# Patient Record
Sex: FEMALE | Race: BLACK OR AFRICAN AMERICAN | NOT HISPANIC OR LATINO
[De-identification: names, ages, dates, MRNs, and addresses within clinical notes are randomized per-mention and may not be internally consistent; named-entity substitution may affect disease eponyms.]

---

## 2019-10-18 PROBLEM — Z00.00 ENCOUNTER FOR PREVENTIVE HEALTH EXAMINATION: Status: ACTIVE | Noted: 2019-10-18

## 2019-10-29 ENCOUNTER — APPOINTMENT (OUTPATIENT)
Dept: NEUROLOGY | Facility: CLINIC | Age: 29
End: 2019-10-29

## 2019-11-12 ENCOUNTER — APPOINTMENT (OUTPATIENT)
Dept: NEUROLOGY | Facility: CLINIC | Age: 29
End: 2019-11-12

## 2020-01-23 ENCOUNTER — TRANSCRIPTION ENCOUNTER (OUTPATIENT)
Age: 30
End: 2020-01-23

## 2020-03-15 ENCOUNTER — EMERGENCY (EMERGENCY)
Facility: HOSPITAL | Age: 30
LOS: 1 days | Discharge: HOME | End: 2020-03-15
Attending: EMERGENCY MEDICINE | Admitting: EMERGENCY MEDICINE
Payer: MEDICAID

## 2020-03-15 ENCOUNTER — TRANSCRIPTION ENCOUNTER (OUTPATIENT)
Age: 30
End: 2020-03-15

## 2020-03-15 VITALS
SYSTOLIC BLOOD PRESSURE: 108 MMHG | OXYGEN SATURATION: 100 % | DIASTOLIC BLOOD PRESSURE: 73 MMHG | HEART RATE: 77 BPM | TEMPERATURE: 98 F | RESPIRATION RATE: 18 BRPM

## 2020-03-15 VITALS
RESPIRATION RATE: 18 BRPM | TEMPERATURE: 98 F | HEART RATE: 80 BPM | DIASTOLIC BLOOD PRESSURE: 74 MMHG | OXYGEN SATURATION: 100 %

## 2020-03-15 LAB
ALBUMIN SERPL ELPH-MCNC: 3.8 G/DL — SIGNIFICANT CHANGE UP (ref 3.3–5)
ALP SERPL-CCNC: 58 U/L — SIGNIFICANT CHANGE UP (ref 40–120)
ALT FLD-CCNC: 17 U/L — SIGNIFICANT CHANGE UP (ref 4–33)
ANION GAP SERPL CALC-SCNC: 8 MMO/L — SIGNIFICANT CHANGE UP (ref 7–14)
APPEARANCE UR: CLEAR — SIGNIFICANT CHANGE UP
AST SERPL-CCNC: 29 U/L — SIGNIFICANT CHANGE UP (ref 4–32)
BILIRUB SERPL-MCNC: 0.3 MG/DL — SIGNIFICANT CHANGE UP (ref 0.2–1.2)
BILIRUB UR-MCNC: NEGATIVE — SIGNIFICANT CHANGE UP
BLOOD UR QL VISUAL: NEGATIVE — SIGNIFICANT CHANGE UP
BUN SERPL-MCNC: 9 MG/DL — SIGNIFICANT CHANGE UP (ref 7–23)
CALCIUM SERPL-MCNC: 9.5 MG/DL — SIGNIFICANT CHANGE UP (ref 8.4–10.5)
CHLORIDE SERPL-SCNC: 101 MMOL/L — SIGNIFICANT CHANGE UP (ref 98–107)
CO2 SERPL-SCNC: 22 MMOL/L — SIGNIFICANT CHANGE UP (ref 22–31)
COLOR SPEC: SIGNIFICANT CHANGE UP
CREAT SERPL-MCNC: 0.67 MG/DL — SIGNIFICANT CHANGE UP (ref 0.5–1.3)
GLUCOSE SERPL-MCNC: 82 MG/DL — SIGNIFICANT CHANGE UP (ref 70–99)
GLUCOSE UR-MCNC: NEGATIVE — SIGNIFICANT CHANGE UP
HCT VFR BLD CALC: 36 % — SIGNIFICANT CHANGE UP (ref 34.5–45)
HGB BLD-MCNC: 11.9 G/DL — SIGNIFICANT CHANGE UP (ref 11.5–15.5)
KETONES UR-MCNC: NEGATIVE — SIGNIFICANT CHANGE UP
LEUKOCYTE ESTERASE UR-ACNC: NEGATIVE — SIGNIFICANT CHANGE UP
LIDOCAIN IGE QN: 24 U/L — SIGNIFICANT CHANGE UP (ref 7–60)
MCHC RBC-ENTMCNC: 29.9 PG — SIGNIFICANT CHANGE UP (ref 27–34)
MCHC RBC-ENTMCNC: 33.1 % — SIGNIFICANT CHANGE UP (ref 32–36)
MCV RBC AUTO: 90.5 FL — SIGNIFICANT CHANGE UP (ref 80–100)
NITRITE UR-MCNC: NEGATIVE — SIGNIFICANT CHANGE UP
NRBC # FLD: 0 K/UL — SIGNIFICANT CHANGE UP (ref 0–0)
PH UR: 6 — SIGNIFICANT CHANGE UP (ref 5–8)
PLATELET # BLD AUTO: 264 K/UL — SIGNIFICANT CHANGE UP (ref 150–400)
PMV BLD: 10.5 FL — SIGNIFICANT CHANGE UP (ref 7–13)
POTASSIUM SERPL-MCNC: 5.5 MMOL/L — HIGH (ref 3.5–5.3)
POTASSIUM SERPL-SCNC: 5.5 MMOL/L — HIGH (ref 3.5–5.3)
PROT SERPL-MCNC: 7.2 G/DL — SIGNIFICANT CHANGE UP (ref 6–8.3)
PROT UR-MCNC: NEGATIVE — SIGNIFICANT CHANGE UP
RBC # BLD: 3.98 M/UL — SIGNIFICANT CHANGE UP (ref 3.8–5.2)
RBC # FLD: 13.7 % — SIGNIFICANT CHANGE UP (ref 10.3–14.5)
SODIUM SERPL-SCNC: 131 MMOL/L — LOW (ref 135–145)
SP GR SPEC: 1.01 — SIGNIFICANT CHANGE UP (ref 1–1.04)
UROBILINOGEN FLD QL: NORMAL — SIGNIFICANT CHANGE UP
WBC # BLD: 5.31 K/UL — SIGNIFICANT CHANGE UP (ref 3.8–10.5)
WBC # FLD AUTO: 5.31 K/UL — SIGNIFICANT CHANGE UP (ref 3.8–10.5)

## 2020-03-15 PROCEDURE — 74176 CT ABD & PELVIS W/O CONTRAST: CPT | Mod: 26

## 2020-03-15 PROCEDURE — 99283 EMERGENCY DEPT VISIT LOW MDM: CPT

## 2020-03-15 PROCEDURE — 76705 ECHO EXAM OF ABDOMEN: CPT | Mod: 26

## 2020-03-15 RX ORDER — SODIUM CHLORIDE 9 MG/ML
1000 INJECTION INTRAMUSCULAR; INTRAVENOUS; SUBCUTANEOUS ONCE
Refills: 0 | Status: COMPLETED | OUTPATIENT
Start: 2020-03-15 | End: 2020-03-15

## 2020-03-15 RX ORDER — KETOROLAC TROMETHAMINE 30 MG/ML
15 SYRINGE (ML) INJECTION ONCE
Refills: 0 | Status: DISCONTINUED | OUTPATIENT
Start: 2020-03-15 | End: 2020-03-15

## 2020-03-15 RX ADMIN — Medication 15 MILLIGRAM(S): at 15:46

## 2020-03-15 RX ADMIN — SODIUM CHLORIDE 1000 MILLILITER(S): 9 INJECTION INTRAMUSCULAR; INTRAVENOUS; SUBCUTANEOUS at 13:43

## 2020-03-15 NOTE — ED PROVIDER NOTE - CLINICAL SUMMARY MEDICAL DECISION MAKING FREE TEXT BOX
upper abd pain concern for pancreatitis vs gb diseas vs gastritis vs pyelo. pain not compatible with gu source. will check ua and u preg

## 2020-03-15 NOTE — ED ADULT NURSE NOTE - CHPI ED NUR SYMPTOMS NEG
no dysuria/no hematuria/no nausea/no vomiting/no fever/no diarrhea/no blood in stool/no burning urination/no chills

## 2020-03-15 NOTE — PROVIDER CONTACT NOTE (OTHER) - ASSESSMENT
SW contacted by Attending BETSY Patrick who states Pt is from a Group Home had an Aide with her who appears to have left. Pt has intellectual delay.  ROCIO met with Pt A&O x2 who was alone unable to provide contact information for Aide. SW unable to reach anyone with number on file, SW attempted online searches for agency [facility] contact information for weekday personnel.   ROCIO met with Pt again who was now accompanied by two staff from [facility] [phone] MsKory Ely and Ms. Landers who confirmed Pt’s address [address] [place] [address] and provided  Doris Georges # [phone]. ROCIO spoke with Manager who confirmed the staff, address and Pt to travel back by group home van driven by Ms. Graves.   Pt is cleared for D/C by Medicine.  Clinical provider is in agreement with Group Home Van transport to residence.  Huddle Form Completed.  No further SW intervention required at this time.

## 2020-03-15 NOTE — ED PROVIDER NOTE - OBJECTIVE STATEMENT
29 year old female with developmental delay presents with diffuse abd pain, mostly upper. no lower abd pain. denies n/v/d/ denies dysuria

## 2020-06-20 ENCOUNTER — EMERGENCY (EMERGENCY)
Facility: HOSPITAL | Age: 30
LOS: 1 days | Discharge: HOME | End: 2020-06-20
Attending: EMERGENCY MEDICINE | Admitting: EMERGENCY MEDICINE
Payer: MEDICAID

## 2020-06-20 VITALS
TEMPERATURE: 99 F | RESPIRATION RATE: 19 BRPM | SYSTOLIC BLOOD PRESSURE: 112 MMHG | HEART RATE: 81 BPM | DIASTOLIC BLOOD PRESSURE: 74 MMHG | OXYGEN SATURATION: 100 %

## 2020-06-20 VITALS
OXYGEN SATURATION: 100 % | DIASTOLIC BLOOD PRESSURE: 80 MMHG | TEMPERATURE: 98 F | HEART RATE: 90 BPM | RESPIRATION RATE: 18 BRPM | SYSTOLIC BLOOD PRESSURE: 129 MMHG

## 2020-06-20 LAB
ALBUMIN SERPL ELPH-MCNC: 4 G/DL — SIGNIFICANT CHANGE UP (ref 3.3–5)
ALP SERPL-CCNC: 59 U/L — SIGNIFICANT CHANGE UP (ref 40–120)
ALT FLD-CCNC: 12 U/L — SIGNIFICANT CHANGE UP (ref 4–33)
ANION GAP SERPL CALC-SCNC: 10 MMO/L — SIGNIFICANT CHANGE UP (ref 7–14)
APPEARANCE UR: CLEAR — SIGNIFICANT CHANGE UP
AST SERPL-CCNC: 15 U/L — SIGNIFICANT CHANGE UP (ref 4–32)
BASOPHILS # BLD AUTO: 0.01 K/UL — SIGNIFICANT CHANGE UP (ref 0–0.2)
BASOPHILS NFR BLD AUTO: 0.2 % — SIGNIFICANT CHANGE UP (ref 0–2)
BILIRUB SERPL-MCNC: < 0.2 MG/DL — LOW (ref 0.2–1.2)
BILIRUB UR-MCNC: NEGATIVE — SIGNIFICANT CHANGE UP
BLOOD UR QL VISUAL: NEGATIVE — SIGNIFICANT CHANGE UP
BUN SERPL-MCNC: 8 MG/DL — SIGNIFICANT CHANGE UP (ref 7–23)
CALCIUM SERPL-MCNC: 9.5 MG/DL — SIGNIFICANT CHANGE UP (ref 8.4–10.5)
CHLORIDE SERPL-SCNC: 103 MMOL/L — SIGNIFICANT CHANGE UP (ref 98–107)
CO2 SERPL-SCNC: 23 MMOL/L — SIGNIFICANT CHANGE UP (ref 22–31)
COLOR SPEC: SIGNIFICANT CHANGE UP
CREAT SERPL-MCNC: 0.69 MG/DL — SIGNIFICANT CHANGE UP (ref 0.5–1.3)
EOSINOPHIL # BLD AUTO: 0.04 K/UL — SIGNIFICANT CHANGE UP (ref 0–0.5)
EOSINOPHIL NFR BLD AUTO: 0.8 % — SIGNIFICANT CHANGE UP (ref 0–6)
GLUCOSE SERPL-MCNC: 95 MG/DL — SIGNIFICANT CHANGE UP (ref 70–99)
GLUCOSE UR-MCNC: NEGATIVE — SIGNIFICANT CHANGE UP
HCG SERPL-ACNC: < 5 MIU/ML — SIGNIFICANT CHANGE UP
HCT VFR BLD CALC: 34.7 % — SIGNIFICANT CHANGE UP (ref 34.5–45)
HGB BLD-MCNC: 11.5 G/DL — SIGNIFICANT CHANGE UP (ref 11.5–15.5)
IMM GRANULOCYTES NFR BLD AUTO: 0.2 % — SIGNIFICANT CHANGE UP (ref 0–1.5)
KETONES UR-MCNC: NEGATIVE — SIGNIFICANT CHANGE UP
LEUKOCYTE ESTERASE UR-ACNC: NEGATIVE — SIGNIFICANT CHANGE UP
LIDOCAIN IGE QN: 32.2 U/L — SIGNIFICANT CHANGE UP (ref 7–60)
LYMPHOCYTES # BLD AUTO: 1.58 K/UL — SIGNIFICANT CHANGE UP (ref 1–3.3)
LYMPHOCYTES # BLD AUTO: 33.4 % — SIGNIFICANT CHANGE UP (ref 13–44)
MAGNESIUM SERPL-MCNC: 1.7 MG/DL — SIGNIFICANT CHANGE UP (ref 1.6–2.6)
MCHC RBC-ENTMCNC: 29.6 PG — SIGNIFICANT CHANGE UP (ref 27–34)
MCHC RBC-ENTMCNC: 33.1 % — SIGNIFICANT CHANGE UP (ref 32–36)
MCV RBC AUTO: 89.2 FL — SIGNIFICANT CHANGE UP (ref 80–100)
MONOCYTES # BLD AUTO: 0.39 K/UL — SIGNIFICANT CHANGE UP (ref 0–0.9)
MONOCYTES NFR BLD AUTO: 8.2 % — SIGNIFICANT CHANGE UP (ref 2–14)
NEUTROPHILS # BLD AUTO: 2.7 K/UL — SIGNIFICANT CHANGE UP (ref 1.8–7.4)
NEUTROPHILS NFR BLD AUTO: 57.2 % — SIGNIFICANT CHANGE UP (ref 43–77)
NITRITE UR-MCNC: NEGATIVE — SIGNIFICANT CHANGE UP
NRBC # FLD: 0 K/UL — SIGNIFICANT CHANGE UP (ref 0–0)
PH UR: 7.5 — SIGNIFICANT CHANGE UP (ref 5–8)
PHOSPHATE SERPL-MCNC: 3.1 MG/DL — SIGNIFICANT CHANGE UP (ref 2.5–4.5)
PLATELET # BLD AUTO: 259 K/UL — SIGNIFICANT CHANGE UP (ref 150–400)
PMV BLD: 10.5 FL — SIGNIFICANT CHANGE UP (ref 7–13)
POTASSIUM SERPL-MCNC: 4.4 MMOL/L — SIGNIFICANT CHANGE UP (ref 3.5–5.3)
POTASSIUM SERPL-SCNC: 4.4 MMOL/L — SIGNIFICANT CHANGE UP (ref 3.5–5.3)
PROT SERPL-MCNC: 6.9 G/DL — SIGNIFICANT CHANGE UP (ref 6–8.3)
PROT UR-MCNC: NEGATIVE — SIGNIFICANT CHANGE UP
RBC # BLD: 3.89 M/UL — SIGNIFICANT CHANGE UP (ref 3.8–5.2)
RBC # FLD: 13.4 % — SIGNIFICANT CHANGE UP (ref 10.3–14.5)
SODIUM SERPL-SCNC: 136 MMOL/L — SIGNIFICANT CHANGE UP (ref 135–145)
SP GR SPEC: 1.01 — SIGNIFICANT CHANGE UP (ref 1–1.04)
UROBILINOGEN FLD QL: NORMAL — SIGNIFICANT CHANGE UP
WBC # BLD: 4.73 K/UL — SIGNIFICANT CHANGE UP (ref 3.8–10.5)
WBC # FLD AUTO: 4.73 K/UL — SIGNIFICANT CHANGE UP (ref 3.8–10.5)

## 2020-06-20 PROCEDURE — 99284 EMERGENCY DEPT VISIT MOD MDM: CPT

## 2020-06-20 RX ORDER — FAMOTIDINE 10 MG/ML
20 INJECTION INTRAVENOUS DAILY
Refills: 0 | Status: DISCONTINUED | OUTPATIENT
Start: 2020-06-20 | End: 2020-06-24

## 2020-06-20 RX ADMIN — FAMOTIDINE 104 MILLIGRAM(S): 10 INJECTION INTRAVENOUS at 18:25

## 2020-06-20 RX ADMIN — Medication 30 MILLILITER(S): at 18:24

## 2020-06-20 NOTE — ED ADULT TRIAGE NOTE - CHIEF COMPLAINT QUOTE
Pt arrives via EMS--pt autistic from a group home. Pt c/o abdominal stomach all over since yesterday. Denies any other symptoms. Denies N/V or diarrhea Pt arrives via EMS--pt autistic from a group home., with aide. Pt c/o abdominal /stomach pain all over since yesterday. Denies any other symptoms. Denies N/V or diarrhea

## 2020-06-20 NOTE — ED ADULT NURSE NOTE - NSIMPLEMENTINTERV_GEN_ALL_ED
Implemented All Universal Safety Interventions:  [place] to call system. Call bell, personal items and telephone within reach. Instruct patient to call for assistance. Room bathroom lighting operational. Non-slip footwear when patient is off stretcher. Physically safe environment: no spills, clutter or unnecessary equipment. Stretcher in lowest position, wheels locked, appropriate side rails in place.

## 2020-06-20 NOTE — ED PROVIDER NOTE - PATIENT PORTAL LINK FT
You can access the FollowMyHealth Patient Portal offered by [facility] by registering at the following website: [email or web address]. By joining [employer]’s FollowMyHealth portal, you will also be able to view your health information using other applications (apps) compatible with our system.

## 2020-06-20 NOTE — ED ADULT NURSE NOTE - CHIEF COMPLAINT QUOTE
Pt arrives via EMS--pt autistic from a group home., with aide. Pt c/o abdominal /stomach pain all over since yesterday. Denies any other symptoms. Denies N/V or diarrhea

## 2020-06-20 NOTE — ED ADULT NURSE NOTE - OBJECTIVE STATEMENT
Receive pt. in Intake room 3 alert and oriented x 3, presenting to the ER with complaints of abdominal pain. Pt. have a history of developmental delay, she is from a group home accompanied by staff. Pt. stated " I ate mac and cheese with chicken tenders today and my stomach is hurting now". Medicated as ordered, labs sent, will continue to monitor.

## 2020-06-20 NOTE — ED PROVIDER NOTE - NSFOLLOWUPINSTRUCTIONS_ED_ALL_ED_FT
Advance activity as tolerated.  Continue all previously prescribed medications as directed unless otherwise instructed.  Follow up with your primary care physician in 48-72 hours- bring copies of your results.  Return to the ER for worsening or persistent symptoms, and/or ANY NEW OR CONCERNING SYMPTOMS. If you have issues obtaining follow up, please call: [phone] to obtain a doctor or specialist who takes your insurance in your area.  You may call [phone] to make an appointment with the internal medicine clinic.

## 2020-06-20 NOTE — ED PROVIDER NOTE - CLINICAL SUMMARY MEDICAL DECISION MAKING FREE TEXT BOX
30 y/o F pmh  developmental delay c/o sharp abd pain all over shortly after having lunch consisting of mac n cheese and chicken tenders. Pt reports having this pain before and was told everything was normal. Did not take anything for the pain. Denies nausea, vomiting, diarrhea, fever, chills, cp, sob, dysuria.  abd soft/nt/nd  will give pepcid, maalox  -labs, ua
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
## 2020-06-20 NOTE — ED PROVIDER NOTE - OBJECTIVE STATEMENT
28 y/o F pmh  developmental delay c/o sharp abd pain all over shortly after having lunch consisting of mac n cheese and chicken tenders. Pt reports having this pain before and was told everything was normal. Did not take anything for the pain. Denies nausea, vomiting, diarrhea, fever, chills, cp, sob, dysuria.

## 2020-06-21 LAB
CULTURE RESULTS: SIGNIFICANT CHANGE UP
SPECIMEN SOURCE: SIGNIFICANT CHANGE UP

## 2020-07-19 ENCOUNTER — EMERGENCY (EMERGENCY)
Facility: HOSPITAL | Age: 30
LOS: 1 days | Discharge: HOME | End: 2020-07-19
Attending: EMERGENCY MEDICINE | Admitting: EMERGENCY MEDICINE
Payer: MEDICAID

## 2020-07-19 VITALS
HEART RATE: 111 BPM | TEMPERATURE: 98 F | SYSTOLIC BLOOD PRESSURE: 111 MMHG | DIASTOLIC BLOOD PRESSURE: 72 MMHG | RESPIRATION RATE: 16 BRPM | OXYGEN SATURATION: 100 %

## 2020-07-19 VITALS
DIASTOLIC BLOOD PRESSURE: 77 MMHG | OXYGEN SATURATION: 100 % | RESPIRATION RATE: 16 BRPM | SYSTOLIC BLOOD PRESSURE: 129 MMHG | TEMPERATURE: 98 F | HEART RATE: 76 BPM

## 2020-07-19 LAB
ALBUMIN SERPL ELPH-MCNC: 4 G/DL — SIGNIFICANT CHANGE UP (ref 3.3–5)
ALP SERPL-CCNC: 58 U/L — SIGNIFICANT CHANGE UP (ref 40–120)
ALT FLD-CCNC: 13 U/L — SIGNIFICANT CHANGE UP (ref 4–33)
ANION GAP SERPL CALC-SCNC: 11 MMO/L — SIGNIFICANT CHANGE UP (ref 7–14)
APPEARANCE UR: CLEAR — SIGNIFICANT CHANGE UP
AST SERPL-CCNC: 16 U/L — SIGNIFICANT CHANGE UP (ref 4–32)
BASOPHILS # BLD AUTO: 0.01 K/UL — SIGNIFICANT CHANGE UP (ref 0–0.2)
BASOPHILS NFR BLD AUTO: 0.2 % — SIGNIFICANT CHANGE UP (ref 0–2)
BILIRUB SERPL-MCNC: 0.2 MG/DL — SIGNIFICANT CHANGE UP (ref 0.2–1.2)
BILIRUB UR-MCNC: NEGATIVE — SIGNIFICANT CHANGE UP
BLOOD UR QL VISUAL: NEGATIVE — SIGNIFICANT CHANGE UP
BUN SERPL-MCNC: 8 MG/DL — SIGNIFICANT CHANGE UP (ref 7–23)
CALCIUM SERPL-MCNC: 9.4 MG/DL — SIGNIFICANT CHANGE UP (ref 8.4–10.5)
CHLORIDE SERPL-SCNC: 104 MMOL/L — SIGNIFICANT CHANGE UP (ref 98–107)
CO2 SERPL-SCNC: 22 MMOL/L — SIGNIFICANT CHANGE UP (ref 22–31)
COLOR SPEC: SIGNIFICANT CHANGE UP
CREAT SERPL-MCNC: 0.64 MG/DL — SIGNIFICANT CHANGE UP (ref 0.5–1.3)
EOSINOPHIL # BLD AUTO: 0.05 K/UL — SIGNIFICANT CHANGE UP (ref 0–0.5)
EOSINOPHIL NFR BLD AUTO: 1 % — SIGNIFICANT CHANGE UP (ref 0–6)
GLUCOSE SERPL-MCNC: 80 MG/DL — SIGNIFICANT CHANGE UP (ref 70–99)
GLUCOSE UR-MCNC: NEGATIVE — SIGNIFICANT CHANGE UP
HCT VFR BLD CALC: 34.5 % — SIGNIFICANT CHANGE UP (ref 34.5–45)
HGB BLD-MCNC: 11.6 G/DL — SIGNIFICANT CHANGE UP (ref 11.5–15.5)
IMM GRANULOCYTES NFR BLD AUTO: 0.2 % — SIGNIFICANT CHANGE UP (ref 0–1.5)
KETONES UR-MCNC: NEGATIVE — SIGNIFICANT CHANGE UP
LEUKOCYTE ESTERASE UR-ACNC: NEGATIVE — SIGNIFICANT CHANGE UP
LYMPHOCYTES # BLD AUTO: 1.76 K/UL — SIGNIFICANT CHANGE UP (ref 1–3.3)
LYMPHOCYTES # BLD AUTO: 35.8 % — SIGNIFICANT CHANGE UP (ref 13–44)
MCHC RBC-ENTMCNC: 30.5 PG — SIGNIFICANT CHANGE UP (ref 27–34)
MCHC RBC-ENTMCNC: 33.6 % — SIGNIFICANT CHANGE UP (ref 32–36)
MCV RBC AUTO: 90.8 FL — SIGNIFICANT CHANGE UP (ref 80–100)
MONOCYTES # BLD AUTO: 0.38 K/UL — SIGNIFICANT CHANGE UP (ref 0–0.9)
MONOCYTES NFR BLD AUTO: 7.7 % — SIGNIFICANT CHANGE UP (ref 2–14)
NEUTROPHILS # BLD AUTO: 2.7 K/UL — SIGNIFICANT CHANGE UP (ref 1.8–7.4)
NEUTROPHILS NFR BLD AUTO: 55.1 % — SIGNIFICANT CHANGE UP (ref 43–77)
NITRITE UR-MCNC: NEGATIVE — SIGNIFICANT CHANGE UP
NRBC # FLD: 0 K/UL — SIGNIFICANT CHANGE UP (ref 0–0)
PH UR: 6 — SIGNIFICANT CHANGE UP (ref 5–8)
PLATELET # BLD AUTO: 248 K/UL — SIGNIFICANT CHANGE UP (ref 150–400)
PMV BLD: 10.4 FL — SIGNIFICANT CHANGE UP (ref 7–13)
POTASSIUM SERPL-MCNC: 4 MMOL/L — SIGNIFICANT CHANGE UP (ref 3.5–5.3)
POTASSIUM SERPL-SCNC: 4 MMOL/L — SIGNIFICANT CHANGE UP (ref 3.5–5.3)
PROT SERPL-MCNC: 6.5 G/DL — SIGNIFICANT CHANGE UP (ref 6–8.3)
PROT UR-MCNC: NEGATIVE — SIGNIFICANT CHANGE UP
RBC # BLD: 3.8 M/UL — SIGNIFICANT CHANGE UP (ref 3.8–5.2)
RBC # FLD: 13.7 % — SIGNIFICANT CHANGE UP (ref 10.3–14.5)
SODIUM SERPL-SCNC: 137 MMOL/L — SIGNIFICANT CHANGE UP (ref 135–145)
SP GR SPEC: 1.01 — SIGNIFICANT CHANGE UP (ref 1–1.04)
TROPONIN T, HIGH SENSITIVITY: < 6 NG/L — SIGNIFICANT CHANGE UP (ref ?–14)
UROBILINOGEN FLD QL: NORMAL — SIGNIFICANT CHANGE UP
WBC # BLD: 4.91 K/UL — SIGNIFICANT CHANGE UP (ref 3.8–10.5)
WBC # FLD AUTO: 4.91 K/UL — SIGNIFICANT CHANGE UP (ref 3.8–10.5)

## 2020-07-19 PROCEDURE — 99284 EMERGENCY DEPT VISIT MOD MDM: CPT | Mod: 25

## 2020-07-19 PROCEDURE — 71046 X-RAY EXAM CHEST 2 VIEWS: CPT | Mod: 26

## 2020-07-19 PROCEDURE — 93010 ELECTROCARDIOGRAM REPORT: CPT

## 2020-07-19 RX ORDER — ACETAMINOPHEN 500 MG
650 TABLET ORAL ONCE
Refills: 0 | Status: COMPLETED | OUTPATIENT
Start: 2020-07-19 | End: 2020-07-19

## 2020-07-19 RX ADMIN — Medication 650 MILLIGRAM(S): at 20:01

## 2020-07-19 RX ADMIN — Medication 30 MILLILITER(S): at 19:31

## 2020-07-19 RX ADMIN — Medication 650 MILLIGRAM(S): at 19:31

## 2020-07-19 NOTE — ED PROVIDER NOTE - OBJECTIVE STATEMENT
29F presents from group home accompanied by aide. Patient states this morning had sharp non radiating chest pain that lasted about hour. She also had sharp abdominal pain that radiated to her right flank. Pain started after eating toast for breakfast. Denies constipation or diarrhea, no blood in stool. Endorses having a cough for the past week, no fever/chills. No n/v. No shortness of breath. No ightheadedness or passing out.     Medical history: constipation, schizoaffective, diabetes on metformin.   No surgical hx.   No allergies.   No new meds.

## 2020-07-19 NOTE — ED PROVIDER NOTE - PATIENT PORTAL LINK FT
You can access the FollowMyHealth Patient Portal offered by [facility] by registering at the following website: [email or web address]. By joining [employer]’s FollowMyHealth portal, you will also be able to view your health information using other applications (apps) compatible with our system.

## 2020-07-19 NOTE — ED ADULT NURSE NOTE - OBJECTIVE STATEMENT
Pt received to room 26 c/o midsternal CP and epigastric pain for the past few days. Denies N/V/D, fever/chills, SOB. Respirations even/unlabored. In NAD. NSR on cardiac monitor. Pt is from group home, accompanied by group staff member. Hx: schizophrenia. Will continue to monitor.

## 2020-07-19 NOTE — ED PROVIDER NOTE - ATTENDING CONTRIBUTION TO CARE
29F p/w chest pain and stomach pain x 1 day.  Upper middle chest pain x 1 day.  No fever, does endorse cough, no sputum.  no short of breath, no h/o asthma, no vomiting or diarrhea.  No fall or injury.  No other c/o.  Still having chest discomfort.  Pt reports year is 2016 but does know the season, the reason she's here, the name of her aide; aide requests psychiatry eval.  Pt is cooperative, denies SI/HI/AVH.  I suspect pt has more intellectual disability rather than confusion, aide advised we will check labs, and urine and CXR; if those are unremarkable would have pt f/u with PMD and psychiatry as oupt. Normal neuro exam here. 29F p/w chest pain and stomach pain x 1 day.  Upper middle chest pain x 1 day.  No fever, does endorse cough, no sputum.  no short of breath, no h/o asthma, no vomiting or diarrhea.  No fall or injury.  No other c/o.  Still having chest discomfort.  Pt reports year is 2016 but does know the season, the reason she's here, the name of her aide; aide requests psychiatry eval.  Pt is calm and cooperative, denies SI/HI/AVH.  I suspect pt has more intellectual disability rather than confusion, aide advised we will check labs, and urine and CXR; if those are unremarkable would have pt f/u with PMD and psychiatry as oupt. Normal neuro exam here.  VS:  unremarkable except tachycardia, improving    GEN - NAD;   well appearing;   A+O x3  (except year, sensorium clear, knows why she's here, where she is, name of aide, name of president)  HEAD - NC/AT     ENT - PEERL, EOMI, mucous membranes    moist , no discharge      NECK: Neck supple, non-tender without lymphadenopathy, no masses, no JVD  PULM - CTA b/l,  symmetric breath sounds  COR -  normal heart sounds    ABD - , ND, NT, soft,  BACK - no CVA tenderness, nontender spine     EXTREMS - no edema, no deformity, warm and well perfused    SKIN - no rash    or bruising      NEUROLOGIC - alert, face symmetric, speech fluent, sensation nl, motor no focal deficit.

## 2020-07-19 NOTE — ED PROVIDER NOTE - PROGRESS NOTE DETAILS
Patient pain free at this time with normal physical exam and stable vital signs. Caretaker states patient will follow up with primary care provider for recurring or worsening symptoms.

## 2020-07-19 NOTE — ED ADULT NURSE NOTE - CAS EDP DISCH TYPE
Spoke with patient via phone. Last INR 7/18/19 was 2.4. Dose maintained per protocol. Today's INR is 3.3 and is within goal range. Current warfarin dosing verified with patient. Patient was informed that their INR result is within therapeutic range and instructed to maintain current dose per protocol. Discussed dose and return date for next INR. Dr. Zina Andrade is in the office today supervising the treatment. Patient was instructed to contact the clinic with any unusual bleeding or bruising, any changes in medications, diet, health status, lifestyle, or any other changes, questions or concerns. Patient verbalized understanding of all discussed.
Group Home/[facility] group [facility]

## 2020-07-19 NOTE — ED PROVIDER NOTE - CLINICAL SUMMARY MEDICAL DECISION MAKING FREE TEXT BOX
Sue: 29F presents to ED after having brief episode of abdominal pain and chest pain. No concern for aortic dissection as VSS and PE normal. Also no risk factors (non smoker, no HTN, young age). Pulmonary embolism unlikely as well (no recent immobilization, no OCPs, no smoking, no family hx clots, no personal hx clots, no tachycardia or hypoxia or syncopal events). Boorheves, ACS, Pneumonia unlikely given HPI, but will check CXR, troponin, basic labs, EKG. AGE unlikely as no n/v/d or fever. Appendicitis and choleystits unlikely as patient is pain free. Will d/c with PCP follow up as patient is asymptomatic at this time.

## 2020-07-19 NOTE — ED PROVIDER NOTE - NSFOLLOWUPINSTRUCTIONS_ED_ALL_ED_FT
You were seen for abdominal pain and chest pain.     Seek immediate medical assistance for any new or worsening symptoms.     Follow up with your PCP for recurring symptoms.     Please take 600 mg of Ibuprofen (aka Motrin, Advil) and/or 650 mg Acetaminophen (aka Tylenol) every 6 hours, as needed, for mild-moderate pain.

## 2020-07-19 NOTE — ED ADULT TRIAGE NOTE - CHIEF COMPLAINT QUOTE
Pt from [facility] group home accompanied by aide w/ hx of schizophrenia, non positional midsternal chest pain radiating bilaterally since this am w/ associated anxiety Pt denies SOB dizziness

## 2020-07-19 NOTE — ED ADULT NURSE NOTE - CHIEF COMPLAINT QUOTE
Pt from [facility] group home accompanied by aide w/ hx of schizophrenia, non positional midsternal chest pain radiating bilaterally since this am w/ associated anxiety Pt denies SOB dizziness

## 2020-11-08 ENCOUNTER — EMERGENCY (EMERGENCY)
Facility: HOSPITAL | Age: 30
LOS: 1 days | Discharge: HOME | End: 2020-11-08
Attending: EMERGENCY MEDICINE | Admitting: EMERGENCY MEDICINE
Payer: MEDICAID

## 2020-11-08 VITALS
RESPIRATION RATE: 16 BRPM | SYSTOLIC BLOOD PRESSURE: 113 MMHG | TEMPERATURE: 97 F | DIASTOLIC BLOOD PRESSURE: 80 MMHG | HEART RATE: 94 BPM | OXYGEN SATURATION: 97 %

## 2020-11-08 PROCEDURE — 99283 EMERGENCY DEPT VISIT LOW MDM: CPT

## 2020-11-08 RX ORDER — FAMOTIDINE 10 MG/ML
20 INJECTION INTRAVENOUS ONCE
Refills: 0 | Status: COMPLETED | OUTPATIENT
Start: 2020-11-08 | End: 2020-11-08

## 2020-11-08 RX ADMIN — FAMOTIDINE 20 MILLIGRAM(S): 10 INJECTION INTRAVENOUS at 18:24

## 2020-11-08 RX ADMIN — Medication 30 MILLILITER(S): at 18:24

## 2020-11-08 NOTE — ED ADULT TRIAGE NOTE - CHIEF COMPLAINT QUOTE
Coming from group home for abd pain and diarrhea x 3 days. PMH DMII, developmental delays Coming from group home accompanied by staff for abd pain and diarrhea x 3 days. PMH DMII, developmental delays

## 2020-11-08 NOTE — ED PROVIDER NOTE - CLINICAL SUMMARY MEDICAL DECISION MAKING FREE TEXT BOX
28 y/o F with adnominal discomfort and loose stool presents to ED. The patient is well appearing. Likely gastritis vs PUD vs IBS. Will obtain uPreg and give GI cocktail. Will discharge with GI follow up as an outpatient. 28 y/o F with adnominal discomfort and loose stool presents to ED. The patient is well appearing. Likely gastritis vs PUD vs GERD vs IBS.  Will obtain uPreg and give GI cocktail. Will discharge with GI follow up as an outpatient.

## 2020-11-08 NOTE — ED PROVIDER NOTE - CARE PLAN
Principal Discharge DX:	Abdominal pain  Assessment and plan of treatment:	You were seen today for your abdominal pain.  It is likely due to gastritis, stomach inflammation.  Avoid salty or spicy foods, alcohol, and caffeine.  Be sure to follow up with your primary care physician in 2-3 days for re-evaluation.  Follow up with a GI specialist at the next available appointment. RETURN TO THE EMERGENCY DEPARTMENT IMMEDIATELY FOR SEVERE PAIN, IF YOU CANNOT EAT OR DRINK, DEVELOP CHEST PAIN, TROUBLE BREATHING, OR FOR ANY OTHER CONCERN.

## 2020-11-08 NOTE — ED ADULT NURSE NOTE - OBJECTIVE STATEMENT
pt received to intake 5, aox3.  pt c/o abd pain and diarrhea x a few days.  pt appears in NAD, tolerating PO well.  awaiting further orders

## 2020-11-08 NOTE — ED PROVIDER NOTE - PATIENT PORTAL LINK FT
You can access the FollowMyHealth Patient Portal offered by [facility] by registering at the following website: [email or web address]. By joining [employer]’s FollowMyHealth portal, you will also be able to view your health information using other applications (apps) compatible with our system.

## 2020-11-08 NOTE — ED PROVIDER NOTE - OBJECTIVE STATEMENT
30 y/o F presents to ED complaining of abdominal discomfort and loose stools. She came from a group home with an aide. She states that for the last couple of months she has had general abdominal discomfort and loose stools. Nothing has changed today. he states that he primarily has upper abdominal pain with intermittent radiation into the chest that feels like burning. 28 y/o F presents to ED complaining of abdominal discomfort and loose stools. She came from a group home with an aide. She states that for the last couple of months she has had general abdominal discomfort and loose stools. Nothing has changed today. She states that she primarily has upper abdominal pain with intermittent radiation into the chest that feels like burning.

## 2020-11-08 NOTE — ED ADULT NURSE NOTE - CHIEF COMPLAINT QUOTE
Coming from group home accompanied by staff for abd pain and diarrhea x 3 days. PMH DMII, developmental delays

## 2020-11-08 NOTE — ED PROVIDER NOTE - PLAN OF CARE
You were seen today for your abdominal pain.  It is likely due to gastritis, stomach inflammation.  Avoid salty or spicy foods, alcohol, and caffeine.  Be sure to follow up with your primary care physician in 2-3 days for re-evaluation.  Follow up with a GI specialist at the next available appointment. RETURN TO THE EMERGENCY DEPARTMENT IMMEDIATELY FOR SEVERE PAIN, IF YOU CANNOT EAT OR DRINK, DEVELOP CHEST PAIN, TROUBLE BREATHING, OR FOR ANY OTHER CONCERN.

## 2020-11-09 LAB — SARS-COV-2 RNA SPEC QL NAA+PROBE: SIGNIFICANT CHANGE UP

## 2020-11-18 ENCOUNTER — EMERGENCY (EMERGENCY)
Facility: HOSPITAL | Age: 30
LOS: 1 days | Discharge: HOME | End: 2020-11-18
Attending: EMERGENCY MEDICINE | Admitting: EMERGENCY MEDICINE
Payer: MEDICAID

## 2020-11-18 VITALS
DIASTOLIC BLOOD PRESSURE: 83 MMHG | RESPIRATION RATE: 15 BRPM | OXYGEN SATURATION: 100 % | SYSTOLIC BLOOD PRESSURE: 116 MMHG | TEMPERATURE: 97 F | HEART RATE: 85 BPM

## 2020-11-18 LAB
ALBUMIN SERPL ELPH-MCNC: 4.2 G/DL — SIGNIFICANT CHANGE UP (ref 3.3–5)
ALP SERPL-CCNC: 49 U/L — SIGNIFICANT CHANGE UP (ref 40–120)
ALT FLD-CCNC: 10 U/L — SIGNIFICANT CHANGE UP (ref 4–33)
AMPHET UR-MCNC: NEGATIVE — SIGNIFICANT CHANGE UP
ANION GAP SERPL CALC-SCNC: 11 MMO/L — SIGNIFICANT CHANGE UP (ref 7–14)
APAP SERPL-MCNC: < 15 UG/ML — LOW (ref 15–25)
APPEARANCE UR: CLEAR — SIGNIFICANT CHANGE UP
AST SERPL-CCNC: 10 U/L — SIGNIFICANT CHANGE UP (ref 4–32)
BARBITURATES UR SCN-MCNC: NEGATIVE — SIGNIFICANT CHANGE UP
BASOPHILS # BLD AUTO: 0.02 K/UL — SIGNIFICANT CHANGE UP (ref 0–0.2)
BASOPHILS NFR BLD AUTO: 0.4 % — SIGNIFICANT CHANGE UP (ref 0–2)
BENZODIAZ UR-MCNC: NEGATIVE — SIGNIFICANT CHANGE UP
BILIRUB SERPL-MCNC: 0.3 MG/DL — SIGNIFICANT CHANGE UP (ref 0.2–1.2)
BILIRUB UR-MCNC: NEGATIVE — SIGNIFICANT CHANGE UP
BLOOD UR QL VISUAL: NEGATIVE — SIGNIFICANT CHANGE UP
BUN SERPL-MCNC: 7 MG/DL — SIGNIFICANT CHANGE UP (ref 7–23)
CALCIUM SERPL-MCNC: 9.5 MG/DL — SIGNIFICANT CHANGE UP (ref 8.4–10.5)
CANNABINOIDS UR-MCNC: NEGATIVE — SIGNIFICANT CHANGE UP
CHLORIDE SERPL-SCNC: 103 MMOL/L — SIGNIFICANT CHANGE UP (ref 98–107)
CO2 SERPL-SCNC: 24 MMOL/L — SIGNIFICANT CHANGE UP (ref 22–31)
COCAINE METAB.OTHER UR-MCNC: NEGATIVE — SIGNIFICANT CHANGE UP
COLOR SPEC: YELLOW — SIGNIFICANT CHANGE UP
CREAT SERPL-MCNC: 0.69 MG/DL — SIGNIFICANT CHANGE UP (ref 0.5–1.3)
EOSINOPHIL # BLD AUTO: 0.01 K/UL — SIGNIFICANT CHANGE UP (ref 0–0.5)
EOSINOPHIL NFR BLD AUTO: 0.2 % — SIGNIFICANT CHANGE UP (ref 0–6)
EPI CELLS # UR: SIGNIFICANT CHANGE UP
ETHANOL BLD-MCNC: < 10 MG/DL — SIGNIFICANT CHANGE UP
GLUCOSE SERPL-MCNC: 85 MG/DL — SIGNIFICANT CHANGE UP (ref 70–99)
GLUCOSE UR-MCNC: NEGATIVE — SIGNIFICANT CHANGE UP
HCG SERPL-ACNC: < 5 MIU/ML — SIGNIFICANT CHANGE UP
HCT VFR BLD CALC: 36.4 % — SIGNIFICANT CHANGE UP (ref 34.5–45)
HGB BLD-MCNC: 12.2 G/DL — SIGNIFICANT CHANGE UP (ref 11.5–15.5)
IMM GRANULOCYTES NFR BLD AUTO: 0.2 % — SIGNIFICANT CHANGE UP (ref 0–1.5)
KETONES UR-MCNC: SIGNIFICANT CHANGE UP
LEUKOCYTE ESTERASE UR-ACNC: NEGATIVE — SIGNIFICANT CHANGE UP
LYMPHOCYTES # BLD AUTO: 1.42 K/UL — SIGNIFICANT CHANGE UP (ref 1–3.3)
LYMPHOCYTES # BLD AUTO: 31.6 % — SIGNIFICANT CHANGE UP (ref 13–44)
MCHC RBC-ENTMCNC: 30.4 PG — SIGNIFICANT CHANGE UP (ref 27–34)
MCHC RBC-ENTMCNC: 33.5 % — SIGNIFICANT CHANGE UP (ref 32–36)
MCV RBC AUTO: 90.8 FL — SIGNIFICANT CHANGE UP (ref 80–100)
METHADONE UR-MCNC: NEGATIVE — SIGNIFICANT CHANGE UP
MONOCYTES # BLD AUTO: 0.42 K/UL — SIGNIFICANT CHANGE UP (ref 0–0.9)
MONOCYTES NFR BLD AUTO: 9.4 % — SIGNIFICANT CHANGE UP (ref 2–14)
NEUTROPHILS # BLD AUTO: 2.61 K/UL — SIGNIFICANT CHANGE UP (ref 1.8–7.4)
NEUTROPHILS NFR BLD AUTO: 58.2 % — SIGNIFICANT CHANGE UP (ref 43–77)
NITRITE UR-MCNC: NEGATIVE — SIGNIFICANT CHANGE UP
NRBC # FLD: 0 K/UL — SIGNIFICANT CHANGE UP (ref 0–0)
OPIATES UR-MCNC: NEGATIVE — SIGNIFICANT CHANGE UP
OXYCODONE UR-MCNC: NEGATIVE — SIGNIFICANT CHANGE UP
PCP UR-MCNC: NEGATIVE — SIGNIFICANT CHANGE UP
PH UR: 6 — SIGNIFICANT CHANGE UP (ref 5–8)
PLATELET # BLD AUTO: 242 K/UL — SIGNIFICANT CHANGE UP (ref 150–400)
PMV BLD: 10.8 FL — SIGNIFICANT CHANGE UP (ref 7–13)
POTASSIUM SERPL-MCNC: 4.1 MMOL/L — SIGNIFICANT CHANGE UP (ref 3.5–5.3)
POTASSIUM SERPL-SCNC: 4.1 MMOL/L — SIGNIFICANT CHANGE UP (ref 3.5–5.3)
PROT SERPL-MCNC: 6.3 G/DL — SIGNIFICANT CHANGE UP (ref 6–8.3)
PROT UR-MCNC: 10 — SIGNIFICANT CHANGE UP
RBC # BLD: 4.01 M/UL — SIGNIFICANT CHANGE UP (ref 3.8–5.2)
RBC # FLD: 13.5 % — SIGNIFICANT CHANGE UP (ref 10.3–14.5)
RBC CASTS # UR COMP ASSIST: SIGNIFICANT CHANGE UP (ref 0–?)
SALICYLATES SERPL-MCNC: < 5 MG/DL — LOW (ref 15–30)
SODIUM SERPL-SCNC: 138 MMOL/L — SIGNIFICANT CHANGE UP (ref 135–145)
SP GR SPEC: 1.02 — SIGNIFICANT CHANGE UP (ref 1–1.04)
TSH SERPL-MCNC: 1.22 UIU/ML — SIGNIFICANT CHANGE UP (ref 0.27–4.2)
UROBILINOGEN FLD QL: NORMAL — SIGNIFICANT CHANGE UP
WBC # BLD: 4.49 K/UL — SIGNIFICANT CHANGE UP (ref 3.8–10.5)
WBC # FLD AUTO: 4.49 K/UL — SIGNIFICANT CHANGE UP (ref 3.8–10.5)
WBC UR QL: SIGNIFICANT CHANGE UP (ref 0–?)

## 2020-11-18 PROCEDURE — 90792 PSYCH DIAG EVAL W/MED SRVCS: CPT

## 2020-11-18 PROCEDURE — 93010 ELECTROCARDIOGRAM REPORT: CPT

## 2020-11-18 PROCEDURE — 99285 EMERGENCY DEPT VISIT HI MDM: CPT | Mod: 25

## 2020-11-18 RX ORDER — FAMOTIDINE 10 MG/ML
20 INJECTION INTRAVENOUS ONCE
Refills: 0 | Status: COMPLETED | OUTPATIENT
Start: 2020-11-18 | End: 2020-11-18

## 2020-11-18 RX ADMIN — FAMOTIDINE 20 MILLIGRAM(S): 10 INJECTION INTRAVENOUS at 19:38

## 2020-11-18 RX ADMIN — Medication 30 MILLILITER(S): at 19:38

## 2020-11-18 NOTE — ED BEHAVIORAL HEALTH ASSESSMENT NOTE - SAFETY PLAN DETAILS
patient will talk to staff member if she feels worse, if she is suicidal r getting agitated, she shows understanding. Also the staff recommended to send patient back to Er if she becomes agitated, aggressive or suicidal ,

## 2020-11-18 NOTE — ED BEHAVIORAL HEALTH ASSESSMENT NOTE - OTHER
staff peers Pt presents with multiple somatic complaints pt is in bed "upset" labile, anxious- bright AH ? Cognitive impairment MR

## 2020-11-18 NOTE — ED BEHAVIORAL HEALTH ASSESSMENT NOTE - REFERRAL / APPOINTMENT DETAILS
pt will continue with the psych appointment, recommended to increase the risperdal to 3 mg BID and start SSRI

## 2020-11-18 NOTE — ED BEHAVIORAL HEALTH ASSESSMENT NOTE - DESCRIPTION
cooperative,. no agitation, multiple somatic complaints   Vital Signs Last 24 Hrs  T(C): 36.1 (18 Nov 2020 18:25), Max: 36.1 (18 Nov 2020 18:25)  T(F): 97 (18 Nov 2020 18:25), Max: 97 (18 Nov 2020 18:25)  HR: 85 (18 Nov 2020 18:25) (85 - 85)  BP: 116/83 (18 Nov 2020 18:25) (116/83 - 116/83)  BP(mean): --  RR: 15 (18 Nov 2020 18:25) (15 - 15)  SpO2: 100% (18 Nov 2020 18:25) (100% - 100%) DM Constipation single, in group home; has one sister (as per the patient)

## 2020-11-18 NOTE — ED BEHAVIORAL HEALTH ASSESSMENT NOTE - SUICIDE PROTECTIVE FACTORS
Supportive social network of family or friends/Protestant beliefs/Fear of death or the actual act of killing self

## 2020-11-18 NOTE — ED PROVIDER NOTE - PROGRESS NOTE DETAILS
Sign out follow-up: Pt given discharge papers by resident Dr. Rogers. Social met with patient and a woman identifying herself as a "supervisor", who stated she had a van and would take the patient back to the group home. She walked out with the patient prior to removal of IV. Called [phone], spoke to "Leesa". She will call supervisor and call back. DERIAN. Sue - I did not place an IV in the patient during her stay nor did I see one in her arms during physical examination when I received her in sign out. There is no RN documentation of IV placement in the chart. I spoke to the [facility] home manager, who contacted Lyla Medina (the supervisor at bedside with patient) who states that no IV was ever placed in the patient, and the patients does not have an IV in at this time.

## 2020-11-18 NOTE — ED PROVIDER NOTE - PHYSICAL EXAMINATION
Physical Exam:  Gen: NAD, AOx3, non-toxic appearing, able to ambulate without assistance  Head: NCAT  HEENT: EOMI, PEERLA, normal conjunctiva, tongue midline, oral mucosa moist  Lung: CTAB, no respiratory distress, no wheezes/rhonchi/rales B/L, speaking in full sentences  CV: RRR, no murmurs, rubs or gallops, distal pulses 2+ b/l  Abd: soft, NT, ND, no guarding, no rigidity, no rebound tenderness, no CVA tenderness   Neuro: No focal sensory or motor deficits  Skin: Warm, well perfused, no rash, no leg swelling  Psych: abnormal affect, calm

## 2020-11-18 NOTE — ED ADULT NURSE NOTE - OBJECTIVE STATEMENT
pt received in rm 16 AAO x 3. pt with hx of schizoaffective disorder. pt from group home with staff at bedside, coming in for multiple complaints. pt reports seizures, midsternal chest pain, epigastric pain, sob. pt also states she is scared she is going to die from her seizures and is "planning her ". pt denies fevers, chills, cough, nausea, diarrhea, S/I, H/I, auditory and visual disturbances. respirations even and unlabored. pt appears in NAD at this time. labs drawn and sent. will continue to monitor.
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
## 2020-11-18 NOTE — ED ADULT NURSE REASSESSMENT NOTE - NS ED NURSE REASSESS COMMENT FT1
pt remains calm and cooperative in bed. group home staff at bedside. covid swab and antibody sent. pt remains in NAD. will continue to monitor.

## 2020-11-18 NOTE — ED BEHAVIORAL HEALTH ASSESSMENT NOTE - CURRENT MEDICATION
risperdal 2 mg BID; MVI; Bromocriptine 5 mg BID ; Ativan 1 mg BID; metformin 100 mg BID; cogentin 1 mg BID
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
## 2020-11-18 NOTE — ED ADULT NURSE NOTE - CHIEF COMPLAINT QUOTE
Pt brought in from [facility] presents to ED for psych eval. As per staff at pt's side pt has been having worsening delusions, psychosis, increased agitation, hallucinations, endorsing suicidal ideation,  and decreased PO intake x2wks. Staff also state she has been having urinary incontinence x4days. Pt states "I'm here for my chest pain when I yell and scream and also gas. I have had seizures and I'm planning a  for my seizures." As per staff pt was seen last week at [facility] but pt symptoms have been worsening. Pt denies SI and HI at this time. pmhx of mild intellectual disability, schizoaffective disorder

## 2020-11-18 NOTE — ED PROVIDER NOTE - PATIENT PORTAL LINK FT
You can access the FollowMyHealth Patient Portal offered by [facility] by registering at the following website: [email or web address]. By joining [email or web address]’s FollowMyHealth portal, you will also be able to view your health information using other applications (apps) compatible with our system.

## 2020-11-18 NOTE — ED PROVIDER NOTE - NSFOLLOWUPINSTRUCTIONS_ED_ALL_ED_FT
You were seen for change in behavior.     The medication Risperidone increased to 3mg two times per day. It was sent to your pharmacy.     Seek immediate medical assistance for any new or worsening symptoms such as fever, nausea, vomiting.     A copy of all lab work is given to you.

## 2020-11-18 NOTE — ED PROVIDER NOTE - OBJECTIVE STATEMENT
28yo female schizoaffective, DM on metformin p/w altered mental status. Per group home staff at bedside, for the past two weeks patient has had decreased appetite and 15 pound weight loss, agitation, passive SI, strong delusions of factitious boyfriend resulting in her breaking up with her actual boyfriend at the group home. Patient says she is sad because of her abdominal pain and says she has had intermittent chest pain x 2 days. Acknowledges visual hallucinatons. Denies current SI, HI, auditory hallucinations, N/V/D, dyspnea.

## 2020-11-18 NOTE — ED ADULT TRIAGE NOTE - CHIEF COMPLAINT QUOTE
Pt brought in from [facility] presents to ED for psych eval. As per staff at pt's side pt has been having worsening delusions, psychosis, hallucinations, endorsing suicidal ideation,  and decreased PO intake x2wks. Staff also state she has been having urinary incontinence x4days. Pt states "I'm here for my chest pain when I yell and scream and also gas. I have had seizures and I'm planning a  for my seizures." As per staff pt was seen last week at [facility] but pt symptoms have been worsening. Pt denies SI and HI at this time. pmhx of mild intellectual disability, schizoaffective disorder Pt brought in from [facility] presents to ED for psych eval. As per staff at pt's side pt has been having worsening delusions, psychosis, increased agitation, hallucinations, endorsing suicidal ideation,  and decreased PO intake x2wks. Staff also state she has been having urinary incontinence x4days. Pt states "I'm here for my chest pain when I yell and scream and also gas. I have had seizures and I'm planning a  for my seizures." As per staff pt was seen last week at [facility] but pt symptoms have been worsening. Pt denies SI and HI at this time. pmhx of mild intellectual disability, schizoaffective disorder
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
## 2020-11-18 NOTE — ED BEHAVIORAL HEALTH ASSESSMENT NOTE - OTHER PAST PSYCHIATRIC HISTORY (INCLUDE DETAILS REGARDING ONSET, COURSE OF ILLNESS, INPATIENT/OUTPATIENT TREATMENT)
no prior psych admissions No HX of SA, Under care of Dr Osman. Compliant with meds when "encouraged" patient was seen in [facility] a week ago, was d/milagros with no med adjustment

## 2020-11-18 NOTE — ED BEHAVIORAL HEALTH ASSESSMENT NOTE - RISK ASSESSMENT
delusions, psychosis, impulsivity, anxiety. MR   No insomnia, no agitation, compliant with meds, no S/H I/I/P; No HX of SA Low Acute Suicide Risk

## 2020-11-18 NOTE — ED PROVIDER NOTE - ATTENDING CONTRIBUTION TO CARE
agree with resident note    "28yo female schizoaffective, DM on metformin p/w altered mental status. Per group home staff at bedside, for the past two weeks patient has had decreased appetite and 15 pound weight loss, agitation, passive SI, strong delusions of factitious boyfriend resulting in her breaking up with her actual boyfriend at the group home. Patient says she is sad because of her abdominal pain and says she has had intermittent chest pain x 2 days. Acknowledges visual hallucinatons. Denies current SI, HI, auditory hallucinations, N/V/D, dyspnea."  Pt endorses multiple medical complaints such as seizures, abd pain, chest pain.  Per aide this has been a similar presentation in the past but real complaints are not taking meds, and not eating.  Pt has made up a fictitious boyfriend, has been seeing things that are not there    PE: in no distress; VSS; CTAB/L; s1 s2 no m/r/g abd soft/NT/ND ext: no edema psych: no HI/no SI; poor insight, +AH    Plan: UA/UCG, labs, EKG, psych consult and reassess

## 2020-11-18 NOTE — ED BEHAVIORAL HEALTH NOTE - BEHAVIORAL HEALTH NOTE
Writer contacted [facility] at [phone]. Group home address is [address] [place] [address], [place] [address]. Pt is accompanied at bedside by aide present during MD evaluation.     Writer spoke with MARTINA Landers. DSP at beginning of shift was told pt was having a seizure. Pt reported to have told staff of this as reason why pt was sent to ED. DSP reports that pt recently having many different behaviors and episodes. Pt reporting she is pregnant, getting , and “a lot of different stories”. DSP reports that she was told that this is not new behavior, however, first time specific DSP has seen such behavior from pt. Pt has lived at residence for over a year. Leesa then directed writer to ADRY Sutton at [phone]. RN provided the following information:     Pt said to have a mild intellectual disability and some mental health concerns. Pt recently acting up and talking about “weird stuff” for the past few weeks. Pt was seen in ED one day last week as well due to “acting funny and talking weird stuff”. Pt said to take “psych medications” with some similar past behaviors. Behaviors although worsening with hallucinations, talking about things that are happening that are untrue, and sporadically refusing medications. Pt requiring increased encouragement to take medication. Residence with 24/7 monitoring; no direct 1:1 to staff. No psychiatric admissions since living at [facility]. No medical concerns reported. Pt has hx of diabetes reported to be managed well at current. No known exposure to COVID. No travel outside of [place]. Pt has recent test said to be negative.     No significant changes to daily routine. No violence. No SI/HI intent or plan. Pt recently reporting of illnesses she does not have with increased somatic preoccupation. Staff confirms pt does not such illnesses for ex. sleep apnea. Pt will also say that someone told her she had heart problem also said to not be true. No acute safety concerns. Pt had recent appointment with psychiatrist at [facility] who was made aware of recent symptoms with no changes to medications. Writer encouraged outreach to provider again for outpatient follow up. RN reports that pt in past has reported that she is having seizures. RN unaware of incident leading to ED visit today.  Pt then sent for eval with no medical findings to support.     Writer then contacted back residence speaking with MARTINA Landers. Writer discussed d/c planning and transportation with DSP. DSP requested writer call back in 15 minutes as she needed to speak with . Writer contacted back. Group home to arrange  of staff member and pt. Writer informed aide at bedside who will contact residence to arrange. Writer contacted [facility] at [phone]. Group home address is [address] [place] [address], [place] [address]. Pt is accompanied at bedside by aide present during MD evaluation.     Writer spoke with MARTINA Landers. DSP at beginning of shift was told pt was having a seizure. Pt reported to have told staff of this as reason why pt was sent to ED. DSP reports that pt recently having many different behaviors and episodes. Pt reporting she is pregnant, getting , and “a lot of different stories”. DSP reports that she was told that this is not new behavior, however, first time specific DSP has seen such behavior from pt. Pt has lived at residence for over a year. Leesa then directed writer to ADRY Sutton at [phone]. RN provided the following information:     Pt said to have a mild intellectual disability and some mental health concerns. Pt recently acting up and talking about “weird stuff” for the past few weeks. Pt was seen in ED one day last week as well due to “acting funny and talking weird stuff”. Pt said to take “psych medications” with some similar past behaviors. Behaviors although worsening with hallucinations, talking about things that are happening that are untrue, and sporadically refusing medications. Pt requiring increased encouragement to take medication. Residence with 24/7 monitoring; no direct 1:1 to staff. No psychiatric admissions since living at [facility]. No medical concerns reported. Pt has hx of diabetes reported to be managed well at current. No known exposure to COVID. No travel outside of [place]. Pt has recent test said to be negative.     No significant changes to daily routine. No violence. No SI/HI intent or plan. Pt recently reporting of illnesses she does not have with increased somatic preoccupation. Staff confirms pt does not such illnesses for ex. sleep apnea. Pt will also say that someone told her she had heart problem also said to not be true. No acute safety concerns. Pt had recent appointment with psychiatrist at [facility] who was made aware of recent symptoms with no changes to medications. Writer encouraged outreach to provider again for outpatient follow up. RN reports that pt in past has reported that she is having seizures. RN unaware of incident leading to ED visit today.  Pt then sent for eval with no medical findings to support.     Writer then contacted back residence speaking with MARTINA Delucaa. Writer discussed d/c planning and transportation with DSP. DSP requested writer call back in 15 minutes as she needed to speak with . Writer contacted back. Group home to arrange  of staff member and pt. Group home staff unaware of anyone being at bedside although writer previously informed aide present. Writer went to bedside to assist with transportation arrangements. Writer corrected upon arrival to room that , Lyla, present at bedside. Supervisor unhappy with news of discharge and that residence was called prior to her. Writer offered apologies as unaware supervisor was present at bedside. Supervisor however was present at time of  assessment by MD. Supervisor acting unprofessional making comments of pt having to attempt suicide for admission in front of pt. Supervisor demanding d/c paperwork. Writer left room while supervisor made phone call. Writer contacted DSP spoken with earlier informing her that supervisor is staff member at bedside. Writer recommend follow up be completed with supervisor directly to further coordinate d/c planning and . Team aware.
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
## 2020-11-18 NOTE — ED BEHAVIORAL HEALTH ASSESSMENT NOTE - DETAILS
cognitively impaired patient with mild intellectual disability episodes of agitation at home pt does not want to talk about it 9sex, physical and verbal) was c/o CP states that she has gas states that she has COVID and needs surgery the group home

## 2020-11-18 NOTE — ED PROVIDER NOTE - CLINICAL SUMMARY MEDICAL DECISION MAKING FREE TEXT BOX
28yo female schizoaffective brought in from group home for 2 weeks for change in behavior, passive SI, weight loss, decreased appetite, chest pain, abdominal pain. Soft, non-tender abdomen, clear lungs, EKG unremarkable. Change in behavior likely exacerbation of chronic psychiatric condition vs depression. Low suspicion for infection. Chest pain unlikely to be ACS or PE. Abdominal pain possibly gastritis, low suspicion for surgical pathology. TSH, cbc, cmp, u preg, urine and serum tox and psych consult for inability to care for self and acute delusions.

## 2020-11-19 VITALS
SYSTOLIC BLOOD PRESSURE: 129 MMHG | RESPIRATION RATE: 16 BRPM | HEART RATE: 98 BPM | DIASTOLIC BLOOD PRESSURE: 78 MMHG | TEMPERATURE: 98 F | OXYGEN SATURATION: 100 %

## 2020-11-19 DIAGNOSIS — F70 MILD INTELLECTUAL DISABILITIES: ICD-10-CM

## 2020-11-19 DIAGNOSIS — F25.9 SCHIZOAFFECTIVE DISORDER, UNSPECIFIED: ICD-10-CM

## 2020-11-19 LAB — SARS-COV-2 RNA SPEC QL NAA+PROBE: SIGNIFICANT CHANGE UP

## 2020-11-19 RX ORDER — RISPERIDONE 4 MG/1
1 TABLET ORAL
Qty: 90 | Refills: 0
Start: 2020-11-19 | End: 2020-12-18

## 2020-11-19 NOTE — ED ADULT NURSE REASSESSMENT NOTE - NS ED NURSE REASSESS COMMENT FT1
Pt received from day shift RN. Pt awake and alert. Vitals as noted. Supervisor from group home at bedside. Pt discharged. As per supervisor at bedside, pt to return to group home via van. Supervisor states that she drives van back to home.

## 2020-11-19 NOTE — ED ADULT NURSE REASSESSMENT NOTE - NS ED NURSE REASSESS COMMENT FT1
Pt walked out with discharge papers and supervisor from [facility]. MD Justice contacted [facility] stating that the supervisor needs to take pt back to verify that her IV was taken out. If pt does not return, PD will be called.

## 2020-11-20 LAB
SARS-COV-2 IGG SERPL QL IA: NEGATIVE — SIGNIFICANT CHANGE UP
SARS-COV-2 IGM SERPL IA-ACNC: <0.1 INDEX — SIGNIFICANT CHANGE UP

## 2021-02-28 ENCOUNTER — EMERGENCY (EMERGENCY)
Facility: HOSPITAL | Age: 31
LOS: 0 days | Discharge: HOME | End: 2021-03-01
Attending: STUDENT IN AN ORGANIZED HEALTH CARE EDUCATION/TRAINING PROGRAM
Payer: MEDICAID

## 2021-02-28 VITALS
HEART RATE: 19 BPM | WEIGHT: 162.04 LBS | DIASTOLIC BLOOD PRESSURE: 79 MMHG | SYSTOLIC BLOOD PRESSURE: 112 MMHG | HEIGHT: 62 IN | RESPIRATION RATE: 19 BRPM | OXYGEN SATURATION: 100 % | TEMPERATURE: 98 F

## 2021-02-28 DIAGNOSIS — F25.9 SCHIZOAFFECTIVE DISORDER, UNSPECIFIED: ICD-10-CM

## 2021-02-28 DIAGNOSIS — F69 UNSPECIFIED DISORDER OF ADULT PERSONALITY AND BEHAVIOR: ICD-10-CM

## 2021-02-28 DIAGNOSIS — E11.9 TYPE 2 DIABETES MELLITUS WITHOUT COMPLICATIONS: ICD-10-CM

## 2021-02-28 DIAGNOSIS — F79 UNSPECIFIED INTELLECTUAL DISABILITIES: ICD-10-CM

## 2021-02-28 LAB
ALBUMIN SERPL ELPH-MCNC: 3 G/DL — LOW (ref 3.3–5)
ALP SERPL-CCNC: 44 U/L — SIGNIFICANT CHANGE UP (ref 40–120)
ALT FLD-CCNC: 12 U/L — SIGNIFICANT CHANGE UP (ref 12–78)
ANION GAP SERPL CALC-SCNC: 8 MMOL/L — SIGNIFICANT CHANGE UP (ref 5–17)
APAP SERPL-MCNC: <2 UG/ML — LOW (ref 10–30)
AST SERPL-CCNC: 11 U/L — LOW (ref 15–37)
BASOPHILS # BLD AUTO: 0.02 K/UL — SIGNIFICANT CHANGE UP (ref 0–0.2)
BASOPHILS NFR BLD AUTO: 0.4 % — SIGNIFICANT CHANGE UP (ref 0–2)
BILIRUB SERPL-MCNC: 0.3 MG/DL — SIGNIFICANT CHANGE UP (ref 0.2–1.2)
BUN SERPL-MCNC: 10 MG/DL — SIGNIFICANT CHANGE UP (ref 7–23)
CALCIUM SERPL-MCNC: 8.6 MG/DL — SIGNIFICANT CHANGE UP (ref 8.5–10.1)
CHLORIDE SERPL-SCNC: 106 MMOL/L — SIGNIFICANT CHANGE UP (ref 96–108)
CO2 SERPL-SCNC: 26 MMOL/L — SIGNIFICANT CHANGE UP (ref 22–31)
CREAT SERPL-MCNC: 0.67 MG/DL — SIGNIFICANT CHANGE UP (ref 0.5–1.3)
EOSINOPHIL # BLD AUTO: 0.02 K/UL — SIGNIFICANT CHANGE UP (ref 0–0.5)
EOSINOPHIL NFR BLD AUTO: 0.4 % — SIGNIFICANT CHANGE UP (ref 0–6)
ETHANOL SERPL-MCNC: <10 MG/DL — SIGNIFICANT CHANGE UP (ref 0–10)
GLUCOSE SERPL-MCNC: 92 MG/DL — SIGNIFICANT CHANGE UP (ref 70–99)
HCG SERPL-ACNC: <1 MIU/ML — SIGNIFICANT CHANGE UP
HCT VFR BLD CALC: 34 % — LOW (ref 34.5–45)
HGB BLD-MCNC: 11.4 G/DL — LOW (ref 11.5–15.5)
IMM GRANULOCYTES NFR BLD AUTO: 0.2 % — SIGNIFICANT CHANGE UP (ref 0–1.5)
LYMPHOCYTES # BLD AUTO: 1.69 K/UL — SIGNIFICANT CHANGE UP (ref 1–3.3)
LYMPHOCYTES # BLD AUTO: 37.7 % — SIGNIFICANT CHANGE UP (ref 13–44)
MCHC RBC-ENTMCNC: 31.4 PG — SIGNIFICANT CHANGE UP (ref 27–34)
MCHC RBC-ENTMCNC: 33.5 GM/DL — SIGNIFICANT CHANGE UP (ref 32–36)
MCV RBC AUTO: 93.7 FL — SIGNIFICANT CHANGE UP (ref 80–100)
MONOCYTES # BLD AUTO: 0.41 K/UL — SIGNIFICANT CHANGE UP (ref 0–0.9)
MONOCYTES NFR BLD AUTO: 9.2 % — SIGNIFICANT CHANGE UP (ref 2–14)
NEUTROPHILS # BLD AUTO: 2.33 K/UL — SIGNIFICANT CHANGE UP (ref 1.8–7.4)
NEUTROPHILS NFR BLD AUTO: 52.1 % — SIGNIFICANT CHANGE UP (ref 43–77)
NRBC # BLD: 0 /100 WBCS — SIGNIFICANT CHANGE UP (ref 0–0)
PLATELET # BLD AUTO: 240 K/UL — SIGNIFICANT CHANGE UP (ref 150–400)
POTASSIUM SERPL-MCNC: 4.2 MMOL/L — SIGNIFICANT CHANGE UP (ref 3.5–5.3)
POTASSIUM SERPL-SCNC: 4.2 MMOL/L — SIGNIFICANT CHANGE UP (ref 3.5–5.3)
PROT SERPL-MCNC: 6.3 GM/DL — SIGNIFICANT CHANGE UP (ref 6–8.3)
RBC # BLD: 3.63 M/UL — LOW (ref 3.8–5.2)
RBC # FLD: 14.4 % — SIGNIFICANT CHANGE UP (ref 10.3–14.5)
SALICYLATES SERPL-MCNC: <1.7 MG/DL — LOW (ref 2.8–20)
SARS-COV-2 IGG SERPL QL IA: NEGATIVE — SIGNIFICANT CHANGE UP
SARS-COV-2 IGM SERPL IA-ACNC: 0.07 INDEX — SIGNIFICANT CHANGE UP
SODIUM SERPL-SCNC: 140 MMOL/L — SIGNIFICANT CHANGE UP (ref 135–145)
VALPROATE SERPL-MCNC: 72 UG/ML — SIGNIFICANT CHANGE UP (ref 50–100)
WBC # BLD: 4.48 K/UL — SIGNIFICANT CHANGE UP (ref 3.8–10.5)
WBC # FLD AUTO: 4.48 K/UL — SIGNIFICANT CHANGE UP (ref 3.8–10.5)

## 2021-02-28 PROCEDURE — 93010 ELECTROCARDIOGRAM REPORT: CPT

## 2021-02-28 PROCEDURE — 99284 EMERGENCY DEPT VISIT MOD MDM: CPT

## 2021-02-28 PROCEDURE — 90792 PSYCH DIAG EVAL W/MED SRVCS: CPT | Mod: GC,95

## 2021-02-28 RX ORDER — HALOPERIDOL DECANOATE 100 MG/ML
2.5 INJECTION INTRAMUSCULAR ONCE
Refills: 0 | Status: COMPLETED | OUTPATIENT
Start: 2021-02-28 | End: 2021-02-28

## 2021-02-28 RX ORDER — MIDAZOLAM HYDROCHLORIDE 1 MG/ML
1 INJECTION, SOLUTION INTRAMUSCULAR; INTRAVENOUS ONCE
Refills: 0 | Status: DISCONTINUED | OUTPATIENT
Start: 2021-02-28 | End: 2021-02-28

## 2021-02-28 RX ADMIN — HALOPERIDOL DECANOATE 2.5 MILLIGRAM(S): 100 INJECTION INTRAMUSCULAR at 21:38

## 2021-02-28 RX ADMIN — MIDAZOLAM HYDROCHLORIDE 1 MILLIGRAM(S): 1 INJECTION, SOLUTION INTRAMUSCULAR; INTRAVENOUS at 21:38

## 2021-02-28 NOTE — ED PROVIDER NOTE - CLINICAL SUMMARY MEDICAL DECISION MAKING FREE TEXT BOX
Will evaluate for organic causes of changes in behavior. If normal ED workup, will consult psychiatry for expert evaluation.

## 2021-02-28 NOTE — ED BEHAVIORAL HEALTH ASSESSMENT NOTE - OTHER
O Staff member Generally cooperative, very childlike not assessed mildly pressured at times external
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
## 2021-02-28 NOTE — ED BEHAVIORAL HEALTH ASSESSMENT NOTE - PREFERRED LANGUAGE
[facility]  Neurology  Progress Note    Patient Name: Melody Dupont  MRN: [id number]  Admission Date: 10/21/2020  Hospital Length of Stay: 2 days  Code Status: DNR   Attending Provider: Estrella Bennett MD   Consulting Provider: Gabby Thompson NP  Primary Care Physician: Larisa Wells MD  Principal Problem:Stroke    Consults  Subjective:     Chief Complaint   Extremity Weakness       left arm weakness.  right side gaze.         HPI: Melody Dupont is a 89 y/o female with a past medical history significant for HTN, HLD, TIA, anemia, RA, RLS, and GERD who presented to the ED with c/o altered mental status.  Pt is accompanied by her daughter in law who provides the history.  Per pt's daughter in law, pt lives alone.  Her daughter in law visits her 3 times daily to assist her with ADL's.  Pt was noted to be in her normal state of health this afternoon around 1 pm.  This evening when pt's daughter in law went back to check on her and help her get ready for bed, she found the patient to be lethargic and vomiting.  Pt was unable to move her left side.  She further relays that pt had an unwitnessed fall yesterday and her right knee is much more swollen than normal.   Pt was assessed for acute stroke while in the ED and determined not to be a candidate for tPA.  Labs are significant for transaminitis, hyperkalemia of 5.7 and JAS with creatinine of 2.3.  CT head was negative for anything acute.  CT C-spine was negative for fracture.  CT chest/abd/pelvis was significant for fecal impaction.  An xray of the patient's right knee was obtained and she is found to have an acute distal femur fracture.  The case was discussed with Dr. Curtis, orthopedic surgeon, and further recommendations were noted.  The patient will be admitted to the service of hospital medicine for continued treatment.       Neurological Consult:  Patient seen and examined with Dr. Junior Parr.  Patient of the eyes closed.  She  has limited cooperation with exam.  Patient back from her MRI which showed chronic lacunar infarct in the right basal ganglia and 2 small acute nonhemorrhagic infarctions superior and posterior lateral right temporal lobe.  Patient is on aspirin and pravastatin at home.  She has a history of a TIA.    10/23:  Patient seen and examined with Dr. Junior Parr.  Daughter-in-law at bedside.  Patient has limited responses.  She does withdrawal and grimace as well as say ouch to noxious stimuli.  She opened her eyes slightly a few times.  Daughter-in-law says this is not her baseline.  Past Medical History:   Diagnosis Date    Anemia     Cataract     Colitis     Disorder of kidney and ureter     Gastric ulcer     Hyperlipidemia     Hypertension     Malnutrition     Osteoporosis     PUD (peptic ulcer disease)     Restless leg syndrome     Rheumatoid arthritis     Stroke 10/21/2020    TIA (transient ischemic attack) 1/23/12       Past Surgical History:   Procedure Laterality Date    ADENOIDECTOMY      FRACTURE SURGERY      left hip ORIF    HAND SURGERY      HYSTERECTOMY      complete    TONSILLECTOMY         Review of patient's allergies indicates:   Allergen Reactions    Antihistamines - alkylamine Other (See Comments)     Other reaction(s): Hallucinations    Hydrocodone Other (See Comments)     Confusion, isolated, abandon    Ibuprofen      Gi bleed, ckd    Plavix [clopidogrel] Hives    Prednisone Hallucinations     Other reaction(s): Hallucinations    Tramadol     Triamcinolone Other (See Comments)     hallucinations    Penicillins Rash     Other reaction(s): Unknown       Current Neurological Medications:  Aspirin and statin    No current facility-administered medications on file prior to encounter.      Current Outpatient Medications on File Prior to Encounter   Medication Sig    aspirin (ECOTRIN) 81 MG EC tablet Take 162 mg by mouth once daily. 2 at night    cyanocobalamin (VITAMIN B-12) 1000  MCG tablet Take 100 mcg by mouth once daily.    docusate sodium (COLACE) 100 MG capsule Take 100 mg by mouth 2 (two) times daily.    fluticasone propionate (FLONASE) 50 mcg/actuation nasal spray 1 spray (50 mcg total) by Each Nostril route once daily.    hydrOXYzine HCL (ATARAX) 10 MG Tab Take 1 tablet (10 mg total) by mouth nightly as needed.    lisinopriL (PRINIVIL,ZESTRIL) 20 MG tablet Take 1 tablet (20 mg total) by mouth once daily.    pantoprazole (PROTONIX) 40 MG tablet Take 1 tablet (40 mg total) by mouth once daily.    pramipexole (MIRAPEX) 0.125 MG tablet Take 1 tablet (0.125 mg total) by mouth nightly as needed (rls).    pravastatin (PRAVACHOL) 10 MG tablet Take 1 tablet (10 mg total) by mouth once daily.    triamcinolone acetonide 0.1% (KENALOG) 0.1 % cream Apply topically 2 (two) times daily.      Family History     Problem Relation (Age of Onset)    Alcohol abuse Brother, Brother    Cancer Brother, Brother    Heart disease Mother, Father    No Known Problems Paternal Grandmother, Paternal Grandfather, Son    Parkinsonism Brother        Tobacco Use    Smoking status: Never Smoker    Smokeless tobacco: Never Used   Substance and Sexual Activity    Alcohol use: No    Drug use: No    Sexual activity: Never     Review of Systems   Unable to perform ROS: Mental status change     Objective:     Vital Signs (Most Recent):  Temp: 97.6 °F (36.4 °C) (10/23/20 1526)  Pulse: 98 (10/23/20 1526)  Resp: 18 (10/23/20 1526)  BP: (!) 94/44 (10/23/20 1526)  SpO2: 99 % (10/23/20 1526) Vital Signs (24h Range):  Temp:  [95 °F (35 °C)-97.8 °F (36.6 °C)] 97.6 °F (36.4 °C)  Pulse:  [] 98  Resp:  [16-20] 18  SpO2:  [93 %-99 %] 99 %  BP: ()/(44-60) 94/44     Weight: 57.8 kg (127 lb 6.8 oz)  Body mass index is 21.2 kg/m².    Physical Exam  HENT:      Head: Normocephalic.      Mouth/Throat:      Mouth: Mucous membranes are moist.   Cardiovascular:      Rate and Rhythm: Normal rate.   Pulmonary:      Effort:  Pulmonary effort is normal.   Musculoskeletal:      Comments: limted ROM   Skin:     General: Skin is warm.   Neurological:      Mental Status: She is lethargic.   Psychiatric:      Comments: Limited cooperation/pt lethargic         NEUROLOGICAL EXAMINATION:     MENTAL STATUS   Speech: (No verbal response at this time)  Level of consciousness: responsive to painful stimuli ,  arousable by tactile stimuli  Unable to name object. Unable to repeat.     CRANIAL NERVES     CN V   Facial sensation intact.     MOTOR EXAM        Limited cooperation with exam       Significant Labs:   Lab Results   Component Value Date    WBC 9.48 10/23/2020    HGB 9.7 (L) 10/23/2020    HCT 31.1 (L) 10/23/2020    MCV 92 10/23/2020     (L) 10/23/2020       CMP  Sodium   Date Value Ref Range Status   10/23/2020 144 136 - 145 mmol/L Final     Potassium   Date Value Ref Range Status   10/23/2020 5.2 (H) 3.5 - 5.1 mmol/L Final     Chloride   Date Value Ref Range Status   10/23/2020 113 (H) 95 - 110 mmol/L Final     CO2   Date Value Ref Range Status   10/23/2020 18 (L) 23 - 29 mmol/L Final     Glucose   Date Value Ref Range Status   10/23/2020 88 70 - 110 mg/dL Final     BUN, Bld   Date Value Ref Range Status   10/23/2020 82 (H) 10 - 30 mg/dL Final     Creatinine   Date Value Ref Range Status   10/23/2020 3.0 (H) 0.5 - 1.4 mg/dL Final     Calcium   Date Value Ref Range Status   10/23/2020 9.3 8.7 - 10.5 mg/dL Final     Total Protein   Date Value Ref Range Status   10/23/2020 5.1 (L) 6.0 - 8.4 g/dL Final     Albumin   Date Value Ref Range Status   10/23/2020 1.9 (L) 3.5 - 5.2 g/dL Final     Total Bilirubin   Date Value Ref Range Status   10/23/2020 0.4 0.1 - 1.0 mg/dL Final     Comment:     For infants and newborns, interpretation of results should be based  on gestational age, weight and in agreement with clinical  observations.  Premature Infant recommended reference ranges:  Up to 24 hours.............<8.0 mg/dL  Up to 48  hours............<12.0 mg/dL  3-5 days..................<15.0 mg/dL  6-29 days.................<15.0 mg/dL       Alkaline Phosphatase   Date Value Ref Range Status   10/23/2020 59 55 - 135 U/L Final     AST   Date Value Ref Range Status   10/23/2020 118 (H) 10 - 40 U/L Final     ALT   Date Value Ref Range Status   10/23/2020 59 (H) 10 - 44 U/L Final     Anion Gap   Date Value Ref Range Status   10/23/2020 13 8 - 16 mmol/L Final     eGFR if    Date Value Ref Range Status   10/23/2020 15 (A) >60 mL/min/1.73 m^2 Final     eGFR if non    Date Value Ref Range Status   10/23/2020 13 (A) >60 mL/min/1.73 m^2 Final     Comment:     Calculation used to obtain the estimated glomerular filtration  rate (eGFR) is the CKD-EPI equation.        .lastlipds    Significant Imaging: Echo Color Flow Doppler? Yes; Bubble Contrast? Yes  · There is left ventricular concentric remodeling.  · The left ventricle is normal in size with normal systolic function. The   estimated ejection fraction is 65%.  · Indeterminate diastolic function.  · Normal right ventricular systolic function.  · The mitral valve is mildly sclerotic.  · Moderate aortic regurgitation.  · Mild mitral regurgitation.  · Moderate aortic valve stenosis.  · Aortic valve area is 1.28 cm2; peak velocity is 2.36 m/s; mean gradient   is 11 mmHg.  · Normal central venous pressure (3 mmHg).  · The estimated PA systolic pressure is 33 mmHg.     MRA Neck without contrast  Narrative: EXAMINATION:  MRA NECK WITHOUT CONTRAST    CLINICAL HISTORY:  Stroke, follow up;    TECHNIQUE:  2D TOF MRA of the carotid and vertebral arteries was performed with 3D reconstructions according to the standard neck MRA protocol.  Contrast was not administered.  Internal carotid artery stenosis is evaluated using NASCET criteria.    COMPARISON:  Brain MRI and head MRA obtained concurrently    FINDINGS:  Moderate to marked patient motion limits evaluation of the neck arteries.   There is flow related signal intensity identified within the vertebral and carotid arteries.  The left vertebral artery is dominant and patent.  The hypoplastic right vertebral artery is patent as well.  There is no critical stenosis or occlusion.  Any subtle or mild nonocclusive stenosis is suboptimally evaluated due to moderate to marked patient motion.  Impression: 1. As above    Electronically signed by: Lc Valdez MD  Date:    10/22/2020  Time:    11:14  MRA Brain  Narrative: EXAMINATION:  MRA BRAIN WITHOUT CONTRAST    CLINICAL HISTORY:  Stroke, follow up;    TECHNIQUE:  3D Time-of-flight Images were performed over the brain.  MIP/MPR 3D  reformatted images were created.  Contrast was not administered    COMPARISON:  Brain MRI obtained concurrently    FINDINGS:  The study is motion degraded which limits evaluation of the intracranial arteries.  There is however normal flow related signal intensity within the vertebral and basilar arteries as well as in the petrous and cavernous segments of the internal carotid arteries.  The carotid terminus is normal.  The basilar tip is normal and there is normal branching into the anterior middle cerebral arteries as well as the posterior circulation vessels.  Any subtle nonocclusive stenosis of the more distal vessels is suboptimally evaluated due to patient motion.  Impression: 1. Patient motion limits evaluation.  There is no large vessel occlusion or critical stenosis suspected.  Any nonocclusive stenosis of the more distal branches of the anterior and posterior circulation is not adequately evaluated due to patient motion.    Electronically signed by: Lc Valdez MD  Date:    10/22/2020  Time:    10:56  MRI BRAIN WITHOUT CONTRAST  Narrative: EXAM:  MRI BRAIN WITHOUT CONTRAST    CLINICAL HISTORY:  Stroke, follow up; .    COMPARISON:  Head CT without contrast dated 10/21/2020    FINDINGS:  Intracranial contents:The study is moderate to markedly motion  degraded.  There is moderate volume loss and nonspecific periventricular and scattered subcortical white matter FLAIR and T2 hyperintense signal.  These changes correspond to regions of decreased attenuation on head CT.  These findings likely reflect sequelae of chronic small vessel disease.  There is a chronic lacunar infarction in the right basal ganglia.  There is no hydrocephalus or midline shift.  There is no acute hemorrhage.  There are however 2 small foci of restricted diffusion in the superior and posterior lateral right temporal lobe.  There is no large vascular territory infarction.  There is no abnormal extra-axial fluid collection.  The basilar cisterns are open.  Cerebellar tonsils are in normal position.    Extracranial contents, calvarium, soft tissues:Baseline marrow signal is normal.  The paranasal sinuses and mastoid air cells on the right are clear.  There is partial opacification of the left mastoid air cells inferiorly representing a small mastoid effusion.  Impression: 1. The study is moderate to markedly motion degraded which somewhat limits evaluation.  There is moderate volume loss and nonspecific white matter change with a chronic lacunar infarction in the right basal ganglia.  Additionally, there are 2 small acute nonhemorrhagic infarctions in the superior and posterior lateral right temporal lobe.  There is no large vascular territory infarction or mass effect.  This report was flagged in Epic as abnormal.    Electronically signed by: Lc Valdez MD  Date:    10/22/2020  Time:    10:51        Assessment and Plan:    AMS  -Stroke Confirmed on MRI:  Two small acute nonhemorrhagic infarctions right temporal lobe, chronic lacunar basal ganglia infarction on the right  -MRA brain/neck:  No large vessel occlusion  -ECHO: pending  -Lipids: 135 chol 76 ldl  -Permissive /110  On day 3, begin decreasing BP by 20%  -continue aspirin and statin: pt allergic to plavix  -A1c:  ordered  -TSH 5.429  T4 1.12  PT/OT/SLP      Acute Encephalopathy- metabolic  -Cr elevated  -K elevated  -EEG ordered  -Encephalopathy labs ordered    JAS  -Cr 2.3  -IM Managing    -RLS  -continue mirapex    HTN  -chronic    PLAN: TTE unrevealing.  Family does not want a WENDY.  They are concerned was patient is limited responses. Encephalopathy workup ordered including EEG and encephalopathy labs. No plavix due to allergy.  Will follow up.      EEG: non convulsive status on EEG. Dr SALAZAR Parr spoke with family contact Caron Dupont. The patient is going to hospice and no further treatment is wanted at this time. We would like to start keppra 500 mg BID with 1g loading dose to maintain pt's comfort and prevent shaking/tremors.      [phone] within 3 days from discharge.     Stroke education was provided including stroke risk factors modification and any acute neurological changes including weakness, confusion, visual changes to come straight to the ER.     All questions were answered.                                        Active Diagnoses:    Diagnosis Date Noted POA    PRINCIPAL PROBLEM:  Stroke [I63.9] 10/21/2020 Yes    Hyperkalemia [E87.5] 10/23/2020 Yes    Comfort measures only status [Z51.5] 10/23/2020 Not Applicable    Acute kidney injury superimposed on chronic kidney disease [N17.9, N18.9] 10/22/2020 Yes    Other fracture of right femur, initial encounter for closed fracture [S72.8X1A] 10/22/2020 Yes    Moderate malnutrition [E44.0] 10/22/2020 Yes    Goals of care, counseling/discussion [Z71.89] 10/22/2020 Not Applicable    RLS (restless legs syndrome) [G25.81] 04/28/2017 Yes    Rheumatoid arthritis involving multiple sites with positive rheumatoid factor, onset 1972 [M05.79] 12/01/2015 Yes    Gastroesophageal reflux disease without esophagitis [K21.9] 11/16/2015 Yes    Mixed hyperlipidemia, baseline LDL not available [E78.2] 02/20/2015 Yes     HTN (hypertension), onset 2014 [I10] 03/14/2012 Yes      Problems Resolved During this Admission:       VTE Risk Mitigation (From admission, onward)         Ordered     IP VTE HIGH RISK PATIENT  Once      10/22/20 0043     Place sequential compression device  Until discontinued      10/22/20 0043     Reason for No Pharmacological VTE Prophylaxis  Once     Question:  Reasons:  Answer:  Physician Provided (leave comment)    10/22/20 0043                Thank you for your consult  Shonda Thompson NP  Neurology  [facility]   English

## 2021-02-28 NOTE — ED BEHAVIORAL HEALTH ASSESSMENT NOTE - GAF
Hpi Title: Evaluation of Skin Lesions
How Severe Are Your Spot(S)?: mild
Have Your Spot(S) Been Treated In The Past?: has not been treated
45
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
## 2021-02-28 NOTE — ED BEHAVIORAL HEALTH ASSESSMENT NOTE - CURRENT MEDICATION
Risperidone 2mg BID  Cogentin 1mg daily  Bromocriptine 5mg BID  Depakote 1000mg qhs  Prozac 40mg daily

## 2021-02-28 NOTE — ED PROVIDER NOTE - PATIENT PORTAL LINK FT
You can access the FollowMyHealth Patient Portal offered by [facility] by registering at the following website: [email or web address]. By joining [employer]’s FollowMyHealth portal, you will also be able to view your health information using other applications (apps) compatible with our system. You can access the FollowMyHealth Patient Portal offered by [facility] by registering at the following website: [email or web address]. By joining [employer]’s FollowMyHealth portal, you will also be able to view your health information using other applications (apps) compatible with our system.

## 2021-02-28 NOTE — ED PROVIDER NOTE - CARE PLAN
----- Message from Steven J Heyden, MD sent at 1/24/2019  8:50 AM CST -----  Please contact the patient via their preferred form of communication.  If communication is by letter, please include a flow sheet copy of lab work.   all of his lab work looks stable  
Letter sent with results per PCP result note with flowsheet    
Principal Discharge DX:	Behavior problem, adult
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
## 2021-02-28 NOTE — ED BEHAVIORAL HEALTH ASSESSMENT NOTE - DESCRIPTION
Pt initially urinated on the floor, subsequently sat in her bed and was calm, did not receive PRNs or require restraints.     T(C): 36.7 (02-28-21 @ 17:57), Max: 36.7 (02-28-21 @ 17:57)  HR: 19 (02-28-21 @ 17:57) (19 - 19)  BP: 112/79 (02-28-21 @ 17:57) (112/79 - 112/79)  RR: 19 (02-28-21 @ 17:57) (19 - 19)  SpO2: 100% (02-28-21 @ 17:57) (100% - 100%)  Wt(kg): --    Pt unable to complete COVID-19 screener due to tangentiality. See  ED note for collateral COVID-19 screen DM, hyperprolactinemia Lives in [facility]

## 2021-02-28 NOTE — ED BEHAVIORAL HEALTH ASSESSMENT NOTE - RISK ASSESSMENT
-Unmodifiable risk factors include: intellectual disability, frequent outbursts of agitation/aggression towards property/others, chronic paranoia  -Modifiable risk factors include: none at this time.   -Protective factors: housing stability, no hx of SA/NSSIB, no current SIIP/HIIP, no substance use, adherent w/medications.  -Pt's chronic risk of harm elevated due to various unmodifiable risk factors noted above, however acute risk of self harm is low at this time and she does not meet criteria for inpatient hospitalization. Low Acute Suicide Risk
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
## 2021-02-28 NOTE — ED PROVIDER NOTE - NSFOLLOWUPCLINICS_GEN_ALL_ED_FT
[facility] Psych[facility]  Psychiatry  [address] [place] [address]  Phone: [phone]  Fax:   Follow Up Time: 1-3 Days

## 2021-03-01 VITALS
RESPIRATION RATE: 18 BRPM | OXYGEN SATURATION: 100 % | SYSTOLIC BLOOD PRESSURE: 104 MMHG | DIASTOLIC BLOOD PRESSURE: 73 MMHG | HEART RATE: 71 BPM

## 2021-03-01 PROBLEM — F25.9 SCHIZOAFFECTIVE DISORDER, UNSPECIFIED: Chronic | Status: ACTIVE | Noted: 2020-11-18

## 2021-03-01 PROBLEM — E11.9 TYPE 2 DIABETES MELLITUS WITHOUT COMPLICATIONS: Chronic | Status: ACTIVE | Noted: 2020-11-18

## 2021-03-01 RX ORDER — DIPHENHYDRAMINE HCL 50 MG
50 CAPSULE ORAL ONCE
Refills: 0 | Status: COMPLETED | OUTPATIENT
Start: 2021-03-01 | End: 2021-03-01

## 2021-03-01 RX ORDER — HALOPERIDOL DECANOATE 100 MG/ML
2.5 INJECTION INTRAMUSCULAR ONCE
Refills: 0 | Status: COMPLETED | OUTPATIENT
Start: 2021-03-01 | End: 2021-03-01

## 2021-03-01 RX ADMIN — HALOPERIDOL DECANOATE 2.5 MILLIGRAM(S): 100 INJECTION INTRAMUSCULAR at 02:07

## 2021-03-01 RX ADMIN — Medication 50 MILLIGRAM(S): at 01:28

## 2021-03-01 NOTE — ED ADULT NURSE REASSESSMENT NOTE - NS ED NURSE REASSESS COMMENT FT1
covering primary nurse, pt alert with confusion, denies any pain , 1:1 monitoring maintained will continue plan of care

## 2021-03-04 ENCOUNTER — EMERGENCY (EMERGENCY)
Facility: HOSPITAL | Age: 31
LOS: 0 days | Discharge: NURSING, REHAB OR LONG-TERM CARE | End: 2021-03-05
Attending: EMERGENCY MEDICINE
Payer: MEDICAID

## 2021-03-04 VITALS
RESPIRATION RATE: 18 BRPM | TEMPERATURE: 98 F | HEIGHT: 62 IN | OXYGEN SATURATION: 98 % | DIASTOLIC BLOOD PRESSURE: 87 MMHG | HEART RATE: 86 BPM | SYSTOLIC BLOOD PRESSURE: 120 MMHG | WEIGHT: 160.06 LBS

## 2021-03-04 DIAGNOSIS — E22.1 HYPERPROLACTINEMIA: ICD-10-CM

## 2021-03-04 DIAGNOSIS — E11.9 TYPE 2 DIABETES MELLITUS WITHOUT COMPLICATIONS: ICD-10-CM

## 2021-03-04 DIAGNOSIS — F25.9 SCHIZOAFFECTIVE DISORDER, UNSPECIFIED: ICD-10-CM

## 2021-03-04 DIAGNOSIS — F79 UNSPECIFIED INTELLECTUAL DISABILITIES: ICD-10-CM

## 2021-03-04 DIAGNOSIS — R45.6 VIOLENT BEHAVIOR: ICD-10-CM

## 2021-03-04 DIAGNOSIS — Z00.8 ENCOUNTER FOR OTHER GENERAL EXAMINATION: ICD-10-CM

## 2021-03-04 DIAGNOSIS — Z79.84 LONG TERM (CURRENT) USE OF ORAL HYPOGLYCEMIC DRUGS: ICD-10-CM

## 2021-03-04 LAB
ALBUMIN SERPL ELPH-MCNC: 3.3 G/DL — SIGNIFICANT CHANGE UP (ref 3.3–5)
ALP SERPL-CCNC: 43 U/L — SIGNIFICANT CHANGE UP (ref 40–120)
ALT FLD-CCNC: 17 U/L — SIGNIFICANT CHANGE UP (ref 12–78)
ANION GAP SERPL CALC-SCNC: 9 MMOL/L — SIGNIFICANT CHANGE UP (ref 5–17)
AST SERPL-CCNC: 22 U/L — SIGNIFICANT CHANGE UP (ref 15–37)
BILIRUB SERPL-MCNC: 0.3 MG/DL — SIGNIFICANT CHANGE UP (ref 0.2–1.2)
BUN SERPL-MCNC: 12 MG/DL — SIGNIFICANT CHANGE UP (ref 7–23)
CALCIUM SERPL-MCNC: 8.7 MG/DL — SIGNIFICANT CHANGE UP (ref 8.5–10.1)
CHLORIDE SERPL-SCNC: 106 MMOL/L — SIGNIFICANT CHANGE UP (ref 96–108)
CO2 SERPL-SCNC: 27 MMOL/L — SIGNIFICANT CHANGE UP (ref 22–31)
CREAT SERPL-MCNC: 0.67 MG/DL — SIGNIFICANT CHANGE UP (ref 0.5–1.3)
ETHANOL SERPL-MCNC: <10 MG/DL — SIGNIFICANT CHANGE UP (ref 0–10)
GLUCOSE SERPL-MCNC: 92 MG/DL — SIGNIFICANT CHANGE UP (ref 70–99)
HCG SERPL-ACNC: <1 MIU/ML — SIGNIFICANT CHANGE UP
POTASSIUM SERPL-MCNC: 4.3 MMOL/L — SIGNIFICANT CHANGE UP (ref 3.5–5.3)
POTASSIUM SERPL-SCNC: 4.3 MMOL/L — SIGNIFICANT CHANGE UP (ref 3.5–5.3)
PROT SERPL-MCNC: 6.6 GM/DL — SIGNIFICANT CHANGE UP (ref 6–8.3)
SODIUM SERPL-SCNC: 142 MMOL/L — SIGNIFICANT CHANGE UP (ref 135–145)
VALPROATE SERPL-MCNC: 54 UG/ML — SIGNIFICANT CHANGE UP (ref 50–100)

## 2021-03-04 PROCEDURE — 99283 EMERGENCY DEPT VISIT LOW MDM: CPT

## 2021-03-04 PROCEDURE — 90792 PSYCH DIAG EVAL W/MED SRVCS: CPT | Mod: 95

## 2021-03-04 RX ORDER — HALOPERIDOL DECANOATE 100 MG/ML
5 INJECTION INTRAMUSCULAR ONCE
Refills: 0 | Status: COMPLETED | OUTPATIENT
Start: 2021-03-04 | End: 2021-03-04

## 2021-03-04 RX ORDER — RISPERIDONE 4 MG/1
3 TABLET ORAL ONCE
Refills: 0 | Status: COMPLETED | OUTPATIENT
Start: 2021-03-04 | End: 2021-03-04

## 2021-03-04 RX ORDER — DIVALPROEX SODIUM 500 MG/1
1000 TABLET, DELAYED RELEASE ORAL ONCE
Refills: 0 | Status: COMPLETED | OUTPATIENT
Start: 2021-03-04 | End: 2021-03-04

## 2021-03-04 RX ADMIN — HALOPERIDOL DECANOATE 5 MILLIGRAM(S): 100 INJECTION INTRAMUSCULAR at 20:59

## 2021-03-04 RX ADMIN — DIVALPROEX SODIUM 1000 MILLIGRAM(S): 500 TABLET, DELAYED RELEASE ORAL at 22:11

## 2021-03-04 RX ADMIN — RISPERIDONE 3 MILLIGRAM(S): 4 TABLET ORAL at 22:11

## 2021-03-04 NOTE — ED ADULT NURSE NOTE - MOOD
Returned pts call. Pt says he saw his PCP for bloating and back pain about because was alarmed but xray was done and found constipation. Pt says he saw his local Cardiologist Dr Carl who asked him for specific info about his status of care and treatment at [facility],a nd wanted to know if he's improving or will need transplant etc. Pt says Dr Carl said he was going to have him sign a release the next time he comes, so he can be updated. Informed pt ok to sign a release when he comes to clinic on 1/8 to see Dr. Campbell and we can send 1/8 visit, last ehco and RHC. Pt verbalized understanding.     Reviewed RHC, echo and last couple of visits with pt. Informed pt of documented improvement on med management in detail.  30 minutes spent on this call    Noticed no labs were scheduled for visit. Messaged CHF JOSETTE Lacy to send orders for labs to [facility] on 1/5 and copy to pt. Pt is aware.   
Irritable/Angry
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
## 2021-03-04 NOTE — ED BEHAVIORAL HEALTH ASSESSMENT NOTE - REFERRAL / APPOINTMENT DETAILS
advised to continue w/ outpatient NP; referral for [facility] given to nursing supervisor given their request

## 2021-03-04 NOTE — ED BEHAVIORAL HEALTH ASSESSMENT NOTE - OTHER
[facility] Conor -nursing supervisor at [facility] it's a group home child like vocab

## 2021-03-04 NOTE — ED PROVIDER NOTE - PROGRESS NOTE DETAILS
Telepsych called Dr. Edwards made aware suggests valproic acid level.  Will review chart and evaluate patient. Patient cleared by psych facility given resources. EMS refusing to take patient haldol IM ordered overnight physician Dr. Delacruz aware. Harsh Delacruz DO: pt acting psychotic in ER despite 5 IM haldol. Screaming about 'her baby', making delusional / paranoid statements about her medications, yelling about her 'grandma just '. Pt seems to be having psychosis. Will trial home meds and reassess per psychiatry's recommendation. Solo: Pt care signed out to me at change of shift. Pt refused to use toilet and urinated on herself in bed on attempt to obtain UA / Utox. Pending AM psych re-eval. Solo: Spoke w/ Dr Steward (Tele Psych) Pt cleared for discharge. Pt w/ delusions, behavioral issues at baseline. Will contact pt group home for update and arrange for transport, group home requested for additional outpatient psych, will bring pt from ED to [facility]. Psych requests pt be given her AM meds. Solo: Spoke w/ Dr Steward (Tele Psych) Pt cleared for discharge. Pt w/ delusions, behavioral issues at baseline. Psych will contact pt group home to update and to arrange for transport, group home requested additional outpatient psych, will bring pt from ED to [facility] to set up f/u. Psych requests pt be given her AM meds. Solo: Paramedics arrive in ED to bring pt back to group home. Pt becomes acutely agitated, screaming that she is not safe to return to the group home. C/w her delusions. Haldol, Ativan IM given. No change. D/w Tele Psych (Dr Smiley), recommends additional Ativan, Benadryl IM. Paramedics leave ED stating they will return to bring pt to group home when she is no longer agitated. Pt becomes calm once paramedics leave the ED.
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
## 2021-03-04 NOTE — ED BEHAVIORAL HEALTH ASSESSMENT NOTE - DETAILS
no known hx no indication - no SI/intent or plan; no hx of SA/SIB MD beth making allegations against staff and individuals at [facility] which are denied by [facility]

## 2021-03-04 NOTE — ED ADULT NURSE NOTE - OBJECTIVE STATEMENT
pt c/o psych eval. Pt aggressive at behavioral health. Expressives anger at home residents. States, "I'm pregnant." Pt emotional and not phyiscally aggressive at this time. No SI or HI.
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
## 2021-03-04 NOTE — ED PROVIDER NOTE - NSFOLLOWUPCLINICS_GEN_ALL_ED_FT
[facility] Psychiatry  Psychiatry  [address] [place] [address]  Phone: [phone]  Fax:   Follow Up Time: 1-3 Days

## 2021-03-04 NOTE — ED ADULT TRIAGE NOTE - CHIEF COMPLAINT QUOTE
marychuy from facility for aggressive outburst hx schizoaffective pt has paranoid thoughts and emotional labile in triage denies suicidal or homicidal ideation seen here for same c/o 2 days ago

## 2021-03-04 NOTE — ED BEHAVIORAL HEALTH ASSESSMENT NOTE - RISK ASSESSMENT
Low Acute Suicide Risk chronic risk factors of aggression based on hx of aggression w/ protective factors of being in a structured environment, engaged in treatment (inc med adherence as seen by the med administration sheets from group home).

## 2021-03-04 NOTE — ED PROVIDER NOTE - PATIENT PORTAL LINK FT
You can access the FollowMyHealth Patient Portal offered by [facility] by registering at the following website: [email or web address]. By joining [employer]’s FollowMyHealth portal, you will also be able to view your health information using other applications (apps) compatible with our system. You can access the FollowMyHealth Patient Portal offered by [facility] by registering at the following website: [email or web address]. By joining [employer]’s FollowMyHealth portal, you will also be able to view your health information using other applications (apps) compatible with our system.

## 2021-03-04 NOTE — ED BEHAVIORAL HEALTH ASSESSMENT NOTE - MEDICATIONS (PRESCRIPTIONS, DIRECTIONS)
Patient becomes hypotensive with repositioning and does not recover quickly requiring increased pressor requirements.  Discussed patients specific risk factors for pressure injury with patients wife. Patients wife verbalized understanding. Will continue to reposition/turn when patients hemodynamics are stable.    continue w/ current regimen until titration by outpt provider
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
## 2021-03-04 NOTE — ED PROVIDER NOTE - PMH
DM (diabetes mellitus)    DM (diabetes mellitus)    Schizoaffective disorder    Schizoaffective disorder

## 2021-03-04 NOTE — ED BEHAVIORAL HEALTH ASSESSMENT NOTE - DESCRIPTION
no beh issues; easily re-directable     COVID exposure screen from pt:  she was unable to be provide due to her ID - unable to answer questions about travel out of [facility], recent covid exposure, vaccination hx, ab result, past covid infection DM, hyperprolactinemia living in group home, single

## 2021-03-04 NOTE — ED PROVIDER NOTE - CLINICAL SUMMARY MEDICAL DECISION MAKING FREE TEXT BOX
Patient with aggression from adult home one to one ordered will check labs and have patient evaluated by psychiatry.

## 2021-03-05 VITALS
SYSTOLIC BLOOD PRESSURE: 97 MMHG | HEART RATE: 95 BPM | DIASTOLIC BLOOD PRESSURE: 56 MMHG | RESPIRATION RATE: 18 BRPM | TEMPERATURE: 99 F | OXYGEN SATURATION: 99 %

## 2021-03-05 PROCEDURE — 99213 OFFICE O/P EST LOW 20 MIN: CPT | Mod: 95

## 2021-03-05 RX ORDER — MIDAZOLAM HYDROCHLORIDE 1 MG/ML
5 INJECTION, SOLUTION INTRAMUSCULAR; INTRAVENOUS ONCE
Refills: 0 | Status: DISCONTINUED | OUTPATIENT
Start: 2021-03-05 | End: 2021-03-05

## 2021-03-05 RX ORDER — HALOPERIDOL DECANOATE 100 MG/ML
5 INJECTION INTRAMUSCULAR ONCE
Refills: 0 | Status: COMPLETED | OUTPATIENT
Start: 2021-03-05 | End: 2021-03-05

## 2021-03-05 RX ORDER — RISPERIDONE 4 MG/1
2 TABLET ORAL ONCE
Refills: 0 | Status: DISCONTINUED | OUTPATIENT
Start: 2021-03-05 | End: 2021-03-05

## 2021-03-05 RX ORDER — DIPHENHYDRAMINE HCL 50 MG
50 CAPSULE ORAL ONCE
Refills: 0 | Status: COMPLETED | OUTPATIENT
Start: 2021-03-05 | End: 2021-03-05

## 2021-03-05 RX ORDER — FLUOXETINE HCL 10 MG
40 CAPSULE ORAL ONCE
Refills: 0 | Status: COMPLETED | OUTPATIENT
Start: 2021-03-05 | End: 2021-03-05

## 2021-03-05 RX ORDER — RISPERIDONE 4 MG/1
2 TABLET ORAL ONCE
Refills: 0 | Status: COMPLETED | OUTPATIENT
Start: 2021-03-05 | End: 2021-03-05

## 2021-03-05 RX ORDER — BENZTROPINE MESYLATE 1 MG
1 TABLET ORAL ONCE
Refills: 0 | Status: COMPLETED | OUTPATIENT
Start: 2021-03-05 | End: 2021-03-05

## 2021-03-05 RX ORDER — BROMOCRIPTINE MESYLATE 5 MG/1
5 CAPSULE ORAL ONCE
Refills: 0 | Status: DISCONTINUED | OUTPATIENT
Start: 2021-03-05 | End: 2021-03-05

## 2021-03-05 RX ORDER — BROMOCRIPTINE MESYLATE 5 MG/1
5 CAPSULE ORAL ONCE
Refills: 0 | Status: COMPLETED | OUTPATIENT
Start: 2021-03-05 | End: 2021-03-05

## 2021-03-05 RX ADMIN — Medication 1 MILLIGRAM(S): at 13:04

## 2021-03-05 RX ADMIN — BROMOCRIPTINE MESYLATE 5 MILLIGRAM(S): 5 CAPSULE ORAL at 13:04

## 2021-03-05 RX ADMIN — HALOPERIDOL DECANOATE 5 MILLIGRAM(S): 100 INJECTION INTRAMUSCULAR at 17:59

## 2021-03-05 RX ADMIN — Medication 40 MILLIGRAM(S): at 13:04

## 2021-03-05 RX ADMIN — Medication 2 MILLIGRAM(S): at 19:07

## 2021-03-05 RX ADMIN — Medication 50 MILLIGRAM(S): at 02:15

## 2021-03-05 RX ADMIN — MIDAZOLAM HYDROCHLORIDE 5 MILLIGRAM(S): 1 INJECTION, SOLUTION INTRAMUSCULAR; INTRAVENOUS at 03:05

## 2021-03-05 RX ADMIN — Medication 50 MILLIGRAM(S): at 19:06

## 2021-03-05 RX ADMIN — RISPERIDONE 2 MILLIGRAM(S): 4 TABLET ORAL at 13:04

## 2021-03-05 RX ADMIN — Medication 2 MILLIGRAM(S): at 18:33

## 2021-03-05 NOTE — PROGRESS NOTE BEHAVIORAL HEALTH - RISK ASSESSMENT
Patient's acute risk is not more elevated from baseline, she however has chronic risk due to psychiatric diagnosis, trials of multiple medication, medical issues, limited social support, intellectual disability, behavioral issues, poor frustration tolerance, but has no known SA, and denied SI currently, has chronic delusions.
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
## 2021-03-05 NOTE — PROGRESS NOTE BEHAVIORAL HEALTH - NSBHADMITCOUNSEL_PSY_A_CORE
diagnostic results/impressions and/or recommended studies/risks and benefits of treatment options/instructions for management, treatment and follow up/prognosis

## 2021-03-05 NOTE — PROGRESS NOTE BEHAVIORAL HEALTH - NSBHCONSULTFOLLOWAFTERCARE_PSY_A_CORE FT
Has current outpatient NP, group home expressed desire to take patient to [facility] crisis center to connect care.

## 2021-03-05 NOTE — PROGRESS NOTE BEHAVIORAL HEALTH - NSBHCHARTREVIEWVS_PSY_A_CORE FT
Vital Signs Last 24 Hrs  T(C): 36.6 (05 Mar 2021 07:10), Max: 36.7 (04 Mar 2021 20:16)  T(F): 97.9 (05 Mar 2021 07:10), Max: 98.1 (04 Mar 2021 20:16)  HR: 82 (05 Mar 2021 07:10) (82 - 98)  BP: 107/67 (05 Mar 2021 07:10) (104/70 - 120/87)  BP(mean): --  RR: 17 (05 Mar 2021 07:10) (17 - 18)  SpO2: 100% (05 Mar 2021 07:10) (97% - 100%)
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
## 2021-03-05 NOTE — ED ADULT NURSE REASSESSMENT NOTE - NS ED NURSE REASSESS COMMENT FT1
Patient received from ADRY Ken at 0710, patient is currently calm and sleeping with constant observation in progress. Will continue to monitor.

## 2021-03-05 NOTE — PROGRESS NOTE BEHAVIORAL HEALTH - NSBHCONSULTMEDAGITATION_PSY_A_CORE FT
Provide Haldol 5mg q 6 hours PRN for agitation (IM or p.o.), Ativan 2mg q 6 hours PRN for agitation (IM or p.o.), Benadryl 50mg q 6 hours PRN for agitation (IM or p.o.)

## 2021-03-05 NOTE — ED ADULT NURSE REASSESSMENT NOTE - NS ED NURSE REASSESS COMMENT FT1
Pt calm and cooperative. Currently eating at bedside with 1:1 in progress. No behaviors noted thus far.

## 2021-03-05 NOTE — ED ADULT NURSE REASSESSMENT NOTE - NS ED NURSE REASSESS COMMENT FT1
pt started screaming and yelling, grabbing herself.  Dr. Delacruz made aware and medicated as ordered.

## 2021-03-07 ENCOUNTER — EMERGENCY (EMERGENCY)
Facility: HOSPITAL | Age: 31
LOS: 1 days | Discharge: HOME | End: 2021-03-07
Admitting: EMERGENCY MEDICINE
Payer: MEDICAID

## 2021-03-07 VITALS
RESPIRATION RATE: 18 BRPM | DIASTOLIC BLOOD PRESSURE: 85 MMHG | HEART RATE: 86 BPM | OXYGEN SATURATION: 100 % | SYSTOLIC BLOOD PRESSURE: 126 MMHG | TEMPERATURE: 98 F

## 2021-03-07 VITALS
DIASTOLIC BLOOD PRESSURE: 75 MMHG | RESPIRATION RATE: 16 BRPM | TEMPERATURE: 99 F | OXYGEN SATURATION: 100 % | HEART RATE: 87 BPM | HEIGHT: 62 IN | SYSTOLIC BLOOD PRESSURE: 121 MMHG

## 2021-03-07 PROCEDURE — 99284 EMERGENCY DEPT VISIT MOD MDM: CPT

## 2021-03-07 RX ORDER — HALOPERIDOL DECANOATE 100 MG/ML
5 INJECTION INTRAMUSCULAR ONCE
Refills: 0 | Status: COMPLETED | OUTPATIENT
Start: 2021-03-07 | End: 2021-03-07

## 2021-03-07 RX ADMIN — Medication 2 MILLIGRAM(S): at 17:49

## 2021-03-07 RX ADMIN — HALOPERIDOL DECANOATE 5 MILLIGRAM(S): 100 INJECTION INTRAMUSCULAR at 17:49
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
## 2021-03-07 NOTE — PROVIDER CONTACT NOTE (OTHER) - ASSESSMENT
Medicine SW contacted by  RN [place] who states Pt is from a Group Home is cleared to return to previous residence.  Medicine SW reviewed chart Pt is from [facility] ([facility] [phone] this SW spoke with RIZWAN Ms. Landers who confirmed Pt is a resident and confirmed address [address] [address] [place] [address] and states she will contact  to discuss transport.  SW to remain available.

## 2021-03-07 NOTE — ED ADULT NURSE NOTE - OBJECTIVE STATEMENT
Pt received to behavorial health. Pt comes from [facility] after "pt was agitated and acting up." Pt appears hyper at this time but is cooperative with staff. Denies SI/HI/hallucinations at this time. Also denies ETOH & drug use. Hx of intellectual disabilities. Respirations are even & unlabored. Pt is A&Ox4.

## 2021-03-07 NOTE — ED PROVIDER NOTE - CLINICAL SUMMARY MEDICAL DECISION MAKING FREE TEXT BOX
29 y/o F   hx   DM , Schizophrenia   Medication offered. Tolerated same well.   Medical evaluation performed. There is no clinical evidence of intoxication or any acute medical problem requiring immediate intervention.  SW consulted. D/C to Group  Home via EMS.
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
## 2021-03-07 NOTE — ED PROVIDER NOTE - PATIENT PORTAL LINK FT
You can access the FollowMyHealth Patient Portal offered by [facility] by registering at the following website: [email or web address]. By joining [employer]’s FollowMyHealth portal, you will also be able to view your health information using other applications (apps) compatible with our system.

## 2021-03-07 NOTE — ED PROVIDER NOTE - OBJECTIVE STATEMENT
31 y/o F   hx   DM , Schizophrenia BIBA  secondary to  aggression  and acting out behaviors .   Admits  that the residents are always teasing her .  Denies falling, punching or kicking any objects. Denies SI/AH/VH/AH.  Denies pain , SOB,  fever, chills, chest/abdominal  discomfort.  Denies recent use of alcohol or  illicit drugs.    No evidence of physical injuries, broken  skin or deformities.

## 2021-03-09 ENCOUNTER — EMERGENCY (EMERGENCY)
Facility: HOSPITAL | Age: 31
LOS: 0 days | Discharge: HOME | End: 2021-03-10
Attending: STUDENT IN AN ORGANIZED HEALTH CARE EDUCATION/TRAINING PROGRAM
Payer: MEDICAID

## 2021-03-09 VITALS
SYSTOLIC BLOOD PRESSURE: 111 MMHG | HEIGHT: 62 IN | RESPIRATION RATE: 19 BRPM | TEMPERATURE: 98 F | OXYGEN SATURATION: 99 % | DIASTOLIC BLOOD PRESSURE: 77 MMHG | HEART RATE: 91 BPM

## 2021-03-09 DIAGNOSIS — F25.9 SCHIZOAFFECTIVE DISORDER, UNSPECIFIED: ICD-10-CM

## 2021-03-09 DIAGNOSIS — F22 DELUSIONAL DISORDERS: ICD-10-CM

## 2021-03-09 DIAGNOSIS — F79 UNSPECIFIED INTELLECTUAL DISABILITIES: ICD-10-CM

## 2021-03-09 DIAGNOSIS — Z20.822 CONTACT WITH AND (SUSPECTED) EXPOSURE TO COVID-19: ICD-10-CM

## 2021-03-09 LAB
ALBUMIN SERPL ELPH-MCNC: 3.4 G/DL — SIGNIFICANT CHANGE UP (ref 3.3–5)
ALP SERPL-CCNC: 50 U/L — SIGNIFICANT CHANGE UP (ref 40–120)
ALT FLD-CCNC: 15 U/L — SIGNIFICANT CHANGE UP (ref 12–78)
ANION GAP SERPL CALC-SCNC: 8 MMOL/L — SIGNIFICANT CHANGE UP (ref 5–17)
APAP SERPL-MCNC: <2 UG/ML — LOW (ref 10–30)
AST SERPL-CCNC: 10 U/L — LOW (ref 15–37)
BASOPHILS # BLD AUTO: 0.03 K/UL — SIGNIFICANT CHANGE UP (ref 0–0.2)
BASOPHILS NFR BLD AUTO: 0.5 % — SIGNIFICANT CHANGE UP (ref 0–2)
BILIRUB SERPL-MCNC: 0.4 MG/DL — SIGNIFICANT CHANGE UP (ref 0.2–1.2)
BUN SERPL-MCNC: 14 MG/DL — SIGNIFICANT CHANGE UP (ref 7–23)
CALCIUM SERPL-MCNC: 8.6 MG/DL — SIGNIFICANT CHANGE UP (ref 8.5–10.1)
CHLORIDE SERPL-SCNC: 104 MMOL/L — SIGNIFICANT CHANGE UP (ref 96–108)
CO2 SERPL-SCNC: 27 MMOL/L — SIGNIFICANT CHANGE UP (ref 22–31)
CREAT SERPL-MCNC: 0.7 MG/DL — SIGNIFICANT CHANGE UP (ref 0.5–1.3)
EOSINOPHIL # BLD AUTO: 0.04 K/UL — SIGNIFICANT CHANGE UP (ref 0–0.5)
EOSINOPHIL NFR BLD AUTO: 0.7 % — SIGNIFICANT CHANGE UP (ref 0–6)
ETHANOL SERPL-MCNC: <10 MG/DL — SIGNIFICANT CHANGE UP (ref 0–10)
FLUAV AG NPH QL: SIGNIFICANT CHANGE UP
FLUBV AG NPH QL: SIGNIFICANT CHANGE UP
GLUCOSE BLDC GLUCOMTR-MCNC: 71 MG/DL — SIGNIFICANT CHANGE UP (ref 70–99)
GLUCOSE SERPL-MCNC: 69 MG/DL — LOW (ref 70–99)
HCG SERPL-ACNC: <1 MIU/ML — SIGNIFICANT CHANGE UP
HCT VFR BLD CALC: 35 % — SIGNIFICANT CHANGE UP (ref 34.5–45)
HGB BLD-MCNC: 11.5 G/DL — SIGNIFICANT CHANGE UP (ref 11.5–15.5)
IMM GRANULOCYTES NFR BLD AUTO: 0.2 % — SIGNIFICANT CHANGE UP (ref 0–1.5)
LYMPHOCYTES # BLD AUTO: 1.71 K/UL — SIGNIFICANT CHANGE UP (ref 1–3.3)
LYMPHOCYTES # BLD AUTO: 28.7 % — SIGNIFICANT CHANGE UP (ref 13–44)
MCHC RBC-ENTMCNC: 31.3 PG — SIGNIFICANT CHANGE UP (ref 27–34)
MCHC RBC-ENTMCNC: 32.9 GM/DL — SIGNIFICANT CHANGE UP (ref 32–36)
MCV RBC AUTO: 95.1 FL — SIGNIFICANT CHANGE UP (ref 80–100)
MONOCYTES # BLD AUTO: 0.59 K/UL — SIGNIFICANT CHANGE UP (ref 0–0.9)
MONOCYTES NFR BLD AUTO: 9.9 % — SIGNIFICANT CHANGE UP (ref 2–14)
NEUTROPHILS # BLD AUTO: 3.57 K/UL — SIGNIFICANT CHANGE UP (ref 1.8–7.4)
NEUTROPHILS NFR BLD AUTO: 60 % — SIGNIFICANT CHANGE UP (ref 43–77)
NRBC # BLD: 0 /100 WBCS — SIGNIFICANT CHANGE UP (ref 0–0)
PLATELET # BLD AUTO: 239 K/UL — SIGNIFICANT CHANGE UP (ref 150–400)
POTASSIUM SERPL-MCNC: 4.4 MMOL/L — SIGNIFICANT CHANGE UP (ref 3.5–5.3)
POTASSIUM SERPL-SCNC: 4.4 MMOL/L — SIGNIFICANT CHANGE UP (ref 3.5–5.3)
PROT SERPL-MCNC: 6.8 GM/DL — SIGNIFICANT CHANGE UP (ref 6–8.3)
RBC # BLD: 3.68 M/UL — LOW (ref 3.8–5.2)
RBC # FLD: 13.7 % — SIGNIFICANT CHANGE UP (ref 10.3–14.5)
SALICYLATES SERPL-MCNC: <1.7 MG/DL — LOW (ref 2.8–20)
SARS-COV-2 RNA SPEC QL NAA+PROBE: SIGNIFICANT CHANGE UP
SODIUM SERPL-SCNC: 139 MMOL/L — SIGNIFICANT CHANGE UP (ref 135–145)
WBC # BLD: 5.95 K/UL — SIGNIFICANT CHANGE UP (ref 3.8–10.5)
WBC # FLD AUTO: 5.95 K/UL — SIGNIFICANT CHANGE UP (ref 3.8–10.5)

## 2021-03-09 PROCEDURE — 93010 ELECTROCARDIOGRAM REPORT: CPT

## 2021-03-09 PROCEDURE — 99285 EMERGENCY DEPT VISIT HI MDM: CPT

## 2021-03-09 PROCEDURE — 90792 PSYCH DIAG EVAL W/MED SRVCS: CPT | Mod: 95

## 2021-03-09 RX ORDER — HALOPERIDOL DECANOATE 100 MG/ML
5 INJECTION INTRAMUSCULAR ONCE
Refills: 0 | Status: COMPLETED | OUTPATIENT
Start: 2021-03-09 | End: 2021-03-09

## 2021-03-09 RX ADMIN — Medication 2 MILLIGRAM(S): at 23:30

## 2021-03-09 RX ADMIN — HALOPERIDOL DECANOATE 5 MILLIGRAM(S): 100 INJECTION INTRAMUSCULAR at 23:30

## 2021-03-09 NOTE — ED PROVIDER NOTE - PROGRESS NOTE DETAILS
SW aware of pt return to ED. Spoke w/ tele psych (Dr Mcdaniels), will eval pt shortly. pt signed out to me from dr mujica, pt presented found after running away with paranoid delusions, feeling people are looking at her the wrong way and inappropriately, pt was seen by TP, pending collateral from her group home, currently the group home is not answeing pt signed out to me from dr mujica, pt presented found after running away with paranoid delusions, feeling people are looking at her the wrong way and inappropriately, pt was seen by TP, pending collateral from her group home, currently the group home is not answering Pt signed out to me from Dr. Celestin as pending re evaluation by telepsych. They have re-evaluated and cleared her for discharge as this is her baseline behavior. Pt known to have disruptive behavior when cleared for return, and psych Dr. Cabrales suggests sedation with haldol and ativan for transport.

## 2021-03-09 NOTE — ED PROVIDER NOTE - CLINICAL SUMMARY MEDICAL DECISION MAKING FREE TEXT BOX
31yo F w/ PMH ID / schizoaffective sent to ED from group [place] for psych eval. AFVSS. Plan: Obtain medical clearance, psych eval. Pt care signed out at change of shift.

## 2021-03-09 NOTE — ED BEHAVIORAL HEALTH ASSESSMENT NOTE - RISK ASSESSMENT
chronic risk factors of aggression based on hx of aggression w/ protective factors of being in a structured environment, engaged in treatment (inc med adherence as seen by the med administration sheets from group home). Low Acute Suicide Risk

## 2021-03-09 NOTE — ED PROVIDER NOTE - PATIENT PORTAL LINK FT
You can access the FollowMyHealth Patient Portal offered by [facility] by registering at the following website: [email or web address]. By joining [employer]’s FollowMyHealth portal, you will also be able to view your health information using other applications (apps) compatible with our system.
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
## 2021-03-09 NOTE — ED BEHAVIORAL HEALTH NOTE - BEHAVIORAL HEALTH NOTE
===================  PRE-HOSPITAL COURSE  ===================    SOURCE: Chart    DETAILS: Patient arrived to the ED via EMS from group home after attempting to run away    ============  ED COURSE   ============    SOURCE: ED, Chart    ARRIVAL: EMS    BELONGINGS: No items of note     BEHAVIOR: Patient arrived to the ED alert and oriented x3, patient was cooperative with ED medical and safety protocols. Patient didn’t report mood symptoms, her affect is euthymic. Patient denied SI/HI, she did not report psychotic symptoms but is disorganized and tangential with thought process, noted to be talking to herself at times. Patient remained in good behavioral control while in the ED.     TREATMENT: No PRN psychiatric medication prior to assessment     VISITORS: None      ========================  COLLATERAL  ========================    NAME: [facility]    NUMBER: [phone]    RELATIONSHIP: [facility]    COMMENTS: # contacted previously for collateral, attempted multiple calls and left voicemail requesting return call, attempted additional chart phone #s, one wrong # and one no response    COVID Exposure Screen- collateral (i.e. third-party, chart review, belongings, etc; include EMS and ED staff)  1.	*Has the patient had a COVID-19 test in the last 90 days?  (  ) Yes   (  ) No   ( x ) Unknown- Reason: __No collateral at this time___  IF YES PROCEED TO QUESTION #2. IF NO OR UNKNOWN, PLEASE SKIP TO QUESTION #3.  2.	Date of test(s) and result(s): ________  3.	*Has the patient tested positive for COVID-19 antibodies? (  ) Yes   (  ) No   (x  ) Unknown- Reason: _____  IF YES PROCEED TO QUESTION #4. IF NO or UNKNOWN, PLEASE SKIP TO QUESTION #5.  4.	Date of positive antibody test: ________  5.	*Has the patient received 2 doses of the COVID-19 vaccine? (  ) Yes   (  ) No   ( x ) Unknown- Reason: _____  IF YES PROCEED TO QUESTION #6. IF NO or UNKNOWN, PLEASE SKIP TO QUESTION #7.  6.	 Date of second dose: ________  7.	*In the past 10 days, has the patient been around anyone with a positive COVID-19 test?* (  ) Yes   (  ) No   (  x) Unknown- Reason: __  IF YES PROCEED TO QUESTION #8. IF NO or UNKNOWN, PLEASE SKIP TO QUESTION #13.  8.	Was the patient within 6 feet of them for at least 15 minutes? (  ) Yes   (  ) No   (  ) Unknown- Reason: _____  9.	Did the patient provide care for them? (  ) Yes   (  ) No   (  ) Unknown- Reason: ______  10.	Did the patient have direct physical contact with them (touched, hugged, or kissed them)? (  ) Yes   (  ) No    (  ) Unknown- Reason: __  11.	Did the patient share eating or drinking utensils with them? (  ) Yes   (  ) No    (  ) Unknown- Reason: ____  12.	Did they sneeze, cough, or somehow get respiratory droplets on the patient? (  ) Yes   (  ) No    (  ) Unknown- Reason: ______  13.	*Has the patient been out of [place] within the past 10 days?* (  ) Yes   (  ) No   ( x ) Unknown- Reason: _____  IF YES PLEASE ANSWER THE FOLLOWING QUESTIONS:  14.	Which state/country did they go to? ______  15.	Were they there over 24 hours? (  ) Yes   (  ) No    (  ) Unknown- Reason: ______  16.	Date of return to [facility]: ______
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
## 2021-03-09 NOTE — ED BEHAVIORAL HEALTH ASSESSMENT NOTE - OTHER
[facility] Conor -nursing supervisor at [facility] child like vocab it's a group home deferred unable to assess x
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
## 2021-03-09 NOTE — ED BEHAVIORAL HEALTH ASSESSMENT NOTE - DESCRIPTION
DM, hyperprolactinemia no beh issues; easily re-directable     COVID exposure screen from pt:  she was unable to be provide due to her ID - unable to answer questions about travel out of [facility], recent covid exposure, vaccination hx, ab result, past covid infection living in group home, single COVID exposure screen from pt:  she was unable to be provide due to her ID - unable to answer questions about travel out of [facility], recent covid exposure, vaccination hx, ab result, past covid infection
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
## 2021-03-09 NOTE — ED BEHAVIORAL HEALTH ASSESSMENT NOTE - NS ED BHA DEMOGRAPHICS MEDICAL RECORD REVIEWED YES RECORDS
Chief Complaint   Patient presents with     Follow Up For     1 year RTC for hypertriglyceridemia, HTN        Hospital chart/[facility]

## 2021-03-09 NOTE — ED BEHAVIORAL HEALTH ASSESSMENT NOTE - PSYCHIATRIC ISSUES AND PLAN (INCLUDE STANDING AND PRN MEDICATION)
may give risperdal 2mg BID as per routine meds; PRNS: haldol 5mg, ativan 2mg, diphenhydramine 50mg, PO/IM, Q6H for severe Agitation

## 2021-03-09 NOTE — ED PROVIDER NOTE - PHYSICAL EXAMINATION
GEN: Awake, alert, interactive, NAD.  HEAD AND NECK: NC/AT. Airway patent. Neck supple.   EYES:  Clear b/l. EOMI. PERRL.   ENT: Moist mucus membranes.   CARDIAC: Regular rate, regular rhythm. No evident pedal edema.    RESP/CHEST: Normal respiratory effort with no use of accessory muscles or retractions. Clear throughout on auscultation.  ABD: Soft, non-distended, non-tender. No rebound, no guarding.   BACK: No midline spinal TTP. No CVAT.   EXTREMITIES: Moving all extremities with no apparent deformities.   SKIN: Warm, dry, intact normal color. No rash.   NEURO: AOx3, CN II-XII grossly intact, no focal deficits.   PSYCH: Appropriate mood and affect. GEN: Awake, alert, interactive.  HEAD AND NECK: NC/AT. Airway patent. Neck supple.   EYES:  Clear b/l. EOMI. PERRL.   ENT: Moist mucus membranes.   CARDIAC: Regular rate, regular rhythm. No evident pedal edema.    RESP/CHEST: Normal respiratory effort with no use of accessory muscles or retractions. Clear throughout on auscultation.  ABD: Soft, non-distended, non-tender. No rebound, no guarding.   BACK: No midline spinal TTP. No CVAT.   EXTREMITIES: Moving all extremities with no apparent deformities.   SKIN: Warm, dry, intact normal color. No rash.   NEURO: CN II-XII grossly intact, no focal deficits.   PSYCH: + delusions, denies HI/SI

## 2021-03-10 VITALS
RESPIRATION RATE: 18 BRPM | SYSTOLIC BLOOD PRESSURE: 92 MMHG | DIASTOLIC BLOOD PRESSURE: 56 MMHG | OXYGEN SATURATION: 97 % | TEMPERATURE: 99 F | HEART RATE: 93 BPM

## 2021-03-10 DIAGNOSIS — F25.9 SCHIZOAFFECTIVE DISORDER, UNSPECIFIED: ICD-10-CM

## 2021-03-10 DIAGNOSIS — F79 UNSPECIFIED INTELLECTUAL DISABILITIES: ICD-10-CM

## 2021-03-10 LAB
SARS-COV-2 IGG SERPL QL IA: NEGATIVE — SIGNIFICANT CHANGE UP
SARS-COV-2 IGM SERPL IA-ACNC: 0.08 INDEX — SIGNIFICANT CHANGE UP

## 2021-03-10 PROCEDURE — 99213 OFFICE O/P EST LOW 20 MIN: CPT | Mod: 95

## 2021-03-10 RX ORDER — HALOPERIDOL DECANOATE 100 MG/ML
5 INJECTION INTRAMUSCULAR ONCE
Refills: 0 | Status: COMPLETED | OUTPATIENT
Start: 2021-03-10 | End: 2021-03-10

## 2021-03-10 RX ORDER — DIPHENHYDRAMINE HCL 50 MG
50 CAPSULE ORAL ONCE
Refills: 0 | Status: COMPLETED | OUTPATIENT
Start: 2021-03-10 | End: 2021-03-10

## 2021-03-10 RX ADMIN — HALOPERIDOL DECANOATE 5 MILLIGRAM(S): 100 INJECTION INTRAMUSCULAR at 14:49

## 2021-03-10 RX ADMIN — Medication 2 MILLIGRAM(S): at 15:25

## 2021-03-10 RX ADMIN — Medication 50 MILLIGRAM(S): at 16:03

## 2021-03-10 NOTE — PROGRESS NOTE BEHAVIORAL HEALTH - NSBHFUPINTERVALHXFT_PSY_A_CORE
ADRY Jacome provided hospital course: the pt received ativan 2mg and haldol 5mg IM at 23:18 (chart states for being loud and nonsensical). the pt then slept overnight and refusing to engage and stating that she wants to sleep. she urinated in her diaper, refused to go to the bathroom and states that her stomach hurts. pt was not shouting and was somewhat organized. pt was responding to internal stimuli.
Interval chart reviewed. Last PRN received was Haldol 5IM + Ativan 2IM on 3/9 at 23:18, after initial consult by Dr. Ward. Patient was seen for reassessment this morning. On reassessment, was uncooperative but somewhat calmer. Shouted at first that she wanted to be left alone, then turned around as if to go to sleep-- had eyes closed. She was not physically agitated and did not want to speak about any psych symptoms-- delusions, AH, SI/HI. Per chart, behaves this way at baseline. Interview ended at this point.
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
## 2021-03-10 NOTE — PROGRESS NOTE BEHAVIORAL HEALTH - NSBHCONSULTFOLLOWAFTERCARE_PSY_A_CORE FT
Patient is Treat and Release.  Would recommend 1:1 to be continued at residence  Also would consider behavior plan and patient going back to CDT program, has been on it in the past

## 2021-03-10 NOTE — ED ADULT NURSE REASSESSMENT NOTE - NS ED NURSE REASSESS COMMENT FT1
Pt responded to administered meds. Slept through the night. Respiration even and unlabored. VSS. Psych eval ongoing
pt endorsed to /Solo and Shahnaz for care EKG being done 1:1 at bedside
Patient became uncontrollably loud, speaking nonsensically. Restless. Medicated per order. PCA at bedside for 1:1.

## 2021-03-10 NOTE — PROGRESS NOTE BEHAVIORAL HEALTH - NSBHCONSULTMEDS_PSY_A_CORE FT
As per previous note:  VPA 1000mg HS, Risperdal 2mg bid, cogentin 1mg bid, prozac 40mg, bromocriptine 5mg BID As per note by Dr. Wilson:  "medications:   haldol 5mg BID- was previously also on risperdal but it was recently stopped by NP on monday.  depakote ER 1000mg daily  prozac 40mg daily   cogentin 1mg BID  metformin 1000mg BID  docusate 100mg  bromocriptine 5mg BID  multivitamin  fibrolax  carlorie carb  iron  simethicone   pepcid  "

## 2021-03-10 NOTE — PROGRESS NOTE BEHAVIORAL HEALTH - RISK ASSESSMENT
Chronically at elevated risk due to history of chronic behavioral issues, developmental disability diagnosis

## 2021-03-10 NOTE — PROGRESS NOTE BEHAVIORAL HEALTH - SUMMARY
31yo AAF, domiciled at [facility] [place], unemployed, with PPHx of intellectual disability (under the care of OPWDD services), schizoaffective disorder per chart, hx of episodes of aggression/beh issues, no reported past SA/SIB, no past psych hospitalizations, currently in outpatient tx at , no known substance abuse, and PMH of DM, hyperprolactinemia; she was BIBEMS from  for delusions and agitation.     On re-assessment after PRN medications is still uncooperative. Shouted to be left alone but then became calmer and turned around as if to sleep. Of note, per chart has a history of delusions and psychotic symptoms at baseline-- although is currently on antipsychotic. Of note, it is common for patient with intellectual disability to have limited ability to cope with stressors and to retreat to psychotic level functioning defenses of denial, magical thinking, withdrawal that result in episodes of mood dysregulation and behavioral acting out at baseline. Practically and concretely speaking, she could benefit from management of stressors at group home and higher level of care-- currently is not attending CDT program that she has in the past. At this time, patient does not present with any acute mood or psychotic disorder. There is no acute safety concern, and patient should continue to be on 1:1 at residence. Would not benefit from psychiatric admission at this time. 31yo AAF, domiciled at [facility] group [place], unemployed, with PPHx of intellectual disability (under the care of OPWDD services), schizoaffective disorder per chart, hx of episodes of aggression/beh issues, no reported past SA/SIB, no past psych hospitalizations, currently in outpatient tx at , no known substance abuse, and PMH of DM, hyperprolactinemia; she was BIBEMS from  for delusions and agitation.     On re-assessment after PRN medications is still uncooperative. Shouted to be left alone but then became calmer and turned around as if to sleep. Of note, per chart has a history of delusions and psychotic symptoms at baseline-- although is currently on antipsychotic. Of note, it is common for patient with intellectual disability to have limited ability to cope with stressors and to retreat to psychotic level functioning psychological defenses of denial, magical thinking, withdrawal that result in episodes of mood dysregulation and behavioral acting out at baseline. Practically and concretely speaking, she could benefit from management of stressors at group home and higher level of care-- currently is not attending CDT program that she has in the past. At this time, patient does not present with any acute mood or psychotic disorder. There is no acute safety concern, and patient should continue to be on 1:1 at residence. Would not benefit from psychiatric admission at this time.

## 2021-03-15 ENCOUNTER — OUTPATIENT (OUTPATIENT)
Dept: OUTPATIENT SERVICES | Facility: HOSPITAL | Age: 31
LOS: 1 days | Discharge: STILL ADMITTED OR ADMINISTRATIVE | End: 2021-03-15

## 2021-03-16 DIAGNOSIS — F25.9 SCHIZOAFFECTIVE DISORDER, UNSPECIFIED: ICD-10-CM

## 2021-03-16 DIAGNOSIS — F79 UNSPECIFIED INTELLECTUAL DISABILITIES: ICD-10-CM

## 2021-03-23 ENCOUNTER — EMERGENCY (EMERGENCY)
Facility: HOSPITAL | Age: 31
LOS: 0 days | Discharge: NURSING, REHAB OR LONG-TERM CARE | End: 2021-03-24
Attending: EMERGENCY MEDICINE
Payer: MEDICAID

## 2021-03-23 VITALS
HEART RATE: 91 BPM | HEIGHT: 61 IN | DIASTOLIC BLOOD PRESSURE: 66 MMHG | SYSTOLIC BLOOD PRESSURE: 106 MMHG | OXYGEN SATURATION: 100 % | WEIGHT: 164.02 LBS | TEMPERATURE: 99 F | RESPIRATION RATE: 17 BRPM

## 2021-03-23 DIAGNOSIS — E11.9 TYPE 2 DIABETES MELLITUS WITHOUT COMPLICATIONS: ICD-10-CM

## 2021-03-23 DIAGNOSIS — F32.9 MAJOR DEPRESSIVE DISORDER, SINGLE EPISODE, UNSPECIFIED: ICD-10-CM

## 2021-03-23 DIAGNOSIS — E22.1 HYPERPROLACTINEMIA: ICD-10-CM

## 2021-03-23 DIAGNOSIS — R45.6 VIOLENT BEHAVIOR: ICD-10-CM
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
## 2021-03-23 DIAGNOSIS — Z20.822 CONTACT WITH AND (SUSPECTED) EXPOSURE TO COVID-19: ICD-10-CM

## 2021-03-23 DIAGNOSIS — F79 UNSPECIFIED INTELLECTUAL DISABILITIES: ICD-10-CM

## 2021-03-23 DIAGNOSIS — F25.9 SCHIZOAFFECTIVE DISORDER, UNSPECIFIED: ICD-10-CM

## 2021-03-23 LAB
ALBUMIN SERPL ELPH-MCNC: 3 G/DL — LOW (ref 3.3–5)
ALP SERPL-CCNC: 42 U/L — SIGNIFICANT CHANGE UP (ref 40–120)
ALT FLD-CCNC: 10 U/L — LOW (ref 12–78)
AMPHET UR-MCNC: NEGATIVE — SIGNIFICANT CHANGE UP
ANION GAP SERPL CALC-SCNC: 5 MMOL/L — SIGNIFICANT CHANGE UP (ref 5–17)
APAP SERPL-MCNC: <2 UG/ML — LOW (ref 10–30)
APPEARANCE UR: CLEAR — SIGNIFICANT CHANGE UP
AST SERPL-CCNC: 6 U/L — LOW (ref 15–37)
BARBITURATES UR SCN-MCNC: NEGATIVE — SIGNIFICANT CHANGE UP
BASOPHILS # BLD AUTO: 0.01 K/UL — SIGNIFICANT CHANGE UP (ref 0–0.2)
BASOPHILS NFR BLD AUTO: 0.2 % — SIGNIFICANT CHANGE UP (ref 0–2)
BENZODIAZ UR-MCNC: NEGATIVE — SIGNIFICANT CHANGE UP
BILIRUB SERPL-MCNC: 0.2 MG/DL — SIGNIFICANT CHANGE UP (ref 0.2–1.2)
BILIRUB UR-MCNC: NEGATIVE — SIGNIFICANT CHANGE UP
BUN SERPL-MCNC: 10 MG/DL — SIGNIFICANT CHANGE UP (ref 7–23)
CALCIUM SERPL-MCNC: 8.4 MG/DL — LOW (ref 8.5–10.1)
CHLORIDE SERPL-SCNC: 106 MMOL/L — SIGNIFICANT CHANGE UP (ref 96–108)
CO2 SERPL-SCNC: 27 MMOL/L — SIGNIFICANT CHANGE UP (ref 22–31)
COCAINE METAB.OTHER UR-MCNC: NEGATIVE — SIGNIFICANT CHANGE UP
COLOR SPEC: YELLOW — SIGNIFICANT CHANGE UP
CREAT SERPL-MCNC: 0.81 MG/DL — SIGNIFICANT CHANGE UP (ref 0.5–1.3)
DIFF PNL FLD: NEGATIVE — SIGNIFICANT CHANGE UP
EOSINOPHIL # BLD AUTO: 0.05 K/UL — SIGNIFICANT CHANGE UP (ref 0–0.5)
EOSINOPHIL NFR BLD AUTO: 1 % — SIGNIFICANT CHANGE UP (ref 0–6)
ETHANOL SERPL-MCNC: <10 MG/DL — SIGNIFICANT CHANGE UP (ref 0–10)
FLUAV AG NPH QL: SIGNIFICANT CHANGE UP
FLUBV AG NPH QL: SIGNIFICANT CHANGE UP
GLUCOSE SERPL-MCNC: 84 MG/DL — SIGNIFICANT CHANGE UP (ref 70–99)
GLUCOSE UR QL: NEGATIVE MG/DL — SIGNIFICANT CHANGE UP
HCG SERPL-ACNC: <1 MIU/ML — SIGNIFICANT CHANGE UP
HCT VFR BLD CALC: 34.6 % — SIGNIFICANT CHANGE UP (ref 34.5–45)
HGB BLD-MCNC: 11.1 G/DL — LOW (ref 11.5–15.5)
IMM GRANULOCYTES NFR BLD AUTO: 0.2 % — SIGNIFICANT CHANGE UP (ref 0–1.5)
KETONES UR-MCNC: NEGATIVE — SIGNIFICANT CHANGE UP
LEUKOCYTE ESTERASE UR-ACNC: NEGATIVE — SIGNIFICANT CHANGE UP
LYMPHOCYTES # BLD AUTO: 2.31 K/UL — SIGNIFICANT CHANGE UP (ref 1–3.3)
LYMPHOCYTES # BLD AUTO: 46 % — HIGH (ref 13–44)
MCHC RBC-ENTMCNC: 31.3 PG — SIGNIFICANT CHANGE UP (ref 27–34)
MCHC RBC-ENTMCNC: 32.1 GM/DL — SIGNIFICANT CHANGE UP (ref 32–36)
MCV RBC AUTO: 97.5 FL — SIGNIFICANT CHANGE UP (ref 80–100)
METHADONE UR-MCNC: NEGATIVE — SIGNIFICANT CHANGE UP
MONOCYTES # BLD AUTO: 0.5 K/UL — SIGNIFICANT CHANGE UP (ref 0–0.9)
MONOCYTES NFR BLD AUTO: 10 % — SIGNIFICANT CHANGE UP (ref 2–14)
NEUTROPHILS # BLD AUTO: 2.14 K/UL — SIGNIFICANT CHANGE UP (ref 1.8–7.4)
NEUTROPHILS NFR BLD AUTO: 42.6 % — LOW (ref 43–77)
NITRITE UR-MCNC: NEGATIVE — SIGNIFICANT CHANGE UP
NRBC # BLD: 0 /100 WBCS — SIGNIFICANT CHANGE UP (ref 0–0)
OPIATES UR-MCNC: NEGATIVE — SIGNIFICANT CHANGE UP
PCP SPEC-MCNC: SIGNIFICANT CHANGE UP
PCP UR-MCNC: NEGATIVE — SIGNIFICANT CHANGE UP
PH UR: 7 — SIGNIFICANT CHANGE UP (ref 5–8)
PLATELET # BLD AUTO: 232 K/UL — SIGNIFICANT CHANGE UP (ref 150–400)
POTASSIUM SERPL-MCNC: 4.1 MMOL/L — SIGNIFICANT CHANGE UP (ref 3.5–5.3)
POTASSIUM SERPL-SCNC: 4.1 MMOL/L — SIGNIFICANT CHANGE UP (ref 3.5–5.3)
PROT SERPL-MCNC: 6 GM/DL — SIGNIFICANT CHANGE UP (ref 6–8.3)
PROT UR-MCNC: NEGATIVE MG/DL — SIGNIFICANT CHANGE UP
RBC # BLD: 3.55 M/UL — LOW (ref 3.8–5.2)
RBC # FLD: 13.6 % — SIGNIFICANT CHANGE UP (ref 10.3–14.5)
SALICYLATES SERPL-MCNC: <1.7 MG/DL — LOW (ref 2.8–20)
SARS-COV-2 RNA SPEC QL NAA+PROBE: SIGNIFICANT CHANGE UP
SODIUM SERPL-SCNC: 138 MMOL/L — SIGNIFICANT CHANGE UP (ref 135–145)
SP GR SPEC: 1 — LOW (ref 1.01–1.02)
THC UR QL: NEGATIVE — SIGNIFICANT CHANGE UP
UROBILINOGEN FLD QL: NEGATIVE MG/DL — SIGNIFICANT CHANGE UP
WBC # BLD: 5.02 K/UL — SIGNIFICANT CHANGE UP (ref 3.8–10.5)
WBC # FLD AUTO: 5.02 K/UL — SIGNIFICANT CHANGE UP (ref 3.8–10.5)

## 2021-03-23 PROCEDURE — 99285 EMERGENCY DEPT VISIT HI MDM: CPT

## 2021-03-23 PROCEDURE — 93010 ELECTROCARDIOGRAM REPORT: CPT

## 2021-03-23 RX ORDER — BROMOCRIPTINE MESYLATE 5 MG/1
5 CAPSULE ORAL ONCE
Refills: 0 | Status: COMPLETED | OUTPATIENT
Start: 2021-03-23 | End: 2021-03-23

## 2021-03-23 RX ORDER — BROMOCRIPTINE MESYLATE 5 MG/1
5 CAPSULE ORAL ONCE
Refills: 0 | Status: DISCONTINUED | OUTPATIENT
Start: 2021-03-23 | End: 2021-03-23

## 2021-03-23 RX ORDER — HALOPERIDOL DECANOATE 100 MG/ML
5 INJECTION INTRAMUSCULAR ONCE
Refills: 0 | Status: COMPLETED | OUTPATIENT
Start: 2021-03-23 | End: 2021-03-23

## 2021-03-23 RX ORDER — BENZTROPINE MESYLATE 1 MG
1 TABLET ORAL ONCE
Refills: 0 | Status: COMPLETED | OUTPATIENT
Start: 2021-03-23 | End: 2021-03-23

## 2021-03-23 RX ADMIN — Medication 1 MILLIGRAM(S): at 20:35

## 2021-03-23 RX ADMIN — HALOPERIDOL DECANOATE 5 MILLIGRAM(S): 100 INJECTION INTRAMUSCULAR at 17:02

## 2021-03-23 RX ADMIN — BROMOCRIPTINE MESYLATE 5 MILLIGRAM(S): 5 CAPSULE ORAL at 17:31

## 2021-03-23 RX ADMIN — Medication 1 MILLIGRAM(S): at 17:02

## 2021-03-23 NOTE — ED PROVIDER NOTE - PHYSICAL EXAMINATION
Gen: Alert, Well appearing. NAD    Head: NC, AT, PERRL, normal lids/conjunctiva   ENT: Bilateral TM WNL, patent oropharynx without erythema/exudate, uvula midline  Neck: supple, no tenderness/meningismus  Pulm: Bilateral clear BS, normal resp effort  CV: RRR, no M/R/G, +dist pulses   Abd: soft, NT/ND, +BS, no guarding/rebound tenderness  Mskel: extremities x4 with normal ROM. no ctl spine ttp. no edema/erythema/cyanosis   Skin: no rash, no bruising  Neuro: AAOx3, no sensory/motor deficits, CN 2-12 intact   psych: calm, cooperative, mildly tangential, but able to hold a conversation and understands what she did. no obv hallucinations, responding to stimuli
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
## 2021-03-23 NOTE — ED PROVIDER NOTE - PATIENT PORTAL LINK FT
You can access the FollowMyHealth Patient Portal offered by [facility] by registering at the following website: [email or web address]. By joining [employer]’s FollowMyHealth portal, you will also be able to view your health information using other applications (apps) compatible with our system. You can access the FollowMyHealth Patient Portal offered by [facility] by registering at the following website: [email or web address]. By joining [employer]’s FollowMyHealth portal, you will also be able to view your health information using other applications (apps) compatible with our system.
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
## 2021-03-23 NOTE — ED PROVIDER NOTE - CLINICAL SUMMARY MEDICAL DECISION MAKING FREE TEXT BOX
pt calm and cooperative. in review of prior charts, pt has a poor baseline with delusions and comparatively, pt is doing very well today and likely her best at baseline. Pt is conversive and calm and understands her actions today were wrong and awareness she needed her meds and agreeable to be dc. no psych emergency, will dc pt back to group home. pt calm and cooperative. in review of prior charts, pt has a poor baseline with delusions and comparatively, pt is doing very well today and likely her best at baseline. Pt is conversive and calm and understands her actions today were wrong and awareness she needed her meds and agreeable to be dc. no psych emergency, will dc pt back to group home.    pt was to be dc, however upon arrival by EMS pt repots she does not want to go back. will do labs and plan for possible psych consult, Patient signed out to incoming physician.  All decisions regarding the progression of care will be made at their discretion.

## 2021-03-23 NOTE — ED PROVIDER NOTE - NSFOLLOWUPINSTRUCTIONS_ED_ALL_ED_FT
PAtient Given her haldol 5mg and cogentin 2mg at 5pm.     Follow up with your primary care doctor and psychiatrist.

## 2021-03-24 VITALS
RESPIRATION RATE: 18 BRPM | DIASTOLIC BLOOD PRESSURE: 64 MMHG | TEMPERATURE: 98 F | OXYGEN SATURATION: 98 % | SYSTOLIC BLOOD PRESSURE: 102 MMHG | HEART RATE: 93 BPM

## 2021-03-24 LAB
COVID-19 SPIKE DOMAIN AB INTERP: NEGATIVE — SIGNIFICANT CHANGE UP
COVID-19 SPIKE DOMAIN ANTIBODY RESULT: 0.4 U/ML — SIGNIFICANT CHANGE UP
SARS-COV-2 IGG+IGM SERPL QL IA: 0.4 U/ML — SIGNIFICANT CHANGE UP
SARS-COV-2 IGG+IGM SERPL QL IA: NEGATIVE — SIGNIFICANT CHANGE UP

## 2021-03-24 PROCEDURE — 90792 PSYCH DIAG EVAL W/MED SRVCS: CPT | Mod: 95

## 2021-03-24 RX ORDER — RISPERIDONE 4 MG/1
3 TABLET ORAL ONCE
Refills: 0 | Status: DISCONTINUED | OUTPATIENT
Start: 2021-03-24 | End: 2021-03-24

## 2021-03-24 RX ORDER — HALOPERIDOL DECANOATE 100 MG/ML
5 INJECTION INTRAMUSCULAR ONCE
Refills: 0 | Status: COMPLETED | OUTPATIENT
Start: 2021-03-24 | End: 2021-03-24

## 2021-03-24 RX ADMIN — HALOPERIDOL DECANOATE 5 MILLIGRAM(S): 100 INJECTION INTRAMUSCULAR at 04:44

## 2021-03-24 RX ADMIN — Medication 1 MILLIGRAM(S): at 01:02

## 2021-03-24 NOTE — ED BEHAVIORAL HEALTH ASSESSMENT NOTE - ACCESS TO FIREARM
- MRI brain w/wo contrast in May 2020 revealed a 4 mm nodular focus of enhancement in L posterior frontal lobe  - Repeat MRI on 7/1/2020:  · Slight interval enlargement of the small juxtacortical enhancing nodule seen on the prior examination within the left posterior medial frontal lobe, suspicious for metastasis  This measures 5 mm in size  No new enhancing lesions are identified  · Stable right frontal lobe encephalomalacia, gliosis and mild chronic hemosiderin deposition compared to the prior examination suggesting remote vascular or traumatic injury    - Patient has repeat MRI brain w/wo contrast scheduled for the future to continue monitoring the lesion  - It is unlikely that this was the source for the AMS due to return to baseline and the lesion is in the motor cortex No
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
## 2021-03-24 NOTE — ED BEHAVIORAL HEALTH ASSESSMENT NOTE - PREPARATORY ACTS:
SUBJECTIVE:   Shemar Almanza is a 8 month old male who presents to clinic today with Foster mother because of:    No chief complaint on file.       [facility]      Hospital Follow-/up Visit:    Hospital/Nursing Home/IP Rehab Facility: [facility]  Date of Admission: 18  Date of Discharge: 18  Reason(s) for Admission: coughing vs. Choking episodes            Problems taking medications regularly:  None       Medication changes since discharge: None       Problems adhering to non-medication therapy:  None    Summary of hospitalization:  [facility] discharge summary reviewed  Diagnostic Tests/Treatments reviewed.  Follow up needed: none  Other Healthcare Providers Involved in Patient s Care:         None  Update since discharge: improved.     Post Discharge Medication Reconciliation: discharge medications reconciled, continue medications without change.  Plan of care communicated with patient and family.     Coding guidelines for this visit:  Type of Medical   Decision Making Face-to-Face Visit       within 7 Days of discharge Face-to-Face Visit        within 14 days of discharge   Moderate Complexity [id number]   High Complexity [id number]             passed swallow study-did have some GERD-now on PPI.    ROS  Constitutional, eye, ENT, skin, respiratory, cardiac, and GI are normal except as otherwise noted.    PROBLEM LIST  Patient Active Problem List    Diagnosis Date Noted     Inguinal hernia bilateral, non-recurrent 2018     Priority: Medium     Left retinopathy of prematurity, stage 1 2018     Priority: Medium      obstruction of left nasolacrimal duct 2018     Priority: Medium     Congenital inguinal hernia 2018     Priority: Medium     Encounter for routine or ritual circumcision 2018     Priority: Medium     In utero drug exposure 2018     Priority: Medium     Single liveborn, born in hospital, delivered 2018     Priority: Medium      "Prematurity 2018     Priority: Medium     Need for observation and evaluation of  for sepsis 2018     Priority: Medium     Malnutrition (H) 2018     Priority: Medium     Respiratory failure of  2018     Priority: Medium      MEDICATIONS  Current Outpatient Prescriptions   Medication Sig Dispense Refill     acetaminophen (TYLENOL) 32 mg/mL solution Give orally 1.25ml once in clinic after circumcision. (Patient not taking: Reported on 2018) 1.25 mL 0     nystatin (MYCOSTATIN) cream Apply topically 3 times daily for 14 days 30 g 1     pediatric multivitamin with iron (POLY-VI-SOL WITH IRON) solution Take 0.5 mLs by mouth daily (Patient not taking: Reported on 2018) 50 mL 1      ALLERGIES  No Known Allergies    Reviewed and updated as needed this visit by clinical staff         Reviewed and updated as needed this visit by Provider       OBJECTIVE:     Pulse 108  Temp 98  F (36.7  C) (Tympanic)  Ht 2' 3.75\" (0.705 m)  Wt 21 lb 8 oz (9.752 kg)  SpO2 100%  BMI 19.63 kg/m2  31 %ile based on WHO (Boys, 0-2 years) length-for-age data using vitals from 2018.  82 %ile based on WHO (Boys, 0-2 years) weight-for-age data using vitals from 2018.  95 %ile based on WHO (Boys, 0-2 years) BMI-for-age data using vitals from 2018.  No blood pressure reading on file for this encounter.    GENERAL: Active, alert, in no acute distress.  SKIN: Clear. No significant rash, abnormal pigmentation or lesions  HEAD: Normocephalic. Normal fontanels and sutures.  EYES:  No discharge or erythema. Normal pupils and EOM  EARS: Normal canals. Tympanic membranes are normal; gray and translucent.  NOSE: Normal without discharge.  MOUTH/THROAT: Clear. No oral lesions.  NECK: Supple, no masses.  LYMPH NODES: No adenopathy  LUNGS: Clear. No rales, rhonchi, wheezing or retractions  HEART: Regular rhythm. Normal S1/S2. No murmurs. Normal femoral pulses.  ABDOMEN: Soft, non-tender, no masses or " hepatosplenomegaly.  NEUROLOGIC: Normal tone throughout. Normal reflexes for age    DIAGNOSTICS: None    ASSESSMENT/PLAN:   1. Gastroesophageal reflux disease, esophagitis presence not specified  Doing better since discharge.  URI improving and tolerating feeds better.  Swallow study nl. Continue to watch.      FOLLOW UP: If not improving or if worsening    Brinda Lacey MD, MD     11   Unable to assess
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
## 2021-03-26 ENCOUNTER — EMERGENCY (EMERGENCY)
Facility: HOSPITAL | Age: 31
LOS: 1 days | Discharge: HOME | End: 2021-03-26
Admitting: EMERGENCY MEDICINE
Payer: MEDICAID

## 2021-03-26 VITALS
SYSTOLIC BLOOD PRESSURE: 105 MMHG | RESPIRATION RATE: 18 BRPM | TEMPERATURE: 98 F | HEART RATE: 78 BPM | OXYGEN SATURATION: 99 % | DIASTOLIC BLOOD PRESSURE: 67 MMHG | HEIGHT: 61 IN

## 2021-03-26 PROCEDURE — 99284 EMERGENCY DEPT VISIT MOD MDM: CPT

## 2021-03-26 RX ORDER — HALOPERIDOL DECANOATE 100 MG/ML
5 INJECTION INTRAMUSCULAR ONCE
Refills: 0 | Status: COMPLETED | OUTPATIENT
Start: 2021-03-26 | End: 2021-03-26

## 2021-03-26 RX ADMIN — HALOPERIDOL DECANOATE 5 MILLIGRAM(S): 100 INJECTION INTRAMUSCULAR at 18:22

## 2021-03-26 RX ADMIN — Medication 2 MILLIGRAM(S): at 18:22

## 2021-03-26 NOTE — ED ADULT NURSE NOTE - OBJECTIVE STATEMENT
Pt BIB EMS from [facility] [place] for psych eval. Pt usually goes to [place] for aggressive behavior. She has been throwing things at staff and getting aggressive with staff. Pt is cooperative at the moment.
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
## 2021-03-26 NOTE — ED ADULT NURSE NOTE - CHIEF COMPLAINT QUOTE
Patient brought to ER by EMS from [facility] for psych eval. Pt usually goes to [place] for aggressive behavior. She has been throwing things at staff and getting aggressive with staff.

## 2021-03-26 NOTE — ED PROVIDER NOTE - PATIENT PORTAL LINK FT
You can access the FollowMyHealth Patient Portal offered by [facility] by registering at the following website: [email or web address]. By joining [employer]’s FollowMyHealth portal, you will also be able to view your health information using other applications (apps) compatible with our system.
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
## 2021-03-26 NOTE — ED ADULT TRIAGE NOTE - CHIEF COMPLAINT QUOTE
Patient brought to ER by EMS from [facility] for psych eval. Pt usually goes to [facility] for aggressive behavior. She has been throwing things at staff and getting aggressive with staff.

## 2021-04-10 ENCOUNTER — EMERGENCY (EMERGENCY)
Facility: HOSPITAL | Age: 31
LOS: 0 days | Discharge: HOME | End: 2021-04-11
Attending: EMERGENCY MEDICINE
Payer: MEDICAID

## 2021-04-10 VITALS
OXYGEN SATURATION: 98 % | WEIGHT: 149.91 LBS | HEART RATE: 83 BPM | DIASTOLIC BLOOD PRESSURE: 84 MMHG | SYSTOLIC BLOOD PRESSURE: 118 MMHG | RESPIRATION RATE: 16 BRPM | HEIGHT: 61 IN | TEMPERATURE: 98 F

## 2021-04-10 DIAGNOSIS — F81.9 DEVELOPMENTAL DISORDER OF SCHOLASTIC SKILLS, UNSPECIFIED: ICD-10-CM

## 2021-04-10 DIAGNOSIS — R45.1 RESTLESSNESS AND AGITATION: ICD-10-CM

## 2021-04-10 DIAGNOSIS — F25.9 SCHIZOAFFECTIVE DISORDER, UNSPECIFIED: ICD-10-CM

## 2021-04-10 DIAGNOSIS — E11.9 TYPE 2 DIABETES MELLITUS WITHOUT COMPLICATIONS: ICD-10-CM

## 2021-04-10 LAB
ALBUMIN SERPL ELPH-MCNC: 3.4 G/DL — SIGNIFICANT CHANGE UP (ref 3.3–5)
ALP SERPL-CCNC: 44 U/L — SIGNIFICANT CHANGE UP (ref 40–120)
ALT FLD-CCNC: 13 U/L — SIGNIFICANT CHANGE UP (ref 12–78)
ANION GAP SERPL CALC-SCNC: 6 MMOL/L — SIGNIFICANT CHANGE UP (ref 5–17)
APAP SERPL-MCNC: < 2 UG/ML (ref 10–30)
AST SERPL-CCNC: 10 U/L — LOW (ref 15–37)
BASOPHILS # BLD AUTO: 0.03 K/UL — SIGNIFICANT CHANGE UP (ref 0–0.2)
BASOPHILS NFR BLD AUTO: 0.7 % — SIGNIFICANT CHANGE UP (ref 0–2)
BILIRUB SERPL-MCNC: 0.2 MG/DL — SIGNIFICANT CHANGE UP (ref 0.2–1.2)
BUN SERPL-MCNC: 11 MG/DL — SIGNIFICANT CHANGE UP (ref 7–23)
CALCIUM SERPL-MCNC: 9.3 MG/DL — SIGNIFICANT CHANGE UP (ref 8.5–10.1)
CHLORIDE SERPL-SCNC: 102 MMOL/L — SIGNIFICANT CHANGE UP (ref 96–108)
CO2 SERPL-SCNC: 28 MMOL/L — SIGNIFICANT CHANGE UP (ref 22–31)
CREAT SERPL-MCNC: 0.65 MG/DL — SIGNIFICANT CHANGE UP (ref 0.5–1.3)
EOSINOPHIL # BLD AUTO: 0.04 K/UL — SIGNIFICANT CHANGE UP (ref 0–0.5)
EOSINOPHIL NFR BLD AUTO: 0.9 % — SIGNIFICANT CHANGE UP (ref 0–6)
ETHANOL SERPL-MCNC: <10 MG/DL — SIGNIFICANT CHANGE UP (ref 0–10)
GLUCOSE SERPL-MCNC: 78 MG/DL — SIGNIFICANT CHANGE UP (ref 70–99)
HCG SERPL-ACNC: <1 MIU/ML — SIGNIFICANT CHANGE UP
HCT VFR BLD CALC: 34.9 % — SIGNIFICANT CHANGE UP (ref 34.5–45)
HGB BLD-MCNC: 11.4 G/DL — LOW (ref 11.5–15.5)
IMM GRANULOCYTES NFR BLD AUTO: 0.2 % — SIGNIFICANT CHANGE UP (ref 0–1.5)
LYMPHOCYTES # BLD AUTO: 1.61 K/UL — SIGNIFICANT CHANGE UP (ref 1–3.3)
LYMPHOCYTES # BLD AUTO: 34.9 % — SIGNIFICANT CHANGE UP (ref 13–44)
MCHC RBC-ENTMCNC: 31.5 PG — SIGNIFICANT CHANGE UP (ref 27–34)
MCHC RBC-ENTMCNC: 32.7 GM/DL — SIGNIFICANT CHANGE UP (ref 32–36)
MCV RBC AUTO: 96.4 FL — SIGNIFICANT CHANGE UP (ref 80–100)
MONOCYTES # BLD AUTO: 0.38 K/UL — SIGNIFICANT CHANGE UP (ref 0–0.9)
MONOCYTES NFR BLD AUTO: 8.2 % — SIGNIFICANT CHANGE UP (ref 2–14)
NEUTROPHILS # BLD AUTO: 2.54 K/UL — SIGNIFICANT CHANGE UP (ref 1.8–7.4)
NEUTROPHILS NFR BLD AUTO: 55.1 % — SIGNIFICANT CHANGE UP (ref 43–77)
NRBC # BLD: 0 /100 WBCS — SIGNIFICANT CHANGE UP (ref 0–0)
PLATELET # BLD AUTO: 242 K/UL — SIGNIFICANT CHANGE UP (ref 150–400)
POTASSIUM SERPL-MCNC: 4.3 MMOL/L — SIGNIFICANT CHANGE UP (ref 3.5–5.3)
POTASSIUM SERPL-SCNC: 4.3 MMOL/L — SIGNIFICANT CHANGE UP (ref 3.5–5.3)
PROT SERPL-MCNC: 6.6 GM/DL — SIGNIFICANT CHANGE UP (ref 6–8.3)
RBC # BLD: 3.62 M/UL — LOW (ref 3.8–5.2)
RBC # FLD: 13.1 % — SIGNIFICANT CHANGE UP (ref 10.3–14.5)
SALICYLATES SERPL-MCNC: <1.7 MG/DL — LOW (ref 2.8–20)
SODIUM SERPL-SCNC: 136 MMOL/L — SIGNIFICANT CHANGE UP (ref 135–145)
WBC # BLD: 4.61 K/UL — SIGNIFICANT CHANGE UP (ref 3.8–10.5)
WBC # FLD AUTO: 4.61 K/UL — SIGNIFICANT CHANGE UP (ref 3.8–10.5)

## 2021-04-10 PROCEDURE — 99285 EMERGENCY DEPT VISIT HI MDM: CPT

## 2021-04-10 PROCEDURE — 90792 PSYCH DIAG EVAL W/MED SRVCS: CPT | Mod: 95

## 2021-04-10 PROCEDURE — 93010 ELECTROCARDIOGRAM REPORT: CPT

## 2021-04-10 RX ORDER — DIPHENHYDRAMINE HCL 50 MG
50 CAPSULE ORAL ONCE
Refills: 0 | Status: DISCONTINUED | OUTPATIENT
Start: 2021-04-10 | End: 2021-04-10

## 2021-04-10 RX ORDER — DIPHENHYDRAMINE HCL 50 MG
50 CAPSULE ORAL ONCE
Refills: 0 | Status: COMPLETED | OUTPATIENT
Start: 2021-04-10 | End: 2021-04-10

## 2021-04-10 RX ORDER — HALOPERIDOL DECANOATE 100 MG/ML
5 INJECTION INTRAMUSCULAR ONCE
Refills: 0 | Status: COMPLETED | OUTPATIENT
Start: 2021-04-10 | End: 2021-04-10

## 2021-04-10 RX ORDER — BENZTROPINE MESYLATE 1 MG
1 TABLET ORAL ONCE
Refills: 0 | Status: COMPLETED | OUTPATIENT
Start: 2021-04-10 | End: 2021-04-10

## 2021-04-10 RX ADMIN — HALOPERIDOL DECANOATE 5 MILLIGRAM(S): 100 INJECTION INTRAMUSCULAR at 18:36

## 2021-04-10 RX ADMIN — Medication 50 MILLIGRAM(S): at 19:44

## 2021-04-10 RX ADMIN — Medication 2 MILLIGRAM(S): at 19:44

## 2021-04-10 RX ADMIN — Medication 1 MILLIGRAM(S): at 18:36

## 2021-04-10 NOTE — ED BEHAVIORAL HEALTH ASSESSMENT NOTE - CURRENT MEDICATION
Per chart review: VPA 1000mg PO QHS, Haldol 5mg PO BID, Cogentin 1mg PO BID, Prozac 40mg PO daily, bromocriptine 5mg PO BID

## 2021-04-10 NOTE — ED PROVIDER NOTE - PROGRESS NOTE DETAILS
pt now sitting, smilling, laughing unclear what triggered agitation, but pt was yelling and talking to herself, got agitated "you be talking to me about that", loud, pacing around. will give some sedation for agitation. pt signed out to me from dr edwards, pt presented agitated, seen by psych and cleared, pt pending discharge and transport back to her facility

## 2021-04-10 NOTE — ED BEHAVIORAL HEALTH ASSESSMENT NOTE - MEDICATIONS (PRESCRIPTIONS, DIRECTIONS)
The patient has been encouraged to continue her current outpatient psychotropic medication regimen for the time being.
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
## 2021-04-10 NOTE — ED BEHAVIORAL HEALTH ASSESSMENT NOTE - ADDITIONAL DETAILS ALL
The patient denies any prior suicide attempts or history of non-suicidal self-injury. She denies any acute suicidal ideation/intent/plan.

## 2021-04-10 NOTE — ED BEHAVIORAL HEALTH ASSESSMENT NOTE - OTHER PAST PSYCHIATRIC HISTORY (INCLUDE DETAILS REGARDING ONSET, COURSE OF ILLNESS, INPATIENT/OUTPATIENT TREATMENT)
The patient has a reported history of ID and schizoaffective disorder. She does not have any prior psychiatric hospitalizations and is reportedly engaged with OPWDD services.

## 2021-04-10 NOTE — ED BEHAVIORAL HEALTH ASSESSMENT NOTE - DESCRIPTION
The patient is currently residing in a group home ([facility]) with peers. She is single and unemployed. DM, hyperprolactinemia Please see the ED Behavioral Health Note dated 4/10/21 for a full ED course.

## 2021-04-10 NOTE — ED PROVIDER NOTE - PHYSICAL EXAMINATION
Gen: Alert, crying  Head: NC, AT, PERRL, normal lids/conjunctiva   ENT: Bilateral TM WNL, patent oropharynx without erythema/exudate, uvula midline  Neck: supple, no tenderness/meningismus  Pulm: Bilateral clear BS, normal resp effort  CV: RRR, no M/R/G, +dist pulses   Abd: soft, NT/ND, +BS, no guarding/rebound tenderness  Mskel: no edema/erythema/cyanosis   Skin: no rash, no bruising  Neuro: AAOx3, no sensory/motor deficits, CN 2-12 intact

## 2021-04-10 NOTE — ED BEHAVIORAL HEALTH NOTE - BEHAVIORAL HEALTH NOTE
========================  COLLATERAL  ========================    NAME: [facility]     NUMBER: [phone]    RELATIONSHIP: -    RELIABILITY: -    COMMENTS: Message requesting callback.       Left message at [phone] requesting callback, emergency contact for [facility] as per chart.
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
## 2021-04-10 NOTE — ED PROVIDER NOTE - CLINICAL SUMMARY MEDICAL DECISION MAKING FREE TEXT BOX
chart review, reports pt with known episodes of acting out, likely due to ID, and would not benefit from psychiatric admission. pt has mulitple visits dialy to [facility]. will give pt po meds and reassess. chart review, reports pt with known episodes of acting out, likely due to ID, and would not benefit from psychiatric admission. [facility]. will give pt po meds and reassess.    pt seen by psych, at baseline, cleared for dc, but unable to get a hold of group home. pending dc home, when group home contacted. Patient signed out to incoming physician.  All decisions regarding the progression of care will be made at their discretion.

## 2021-04-10 NOTE — ED BEHAVIORAL HEALTH ASSESSMENT NOTE - SUMMARY
The patient is a 30-year-old, currently domiciled in a psychiatric residence with peers, single, unemployed, -American woman, non-caregiver, with a reported history of ID and schizoaffective disorder, no prior inpatient psychiatric hospitalizations, no known prior suicide attempts or history of non-suicidal self-injury, with a reported history of physical aggression, no known active substance abuse, with a past medical history significant for DM and hyperprolactinemia, who was brought in by EMS, referred by staff at her group home, for aggression and reported suicidality.    Risk Assessment: The patient is at chronically elevated risk for aggression and suicidality. Her risk factors include single marital status, unemployed, limited social supports, impulsivity, and history of aggression. Her protective factors include no acute suicidal or homicidal ideation/intent/plan, no known direct access to firearms, no prior suicide attempts, no known active substance use, and living in a supervised setting.    The patient is denying any acute suicidal or homicidal ideation/intent/plan and seems to be functioning at her psychiatric baseline. She would not benefit from inpatient psychiatric hospitalization at this point in time.

## 2021-04-10 NOTE — ED ADULT TRIAGE NOTE - CHIEF COMPLAINT QUOTE
patient is from group home for psych evaluation . pt was agitated and breaking things. pt states she is pregnant .
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
## 2021-04-10 NOTE — ED BEHAVIORAL HEALTH NOTE - BEHAVIORAL HEALTH NOTE
===================  PRE-HOSPITAL COURSE  ===================  SOURCE: ED Documentation  DETAILS: PT BIB EMS from group home    ============  ED COURSE   ============  SOURCE: ED Documentation  ARRIVAL: Pt was BIB EMS from group home   BELONGINGS: No significant belongings documented  BEHAVIOR: Pt presented with complaint of agitation at group home, where she threw a chair, broke a plate and tried to cut herself. Pt endorses “some SI,” but states because she is “jealous of kelli.” Pt denies AH/VH and HI.  Pt was given dinner. During patient’s ED course she was observed crying, then observed sitting, smiling and laughing, and later observed to be agitated, yelling and talking to herself and pacing around. Pt was medicated in ED.   TREATMENT: Haldol 5mg tablet, 1 tablet at ~18:36, benztropine 1mg tablet, 1 tablet at ~18:36, diphenhydramine 50mg/mL injectable, 1 miliLiters at ~19:44, and Lorazepam 2mg IM at ~19:44.  VISITORS: None documented

## 2021-04-10 NOTE — ED BEHAVIORAL HEALTH ASSESSMENT NOTE - HPI (INCLUDE ILLNESS QUALITY, SEVERITY, DURATION, TIMING, CONTEXT, MODIFYING FACTORS, ASSOCIATED SIGNS AND SYMPTOMS)
The patient is a 30-year-old, currently domiciled in a psychiatric residence with peers, single, unemployed, -American woman, non-caregiver, with a reported history of ID and schizoaffective disorder, no prior inpatient psychiatric hospitalizations, no known prior suicide attempts or history of non-suicidal self-injury, with a reported history of physical aggression, no known active substance abuse, with a past medical history significant for DM and hyperprolactinemia, who was brought in by EMS, referred by staff at her group home, for aggression and reported suicidality.    The patient is a frequent ED visitor. She was last seen by our service with a presenting problem of aggression on 3/24/21 and was discharged.    The patient was partially cooperative with psychiatric interview, stating that she is "tired" from the medication that she received. The patient states that she was brought to the ED because she was "acting out" at her group home, and admits to flipping chairs. She initially denied doing anything to hurt herself but then stated that she broke a plate and used it to cut herself. When asked where she cut herself, she showed this provider her hand, which did not have any visible laceration. The patient states that she does not like living at her current group home. She denies any history of suicide attempts or self-injurious behavior. The patient denies any acute suicidal or homicidal ideation/intent/plan and states that she ahs been compliant with her prescribed psychotropic medication regimen,    COVID Exposure Screen- Patient  1.	*Have you had a COVID-19 test in the last 90 days?  ( X ) Yes   (  ) No   (  ) Unknown- Reason: _____  IF YES PROCEED TO QUESTION #2. IF NO OR UNKNOWN, PLEASE SKIP TO QUESTION #3.  2.	Date of test(s) and result(s): ________  3.	*Have you tested positive for COVID-19 antibodies? (  ) Yes   ( X ) No   (  ) Unknown- Reason: _____  IF YES PROCEED TO QUESTION #4. IF NO or UNKNOWN, PLEASE SKIP TO QUESTION #5.  4.	Date of positive antibody test: ________  5.	*Have you received 2 doses of the COVID-19 vaccine? ( X ) Yes   (  ) No   (  ) Unknown- Reason: _____  IF YES PROCEED TO QUESTION #6. IF NO or UNKNOWN, PLEASE SKIP TO QUESTION #7.  6.	Date of second dose: Per patient, received her second dose of the vaccine today (unclear whether or not this is true)  7.	*In the past 10 days, have you been around anyone with a positive COVID-19 test?* (  ) Yes   (  ) No   ( X ) Unknown- Reason: Patient does not know  IF YES PROCEED TO QUESTION #8. IF NO or UNKNOWN, PLEASE SKIP TO QUESTION #13.  8.	Were you within 6 feet of them for at least 15 minutes? (  ) Yes   (  ) No   (  ) Unknown- Reason: _____  9.	Have you provided care for them? (  ) Yes   (  ) No   (  ) Unknown- Reason: ______  10.	Have you had direct physical contact with them (touched, hugged, or kissed them)? (  ) Yes   (  ) No    (  ) Unknown- Reason: _____  11.	Have you shared eating or drinking utensils with them? (  ) Yes   (  ) No    (  ) Unknown- Reason: ____  12.	Have they sneezed, coughed, or somehow gotten respiratory droplets on you? (  ) Yes   (  ) No    (  ) Unknown- Reason: ______  13.	*Have you been out of [place] within the past 10 days?* (  ) Yes   ( X ) No   (  ) Unknown- Reason: _____  IF YES PLEASE ANSWER THE FOLLOWING QUESTIONS:  14.	Which state/country have you been to? ______  15.	Were you there over 24 hours? (  ) Yes   (  ) No    (  ) Unknown- Reason: ______  16.	Date of return to [facility]: ______

## 2021-04-10 NOTE — ED PROVIDER NOTE - PATIENT PORTAL LINK FT
You can access the FollowMyHealth Patient Portal offered by [facility] by registering at the following website: [email or web address]. By joining [employer]’s FollowMyHealth portal, you will also be able to view your health information using other applications (apps) compatible with our system.

## 2021-04-11 VITALS
TEMPERATURE: 98 F | RESPIRATION RATE: 18 BRPM | DIASTOLIC BLOOD PRESSURE: 81 MMHG | SYSTOLIC BLOOD PRESSURE: 119 MMHG | HEART RATE: 82 BPM | OXYGEN SATURATION: 99 %

## 2021-04-11 RX ADMIN — Medication 1 MILLIGRAM(S): at 09:38

## 2021-04-11 NOTE — ED ADULT NURSE REASSESSMENT NOTE - DESCRIPTION
Report received from night shift RN. Pt is awake/calm at this time. She was informed that she will be d/c'ed to group home this AM ([facility]). Report given to [facility] at 08:30. Comfort and safety maintained. CO in place. See CO flow sheet. Security called for pt belongings.

## 2021-04-13 ENCOUNTER — EMERGENCY (EMERGENCY)
Facility: HOSPITAL | Age: 31
LOS: 1 days | Discharge: HOME | End: 2021-04-13
Attending: EMERGENCY MEDICINE
Payer: MEDICAID

## 2021-04-13 VITALS
TEMPERATURE: 98 F | SYSTOLIC BLOOD PRESSURE: 103 MMHG | RESPIRATION RATE: 16 BRPM | HEART RATE: 61 BPM | DIASTOLIC BLOOD PRESSURE: 69 MMHG | OXYGEN SATURATION: 100 %

## 2021-04-13 DIAGNOSIS — F79 UNSPECIFIED INTELLECTUAL DISABILITIES: ICD-10-CM

## 2021-04-13 DIAGNOSIS — F25.9 SCHIZOAFFECTIVE DISORDER, UNSPECIFIED: ICD-10-CM

## 2021-04-13 LAB
ALBUMIN SERPL ELPH-MCNC: 4 G/DL — SIGNIFICANT CHANGE UP (ref 3.3–5)
ALP SERPL-CCNC: 46 U/L — SIGNIFICANT CHANGE UP (ref 40–120)
ALT FLD-CCNC: 8 U/L — LOW (ref 10–45)
ANION GAP SERPL CALC-SCNC: 10 MMOL/L — SIGNIFICANT CHANGE UP (ref 5–17)
APAP SERPL-MCNC: <15 UG/ML — SIGNIFICANT CHANGE UP (ref 10–30)
APPEARANCE UR: CLEAR — SIGNIFICANT CHANGE UP
AST SERPL-CCNC: 15 U/L — SIGNIFICANT CHANGE UP (ref 10–40)
BACTERIA # UR AUTO: ABNORMAL
BASOPHILS # BLD AUTO: 0.02 K/UL — SIGNIFICANT CHANGE UP (ref 0–0.2)
BASOPHILS NFR BLD AUTO: 0.4 % — SIGNIFICANT CHANGE UP (ref 0–2)
BILIRUB SERPL-MCNC: 0.2 MG/DL — SIGNIFICANT CHANGE UP (ref 0.2–1.2)
BILIRUB UR-MCNC: NEGATIVE — SIGNIFICANT CHANGE UP
BUN SERPL-MCNC: 7 MG/DL — SIGNIFICANT CHANGE UP (ref 7–23)
CALCIUM SERPL-MCNC: 9.2 MG/DL — SIGNIFICANT CHANGE UP (ref 8.4–10.5)
CHLORIDE SERPL-SCNC: 103 MMOL/L — SIGNIFICANT CHANGE UP (ref 96–108)
CO2 SERPL-SCNC: 25 MMOL/L — SIGNIFICANT CHANGE UP (ref 22–31)
COLOR SPEC: SIGNIFICANT CHANGE UP
CREAT SERPL-MCNC: 0.61 MG/DL — SIGNIFICANT CHANGE UP (ref 0.5–1.3)
DIFF PNL FLD: NEGATIVE — SIGNIFICANT CHANGE UP
EOSINOPHIL # BLD AUTO: 0.04 K/UL — SIGNIFICANT CHANGE UP (ref 0–0.5)
EOSINOPHIL NFR BLD AUTO: 0.9 % — SIGNIFICANT CHANGE UP (ref 0–6)
EPI CELLS # UR: 1 /HPF — SIGNIFICANT CHANGE UP
ETHANOL SERPL-MCNC: SIGNIFICANT CHANGE UP MG/DL (ref 0–10)
GLUCOSE SERPL-MCNC: 74 MG/DL — SIGNIFICANT CHANGE UP (ref 70–99)
GLUCOSE UR QL: NEGATIVE — SIGNIFICANT CHANGE UP
HCG SERPL-ACNC: <2 MIU/ML — SIGNIFICANT CHANGE UP
HCT VFR BLD CALC: 34.5 % — SIGNIFICANT CHANGE UP (ref 34.5–45)
HGB BLD-MCNC: 11.2 G/DL — LOW (ref 11.5–15.5)
IMM GRANULOCYTES NFR BLD AUTO: 0.2 % — SIGNIFICANT CHANGE UP (ref 0–1.5)
KETONES UR-MCNC: ABNORMAL
LEUKOCYTE ESTERASE UR-ACNC: ABNORMAL
LIDOCAIN IGE QN: 22 U/L — SIGNIFICANT CHANGE UP (ref 7–60)
LYMPHOCYTES # BLD AUTO: 1.9 K/UL — SIGNIFICANT CHANGE UP (ref 1–3.3)
LYMPHOCYTES # BLD AUTO: 41.8 % — SIGNIFICANT CHANGE UP (ref 13–44)
MCHC RBC-ENTMCNC: 31.4 PG — SIGNIFICANT CHANGE UP (ref 27–34)
MCHC RBC-ENTMCNC: 32.5 GM/DL — SIGNIFICANT CHANGE UP (ref 32–36)
MCV RBC AUTO: 96.6 FL — SIGNIFICANT CHANGE UP (ref 80–100)
MONOCYTES # BLD AUTO: 0.46 K/UL — SIGNIFICANT CHANGE UP (ref 0–0.9)
MONOCYTES NFR BLD AUTO: 10.1 % — SIGNIFICANT CHANGE UP (ref 2–14)
NEUTROPHILS # BLD AUTO: 2.12 K/UL — SIGNIFICANT CHANGE UP (ref 1.8–7.4)
NEUTROPHILS NFR BLD AUTO: 46.6 % — SIGNIFICANT CHANGE UP (ref 43–77)
NITRITE UR-MCNC: POSITIVE
NRBC # BLD: 0 /100 WBCS — SIGNIFICANT CHANGE UP (ref 0–0)
PH UR: 6 — SIGNIFICANT CHANGE UP (ref 5–8)
PLATELET # BLD AUTO: 215 K/UL — SIGNIFICANT CHANGE UP (ref 150–400)
POTASSIUM SERPL-MCNC: 4.3 MMOL/L — SIGNIFICANT CHANGE UP (ref 3.5–5.3)
POTASSIUM SERPL-SCNC: 4.3 MMOL/L — SIGNIFICANT CHANGE UP (ref 3.5–5.3)
PROT SERPL-MCNC: 6.6 G/DL — SIGNIFICANT CHANGE UP (ref 6–8.3)
PROT UR-MCNC: NEGATIVE — SIGNIFICANT CHANGE UP
RBC # BLD: 3.57 M/UL — LOW (ref 3.8–5.2)
RBC # FLD: 12.8 % — SIGNIFICANT CHANGE UP (ref 10.3–14.5)
RBC CASTS # UR COMP ASSIST: 0 /HPF — SIGNIFICANT CHANGE UP (ref 0–4)
SALICYLATES SERPL-MCNC: <2 MG/DL — LOW (ref 15–30)
SARS-COV-2 RNA SPEC QL NAA+PROBE: SIGNIFICANT CHANGE UP
SODIUM SERPL-SCNC: 138 MMOL/L — SIGNIFICANT CHANGE UP (ref 135–145)
SP GR SPEC: 1.01 — SIGNIFICANT CHANGE UP (ref 1.01–1.02)
TSH SERPL-MCNC: 0.72 UIU/ML — SIGNIFICANT CHANGE UP (ref 0.27–4.2)
UROBILINOGEN FLD QL: NEGATIVE — SIGNIFICANT CHANGE UP
WBC # BLD: 4.55 K/UL — SIGNIFICANT CHANGE UP (ref 3.8–10.5)
WBC # FLD AUTO: 4.55 K/UL — SIGNIFICANT CHANGE UP (ref 3.8–10.5)
WBC UR QL: 8 /HPF — HIGH (ref 0–5)

## 2021-04-13 PROCEDURE — 99284 EMERGENCY DEPT VISIT MOD MDM: CPT

## 2021-04-13 PROCEDURE — 90792 PSYCH DIAG EVAL W/MED SRVCS: CPT | Mod: 95

## 2021-04-13 PROCEDURE — 93010 ELECTROCARDIOGRAM REPORT: CPT

## 2021-04-13 RX ORDER — HALOPERIDOL DECANOATE 100 MG/ML
5 INJECTION INTRAMUSCULAR ONCE
Refills: 0 | Status: COMPLETED | OUTPATIENT
Start: 2021-04-13 | End: 2021-04-13

## 2021-04-13 RX ORDER — HALOPERIDOL DECANOATE 100 MG/ML
5 INJECTION INTRAMUSCULAR ONCE
Refills: 0 | Status: DISCONTINUED | OUTPATIENT
Start: 2021-04-13 | End: 2021-04-13

## 2021-04-13 RX ADMIN — Medication 2 MILLIGRAM(S): at 23:30

## 2021-04-13 RX ADMIN — Medication 2 MILLIGRAM(S): at 16:09

## 2021-04-13 RX ADMIN — HALOPERIDOL DECANOATE 5 MILLIGRAM(S): 100 INJECTION INTRAMUSCULAR at 15:39

## 2021-04-13 RX ADMIN — HALOPERIDOL DECANOATE 5 MILLIGRAM(S): 100 INJECTION INTRAMUSCULAR at 23:30
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
## 2021-04-13 NOTE — ED BEHAVIORAL HEALTH ASSESSMENT NOTE - DESCRIPTION
see bh note    Vital Signs Last 24 Hrs  T(C): 36.7 (13 Apr 2021 20:32), Max: 36.7 (13 Apr 2021 20:32)  T(F): 98 (13 Apr 2021 20:32), Max: 98 (13 Apr 2021 20:32)  HR: 73 (13 Apr 2021 20:32) (61 - 98)  BP: 105/65 (13 Apr 2021 20:32) (103/69 - 105/75)  BP(mean): --  RR: 18 (13 Apr 2021 20:32) (16 - 18)  SpO2: 100% (13 Apr 2021 20:32) (100% - 100%) DM, hyperprolactinemia The patient is currently residing in a group home ([facility]) with peers; single, unemployed

## 2021-04-13 NOTE — ED PROVIDER NOTE - NSFOLLOWUPINSTRUCTIONS_ED_ALL_ED_FT
You are being discharged back to your group home. You ED visit today did not show any medical conditions in need of treatment. Please follow up with your psychiatrist. 1. Eloise was seen in the ED for aggressive behavior, she was seen by the psychiatry team and deemed safe to return to her group home.      2. Eloise was found to have a UTI (bladder infection). She needs to take the antibiotic Macrobid 2 times per day for 5 days.     3. Please continue all medications otherwise.      4. Return to the ED for severe back or side pain, vomiting or high fevers (101F).

## 2021-04-13 NOTE — ED ADULT NURSE NOTE - SUICIDE RISK FACTORS
Command hallucinations/Agitation/Severe Anxiety/Panic/Impulsivity/Psychotic disorder current/past/Cluster B Personality disorders or traits current/past
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
## 2021-04-13 NOTE — ED BEHAVIORAL HEALTH ASSESSMENT NOTE - RISK ASSESSMENT
Low Acute Suicide Risk Risk factors include single marital status, unemployed, limited social supports, impulsivity, and history of aggression. Her protective factors include no acute suicidal or homicidal ideation/intent/plan, no known  direct access to firearms, no prior suicide attempts, no known active substance use, and living in a supervised setting.    The patient is denying any acute suicidal or homicidal ideation/intent/plan and seems to be functioning at her psychiatric baseline. She would not benefit from inpatient psychiatric hospitalization at this point in time.    COVID Exposure Screen- Patient  1.	*Have you had a COVID-19 test in the last 90 days?  (  ) Yes   (x  ) No   (  ) Unknown- Reason: _____  IF YES PROCEED TO QUESTION #2. IF NO OR UNKNOWN, PLEASE SKIP TO QUESTION #3.  2.	Date of test(s) and result(s): ________  3.	*Have you tested positive for COVID-19 antibodies? (  ) Yes   (x  ) No   (  ) Unknown- Reason: _____  IF YES PROCEED TO QUESTION #4. IF NO or UNKNOWN, PLEASE SKIP TO QUESTION #5.  4.	Date of positive antibody test: ________  5.	*Have you received 2 doses of the COVID-19 vaccine? (  ) Yes   ( x ) No   (  ) Unknown- Reason: _____   IF YES PROCEED TO QUESTION #6. IF NO or UNKNOWN, PLEASE SKIP TO QUESTION #7.  6.	Date of second dose: ________  7.	*In the past 10 days, have you been around anyone with a positive COVID-19 test?* (  ) Yes   (x  ) No   (  ) Unknown- Reason: ____  IF YES PROCEED TO QUESTION #8. IF NO or UNKNOWN, PLEASE SKIP TO QUESTION #13.  8.	Were you within 6 feet of them for at least 15 minutes? (  ) Yes   (  ) No   (  ) Unknown- Reason: _____  9.	Have you provided care for them? (  ) Yes   (  ) No   (  ) Unknown- Reason: ______  10.	Have you had direct physical contact with them (touched, hugged, or kissed them)? (  ) Yes   (  ) No    (  ) Unknown- Reason: _____  11.	Have you shared eating or drinking utensils with them? (  ) Yes   (  ) No    (  ) Unknown- Reason: ____  12.	Have they sneezed, coughed, or somehow gotten respiratory droplets on you? (  ) Yes   (  ) No    (  ) Unknown- Reason: ______  13.	*Have you been out of [place] within the past 10 days?* (  ) Yes   (x  ) No   (  ) Unknown- Reason: _____  IF YES PLEASE ANSWER THE FOLLOWING QUESTIONS:  14.	Which state/country have you been to? ______  15.	Were you there over 24 hours? (  ) Yes   (  ) No    (  ) Unknown- Reason: ______  16.	Date of return to [facility]: ______

## 2021-04-13 NOTE — ED ADULT NURSE NOTE - CINV DISCH MEDS REVIEWED YN
Felix Wilde MD  P  Phone Nurse Message Pool Cc             FYI- This is a message which I sent to the patient via [employer]:   Your red blood cell counts, as asked reflected by the Hgb and HCT, are normal. Your anemia has been corrected.   Felix Wilde MD         Yes
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
## 2021-04-13 NOTE — ED ADULT NURSE REASSESSMENT NOTE - NS ED NURSE REASSESS COMMENT FT1
when  (Astrid Leach) contacted pt's group home residence, they refused to take the pt. back, in addition [facility] ems called the residence for confirmation, group home staff sais "she's not allowed to come back". pt. started to get agitated and was unable to verbally redirect. pt. was medicated w/ haldol 5mg/ ativan 2mg IM stat for agitation. 1:1 CO for aggression maintained.
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
## 2021-04-13 NOTE — ED BEHAVIORAL HEALTH ASSESSMENT NOTE - SAFETY PLAN ADDT'L DETAILS
Education provided regarding environmental safety / lethal means restriction/Provision of National Suicide Prevention Lifeline 5-510-415-TALK (6426)

## 2021-04-13 NOTE — ED PROVIDER NOTE - CLINICAL SUMMARY MEDICAL DECISION MAKING FREE TEXT BOX
Connie, PGY3- schizoaffective, bipolar, intellectual disability, coming from group home for intermittently yelling, agitated behavior.   Pt with significant motor agitation, tangential thoughts, hysteria in ED.  Will need medical clearance, collateral,  and then psych.

## 2021-04-13 NOTE — ED PROVIDER NOTE - PHYSICAL EXAMINATION
General: awake, alert, histrionic    Head: atraumatic, normocephalic  Eyes: EOMI, no scleral icterus  ENT: no epistaxis, normal phonation, airway patent  Neck: full ROM, no midline ttp  CV: RRR, no murmurs, HDS  Pulm: lungs CTA b/l, no wheezing, no respiratory distress  GI: abd soft, non tender, no guarding/rebound/masses  Back: normal ROM, no midline ttp, no signs of trauma  Extremities: normal ROM, joints stable, distal pulses intact, no edema  Neuro: alert, awake, normal gait, normal memory, clear speech, no facial droop  Derm: warm, dry, normal color, no rash/wounds  Psych: agitated, intermittently yelling, histrionic, motor agitation, no SI/HI

## 2021-04-13 NOTE — ED PROVIDER NOTE - CARE PLAN
Principal Discharge DX:	Aggressive behavior   Principal Discharge DX:	Aggressive behavior  Secondary Diagnosis:	UTI (urinary tract infection)
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
## 2021-04-13 NOTE — ED ADULT NURSE NOTE - NSIMPLEMENTINTERV_GEN_ALL_ED
Implemented All Fall Risk Interventions:  [place] to call system. Call bell, personal items and telephone within reach. Instruct patient to call for assistance. Room bathroom lighting operational. Non-slip footwear when patient is off stretcher. Physically safe environment: no spills, clutter or unnecessary equipment. Stretcher in lowest position, wheels locked, appropriate side rails in place. Provide visual cue, wrist band, yellow gown, etc. Monitor gait and stability. Monitor for mental status changes and reorient to person, place, and time. Review medications for side effects contributing to fall risk. Reinforce activity limits and safety measures with patient and family.

## 2021-04-13 NOTE — ED ADULT NURSE REASSESSMENT NOTE - NS ED NURSE REASSESS COMMENT FT1
Pt remained in a state of agitation from the time she arrived in the ER, screaming loudly and responded somewhat to deescalation but remained disruptive with loud wailing and screaming from the minute writer left the room, received haldol 5mg IM with no effect, received ativan 2mg IM with much better effect, currently asleep, VSS, CO in place.
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
## 2021-04-13 NOTE — ED PROVIDER NOTE - PATIENT PORTAL LINK FT
You can access the FollowMyHealth Patient Portal offered by [facility] by registering at the following website: [email or web address]. By joining [employer]’s FollowMyHealth portal, you will also be able to view your health information using other applications (apps) compatible with our system. You can access the FollowMyHealth Patient Portal offered by [facility] by registering at the following website: [email or web address]. By joining [employer]’s FollowMyHealth portal, you will also be able to view your health information using other applications (apps) compatible with our system.
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
## 2021-04-14 VITALS
SYSTOLIC BLOOD PRESSURE: 120 MMHG | DIASTOLIC BLOOD PRESSURE: 81 MMHG | RESPIRATION RATE: 17 BRPM | HEART RATE: 76 BPM | TEMPERATURE: 98 F | OXYGEN SATURATION: 100 %

## 2021-04-14 PROCEDURE — 87186 SC STD MICRODIL/AGAR DIL: CPT

## 2021-04-14 PROCEDURE — 99285 EMERGENCY DEPT VISIT HI MDM: CPT | Mod: 25

## 2021-04-14 PROCEDURE — 83690 ASSAY OF LIPASE: CPT

## 2021-04-14 PROCEDURE — 85025 COMPLETE CBC W/AUTO DIFF WBC: CPT

## 2021-04-14 PROCEDURE — 93005 ELECTROCARDIOGRAM TRACING: CPT

## 2021-04-14 PROCEDURE — 80307 DRUG TEST PRSMV CHEM ANLYZR: CPT

## 2021-04-14 PROCEDURE — 87086 URINE CULTURE/COLONY COUNT: CPT

## 2021-04-14 PROCEDURE — U0003: CPT

## 2021-04-14 PROCEDURE — U0005: CPT

## 2021-04-14 PROCEDURE — 96372 THER/PROPH/DIAG INJ SC/IM: CPT

## 2021-04-14 PROCEDURE — 84443 ASSAY THYROID STIM HORMONE: CPT

## 2021-04-14 PROCEDURE — 84702 CHORIONIC GONADOTROPIN TEST: CPT

## 2021-04-14 PROCEDURE — 81001 URINALYSIS AUTO W/SCOPE: CPT

## 2021-04-14 PROCEDURE — 80053 COMPREHEN METABOLIC PANEL: CPT

## 2021-04-14 RX ORDER — NITROFURANTOIN MACROCRYSTAL 50 MG
100 CAPSULE ORAL ONCE
Refills: 0 | Status: COMPLETED | OUTPATIENT
Start: 2021-04-14 | End: 2021-04-14

## 2021-04-14 RX ORDER — BROMOCRIPTINE MESYLATE 5 MG/1
5 CAPSULE ORAL
Refills: 0 | Status: DISCONTINUED | OUTPATIENT
Start: 2021-04-14 | End: 2021-04-17

## 2021-04-14 RX ORDER — HALOPERIDOL DECANOATE 100 MG/ML
5 INJECTION INTRAMUSCULAR ONCE
Refills: 0 | Status: COMPLETED | OUTPATIENT
Start: 2021-04-14 | End: 2021-04-14

## 2021-04-14 RX ORDER — METFORMIN HYDROCHLORIDE 850 MG/1
1000 TABLET ORAL
Refills: 0 | Status: DISCONTINUED | OUTPATIENT
Start: 2021-04-14 | End: 2021-04-17

## 2021-04-14 RX ORDER — FLUOXETINE HCL 10 MG
40 CAPSULE ORAL DAILY
Refills: 0 | Status: DISCONTINUED | OUTPATIENT
Start: 2021-04-14 | End: 2021-04-17

## 2021-04-14 RX ORDER — NITROFURANTOIN MACROCRYSTAL 50 MG
1 CAPSULE ORAL
Qty: 10 | Refills: 0
Start: 2021-04-14 | End: 2021-04-18

## 2021-04-14 RX ADMIN — METFORMIN HYDROCHLORIDE 1000 MILLIGRAM(S): 850 TABLET ORAL at 09:10

## 2021-04-14 RX ADMIN — Medication 100 MILLIGRAM(S): at 09:08

## 2021-04-14 RX ADMIN — Medication 40 MILLIGRAM(S): at 09:11

## 2021-04-14 RX ADMIN — HALOPERIDOL DECANOATE 5 MILLIGRAM(S): 100 INJECTION INTRAMUSCULAR at 09:12

## 2021-04-14 RX ADMIN — BROMOCRIPTINE MESYLATE 5 MILLIGRAM(S): 5 CAPSULE ORAL at 09:12

## 2021-04-14 NOTE — ED ADULT NURSE REASSESSMENT NOTE - NS ED NURSE REASSESS COMMENT FT1
observed pt. sleeping most of the shift and no further episodes of agitation. 1:1 CO for aggression maintained.

## 2021-04-14 NOTE — ED ADULT NURSE REASSESSMENT NOTE - NS ED NURSE REASSESS COMMENT FT1
As per Marbella , group home notified of pt D/C, nonemergent called by Marbella SW coordinate D/C to group home, pending malut, JAMEE, pt at baseline mental status, expresses excitement to return to group home, given food and drink for comfort, safety maintained

## 2021-04-15 NOTE — ED POST DISCHARGE NOTE - RESULT SUMMARY
José Manuel Morejon PA-C: 4/15/21 UCx +>100 E.coli, sensitivity pending, DC'd on Macrobid, appropriate treatment at this time. José Manuel Morejon PA-C: 4/15/21 UCx +>100 E.coli, sensitivity pending, DC'd on Macrobid, appropriate treatment at this time.  /   4/16/21: Final Ucx >100k E.coli sensitive to nitrofurantoin, appropriate care received in ED no need for further contact at this time. -Froylan Moore PA-C

## 2021-04-22 ENCOUNTER — EMERGENCY (EMERGENCY)
Facility: HOSPITAL | Age: 31
LOS: 1 days | Discharge: HOME | End: 2021-04-22
Admitting: EMERGENCY MEDICINE
Payer: MEDICAID

## 2021-04-22 VITALS
HEART RATE: 89 BPM | OXYGEN SATURATION: 100 % | RESPIRATION RATE: 16 BRPM | SYSTOLIC BLOOD PRESSURE: 125 MMHG | TEMPERATURE: 98 F | HEIGHT: 61 IN | DIASTOLIC BLOOD PRESSURE: 82 MMHG

## 2021-04-22 PROCEDURE — 99283 EMERGENCY DEPT VISIT LOW MDM: CPT

## 2021-04-22 RX ORDER — HALOPERIDOL DECANOATE 100 MG/ML
5 INJECTION INTRAMUSCULAR ONCE
Refills: 0 | Status: COMPLETED | OUTPATIENT
Start: 2021-04-22 | End: 2021-04-22

## 2021-04-22 RX ADMIN — HALOPERIDOL DECANOATE 5 MILLIGRAM(S): 100 INJECTION INTRAMUSCULAR at 21:40

## 2021-04-22 RX ADMIN — Medication 2 MILLIGRAM(S): at 21:40

## 2021-04-22 NOTE — ED PROVIDER NOTE - NSFOLLOWUPINSTRUCTIONS_ED_ALL_ED_FT
Follow up with your primary care physician and psychiatrist in 48-72 hours.  You may also see the psychiatrist at [facility]:    [id number] [place] [address]  Phone: [phone]      SEEK IMMEDIATE MEDICAL CARE IF YOU HAVE ANY OF THE FOLLOWING SYMPTOMS: thoughts about hurting or killing yourself, thoughts about hurting or killing somebody else, hallucinations or any other worsening or persistent symptoms OR ANY NEW OR CONCERNING SYMPTOMS.
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
## 2021-04-22 NOTE — ED PROVIDER NOTE - PMH
Bipolar disorder    DM (diabetes mellitus)    DM (diabetes mellitus)    Intellectual disability    Schizoaffective disorder    Schizoaffective disorder
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
## 2021-04-22 NOTE — ED PROVIDER NOTE - PHYSICAL EXAMINATION
abrasions to right hand, b/l knees.  No head trauma on exam, no spinal or paraspinal tenderness, step-offs, deformities.  No lacerations.
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
## 2021-04-22 NOTE — ED ADULT NURSE NOTE - NSIMPLEMENTINTERV_GEN_ALL_ED
Sophie Felton is a 30 year old female,  ,   ,     Patient's last menstrual period was 2016., Estimated Date of Delivery: 2017 is calculated from lmp and verified with U/S     [facility] at 9:51 AM     for evaluation of labor.  Membranes are intact    HPI: Pt states she began ryne yesterday, slept on and off through the night, this am states contractions became more regular    PRENATAL COURSE  See prenatal  Prenatal course was   complicated by    Patient Active Problem List    Diagnosis Date Noted     Heartburn during pregnancy in third trimester 2017     Priority: Medium     Supervision of other normal pregnancy, antepartum 2017     Priority: Medium     Need for Tdap vaccination 2017     Priority: Medium     Given 2017         HISTORIES  Allergies   Allergen Reactions     Septra [Sulfamethoxazole W/Trimethoprim]      Sulfamethoxazole-Trimethoprim Hives     As a child     History reviewed. No pertinent past medical history.  History reviewed. No pertinent surgical history.  No family history on file.  Social History   Substance Use Topics     Smoking status: Never Smoker     Smokeless tobacco: Not on file     Alcohol use No     Obstetric History       T1      L2     SAB0   TAB0   Ectopic0   Multiple0   Live Births1       # Outcome Date GA Lbr Man/2nd Weight Sex Delivery Anes PTL Lv   3 Current            2 Term 06/18/15 41w1d  3.345 kg (7 lb 6 oz) M Vag-Spont   JEFERSON      Name: Delfino Yancey SAB                   LABS:       Lab Results   Component Value Date    ABO A 10/27/2016       Lab Results   Component Value Date    RH Pos 10/27/2016     Rhogam not indicated  Rubella immune   Treponema Pallidum Antibody  Negative    HIV    Non-reactive   Lab Results   Component Value Date    HGB 12.6 05/10/2017      Lab Results   Component Value Date    HEPBANG non-reactive 10/27/2016     Lab Results   Component  "Value Date    GBS  05/10/2017     Negative  No GBS DNA detected, presumed negative for GBS or number of bacteria may be   below the limit of detection of the assay.   Assay performed on incubated broth culture of specimen using [employer] real-time   PCR.       other     ROS  Pt denies significant constitutional symptoms including fever and/or malaise.  Pt denies significant respiratory, cardiovacular, GI, or muscular/skeletal complaints.      PHYSICAL EXAM:  BP (!) 133/94  Pulse 73  Temp 97.6  F (36.4  C) (Oral)  Ht 1.778 m (5' 10\")  Wt 94.3 kg (208 lb)  LMP 08/24/2016  BMI 29.84 kg/m2  General appearance healthy, alert, active and moderate distress   Neuro:  denies headache and visual disturbances  Psych: Mentation normal and bright   Legs: 2+/2+, no clonus, no edema       Abdomen: gravid, single vertex fetus, non-tender, EFW 8 lbs. Pt is ryne every 5-6 minutes, lasting  seconds and palpates moderate    FETAL HEART TONES: baseline 130 with moderate FHRV variability and accelerations.  No decelerations present.     PELVIC EXAM: 3/ 70% / -1  BLOODY SHOW:: yes   Membranes as listed above    ASSESSMENT:  IUP @ 40w2d  wks gestation  for observation for labor  NST  reactive   Parity: Multip  GBS negative and membranes intact      PLAN:  Will observe x 1-2 hrs for more active labor.  If in labor will admit and have a baby, if not will discharge home to return later for labor and birth.    Romana Lance CNM      " Implemented All Universal Safety Interventions:  [place] to call system. Call bell, personal items and telephone within reach. Instruct patient to call for assistance. Room bathroom lighting operational. Non-slip footwear when patient is off stretcher. Physically safe environment: no spills, clutter or unnecessary equipment. Stretcher in lowest position, wheels locked, appropriate side rails in place.

## 2021-04-22 NOTE — ED PROVIDER NOTE - PATIENT PORTAL LINK FT
You can access the FollowMyHealth Patient Portal offered by [facility] by registering at the following website: [email or web address]. By joining [employer]’s FollowMyHealth portal, you will also be able to view your health information using other applications (apps) compatible with our system.

## 2021-04-22 NOTE — ED PROVIDER NOTE - CLINICAL SUMMARY MEDICAL DECISION MAKING FREE TEXT BOX
30 yoF, PMHx of bipolar, schizoaffective, intellectual disability, presenting from group home for aggressive behavior.  No indication for imaging at this time.  Medications provided due to patient agitation in the ED.  Medical evaluation performed. There is no clinical evidence of intoxication or any acute medical problem requiring immediate intervention. Patient behavior expected from her baseline intellectual function and chronic psychiatric conditions. No acute psychiatric emergency, however pt is calm cooperative and denies SI and HI and not hallucinating. Will engage SW to obtain collateral from facility and if no concerns will discharge back to facility.

## 2021-04-22 NOTE — ED PROVIDER NOTE - PROGRESS NOTE DETAILS
SW called residence, states resident left earlier today, unclear why but they are accepting patient back.  Also stated tetanus vaccine received within the last 2 years.  Wounds cleaned by RN, patient to be discharged back by EMS.
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
## 2021-04-23 ENCOUNTER — EMERGENCY (EMERGENCY)
Facility: HOSPITAL | Age: 31
LOS: 1 days | Discharge: HOME | End: 2021-04-23
Admitting: EMERGENCY MEDICINE
Payer: MEDICAID

## 2021-04-23 VITALS
OXYGEN SATURATION: 99 % | TEMPERATURE: 98 F | DIASTOLIC BLOOD PRESSURE: 59 MMHG | RESPIRATION RATE: 16 BRPM | HEIGHT: 61 IN | HEART RATE: 86 BPM | SYSTOLIC BLOOD PRESSURE: 105 MMHG

## 2021-04-23 PROBLEM — F79 UNSPECIFIED INTELLECTUAL DISABILITIES: Chronic | Status: ACTIVE | Noted: 2021-04-13

## 2021-04-23 PROBLEM — F31.9 BIPOLAR DISORDER, UNSPECIFIED: Chronic | Status: ACTIVE | Noted: 2021-04-13

## 2021-04-23 PROCEDURE — 99284 EMERGENCY DEPT VISIT MOD MDM: CPT

## 2021-04-23 RX ORDER — HALOPERIDOL DECANOATE 100 MG/ML
5 INJECTION INTRAMUSCULAR ONCE
Refills: 0 | Status: COMPLETED | OUTPATIENT
Start: 2021-04-23 | End: 2021-04-23

## 2021-04-23 RX ADMIN — HALOPERIDOL DECANOATE 5 MILLIGRAM(S): 100 INJECTION INTRAMUSCULAR at 21:32

## 2021-04-23 RX ADMIN — Medication 2 MILLIGRAM(S): at 21:32

## 2021-04-23 NOTE — ED PROVIDER NOTE - PATIENT PORTAL LINK FT
You can access the FollowMyHealth Patient Portal offered by [facility] by registering at the following website: [email or web address]. By joining [employer]’s FollowMyHealth portal, you will also be able to view your health information using other applications (apps) compatible with our system.

## 2021-04-23 NOTE — ED PROVIDER NOTE - CHIEF COMPLAINT
CHIEF COMPLAINT: Lower back pain in therapy.       HISTORY OF PRESENT ILLNESS  58-year-old right-handed female to [facility] on 11/21/18 with new onset left hemiparesis upon awaking. 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 CT head (11/21/18) showed a chronic right lentiform nucleus lacunar infarct. CTA neck (11/21/18) showed a probable right ICA occlusion with some calcified plaque. CTA head (11/21/18) showed that the right ICA reconstituted perhaps through the ACOM and possibly a large right PCOM. No intracranial occlusion, she was excluded from endovascular thrombectomy. MRI brain subsequently showed R MCA distribution infarct. MRA head and neck showed severe to critical stenosis of proximal  /extracranial right ICA. Conventional angiogram confirmed severe proximal/extracranial right ICA stenosis s/p carotid artery stenting for secondary stroke prevention in this patient with symptomatic right ICA severe stenosis.   Impression:   Cerebral embolism with cerebral infarction. Right MCA distribution stroke - likely etiology being large vessel disease i.e. symptomatic extracranial R ICA severe stenosis leading to artery to artery embolism. Selective cerebral angiography on 11/24/18. Patient enrolled in Stroke AF trial and randomized to a LOOP. ASA/Plavix/Lipitor. Dysphagia: passed, regular diet.  CAROTID DOPPLERS (11/2/7/18): The stent and stented cervical right common and internal carotid arteries are widely patent. There is a flow-limiting stenosis of the right external carotid artery. No hemodynamically significant is present on the left.  MRA T1 Fat Sat (11/23/18) Findings consistent with arterial dissection involving the origin of the right internal carotid artery. Decreased, but present flow related enhancement of the more distal right ICA. Previously, no flow related enhancement was identified within the right ICA on CTA from 11/21/2018.  MRA BRAIN: Diminished, but present flow related signal within the skull base and supraclinoid right internal carotid artery. Diminished flow related signal within the right MCA.  MRI Head (11/21/18): Acute right MCA infarct.  A1c was 9.2; TTE: unremarkable. (28 Nov 2018 13:35)      PAST MEDICAL & SURGICAL HISTORY:  Hypertension  CVA (cerebral vascular accident)       REVIEW OF SYMPTOMS  [X] Constitutional WNL     [X] Cardio WNL            [X] Resp WNL           [X] GI WNL                          [X]  WNL                   [X] Heme WNL                                   [X] Skin WNL                                 [X] Cognitive WNL        [X] Psych WNL      VITALS  Vital Signs Last 24 Hrs  T(C): 36.4 (04 Dec 2018 08:43), Max: 36.8 (03 Dec 2018 21:40)  T(F): 97.6 (04 Dec 2018 08:43), Max: 98.2 (03 Dec 2018 21:40)  HR: 58 (04 Dec 2018 08:43) (58 - 71)  BP: 127/73 (04 Dec 2018 08:43) (107/65 - 144/76)  BP(mean): --  RR: 14 (04 Dec 2018 08:43) (14 - 14)  SpO2: 99% (04 Dec 2018 08:43) (99% - 99%)      PHYSICAL EXAM  Constitutional - back pain R  HEENT - NCAT, EOMI  Neck - Supple, No limited ROM  Chest - No wheeze, No rhonchi, No crackles  Cardiovascular - RRR, S1S2, No murmurs  Abdomen - BS+, Soft, NTND  Extremities - No C/C/E, No calf tenderness   Skin-no rash  Wounds-stage I sacrum      Neurologic Exam - no new deficits.  Hemiparesis.                   Balance - impaired     Psychiatric - Mood stable, Affect WNL       FUNCTIONAL PROGRESS  Gait - max A   ADLs - mod/min A  Transfers -mod A  Functional transfer -mod A     RECENT LABS                        11.2   5.3   )-----------( 301      ( 03 Dec 2018 05:52 )             32.4     12-03    134<L>  |  101  |  15  ----------------------------<  136<H>  3.7   |  22  |  0.70    Ca    9.4      03 Dec 2018 05:52            Direct LDL: 106 mg/dL (11-24-18 @ 07:18)    Hemoglobin A1C, Whole Blood: 9.2 % (11-22-18 @ 08:24)              RADIOLOGY/OTHER RESULTS      CURRENT MEDICATIONS  MEDICATIONS  (STANDING):  aspirin  chewable 81 milliGRAM(s) Oral daily  atorvastatin 80 milliGRAM(s) Oral at bedtime  clopidogrel Tablet 75 milliGRAM(s) Oral daily  dextrose 5%. 1000 milliLiter(s) (50 mL/Hr) IV Continuous <Continuous>  dextrose 50% Injectable 12.5 Gram(s) IV Push once  dextrose 50% Injectable 25 Gram(s) IV Push once  dextrose 50% Injectable 25 Gram(s) IV Push once  docusate sodium 100 milliGRAM(s) Oral three times a day  enoxaparin Injectable 40 milliGRAM(s) SubCutaneous every 24 hours  FLUoxetine 20 milliGRAM(s) Oral daily  insulin glargine Injectable (LANTUS) 10 Unit(s) SubCutaneous at bedtime  insulin lispro (HumaLOG) corrective regimen sliding scale   SubCutaneous three times a day before meals  insulin lispro (HumaLOG) corrective regimen sliding scale   SubCutaneous at bedtime  lisinopril 5 milliGRAM(s) Oral daily  multivitamin 1 Tablet(s) Oral daily  senna 2 Tablet(s) Oral at bedtime    MEDICATIONS  (PRN):  acetaminophen   Tablet .. 650 milliGRAM(s) Oral every 6 hours PRN Moderate Pain (4 - 6)  dextrose 40% Gel 15 Gram(s) Oral once PRN Blood Glucose LESS THAN 70 milliGRAM(s)/deciliter  glucagon  Injectable 1 milliGRAM(s) IntraMuscular once PRN Glucose LESS THAN 70 milligrams/deciliter  ondansetron    Tablet 4 milliGRAM(s) Oral every 6 hours PRN Nausea and/or Vomiting  polyethylene glycol 3350 17 Gram(s) Oral daily PRN Constipation      ASSESSMENT & PLAN    GI/Bowel Management - Dulcolax PRN, Fleet PRN   Management - Toilet Q2  Skin - Turn Q2  Pain - Tylenol PRN  DVT PPX - TEDs, SCDs  Diet -diabetic, reg c thins      Continue comprehensive acute rehab program consisting of 3hrs/day of OT/PT and SLP. The patient is a 30y Female complaining of
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
## 2021-04-23 NOTE — ED PROVIDER NOTE - CLINICAL SUMMARY MEDICAL DECISION MAKING FREE TEXT BOX
Medical evaluation performed. There is no clinical evidence of intoxication or any acute medical problem requiring immediate intervention.  Medication offered. Tolerated same well. D/C home via EMS. 29 y/o F hx  Schizophrenia DM, Intellectual  Disability   Medical evaluation performed. There is no clinical evidence of intoxication or any acute medical problem requiring immediate intervention.  Medication offered. Tolerated same well. D/C home via EMS. SW consulted.

## 2021-04-23 NOTE — ED ADULT NURSE NOTE - COVID-19 RESULT DATE/TIME
Preop Dx: Right Chronic maxillary ant ethmoid sinusitis and frontal sinusitis     Postop Dx: Same    Procedures: Right Endoscopic maxillary antrostomy, anterior ethmoidectomy, frontal sinus surgery, image guided sinus surgery with Fusion navigation    Surgeon: Cristobal Collazo MD    Anesthesia: General    Findings: inflammation noted    Specimens: 0    EBL: <100mL    Complications: None    Implants and Grafts: not applicable    Description of Procedure:  The patient was brought back to the operating room where a general anesthetic and intubation was performed by the Anesthesia team.  The head of the bed was turned 90 degrees from anesthesia and the patient was placed in the modified beach chair position.     The image guidance system was loaded with the patient's CT scan.  The images were reviewed for preop planning.  The head frame was applied and the system was calibrated.  It was used during the case for intraoperative navigation.  The head frame was removed at the end of the case.     Afrin soaked pledgets were placed in both sides of the nose for several minutes.  Bilateral greater palatine blocks were performed using 1% lidocaine with 1/100,000 of epinephrine.  The nose was then prepped and draped in the usual fashion.  A 0 degree endoscope was used 1% lidocaine with 1/100,000 of epinephrine was injected into the mucosa overlying the uncinate process and sphenopalatine foramen bilaterally.      A [place] elevator was used to medialize the middle turbinate.  An uncinectomy was performed with a pediatric back-biting forcep.  A J curette was placed in the retrobullar space and the bulla was fractured anteriorly.  Anterior ethmoid air cells were then opened back to the basal lamella using the J curette, Blakesly forceps, and endoscopic microdebrider.  I switched to the 45 degree endoscope.  The natural ostium of the maxillary sinus was identified with the maxillary sinus seeking probe.  It was then opened widely  with a straight Chandra cut and back biting forcep. The frontal sinus was addressed next.  A Draf I frontal sinusotomy was performed.  The posterior wall of the agger nasi cell was taken down.  The frontal os was identified with the frontal probe.  Using the 30- degree scope the frontal sinus was visualized and polyp tissue was removed using the frontal foceps.   A piece of Nasopore was placed in  the ethmoid.  The head of the bed was turned back to anesthesia.  The patient was awoken from their anesthetic, extubated, and returned to the postanesthesia recovery room in stable condition.     Cristobal Collazo MD             13-Apr-2021 20:41

## 2021-04-23 NOTE — ED PROVIDER NOTE - OBJECTIVE STATEMENT
29 y/o F hx  Schizophrenia DM, Intellectual  Disability BIBA from group home secondary to agitation  and acting and acting out behaviour.  Admits that she was upset with her roommate.    Denies falling, punching or kicking any objects. Denies pain, SOB, fever, chills,  chest/ abdominal discomfort. Denies SI/HI/VH/AH. No evidence of physical injuries, broken  skin or deformities. Denies recent use of alcohol or illicit drugs.

## 2021-04-23 NOTE — ED BEHAVIORAL HEALTH NOTE - BEHAVIORAL HEALTH NOTE
Per TEDDY Mcdonald, pt is cleared and can return to their previous residence [facility] ([address] [facility]). SW has spoken to MARTINA Jiménez ([phone]). ROCIO confirmed pts mode of transportation is ambulance and pt travels independently. Clinical provider is in agreement with ambulance back to group home. MARTHA RN to arrange transport. Verbal huddle completed.

## 2021-04-23 NOTE — ED ADULT NURSE NOTE - NSIMPLEMENTINTERV_GEN_ALL_ED
Implemented All Universal Safety Interventions:  [place] to call system. Call bell, personal items and telephone within reach. Instruct patient to call for assistance. Room bathroom lighting operational. Non-slip footwear when patient is off stretcher. Physically safe environment: no spills, clutter or unnecessary equipment. Stretcher in lowest position, wheels locked, appropriate side rails in place.
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
## 2021-04-24 VITALS
DIASTOLIC BLOOD PRESSURE: 63 MMHG | HEART RATE: 73 BPM | OXYGEN SATURATION: 97 % | TEMPERATURE: 98 F | RESPIRATION RATE: 16 BRPM | SYSTOLIC BLOOD PRESSURE: 140 MMHG

## 2021-04-25 ENCOUNTER — EMERGENCY (EMERGENCY)
Facility: HOSPITAL | Age: 31
LOS: 1 days | Discharge: HOME | End: 2021-04-25
Admitting: EMERGENCY MEDICINE
Payer: MEDICAID

## 2021-04-25 VITALS
DIASTOLIC BLOOD PRESSURE: 72 MMHG | SYSTOLIC BLOOD PRESSURE: 108 MMHG | TEMPERATURE: 98 F | OXYGEN SATURATION: 99 % | RESPIRATION RATE: 16 BRPM | HEART RATE: 73 BPM

## 2021-04-25 VITALS
SYSTOLIC BLOOD PRESSURE: 112 MMHG | HEART RATE: 81 BPM | DIASTOLIC BLOOD PRESSURE: 63 MMHG | RESPIRATION RATE: 20 BRPM | HEIGHT: 61 IN | OXYGEN SATURATION: 99 % | TEMPERATURE: 99 F

## 2021-04-25 PROCEDURE — 99284 EMERGENCY DEPT VISIT MOD MDM: CPT

## 2021-04-25 RX ORDER — HALOPERIDOL DECANOATE 100 MG/ML
5 INJECTION INTRAMUSCULAR ONCE
Refills: 0 | Status: COMPLETED | OUTPATIENT
Start: 2021-04-25 | End: 2021-04-25

## 2021-04-25 RX ADMIN — HALOPERIDOL DECANOATE 5 MILLIGRAM(S): 100 INJECTION INTRAMUSCULAR at 16:49

## 2021-04-25 RX ADMIN — Medication 2 MILLIGRAM(S): at 16:49
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
## 2021-04-25 NOTE — ED PROVIDER NOTE - CLINICAL SUMMARY MEDICAL DECISION MAKING FREE TEXT BOX
29 y/o F  hx  Bipolar D/O, Schizoaffective D/O, DM  FS. Medication offered. Tolerated same well. Medical evaluation performed. There is no clinical evidence of intoxication or any acute medical problem requiring immediate intervention.  SW consulted . D/C to group home via EMS.

## 2021-04-25 NOTE — ED PROVIDER NOTE - PATIENT PORTAL LINK FT
You can access the FollowMyHealth Patient Portal offered by [facility] by registering at the following website: [email or web address]. By joining [employer]’s FollowMyHealth portal, you will also be able to view your health information using other applications (apps) compatible with our system.
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
## 2021-04-25 NOTE — ED ADULT NURSE NOTE - OBJECTIVE STATEMENT
PT presents hyperverbal, disorganized in thoughts with swings in mood. PT is a poor historian but able to reports that she has no pain any other medical complaint. Breathing is equal and unlabored, ab soft.

## 2021-04-25 NOTE — ED ADULT NURSE REASSESSMENT NOTE - NS ED NURSE REASSESS COMMENT FT1
Break covering RN: EMS arrived to take patient to group home. Pt calm and cooperative at this time. Vital signs stable. No IV observed at this time. Belongings returned to patient.

## 2021-04-27 ENCOUNTER — EMERGENCY (EMERGENCY)
Facility: HOSPITAL | Age: 31
LOS: 1 days | Discharge: HOME | End: 2021-04-27
Admitting: EMERGENCY MEDICINE
Payer: MEDICAID

## 2021-04-27 VITALS
HEART RATE: 107 BPM | OXYGEN SATURATION: 100 % | RESPIRATION RATE: 18 BRPM | DIASTOLIC BLOOD PRESSURE: 56 MMHG | HEIGHT: 61 IN | TEMPERATURE: 98 F | SYSTOLIC BLOOD PRESSURE: 91 MMHG

## 2021-04-27 PROCEDURE — 99283 EMERGENCY DEPT VISIT LOW MDM: CPT

## 2021-04-27 RX ORDER — CHLORPROMAZINE HCL 10 MG
50 TABLET ORAL ONCE
Refills: 0 | Status: COMPLETED | OUTPATIENT
Start: 2021-04-27 | End: 2021-04-27

## 2021-04-27 RX ADMIN — Medication 2 MILLIGRAM(S): at 18:47

## 2021-04-27 RX ADMIN — Medication 50 MILLIGRAM(S): at 18:47
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
## 2021-04-27 NOTE — ED ADULT NURSE NOTE - NSIMPLEMENTINTERV_GEN_ALL_ED
Implemented All Fall Risk Interventions:  [place] to call system. Call bell, personal items and telephone within reach. Instruct patient to call for assistance. Room bathroom lighting operational. Non-slip footwear when patient is off stretcher. Physically safe environment: no spills, clutter or unnecessary equipment. Stretcher in lowest position, wheels locked, appropriate side rails in place. Provide visual cue, wrist band, yellow gown, etc. Monitor gait and stability. Monitor for mental status changes and reorient to person, place, and time. Review medications for side effects contributing to fall risk. Reinforce activity limits and safety measures with patient and family.
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
## 2021-04-27 NOTE — ED PROVIDER NOTE - PATIENT PORTAL LINK FT
You can access the FollowMyHealth Patient Portal offered by [facility] by registering at the following website: [email or web address]. By joining [employer]’s FollowMyHealth portal, you will also be able to view your health information using other applications (apps) compatible with our system.
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
## 2021-04-27 NOTE — ED PROVIDER NOTE - CARE PLAN
Principal Discharge DX:	Schizoaffective disorder  Secondary Diagnosis:	Bipolar disorder  Secondary Diagnosis:	Developmental delay

## 2021-04-27 NOTE — ED PROVIDER NOTE - PMH
Bipolar disorder    Development delay    DM (diabetes mellitus)    DM (diabetes mellitus)    Intellectual disability    Schizoaffective disorder    Schizoaffective disorder
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
## 2021-04-29 ENCOUNTER — EMERGENCY (EMERGENCY)
Facility: HOSPITAL | Age: 31
LOS: 1 days | Discharge: HOME | End: 2021-04-29
Admitting: EMERGENCY MEDICINE
Payer: MEDICAID

## 2021-04-29 VITALS
HEIGHT: 61 IN | OXYGEN SATURATION: 99 % | SYSTOLIC BLOOD PRESSURE: 102 MMHG | DIASTOLIC BLOOD PRESSURE: 56 MMHG | TEMPERATURE: 98 F | HEART RATE: 85 BPM | RESPIRATION RATE: 18 BRPM

## 2021-04-29 VITALS
OXYGEN SATURATION: 93 % | DIASTOLIC BLOOD PRESSURE: 72 MMHG | SYSTOLIC BLOOD PRESSURE: 110 MMHG | RESPIRATION RATE: 16 BRPM | TEMPERATURE: 97 F | HEART RATE: 80 BPM

## 2021-04-29 PROBLEM — R62.50 UNSPECIFIED LACK OF EXPECTED NORMAL PHYSIOLOGICAL DEVELOPMENT IN CHILDHOOD: Chronic | Status: ACTIVE | Noted: 2021-04-27

## 2021-04-29 PROCEDURE — 99284 EMERGENCY DEPT VISIT MOD MDM: CPT

## 2021-04-29 RX ORDER — CHLORPROMAZINE HCL 10 MG
50 TABLET ORAL ONCE
Refills: 0 | Status: COMPLETED | OUTPATIENT
Start: 2021-04-29 | End: 2021-04-29

## 2021-04-29 RX ADMIN — Medication 50 MILLIGRAM(S): at 14:52

## 2021-04-29 RX ADMIN — Medication 50 MILLIGRAM(S): at 15:10

## 2021-04-29 RX ADMIN — Medication 2 MILLIGRAM(S): at 14:52

## 2021-04-29 NOTE — ED ADULT NURSE REASSESSMENT NOTE - NS ED NURSE REASSESS COMMENT FT1
Pt released from restraints at 1500. No signs of injury noted. Pt cooperative at this time, agreeing to change into  clothing. Pt placed into  room 4. Comfort measures provided.

## 2021-04-29 NOTE — ED ADULT NURSE NOTE - OBJECTIVE STATEMENT
Pt arrived to  agitated, screaming and refusing to come off EMS stretcher. Pt transferred to  stretcher with staff assistance. Pt medicated as ordered with 50 thorazine 2 ativan IM. Pt placed into restraints at 1420 for combative behavior, banging head on wall and pushing against  door attempting to elope.
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
## 2021-04-29 NOTE — ED BEHAVIORAL HEALTH NOTE - BEHAVIORAL HEALTH NOTE
Writer contacted [address] at [phone]. Worker spoke with behavioral specialist at the [facility]. All information is as per Gladys:    Pt with hx of schizoaffective disorder/ bipolar type, mild intellectual disability. Pt with multiple recent ED visits. As per Gladys, the patient came back from an intake appointment at [facility] today and was pacing back and forth and talking loudly.  She states that the patient was pacing up and down the stairs and walking back and forth and yelling at people across the street. Patient was throwing objects.  She states that the patient could not be calmed down today and staff could not de-escalate the patient. She states that the patient has been in and out of different emergency rooms for the past three months. Last hospitalization said to have been in November 2020 to Jan 2021 at [facility]. Patient has been compliant with medications of Haldol 5mg tab one tab in am two tabs at night, Risperdal 2mg bid, Cogentin 1mg bid, Depakote 500mg two tabs in pm, Prozac 40mg once cap per day and Metformin 1000mg bid. She states that the patient is still seeing an NP –Neil trinidad and last seen this nurse practitioner this past Monday ([phone]). At that time pt had no changes with medications. Pt’s baseline behaviors is said to be with hx of schizophrenia, hallucinations, aggression and acting out. Patient is on a one to one supervision at the facility. No staff member with the patient currently. Patient lives with other residents and the two residents that reside on her floor had to be moved to another floor because of the patient’s ongoing behavioral outbursts. Patient is not presenting with SI. Today the patient was not physically aggressive with others but was verbally aggressive. Patient is not officially linked to [facility] because consent forms has not been signed by the pt’s grandfather as yet. Patient resides in a group home under [facility]. [facility] address is: [address] [place] [id number]. case discussed with TEDDY Toure.

## 2021-04-29 NOTE — ED PROVIDER NOTE - CLINICAL SUMMARY MEDICAL DECISION MAKING FREE TEXT BOX
Patient well known to this ED presents to the ED for aggressive behavior.  Medical evaluation performed. There is no clinical evidence of intoxication or any acute medical problem requiring immediate intervention. Patient behavior expected from her baseline intellectual function and chronic psychiatric conditions. No acute psychiatric emergency, however due to patient agitation in BH area, patient medicated.  Pt denies SI and HI and not hallucinating. Will engage SW to obtain collateral from facility and if no concerns will discharge back to facility once patient is calm.

## 2021-04-29 NOTE — ED BEHAVIORAL HEALTH NOTE - BEHAVIORAL HEALTH NOTE
Per provider, TEDDY Stovall, patient is cleared and is able to return to their previous residence, [facility].  has spoken to Behavioral specialist Gladys ([phone]),  confirmed that patients mode of transportation is AMBULANCE and that patient travels INDEPENDENTLY. Clinical provider is in agreement with AMBULANCE back to prison. Verbal huddle regarding coordination of care completed with interdisciplinary team. RN will arrange for ambulance.

## 2021-04-29 NOTE — ED ADULT NURSE REASSESSMENT NOTE - NS ED NURSE REASSESS COMMENT FT1
Pt cleared for d/c by NP. Pt calm and cooperative, denies S/I H/I. EMS is scheduled to arrive for pickup, pt received d/c instructions. Huddle completed.

## 2021-04-29 NOTE — ED PROVIDER NOTE - NSFOLLOWUPINSTRUCTIONS_ED_ALL_ED_FT
Follow up with your primary care physician and psychiatrist in 48-72 hours.  You may also see the psychiatrist at [facility]:    [address] [place] [address]  Phone: [phone]      SEEK IMMEDIATE MEDICAL CARE IF YOU HAVE ANY OF THE FOLLOWING SYMPTOMS: thoughts about hurting or killing yourself, thoughts about hurting or killing somebody else, hallucinations or any other worsening or persistent symptoms OR ANY NEW OR CONCERNING SYMPTOMS.

## 2021-04-29 NOTE — ED PROVIDER NOTE - PHYSICAL EXAMINATION
GEN:   anxious, screaming, AOx3  EYES:   PERRL, extra-occular movements intact  RESP:   clear to auscultation bilaterally, non-labored, speaking in full sentences  ABD:   soft, non tender, no guarding  :   no cva tenderness  MSK:   no musculoskeletal tenderness, 5/5 strength, moving all extremities  SKIN:   dry, intact, no rash, healing abrasion scabbed over right palm, no evidence of infection  NEURO:   AOx3, no focal weakness or loss of sensation, gait normal, GCS 15  PSYCH: anxious, agitated, screaming, no SI/HI

## 2021-04-29 NOTE — ED PROVIDER NOTE - PATIENT PORTAL LINK FT
You can access the FollowMyHealth Patient Portal offered by [facility] by registering at the following website: [email or web address]. By joining [facility]’s FollowMyHealth portal, you will also be able to view your health information using other applications (apps) compatible with our system.

## 2021-04-29 NOTE — ED PROVIDER NOTE - PROGRESS NOTE DETAILS
Patient removed from restraints, no injuries, patient calm and cooperative. Patient increasingly agitated, Security emergency called, additional meds to be given and patient placed in restraints to ensure safety of herself and staff. Patient has maintained good behavioral control since being removed from restraints, will discharge patient back to group home via EMS.  ROCIO keys performed.

## 2021-05-04 ENCOUNTER — EMERGENCY (EMERGENCY)
Facility: HOSPITAL | Age: 31
LOS: 1 days | Discharge: HOME | End: 2021-05-04
Admitting: EMERGENCY MEDICINE
Payer: MEDICAID

## 2021-05-04 VITALS
SYSTOLIC BLOOD PRESSURE: 126 MMHG | TEMPERATURE: 98 F | HEIGHT: 61 IN | OXYGEN SATURATION: 100 % | RESPIRATION RATE: 18 BRPM | HEART RATE: 93 BPM | DIASTOLIC BLOOD PRESSURE: 65 MMHG

## 2021-05-04 VITALS
DIASTOLIC BLOOD PRESSURE: 71 MMHG | OXYGEN SATURATION: 100 % | HEART RATE: 96 BPM | RESPIRATION RATE: 18 BRPM | TEMPERATURE: 99 F | SYSTOLIC BLOOD PRESSURE: 116 MMHG

## 2021-05-04 PROCEDURE — 99283 EMERGENCY DEPT VISIT LOW MDM: CPT

## 2021-05-04 RX ORDER — CHLORPROMAZINE HCL 10 MG
100 TABLET ORAL ONCE
Refills: 0 | Status: COMPLETED | OUTPATIENT
Start: 2021-05-04 | End: 2021-05-04

## 2021-05-04 RX ADMIN — Medication 100 MILLIGRAM(S): at 12:42

## 2021-05-04 RX ADMIN — Medication 2 MILLIGRAM(S): at 12:42

## 2021-05-04 NOTE — ED BEHAVIORAL HEALTH NOTE - BEHAVIORAL HEALTH NOTE
Writer called [address] senior care at [phone]  MULTIPLE times and was not able to leave a message for call back. Worker then called [phone] and was not able to leave voicemail .     Worker then called [phone] and spoke to DSP who was not on shift. She provided director # [phone]. Worker called and left a message for May for immediate call back.

## 2021-05-04 NOTE — ED ADULT NURSE NOTE - OBJECTIVE STATEMENT
Pt arrives via EMS, [employer] escort.  Pt was to be placed in low acuity area.  while escorting pt to low acuity pt began to scream and cry and attempted to walkout of ED.  Unable to redirect at the time.  Pt placed in high acuity BH 3.  Pt screaming and crying, with mild combativeness.  Pt medicated as per EMAR.

## 2021-05-04 NOTE — ED BEHAVIORAL HEALTH NOTE - BEHAVIORAL HEALTH NOTE
Writer called [facility] longterm at [phone] and was not able to leave a message for call back. Worker then called [phone] and was not able to leave voicemail .
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
## 2021-05-04 NOTE — ED PROVIDER NOTE - CLINICAL SUMMARY MEDICAL DECISION MAKING FREE TEXT BOX
This is a 30 yr old F, pmh bipolar, schizoaffective disorder, developmental delay, dm with c/o acting out behaviour, agitation. Pt sent in from group home after an out burst.  Upon arrival irritable, anxious, yelling medication offered and accepted.   Collateral info obtained by sw refer to her note This is a 30 yr old F, pmh bipolar, schizoaffective disorder, developmental delay, dm with c/o acting out behaviour, agitation. Pt sent in from group home after an out burst.  Upon arrival irritable, anxious, yelling medication offered and accepted.   Collateral info obtained by sw refer to her note.  After medication administration no behavioural issues, resting comfortable. There is no clinical evidence of intoxication, or any acute medical problem requiring immediate intervention. Transport arranged by RN.

## 2021-05-04 NOTE — ED PROVIDER NOTE - PATIENT PORTAL LINK FT
You can access the FollowMyHealth Patient Portal offered by [facility] by registering at the following website: [email or web address]. By joining [employer]’s FollowMyHealth portal, you will also be able to view your health information using other applications (apps) compatible with our system.

## 2021-05-04 NOTE — ED PROVIDER NOTE - OBJECTIVE STATEMENT
This is a 30 yr old F, pmh bipolar, schizoaffective disorder, developmental delay, dm with c/o acting out behaviour, agitation. This is a 30 yr old F, pmh bipolar, schizoaffective disorder, developmental delay, dm with c/o acting out behaviour, agitation. Pt sent in from group home after an out burst.  Upon arrival irritable, anxious, yelling medication offered and accepted.

## 2021-05-08 ENCOUNTER — EMERGENCY (EMERGENCY)
Facility: HOSPITAL | Age: 31
LOS: 0 days | Discharge: HOME | End: 2021-05-09
Attending: EMERGENCY MEDICINE
Payer: MEDICAID

## 2021-05-08 VITALS
DIASTOLIC BLOOD PRESSURE: 81 MMHG | HEART RATE: 87 BPM | TEMPERATURE: 98 F | OXYGEN SATURATION: 97 % | HEIGHT: 61 IN | WEIGHT: 160.06 LBS | RESPIRATION RATE: 16 BRPM | SYSTOLIC BLOOD PRESSURE: 116 MMHG

## 2021-05-08 DIAGNOSIS — R62.50 UNSPECIFIED LACK OF EXPECTED NORMAL PHYSIOLOGICAL DEVELOPMENT IN CHILDHOOD: ICD-10-CM

## 2021-05-08 DIAGNOSIS — Z88.8 ALLERGY STATUS TO OTHER DRUGS, MEDICAMENTS AND BIOLOGICAL SUBSTANCES: ICD-10-CM

## 2021-05-08 DIAGNOSIS — Z20.822 CONTACT WITH AND (SUSPECTED) EXPOSURE TO COVID-19: ICD-10-CM

## 2021-05-08 DIAGNOSIS — Z00.8 ENCOUNTER FOR OTHER GENERAL EXAMINATION: ICD-10-CM

## 2021-05-08 DIAGNOSIS — E11.9 TYPE 2 DIABETES MELLITUS WITHOUT COMPLICATIONS: ICD-10-CM

## 2021-05-08 DIAGNOSIS — R19.7 DIARRHEA, UNSPECIFIED: ICD-10-CM

## 2021-05-08 DIAGNOSIS — F25.0 SCHIZOAFFECTIVE DISORDER, BIPOLAR TYPE: ICD-10-CM

## 2021-05-08 DIAGNOSIS — F79 UNSPECIFIED INTELLECTUAL DISABILITIES: ICD-10-CM

## 2021-05-08 LAB
ALBUMIN SERPL ELPH-MCNC: 3.1 G/DL — LOW (ref 3.3–5)
ALP SERPL-CCNC: 54 U/L — SIGNIFICANT CHANGE UP (ref 40–120)
ALT FLD-CCNC: 16 U/L — SIGNIFICANT CHANGE UP (ref 12–78)
ANION GAP SERPL CALC-SCNC: 4 MMOL/L — LOW (ref 5–17)
APAP SERPL-MCNC: < 2 UG/ML (ref 10–30)
APPEARANCE UR: CLEAR — SIGNIFICANT CHANGE UP
AST SERPL-CCNC: 19 U/L — SIGNIFICANT CHANGE UP (ref 15–37)
BASOPHILS # BLD AUTO: 0.02 K/UL — SIGNIFICANT CHANGE UP (ref 0–0.2)
BASOPHILS NFR BLD AUTO: 0.3 % — SIGNIFICANT CHANGE UP (ref 0–2)
BILIRUB SERPL-MCNC: 0.3 MG/DL — SIGNIFICANT CHANGE UP (ref 0.2–1.2)
BILIRUB UR-MCNC: NEGATIVE — SIGNIFICANT CHANGE UP
BUN SERPL-MCNC: 9 MG/DL — SIGNIFICANT CHANGE UP (ref 7–23)
CALCIUM SERPL-MCNC: 8.6 MG/DL — SIGNIFICANT CHANGE UP (ref 8.5–10.1)
CHLORIDE SERPL-SCNC: 106 MMOL/L — SIGNIFICANT CHANGE UP (ref 96–108)
CO2 SERPL-SCNC: 28 MMOL/L — SIGNIFICANT CHANGE UP (ref 22–31)
COLOR SPEC: YELLOW — SIGNIFICANT CHANGE UP
CREAT SERPL-MCNC: 0.62 MG/DL — SIGNIFICANT CHANGE UP (ref 0.5–1.3)
DIFF PNL FLD: NEGATIVE — SIGNIFICANT CHANGE UP
EOSINOPHIL # BLD AUTO: 0.06 K/UL — SIGNIFICANT CHANGE UP (ref 0–0.5)
EOSINOPHIL NFR BLD AUTO: 0.9 % — SIGNIFICANT CHANGE UP (ref 0–6)
ETHANOL SERPL-MCNC: <10 MG/DL — SIGNIFICANT CHANGE UP (ref 0–10)
FLUAV AG NPH QL: SIGNIFICANT CHANGE UP
FLUBV AG NPH QL: SIGNIFICANT CHANGE UP
GLUCOSE BLDC GLUCOMTR-MCNC: 96 MG/DL — SIGNIFICANT CHANGE UP (ref 70–99)
GLUCOSE SERPL-MCNC: 75 MG/DL — SIGNIFICANT CHANGE UP (ref 70–99)
GLUCOSE UR QL: NEGATIVE MG/DL — SIGNIFICANT CHANGE UP
HCG SERPL-ACNC: <1 MIU/ML — SIGNIFICANT CHANGE UP
HCT VFR BLD CALC: 34.5 % — SIGNIFICANT CHANGE UP (ref 34.5–45)
HGB BLD-MCNC: 11.4 G/DL — LOW (ref 11.5–15.5)
IMM GRANULOCYTES NFR BLD AUTO: 0.1 % — SIGNIFICANT CHANGE UP (ref 0–1.5)
KETONES UR-MCNC: NEGATIVE — SIGNIFICANT CHANGE UP
LEUKOCYTE ESTERASE UR-ACNC: NEGATIVE — SIGNIFICANT CHANGE UP
LYMPHOCYTES # BLD AUTO: 1.46 K/UL — SIGNIFICANT CHANGE UP (ref 1–3.3)
LYMPHOCYTES # BLD AUTO: 21.1 % — SIGNIFICANT CHANGE UP (ref 13–44)
MCHC RBC-ENTMCNC: 30.7 PG — SIGNIFICANT CHANGE UP (ref 27–34)
MCHC RBC-ENTMCNC: 33 GM/DL — SIGNIFICANT CHANGE UP (ref 32–36)
MCV RBC AUTO: 93 FL — SIGNIFICANT CHANGE UP (ref 80–100)
MONOCYTES # BLD AUTO: 0.61 K/UL — SIGNIFICANT CHANGE UP (ref 0–0.9)
MONOCYTES NFR BLD AUTO: 8.8 % — SIGNIFICANT CHANGE UP (ref 2–14)
NEUTROPHILS # BLD AUTO: 4.77 K/UL — SIGNIFICANT CHANGE UP (ref 1.8–7.4)
NEUTROPHILS NFR BLD AUTO: 68.8 % — SIGNIFICANT CHANGE UP (ref 43–77)
NITRITE UR-MCNC: NEGATIVE — SIGNIFICANT CHANGE UP
NRBC # BLD: 0 /100 WBCS — SIGNIFICANT CHANGE UP (ref 0–0)
PH UR: 7 — SIGNIFICANT CHANGE UP (ref 5–8)
PLATELET # BLD AUTO: 309 K/UL — SIGNIFICANT CHANGE UP (ref 150–400)
POTASSIUM SERPL-MCNC: 4.1 MMOL/L — SIGNIFICANT CHANGE UP (ref 3.5–5.3)
POTASSIUM SERPL-SCNC: 4.1 MMOL/L — SIGNIFICANT CHANGE UP (ref 3.5–5.3)
PROT SERPL-MCNC: 6.9 GM/DL — SIGNIFICANT CHANGE UP (ref 6–8.3)
PROT UR-MCNC: NEGATIVE MG/DL — SIGNIFICANT CHANGE UP
RBC # BLD: 3.71 M/UL — LOW (ref 3.8–5.2)
RBC # FLD: 13.1 % — SIGNIFICANT CHANGE UP (ref 10.3–14.5)
SALICYLATES SERPL-MCNC: <1.7 MG/DL — LOW (ref 2.8–20)
SARS-COV-2 RNA SPEC QL NAA+PROBE: SIGNIFICANT CHANGE UP
SODIUM SERPL-SCNC: 138 MMOL/L — SIGNIFICANT CHANGE UP (ref 135–145)
SP GR SPEC: 1 — LOW (ref 1.01–1.02)
TROPONIN I SERPL-MCNC: <.015 NG/ML — SIGNIFICANT CHANGE UP (ref 0.01–0.04)
TSH SERPL-MCNC: 1.01 UIU/ML — SIGNIFICANT CHANGE UP (ref 0.36–3.74)
UROBILINOGEN FLD QL: NEGATIVE MG/DL — SIGNIFICANT CHANGE UP
WBC # BLD: 6.93 K/UL — SIGNIFICANT CHANGE UP (ref 3.8–10.5)
WBC # FLD AUTO: 6.93 K/UL — SIGNIFICANT CHANGE UP (ref 3.8–10.5)

## 2021-05-08 PROCEDURE — 99285 EMERGENCY DEPT VISIT HI MDM: CPT

## 2021-05-08 PROCEDURE — 90792 PSYCH DIAG EVAL W/MED SRVCS: CPT | Mod: 95

## 2021-05-08 PROCEDURE — 71045 X-RAY EXAM CHEST 1 VIEW: CPT | Mod: 26

## 2021-05-08 PROCEDURE — 93010 ELECTROCARDIOGRAM REPORT: CPT

## 2021-05-08 NOTE — ED PROVIDER NOTE - PATIENT PORTAL LINK FT
You can access the FollowMyHealth Patient Portal offered by [facility] by registering at the following website: [email or web address]. By joining [employer]’s FollowMyHealth portal, you will also be able to view your health information using other applications (apps) compatible with our system. You can access the FollowMyHealth Patient Portal offered by [facility] by registering at the following website: [email or web address]. By joining [employer]’s FollowMyHealth portal, you will also be able to view your health information using other applications (apps) compatible with our system.
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
## 2021-05-08 NOTE — ED PROVIDER NOTE - PHYSICAL EXAMINATION
Gen: Alert, Well appearing. NAD    Head: NC, AT, PERRL, normal lids/conjunctiva   ENT: patent oropharynx without erythema/exudate, uvula midline  Neck: supple, no tenderness/meningismus  Pulm: Bilateral clear BS, normal resp effort  CV: RRR, no M/R/G, +dist pulses   Abd: soft, NT/ND, +BS, no guarding/rebound tenderness  Mskel:  no edema/erythema/cyanosis   Skin: no rash, no bruising  Neuro: AAOx3, no sensory/motor deficits, CN 2-12 intact   psych: currently calm, answering linearly

## 2021-05-08 NOTE — ED ADULT NURSE NOTE - CHIEF COMPLAINT QUOTE
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
## 2021-05-08 NOTE — ED PROVIDER NOTE - PROGRESS NOTE DETAILS
I discussed the case with psych who states they spoke with group home and together agree patient is at baseline. Recommends DC back to group home. Matthew: Multiple attempts were made to try to reach [facility] to coordinate patient return to group home, no answer on this line. No answer on multiple other lines attempted as well. Matthew: Was able to get Marilee on the phone at  and made her aware that patient was cleared for discharge and that we have been unable to reach her or ANYONE at the group home all night. Marilee states she is going to contact group home and call back, gave me alternate group home number [phone]. Matthew: Multiple attempts were made to try to reach [facility] to coordinate patient return to group home, no answer on this line, voicemail is not activated. No answer on multiple other lines attempted as well. Matthew: Multiple attempts made to reach Marilee again or anyone at group home using alternate numbers, no success. All numbers either are non working numbers or do not have activated voicemails. Zandra: patient running through ER and screaming/wailing/crying. Multiple attempts made to reach group home. Patient does not appear stable. Will reverse discharge and medicate. Called psych asking for re-eval. Zandra: spoke with Dr. Gates from tele-psych who does not think patient needs re-eval at this time. Per Dr. Gates, this is patient's baseline. PO meds were ordered. Plan for reassess, likely will need medical admit for social admission if pt is abandoned by her group home Zandra: patient now calm after taking morning meds. Mayte RIDER spoke with patient's group home (Lisandra- assistant )- someone is there receive her. Ambulette being set up for  within one hour. dc
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
## 2021-05-08 NOTE — ED PROVIDER NOTE - NSFOLLOWUPINSTRUCTIONS_ED_ALL_ED_FT
Please return to Emergency Department immediately for any new, concerning, or worsening symptoms.   Please follow-up with Psychiatrist as recommended.

## 2021-05-08 NOTE — ED PROVIDER NOTE - OBJECTIVE STATEMENT
29yo female from [facility] with pmh mild developmental delay, schiziaffective/bipolar presents after pt called ems. Pt noted to be psychosis, hitting on ems, stating she is pregnant and doesn't want to be here. on arrival, pt called family and reports they aren't treating her well, feeding her, taking of her wig. When I came to talk to pt, she was calm, AOx3, reports she is pregnat. She also reports she ate pizza yesterday and she had lots of diarrhea and some NV. Pt also reports some sore throat. denies fever, body aches, cp, sob, abd pain. denies SI/HI/AH/VH. No obv command hallucinations. Pt known to me and has multiple visits to the ER, most recently 4 days ago and seen by psych.     d/w [facility] Marilee ([phone]) , who states pt did call ems and has been acting her normal agitated, self. she reports she does not want the pt back, states she is attention seeking and states they feel she is danger to herself and the staff and states that she wants the pt psychiatrically admitted.     No fever/chills, No photophobia/eye pain/changes in vision, No ear pain/+sore throat/dysphagia, No chest pain/palpitations, no SOB/+cough, no wheeze/stridor, No abdominal pain, +N/V/D, no dysuria/frequency/discharge, No neck/back pain, no rash, no changes in neurological status/function.

## 2021-05-08 NOTE — ED BEHAVIORAL HEALTH ASSESSMENT NOTE - SAFETY PLAN ADDT'L DETAILS
Education provided regarding environmental safety / lethal means restriction/Provision of National Suicide Prevention Lifeline 2-992-702-TALK (1795)

## 2021-05-08 NOTE — ED ADULT TRIAGE NOTE - CHIEF COMPLAINT QUOTE
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

## 2021-05-08 NOTE — ED BEHAVIORAL HEALTH ASSESSMENT NOTE - CURRENT MEDICATION
Per chart review: VPA 1000mg PO QHS, Haldol 5mg PO BID, Cogentin 1mg PO BID, Prozac 40mg PO daily, bromocriptine 5mg PO BID, Risperdal 2mg po bid

## 2021-05-08 NOTE — ED BEHAVIORAL HEALTH ASSESSMENT NOTE - RISK ASSESSMENT
Low Acute Suicide Risk Risk factors include single marital status, unemployed, limited social supports, impulsivity, and history of aggression. Her protective factors include no acute suicidal or homicidal ideation/intent/plan, no known  direct access to firearms, no prior suicide attempts, no known active substance use, and living in a supervised setting.    The patient is denying any acute suicidal or homicidal ideation/intent/plan and seems to be functioning at her psychiatric baseline. She would not benefit from inpatient psychiatric hospitalization at this point in time.    COVID Exposure Screen- Patient  1.	*Have you had a COVID-19 test in the last 90 days?  (  ) Yes   (x  ) No   (  ) Unknown- Reason: _____  IF YES PROCEED TO QUESTION #2. IF NO OR UNKNOWN, PLEASE SKIP TO QUESTION #3.  2.	Date of test(s) and result(s): ________  3.	*Have you tested positive for COVID-19 antibodies? (  ) Yes   (x  ) No   (  ) Unknown- Reason: _____  IF YES PROCEED TO QUESTION #4. IF NO or UNKNOWN, PLEASE SKIP TO QUESTION #5.  4.	Date of positive antibody test: ________  5.	*Have you received 2 doses of the COVID-19 vaccine? (  ) Yes   ( x ) No   (  ) Unknown- Reason: _____   IF YES PROCEED TO QUESTION #6. IF NO or UNKNOWN, PLEASE SKIP TO QUESTION #7.  6.	Date of second dose: ________  7.	*In the past 10 days, have you been around anyone with a positive COVID-19 test?* (  ) Yes   (x  ) No   (  ) Unknown- Reason: ____  IF YES PROCEED TO QUESTION #8. IF NO or UNKNOWN, PLEASE SKIP TO QUESTION #13.  8.	Were you within 6 feet of them for at least 15 minutes? (  ) Yes   (  ) No   (  ) Unknown- Reason: _____  9.	Have you provided care for them? (  ) Yes   (  ) No   (  ) Unknown- Reason: ______  10.	Have you had direct physical contact with them (touched, hugged, or kissed them)? (  ) Yes   (  ) No    (  ) Unknown- Reason: _____  11.	Have you shared eating or drinking utensils with them? (  ) Yes   (  ) No    (  ) Unknown- Reason: ____  12.	Have they sneezed, coughed, or somehow gotten respiratory droplets on you? (  ) Yes   (  ) No    (  ) Unknown- Reason: ______  13.	*Have you been out of [place] within the past 10 days?* (  ) Yes   (x  ) No   (  ) Unknown- Reason: _____  IF YES PLEASE ANSWER THE FOLLOWING QUESTIONS:  14.	Which state/country have you been to? ______  15.	Were you there over 24 hours? (  ) Yes   (  ) No    (  ) Unknown- Reason: ______  16.	Date of return to [facility]: ______

## 2021-05-08 NOTE — ED BEHAVIORAL HEALTH ASSESSMENT NOTE - DESCRIPTION
The patient is currently residing in a group home ([facility]) with peers; single, unemployed see bh note    Vital Signs Last 24 Hrs  T(C): 36.7 (13 Apr 2021 20:32), Max: 36.7 (13 Apr 2021 20:32)  T(F): 98 (13 Apr 2021 20:32), Max: 98 (13 Apr 2021 20:32)  HR: 73 (13 Apr 2021 20:32) (61 - 98)  BP: 105/65 (13 Apr 2021 20:32) (103/69 - 105/75)  BP(mean): --  RR: 18 (13 Apr 2021 20:32) (16 - 18)  SpO2: 100% (13 Apr 2021 20:32) (100% - 100%)    COVID Exposure Screen- Patient     1.        *Have you had a COVID-19 test in the last 21 days?  (  ) Yes   ( x ) No   (  ) Unknown- Reason: ______  IF YES PROCEED TO QUESTION #2. IF NO OR UNKNOWN THEN PLEASE SKIP TO QUESTION #3.    4.        *In the past 10 days, have you been around anyone with a positive COVID-19 test?*  (  ) Yes   ( x ) No   (  ) Unknown- Reason (e.g. patient uncertain, sedated, refusing to answer, etc.):  ______    10.     *Have you been out of [place] within the past 10 days?*  (  ) Yes   ( x ) No   (  ) Unknown- Reason (e.g. patient uncertain, sedated, refusing to answer, etc.): _______ DM, hyperprolactinemia

## 2021-05-08 NOTE — ED PROVIDER NOTE - CLINICAL SUMMARY MEDICAL DECISION MAKING FREE TEXT BOX
pt currently likely her baseline status, however  at states she is requesting pt to be admitted to psych. will do labs and consult psych. Patient signed out to incoming physician.  All decisions regarding the progression of care will be made at their discretion. pt comfortable, no obv pain and pt with multiple c/o her "heart" being one of them, low risk, though ekg noted with some twi in v2-3, will do trop, though low risk. pt currently likely her baseline status, however  at states she is requesting pt to be admitted to psych. will do labs and consult psych. Patient signed out to incoming physician.  All decisions regarding the progression of care will be made at their discretion.

## 2021-05-08 NOTE — ED ADULT NURSE NOTE - OBJECTIVE STATEMENT
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

## 2021-05-09 VITALS
TEMPERATURE: 98 F | SYSTOLIC BLOOD PRESSURE: 119 MMHG | OXYGEN SATURATION: 100 % | HEART RATE: 80 BPM | DIASTOLIC BLOOD PRESSURE: 78 MMHG | RESPIRATION RATE: 17 BRPM

## 2021-05-09 RX ORDER — DIPHENHYDRAMINE HCL 50 MG
50 CAPSULE ORAL ONCE
Refills: 0 | Status: COMPLETED | OUTPATIENT
Start: 2021-05-09 | End: 2021-05-09

## 2021-05-09 RX ORDER — BENZTROPINE MESYLATE 1 MG
1 TABLET ORAL ONCE
Refills: 0 | Status: COMPLETED | OUTPATIENT
Start: 2021-05-09 | End: 2021-05-09

## 2021-05-09 RX ORDER — HALOPERIDOL DECANOATE 100 MG/ML
5 INJECTION INTRAMUSCULAR ONCE
Refills: 0 | Status: COMPLETED | OUTPATIENT
Start: 2021-05-09 | End: 2021-05-09

## 2021-05-09 RX ORDER — RISPERIDONE 4 MG/1
2 TABLET ORAL ONCE
Refills: 0 | Status: COMPLETED | OUTPATIENT
Start: 2021-05-09 | End: 2021-05-09

## 2021-05-09 RX ADMIN — Medication 1 MILLIGRAM(S): at 09:50

## 2021-05-09 RX ADMIN — RISPERIDONE 2 MILLIGRAM(S): 4 TABLET ORAL at 09:50

## 2021-05-09 RX ADMIN — Medication 50 MILLIGRAM(S): at 12:13

## 2021-05-09 RX ADMIN — HALOPERIDOL DECANOATE 5 MILLIGRAM(S): 100 INJECTION INTRAMUSCULAR at 09:50
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
## 2021-05-12 ENCOUNTER — EMERGENCY (EMERGENCY)
Facility: HOSPITAL | Age: 31
LOS: 1 days | Discharge: HOME | End: 2021-05-12
Admitting: EMERGENCY MEDICINE
Payer: MEDICAID

## 2021-05-12 VITALS
DIASTOLIC BLOOD PRESSURE: 74 MMHG | OXYGEN SATURATION: 100 % | SYSTOLIC BLOOD PRESSURE: 112 MMHG | HEART RATE: 79 BPM | TEMPERATURE: 98 F | RESPIRATION RATE: 17 BRPM | HEIGHT: 61 IN

## 2021-05-12 VITALS
RESPIRATION RATE: 18 BRPM | HEART RATE: 71 BPM | SYSTOLIC BLOOD PRESSURE: 123 MMHG | DIASTOLIC BLOOD PRESSURE: 65 MMHG | OXYGEN SATURATION: 98 % | HEIGHT: 61 IN | TEMPERATURE: 98 F

## 2021-05-12 VITALS
SYSTOLIC BLOOD PRESSURE: 105 MMHG | HEART RATE: 81 BPM | TEMPERATURE: 99 F | RESPIRATION RATE: 18 BRPM | DIASTOLIC BLOOD PRESSURE: 67 MMHG | OXYGEN SATURATION: 100 %

## 2021-05-12 PROCEDURE — 99283 EMERGENCY DEPT VISIT LOW MDM: CPT

## 2021-05-12 PROCEDURE — 99284 EMERGENCY DEPT VISIT MOD MDM: CPT
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
## 2021-05-12 NOTE — ED PROVIDER NOTE - PATIENT PORTAL LINK FT
You can access the FollowMyHealth Patient Portal offered by Health system by registering at the following website: [email or web address]. By joining [facility]’s FollowMyHealth portal, you will also be able to view your health information using other applications (apps) compatible with our system.
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
## 2021-05-12 NOTE — ED PROVIDER NOTE - PATIENT PORTAL LINK FT
You can access the FollowMyHealth Patient Portal offered by [facility] by registering at the following website: [email or web address]. By joining [facility]’s FollowMyHealth portal, you will also be able to view your health information using other applications (apps) compatible with our system.
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
## 2021-05-12 NOTE — ED ADULT NURSE REASSESSMENT NOTE - NS ED NURSE REASSESS COMMENT FT1
Pt cleared for discharge and transported via [facility].  Discharge instructions given to .  Pt ate and hydrated while in  and maintained behavioral control.  VSS WNL.
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
## 2021-05-13 RX ORDER — CHLORPROMAZINE HCL 10 MG
50 TABLET ORAL ONCE
Refills: 0 | Status: COMPLETED | OUTPATIENT
Start: 2021-05-13 | End: 2021-05-13

## 2021-05-13 RX ADMIN — Medication 50 MILLIGRAM(S): at 00:18

## 2021-05-13 RX ADMIN — Medication 2 MILLIGRAM(S): at 00:18
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
## 2021-05-13 NOTE — ED ADULT NURSE REASSESSMENT NOTE - NS ED NURSE REASSESS COMMENT FT1
break coverage RN - pt cleared for discharged by psychiatry. EMS activated and pt transported out of ED w/o issue by EMS.
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
## 2021-05-14 ENCOUNTER — EMERGENCY (EMERGENCY)
Facility: HOSPITAL | Age: 31
LOS: 1 days | Discharge: HOME | End: 2021-05-14
Admitting: EMERGENCY MEDICINE
Payer: MEDICAID

## 2021-05-14 VITALS
HEART RATE: 97 BPM | RESPIRATION RATE: 16 BRPM | OXYGEN SATURATION: 100 % | DIASTOLIC BLOOD PRESSURE: 73 MMHG | SYSTOLIC BLOOD PRESSURE: 107 MMHG

## 2021-05-14 VITALS — HEIGHT: 61 IN

## 2021-05-14 PROCEDURE — 99284 EMERGENCY DEPT VISIT MOD MDM: CPT

## 2021-05-14 RX ORDER — DIPHENHYDRAMINE HCL 50 MG
50 CAPSULE ORAL ONCE
Refills: 0 | Status: DISCONTINUED | OUTPATIENT
Start: 2021-05-14 | End: 2021-05-14

## 2021-05-14 RX ORDER — CHLORPROMAZINE HCL 10 MG
50 TABLET ORAL ONCE
Refills: 0 | Status: COMPLETED | OUTPATIENT
Start: 2021-05-14 | End: 2021-05-14

## 2021-05-14 RX ADMIN — Medication 2 MILLIGRAM(S): at 13:33

## 2021-05-14 RX ADMIN — Medication 50 MILLIGRAM(S): at 13:33

## 2021-05-14 NOTE — ED PROVIDER NOTE - PATIENT PORTAL LINK FT
NEED SPEECH THERAPY ORDER SENT TO [facility], THEY RECIEVED OCCUPATIONAL THERAPY ORDER, NEED ORDER LIKE SHE HAD FROM OCT 2017 FOR SPEECH
Referral faxed to [facility] [phone].
You can access the FollowMyHealth Patient Portal offered by [facility] by registering at the following website: [email or web address]. By joining [facility]’s FollowMyHealth portal, you will also be able to view your health information using other applications (apps) compatible with our system.

## 2021-05-14 NOTE — ED ADULT NURSE REASSESSMENT NOTE - NS ED NURSE REASSESS COMMENT FT1
[facility] EMS [phone] called at approximately 1500 to set up transport for PT back to group home; [address]  at [phone] [facility].  Multiple phone contacts provided to EMS in order to reach group home and confirm they are accepting her back.  Author told EMS that   Adrianne has confirmed with Beto Rodríguez from group [facility]. However EMS must hear directly from Group home staff and no one is picking up multiple call attempts per Roxanna.  Since 1500 Author has attempted to call all contact numbers in  note no less than 20 times with no success.  Author left several messages.    notified of this situation

## 2021-05-14 NOTE — ED ADULT TRIAGE NOTE - CHIEF COMPLAINT QUOTE
Pt sent from group home for pt acting out.  Destroying things, defecating on bed, urinating on bed, and attempting to leave home.  Hx schizophrenia.  Unable to obtain VS

## 2021-05-14 NOTE — ED PROVIDER NOTE - CLINICAL SUMMARY MEDICAL DECISION MAKING FREE TEXT BOX
30 yr old F, pmh schizoaffective bipolar , developmental delay with c/o acting out and erratic behavior.  Patient well known to ED. Medical evaluation performed. There is no clinical evidence of intoxication or any acute medical problem requiring immediate intervention. Due to agitation and aggression towards staff and herself, patient medicated.  Will engage SW to obtain collateral from facility and if behavior is consistent with baseline, will likely discharge back to facility. Patient well known to this ED presents to the ED for aggressive behavior.  Medical evaluation performed. There is no clinical evidence of intoxication or any acute medical problem requiring immediate intervention. Patient behavior expected from her baseline intellectual function and chronic psychiatric conditions. No acute psychiatric emergency, however patient is screaming, kicking legs and required medication upon arrival. Will engage SW to obtain collateral from facility and if no concerns will discharge back to facility.
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
## 2021-05-14 NOTE — ED ADULT NURSE NOTE - OBJECTIVE STATEMENT
Pt w/ hx of schizophrenia received to  from group home after 911 was called for agitation, Per Group home staff this type of behavior happens sporadically,  Pt becomes agitated and emotionally labile, screaming at staff and crying.     Pt arrived in this condition but is now laughing and conversing with staff.   Pt denies pain, or any physical complaints, denies SI HI hallucinations.  Pt safety maintained

## 2021-05-14 NOTE — ED PROVIDER NOTE - NSFOLLOWUPINSTRUCTIONS_ED_ALL_ED_FT
Follow up with your primary care physician and psychiatrist in 48-72 hours.  You may also see the psychiatrist at [facility]:    [address] [place] [address]  Phone: [phone]      SEEK IMMEDIATE MEDICAL CARE IF YOU HAVE ANY OF THE FOLLOWING SYMPTOMS: thoughts about hurting or killing yourself, thoughts about hurting or killing somebody else, hallucinations or any other worsening or persistent symptoms OR ANY NEW OR CONCERNING SYMPTOMS.

## 2021-05-14 NOTE — ED ADULT NURSE REASSESSMENT NOTE - NS ED NURSE REASSESS COMMENT FT1
Pt is calm cooperative expresses desire to go home to [facility], Report given to EMS vitals as noted.  Pt discharged safely in care of EMS.

## 2021-05-18 ENCOUNTER — EMERGENCY (EMERGENCY)
Facility: HOSPITAL | Age: 31
LOS: 0 days | Discharge: NURSING, REHAB OR LONG-TERM CARE | End: 2021-05-19
Attending: EMERGENCY MEDICINE
Payer: MEDICAID

## 2021-05-18 VITALS
OXYGEN SATURATION: 96 % | HEIGHT: 61 IN | WEIGHT: 164.91 LBS | DIASTOLIC BLOOD PRESSURE: 72 MMHG | RESPIRATION RATE: 19 BRPM | SYSTOLIC BLOOD PRESSURE: 115 MMHG | HEART RATE: 101 BPM | TEMPERATURE: 99 F

## 2021-05-18 DIAGNOSIS — F79 UNSPECIFIED INTELLECTUAL DISABILITIES: ICD-10-CM

## 2021-05-18 DIAGNOSIS — F25.9 SCHIZOAFFECTIVE DISORDER, UNSPECIFIED: ICD-10-CM

## 2021-05-18 DIAGNOSIS — F31.9 BIPOLAR DISORDER, UNSPECIFIED: ICD-10-CM

## 2021-05-18 DIAGNOSIS — R45.6 VIOLENT BEHAVIOR: ICD-10-CM

## 2021-05-18 DIAGNOSIS — F90.9 ATTENTION-DEFICIT HYPERACTIVITY DISORDER, UNSPECIFIED TYPE: ICD-10-CM

## 2021-05-18 DIAGNOSIS — Z91.018 ALLERGY TO OTHER FOODS: ICD-10-CM

## 2021-05-18 DIAGNOSIS — E22.1 HYPERPROLACTINEMIA: ICD-10-CM

## 2021-05-18 DIAGNOSIS — Z20.822 CONTACT WITH AND (SUSPECTED) EXPOSURE TO COVID-19: ICD-10-CM

## 2021-05-18 DIAGNOSIS — Z91.5 PERSONAL HISTORY OF SELF-HARM: ICD-10-CM

## 2021-05-18 DIAGNOSIS — R62.50 UNSPECIFIED LACK OF EXPECTED NORMAL PHYSIOLOGICAL DEVELOPMENT IN CHILDHOOD: ICD-10-CM

## 2021-05-18 DIAGNOSIS — E11.9 TYPE 2 DIABETES MELLITUS WITHOUT COMPLICATIONS: ICD-10-CM

## 2021-05-18 LAB
ALBUMIN SERPL ELPH-MCNC: 3.2 G/DL — LOW (ref 3.3–5)
ALP SERPL-CCNC: 52 U/L — SIGNIFICANT CHANGE UP (ref 40–120)
ALT FLD-CCNC: 16 U/L — SIGNIFICANT CHANGE UP (ref 12–78)
ANION GAP SERPL CALC-SCNC: 7 MMOL/L — SIGNIFICANT CHANGE UP (ref 5–17)
APAP SERPL-MCNC: < 2 UG/ML (ref 10–30)
AST SERPL-CCNC: 17 U/L — SIGNIFICANT CHANGE UP (ref 15–37)
BASOPHILS # BLD AUTO: 0.02 K/UL — SIGNIFICANT CHANGE UP (ref 0–0.2)
BASOPHILS NFR BLD AUTO: 0.4 % — SIGNIFICANT CHANGE UP (ref 0–2)
BILIRUB SERPL-MCNC: 0.2 MG/DL — SIGNIFICANT CHANGE UP (ref 0.2–1.2)
BUN SERPL-MCNC: 9 MG/DL — SIGNIFICANT CHANGE UP (ref 7–23)
CALCIUM SERPL-MCNC: 8.5 MG/DL — SIGNIFICANT CHANGE UP (ref 8.5–10.1)
CHLORIDE SERPL-SCNC: 104 MMOL/L — SIGNIFICANT CHANGE UP (ref 96–108)
CO2 SERPL-SCNC: 27 MMOL/L — SIGNIFICANT CHANGE UP (ref 22–31)
CREAT SERPL-MCNC: 0.69 MG/DL — SIGNIFICANT CHANGE UP (ref 0.5–1.3)
EOSINOPHIL # BLD AUTO: 0.02 K/UL — SIGNIFICANT CHANGE UP (ref 0–0.5)
EOSINOPHIL NFR BLD AUTO: 0.4 % — SIGNIFICANT CHANGE UP (ref 0–6)
ETHANOL SERPL-MCNC: <10 MG/DL — SIGNIFICANT CHANGE UP (ref 0–10)
FLUAV AG NPH QL: SIGNIFICANT CHANGE UP
FLUBV AG NPH QL: SIGNIFICANT CHANGE UP
GLUCOSE SERPL-MCNC: 69 MG/DL — LOW (ref 70–99)
HCG SERPL-ACNC: 1 MIU/ML — SIGNIFICANT CHANGE UP
HCT VFR BLD CALC: 33.8 % — LOW (ref 34.5–45)
HGB BLD-MCNC: 11.4 G/DL — LOW (ref 11.5–15.5)
IMM GRANULOCYTES NFR BLD AUTO: 0.2 % — SIGNIFICANT CHANGE UP (ref 0–1.5)
LYMPHOCYTES # BLD AUTO: 1.51 K/UL — SIGNIFICANT CHANGE UP (ref 1–3.3)
LYMPHOCYTES # BLD AUTO: 33.4 % — SIGNIFICANT CHANGE UP (ref 13–44)
MCHC RBC-ENTMCNC: 31 PG — SIGNIFICANT CHANGE UP (ref 27–34)
MCHC RBC-ENTMCNC: 33.7 GM/DL — SIGNIFICANT CHANGE UP (ref 32–36)
MCV RBC AUTO: 91.8 FL — SIGNIFICANT CHANGE UP (ref 80–100)
MONOCYTES # BLD AUTO: 0.38 K/UL — SIGNIFICANT CHANGE UP (ref 0–0.9)
MONOCYTES NFR BLD AUTO: 8.4 % — SIGNIFICANT CHANGE UP (ref 2–14)
NEUTROPHILS # BLD AUTO: 2.58 K/UL — SIGNIFICANT CHANGE UP (ref 1.8–7.4)
NEUTROPHILS NFR BLD AUTO: 57.2 % — SIGNIFICANT CHANGE UP (ref 43–77)
NRBC # BLD: 0 /100 WBCS — SIGNIFICANT CHANGE UP (ref 0–0)
PLATELET # BLD AUTO: 277 K/UL — SIGNIFICANT CHANGE UP (ref 150–400)
POTASSIUM SERPL-MCNC: 4.1 MMOL/L — SIGNIFICANT CHANGE UP (ref 3.5–5.3)
POTASSIUM SERPL-SCNC: 4.1 MMOL/L — SIGNIFICANT CHANGE UP (ref 3.5–5.3)
PROT SERPL-MCNC: 6.7 GM/DL — SIGNIFICANT CHANGE UP (ref 6–8.3)
RBC # BLD: 3.68 M/UL — LOW (ref 3.8–5.2)
RBC # FLD: 13.3 % — SIGNIFICANT CHANGE UP (ref 10.3–14.5)
SALICYLATES SERPL-MCNC: <1.7 MG/DL — LOW (ref 2.8–20)
SARS-COV-2 RNA SPEC QL NAA+PROBE: SIGNIFICANT CHANGE UP
SODIUM SERPL-SCNC: 138 MMOL/L — SIGNIFICANT CHANGE UP (ref 135–145)
TSH SERPL-MCNC: 0.82 UIU/ML — SIGNIFICANT CHANGE UP (ref 0.36–3.74)
VALPROATE SERPL-MCNC: 39 UG/ML — LOW (ref 50–100)
WBC # BLD: 4.52 K/UL — SIGNIFICANT CHANGE UP (ref 3.8–10.5)
WBC # FLD AUTO: 4.52 K/UL — SIGNIFICANT CHANGE UP (ref 3.8–10.5)

## 2021-05-18 PROCEDURE — 90792 PSYCH DIAG EVAL W/MED SRVCS: CPT | Mod: 95

## 2021-05-18 PROCEDURE — 99284 EMERGENCY DEPT VISIT MOD MDM: CPT

## 2021-05-18 PROCEDURE — 93010 ELECTROCARDIOGRAM REPORT: CPT

## 2021-05-18 RX ORDER — HALOPERIDOL DECANOATE 100 MG/ML
5 INJECTION INTRAMUSCULAR ONCE
Refills: 0 | Status: COMPLETED | OUTPATIENT
Start: 2021-05-18 | End: 2021-05-18

## 2021-05-18 RX ORDER — MIDAZOLAM HYDROCHLORIDE 1 MG/ML
10 INJECTION, SOLUTION INTRAMUSCULAR; INTRAVENOUS ONCE
Refills: 0 | Status: DISCONTINUED | OUTPATIENT
Start: 2021-05-18 | End: 2021-05-18

## 2021-05-18 RX ORDER — OLANZAPINE 15 MG/1
5 TABLET, FILM COATED ORAL ONCE
Refills: 0 | Status: COMPLETED | OUTPATIENT
Start: 2021-05-18 | End: 2021-05-18

## 2021-05-18 RX ORDER — DIPHENHYDRAMINE HCL 50 MG
50 CAPSULE ORAL ONCE
Refills: 0 | Status: COMPLETED | OUTPATIENT
Start: 2021-05-18 | End: 2021-05-18

## 2021-05-18 RX ADMIN — Medication 2 MILLIGRAM(S): at 16:04

## 2021-05-18 RX ADMIN — MIDAZOLAM HYDROCHLORIDE 10 MILLIGRAM(S): 1 INJECTION, SOLUTION INTRAMUSCULAR; INTRAVENOUS at 19:19

## 2021-05-18 RX ADMIN — HALOPERIDOL DECANOATE 5 MILLIGRAM(S): 100 INJECTION INTRAMUSCULAR at 16:04

## 2021-05-18 RX ADMIN — OLANZAPINE 5 MILLIGRAM(S): 15 TABLET, FILM COATED ORAL at 17:24

## 2021-05-18 RX ADMIN — Medication 50 MILLIGRAM(S): at 16:04

## 2021-05-18 NOTE — ED ADULT NURSE NOTE - NSIMPLEMENTINTERV_GEN_ALL_ED
Implemented All Fall with Harm Risk Interventions:  [place] to call system. Call bell, personal items and telephone within reach. Instruct patient to call for assistance. Room bathroom lighting operational. Non-slip footwear when patient is off stretcher. Physically safe environment: no spills, clutter or unnecessary equipment. Stretcher in lowest position, wheels locked, appropriate side rails in place. Provide visual cue, wrist band, yellow gown, etc. Monitor gait and stability. Monitor for mental status changes and reorient to person, place, and time. Review medications for side effects contributing to fall risk. Reinforce activity limits and safety measures with patient and family. Provide visual clues: red socks.

## 2021-05-18 NOTE — ED BEHAVIORAL HEALTH ASSESSMENT NOTE - RISK ASSESSMENT
Unable to determine Suicide Risk Risk factors include single marital status, unemployed, limited social supports, impulsivity, and history of aggression. Protective factors include no acute suicidal or homicidal ideation/intent/plan, no known direct access to firearms, no prior suicide attempts, no known active substance use, and living in a supervised setting.

## 2021-05-18 NOTE — ED BEHAVIORAL HEALTH ASSESSMENT NOTE - SUMMARY
This is a 30-year-old, AA female, single, resides at [facility], unemployed, non-caregiver, with a history of ID and schizoaffective disorder, numerous ER visits, no prior inpatient psychiatric hospitalizations, no known prior suicide attempts or history of non-suicidal self-injury, with a reported history of physical aggression, no known active substance abuse, with a past medical history significant for DM and hyperprolactinemia, who was brought in by EMS, referred by staff at her group home, for aggressive behavior at group home.    Writer attempted to evaluate patient, however she is highly agitated, disorganized, paranoid, and tried to elope.  She is uncooperative and refused to speak with writer.  Per chart review, At 3:49pm she was given Haldol 5mg IM once, Ativan 2mg IM once, and Benadryl 50mg IM.  Then at 5:05pm she was given Zyprexa 5mg IM once and at 7:08pm she was given midazolam 10mg IM once.      Writer spoke to ER Attending and recommended if she continues to be disorganized in an hour around 820pm,  may give Thorazine 50mg IM once.      Will continue close follow up and monitoring of patient.      Plan:  1.  Hold and reassess  2.  Medication: Thorazine 50mg PO/IM q6 hours prn for agitation.  Avoid benzo's as they can cause   disinhibition in patients with intellectual disabilities ; continue home meds  3.  Observation 1:1 close observation   4.  Follow up labs, Depakote level, and EKG

## 2021-05-18 NOTE — ED BEHAVIORAL HEALTH ASSESSMENT NOTE - DETAILS
yes unable to assess, patient agitated and uncooperative per chart review patient has history of aggressive behavoirs

## 2021-05-18 NOTE — ED BEHAVIORAL HEALTH ASSESSMENT NOTE - OTHER
resides at [facility] unable to assess, patient highly agitated and uncooperative angry external group home peers at group home

## 2021-05-18 NOTE — ED PROVIDER NOTE - PROGRESS NOTE DETAILS
Pt is still yelling and screaming. people are trying to hurt her baby. pt infom listed supervisor [phone] is called and left message. telepsych is called and they will call back. teleypsych is notified. JESSICA SWEENEY: Pt is aggressive/agitated unable to be redirected: treated w/ 10mg IM versed. discussed with telepsych, if persistent agitation rec 100mg IM thorazine. pending re-eval and placed on cardiac monitor. Received patient signout from Dr. Steward.  Patient with aggressive behavior at group home verbally aggressive required sedation.  Pending psych dispo. Patient cleared by psych to return back to group home.

## 2021-05-18 NOTE — ED PROVIDER NOTE - PATIENT PORTAL LINK FT
You can access the FollowMyHealth Patient Portal offered by [facility] by registering at the following website: [email or web address]. By joining [employer]s FollowMyHealth portal, you will also be able to view your health information using other applications (apps) compatible with our system.

## 2021-05-18 NOTE — ED PROVIDER NOTE - CONSTITUTIONAL, MLM
normal... Well appearing, awake, alert, oriented to person, place,  and in no apparent distress. Screaming yelling, appears delusions

## 2021-05-18 NOTE — ED PROVIDER NOTE - OBJECTIVE STATEMENT
30 years old female here by ems from a group home c/o being aggressive pt is screaming yelling and hitting the table of the nursing station yelling about she was missed treated in the group Pt sts she is sick and tire of being in the hospital 30 years old female here by ems from a group home c/o being aggressive pt is screaming yelling and hitting the table of the nursing station yelling about she was missed treated in the group Pt sts she is sick and tire of being in the hospital. Pt is refusing to give detail hx with uncontrolled behavior.

## 2021-05-18 NOTE — ED BEHAVIORAL HEALTH ASSESSMENT NOTE - DESCRIPTION
DM, hyperprolactinemia see BTCM note AA female, single, resides at [facility] home, unemployed, non-caregiver
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
## 2021-05-18 NOTE — ED BEHAVIORAL HEALTH ASSESSMENT NOTE - PSYCHIATRIC ISSUES AND PLAN (INCLUDE STANDING AND PRN MEDICATION)
Thorazine 50mg PO/IM q6 hours prn for agitation.  Avoid benzo's as they can cause disinhibition in patients with intellectual disabilities , f/U depakote level, continue home meds
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
## 2021-05-19 VITALS
TEMPERATURE: 98 F | OXYGEN SATURATION: 98 % | HEART RATE: 99 BPM | SYSTOLIC BLOOD PRESSURE: 110 MMHG | DIASTOLIC BLOOD PRESSURE: 71 MMHG | RESPIRATION RATE: 17 BRPM

## 2021-05-19 PROCEDURE — 99213 OFFICE O/P EST LOW 20 MIN: CPT | Mod: 95

## 2021-05-19 RX ORDER — HALOPERIDOL DECANOATE 100 MG/ML
5 INJECTION INTRAMUSCULAR ONCE
Refills: 0 | Status: COMPLETED | OUTPATIENT
Start: 2021-05-19 | End: 2021-05-19

## 2021-05-19 RX ADMIN — Medication 2 MILLIGRAM(S): at 12:41

## 2021-05-19 NOTE — PROGRESS NOTE BEHAVIORAL HEALTH - SUMMARY
The patient is a 30-year-old, currently domiciled in a psychiatric residence with peers, single, unemployed, -American woman, non-caregiver, with a reported history of ID and schizoaffective disorder, no prior inpatient psychiatric hospitalizations, no known prior suicide attempts or history of non-suicidal self-injury, with a reported history of physical aggression, no known active substance abuse, with a past medical history significant for DM and hyperprolactinemia, who was brought in by EMS, referred by staff at her group home, for anxiety and agitation.     Patient is seen frequently in ER for similar presentations (3/24-DC, 4/10-DC, 4/13-DC, 5/8-DC). She became agitated at  and was aggressive. She has baseline hx of low frustration tolerance, poor coping leading to episodes of acting out, aggression towards others, self harm due to her intellectual disability. In ER patient has been calm and in behavioral control and does not report any SI/HI/aggressive ideation. She appears to be at her baseline and does not require inpatient admission. Cleared for discharge.

## 2021-05-19 NOTE — PROGRESS NOTE BEHAVIORAL HEALTH - NSBHFUPINTERVALHXFT_PSY_A_CORE
chart reviewed, case discussed with overnight psychiatrist.   pt received multiple PRNs yesterday but has not required additional medications since last evening.   on evaluation today, pt is labile but conversant. She says how she is very unhappy at the group home. says that she was upset because another client got 4 pancakes and 4 sausage links and she didn't get as much. She's also upset because she's not allowed to have white sugar, only brown sugar. She denies AH/VH/SI/HI. She says she has "nowhere to go" but later agrees that going back to the group home is probably the best idea.

## 2021-05-19 NOTE — ED BEHAVIORAL HEALTH NOTE - BEHAVIORAL HEALTH NOTE
Telepsychiatry Reassessment Note:     MD received handoff on patient.     MD spoke to RN for updated ED course. Since receiving Versed 10mg around 7pm patient has been sleeping calmly without further behavioral issues. she remains asleep at this time.       A/P from prior eval: "This is a 30-year-old, AA female, single[facility], unemployed, non-caregiver, with a history of ID and schizoaffective disorder, numerous ER visits, no prior inpatient psychiatric hospitalizations, no known prior suicide attempts or history of non-suicidal self-injury, with a reported history of physical aggression, no known active substance abuse, with a past medical history significant for DM and hyperprolactinemia, who was brought in by EMS, referred by staff at her group home, for aggressive behavior at group home."    patient remains asleep after multiple rounds of IM medications (haldol, zyprexa, BZD). WIll require further assessment when awake.     - psychiatry to follow

## 2021-05-19 NOTE — ED ADULT NURSE REASSESSMENT NOTE - NS ED NURSE REASSESS COMMENT FT1
covering primary nurse, received at bedside sleeping calmly, no behavioural issues noted at this time, safety precaution maintained, 1 :1 monitoring in place, will continue plan of care.
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
## 2021-05-20 ENCOUNTER — EMERGENCY (EMERGENCY)
Facility: HOSPITAL | Age: 31
LOS: 1 days | Discharge: HOME | End: 2021-05-20
Admitting: EMERGENCY MEDICINE
Payer: MEDICAID

## 2021-05-20 VITALS
HEART RATE: 107 BPM | DIASTOLIC BLOOD PRESSURE: 80 MMHG | TEMPERATURE: 98 F | OXYGEN SATURATION: 100 % | SYSTOLIC BLOOD PRESSURE: 125 MMHG | HEIGHT: 61 IN | RESPIRATION RATE: 16 BRPM

## 2021-05-20 PROCEDURE — 99284 EMERGENCY DEPT VISIT MOD MDM: CPT

## 2021-05-20 RX ORDER — CHLORPROMAZINE HCL 10 MG
50 TABLET ORAL ONCE
Refills: 0 | Status: COMPLETED | OUTPATIENT
Start: 2021-05-20 | End: 2021-05-20

## 2021-05-20 RX ADMIN — Medication 2 MILLIGRAM(S): at 17:29

## 2021-05-20 RX ADMIN — Medication 50 MILLIGRAM(S): at 17:29

## 2021-05-20 NOTE — ED PROVIDER NOTE - PHYSICAL EXAMINATION
GEN:   agitated, screaming, AOx3  EYES:   PERRL, extra-occular movements intact  RESP:   non-labored, speaking in full sentences  ABD:   soft, non tender, no guarding  :   no cva tenderness  MSK:   no musculoskeletal tenderness, 5/5 strength, moving all extremities  SKIN:   dry, intact, no rash  NEURO:   AOx3, no focal weakness or loss of sensation, gait normal, GCS 15  PSYCH: agitated, screaming, cursing.  no evidence of intoxication or withdrawal from any substances.

## 2021-05-20 NOTE — ED ADULT NURSE NOTE - NSIMPLEMENTINTERV_GEN_ALL_ED
Implemented All Universal Safety Interventions:  [place] to call system. Call bell, personal items and telephone within reach. Instruct patient to call for assistance. Room bathroom lighting operational. Non-slip footwear when patient is off stretcher. Physically safe environment: no spills, clutter or unnecessary equipment. Stretcher in lowest position, wheels locked, appropriate side rails in place.

## 2021-05-20 NOTE — ED BEHAVIORAL HEALTH NOTE - BEHAVIORAL HEALTH NOTE
Pt from [facility]. Writer attempted to contact , May, at [phone]. Writer made multiple attempts receive  each time. Writer contacted additional numbers listed , [phone] and then able to get through to [facility] Nurse Conor at . Conor reports that pt was “pretty wild today”. Pt tried to resist going with police needing to be handcuffed. Conor reports trigger to be house across the street unrelated to [facility] agency has many people between the ages of 20-40 people going in and out. Other residence said to be private property and the resident of this home was said to be getting pt “riled up”. Pt was said to have had to been handcuffed. Pt was said to be speaking a lot not like her when in police custody. Nurse noticing pt is missing AM or PM dosages depending on day due to going to the ED. Pt has appointment at [facility] scheduled for 5/28. Staff hopeful that pt will be able to see provider on this day with no issues. Pt said to be regressing with behaviors now getting worse. Past provider was difficult to follow up with. No SI intent or plan reported. No past self-harm in past also reported. Staff however reports that pt has delusional based thought content and has went through windows before due to delusions. This did not occur today but in past. Pt also now becoming aggressive with peers. Mode of transportation for discharge said to be AMBULANCE for safety. Staff member aware of pts return. Verbal huddle occurred with TEDDY Toure. Nurse informed of concerns related to being unable to reach staff members by phone. Nurse provided number to call for EMS to confirm transport given phone #[phone]. Writer called and goes straight to  after one ring with mailbox not set up. Pt from [facility]. Writer attempted to contact , May, at [phone]. Writer made multiple attempts receive  each time. Writer contacted additional numbers listed , [phone] and then able to get through to [facility] Nurse Conor at . Conor reports that pt was “pretty wild today”. Pt tried to resist going with police needing to be handcuffed. Conor reports trigger to be house across the street unrelated to [facility] agency has many people between the ages of 20-40 people going in and out. Other residence said to be private property and the resident of this home was said to be getting pt “riled up”. Pt was said to have had to been handcuffed. Pt was said to be speaking a lot not like her when in police custody. Nurse noticing pt is missing AM or PM dosages depending on day due to going to the ED. Pt has appointment at [facility] scheduled for 5/28. Staff hopeful that pt will be able to see provider on this day with no issues. Pt said to be regressing with behaviors now getting worse. Past provider was difficult to follow up with. No SI intent or plan reported. No past self-harm in past also reported. Staff however reports that pt has delusional based thought content and has went through windows before due to delusions. This did not occur today but in past. Pt also now becoming aggressive with peers. Mode of transportation for discharge said to be AMBULANCE for safety. Staff member aware of pts return. Verbal huddle occurred with TEDDY Toure. Nurse informed of concerns related to being unable to reach staff members by phone. Nurse provided number to call for EMS to confirm transport given phone #[phone]. Writer called and goes straight to  after one ring with mailbox not set up.    SW involved with multiple attempts made to reach group home staff via SW outreach and EMS outreach. SW later able to speak with May confirming pt to be accepted upon EMS drop off.

## 2021-05-20 NOTE — ED ADULT TRIAGE NOTE - CHIEF COMPLAINT QUOTE
pt arriving from group home for throwing things at staff. PMH bipolar, noncompliant with meds. Pt arriving in handcuffs, verbally aggressive. Denies SI/HI.

## 2021-05-20 NOTE — ED PROVIDER NOTE - PATIENT PORTAL LINK FT
You can access the FollowMyHealth Patient Portal offered by [facility] by registering at the following website: [email or web address]. By joining [employer]’s FollowMyHealth portal, you will also be able to view your health information using other applications (apps) compatible with our system.

## 2021-05-20 NOTE — ED PROVIDER NOTE - CLINICAL SUMMARY MEDICAL DECISION MAKING FREE TEXT BOX
Patient well known to this ED presents to the ED for aggressive behavior.  Medical evaluation performed. There is no clinical evidence of intoxication or any acute medical problem requiring immediate intervention. Patient behavior expected from her baseline intellectual function and chronic psychiatric conditions. No acute psychiatric emergency, however pt screaming, requiring meds. Will engage SW to obtain collateral from facility and if no concerns will discharge back to facility.

## 2021-05-20 NOTE — ED BEHAVIORAL HEALTH NOTE - BEHAVIORAL HEALTH NOTE
Worker received a call from ems confirming ride. Angelito also spoke  lacie who states that Anoop can also be reached [phone] when the patient is in the ED.

## 2021-05-20 NOTE — ED PROVIDER NOTE - PROGRESS NOTE DETAILS
Since being medicated,, the Pt has remained calm and cooperative. There was no occurrence of agitation/aggressive behavior. There were no signs/symptoms of acute domenico or florid psychosis.  Pt did not exhibit any signs/ symptoms of intoxication or withdrawal.  Pt is not delirious.  Pt did not test limits and was able to maintain appropriate boundaries.  Pt was easily redirected.  Overall, there was no management issues whilst Pt was at the  ED.  Patient agreeable to go back to group home, will discharge.  ROCIO keys performed.

## 2021-05-20 NOTE — ED PROVIDER NOTE - NSFOLLOWUPINSTRUCTIONS_ED_ALL_ED_FT
Follow up with your primary care physician and psychiatrist in 48-72 hours.  You may also see the psychiatrist at [facility]:    [address] [place] [address]  Phone: [phone]      SEEK IMMEDIATE MEDICAL CARE IF YOU HAVE ANY OF THE FOLLOWING SYMPTOMS: thoughts about hurting or killing yourself, thoughts about hurting or killing somebody else, hallucinations or any other worsening or persistent symptoms OR ANY NEW OR CONCERNING SYMPTOMS.

## 2021-05-20 NOTE — ED PROVIDER NOTE - OBJECTIVE STATEMENT
30 yr old F, pmh schizoaffective bipolar , developmental delay with c/o acting out behaviour. Per EMS / [place] patient started to become aggressive, screaming, cursing and flailing in handcuffs.  Patient making statements she hates the hospital and hates group home.  Patient denies SI/HI/AH/VH.  Patient denies illicit drugs or ETOH, no physical complaints or concerns.

## 2021-05-20 NOTE — ED ADULT NURSE NOTE - OBJECTIVE STATEMENT
[place] and [place] in handcuffs not under arrest for agitation Pt is lacrimis, screaming paranoid Pt medicated as ordered and calming down Pt ultimately cooperative with BH procedures.  Unable to interview patient

## 2021-05-21 ENCOUNTER — EMERGENCY (EMERGENCY)
Facility: HOSPITAL | Age: 31
LOS: 0 days | Discharge: HOME | End: 2021-05-21
Attending: EMERGENCY MEDICINE
Payer: MEDICAID

## 2021-05-21 VITALS
OXYGEN SATURATION: 100 % | HEART RATE: 77 BPM | SYSTOLIC BLOOD PRESSURE: 108 MMHG | RESPIRATION RATE: 16 BRPM | DIASTOLIC BLOOD PRESSURE: 71 MMHG

## 2021-05-21 VITALS
SYSTOLIC BLOOD PRESSURE: 102 MMHG | RESPIRATION RATE: 16 BRPM | HEART RATE: 97 BPM | WEIGHT: 169.98 LBS | OXYGEN SATURATION: 98 % | HEIGHT: 61 IN | TEMPERATURE: 99 F | DIASTOLIC BLOOD PRESSURE: 70 MMHG

## 2021-05-21 DIAGNOSIS — F81.9 DEVELOPMENTAL DISORDER OF SCHOLASTIC SKILLS, UNSPECIFIED: ICD-10-CM

## 2021-05-21 DIAGNOSIS — Z91.018 ALLERGY TO OTHER FOODS: ICD-10-CM

## 2021-05-21 DIAGNOSIS — E11.9 TYPE 2 DIABETES MELLITUS WITHOUT COMPLICATIONS: ICD-10-CM

## 2021-05-21 DIAGNOSIS — R45.6 VIOLENT BEHAVIOR: ICD-10-CM

## 2021-05-21 DIAGNOSIS — F25.9 SCHIZOAFFECTIVE DISORDER, UNSPECIFIED: ICD-10-CM

## 2021-05-21 DIAGNOSIS — R45.1 RESTLESSNESS AND AGITATION: ICD-10-CM

## 2021-05-21 PROCEDURE — 99284 EMERGENCY DEPT VISIT MOD MDM: CPT

## 2021-05-21 RX ORDER — DIPHENHYDRAMINE HCL 50 MG
50 CAPSULE ORAL ONCE
Refills: 0 | Status: COMPLETED | OUTPATIENT
Start: 2021-05-21 | End: 2021-05-21

## 2021-05-21 RX ORDER — HALOPERIDOL DECANOATE 100 MG/ML
5 INJECTION INTRAMUSCULAR ONCE
Refills: 0 | Status: COMPLETED | OUTPATIENT
Start: 2021-05-21 | End: 2021-05-21

## 2021-05-21 RX ORDER — RISPERIDONE 4 MG/1
3 TABLET ORAL ONCE
Refills: 0 | Status: COMPLETED | OUTPATIENT
Start: 2021-05-21 | End: 2021-05-21

## 2021-05-21 RX ORDER — MIDAZOLAM HYDROCHLORIDE 1 MG/ML
2 INJECTION, SOLUTION INTRAMUSCULAR; INTRAVENOUS ONCE
Refills: 0 | Status: DISCONTINUED | OUTPATIENT
Start: 2021-05-21 | End: 2021-05-21

## 2021-05-21 RX ORDER — ALPRAZOLAM 0.25 MG
2 TABLET ORAL ONCE
Refills: 0 | Status: DISCONTINUED | OUTPATIENT
Start: 2021-05-21 | End: 2021-05-21

## 2021-05-21 RX ADMIN — RISPERIDONE 3 MILLIGRAM(S): 4 TABLET ORAL at 20:10

## 2021-05-21 RX ADMIN — Medication 2 MILLIGRAM(S): at 21:20

## 2021-05-21 RX ADMIN — MIDAZOLAM HYDROCHLORIDE 2 MILLIGRAM(S): 1 INJECTION, SOLUTION INTRAMUSCULAR; INTRAVENOUS at 21:41

## 2021-05-21 RX ADMIN — HALOPERIDOL DECANOATE 5 MILLIGRAM(S): 100 INJECTION INTRAMUSCULAR at 18:54

## 2021-05-21 RX ADMIN — Medication 50 MILLIGRAM(S): at 21:02

## 2021-05-21 RX ADMIN — Medication 2 MILLIGRAM(S): at 18:54

## 2021-05-21 NOTE — ED PROVIDER NOTE - PHYSICAL EXAMINATION
Gen: extreme agitation with yelling and thrashing   Head: NC, AT   Eyes: PERRL, EOMI, normal lids/conjunctiva  ENT: normal hearing, patent oropharynx without erythema/exudate, uvula midline  Neck: supple, no tenderness, Trachea midline  Pulm: Bilateral BS, normal resp effort, no wheeze/stridor/retractions  CV: RRR, no M/R/G, 2+ radial and dp pulses bl, no edema  Abd: soft, NT/ND, +BS, no hepatosplenomegaly  Mskel: extremities x4 with normal ROM and no joint effusions. no ctl spine ttp.   Skin: no rash, no bruising   Neuro: oriented to self, wont stop shouting to answer other questions.

## 2021-05-21 NOTE — ED ADULT NURSE NOTE - ACTIVATING EVENTS/STRESSORS
Pt AOX4, can be forgetful at times. Pt on room air, no complaints of SOB. Pt complains of L knee pain, tylenol and norco administered per orders. Pt has had no had bowel movement, miralax and colace administered per orders.  Pt has external catheter in plac None known

## 2021-05-21 NOTE — ED PROVIDER NOTE - PATIENT PORTAL LINK FT
You can access the FollowMyHealth Patient Portal offered by [facility] by registering at the following website: [email or web address]. By joining [facility]’s FollowMyHealth portal, you will also be able to view your health information using other applications (apps) compatible with our system.
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
## 2021-05-21 NOTE — ED ADULT NURSE NOTE - NSIMPLEMENTINTERV_GEN_ALL_ED
Implemented All Fall Risk Interventions:  [place] to call system. Call bell, personal items and telephone within reach. Instruct patient to call for assistance. Room bathroom lighting operational. Non-slip footwear when patient is off stretcher. Physically safe environment: no spills, clutter or unnecessary equipment. Stretcher in lowest position, wheels locked, appropriate side rails in place. Provide visual cue, wrist band, yellow gown, etc. Monitor gait and stability. Monitor for mental status changes and reorient to person, place, and time. Review medications for side effects contributing to fall risk. Reinforce activity limits and safety measures with patient and family.

## 2021-05-23 ENCOUNTER — EMERGENCY (EMERGENCY)
Facility: HOSPITAL | Age: 31
LOS: 0 days | Discharge: NURSING, REHAB OR LONG-TERM CARE | End: 2021-05-24
Attending: EMERGENCY MEDICINE
Payer: MEDICAID

## 2021-05-23 VITALS
WEIGHT: 160.06 LBS | OXYGEN SATURATION: 98 % | TEMPERATURE: 98 F | HEART RATE: 98 BPM | RESPIRATION RATE: 19 BRPM | HEIGHT: 61 IN | DIASTOLIC BLOOD PRESSURE: 73 MMHG | SYSTOLIC BLOOD PRESSURE: 107 MMHG

## 2021-05-23 DIAGNOSIS — R44.0 AUDITORY HALLUCINATIONS: ICD-10-CM

## 2021-05-23 DIAGNOSIS — Z04.6 ENCOUNTER FOR GENERAL PSYCHIATRIC EXAMINATION, REQUESTED BY AUTHORITY: ICD-10-CM

## 2021-05-23 DIAGNOSIS — F22 DELUSIONAL DISORDERS: ICD-10-CM

## 2021-05-23 DIAGNOSIS — E22.1 HYPERPROLACTINEMIA: ICD-10-CM

## 2021-05-23 DIAGNOSIS — R45.1 RESTLESSNESS AND AGITATION: ICD-10-CM

## 2021-05-23 DIAGNOSIS — F25.9 SCHIZOAFFECTIVE DISORDER, UNSPECIFIED: ICD-10-CM

## 2021-05-23 DIAGNOSIS — R62.50 UNSPECIFIED LACK OF EXPECTED NORMAL PHYSIOLOGICAL DEVELOPMENT IN CHILDHOOD: ICD-10-CM

## 2021-05-23 DIAGNOSIS — Z20.822 CONTACT WITH AND (SUSPECTED) EXPOSURE TO COVID-19: ICD-10-CM

## 2021-05-23 DIAGNOSIS — Z91.018 ALLERGY TO OTHER FOODS: ICD-10-CM

## 2021-05-23 DIAGNOSIS — F90.9 ATTENTION-DEFICIT HYPERACTIVITY DISORDER, UNSPECIFIED TYPE: ICD-10-CM

## 2021-05-23 DIAGNOSIS — F31.9 BIPOLAR DISORDER, UNSPECIFIED: ICD-10-CM

## 2021-05-23 DIAGNOSIS — E11.9 TYPE 2 DIABETES MELLITUS WITHOUT COMPLICATIONS: ICD-10-CM

## 2021-05-23 DIAGNOSIS — F63.9 IMPULSE DISORDER, UNSPECIFIED: ICD-10-CM

## 2021-05-23 LAB
ALBUMIN SERPL ELPH-MCNC: 3.3 G/DL — SIGNIFICANT CHANGE UP (ref 3.3–5)
ALP SERPL-CCNC: 48 U/L — SIGNIFICANT CHANGE UP (ref 40–120)
ALT FLD-CCNC: 22 U/L — SIGNIFICANT CHANGE UP (ref 12–78)
ANION GAP SERPL CALC-SCNC: 7 MMOL/L — SIGNIFICANT CHANGE UP (ref 5–17)
AST SERPL-CCNC: 29 U/L — SIGNIFICANT CHANGE UP (ref 15–37)
BASOPHILS # BLD AUTO: 0.02 K/UL — SIGNIFICANT CHANGE UP (ref 0–0.2)
BASOPHILS NFR BLD AUTO: 0.5 % — SIGNIFICANT CHANGE UP (ref 0–2)
BILIRUB SERPL-MCNC: 0.4 MG/DL — SIGNIFICANT CHANGE UP (ref 0.2–1.2)
BUN SERPL-MCNC: 13 MG/DL — SIGNIFICANT CHANGE UP (ref 7–23)
CALCIUM SERPL-MCNC: 8.6 MG/DL — SIGNIFICANT CHANGE UP (ref 8.5–10.1)
CHLORIDE SERPL-SCNC: 103 MMOL/L — SIGNIFICANT CHANGE UP (ref 96–108)
CO2 SERPL-SCNC: 27 MMOL/L — SIGNIFICANT CHANGE UP (ref 22–31)
CREAT SERPL-MCNC: 0.64 MG/DL — SIGNIFICANT CHANGE UP (ref 0.5–1.3)
EOSINOPHIL # BLD AUTO: 0.03 K/UL — SIGNIFICANT CHANGE UP (ref 0–0.5)
EOSINOPHIL NFR BLD AUTO: 0.7 % — SIGNIFICANT CHANGE UP (ref 0–6)
ETHANOL SERPL-MCNC: <10 MG/DL — SIGNIFICANT CHANGE UP (ref 0–10)
GLUCOSE SERPL-MCNC: 76 MG/DL — SIGNIFICANT CHANGE UP (ref 70–99)
HCG SERPL-ACNC: <1 MIU/ML — SIGNIFICANT CHANGE UP
HCT VFR BLD CALC: 31.3 % — LOW (ref 34.5–45)
HGB BLD-MCNC: 10.4 G/DL — LOW (ref 11.5–15.5)
IMM GRANULOCYTES NFR BLD AUTO: 0.2 % — SIGNIFICANT CHANGE UP (ref 0–1.5)
LYMPHOCYTES # BLD AUTO: 1.65 K/UL — SIGNIFICANT CHANGE UP (ref 1–3.3)
LYMPHOCYTES # BLD AUTO: 37.8 % — SIGNIFICANT CHANGE UP (ref 13–44)
MCHC RBC-ENTMCNC: 30.2 PG — SIGNIFICANT CHANGE UP (ref 27–34)
MCHC RBC-ENTMCNC: 33.2 GM/DL — SIGNIFICANT CHANGE UP (ref 32–36)
MCV RBC AUTO: 91 FL — SIGNIFICANT CHANGE UP (ref 80–100)
MONOCYTES # BLD AUTO: 0.49 K/UL — SIGNIFICANT CHANGE UP (ref 0–0.9)
MONOCYTES NFR BLD AUTO: 11.2 % — SIGNIFICANT CHANGE UP (ref 2–14)
NEUTROPHILS # BLD AUTO: 2.16 K/UL — SIGNIFICANT CHANGE UP (ref 1.8–7.4)
NEUTROPHILS NFR BLD AUTO: 49.6 % — SIGNIFICANT CHANGE UP (ref 43–77)
NRBC # BLD: 0 /100 WBCS — SIGNIFICANT CHANGE UP (ref 0–0)
PLATELET # BLD AUTO: 258 K/UL — SIGNIFICANT CHANGE UP (ref 150–400)
POTASSIUM SERPL-MCNC: 3.9 MMOL/L — SIGNIFICANT CHANGE UP (ref 3.5–5.3)
POTASSIUM SERPL-SCNC: 3.9 MMOL/L — SIGNIFICANT CHANGE UP (ref 3.5–5.3)
PROT SERPL-MCNC: 6.9 GM/DL — SIGNIFICANT CHANGE UP (ref 6–8.3)
RBC # BLD: 3.44 M/UL — LOW (ref 3.8–5.2)
RBC # FLD: 13.6 % — SIGNIFICANT CHANGE UP (ref 10.3–14.5)
SODIUM SERPL-SCNC: 137 MMOL/L — SIGNIFICANT CHANGE UP (ref 135–145)
TSH SERPL-MCNC: 1.27 UIU/ML — SIGNIFICANT CHANGE UP (ref 0.36–3.74)
WBC # BLD: 4.36 K/UL — SIGNIFICANT CHANGE UP (ref 3.8–10.5)
WBC # FLD AUTO: 4.36 K/UL — SIGNIFICANT CHANGE UP (ref 3.8–10.5)

## 2021-05-23 PROCEDURE — 99285 EMERGENCY DEPT VISIT HI MDM: CPT

## 2021-05-23 PROCEDURE — 93010 ELECTROCARDIOGRAM REPORT: CPT

## 2021-05-23 RX ORDER — HALOPERIDOL DECANOATE 100 MG/ML
5 INJECTION INTRAMUSCULAR ONCE
Refills: 0 | Status: COMPLETED | OUTPATIENT
Start: 2021-05-23 | End: 2021-05-23

## 2021-05-23 RX ORDER — MIDAZOLAM HYDROCHLORIDE 1 MG/ML
4 INJECTION, SOLUTION INTRAMUSCULAR; INTRAVENOUS ONCE
Refills: 0 | Status: DISCONTINUED | OUTPATIENT
Start: 2021-05-23 | End: 2021-05-23

## 2021-05-23 RX ORDER — MIDAZOLAM HYDROCHLORIDE 1 MG/ML
4 INJECTION, SOLUTION INTRAMUSCULAR; INTRAVENOUS ONCE
Refills: 0 | Status: DISCONTINUED | OUTPATIENT
Start: 2021-05-23 | End: 2021-05-24

## 2021-05-23 RX ADMIN — MIDAZOLAM HYDROCHLORIDE 4 MILLIGRAM(S): 1 INJECTION, SOLUTION INTRAMUSCULAR; INTRAVENOUS at 22:18

## 2021-05-23 RX ADMIN — HALOPERIDOL DECANOATE 5 MILLIGRAM(S): 100 INJECTION INTRAMUSCULAR at 22:18

## 2021-05-23 NOTE — ED PROVIDER NOTE - PATIENT PORTAL LINK FT
You can access the FollowMyHealth Patient Portal offered by [facility] by registering at the following website: [email or web address]. By joining [employer]’s FollowMyHealth portal, you will also be able to view your health information using other applications (apps) compatible with our system.

## 2021-05-23 NOTE — ED ADULT NURSE NOTE - NSIMPLEMENTINTERV_GEN_ALL_ED
Implemented All Fall Risk Interventions:  [place] to call system. Call bell, personal items and telephone within reach. Instruct patient to call for assistance. Room bathroom lighting operational. Non-slip footwear when patient is off stretcher. Physically safe environment: no spills, clutter or unnecessary equipment. Stretcher in lowest position, wheels locked, appropriate side rails in place. Provide visual cue, wrist band, yellow gown, etc. Monitor gait and stability. Monitor for mental status changes and reorient to person, place, and time. Review medications for side effects contributing to fall risk. Reinforce activity limits and safety measures with patient and family.

## 2021-05-23 NOTE — ED PROVIDER NOTE - PROGRESS NOTE DETAILS
pt. improved after multiple rounds of sedation, pending psych reeval after meds for potential d/c back to living facility as she is well-known to psychiatry Cleared for discharge.  will give morning meds before DC and arrange transport.

## 2021-05-23 NOTE — ED PROVIDER NOTE - CLINICAL SUMMARY MEDICAL DECISION MAKING FREE TEXT BOX
31 yo F with agitation, aggressive behavior, possible domenico/delusional  -basic labs, etoh, hcg, drug screen, covid, rvp, tsh, ua, cx, ekg, iv, sedation (limit setting and redirection attempted with no success), 1:1 obs, monitor  -sober reeval and telepsych consult
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
## 2021-05-23 NOTE — ED PROVIDER NOTE - CARE PLAN
Principal Discharge DX:	Agitation   Principal Discharge DX:	Agitation  Secondary Diagnosis:	Delusional ideas

## 2021-05-23 NOTE — ED PROVIDER NOTE - PHYSICAL EXAMINATION
Vitals: WNL  Gen: AAOx3, yelling out loud, creating a scene in ER, interfering with patient care, uncooperative with questions, ambulating about ER, speaking on phone   Head: ncat, perrla, eomi b/l  Neck: supple, no lymphadenopathy, no midline deviation  Heart: rrr, no m/r/g  Lungs: CTA b/l, no rales/ronchi/wheezes  Abd: soft, nontender, non-distended, no rebound or guarding  Ext: no clubbing/cyanosis/edema  Neuro: sensation and muscle strength intact b/l, steady gait Vitals: WNL  Gen: AAOx3, yelling out loud, creating a scene in ER, interfering with patient care, uncooperative with questions, ambulating about ER, yelling on phone  Head: ncat, perrla, eomi b/l  Neck: supple, no lymphadenopathy, no midline deviation  Heart: rrr, no m/r/g  Lungs: CTA b/l, no rales/ronchi/wheezes  Abd: soft, nontender, non-distended, no rebound or guarding  Ext: no clubbing/cyanosis/edema  Neuro: sensation and muscle strength intact b/l, steady gait

## 2021-05-24 ENCOUNTER — EMERGENCY (EMERGENCY)
Facility: HOSPITAL | Age: 31
LOS: 1 days | Discharge: HOME | End: 2021-05-24
Admitting: EMERGENCY MEDICINE
Payer: MEDICAID

## 2021-05-24 VITALS — DIASTOLIC BLOOD PRESSURE: 76 MMHG | HEART RATE: 76 BPM | OXYGEN SATURATION: 100 % | SYSTOLIC BLOOD PRESSURE: 118 MMHG

## 2021-05-24 VITALS
HEIGHT: 61 IN | TEMPERATURE: 98 F | DIASTOLIC BLOOD PRESSURE: 53 MMHG | OXYGEN SATURATION: 96 % | SYSTOLIC BLOOD PRESSURE: 105 MMHG | RESPIRATION RATE: 17 BRPM | HEART RATE: 84 BPM

## 2021-05-24 VITALS
RESPIRATION RATE: 16 BRPM | OXYGEN SATURATION: 100 % | SYSTOLIC BLOOD PRESSURE: 105 MMHG | TEMPERATURE: 98 F | HEART RATE: 82 BPM | DIASTOLIC BLOOD PRESSURE: 66 MMHG

## 2021-05-24 DIAGNOSIS — F25.9 SCHIZOAFFECTIVE DISORDER, UNSPECIFIED: ICD-10-CM

## 2021-05-24 DIAGNOSIS — F79 UNSPECIFIED INTELLECTUAL DISABILITIES: ICD-10-CM

## 2021-05-24 LAB
COVID-19 SPIKE DOMAIN AB INTERP: NEGATIVE — SIGNIFICANT CHANGE UP
COVID-19 SPIKE DOMAIN ANTIBODY RESULT: 0.4 U/ML — SIGNIFICANT CHANGE UP
RAPID RVP RESULT: SIGNIFICANT CHANGE UP
SARS-COV-2 IGG+IGM SERPL QL IA: 0.4 U/ML — SIGNIFICANT CHANGE UP
SARS-COV-2 IGG+IGM SERPL QL IA: NEGATIVE — SIGNIFICANT CHANGE UP
SARS-COV-2 RNA SPEC QL NAA+PROBE: SIGNIFICANT CHANGE UP

## 2021-05-24 PROCEDURE — 99284 EMERGENCY DEPT VISIT MOD MDM: CPT

## 2021-05-24 RX ORDER — CHLORPROMAZINE HCL 10 MG
100 TABLET ORAL ONCE
Refills: 0 | Status: COMPLETED | OUTPATIENT
Start: 2021-05-24 | End: 2021-05-24

## 2021-05-24 RX ORDER — VALPROIC ACID (AS SODIUM SALT) 250 MG/5ML
1000 SOLUTION, ORAL ORAL ONCE
Refills: 0 | Status: COMPLETED | OUTPATIENT
Start: 2021-05-24 | End: 2021-05-24

## 2021-05-24 RX ORDER — HALOPERIDOL DECANOATE 100 MG/ML
5 INJECTION INTRAMUSCULAR ONCE
Refills: 0 | Status: COMPLETED | OUTPATIENT
Start: 2021-05-24 | End: 2021-05-24

## 2021-05-24 RX ORDER — CHLORPROMAZINE HCL 10 MG
50 TABLET ORAL EVERY 6 HOURS
Refills: 0 | Status: DISCONTINUED | OUTPATIENT
Start: 2021-05-24 | End: 2021-05-24

## 2021-05-24 RX ORDER — CHLORPROMAZINE HCL 10 MG
50 TABLET ORAL ONCE
Refills: 0 | Status: COMPLETED | OUTPATIENT
Start: 2021-05-24 | End: 2021-05-24

## 2021-05-24 RX ORDER — BENZTROPINE MESYLATE 1 MG
1 TABLET ORAL ONCE
Refills: 0 | Status: COMPLETED | OUTPATIENT
Start: 2021-05-24 | End: 2021-05-24

## 2021-05-24 RX ADMIN — Medication 1000 MILLIGRAM(S): at 11:38

## 2021-05-24 RX ADMIN — Medication 50 MILLIGRAM(S): at 15:34

## 2021-05-24 RX ADMIN — Medication 2 MILLIGRAM(S): at 14:51

## 2021-05-24 RX ADMIN — HALOPERIDOL DECANOATE 5 MILLIGRAM(S): 100 INJECTION INTRAMUSCULAR at 00:04

## 2021-05-24 RX ADMIN — Medication 50 MILLIGRAM(S): at 10:06

## 2021-05-24 RX ADMIN — Medication 50 MILLIGRAM(S): at 14:51

## 2021-05-24 RX ADMIN — HALOPERIDOL DECANOATE 5 MILLIGRAM(S): 100 INJECTION INTRAMUSCULAR at 11:38

## 2021-05-24 RX ADMIN — Medication 1 MILLIGRAM(S): at 11:38

## 2021-05-24 RX ADMIN — Medication 100 MILLIGRAM(S): at 03:45

## 2021-05-24 RX ADMIN — Medication 100 MILLIGRAM(S): at 00:57

## 2021-05-24 RX ADMIN — HALOPERIDOL DECANOATE 5 MILLIGRAM(S): 100 INJECTION INTRAMUSCULAR at 16:05

## 2021-05-24 NOTE — ED BEHAVIORAL HEALTH NOTE - BEHAVIORAL HEALTH NOTE
29yo domiciled in [facility], unemployed, single AA F with hx of ID, unspecified schizoaffective d/o with hx of aggression, impulse control who presented from her group home for another episode of agitation/aggression. Patient was held for re-assessment, awaiting collateral.        Pt was seen and evaluated via telemonitor. Patient reported that she "was fine". She reported that she wanted to "go back to group home." She denied SI/HI/AH/VH.      Collateral from nursing staff: beyond initial agitation upon arrival, no further beh issues      Collateral from group home:    May -  [phone]: reported that pt had a good day yesterday but in the late afternoon she started to "get upset about people not loving her" which led to the episode of agitation. May confirmed baseline issues w/ similar episodes of making accusations of people being inappropriate with her, of not loving or caring for her. She reported that pt was able to return to group home and has an outpatient appt set up at [facility] on 5/28/21.      Conor - nurse from [facility] [phone]: confirmed upcoming appt, verified med list inc VPA 1000mg qdaily, haldol 5mg bid, cogentin 1mg bid, bromocriptine 5mg bid, prozac 40mg qdaily, metformin 1000mg bid    MSE: appears stated age, disheveled, in hospital gown. No psychomotor agitation or retardation. Speech: nl rate, rhythm and vol. Mood: irritable Affect: irritable TP: concrete TC: no delusions; focused on returning to group home, no SI/HI. Memory and cog: grossly intact. Insight and judgment: chronically impaired Impulse control: chronically impaired      Diagnosis: ID, impulse control; by hx: schizoaffective d/o      A+P:  29yo F w/ hx of ID, impulse control, purported schizoaffective d/o w/ no prior known SA/SIB, hx of aggression and agitation w/ chronic baseline paranoid delusions who presented after another episode of agitation. Patient is noted to be euthymic if irritable w/o acute domenico or psychosis. Patient's group home  gave a collateral detailing relatively stable beh until pt began to feel unloved or uncared for by other housemates but confirms this is part of pt's baseline beh issues and details no acute psych safety concerns; she reports pt is comfortable to have pt return home. Patient's presentation appears to be part of the ongoing chronic beh pattern rather that of psychiatric decompensation. She will always be a chronic risk of aggression given her reported cognitive/intellectual deficits and poor impulse control. She will benefit from continued outpatient follow up with [facility].      1) Psych cleared for d/c  -Need to set up transport w/ asst director May [phone]  2) Resume home meds: VPA 1000mg po qdaily, haldol 5mg bid, cogentin 1mg bid  3) Keep on 1:1 while in ED given impulse control issues  4) Outpatient f/u with [facility] on 5/28/21 - will be facilitated by the group home   5) ED provider made aware of plan and recs

## 2021-05-24 NOTE — ED BEHAVIORAL HEALTH NOTE - BEHAVIORAL HEALTH NOTE
Per provider, TEDDY Beltran, patient is cleared and is able to return to their previous residence, [facility].  has spoken to  Lisandra, [phone] to inform of discharge.  confirmed that patients mode of transportation is AMBULANCE and that patient travels INDEPENDENTLY. Clinical provider is in agreement with AMBULANCE back to longterm. Verbal huddle regarding coordination of care completed with interdisciplinary team.  Address confirmed as listed in [place]. RN to arrange ambulance transportation.
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
## 2021-05-24 NOTE — ED PROVIDER NOTE - PATIENT PORTAL LINK FT
You can access the FollowMyHealth Patient Portal offered by [facility] by registering at the following website: [email or web address]. By joining [employer]’s FollowMyHealth portal, you will also be able to view your health information using other applications (apps) compatible with our system.
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
## 2021-05-24 NOTE — ED BEHAVIORAL HEALTH ASSESSMENT NOTE - DESCRIPTION
DM, hyperprolactinemia see BTCM note AA female, single, resides at [facility] home, unemployed, non-caregiver
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
## 2021-05-24 NOTE — ED BEHAVIORAL HEALTH ASSESSMENT NOTE - AXIS III
Subjective:       Patient ID: Katya Robbins is a 61 y.o. female.    Chief Complaint: Fever (only thursdays for 3 weeks)    Ms. Robbins is a 61 year old female who presents today with concerns of fever. Patient of Dr. Hendrickson, would like to establish care with new PCP at this location. Over the last three weeks, she has had a low grade temp off  on thursdays. Only occurs on Thursday. No other symptoms. Does have seasonal allergies. Hx of chronic fatigue and malaise. Reports generalized body aches that are constantly present. Vitals stable today, no fever present.     Fever    This is a recurrent problem. The current episode started 1 to 4 weeks ago. The problem occurs intermittently. The problem has been waxing and waning. The maximum temperature noted was 99 to 99.9 F. The temperature was taken using an oral thermometer. Associated symptoms include headaches, muscle aches and sleepiness. Pertinent negatives include no abdominal pain, chest pain, congestion, coughing, diarrhea, ear pain, nausea, rash, sore throat, urinary pain, vomiting or wheezing. She has tried NSAIDs, acetaminophen and fluids for the symptoms. The treatment provided mild relief.     Patient Active Problem List   Diagnosis    Menopause ovarian failure    Depression    Voice disturbance    Neck pain    Skin moles    Tobacco use    Keratosis, seborrheic    Weight loss    FHx: colon cancer    Polyp of colon    Knee pain    Itching    Sun-damaged skin    Acute medial meniscal tear    Distal radius fracture    Fracture of left wrist    Wrist fracture    Chest pain     Review of Systems   Constitutional: Positive for fatigue and fever (temp around 99).   HENT: Positive for rhinorrhea. Negative for congestion, ear pain, sinus pressure, sinus pain and sore throat.    Respiratory: Negative for cough and wheezing.    Cardiovascular: Negative for chest pain.   Gastrointestinal: Negative for abdominal pain, diarrhea, nausea and  vomiting.   Genitourinary: Negative for dysuria, frequency and urgency.   Musculoskeletal: Positive for arthralgias, myalgias (chronic, worse with fever) and neck pain.   Skin: Negative for rash.   Neurological: Positive for headaches. Negative for dizziness, weakness, light-headedness and numbness.       Objective:      Physical Exam   Constitutional: She is oriented to person, place, and time. She appears well-developed and well-nourished.   Cardiovascular: Normal rate, regular rhythm and normal heart sounds.   Pulmonary/Chest: Effort normal and breath sounds normal. She has no wheezes.   Musculoskeletal: She exhibits no edema.   Neurological: She is alert and oriented to person, place, and time.   Skin: Skin is warm and dry.   Psychiatric: She has a normal mood and affect.   Nursing note and vitals reviewed.      Assessment:       1. Chronic fatigue and malaise    2. Fever, unspecified fever cause    3. Seasonal allergic rhinitis, unspecified trigger    4. History of migraine headaches        Plan:       Katya was seen today for fever.    Diagnoses and all orders for this visit:    Chronic fatigue and malaise  -     CBC auto differential; Future  -     Comprehensive metabolic panel; Future  -     Sedimentation rate; Future  -     C-reactive protein; Future  -     Rheumatoid factor; Future  -     GAVIOTA Screen w/Reflex; Future  Screen and treat as needed    Fever, unspecified fever cause  -     POCT URINE DIPSTICK WITHOUT MICROSCOPE  UA not concerning for UTI  Temp never over 100, monitor for S/S such as sinus pain/pressure, cough, wheezing, urinary symptoms. Notify clinic should this occur.     Seasonal allergic rhinitis, unspecified trigger  -     loratadine (CLARITIN) 10 mg tablet; Take 1 tablet (10 mg total) by mouth once daily.  -     fluticasone propionate (FLONASE) 50 mcg/actuation nasal spray; 1 spray (50 mcg total) by Each Nare route once daily.  Recommend otc non-sedating antihistamine such as Loratadine  and/or steroid nasal spray such as Flonase as directed and as needed.  Please return to clinic if symptoms persist after these interventions.    History of migraine headaches  Uses Imitrex PRN. Prescribed by Dr. Andre who also prescribes HRT.    If headaches occur more frequently, consider prophylactic medication. Discussed risk of use of hormones with migraine headaches        DM, hyperprolactinemia
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
## 2021-05-24 NOTE — ED ADULT NURSE REASSESSMENT NOTE - NS ED NURSE REASSESS COMMENT FT1
pt sleeping in stretcher, no signs of acute distress, 1:1 in place, plan is to be reassessed by tele psych in AM.

## 2021-05-24 NOTE — ED BEHAVIORAL HEALTH ASSESSMENT NOTE - SUMMARY
This is a 30-year-old, AA female, single, resides at [facility], unemployed, non-caregiver, with a history of ID and schizoaffective disorder, developmental delays, numerous ER visits, no prior inpatient psychiatric hospitalizations, no known prior suicide attempts or history of non-suicidal self-injury, with a reported history of physical aggression, no known active substance abuse, with a past medical history significant for DM and hyperprolactinemia, who was brought in by EMS, referred by staff at her group home for disorganized behavior.    Unable to assess patient as she is disorganized, agitated, paranoid staff in ER are raping her.  On 5/23/21 she received Haldol 5mg IM and midazolam 4mg IM once.  Recommend not to give benzos or anticholinergics as they can cause paradoxical disinhibition in patients with developmental delays, intellectual disability.  Will hold patient for reassessment.       Plan:  1.  Hold and reassess  2.  Medication: Thorazine 50mg PO/IM q6 hours prn for agitation.  Avoid benzo's and anticholinergics as they can cause disinhibition in patients with intellectual disabilities ; continue home meds  3.  Observation 1:1 close observation   4.  Follow up labs, Depakote level, and EKG

## 2021-05-26 ENCOUNTER — EMERGENCY (EMERGENCY)
Facility: HOSPITAL | Age: 31
LOS: 1 days | Discharge: HOME | End: 2021-05-26
Attending: STUDENT IN AN ORGANIZED HEALTH CARE EDUCATION/TRAINING PROGRAM | Admitting: STUDENT IN AN ORGANIZED HEALTH CARE EDUCATION/TRAINING PROGRAM
Payer: MEDICAID

## 2021-05-26 VITALS
RESPIRATION RATE: 16 BRPM | OXYGEN SATURATION: 100 % | DIASTOLIC BLOOD PRESSURE: 57 MMHG | HEART RATE: 95 BPM | SYSTOLIC BLOOD PRESSURE: 96 MMHG | TEMPERATURE: 98 F

## 2021-05-26 VITALS
HEIGHT: 61 IN | HEART RATE: 96 BPM | SYSTOLIC BLOOD PRESSURE: 92 MMHG | DIASTOLIC BLOOD PRESSURE: 62 MMHG | TEMPERATURE: 98 F | OXYGEN SATURATION: 100 % | RESPIRATION RATE: 18 BRPM

## 2021-05-26 PROCEDURE — 99284 EMERGENCY DEPT VISIT MOD MDM: CPT

## 2021-05-26 RX ORDER — CHLORPROMAZINE HCL 10 MG
50 TABLET ORAL ONCE
Refills: 0 | Status: COMPLETED | OUTPATIENT
Start: 2021-05-26 | End: 2021-05-26

## 2021-05-26 RX ADMIN — Medication 2 MILLIGRAM(S): at 10:41

## 2021-05-26 RX ADMIN — Medication 50 MILLIGRAM(S): at 10:41

## 2021-05-26 NOTE — ED ADULT NURSE REASSESSMENT NOTE - NS ED NURSE REASSESS COMMENT FT1
Break covering RN: Pt a&ox4 and ambulatory. Pt to return back to group home with ems. DC paperwork provided and explained. Pt aware of plan of care. report given to ems. VS as noted, pt belonging returned to pt upon dc.

## 2021-05-26 NOTE — ED PROVIDER NOTE - CLINICAL SUMMARY MEDICAL DECISION MAKING FREE TEXT BOX
31 y/o F w/ intellectual disability and schizoaffective disorder w/ agitation at group home and in ED. Pt had numerous prior ED visits at both [facility] and [facility] for similar episode. Will medicate for agitated delireum, contact  for collateral information and reassess. 29 y/o F w/ intellectual disability and schizoaffective disorder w/ agitation at group home and in ED. Pt had numerous prior ED visits at both [facility] and [facility] for similar episode. Will medicate for agitated delireum, contact  for collateral information and reassess.  NP Bereczky- upon reassessment pt resting comfortable, collateral info obtained by sw, refer to her note. There is no clinical evidence of intoxication, or any acute medical problem requiring immediate intervention.  Transport arranged by RN.

## 2021-05-26 NOTE — ED PROVIDER NOTE - PATIENT PORTAL LINK FT
You can access the FollowMyHealth Patient Portal offered by [facility] by registering at the following website: [email or web address]. By joining "[employer]’s FollowMyHealth portal, you will also be able to view your health information using other applications (apps) compatible with our system.

## 2021-05-26 NOTE — ED ADULT NURSE NOTE - NSIMPLEMENTINTERV_GEN_ALL_ED
Implemented All Universal Safety Interventions:  Lillie to call system. Call bell, personal items and telephone within reach. Instruct patient to call for assistance. Room bathroom lighting operational. Non-slip footwear when patient is off stretcher. Physically safe environment: no spills, clutter or unnecessary equipment. Stretcher in lowest position, wheels locked, appropriate side rails in place.

## 2021-05-26 NOTE — ED PROVIDER NOTE - SKIN, MLM
Skin normal color for race, warm, dry and intact. No evidence of rash. No ecchymosis or lacerations.
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
## 2021-05-26 NOTE — ED PROVIDER NOTE - OBJECTIVE STATEMENT
29 y/o F w/ PMH intellectual disability and schizoaffective disorder presents from group home w/ agitation this morning. In ambulance bay, pt is shouting, aggressive and not redirectable w/ verbal cures. Attempted deescalation w/ food and television, however pt continues to be agitated. Pt not cooperative w/ rest of history at that time.

## 2021-05-26 NOTE — ED ADULT NURSE NOTE - OBJECTIVE STATEMENT
pt bib ems from [facility]. pmh MR, devin. pt was being aggressive at [facility], and staff called. pt agitated, yelling on arrival. medicated with IM. also was uncooperative

## 2021-05-29 ENCOUNTER — EMERGENCY (EMERGENCY)
Facility: HOSPITAL | Age: 31
LOS: 1 days | Discharge: HOME | End: 2021-05-29
Admitting: EMERGENCY MEDICINE
Payer: MEDICAID

## 2021-05-29 VITALS
HEART RATE: 94 BPM | OXYGEN SATURATION: 100 % | HEIGHT: 61 IN | TEMPERATURE: 97 F | DIASTOLIC BLOOD PRESSURE: 70 MMHG | SYSTOLIC BLOOD PRESSURE: 115 MMHG | RESPIRATION RATE: 16 BRPM

## 2021-05-29 VITALS
DIASTOLIC BLOOD PRESSURE: 59 MMHG | HEART RATE: 100 BPM | RESPIRATION RATE: 18 BRPM | TEMPERATURE: 98 F | SYSTOLIC BLOOD PRESSURE: 123 MMHG | OXYGEN SATURATION: 100 %

## 2021-05-29 PROCEDURE — 99284 EMERGENCY DEPT VISIT MOD MDM: CPT

## 2021-05-29 RX ORDER — CHLORPROMAZINE HCL 10 MG
50 TABLET ORAL ONCE
Refills: 0 | Status: COMPLETED | OUTPATIENT
Start: 2021-05-29 | End: 2021-05-29

## 2021-05-29 RX ORDER — DIPHENHYDRAMINE HCL 50 MG
50 CAPSULE ORAL ONCE
Refills: 0 | Status: COMPLETED | OUTPATIENT
Start: 2021-05-29 | End: 2021-05-29

## 2021-05-29 RX ADMIN — Medication 50 MILLIGRAM(S): at 16:45

## 2021-05-29 RX ADMIN — Medication 1 MILLIGRAM(S): at 21:19

## 2021-05-29 RX ADMIN — Medication 50 MILLIGRAM(S): at 21:19

## 2021-05-29 NOTE — ED ADULT NURSE NOTE - CHIEF COMPLAINT QUOTE
Pt brought in by EMS and [employer] from [facility] for agitation, throwing stuff. Screaming "I hope to die. I want to kill my baby." Pt not answering any questions. Pt to be evaluated in  per TEDDY Mcdonald. PMHx schizoaffective disorder, diabetes, intellectual delay, bipolar disorder.

## 2021-05-29 NOTE — ED PROVIDER NOTE - PATIENT PORTAL LINK FT
You can access the FollowMyHealth Patient Portal offered by [facility] by registering at the following website: [email or web address]. By joining [employer]’s FollowMyHealth portal, you will also be able to view your health information using other applications (apps) compatible with our system.

## 2021-05-29 NOTE — ED ADULT NURSE REASSESSMENT NOTE - NS ED NURSE REASSESS COMMENT FT1
Pt increasingly agitated, screaming and crying and making delusional statements. Pt reevaluated by NP and medicated as ordered.

## 2021-05-29 NOTE — ED PROVIDER NOTE - OBJECTIVE STATEMENT
29 y/o F hx Bipolar D/O , MR, Impulse Control D/O BIBA  secondary to acting out behaviors at her group home. Admits that she is upset, because she  cannot see her baby.  Denies SI/HI/AH/VH. Denies falling,  punching or kicking any objects.  Denies pain, SOB, fever, chills, chest/abdominal discomfort. Denies recent  use of alcohol or  illicit drugs. No evidence of physical injuries, broken  skin or deformities.

## 2021-05-29 NOTE — ED PROVIDER NOTE - CLINICAL SUMMARY MEDICAL DECISION MAKING FREE TEXT BOX
Medical evaluation performed. There is no clinical evidence of intoxication or any acute medical problem requiring immediate intervention. 31 y/o F hx Bipolar D/O , MR, Impulse Control D/O   Medication ordered.  Medical evaluation performed. There is no clinical evidence of intoxication or any acute medical problem requiring immediate intervention. SW consulted. D/C to group home via EMS.

## 2021-05-29 NOTE — ED ADULT TRIAGE NOTE - CHIEF COMPLAINT QUOTE
Pt brought in by EMS and [employer] from [facility] for agitation, throwing stuff. Screaming "I hope to die. I want to kill my baby." Pt not answering any questions. Pt to be evaluated in  per TEDDY Mcdonald. PMHx schizoaffective disorder, diabetes, intellectual delay, bipolar disorder.

## 2021-05-29 NOTE — ED BEHAVIORAL HEALTH NOTE - BEHAVIORAL HEALTH NOTE
Per provider, NAME OF PROVIDER, patient is cleared and is able to return to their current residence, [facility] /[phone].   has spoken to Mr. Perdomo residential nurse at [facility] ,  confirmed that patients mode of transportation is AMBULANCE and that patient travels INDEPENDENTLY in ambulance. Clinical provider is in agreement with AMBULANCE back to [facility]. Verbal huddle regarding coordination of care completed with interdisciplinary team. Transportation coordinated via STANDARD TRANSPORTATION DOCUMENTATION.    [place] address: [address] [place] [address] phone number:  [phone]

## 2021-05-29 NOTE — ED PROVIDER NOTE - CARE PLAN
Principal Discharge DX:	Intellectual disability   Principal Discharge DX:	Impulse control disorder  Secondary Diagnosis:	Intellectual disability  Secondary Diagnosis:	Schizoaffective disorder  Secondary Diagnosis:	Development delay

## 2021-05-29 NOTE — ED ADULT NURSE NOTE - OBJECTIVE STATEMENT
Pt unable to answer any meaningful questions coherently.  Pt states that writer was "making fun of my mother.  She's 400 pounds."  Pt was sobbing and could not describe why EMS was called to the [facility]. Pt has a hx of intellectual disability, schizoaffective disorder.

## 2021-05-31 ENCOUNTER — INPATIENT (INPATIENT)
Facility: HOSPITAL | Age: 31
LOS: 13 days | Discharge: ANOTHER HOSPITAL | End: 2021-06-14
Attending: PSYCHIATRY & NEUROLOGY | Admitting: PSYCHIATRY & NEUROLOGY
Payer: MEDICAID

## 2021-05-31 VITALS — HEIGHT: 61 IN

## 2021-05-31 DIAGNOSIS — F25.9 SCHIZOAFFECTIVE DISORDER, UNSPECIFIED: ICD-10-CM

## 2021-05-31 DIAGNOSIS — F79 UNSPECIFIED INTELLECTUAL DISABILITIES: ICD-10-CM

## 2021-05-31 PROCEDURE — 99285 EMERGENCY DEPT VISIT HI MDM: CPT

## 2021-05-31 RX ORDER — CHLORPROMAZINE HCL 10 MG
100 TABLET ORAL ONCE
Refills: 0 | Status: COMPLETED | OUTPATIENT
Start: 2021-05-31 | End: 2021-05-31

## 2021-05-31 RX ORDER — BENZTROPINE MESYLATE 1 MG
1 TABLET ORAL ONCE
Refills: 0 | Status: COMPLETED | OUTPATIENT
Start: 2021-05-31 | End: 2021-05-31

## 2021-05-31 RX ORDER — FLUOXETINE HCL 10 MG
40 CAPSULE ORAL ONCE
Refills: 0 | Status: COMPLETED | OUTPATIENT
Start: 2021-05-31 | End: 2021-05-31

## 2021-05-31 RX ORDER — DIVALPROEX SODIUM 500 MG/1
1000 TABLET, DELAYED RELEASE ORAL ONCE
Refills: 0 | Status: COMPLETED | OUTPATIENT
Start: 2021-05-31 | End: 2021-05-31

## 2021-05-31 RX ORDER — HALOPERIDOL DECANOATE 100 MG/ML
10 INJECTION INTRAMUSCULAR ONCE
Refills: 0 | Status: COMPLETED | OUTPATIENT
Start: 2021-05-31 | End: 2021-05-31

## 2021-05-31 RX ADMIN — Medication 100 MILLIGRAM(S): at 13:32

## 2021-05-31 RX ADMIN — Medication 2 MILLIGRAM(S): at 13:32
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
## 2021-05-31 NOTE — ED ADULT TRIAGE NOTE - CHIEF COMPLAINT QUOTE
Pt brought in by EMS and [facility] for aggressive behavior. As per EMS pt is noncompliant with medications. Pt arrives in handcuffs. Pt noncooperative in triage.

## 2021-05-31 NOTE — ED BEHAVIORAL HEALTH ASSESSMENT NOTE - RISK ASSESSMENT
Chronic risk factors include history of aggression, history of multiple hospital visits, psychotic disorder, lack of dependents, lack of relationship stability, medical problems. Acute risk factors include insomnia, acute psychosis, mood symptoms. Protective factors include residential stability, sobriety, lack of SI, lack of NSSIB, lack of HI, female sex. Low Acute Suicide Risk

## 2021-05-31 NOTE — ED BEHAVIORAL HEALTH ASSESSMENT NOTE - NSTXRELFACTOR_PSY_ALL_CORE
Out of season
Non-compliant or not receiving treatment/Change in provider or treatment (i.e., medications, psychotherapy, milieu)

## 2021-05-31 NOTE — ED BEHAVIORAL HEALTH NOTE - BEHAVIORAL HEALTH NOTE
Writer spoke with May () who returned call at phone #[phone]. May provided with current update. May adds that pt is becoming more paranoid feeling like people are going to get her and kill her. Pt also mentioned hearing gun shots and people coming to get everyone in the group home. Pt said to have refused medications at group home today and yesterday. May also states that pt believes she is pregnant with no knowledge of actual pregnancy. Pt will say one day that baby is inside her or that baby is already born in her room with there again being no knowledge of any pregnancy. Pt said to be speaking about "baby Paola" which is actually said to be pt's baby sister confirmed to be safe with pt's mother. May informed that follow up would be provided to her by staff and to please keep phone available as team has had past difficulties reaching group home staff.

## 2021-05-31 NOTE — ED PROVIDER NOTE - OBJECTIVE STATEMENT
29 y/o F w/ multiple visits recently for behavior out of control, cognitively delayed and schizophrenic, comes to ED because she is refusing meds in her place of residence and has been throwing things, aggressive towards staff and noncompliant. History completely unobtainable from her. Pt screams and talks about someone trying to take her baby. Chart review reveals no other medical problems. About 4 visits in the last 2 weeks. ROS impossible to obtain.

## 2021-05-31 NOTE — ED BEHAVIORAL HEALTH ASSESSMENT NOTE - DESCRIPTION
Tenuous behavioral control, limited cooperativity, required IM PRNs initially    Vital Signs Last 24 Hrs  T(C): 36.6 (31 May 2021 21:02), Max: 36.6 (31 May 2021 21:02)  T(F): 97.9 (31 May 2021 21:02), Max: 97.9 (31 May 2021 21:02)  HR: 70 (31 May 2021 21:02) (70 - 70)  BP: 112/88 (31 May 2021 21:02) (112/88 - 112/88)  BP(mean): --  RR: 18 (31 May 2021 21:02) (18 - 18)  SpO2: 100% (31 May 2021 21:02) (100% - 100%) diabetes Lives in [facility], unemployed, disability, single
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
## 2021-05-31 NOTE — ED BEHAVIORAL HEALTH ASSESSMENT NOTE - NS ED BHA PLAN PSYCHIATRIC ISSUES2 FT
Haldol 5mg qAM and 10mg qHS, depakote 1000mg, prozac 40mg, cogentin 1mg BID, haldol 5mg PO/IM PRN, Ativan 2mg PO/IM PRN, Benadryl 50mg PO/IM PRN

## 2021-05-31 NOTE — ED BEHAVIORAL HEALTH ASSESSMENT NOTE - OTHER PAST PSYCHIATRIC HISTORY (INCLUDE DETAILS REGARDING ONSET, COURSE OF ILLNESS, INPATIENT/OUTPATIENT TREATMENT)
PPH of intellectual disability and schizoaffective disorder, with history of aggression and behavioral issues, no SA/NSSIB reported, 1 reported psychiatric admission from 11/2020-1/2021 for aggression, paranoia and delusional thinking, in outpatient treatment with TEDDY Osman, PMH of DM, and hyperprolactinemia. Pt. was seen in the ED 7 times in the past 10 days most recently at [facility] ED on 5/29/21 for aggression in her group home.

## 2021-05-31 NOTE — ED BEHAVIORAL HEALTH ASSESSMENT NOTE - VIOLENCE RISK FACTORS:
Feeling of being under threat and being unable to control threat/Impulsivity/Lack of insight into violence risk/need for treatment/Noncompliance with treatment/Community stressors that increase the risk of destabilization

## 2021-05-31 NOTE — ED PROVIDER NOTE - CLINICAL SUMMARY MEDICAL DECISION MAKING FREE TEXT BOX
Psych already consulted. Clearly pt's behavior is concerning given her noncompliance w/ medications. Dispo as per psych.

## 2021-05-31 NOTE — ED BEHAVIORAL HEALTH ASSESSMENT NOTE - PSYCHIATRIC ISSUES AND PLAN (INCLUDE STANDING AND PRN MEDICATION)
Haldol 5mg qAM and 10mg qHS, depakote 1000mg, prozac 40mg, cogentin 1mg BID; PRNs - thorazine 100mg PO/IM, ativan 2mg PO/IM for psychotic agitation

## 2021-05-31 NOTE — ED PROVIDER NOTE - PHYSICAL EXAMINATION
pt alert and can phonate well  h at/nc  perrl, conj clear, sclera anicteric,  neck supple  throat no exudate  cor rrr pos s1s2  lungs clear to asno wheeze  abd soft no r/g/t  ext no edema no deformities  neueo awake, lucid normal gait moves all extremities with strength  psych normal affect  vs reasonable pt alert, screaming nonsense.  screaming about her baby   h at/nc  perrl, conj clear, sclera anicteric,  neck supple  cor rrr pos s1s2  lungs clear to asno wheeze  abd soft no r/g/t  ext no edema no deformities  neueo awaket moves all extremities with strength  psych psychotic  vs pending

## 2021-05-31 NOTE — ED PROVIDER NOTE - PROGRESS NOTE DETAILS
Pt in nad, with loose associations- verbally heightened but pleasant, no si, hi, ah, vh.  Sitting and eating tray.  For admission for further psychiatric care.

## 2021-05-31 NOTE — ED ADULT NURSE NOTE - CHIEF COMPLAINT QUOTE
Pt brought in by EMS and [facility] for aggressive behavior. As per EMS pt is noncompliant with medications. Pt arrives in handcuffs. Pt noncooperative in triage.
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
## 2021-06-01 DIAGNOSIS — F20.5 RESIDUAL SCHIZOPHRENIA: ICD-10-CM

## 2021-06-01 LAB
ALBUMIN SERPL ELPH-MCNC: 3.7 G/DL — SIGNIFICANT CHANGE UP (ref 3.3–5)
ALP SERPL-CCNC: 64 U/L — SIGNIFICANT CHANGE UP (ref 40–120)
ALT FLD-CCNC: 9 U/L — SIGNIFICANT CHANGE UP (ref 4–33)
AMPHET UR-MCNC: NEGATIVE — SIGNIFICANT CHANGE UP
ANION GAP SERPL CALC-SCNC: 10 MMOL/L — SIGNIFICANT CHANGE UP (ref 7–14)
ANISOCYTOSIS BLD QL: SLIGHT — SIGNIFICANT CHANGE UP
APAP SERPL-MCNC: <15 UG/ML — SIGNIFICANT CHANGE UP (ref 15–25)
APPEARANCE UR: ABNORMAL
AST SERPL-CCNC: 14 U/L — SIGNIFICANT CHANGE UP (ref 4–32)
BACTERIA # UR AUTO: ABNORMAL
BARBITURATES UR SCN-MCNC: NEGATIVE — SIGNIFICANT CHANGE UP
BASOPHILS # BLD AUTO: 0.06 K/UL — SIGNIFICANT CHANGE UP (ref 0–0.2)
BASOPHILS NFR BLD AUTO: 1.9 % — SIGNIFICANT CHANGE UP (ref 0–2)
BENZODIAZ UR-MCNC: NEGATIVE — SIGNIFICANT CHANGE UP
BILIRUB SERPL-MCNC: 0.2 MG/DL — SIGNIFICANT CHANGE UP (ref 0.2–1.2)
BILIRUB UR-MCNC: NEGATIVE — SIGNIFICANT CHANGE UP
BUN SERPL-MCNC: 7 MG/DL — SIGNIFICANT CHANGE UP (ref 7–23)
CALCIUM SERPL-MCNC: 9.3 MG/DL — SIGNIFICANT CHANGE UP (ref 8.4–10.5)
CHLORIDE SERPL-SCNC: 100 MMOL/L — SIGNIFICANT CHANGE UP (ref 98–107)
CO2 SERPL-SCNC: 25 MMOL/L — SIGNIFICANT CHANGE UP (ref 22–31)
COCAINE METAB.OTHER UR-MCNC: NEGATIVE — SIGNIFICANT CHANGE UP
COLOR SPEC: YELLOW — SIGNIFICANT CHANGE UP
COVID-19 SPIKE DOMAIN AB INTERP: NEGATIVE — SIGNIFICANT CHANGE UP
COVID-19 SPIKE DOMAIN ANTIBODY RESULT: 0.4 U/ML — SIGNIFICANT CHANGE UP
CREAT SERPL-MCNC: 0.66 MG/DL — SIGNIFICANT CHANGE UP (ref 0.5–1.3)
CREATININE URINE RESULT, DAU: 114 MG/DL — SIGNIFICANT CHANGE UP
DIFF PNL FLD: NEGATIVE — SIGNIFICANT CHANGE UP
EOSINOPHIL # BLD AUTO: 0.06 K/UL — SIGNIFICANT CHANGE UP (ref 0–0.5)
EOSINOPHIL NFR BLD AUTO: 1.9 % — SIGNIFICANT CHANGE UP (ref 0–6)
EPI CELLS # UR: 8 /HPF — HIGH (ref 0–5)
ETHANOL SERPL-MCNC: <10 MG/DL — SIGNIFICANT CHANGE UP
GIANT PLATELETS BLD QL SMEAR: PRESENT — SIGNIFICANT CHANGE UP
GLUCOSE BLDC GLUCOMTR-MCNC: 109 MG/DL — HIGH (ref 70–99)
GLUCOSE BLDC GLUCOMTR-MCNC: 94 MG/DL — SIGNIFICANT CHANGE UP (ref 70–99)
GLUCOSE SERPL-MCNC: 84 MG/DL — SIGNIFICANT CHANGE UP (ref 70–99)
GLUCOSE UR QL: NEGATIVE — SIGNIFICANT CHANGE UP
HCG SERPL-ACNC: <5 MIU/ML — SIGNIFICANT CHANGE UP
HCT VFR BLD CALC: 35.1 % — SIGNIFICANT CHANGE UP (ref 34.5–45)
HGB BLD-MCNC: 11 G/DL — LOW (ref 11.5–15.5)
HYALINE CASTS # UR AUTO: 3 /LPF — SIGNIFICANT CHANGE UP (ref 0–7)
IANC: 1.17 K/UL — LOW (ref 1.5–8.5)
KETONES UR-MCNC: ABNORMAL
LEUKOCYTE ESTERASE UR-ACNC: NEGATIVE — SIGNIFICANT CHANGE UP
LYMPHOCYTES # BLD AUTO: 0.96 K/UL — LOW (ref 1–3.3)
LYMPHOCYTES # BLD AUTO: 31.1 % — SIGNIFICANT CHANGE UP (ref 13–44)
MACROCYTES BLD QL: SIGNIFICANT CHANGE UP
MANUAL SMEAR VERIFICATION: SIGNIFICANT CHANGE UP
MCHC RBC-ENTMCNC: 30.3 PG — SIGNIFICANT CHANGE UP (ref 27–34)
MCHC RBC-ENTMCNC: 31.3 GM/DL — LOW (ref 32–36)
MCV RBC AUTO: 96.7 FL — SIGNIFICANT CHANGE UP (ref 80–100)
METHADONE UR-MCNC: NEGATIVE — SIGNIFICANT CHANGE UP
MONOCYTES # BLD AUTO: 0.26 K/UL — SIGNIFICANT CHANGE UP (ref 0–0.9)
MONOCYTES NFR BLD AUTO: 8.5 % — SIGNIFICANT CHANGE UP (ref 2–14)
NEUTROPHILS # BLD AUTO: 1.45 K/UL — LOW (ref 1.8–7.4)
NEUTROPHILS NFR BLD AUTO: 47.2 % — SIGNIFICANT CHANGE UP (ref 43–77)
NITRITE UR-MCNC: NEGATIVE — SIGNIFICANT CHANGE UP
OPIATES UR-MCNC: NEGATIVE — SIGNIFICANT CHANGE UP
OVALOCYTES BLD QL SMEAR: SLIGHT — SIGNIFICANT CHANGE UP
OXYCODONE UR-MCNC: NEGATIVE — SIGNIFICANT CHANGE UP
PCP SPEC-MCNC: SIGNIFICANT CHANGE UP
PCP UR-MCNC: NEGATIVE — SIGNIFICANT CHANGE UP
PH UR: 7 — SIGNIFICANT CHANGE UP (ref 5–8)
PLAT MORPH BLD: NORMAL — SIGNIFICANT CHANGE UP
PLATELET # BLD AUTO: 331 K/UL — SIGNIFICANT CHANGE UP (ref 150–400)
PLATELET COUNT - ESTIMATE: NORMAL — SIGNIFICANT CHANGE UP
POTASSIUM SERPL-MCNC: 4.1 MMOL/L — SIGNIFICANT CHANGE UP (ref 3.5–5.3)
POTASSIUM SERPL-SCNC: 4.1 MMOL/L — SIGNIFICANT CHANGE UP (ref 3.5–5.3)
PROT SERPL-MCNC: 6.9 G/DL — SIGNIFICANT CHANGE UP (ref 6–8.3)
PROT UR-MCNC: ABNORMAL
RBC # BLD: 3.63 M/UL — LOW (ref 3.8–5.2)
RBC # FLD: 13.2 % — SIGNIFICANT CHANGE UP (ref 10.3–14.5)
RBC BLD AUTO: NORMAL — SIGNIFICANT CHANGE UP
RBC CASTS # UR COMP ASSIST: 0 /HPF — SIGNIFICANT CHANGE UP (ref 0–4)
SALICYLATES SERPL-MCNC: <5 MG/DL — LOW (ref 15–30)
SARS-COV-2 IGG+IGM SERPL QL IA: 0.4 U/ML — SIGNIFICANT CHANGE UP
SARS-COV-2 IGG+IGM SERPL QL IA: NEGATIVE — SIGNIFICANT CHANGE UP
SARS-COV-2 RNA SPEC QL NAA+PROBE: SIGNIFICANT CHANGE UP
SCHISTOCYTES BLD QL AUTO: SLIGHT — SIGNIFICANT CHANGE UP
SMUDGE CELLS # BLD: PRESENT — SIGNIFICANT CHANGE UP
SODIUM SERPL-SCNC: 135 MMOL/L — SIGNIFICANT CHANGE UP (ref 135–145)
SP GR SPEC: 1.02 — SIGNIFICANT CHANGE UP (ref 1.01–1.02)
THC UR QL: NEGATIVE — SIGNIFICANT CHANGE UP
TOXICOLOGY SCREEN, DRUGS OF ABUSE, SERUM RESULT: SIGNIFICANT CHANGE UP
TSH SERPL-MCNC: 1.51 UIU/ML — SIGNIFICANT CHANGE UP (ref 0.27–4.2)
UROBILINOGEN FLD QL: SIGNIFICANT CHANGE UP
VALPROATE SERPL-MCNC: 67.3 UG/ML — SIGNIFICANT CHANGE UP (ref 50–100)
VARIANT LYMPHS # BLD: 9.4 % — HIGH (ref 0–6)
WBC # BLD: 3.08 K/UL — LOW (ref 3.8–10.5)
WBC # FLD AUTO: 3.08 K/UL — LOW (ref 3.8–10.5)
WBC UR QL: 1 /HPF — SIGNIFICANT CHANGE UP (ref 0–5)

## 2021-06-01 PROCEDURE — 99283 EMERGENCY DEPT VISIT LOW MDM: CPT

## 2021-06-01 PROCEDURE — 99222 1ST HOSP IP/OBS MODERATE 55: CPT

## 2021-06-01 RX ORDER — METFORMIN HYDROCHLORIDE 850 MG/1
1000 TABLET ORAL
Refills: 0 | Status: DISCONTINUED | OUTPATIENT
Start: 2021-06-01 | End: 2021-06-01

## 2021-06-01 RX ORDER — HALOPERIDOL DECANOATE 100 MG/ML
5 INJECTION INTRAMUSCULAR ONCE
Refills: 0 | Status: DISCONTINUED | OUTPATIENT
Start: 2021-06-01 | End: 2021-06-01

## 2021-06-01 RX ORDER — DIVALPROEX SODIUM 500 MG/1
750 TABLET, DELAYED RELEASE ORAL
Refills: 0 | Status: DISCONTINUED | OUTPATIENT
Start: 2021-06-01 | End: 2021-06-14

## 2021-06-01 RX ORDER — HALOPERIDOL DECANOATE 100 MG/ML
5 INJECTION INTRAMUSCULAR DAILY
Refills: 0 | Status: DISCONTINUED | OUTPATIENT
Start: 2021-06-01 | End: 2021-06-03

## 2021-06-01 RX ORDER — HALOPERIDOL DECANOATE 100 MG/ML
5 INJECTION INTRAMUSCULAR EVERY 6 HOURS
Refills: 0 | Status: DISCONTINUED | OUTPATIENT
Start: 2021-06-01 | End: 2021-06-01

## 2021-06-01 RX ORDER — HALOPERIDOL DECANOATE 100 MG/ML
10 INJECTION INTRAMUSCULAR AT BEDTIME
Refills: 0 | Status: DISCONTINUED | OUTPATIENT
Start: 2021-06-01 | End: 2021-06-02

## 2021-06-01 RX ORDER — CHLORPROMAZINE HCL 10 MG
100 TABLET ORAL ONCE
Refills: 0 | Status: DISCONTINUED | OUTPATIENT
Start: 2021-06-01 | End: 2021-06-08

## 2021-06-01 RX ORDER — BROMOCRIPTINE MESYLATE 5 MG/1
5 CAPSULE ORAL
Refills: 0 | Status: DISCONTINUED | OUTPATIENT
Start: 2021-06-01 | End: 2021-06-01

## 2021-06-01 RX ORDER — CHLORPROMAZINE HCL 10 MG
50 TABLET ORAL ONCE
Refills: 0 | Status: COMPLETED | OUTPATIENT
Start: 2021-06-01 | End: 2021-06-01

## 2021-06-01 RX ORDER — BROMOCRIPTINE MESYLATE 5 MG/1
5 CAPSULE ORAL
Refills: 0 | Status: DISCONTINUED | OUTPATIENT
Start: 2021-06-01 | End: 2021-06-08

## 2021-06-01 RX ORDER — CHLORPROMAZINE HCL 10 MG
50 TABLET ORAL EVERY 4 HOURS
Refills: 0 | Status: DISCONTINUED | OUTPATIENT
Start: 2021-06-01 | End: 2021-06-08

## 2021-06-01 RX ORDER — METFORMIN HYDROCHLORIDE 850 MG/1
1000 TABLET ORAL DAILY
Refills: 0 | Status: DISCONTINUED | OUTPATIENT
Start: 2021-06-01 | End: 2021-06-04

## 2021-06-01 RX ORDER — FLUOXETINE HCL 10 MG
40 CAPSULE ORAL DAILY
Refills: 0 | Status: DISCONTINUED | OUTPATIENT
Start: 2021-06-01 | End: 2021-06-14

## 2021-06-01 RX ORDER — HALOPERIDOL DECANOATE 100 MG/ML
5 INJECTION INTRAMUSCULAR ONCE
Refills: 0 | Status: COMPLETED | OUTPATIENT
Start: 2021-06-01 | End: 2021-06-01

## 2021-06-01 RX ORDER — DIPHENHYDRAMINE HCL 50 MG
50 CAPSULE ORAL ONCE
Refills: 0 | Status: DISCONTINUED | OUTPATIENT
Start: 2021-06-01 | End: 2021-06-01

## 2021-06-01 RX ORDER — BENZTROPINE MESYLATE 1 MG
1 TABLET ORAL
Refills: 0 | Status: DISCONTINUED | OUTPATIENT
Start: 2021-06-01 | End: 2021-06-14

## 2021-06-01 RX ORDER — INSULIN LISPRO 100/ML
VIAL (ML) SUBCUTANEOUS
Refills: 0 | Status: DISCONTINUED | OUTPATIENT
Start: 2021-06-01 | End: 2021-06-14

## 2021-06-01 RX ORDER — DIVALPROEX SODIUM 500 MG/1
1000 TABLET, DELAYED RELEASE ORAL AT BEDTIME
Refills: 0 | Status: DISCONTINUED | OUTPATIENT
Start: 2021-06-01 | End: 2021-06-01

## 2021-06-01 RX ORDER — CHLORPROMAZINE HCL 10 MG
50 TABLET ORAL EVERY 6 HOURS
Refills: 0 | Status: DISCONTINUED | OUTPATIENT
Start: 2021-06-01 | End: 2021-06-01

## 2021-06-01 RX ORDER — DIPHENHYDRAMINE HCL 50 MG
50 CAPSULE ORAL EVERY 6 HOURS
Refills: 0 | Status: DISCONTINUED | OUTPATIENT
Start: 2021-06-01 | End: 2021-06-01

## 2021-06-01 RX ORDER — HALOPERIDOL DECANOATE 100 MG/ML
2 INJECTION INTRAMUSCULAR ONCE
Refills: 0 | Status: DISCONTINUED | OUTPATIENT
Start: 2021-06-01 | End: 2021-06-01

## 2021-06-01 RX ADMIN — Medication 1 MILLIGRAM(S): at 00:28

## 2021-06-01 RX ADMIN — Medication 2 MILLIGRAM(S): at 04:41

## 2021-06-01 RX ADMIN — Medication 1 MILLIGRAM(S): at 18:01

## 2021-06-01 RX ADMIN — Medication 40 MILLIGRAM(S): at 00:28

## 2021-06-01 RX ADMIN — Medication 50 MILLIGRAM(S): at 20:24

## 2021-06-01 RX ADMIN — DIVALPROEX SODIUM 1000 MILLIGRAM(S): 500 TABLET, DELAYED RELEASE ORAL at 00:28

## 2021-06-01 RX ADMIN — HALOPERIDOL DECANOATE 10 MILLIGRAM(S): 100 INJECTION INTRAMUSCULAR at 20:22

## 2021-06-01 RX ADMIN — Medication 2 MILLIGRAM(S): at 15:53

## 2021-06-01 RX ADMIN — BROMOCRIPTINE MESYLATE 5 MILLIGRAM(S): 5 CAPSULE ORAL at 20:23

## 2021-06-01 RX ADMIN — DIVALPROEX SODIUM 750 MILLIGRAM(S): 500 TABLET, DELAYED RELEASE ORAL at 20:23

## 2021-06-01 RX ADMIN — Medication 50 MILLIGRAM(S): at 15:53

## 2021-06-01 RX ADMIN — HALOPERIDOL DECANOATE 5 MILLIGRAM(S): 100 INJECTION INTRAMUSCULAR at 04:41

## 2021-06-01 RX ADMIN — HALOPERIDOL DECANOATE 10 MILLIGRAM(S): 100 INJECTION INTRAMUSCULAR at 00:28

## 2021-06-01 NOTE — BH PATIENT PROFILE - HOME MEDICATIONS
Macrobid 100 mg oral capsule , 1 cap(s) orally 2 times a day   RisperDAL 3 mg oral tablet , 1 tab(s) orally 3 times a day
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
## 2021-06-01 NOTE — BH CONSULTATION LIAISON PROGRESS NOTE - NSBHCHARTREVIEWVS_PSY_A_CORE FT
Vital Signs Last 24 Hrs  T(C): 36.6 (31 May 2021 21:02), Max: 36.6 (31 May 2021 21:02)  T(F): 97.9 (31 May 2021 21:02), Max: 97.9 (31 May 2021 21:02)  HR: 70 (31 May 2021 21:02) (70 - 70)  BP: 112/88 (31 May 2021 21:02) (112/88 - 112/88)  BP(mean): --  RR: 18 (01 Jun 2021 03:41) (18 - 18)  SpO2: 100% (31 May 2021 21:02) (100% - 100%)

## 2021-06-01 NOTE — BH INPATIENT PSYCHIATRY ASSESSMENT NOTE - MSE UNSTRUCTURED FT
Pt is dressed in gowns, somewhat unkempt.  Very childlike.  Speech melodious.  Mood is "ok," affect is smiling.  [place] TP, some loosening of associations.  TC: Denies AVH, SIIP, HIIP.  Insight and judgment poor, attention poor, gait intact, language fluent.

## 2021-06-01 NOTE — BH INPATIENT PSYCHIATRY ASSESSMENT NOTE - OTHER PAST PSYCHIATRIC HISTORY (INCLUDE DETAILS REGARDING ONSET, COURSE OF ILLNESS, INPATIENT/OUTPATIENT TREATMENT)
Intellectual disability and schizoaffective disorder  1 reported psychiatric admission from 11/2020-1/2021 for aggression, paranoia and delusional thinking  Outpatient treatment ([facility]?) with TEDDY Osman

## 2021-06-01 NOTE — BH INPATIENT PSYCHIATRY ASSESSMENT NOTE - NSBHMETABOLIC_PSY_ALL_CORE_FT
BMI: BMI (kg/m2): 31.3 (06-01-21 @ 16:20)  HbA1c:   Glucose: POCT Blood Glucose.: 223 mg/dL (06-01-21 @ 09:35)    BP: 112/67 (06-01-21 @ 14:35) (106/65 - 112/88)  Lipid Panel:

## 2021-06-01 NOTE — BH CONSULTATION LIAISON PROGRESS NOTE - NSBHCONSULTPSYCHPLAN_PSY_A_CORE FT
Haldol 5mg qAM and 10mg qHS, depakote 1000mg, prozac 40mg, cogentin 1mg BID, Haldol 5mg qAM and 10mg qHS, depakote 1000mg QHS, prozac 40mg, cogentin 1mg BID,

## 2021-06-01 NOTE — BH INPATIENT PSYCHIATRY ASSESSMENT NOTE - NSBHASSESSSUMMFT_PSY_ALL_CORE
30F w/schizoaffective disorder and intellectual disability, admitted with recent disorganization and aggression.    1. Continue admission for safety and stabilization.  2. No CO 1:1 for now.  3. Continue haloperidol.  Depakote to  mg bid.    4. Reviewed ER documentation, appears limited benefit from Haldol 5 mg/Ativan 2mg/Benadryl 50 mg combination, as well as olanzapine.  ER has used Versed on multiple occasions.  Will order chlorpromazine PRNs as documentation states outpatient team was wanting to switch her to this.  5. Collateral by primary team.
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
## 2021-06-01 NOTE — BH INPATIENT PSYCHIATRY ASSESSMENT NOTE - NSBHCHARTREVIEWVS_PSY_A_CORE FT
Vital Signs Last 24 Hrs  T(C): 36.1 (01 Jun 2021 16:20), Max: 36.7 (01 Jun 2021 14:35)  T(F): 97 (01 Jun 2021 16:20), Max: 98.1 (01 Jun 2021 14:35)  HR: 80 (01 Jun 2021 14:35) (70 - 84)  BP: 112/67 (01 Jun 2021 14:35) (106/65 - 112/88)  BP(mean): --  RR: 18 (01 Jun 2021 14:35) (18 - 18)  SpO2: 100% (01 Jun 2021 14:35) (100% - 100%)

## 2021-06-01 NOTE — BH INPATIENT PSYCHIATRY ASSESSMENT NOTE - DESCRIPTION
Lives in [facility], unemployed, disability, single.

## 2021-06-01 NOTE — BH INPATIENT PSYCHIATRY ASSESSMENT NOTE - CURRENT MEDICATION
MEDICATIONS  (STANDING):  benztropine 1 milliGRAM(s) Oral two times a day  bromocriptine Capsule 5 milliGRAM(s) Oral two times a day  diVALproex ER 1000 milliGRAM(s) Oral at bedtime  FLUoxetine 40 milliGRAM(s) Oral daily  haloperidol     Tablet 5 milliGRAM(s) Oral daily  haloperidol     Tablet 10 milliGRAM(s) Oral at bedtime  metFORMIN 1000 milliGRAM(s) Oral daily    MEDICATIONS  (PRN):  chlorproMAZINE    Injectable 100 milliGRAM(s) IntraMuscular once PRN severe aggression  chlorproMAZINE    Tablet 50 milliGRAM(s) Oral every 4 hours PRN agitation/aggression  LORazepam     Tablet 2 milliGRAM(s) Oral every 4 hours PRN Agitation  LORazepam   Injectable 2 milliGRAM(s) IntraMuscular Once PRN sevree agitation
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
## 2021-06-01 NOTE — ED BEHAVIORAL HEALTH NOTE - BEHAVIORAL HEALTH NOTE
Writer outreached , May, at [phone] for COVID screen follow up and to provide unit information. May was unable to be reached earlier with VM left. Writer received return call to which pt was said to have no recent COVID exposure, no recent travel, and no + tests. May reports pt claims to have been vaccinated during hospital stay at [facility] from October to Feb. 2021 however no documentation of vaccine provided and unable to be confirmed.

## 2021-06-01 NOTE — BH INPATIENT PSYCHIATRY ASSESSMENT NOTE - MSE OPTIONS
Injectable Influenza Immunization Documentation    1.  Is the person to be vaccinated sick today?   No    2. Does the person to be vaccinated have an allergy to a component   of the vaccine?   No  Egg Allergy Algorithm Link    3. Has the person to be vaccinated ever had a serious reaction   to influenza vaccine in the past?   No    4. Has the person to be vaccinated ever had Guillain-Barré syndrome?   No    Form completed by Josefa Mitchell CMA     Prior to injection verified patient identity using patient's name and date of birth. Patient instructed to remain in clinic for 20 minutes afterwards, and to report any adverse reaction to me immediately.              Unstructured MSE

## 2021-06-02 LAB
A1C WITH ESTIMATED AVERAGE GLUCOSE RESULT: 5.2 % — SIGNIFICANT CHANGE UP (ref 4–5.6)
ESTIMATED AVERAGE GLUCOSE: 103 MG/DL — SIGNIFICANT CHANGE UP (ref 68–114)
GLUCOSE BLDC GLUCOMTR-MCNC: 109 MG/DL — HIGH (ref 70–99)
GLUCOSE BLDC GLUCOMTR-MCNC: 98 MG/DL — SIGNIFICANT CHANGE UP (ref 70–99)

## 2021-06-02 PROCEDURE — 99232 SBSQ HOSP IP/OBS MODERATE 35: CPT

## 2021-06-02 RX ORDER — CHLORPROMAZINE HCL 10 MG
100 TABLET ORAL AT BEDTIME
Refills: 0 | Status: DISCONTINUED | OUTPATIENT
Start: 2021-06-02 | End: 2021-06-03

## 2021-06-02 RX ORDER — HALOPERIDOL DECANOATE 100 MG/ML
5 INJECTION INTRAMUSCULAR AT BEDTIME
Refills: 0 | Status: DISCONTINUED | OUTPATIENT
Start: 2021-06-02 | End: 2021-06-03

## 2021-06-02 RX ORDER — SOD,AMMONIUM,POTASSIUM LACTATE
1 CREAM (GRAM) TOPICAL EVERY 12 HOURS
Refills: 0 | Status: DISCONTINUED | OUTPATIENT
Start: 2021-06-02 | End: 2021-06-14

## 2021-06-02 RX ADMIN — HALOPERIDOL DECANOATE 5 MILLIGRAM(S): 100 INJECTION INTRAMUSCULAR at 21:33

## 2021-06-02 RX ADMIN — Medication 40 MILLIGRAM(S): at 08:26

## 2021-06-02 RX ADMIN — BROMOCRIPTINE MESYLATE 5 MILLIGRAM(S): 5 CAPSULE ORAL at 08:25

## 2021-06-02 RX ADMIN — Medication 1 DROP(S): at 17:45

## 2021-06-02 RX ADMIN — Medication 50 MILLIGRAM(S): at 08:26

## 2021-06-02 RX ADMIN — HALOPERIDOL DECANOATE 5 MILLIGRAM(S): 100 INJECTION INTRAMUSCULAR at 08:27

## 2021-06-02 RX ADMIN — BROMOCRIPTINE MESYLATE 5 MILLIGRAM(S): 5 CAPSULE ORAL at 21:33

## 2021-06-02 RX ADMIN — METFORMIN HYDROCHLORIDE 1000 MILLIGRAM(S): 850 TABLET ORAL at 08:27

## 2021-06-02 RX ADMIN — DIVALPROEX SODIUM 750 MILLIGRAM(S): 500 TABLET, DELAYED RELEASE ORAL at 08:26

## 2021-06-02 RX ADMIN — Medication 1 APPLICATION(S): at 21:32

## 2021-06-02 RX ADMIN — Medication 2 MILLIGRAM(S): at 15:37

## 2021-06-02 RX ADMIN — Medication 50 MILLIGRAM(S): at 15:37

## 2021-06-02 RX ADMIN — Medication 2 MILLIGRAM(S): at 08:27

## 2021-06-02 RX ADMIN — Medication 1 MILLIGRAM(S): at 21:32

## 2021-06-02 RX ADMIN — Medication 1 MILLIGRAM(S): at 08:27

## 2021-06-02 RX ADMIN — DIVALPROEX SODIUM 750 MILLIGRAM(S): 500 TABLET, DELAYED RELEASE ORAL at 21:33

## 2021-06-02 RX ADMIN — Medication 100 MILLIGRAM(S): at 21:33

## 2021-06-02 NOTE — BH INPATIENT PSYCHIATRY PROGRESS NOTE - MSE UNSTRUCTURED FT
Pt is dressed in gowns, somewhat unkempt.  Very childlike.  Speech melodious.  Mood is "happy," affect is smiling.  [place] TP, some loosening of associations.  TC: Denies AVH, SIIP, HIIP.  Insight and judgment poor, attention poor, gait intact, language fluent.
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
## 2021-06-02 NOTE — BH SOCIAL WORK INITIAL PSYCHOSOCIAL EVALUATION - OTHER PAST PSYCHIATRIC HISTORY (INCLUDE DETAILS REGARDING ONSET, COURSE OF ILLNESS, INPATIENT/OUTPATIENT TREATMENT)
Per EMR, pt is developmentally delayed, has a hx of schizoaffective d/o delusional behaviors, has 1 prior inpt hospitalization last 11/20-1/21 (facility unk), had over 10 ED visits @ [facility] in the past several months due to increased aggression at the group home. Pt has a hx of violent aggressive behavior towards others, was in tx with a [facility] np [place] for several yrs however was advised pt recently changed outpt provider to [facility] clinic ,pt was currently hospitalized before medication regimen began, per EMR pt has no hx of SA/SI, has AH/VH, delusions, paranoia, disorganized thoughts, has childlike demeanor and needs constant redirecting when agitated, becomes combative when symptomatic and endorsed thoughts of hurting others. Pt has no hx of substance use, no legal hx, has a medical hx of diabetes

## 2021-06-02 NOTE — PSYCHIATRIC REHAB INITIAL EVALUATION - NSBHALCSUBTREAT_PSY_ALL_CORE
As per chart, pt is in outpatient treatment with TEDDY Osman, was in the [facility] ED 7x in the past 10 days, and has a prior psychiatric hospitalization from 11/20-1/21.

## 2021-06-02 NOTE — BH INPATIENT PSYCHIATRY PROGRESS NOTE - NSBHFUPINTERVALHXFT_PSY_A_CORE
Patient seen in follow up for history of schizoaffective disorder and persistent agitation. Patient received Thorazine 50mg PO x 1 dose PRN for irritability/agitation yesterday, no acute events overnight. Per log, patient slept throughout the night. On interview today, patient is found resting in bed. Patient seen by writer with ES staff member at bedside. Patient reports she feels happy this morning, states she ate raisin bran for breakfast. Patient denies any depressed mood, requests eye drops and foot lotion for dry eyes for dry skin. Patient denies SI/HI, no AH/VH elicited. Patient provides verbal consent for team to speak with her father and her aunt about her care.

## 2021-06-02 NOTE — PSYCHIATRIC REHAB INITIAL EVALUATION - NSBHEMPLOYED_PSY_ALL_CORE
Smoking can make GERD worse. If you need help quitting, talk to your doctor about stop-smoking programs and medicines. These can increase your chances of quitting for good. · If you have GERD symptoms at night, raise the head of your bed 6 to 8 inches by putting the frame on blocks or placing a foam wedge under the head of your mattress. (Adding extra pillows does not work.)  · Do not wear tight clothing around your middle. · Lose weight if you need to. Losing just 5 to 10 pounds can help. When should you call for help? Call your doctor now or seek immediate medical care if:    · You have new or different belly pain.     · Your stools are black and tarlike or have streaks of blood.    Watch closely for changes in your health, and be sure to contact your doctor if:    · Your symptoms have not improved after 2 days.     · Food seems to catch in your throat or chest.   Where can you learn more? Go to [email or web address]://[email or web address] org and sign in to your [employer] account. Enter W085 in the [employer] Unemployed

## 2021-06-02 NOTE — BH INPATIENT PSYCHIATRY PROGRESS NOTE - CASE SUMMARY
Pt remains childlike, with periods of dysregulation and agitation.  Suspect more behavioral disturbances due to ID rather than true psychosis.  Collateral to be obtained today.

## 2021-06-02 NOTE — BH INPATIENT PSYCHIATRY PROGRESS NOTE - NSBHASSESSSUMMFT_PSY_ALL_CORE
30F w/schizoaffective disorder and intellectual disability, admitted with recent disorganization and aggression. Patient continues to present as poor historian with childlike affect, somatically focused on interview today requesting eye drops and foot lotion. In light of patient's recent ED visits for aggression, patient to be monitored on ES with low threshold to initiate CO if displays persistent aggression or agitation towards staff/peers. Will continue home medication regimen for now and attempt to reach family members for further collateral. Per ED documentation, patient may have missed several doses of her standing home medications due to her frequent ED visits, suspect that this may at least in part be contributing to her apparent decompensation. Patient requires inpatient hospitalization for safety and stabilization.     1. Legal status 9.39- Continue admission for safety and stabilization.  2. No CO 1:1 for now, continue ED.  3. Continue haloperidol.  Depakote to  mg bid.    4. Reviewed ER documentation, appears limited benefit from Haldol 5 mg/Ativan 2mg/Benadryl 50 mg combination, as well as olanzapine.  ER has used Versed on multiple occasions.  Will order chlorpromazine PRNs as documentation states outpatient team was wanting to switch her to this.  5. Medical- continue bromocriptine for hyperprolactinemia, eye drops q6hrs and foot lotion BID as per documentation from group home  6. Will attempt to obtain collateral for further history, dispo likely back to group home pending symptomatic improvement
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
## 2021-06-02 NOTE — PSYCHIATRIC REHAB INITIAL EVALUATION - NSBHSIGPRIORMED_PSY_ALL_CORE
As per chart, pt has history of an intellectual disability, diabetes, and hyperprolactinemia./Yes (Specify)

## 2021-06-03 LAB
GLUCOSE BLDC GLUCOMTR-MCNC: 89 MG/DL — SIGNIFICANT CHANGE UP (ref 70–99)
GLUCOSE BLDC GLUCOMTR-MCNC: 92 MG/DL — SIGNIFICANT CHANGE UP (ref 70–99)

## 2021-06-03 PROCEDURE — 99232 SBSQ HOSP IP/OBS MODERATE 35: CPT

## 2021-06-03 RX ORDER — CHLORPROMAZINE HCL 10 MG
150 TABLET ORAL AT BEDTIME
Refills: 0 | Status: DISCONTINUED | OUTPATIENT
Start: 2021-06-03 | End: 2021-06-04

## 2021-06-03 RX ADMIN — Medication 2 MILLIGRAM(S): at 07:31

## 2021-06-03 RX ADMIN — Medication 2 MILLIGRAM(S): at 16:43

## 2021-06-03 RX ADMIN — Medication 2 MILLIGRAM(S): at 12:33

## 2021-06-03 RX ADMIN — METFORMIN HYDROCHLORIDE 1000 MILLIGRAM(S): 850 TABLET ORAL at 08:01

## 2021-06-03 RX ADMIN — Medication 50 MILLIGRAM(S): at 07:31

## 2021-06-03 RX ADMIN — Medication 150 MILLIGRAM(S): at 21:31

## 2021-06-03 RX ADMIN — Medication 1 MILLIGRAM(S): at 21:34

## 2021-06-03 RX ADMIN — BROMOCRIPTINE MESYLATE 5 MILLIGRAM(S): 5 CAPSULE ORAL at 21:31

## 2021-06-03 RX ADMIN — Medication 1 MILLIGRAM(S): at 08:04

## 2021-06-03 RX ADMIN — HALOPERIDOL DECANOATE 5 MILLIGRAM(S): 100 INJECTION INTRAMUSCULAR at 08:01

## 2021-06-03 RX ADMIN — DIVALPROEX SODIUM 750 MILLIGRAM(S): 500 TABLET, DELAYED RELEASE ORAL at 08:01

## 2021-06-03 RX ADMIN — Medication 40 MILLIGRAM(S): at 08:01

## 2021-06-03 RX ADMIN — BROMOCRIPTINE MESYLATE 5 MILLIGRAM(S): 5 CAPSULE ORAL at 08:04

## 2021-06-03 RX ADMIN — DIVALPROEX SODIUM 750 MILLIGRAM(S): 500 TABLET, DELAYED RELEASE ORAL at 21:31

## 2021-06-03 RX ADMIN — Medication 50 MILLIGRAM(S): at 12:33

## 2021-06-03 RX ADMIN — Medication 50 MILLIGRAM(S): at 16:43

## 2021-06-03 RX ADMIN — Medication 1 APPLICATION(S): at 21:35

## 2021-06-03 NOTE — BH INPATIENT PSYCHIATRY PROGRESS NOTE - NSBHFUPINTERVALHXFT_PSY_A_CORE
Patient seen in follow up for schizoaffective disorder and intellectual disability. No acute events overnight, patient received Thorazine 50/Ativan 1mg PO PRN x 1 dose for agitation. Per log, patient slept throughout the night. This morning, patient seen in dining area. Reports she "doesn't like taking medications because they make me sleep all night." Psychoeducation provided re: sleep hygiene and importance of remaining awake during the day and sleeping at night. Patient says "I'm very happy here and I don't want to go back to the group home." Then proceeds to list all of the food she ate for breakfast and recites a list of multiplication values because "I know my multiplication tables." No SI/HI or AH/VH elicited.

## 2021-06-03 NOTE — BH INPATIENT PSYCHIATRY PROGRESS NOTE - MODIFICATIONS
Modifications were made to above trainee note where appropriate and/or are addressed below in case summary.  Patient seen by me at length in conference room for initial inpatient interview with resident observing.   I was physically present during the service to the patient and personally examined the patient and I was directly involved in the management plan and recommendations of the care provided to the patient.   I have reviewed the admission record and reviewed the patient’s physical examination, review of systems and there are no pertinent positives, and admission labs. I have discussed the case with the resident and I have reviewed the resident's progress note. I agree with the progress note’s contents and I have made modifications as indicated.

## 2021-06-03 NOTE — BH TREATMENT PLAN - NSTXDIABGOAL_PSY_ALL_CORE
Will be able to able to describe prescribed diabetic medications and how to properly take these medications

## 2021-06-03 NOTE — BH INPATIENT PSYCHIATRY PROGRESS NOTE - CASE SUMMARY
Pt remains childlike, with periods of dysregulation and agitation.  Suspect more behavioral disturbances due to ID rather than true psychosis.  Collateral to be obtained today. 30F w/schizoaffective disorder and intellectual disability, admitted with recent disorganization and aggression    Pt remains regressed and with periods of dysregulation and agitation.   awaiting further collateral on med changes    PLAN  1. Legal status 9.39- Continue admission for safety and stabilization.  2. No CO 1:1 for now, continue ES.  3. Continue haloperidol 5 daily and Depakote  mg bid, increase thorazine to 150mg qHS.

## 2021-06-03 NOTE — BH INPATIENT PSYCHIATRY PROGRESS NOTE - NSBHASSESSSUMMFT_PSY_ALL_CORE
30F w/schizoaffective disorder and intellectual disability, admitted with recent disorganization and aggression. Patient continues to present as poor historian with childlike affect, somatically focused on interview today requesting eye drops and foot lotion. In light of patient's recent ED visits for aggression, patient to be monitored on ES with low threshold to initiate CO if displays persistent aggression or agitation towards staff/peers. Will continue home medication regimen for now and attempt to reach family members for further collateral. Per ED documentation, patient may have missed several doses of her standing home medications due to her frequent ED visits, suspect that this may at least in part be contributing to her apparent decompensation. Patient requires inpatient hospitalization for safety and stabilization.     1. Legal status 9.39- Continue admission for safety and stabilization.  2. No CO 1:1 for now, continue ED.  3. Continue haloperidol 5 daily and Depakote  mg bid, increase thorazine to 150mg qHS.    4. Reviewed ER documentation, appears limited benefit from Haldol 5 mg/Ativan 2mg/Benadryl 50 mg combination, as well as olanzapine.  ER has used Versed on multiple occasions.  Will order chlorpromazine PRNs as documentation states outpatient team was wanting to switch her to this.  5. Medical- continue bromocriptine for hyperprolactinemia, eye drops q6hrs and foot lotion BID as per documentation from group home  6. Will attempt to obtain collateral for further history, dispo likely back to group home pending symptomatic improvement

## 2021-06-03 NOTE — BH INPATIENT PSYCHIATRY PROGRESS NOTE - MSE UNSTRUCTURED FT
Pt is dressed in gowns, somewhat unkempt.  Very childlike.  Speech melodious.  Mood is "very happy," affect is smiling.  [place] TP, some loosening of associations.  TC: Denies AVH, SIIP, HIIP.  Insight and judgment poor, attention poor, gait intact, language fluent.

## 2021-06-04 LAB
GLUCOSE BLDC GLUCOMTR-MCNC: 68 MG/DL — LOW (ref 70–99)
GLUCOSE BLDC GLUCOMTR-MCNC: 72 MG/DL — SIGNIFICANT CHANGE UP (ref 70–99)

## 2021-06-04 PROCEDURE — 99232 SBSQ HOSP IP/OBS MODERATE 35: CPT

## 2021-06-04 RX ORDER — OLANZAPINE 15 MG/1
10 TABLET, FILM COATED ORAL EVERY 6 HOURS
Refills: 0 | Status: DISCONTINUED | OUTPATIENT
Start: 2021-06-04 | End: 2021-06-14

## 2021-06-04 RX ORDER — DIPHENHYDRAMINE HCL 50 MG
50 CAPSULE ORAL EVERY 4 HOURS
Refills: 0 | Status: DISCONTINUED | OUTPATIENT
Start: 2021-06-04 | End: 2021-06-14

## 2021-06-04 RX ORDER — ARIPIPRAZOLE 15 MG/1
2 TABLET ORAL DAILY
Refills: 0 | Status: DISCONTINUED | OUTPATIENT
Start: 2021-06-05 | End: 2021-06-08

## 2021-06-04 RX ORDER — CHLORPROMAZINE HCL 10 MG
200 TABLET ORAL AT BEDTIME
Refills: 0 | Status: DISCONTINUED | OUTPATIENT
Start: 2021-06-04 | End: 2021-06-14

## 2021-06-04 RX ADMIN — BROMOCRIPTINE MESYLATE 5 MILLIGRAM(S): 5 CAPSULE ORAL at 08:23

## 2021-06-04 RX ADMIN — METFORMIN HYDROCHLORIDE 1000 MILLIGRAM(S): 850 TABLET ORAL at 08:34

## 2021-06-04 RX ADMIN — Medication 40 MILLIGRAM(S): at 08:23

## 2021-06-04 RX ADMIN — Medication 1 MILLIGRAM(S): at 21:13

## 2021-06-04 RX ADMIN — OLANZAPINE 10 MILLIGRAM(S): 15 TABLET, FILM COATED ORAL at 10:29

## 2021-06-04 RX ADMIN — DIVALPROEX SODIUM 750 MILLIGRAM(S): 500 TABLET, DELAYED RELEASE ORAL at 20:35

## 2021-06-04 RX ADMIN — Medication 50 MILLIGRAM(S): at 10:28

## 2021-06-04 RX ADMIN — Medication 50 MILLIGRAM(S): at 08:22

## 2021-06-04 RX ADMIN — Medication 50 MILLIGRAM(S): at 13:16

## 2021-06-04 RX ADMIN — Medication 1 APPLICATION(S): at 21:13

## 2021-06-04 RX ADMIN — Medication 50 MILLIGRAM(S): at 20:36

## 2021-06-04 RX ADMIN — DIVALPROEX SODIUM 750 MILLIGRAM(S): 500 TABLET, DELAYED RELEASE ORAL at 08:24

## 2021-06-04 RX ADMIN — Medication 2 MILLIGRAM(S): at 09:28

## 2021-06-04 RX ADMIN — Medication 1 MILLIGRAM(S): at 08:24

## 2021-06-04 RX ADMIN — Medication 1 APPLICATION(S): at 13:05

## 2021-06-04 RX ADMIN — Medication 200 MILLIGRAM(S): at 20:36

## 2021-06-04 RX ADMIN — Medication 2 MILLIGRAM(S): at 20:34

## 2021-06-04 RX ADMIN — BROMOCRIPTINE MESYLATE 5 MILLIGRAM(S): 5 CAPSULE ORAL at 20:36

## 2021-06-04 RX ADMIN — Medication 1 DROP(S): at 10:03

## 2021-06-04 NOTE — BH INPATIENT PSYCHIATRY PROGRESS NOTE - MODIFICATIONS
Patient seen by me at length in conference room for initial inpatient interview with resident observing.   I was physically present during the service to the patient and personally examined the patient and I was directly involved in the management plan and recommendations of the care provided to the patient.   I have reviewed the admission record and reviewed the patient’s physical examination, review of systems and there are no pertinent positives, and admission labs. I have discussed the case with the resident and I have reviewed the resident's progress note. I agree with the progress note’s contents and I have made modifications as indicated.   Patient interviewed and evaluated with resident present to follow up on symptoms and the case has been reviewed with resident and treatment team this morning.   I was physically present during the service to the patient and personally examined the patient and I was directly involved in the management plan and recommendations of the care provided to the patient.  I have reviewed the resident's progress note and the mental status examination and I agree with its contents and treatment plan and I have made modifications to the note when needed and as indicated.

## 2021-06-04 NOTE — BH INPATIENT PSYCHIATRY PROGRESS NOTE - CASE SUMMARY
30F w/schizoaffective disorder and intellectual disability, admitted with recent disorganization and aggression    Pt remains regressed and with periods of dysregulation and agitation.   awaiting further collateral on med changes    PLAN  1. Legal status 9.39- Continue admission for safety and stabilization.  2. No CO 1:1 for now, continue ES.  3. Continue haloperidol 5 daily and Depakote  mg bid, increase thorazine to 150mg qHS.   30F w/schizoaffective disorder and intellectual disability, admitted with recent disorganization and aggression    Pt remains regressed and requesting to "stay here forever!"  Had episode of agitation when informed she couldn't stay hospitalized indefinitely.       PLAN  1. Legal status 9.39- Continue admission for safety and stabilization.  2. No CO 1:1 for now, continue ES.  3. Continue Depakote  mg bid, increase thorazine to  200 mg qHS and add low dose abilify.

## 2021-06-04 NOTE — BH INPATIENT PSYCHIATRY PROGRESS NOTE - NSCGISEVERILLNESS_PSY_ALL_CORE
[place] [id number] 
[phone] Patient: Sina Bruner MRN: [id number] :[id number] Visit Information Date & Time Provider Department Dept. Phone Encounter #  
 2018  5:30 PM [address] [facility] [phone] [id number] Upcoming Health Maintenance Date Due Hepatitis C Screening 1961 DTaP/Tdap/Td series (1 - Tdap) 1984 FOBT Q 1 YEAR AGE 50-75 2013 Influenza Age 5 to Adult 2018 Allergies as of 2018  Review Complete On: 2018 By: Siria Peter RN No Known Allergies Current Immunizations  Never Reviewed No immunizations on file. Not reviewed this visit You Were Diagnosed With   
  
 Codes Comments Right ear pain    -  Primary ICD-10-CM: H92.01 
ICD-9-CM: 388.70 Eustachian tube dysfunction, right     ICD-10-CM: H69.81 ICD-9-CM: 381.81 Vitals BP Pulse Temp Resp Height(growth percentile) Weight(growth percentile) 126/72 75 98.4 °F (36.9 °C) 16 6' 1\" (1.854 m) 217 lb (98.4 kg) SpO2 BMI Smoking Status 95% 28.63 kg/m2 Never Smoker BMI and BSA Data Body Mass Index Body Surface Area  
 28.63 kg/m 2 2.25 m 2 Preferred [facility] [facility] Name Phone [facility]/PHARMACY #[id number] - [place], [address] [phone] Your Updated Medication List  
  
   
This list is accurate as of 18  6:06 PM.  Always use your most recent med list.  
  
  
  
  
 amoxicillin-clavulanate 875-125 mg per tablet Commonly known as:  AUGMENTIN Take 1 Tab by mouth two (2) times a day. fluticasone 50 mcg/actuation nasal spray Commonly known as:  [place] End 2 Sprays by Both Nostrils route daily. levothyroxine 100 mcg tablet Commonly known as:  SYNTHROID Take 1 tablet by mouth  daily before breakfast  
  
 multivitamin tablet Commonly known as:  ONE A DAY Take 1 Tab by mouth daily. predniSONE 10 mg dose pack Commonly known as:  STERAPRED DS As directed Prescriptions Sent to [facility] Refills  
 fluticasone (FLONASE) 50 mcg/actuation nasal spray 0 Si Sprays by Both Nostrils route daily. Class: Normal  
 [facility]: [facility] Ph #: [phone] Route: Both Nostrils  
 predniSONE (STERAPRED DS) 10 mg dose pack 0 Sig: As directed Class: Normal  
 [facility]: [id number] Ph #: [phone]  
 amoxicillin-clavulanate (AUGMENTIN) 875-125 mg per tablet 0 Sig: Take 1 Tab by mouth two (2) times a day. Class: Normal  
 [facility]: [facility] Ph #: [phone] Route: Oral  
  
Patient Instructions Fluids/ gargles Claritin/ allegra Tylenol cold-sinus - max strength 1-2 tab 4 times/ day  
 with Advil as needed Eustachian Tube Problems: Care Instructions Your Care Instructions The eustachian (say \"you-STAY-shee-un\") tubes run between the inside of the ears and the throat. They keep air pressure stable in the ears. If your eustachian tubes become blocked, the air pressure in your ears changes. The fluids from a cold can clog eustachian tubes, causing pain in the ears. A quick change in air pressure can cause eustachian tubes to close up. This might happen when an airplane changes altitude or when a  goes up or down underwater. Eustachian tube problems often clear up on their own or after antibiotic treatment. If your tubes continue to be blocked, you may need surgery. Follow-up care is a key part of your treatment and safety.  Be sure to make and go to all appointments, and call your doctor if you are having problems. It's also a good idea to know your test results and keep a list of the medicines you take. How can you care for yourself at home? · To ease ear pain, apply a warm washcloth or a heating pad set on low. There may be some drainage from the ear when the heat melts earwax. Put a cloth between the heat source and your skin. Do not use a heating pad with children. · If your doctor prescribed [facility], take them as directed. Do not stop taking them just because you feel better. You need to take the full course of [facility]. · Your doctor may recommend over-the-counter medicine. Be safe with medicines. Oral or nasal decongestants may relieve ear pain. Avoid decongestants that are combined with antihistamines, which tend to cause more blockage. But if allergies seem to be the problem, your doctor may recommend a combination. Be careful with cough and cold medicines. Don't give them to children younger than 6, because they don't work for children that age and can even be harmful. For children 6 and older, always follow all the instructions carefully. Make sure you know how much medicine to give and how long to use it. And use the dosing device if one is included. When should you call for help? Call your doctor now or seek immediate medical care if: 
? · You develop sudden, complete hearing loss. ? · You have severe pain or feel dizzy. ? · You have new or increasing pus or blood draining from your ear. ? · You have redness, swelling, or pain around or behind the ear. ? Watch closely for changes in your health, and be sure to contact your doctor if: 
? · You do not get better after 2 weeks. ? · You have any new symptoms, such as itching or a feeling of fullness in the ear. Where can you learn more? Go to http://jacob-lalo.info/. Enter Y822 in the search box to learn more about \"Eustachian Tube Problems: Care Instructions. \" Current as of: May 12, 2017 Content Version: 11.4 © 1239-6605 [email or web address]. Care instructions adapted under license by [employer] (which disclaims liability or warranty for this information). Introducing \[facility]\"" & HEALTH SERVICES! Dear Miladys Bartlett: 
Thank you for requesting a Dotstudioz account. Our records indicate that you already have an active Dotstudio[employer] account. You can access your account anytime at [email or web address] Did you know that you can access your hospital and ER discharge instructions at any time in Dotstudioz? You can also review all of your test results from your hospital stay or ER visit. Additional Information If you have questions, please visit the Frequently Asked Questions section of the Dotstudioz website at [email or web address] Remember, Dotstudioz is NOT to be used for urgent needs. For medical emergencies, dial 911. Now available from your iPhone and Android! Please provide this summary of care documentation to your next provider. Your primary care clinician is listed as NONE. If you have any questions after today's visit, please call [phone]. 5 = Markedly ill - intrusive symptoms that distinctly impair social/occupational function or cause intrusive levels of distress

## 2021-06-04 NOTE — BH INPATIENT PSYCHIATRY PROGRESS NOTE - NSBHFUPINTERVALHXFT_PSY_A_CORE
Patient seen in follow up for schizoaffective disorder and intellectual disability. No acute events overnight, patient received Thorazine 50/Ativan 2mg PO x 2 doses each for irritability/agitation in the last 24 hours. Per log, patient slept throughout the night. This morning, patient reports she feels "excellent!" Patient notes "I want to stay here forever." Patient reminded that this is an acute care hospital and not a long term care facility. Patient becomes irritable, tearful, saying "but I don't ever want to leave!" Patient was informed that team will work to facilitate a safe discharge plan once her symptoms have improved. Patient was encouraged to use coping skills including music and writing. No SI/HI or AH/VH elicited.

## 2021-06-04 NOTE — BH INPATIENT PSYCHIATRY PROGRESS NOTE - MSE UNSTRUCTURED FT
Pt is dressed in gowns, somewhat unkempt.  Very childlike.  Speech melodious.  Mood is "excellent," affect is labile, alternately smiling and crying.  [place] TP, some loosening of associations.  TC: Denies AVH, SIIP, HIIP.  Insight and judgment poor, attention poor, gait intact, language fluent.

## 2021-06-04 NOTE — BH INPATIENT PSYCHIATRY PROGRESS NOTE - NSBHASSESSSUMMFT_PSY_ALL_CORE
30F w/schizoaffective disorder and intellectual disability, admitted with recent disorganization and aggression. Patient continues to present as poor historian with labile and childlike affect. Becoming tearful when informed that [facility] is an acute care hospital, showing poor frustration tolerance. In light of patient's recent ED visits for aggression, patient to be monitored on ES with low threshold to initiate CO if displays persistent aggression or agitation towards staff/peers. Information regarding psychiatric history and home med regimen obtained from group home and previous outpatient psychiatric NP. Per ED documentation, patient may have missed several doses of her standing home medications due to her frequent ED visits, suspect that this may at least in part be contributing to her apparent decompensation. Patient requires inpatient hospitalization for safety and stabilization.     1. Legal status 9.39- Continue admission for safety and stabilization.  2. No CO 1:1 for now, continue ES.  3. Continue Thorazine 150mg qHS, Depakote DR 750mg BID, fluoxetine 40mg daily, haloperidol 5 daily and Depakote  mg bid  4. Reviewed ER documentation, appears limited benefit from Haldol 5 mg/Ativan 2mg/Benadryl 50 mg combination, as well as olanzapine. ER has used Versed on multiple occasions.  Will order chlorpromazine PRNs as documentation states outpatient team was wanting to switch her to this.  5. Medical- continue bromocriptine for hyperprolactinemia, metformin for diabetes, eye drops q6hrs and foot lotion BID as per documentation from group home, will consider offering Depo Provera given patient on Depakote  6. Spoke with outpatient psychiatric NP and group home for collateral, dispo likely back to group home pending symptomatic improvement    30F w/schizoaffective disorder and intellectual disability, admitted with recent disorganization and aggression. Patient continues to present as poor historian with labile and childlike affect. Becoming tearful when informed that [facility] is an acute care hospital, showing poor frustration tolerance. In light of patient's recent ED visits for aggression, patient to be monitored on ES with low threshold to initiate CO if displays persistent aggression or agitation towards staff/peers. Information regarding psychiatric history and home med regimen obtained from group home and previous outpatient psychiatric NP. Per ED documentation, patient may have missed several doses of her standing home medications due to her frequent ED visits, suspect that this may at least in part be contributing to her apparent decompensation. Patient requires inpatient hospitalization for safety and stabilization.     1. Legal status 9.39- Continue admission for safety and stabilization.  2. No CO 1:1 for now, continue ES.  3. Increase Thorazine to 200mg qHS, continue Depakote DR 750mg BID, fluoxetine 40mg daily, add Abilify 2mg daily starting 6/5  4. Reviewed ER documentation, appears limited benefit from Haldol 5 mg/Ativan 2mg/Benadryl 50 mg combination, as well as olanzapine. ER has used Versed on multiple occasions.  Will order chlorpromazine PRNs as documentation states outpatient team was wanting to switch her to this.  5. Medical- continue bromocriptine for hyperprolactinemia, metformin for diabetes, eye drops q6hrs and foot lotion BID as per documentation from group home, will consider offering Depo Provera given patient on Depakote  6. Spoke with outpatient psychiatric NP and group home for collateral, dispo likely back to group home pending symptomatic improvement    30F w/schizoaffective disorder and intellectual disability, admitted with recent disorganization and aggression. Patient continues to present as poor historian with labile and childlike affect. Becoming tearful when informed that [facility] is an acute care hospital, showing poor frustration tolerance. In light of patient's recent ED visits for aggression, patient to be monitored on ES with low threshold to initiate CO if displays persistent aggression or agitation towards staff/peers. Information regarding psychiatric history and home med regimen obtained from group home and previous outpatient psychiatric NP. Per ED documentation, patient may have missed several doses of her standing home medications due to her frequent ED visits, suspect that this may at least in part be contributing to her apparent decompensation. Patient requires inpatient hospitalization for safety and stabilization.     1. Legal status 9.39- Continue admission for safety and stabilization.  2. No CO 1:1 for now, continue ES.  3. Increase Thorazine to 200mg qHS, continue Depakote DR 750mg BID, fluoxetine 40mg daily, add Abilify 2mg daily starting 6/5  4. Reviewed ER documentation, appears limited benefit from Haldol 5 mg/Ativan 2mg/Benadryl 50 mg combination, as well as olanzapine. ER has used Versed on multiple occasions.  Will order chlorpromazine PRNs as documentation states outpatient team was wanting to switch her to this.  5. Medical- continue bromocriptine for hyperprolactinemia, metformin for diabetes, eye drops q6hrs and foot lotion BID as per documentation from group home, will consider offering Depo Provera given patient on Depakote, f/u prolactin level to be drawn 6/7  6. Spoke with outpatient psychiatric NP and group home for collateral, dispo likely back to group home pending symptomatic improvement

## 2021-06-05 LAB
GLUCOSE BLDC GLUCOMTR-MCNC: 81 MG/DL — SIGNIFICANT CHANGE UP (ref 70–99)
GLUCOSE BLDC GLUCOMTR-MCNC: 94 MG/DL — SIGNIFICANT CHANGE UP (ref 70–99)
GLUCOSE BLDC GLUCOMTR-MCNC: 97 MG/DL — SIGNIFICANT CHANGE UP (ref 70–99)

## 2021-06-05 PROCEDURE — 99232 SBSQ HOSP IP/OBS MODERATE 35: CPT

## 2021-06-05 RX ADMIN — Medication 2 MILLIGRAM(S): at 15:19

## 2021-06-05 RX ADMIN — Medication 50 MILLIGRAM(S): at 11:39

## 2021-06-05 RX ADMIN — DIVALPROEX SODIUM 750 MILLIGRAM(S): 500 TABLET, DELAYED RELEASE ORAL at 21:22

## 2021-06-05 RX ADMIN — Medication 40 MILLIGRAM(S): at 08:57

## 2021-06-05 RX ADMIN — OLANZAPINE 10 MILLIGRAM(S): 15 TABLET, FILM COATED ORAL at 11:38

## 2021-06-05 RX ADMIN — BROMOCRIPTINE MESYLATE 5 MILLIGRAM(S): 5 CAPSULE ORAL at 21:22

## 2021-06-05 RX ADMIN — ARIPIPRAZOLE 2 MILLIGRAM(S): 15 TABLET ORAL at 08:58

## 2021-06-05 RX ADMIN — Medication 1 APPLICATION(S): at 09:26

## 2021-06-05 RX ADMIN — Medication 50 MILLIGRAM(S): at 15:19

## 2021-06-05 RX ADMIN — DIVALPROEX SODIUM 750 MILLIGRAM(S): 500 TABLET, DELAYED RELEASE ORAL at 08:57

## 2021-06-05 RX ADMIN — Medication 1 MILLIGRAM(S): at 09:26

## 2021-06-05 RX ADMIN — Medication 1 MILLIGRAM(S): at 21:22

## 2021-06-05 RX ADMIN — BROMOCRIPTINE MESYLATE 5 MILLIGRAM(S): 5 CAPSULE ORAL at 08:58

## 2021-06-05 RX ADMIN — Medication 200 MILLIGRAM(S): at 21:22

## 2021-06-05 RX ADMIN — Medication 1 APPLICATION(S): at 21:22

## 2021-06-05 NOTE — BH INPATIENT PSYCHIATRY PROGRESS NOTE - NSBHFUPINTERVALHXFT_PSY_A_CORE
Patient seen in follow up for schizoaffective disorder and intellectual disability. No acute events overnight, patient did not receive any PRNs overnight.  This morning, patient reports she feels a little tired but continues to feel comfortable and likes being here.   Patient was encouraged to use coping skills including music and writing. No SI/HI or AH/VH elicited.

## 2021-06-05 NOTE — BH INPATIENT PSYCHIATRY PROGRESS NOTE - NSBHASSESSSUMMFT_PSY_ALL_CORE
30F w/schizoaffective disorder and intellectual disability, admitted with recent disorganization and aggression. Patient continues to present as poor historian with labile and childlike affect. Becoming tearful when informed that [facility] is an acute care hospital, showing poor frustration tolerance. In light of patient's recent ED visits for aggression, patient to be monitored on ES with low threshold to initiate CO if displays persistent aggression or agitation towards staff/peers. Information regarding psychiatric history and home med regimen obtained from group home and previous outpatient psychiatric NP. Per ED documentation, patient may have missed several doses of her standing home medications due to her frequent ED visits, suspect that this may at least in part be contributing to her apparent decompensation. Patient requires inpatient hospitalization for safety and stabilization.     1. Legal status 9.39- Continue admission for safety and stabilization.  2. No CO 1:1 for now, continue ES.  3. Increase Thorazine to 200mg qHS, continue Depakote DR 750mg BID, fluoxetine 40mg daily, add Abilify 2mg daily starting 6/5  4. Reviewed ER documentation, appears limited benefit from Haldol 5 mg/Ativan 2mg/Benadryl 50 mg combination, as well as olanzapine. ER has used Versed on multiple occasions.  Will order chlorpromazine PRNs as documentation states outpatient team was wanting to switch her to this.  5. Medical- continue bromocriptine for hyperprolactinemia, metformin for diabetes, eye drops q6hrs and foot lotion BID as per documentation from group home, will consider offering Depo Provera given patient on Depakote, f/u prolactin level to be drawn 6/7  6. Spoke with outpatient psychiatric NP and group home for collateral, dispo likely back to group home pending symptomatic improvement

## 2021-06-05 NOTE — BH INPATIENT PSYCHIATRY PROGRESS NOTE - MSE UNSTRUCTURED FT
Pt is dressed in gowns, somewhat unkempt.  Very childlike.  Speech melodious.  Mood is "excellent," affect is labile, alternately smiling and crying.  [place] TP, some loosening of associations.  TC: Denies AVH, SIIP, HIIP.  Insight and judgment poor, attention poor, gait intact, language fluent.

## 2021-06-06 LAB
GLUCOSE BLDC GLUCOMTR-MCNC: 113 MG/DL — HIGH (ref 70–99)
GLUCOSE BLDC GLUCOMTR-MCNC: 85 MG/DL — SIGNIFICANT CHANGE UP (ref 70–99)

## 2021-06-06 PROCEDURE — 99232 SBSQ HOSP IP/OBS MODERATE 35: CPT

## 2021-06-06 RX ADMIN — Medication 200 MILLIGRAM(S): at 21:18

## 2021-06-06 RX ADMIN — Medication 1 APPLICATION(S): at 09:45

## 2021-06-06 RX ADMIN — DIVALPROEX SODIUM 750 MILLIGRAM(S): 500 TABLET, DELAYED RELEASE ORAL at 21:18

## 2021-06-06 RX ADMIN — Medication 40 MILLIGRAM(S): at 08:19

## 2021-06-06 RX ADMIN — Medication 2 MILLIGRAM(S): at 09:32

## 2021-06-06 RX ADMIN — Medication 50 MILLIGRAM(S): at 09:33

## 2021-06-06 RX ADMIN — Medication 1 DROP(S): at 21:41

## 2021-06-06 RX ADMIN — OLANZAPINE 10 MILLIGRAM(S): 15 TABLET, FILM COATED ORAL at 17:53

## 2021-06-06 RX ADMIN — Medication 1 MILLIGRAM(S): at 22:21

## 2021-06-06 RX ADMIN — BROMOCRIPTINE MESYLATE 5 MILLIGRAM(S): 5 CAPSULE ORAL at 08:18

## 2021-06-06 RX ADMIN — OLANZAPINE 10 MILLIGRAM(S): 15 TABLET, FILM COATED ORAL at 11:40

## 2021-06-06 RX ADMIN — BROMOCRIPTINE MESYLATE 5 MILLIGRAM(S): 5 CAPSULE ORAL at 21:18

## 2021-06-06 RX ADMIN — Medication 50 MILLIGRAM(S): at 23:33

## 2021-06-06 RX ADMIN — Medication 50 MILLIGRAM(S): at 17:53

## 2021-06-06 RX ADMIN — Medication 1 MILLIGRAM(S): at 08:21

## 2021-06-06 RX ADMIN — Medication 2 MILLIGRAM(S): at 23:34

## 2021-06-06 RX ADMIN — ARIPIPRAZOLE 2 MILLIGRAM(S): 15 TABLET ORAL at 08:19

## 2021-06-06 RX ADMIN — Medication 50 MILLIGRAM(S): at 15:29

## 2021-06-06 RX ADMIN — Medication 2 MILLIGRAM(S): at 15:30

## 2021-06-06 RX ADMIN — DIVALPROEX SODIUM 750 MILLIGRAM(S): 500 TABLET, DELAYED RELEASE ORAL at 08:29

## 2021-06-06 RX ADMIN — Medication 50 MILLIGRAM(S): at 08:21

## 2021-06-06 NOTE — BH INPATIENT PSYCHIATRY PROGRESS NOTE - NSBHFUPINTERVALHXFT_PSY_A_CORE
Patient seen in follow up for schizoaffective disorder and intellectual disability. No acute events overnight, however patient did receive prns of Olanzapine, Ativan, and Benadryl today during the day.   This morning, patient was feeling impatient requesting to watch TV in the game room. Patient was encouraged to use coping skills including music and writing. No SI/HI or AH/VH elicited.

## 2021-06-06 NOTE — BH INPATIENT PSYCHIATRY PROGRESS NOTE - MSE UNSTRUCTURED FT
Pt is dressed and sitting with staff.  Her speech is goal directed.  Mood is "excellent," affect is labile, alternately smiling and crying.  [place] TP, some loosening of associations.  TC: Denies AVH, SIIP, HIIP.  Insight and judgment poor, attention poor, gait intact, language fluent.

## 2021-06-06 NOTE — BH INPATIENT PSYCHIATRY PROGRESS NOTE - NSBHASSESSSUMMFT_PSY_ALL_CORE
The patient is a 30yr old woman w/schizoaffective disorder and intellectual disability, admitted with recent disorganization and aggression. Patient continues to present as poor historian with labile and childlike affect. Becoming tearful when informed that [facility] is an acute care hospital, showing poor frustration tolerance. In light of patient's recent ED visits for aggression, patient to be monitored on ES with low threshold to initiate CO if displays persistent aggression or agitation towards staff/peers. Information regarding psychiatric history and home med regimen obtained from group home and previous outpatient psychiatric NP. Per ED documentation, patient may have missed several doses of her standing home medications due to her frequent ED visits, suspect that this may at least in part be contributing to her apparent decompensation. Patient requires inpatient hospitalization for safety and stabilization.     1. Legal status 9.39- Continue admission for safety and stabilization.  2. No CO 1:1 for now, continue ES.  3. Increase Thorazine to 200mg qHS, continue Depakote DR 750mg BID, fluoxetine 40mg daily, add Abilify 2mg daily starting 6/5  4. Reviewed ER documentation, appears limited benefit from Haldol 5 mg/Ativan 2mg/Benadryl 50 mg combination, as well as olanzapine. ER has used Versed on multiple occasions.  Will order chlorpromazine PRNs as documentation states outpatient team was wanting to switch her to this.  5. Medical- continue bromocriptine for hyperprolactinemia, metformin for diabetes, eye drops q6hrs and foot lotion BID as per documentation from group home, will consider offering Depo Provera given patient on Depakote, f/u prolactin level to be drawn 6/7  6. Spoke with outpatient psychiatric NP and group home for collateral, dispo likely back to group home pending symptomatic improvement

## 2021-06-07 LAB
GLUCOSE BLDC GLUCOMTR-MCNC: 111 MG/DL — HIGH (ref 70–99)
GLUCOSE BLDC GLUCOMTR-MCNC: 121 MG/DL — HIGH (ref 70–99)
GLUCOSE BLDC GLUCOMTR-MCNC: 78 MG/DL — SIGNIFICANT CHANGE UP (ref 70–99)
PROLACTIN SERPL-MCNC: 10.5 NG/ML — SIGNIFICANT CHANGE UP (ref 3.4–24.1)

## 2021-06-07 PROCEDURE — 99232 SBSQ HOSP IP/OBS MODERATE 35: CPT

## 2021-06-07 RX ORDER — OLANZAPINE 15 MG/1
10 TABLET, FILM COATED ORAL ONCE
Refills: 0 | Status: COMPLETED | OUTPATIENT
Start: 2021-06-07 | End: 2021-06-07

## 2021-06-07 RX ADMIN — OLANZAPINE 10 MILLIGRAM(S): 15 TABLET, FILM COATED ORAL at 12:45

## 2021-06-07 RX ADMIN — Medication 1 APPLICATION(S): at 08:42

## 2021-06-07 RX ADMIN — ARIPIPRAZOLE 2 MILLIGRAM(S): 15 TABLET ORAL at 08:41

## 2021-06-07 RX ADMIN — Medication 50 MILLIGRAM(S): at 07:44

## 2021-06-07 RX ADMIN — DIVALPROEX SODIUM 750 MILLIGRAM(S): 500 TABLET, DELAYED RELEASE ORAL at 08:42

## 2021-06-07 RX ADMIN — Medication 1 MILLIGRAM(S): at 08:41

## 2021-06-07 RX ADMIN — DIVALPROEX SODIUM 750 MILLIGRAM(S): 500 TABLET, DELAYED RELEASE ORAL at 20:28

## 2021-06-07 RX ADMIN — BROMOCRIPTINE MESYLATE 5 MILLIGRAM(S): 5 CAPSULE ORAL at 08:41

## 2021-06-07 RX ADMIN — Medication 200 MILLIGRAM(S): at 20:28

## 2021-06-07 RX ADMIN — Medication 50 MILLIGRAM(S): at 12:45

## 2021-06-07 RX ADMIN — Medication 50 MILLIGRAM(S): at 18:45

## 2021-06-07 RX ADMIN — BROMOCRIPTINE MESYLATE 5 MILLIGRAM(S): 5 CAPSULE ORAL at 20:28

## 2021-06-07 RX ADMIN — Medication 1 APPLICATION(S): at 20:27

## 2021-06-07 RX ADMIN — Medication 1 MILLIGRAM(S): at 20:28

## 2021-06-07 RX ADMIN — Medication 2 MILLIGRAM(S): at 12:45

## 2021-06-07 RX ADMIN — Medication 40 MILLIGRAM(S): at 08:42

## 2021-06-07 RX ADMIN — Medication 2 MILLIGRAM(S): at 07:45

## 2021-06-07 RX ADMIN — OLANZAPINE 10 MILLIGRAM(S): 15 TABLET, FILM COATED ORAL at 15:28

## 2021-06-07 NOTE — BH INPATIENT PSYCHIATRY PROGRESS NOTE - MSE UNSTRUCTURED FT
Patient is dressed in hospital scrubs, appears stated age. Fair grooming and hygiene. Fair eye contact, no involuntary movements, somewhat irritable. Speech with normal volume, normal rate. Mood is "ok" Affect is dysphoric, constricted, some lability, congruent with stated mood. Thought process is goal-directed, some loosening of associations. Thought content without any overt delusional thinking.  Denies SI/HI, no AVH.  Insight and judgment poor, attention poor, gait intact, language fluent.

## 2021-06-07 NOTE — BH INPATIENT PSYCHIATRY PROGRESS NOTE - CASE SUMMARY
30F w/schizoaffective disorder and intellectual disability, admitted with recent disorganization and aggression    Pt remains regressed and requesting to "stay here forever!"  Had episode of agitation when informed she couldn't stay hospitalized indefinitely.       PLAN  1. Legal status 9.39- Continue admission for safety and stabilization.  2. No CO 1:1 for now, continue ES.  3. Continue Depakote  mg bid, increase thorazine to  200 mg qHS and add low dose abilify.   30F w/schizoaffective disorder and intellectual disability, admitted with recent disorganization and aggression  Pt remains regressed and requesting to "stay here forever!"  Had episode of agitation when informed she couldn't stay hospitalized indefinitely.     Patient was initially cooperative but again abruptly walked out of interview when she was informed she couldn't "live here"  PLAN  1. Legal status 9.39- Continue admission for safety and stabilization.  2. No CO 1:1 for now, continue ES.  3. Continue Depakote  mg bid, increase thorazine to  200 mg qHS and add low dose abilify.

## 2021-06-07 NOTE — BH INPATIENT PSYCHIATRY PROGRESS NOTE - NSBHASSESSSUMMFT_PSY_ALL_CORE
The patient is a 30yr old woman w/schizoaffective disorder and intellectual disability, admitted with recent disorganization and aggression. Patient continues to present as poor historian with labile and childlike affect. Becoming tearful when informed that [facility] is an acute care hospital, showing poor frustration tolerance. In light of patient's recent ED visits for aggression, patient to be monitored on ES with low threshold to initiate CO if displays persistent aggression or agitation towards staff/peers. Information regarding psychiatric history and home med regimen obtained from group home and previous outpatient psychiatric NP. Per ED documentation, patient may have missed several doses of her standing home medications due to her frequent ED visits, suspect that this may at least in part be contributing to her apparent decompensation. Patient requires inpatient hospitalization for safety and stabilization.     6/7: some irritability, refuses to return to her previous group home    Plan:  1. Legal: Legal status 9.39- Continue admission for safety and stabilization.  2. Safety: No CO 1:1 for now, continue ES.  3. Psychiatric: Continue Thorazine 200mg qHS, continue Depakote DR 750mg BID, fluoxetine 40mg daily, continue Abilify 2mg daily starting 6/5. Reviewed ER documentation, appears limited benefit from Haldol 5 mg/Ativan 2mg/Benadryl 50 mg combination, as well as olanzapine. ER has used Versed on multiple occasions.  Will order chlorpromazine PRNs as documentation states outpatient team was wanting to switch her to this.  4. Medical- continue bromocriptine for hyperprolactinemia, metformin for diabetes, eye drops q6hrs and foot lotion BID as per documentation from group home, will consider offering Depo Provera given patient on Depakote, f/u prolactin level to be drawn 6/7  5. I/G/M therapy as appropriate   6. Spoke with outpatient psychiatric NP and group home for collateral, dispo likely back to group home pending symptomatic improvement
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
## 2021-06-07 NOTE — BH INPATIENT PSYCHIATRY PROGRESS NOTE - MODIFICATIONS
Patient interviewed and evaluated with resident present to follow up on symptoms and the case has been reviewed with resident and treatment team this morning.   I was physically present during the service to the patient and personally examined the patient and I was directly involved in the management plan and recommendations of the care provided to the patient.  I have reviewed the resident's progress note and the mental status examination and I agree with its contents and treatment plan and I have made modifications to the note when needed and as indicated.

## 2021-06-08 LAB
GLUCOSE BLDC GLUCOMTR-MCNC: 114 MG/DL — HIGH (ref 70–99)
GLUCOSE BLDC GLUCOMTR-MCNC: 77 MG/DL — SIGNIFICANT CHANGE UP (ref 70–99)
GLUCOSE BLDC GLUCOMTR-MCNC: 93 MG/DL — SIGNIFICANT CHANGE UP (ref 70–99)

## 2021-06-08 PROCEDURE — 90832 PSYTX W PT 30 MINUTES: CPT

## 2021-06-08 PROCEDURE — 99231 SBSQ HOSP IP/OBS SF/LOW 25: CPT

## 2021-06-08 RX ORDER — CHLORPROMAZINE HCL 10 MG
50 TABLET ORAL ONCE
Refills: 0 | Status: COMPLETED | OUTPATIENT
Start: 2021-06-08 | End: 2021-06-08

## 2021-06-08 RX ORDER — CHLORPROMAZINE HCL 10 MG
100 TABLET ORAL DAILY
Refills: 0 | Status: DISCONTINUED | OUTPATIENT
Start: 2021-06-09 | End: 2021-06-14

## 2021-06-08 RX ORDER — CHLORPROMAZINE HCL 10 MG
50 TABLET ORAL ONCE
Refills: 0 | Status: DISCONTINUED | OUTPATIENT
Start: 2021-06-08 | End: 2021-06-14

## 2021-06-08 RX ORDER — CHLORPROMAZINE HCL 10 MG
100 TABLET ORAL EVERY 4 HOURS
Refills: 0 | Status: DISCONTINUED | OUTPATIENT
Start: 2021-06-08 | End: 2021-06-14

## 2021-06-08 RX ORDER — CHLORPROMAZINE HCL 10 MG
100 TABLET ORAL
Refills: 0 | Status: DISCONTINUED | OUTPATIENT
Start: 2021-06-08 | End: 2021-06-14

## 2021-06-08 RX ORDER — ARIPIPRAZOLE 15 MG/1
5 TABLET ORAL DAILY
Refills: 0 | Status: DISCONTINUED | OUTPATIENT
Start: 2021-06-09 | End: 2021-06-14

## 2021-06-08 RX ORDER — CHLORPROMAZINE HCL 10 MG
100 TABLET ORAL DAILY
Refills: 0 | Status: DISCONTINUED | OUTPATIENT
Start: 2021-06-08 | End: 2021-06-08

## 2021-06-08 RX ADMIN — Medication 100 MILLIGRAM(S): at 15:21

## 2021-06-08 RX ADMIN — OLANZAPINE 10 MILLIGRAM(S): 15 TABLET, FILM COATED ORAL at 07:31

## 2021-06-08 RX ADMIN — Medication 2 MILLIGRAM(S): at 07:31

## 2021-06-08 RX ADMIN — Medication 2 MILLIGRAM(S): at 21:12

## 2021-06-08 RX ADMIN — Medication 40 MILLIGRAM(S): at 08:03

## 2021-06-08 RX ADMIN — Medication 2 MILLIGRAM(S): at 15:21

## 2021-06-08 RX ADMIN — Medication 200 MILLIGRAM(S): at 20:56

## 2021-06-08 RX ADMIN — Medication 50 MILLIGRAM(S): at 15:21

## 2021-06-08 RX ADMIN — OLANZAPINE 10 MILLIGRAM(S): 15 TABLET, FILM COATED ORAL at 01:22

## 2021-06-08 RX ADMIN — Medication 100 MILLIGRAM(S): at 11:56

## 2021-06-08 RX ADMIN — Medication 50 MILLIGRAM(S): at 07:31

## 2021-06-08 RX ADMIN — Medication 1 MILLIGRAM(S): at 08:03

## 2021-06-08 RX ADMIN — DIVALPROEX SODIUM 750 MILLIGRAM(S): 500 TABLET, DELAYED RELEASE ORAL at 08:02

## 2021-06-08 RX ADMIN — Medication 1 APPLICATION(S): at 08:04

## 2021-06-08 RX ADMIN — BROMOCRIPTINE MESYLATE 5 MILLIGRAM(S): 5 CAPSULE ORAL at 08:03

## 2021-06-08 RX ADMIN — DIVALPROEX SODIUM 750 MILLIGRAM(S): 500 TABLET, DELAYED RELEASE ORAL at 20:56

## 2021-06-08 RX ADMIN — Medication 1 MILLIGRAM(S): at 20:56

## 2021-06-08 RX ADMIN — Medication 1 APPLICATION(S): at 21:46

## 2021-06-08 RX ADMIN — ARIPIPRAZOLE 2 MILLIGRAM(S): 15 TABLET ORAL at 08:02

## 2021-06-08 RX ADMIN — OLANZAPINE 10 MILLIGRAM(S): 15 TABLET, FILM COATED ORAL at 15:21

## 2021-06-08 RX ADMIN — Medication 50 MILLIGRAM(S): at 09:39

## 2021-06-08 NOTE — BH INPATIENT PSYCHIATRY PROGRESS NOTE - CASE SUMMARY
30F w/schizoaffective disorder and intellectual disability, admitted with recent disorganization and aggression  Pt remains regressed and requesting to "stay here forever!"  Had episode of agitation when informed she couldn't stay hospitalized indefinitely.     Patient was initially cooperative but again abruptly walked out of interview when she was informed she couldn't "live here"  PLAN  1. Legal status 9.39- Continue admission for safety and stabilization.  2. No CO 1:1 for now, continue ES.  3. Continue Depakote  mg bid, increase thorazine to  200 mg qHS and add low dose abilify.   30F w/schizoaffective disorder and intellectual disability, admitted with recent disorganization and aggression  Pt remains regressed and requesting to "stay here forever!"  Had episode of agitation when informed she couldn't stay hospitalized indefinitely.     Patient was initially cooperative but again abruptly walked out of interview when she was informed she couldn't "live here"  PLAN  1. Legal status 9.39- Continue admission for safety and stabilization.  2. No CO 1:1 for now, continue ES.  3. Continue Depakote  mg bid, increase thorazine to  100/100/200 mg and add low dose abilify.

## 2021-06-08 NOTE — BH INPATIENT PSYCHIATRY PROGRESS NOTE - NSBHASSESSSUMMFT_PSY_ALL_CORE
The patient is a 30yr old woman w/schizoaffective disorder and intellectual disability, admitted with recent disorganization and aggression. Patient continues to present as poor historian with labile and childlike affect. Becoming tearful when informed that [facility] is an acute care hospital, showing poor frustration tolerance. In light of patient's recent ED visits for aggression, patient to be monitored on ES with low threshold to initiate CO if displays persistent aggression or agitation towards staff/peers. Information regarding psychiatric history and home med regimen obtained from group home and previous outpatient psychiatric NP. Per ED documentation, patient may have missed several doses of her standing home medications due to her frequent ED visits, suspect that this may at least in part be contributing to her apparent decompensation. Patient requires inpatient hospitalization for safety and stabilization.     6/7: some irritability, refuses to return to her previous group home    Plan:  1. Legal: Legal status 9.39- Continue admission for safety and stabilization.  2. Safety: No CO 1:1 for now, continue ES.  3. Psychiatric: Continue Thorazine 200mg qHS, continue Depakote DR 750mg BID, fluoxetine 40mg daily, continue Abilify 2mg daily starting 6/5. Reviewed ER documentation, appears limited benefit from Haldol 5 mg/Ativan 2mg/Benadryl 50 mg combination, as well as olanzapine. ER has used Versed on multiple occasions.  Will order chlorpromazine PRNs as documentation states outpatient team was wanting to switch her to this.  4. Medical- continue bromocriptine for hyperprolactinemia, metformin for diabetes, eye drops q6hrs and foot lotion BID as per documentation from group home, will consider offering Depo Provera given patient on Depakote, f/u prolactin level to be drawn 6/7  5. I/G/M therapy as appropriate   6. Spoke with outpatient psychiatric NP and group home for collateral, dispo likely back to group home pending symptomatic improvement    The patient is a 30yr old woman w/schizoaffective disorder and intellectual disability, admitted with recent disorganization and aggression. Patient continues to present as poor historian with labile and childlike affect. Becoming tearful when informed that [facility] is an acute care hospital, showing poor frustration tolerance. In light of patient's recent ED visits for aggression, patient to be monitored on ES with low threshold to initiate CO if displays persistent aggression or agitation towards staff/peers. Information regarding psychiatric history and home med regimen obtained from group home and previous outpatient psychiatric NP. Per ED documentation, patient may have missed several doses of her standing home medications due to her frequent ED visits, suspect that this may at least in part be contributing to her apparent decompensation. Patient requires inpatient hospitalization for safety and stabilization.     6/7: some irritability, refuses to return to her previous group home  6/8: irritable, refuses to engage in interview    Plan:  1. Legal: Legal status 9.39  2. Safety: No CO 1:1 for now, continue ES.  3. Psychiatric: Start Thorazine 100 mg daily, 100 mg at 1300.  Continue Thorazine 200mg qHS, continue Depakote DR 750mg BID, fluoxetine 40mg daily, continue Abilify 2mg daily starting 6/5. Reviewed ER documentation, appears limited benefit from Haldol 5 mg/Ativan 2mg/Benadryl 50 mg combination, as well as olanzapine. ER has used Versed on multiple occasions.  Will order chlorpromazine PRNs as documentation states outpatient team was wanting to switch her to this. PRNs: Thorazine 50 mg IM / 100 mg PO, Benadryl 50 mg PO/IM, Zyprexa 10 mg PO/IM for agitation  4. Medical- continue bromocriptine for hyperprolactinemia, metformin for diabetes, eye drops q6hrs and foot lotion BID as per documentation from group home, will consider offering Depo Provera given patient on Depakote, f/u prolactin level to be drawn 6/7  5. I/G/M therapy as appropriate   6. Spoke with outpatient psychiatric NP and group home for collateral, dispo likely back to group home pending symptomatic improvement

## 2021-06-08 NOTE — BH PSYCHOLOGY - CLINICIAN PSYCHOTHERAPY NOTE - NSBHPSYCHOLRESPONSE_PSY_A_CORE FT
Patient refused to verbally engage appropriately with the writer, pretending to cup her ears and walked away several times.

## 2021-06-08 NOTE — BH INPATIENT PSYCHIATRY PROGRESS NOTE - NSBHFUPINTERVALHXFT_PSY_A_CORE
Patient seen in follow up for schizoaffective disorder and intellectual disability. The case was reviewed and discussed with the interdisciplinary team. The patient received PRNs of thorazine, Ativan, Zyprexa, and Benadryl this am. She took all standing medications. She slept approximately 7 hours last night.    This morning, patient notes  Patient seen in follow up for schizoaffective disorder and intellectual disability. The case was reviewed and discussed with the interdisciplinary team. The patient received PRNs of thorazine, Ativan, Zyprexa, and Benadryl this am. She took all standing medications. She slept approximately 7 hours last night.    This morning, patient notes that she was loud because "Neil passed away from diabetes." Pt unable to elaborate more on who Neil is. She states that she sees Dr. Portillo as her PCP. Pt terminates interview abruptly.  Patient seen in follow up for schizoaffective disorder and intellectual disability. The case was reviewed and discussed with the interdisciplinary team. The patient received PRNs of thorazine, Ativan, Zyprexa, and Benadryl this am. She took all standing medications. She slept approximately 7 hours last night.    This morning, patient notes that she was loud because "Neil passed away from diabetes." Pt unable to elaborate more on who Neil is. She states that she sees Dr. Portillo as her PCP. Pt terminates interview abruptly.     Collateral obtained from patient's  Staff, ADRY Perdomo:  Per Conor, patient last had a physical in February 2021. Previous documentation from January 2021 note that she was prescribed Depakote at that time for behavioral disturbance. In February 2020, she had an EEG done which was normal and without any epileptic activity. She had records indicating that she had a hx of a seizure disorder, though no detailed evaluations were given. The pt also had a documented history of DM2, however labwork from 2019 showed that she had an A1C of 4.9. Conor reports that she has a hx of obesity and was likely prescribed Metformin for weight loss, though has not been recently prescribed it.

## 2021-06-08 NOTE — BH INPATIENT PSYCHIATRY PROGRESS NOTE - NSCGISEVERILLNESS_PSY_ALL_CORE
5 = Markedly ill - intrusive symptoms that distinctly impair social/occupational function or cause intrusive levels of distress 6 = Severely ill - disruptive pathology, behavior and function are frequently influenced by symptoms, may require assistance from others

## 2021-06-08 NOTE — BH INPATIENT PSYCHIATRY PROGRESS NOTE - NSBHINPTBILLING_PSY_ALL_CORE
Test blood sugars twice daily in morning before eating and two hours after eating one meal.  Write blood sugars in log book.    Exercise - walk for 10-15 minutes 4 times per week. [facility]    Aim for 2-4 carbohydrate choices at meals, snacks should be 0-2 carbohydrate choices. Meals should be 4-5 hours apart, if snacks are included they need to be at least 2 hours before or after the previous meal.      Bring meter and log book to all diabetes education and doctor appointments.    Call SHILA Solis or Lolis CHAMORRO at the [facility] with any question or concerns.   [facility] - [phone].   98147 - Inpatient Moderate Complexity 40466 - Inpatient Low Complexity

## 2021-06-08 NOTE — BH INPATIENT PSYCHIATRY PROGRESS NOTE - MSE UNSTRUCTURED FT
Patient is dressed in hospital scrubs, appears stated age. Fair grooming and hygiene. Fair eye contact, no involuntary movements, somewhat irritable. Speech with normal volume, normal rate. Mood is "ok" Affect is dysphoric, constricted, some lability, congruent with stated mood. Thought process is goal-directed, some loosening of associations. Thought content without any overt delusional thinking.  Denies SI/HI, no AVH.  Insight and judgment poor, attention poor, gait intact, language fluent. Patient is dressed in hospital scrubs, appears stated age. Fair grooming and hygiene. Fair eye contact, no involuntary movements, irritable. Speech with loud volume, normal rate. Mood is "---" Affect is dysphoric, constricted, some lability, congruent with stated mood. Thought process is goal-directed, some loosening of associations. Thought content without any overt delusional thinking.  Denies SI/HI, no AVH.  Insight and judgment poor, attention poor, gait intact, language fluent.

## 2021-06-09 LAB
GLUCOSE BLDC GLUCOMTR-MCNC: 104 MG/DL — HIGH (ref 70–99)
GLUCOSE BLDC GLUCOMTR-MCNC: 86 MG/DL — SIGNIFICANT CHANGE UP (ref 70–99)

## 2021-06-09 PROCEDURE — 99231 SBSQ HOSP IP/OBS SF/LOW 25: CPT

## 2021-06-09 RX ORDER — SOD,AMMONIUM,POTASSIUM LACTATE
1 CREAM (GRAM) TOPICAL
Qty: 0 | Refills: 0 | DISCHARGE
Start: 2021-06-09

## 2021-06-09 RX ORDER — ARIPIPRAZOLE 15 MG/1
1 TABLET ORAL
Qty: 14 | Refills: 0
Start: 2021-06-09 | End: 2021-06-22

## 2021-06-09 RX ORDER — DIVALPROEX SODIUM 500 MG/1
3 TABLET, DELAYED RELEASE ORAL
Qty: 84 | Refills: 0
Start: 2021-06-09 | End: 2021-06-22

## 2021-06-09 RX ORDER — CHLORPROMAZINE HCL 10 MG
1 TABLET ORAL
Qty: 28 | Refills: 0
Start: 2021-06-09 | End: 2021-06-22

## 2021-06-09 RX ORDER — BENZTROPINE MESYLATE 1 MG
1 TABLET ORAL
Qty: 28 | Refills: 0
Start: 2021-06-09 | End: 2021-06-22

## 2021-06-09 RX ORDER — CHLORPROMAZINE HCL 10 MG
1 TABLET ORAL
Qty: 14 | Refills: 0
Start: 2021-06-09 | End: 2021-06-22

## 2021-06-09 RX ORDER — FLUOXETINE HCL 10 MG
1 CAPSULE ORAL
Qty: 14 | Refills: 0
Start: 2021-06-09 | End: 2021-06-22

## 2021-06-09 RX ADMIN — Medication 1 MILLIGRAM(S): at 08:50

## 2021-06-09 RX ADMIN — Medication 1 APPLICATION(S): at 22:39

## 2021-06-09 RX ADMIN — Medication 2 MILLIGRAM(S): at 22:39

## 2021-06-09 RX ADMIN — DIVALPROEX SODIUM 750 MILLIGRAM(S): 500 TABLET, DELAYED RELEASE ORAL at 08:50

## 2021-06-09 RX ADMIN — OLANZAPINE 10 MILLIGRAM(S): 15 TABLET, FILM COATED ORAL at 07:30

## 2021-06-09 RX ADMIN — Medication 1 APPLICATION(S): at 09:00

## 2021-06-09 RX ADMIN — Medication 50 MILLIGRAM(S): at 13:11

## 2021-06-09 RX ADMIN — DIVALPROEX SODIUM 750 MILLIGRAM(S): 500 TABLET, DELAYED RELEASE ORAL at 22:37

## 2021-06-09 RX ADMIN — Medication 50 MILLIGRAM(S): at 07:30

## 2021-06-09 RX ADMIN — Medication 2 MILLIGRAM(S): at 18:26

## 2021-06-09 RX ADMIN — ARIPIPRAZOLE 5 MILLIGRAM(S): 15 TABLET ORAL at 08:50

## 2021-06-09 RX ADMIN — Medication 40 MILLIGRAM(S): at 08:50

## 2021-06-09 RX ADMIN — Medication 2 MILLIGRAM(S): at 07:30

## 2021-06-09 RX ADMIN — Medication 100 MILLIGRAM(S): at 08:50

## 2021-06-09 RX ADMIN — Medication 100 MILLIGRAM(S): at 12:33

## 2021-06-09 RX ADMIN — Medication 2 MILLIGRAM(S): at 12:33

## 2021-06-09 RX ADMIN — OLANZAPINE 10 MILLIGRAM(S): 15 TABLET, FILM COATED ORAL at 13:48

## 2021-06-09 RX ADMIN — Medication 100 MILLIGRAM(S): at 16:39

## 2021-06-09 RX ADMIN — Medication 200 MILLIGRAM(S): at 22:36

## 2021-06-09 RX ADMIN — Medication 1 MILLIGRAM(S): at 22:38

## 2021-06-09 NOTE — BH PSYCHOLOGY - CLINICIAN PSYCHOTHERAPY NOTE - NSBHPSYCHOLNARRATIVE_PSY_A_CORE FT
Supervising Psychologist reviewed the behavior plan with the treatment team. Team met with the patient to discuss the behavior plan. Patient stated that she looked forward to doing the worksheets and that, "it was easy." Patient also declared that she wanted to leave the unit to go home and to be discharged today. Team gently explained that she made this declaration last evening and that several things would need to get done in the meantime. Team also explained that the team would work on their part for discharge and that she would have to do her part as well; specifically, working on appropriate behaviors for discharge. Patient requested the worksheets which would be generously provided by the psych rehab therapists. Patient did not have any questions about the behavior plan.    Behavior Plan  Patient Name: 	Eloise VILLEGAS  Date of Plan:	6/9/21    Explain the behavior you want to change: Ms. Villegas has been demonstrating loud behaviors (i.e., yelling, screaming, false crying).  Reasons for the behavior: Ms. Villegas has had difficulties engaging in treatment compliance, loudly declaring she wanted to stay at the hospital.   Appropriate replacement behavior(s):   ?	Verbally appropriate engagement with staff and peers  ?	Positive engagement in her treatment including demonstrating appropriate behaviors  Support – How to make the adaptive/healthy behavior happen more often:  ?	The following schedule applies:  o	Wake-up to Lunch: She will conduct herself appropriately including following staff redirection, no yelling/fake crying, taking her medications, and completion of worksheets (5 multiplication / 5 spelling). She will then receive 1 Ensure and have a fresh air break with music. However, if she conducts herself poorly, she will not receive the reward.  o	After Lunch to Dinner: She will conduct herself appropriately including following staff redirection, no yelling/fake crying, taking her medications, and completion of worksheets (5 multiplication / 5 spelling). She will then receive 1 Ensure and have a fresh air break with music. However, if she conducts herself poorly, she will not receive the reward.  o	After Dinner to Bedtime: She will conduct herself appropriately including following staff redirection, no yelling/fake crying, and taking her medications. She will then receive 1 Ensure and have a fresh air break with music. However, if she conducts herself poorly, she will not receive the reward.  o	If there is a question regarding the Behavior Plan, the Team will have Final Decision.    **Due to the Patient’s past history of aggression and Difficulties Engaging Appropriately: If Patient becomes verbally/physically aggressive towards staff/patients/self OR refuses to follow staff redirection:  o	Staff will ask Patient to go to room and redirect three times (allowing 30 seconds each time) to comply;  o	If Patient does not comply after the Third Request:  •	Call Support Team  •	Escort to room (or seclusion room)  •	Prepare oral (and IM) PRN medications and encourage oral PRN medications first: one offer.  o	If Patient still does not comply, then:  •	Call Psych Emergency  •	Escort to room (or seclusion room)  •	Encourage IM medications  (Restraints / Seclusion to be used to maintain safety of the unit and as a last resort.)    Evaluate – How will you know if it is working: Ms. Villegas will be able to demonstrate improved positive progress and engagement in her treatment.   
Supervising Psychologist met with the Patient. Patient was unable to tolerate the questions posed to her, becoming loud and screaming at the writer. Writer explained the purpose of the questions were to find out her likes and dislikes. Writer also explained that a behavior plan would begin tomorrow morning and it was important for this writer to learn what she liked; patient was not cooperative, walking away from the writer, screaming in a high pitched voice. Writer outlined the schedule for the behavior plan including completion of worksheets as well as verbally appropriate behaviors and conveyed the expectations of the treatment team. Patient then walked to the phone declaring, "I'm going to shannon you."

## 2021-06-09 NOTE — BH INPATIENT PSYCHIATRY DISCHARGE NOTE - NSBHMETABOLIC_PSY_ALL_CORE_FT
BMI: BMI (kg/m2): 31.3 (06-01-21 @ 16:20)  HbA1c: A1C with Estimated Average Glucose Result: 5.2 % (06-02-21 @ 10:04)    Glucose: POCT Blood Glucose.: 86 mg/dL (06-09-21 @ 07:52)    BP: --  Lipid Panel:

## 2021-06-09 NOTE — BH INPATIENT PSYCHIATRY DISCHARGE NOTE - OTHER PAST PSYCHIATRIC HISTORY (INCLUDE DETAILS REGARDING ONSET, COURSE OF ILLNESS, INPATIENT/OUTPATIENT TREATMENT)
Per EMR, pt is developmentally delayed, has a hx of schizoaffective d/o delusional behaviors, has 1 prior inpt hospitalization last 11/20-1/21 (facility unk), had over 10 ED visits @ [facility] in the past several months due to increased aggression at the group home. Pt has a hx of violent aggressive behavior towards others, was in tx with a [facility] np [place] for several yrs however was advised pt recently changed outpt provider to [facility] clinic ,pt was currently hospitalized before medication regimen began, per EMR pt has no hx of SA/SI, has AH/VH, delusions, paranoia, disorganized thoughts, has childlike demeanor and needs constant redirecting when agitated, becomes combative when symptomatic and endorsed thoughts of hurting others. Pt has no hx of substance use, no legal hx, has a medical hx of diabetes

## 2021-06-09 NOTE — BH TREATMENT PLAN - NSTXDCFAMINTERSW_PSY_ALL_CORE
SW attempted to engage pt however was too agitated and symptomatic to participate in interview, pt has limited intellectual ability
SW attempted to engage pt however was too agitated and symptomatic to participate in interview, pt has limited intellectual ability

## 2021-06-09 NOTE — BH INPATIENT PSYCHIATRY DISCHARGE NOTE - NSDCCPCAREPLAN_GEN_ALL_CORE_FT
PRINCIPAL DISCHARGE DIAGNOSIS  Diagnosis: Schizoaffective disorder  Assessment and Plan of Treatment:       SECONDARY DISCHARGE DIAGNOSES  Diagnosis: Intellectual disability  Assessment and Plan of Treatment:     Diagnosis: History of seizure disorder  Assessment and Plan of Treatment:

## 2021-06-09 NOTE — BH TREATMENT PLAN - NSCMSPTSTRENGTHS_PSY_ALL_CORE
Pt unable to respond logically during interview/Expressive of emotions/Self confidence/Unable to obtain (specify)
Pt unable to respond logically during interview/Expressive of emotions/Physically healthy/Self confidence

## 2021-06-09 NOTE — BH INPATIENT PSYCHIATRY PROGRESS NOTE - NSBHASSESSSUMMFT_PSY_ALL_CORE
The patient is a 30yr old woman w/schizoaffective disorder and intellectual disability, admitted with recent disorganization and aggression. Patient continues to present as poor historian with labile and childlike affect. Becoming tearful when informed that [facility] is an acute care hospital, showing poor frustration tolerance. In light of patient's recent ED visits for aggression, patient to be monitored on ES with low threshold to initiate CO if displays persistent aggression or agitation towards staff/peers. Information regarding psychiatric history and home med regimen obtained from group home and previous outpatient psychiatric NP. Per ED documentation, patient may have missed several doses of her standing home medications due to her frequent ED visits, suspect that this may at least in part be contributing to her apparent decompensation. Patient requires inpatient hospitalization for safety and stabilization.     6/7: some irritability, refuses to return to her previous group home  6/8: irritable, refuses to engage in interview    Plan:  1. Legal: Legal status 9.39  2. Safety: No CO 1:1 for now, continue ES.  3. Psychiatric: Start Thorazine 100 mg daily, 100 mg at 1300.  Continue Thorazine 200mg qHS, continue Depakote DR 750mg BID, fluoxetine 40mg daily, continue Abilify 2mg daily starting 6/5. Reviewed ER documentation, appears limited benefit from Haldol 5 mg/Ativan 2mg/Benadryl 50 mg combination, as well as olanzapine. ER has used Versed on multiple occasions.  Will order chlorpromazine PRNs as documentation states outpatient team was wanting to switch her to this. PRNs: Thorazine 50 mg IM / 100 mg PO, Benadryl 50 mg PO/IM, Zyprexa 10 mg PO/IM for agitation  4. Medical- continue bromocriptine for hyperprolactinemia, metformin for diabetes, eye drops q6hrs and foot lotion BID as per documentation from group home, will consider offering Depo Provera given patient on Depakote, f/u prolactin level to be drawn 6/7  5. I/G/M therapy as appropriate   6. Spoke with outpatient psychiatric NP and group home for collateral, dispo likely back to group home pending symptomatic improvement    The patient is a 30yr old woman w/schizoaffective disorder and intellectual disability, admitted with recent disorganization and aggression. Patient continues to present as poor historian with labile and childlike affect. Becoming tearful when informed that [facility] is an acute care hospital, showing poor frustration tolerance. In light of patient's recent ED visits for aggression, patient to be monitored on ES with low threshold to initiate CO if displays persistent aggression or agitation towards staff/peers. Information regarding psychiatric history and home med regimen obtained from group home and previous outpatient psychiatric NP. Per ED documentation, patient may have missed several doses of her standing home medications due to her frequent ED visits, suspect that this may at least in part be contributing to her apparent decompensation. Patient requires inpatient hospitalization for safety and stabilization.     6/7: some irritability, refuses to return to her previous group home  6/8: irritable, refuses to engage in interview  6/9: irritable, tearful, demanding discharge    Plan:  1. Legal: Legal status 9.39  2. Safety: No CO 1:1 for now, continue ES.  3. Psychiatric: Continue Thorazine 100 mg daily, 100 mg at 1300.  Continue Thorazine 200mg qHS, continue Depakote DR 750mg BID, fluoxetine 40mg daily, continue Abilify 5 mg daily starting 6/8. Reviewed ER documentation, appears limited benefit from Haldol 5 mg/Ativan 2mg/Benadryl 50 mg combination, as well as olanzapine. ER has used Versed on multiple occasions.  Will order chlorpromazine PRNs as documentation states outpatient team was wanting to switch her to this. PRNs: Thorazine 50 mg IM / 100 mg PO, Benadryl 50 mg PO/IM, Zyprexa 10 mg PO/IM for agitation  4. Medical- continue bromocriptine for hyperprolactinemia, metformin for diabetes, eye drops q6hrs and foot lotion BID as per documentation from group home, will consider offering Depo Provera given patient on Depakote. Prolactin level 10.5. Hx of seizure disorder, EEG scheduled for 6/11.   5. I/G/M therapy as appropriate   6. Spoke with outpatient psychiatric NP and group home for collateral, dispo likely back to group home pending symptomatic improvement    The patient is a 30yr old woman w/schizoaffective disorder and intellectual disability, admitted with recent disorganization and aggression. Patient continues to present as poor historian with labile and childlike affect. Becoming tearful when informed that [facility] is an acute care hospital, showing poor frustration tolerance. In light of patient's recent ED visits for aggression, patient to be monitored on ES with low threshold to initiate CO if displays persistent aggression or agitation towards staff/peers. Information regarding psychiatric history and home med regimen obtained from group home and previous outpatient psychiatric NP. Per ED documentation, patient may have missed several doses of her standing home medications due to her frequent ED visits, suspect that this may at least in part be contributing to her apparent decompensation. Patient requires inpatient hospitalization for safety and stabilization.     6/7: some irritability, refuses to return to her previous group home  6/8: irritable, refuses to engage in interview  6/9: irritable, tearful, demanding discharge    Plan:  1. Legal: Legal status 9.39  2. Safety: No CO 1:1 for now, continue ES.  3. Psychiatric: Continue Thorazine 100 mg daily, 100 mg at 1300.  Continue Thorazine 200mg qHS, continue Depakote DR 750mg BID, fluoxetine 40mg daily, continue Abilify 5 mg daily starting 6/8. Reviewed ER documentation, appears limited benefit from Haldol 5 mg/Ativan 2mg/Benadryl 50 mg combination, as well as olanzapine. ER has used Versed on multiple occasions.  Will order chlorpromazine PRNs as documentation states outpatient team was wanting to switch her to this. PRNs: Thorazine 50 mg IM / 100 mg PO, Benadryl 50 mg PO/IM, Zyprexa 10 mg PO/IM for agitation  4. Medical- continue bromocriptine for hyperprolactinemia, metformin for diabetes, eye drops q6hrs and foot lotion BID as per documentation from group home, will consider offering Depo Provera given patient on Depakote. Prolactin level 10.5. Hx of seizure disorder, EEG scheduled for 6/11.   5. I/G/M therapy as appropriate. Behavioral Plan created and discussed with pt.    6. Spoke with outpatient psychiatric NP and group home for collateral, dispo likely back to group home pending symptomatic improvement

## 2021-06-09 NOTE — BH INPATIENT PSYCHIATRY PROGRESS NOTE - NSBHFUPINTERVALHXFT_PSY_A_CORE
Patient seen in follow up for schizoaffective disorder and intellectual disability. The case was reviewed and discussed with the interdisciplinary team. The patient received PRNs of thorazine, Ativan, Zyprexa, and Benadryl this am. She took all standing medications. She slept approximately 7 hours last night.    This morning, patient notes that she was loud because "Neil passed away from diabetes." Pt unable to elaborate more on who Neil is. She states that she sees Dr. Portillo as her PCP. Pt terminates interview abruptly.     Collateral obtained from patient's  Staff, ADRY Perdomo:  Per Conor, patient last had a physical in February 2021. Previous documentation from January 2021 note that she was prescribed Depakote at that time for behavioral disturbance. In February 2020, she had an EEG done which was normal and without any epileptic activity. She had records indicating that she had a hx of a seizure disorder, though no detailed evaluations were given. The pt also had a documented history of DM2, however labwork from 2019 showed that she had an A1C of 4.9. Conor reports that she has a hx of obesity and was likely prescribed Metformin for weight loss, though has not been recently prescribed it.  Patient seen in follow up for schizoaffective disorder and intellectual disability. The case was reviewed and discussed with the interdisciplinary team. The patient received PRNs of thorazine, Ativan, and Benadryl this am. She took all standing medications. She slept approximately 7 hours last night.    This morning, patient notes she wants to go back to her group home. She states that she doesn't want to be here anymore and wants to do her multiplication tables.

## 2021-06-09 NOTE — BH INPATIENT PSYCHIATRY PROGRESS NOTE - CASE SUMMARY
30F w/schizoaffective disorder and intellectual disability, admitted with recent disorganization and aggression  Pt remains regressed and requesting to "stay here forever!"  Had episode of agitation when informed she couldn't stay hospitalized indefinitely.     Patient was initially cooperative but again abruptly walked out of interview when she was informed she couldn't "live here"  PLAN  1. Legal status 9.39- Continue admission for safety and stabilization.  2. No CO 1:1 for now, continue ES.  3. Continue Depakote  mg bid, increase thorazine to  100/100/200 mg and add low dose abilify.   30F w/schizoaffective disorder and intellectual disability, admitted with recent disorganization and aggression  Pt remains regressed and requesting today to "go back to my house!"  Had meeting with Treatment team to discuss a behavioral plan to help her have better control of the lability of her behavior when frustrated in the moment   PLAN  1. Legal status 9.39- Continue admission for safety and stabilization.  2. No CO 1:1   3. Continue Depakote  mg bid, increase thorazine to  100/100/200 mg and add low dose abilify.
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
## 2021-06-09 NOTE — BH TREATMENT PLAN - NSTXPLANTHERAPYSESSIONSFT_PSY_ALL_CORE
06-08-21  Type of therapy: Creative arts therapy,Music therapy,Peer advocate  Type of session: Individual  Level of patient participation: Pt was sleeping and unable to be roused.  Duration of participation: Less than 15 minutes  Therapy conducted by: Psych rehab  Therapy Summary: Due to pt sleeping and unable to be roused, writer will obtain pt’s information from pt’s chart records and treatment team.     As per chart, patient was assessed with a slightly irritable mood and a constricted affect. Pt is assessed with poor insight and judgment and a disorganized thought process. Patient has been observed labile, and has been screaming, laughing, and crying while on the unit. Pt has been increasingly engaged in the environment and requires redirection from staff. Pt is on constant observation (CO) for safety.      As per chart, Patient has demonstrated daily medication compliance. Pt has attended 50% of psychoeducational groups within the past 7 days. As per chart, Patient denied suicidal and homicidal ideation/intent/plan. As per chart, pt denied auditory and visual hallucinations. In regard to goals, pt has been working on identifying and utilizing 2-3 coping skills to help with agitation while on the unit. Writer is unable to assess for progress due to pt sleeping. Psych rehab staff will continue to meet with patient within the next 7 days for encouragement, support, and psychotherapy.

## 2021-06-09 NOTE — BH TREATMENT PLAN - NSTXIMPULSINTERPR_PSY_ALL_CORE
Psych rehab staff will continue to support and provide patient with psycho-education in individual and group sessions in effort to facilitate patient progress towards goal.
Psych rehab staff will continue to support and provide patient with psycho-education in individual and group sessions in effort to facilitate patient progress towards goal.

## 2021-06-09 NOTE — BH PSYCHOLOGY - CLINICIAN PSYCHOTHERAPY NOTE - NSTXDIABGOAL_PSY_ALL_CORE
Will be able to able to describe prescribed diabetic medications and how to properly take these medications
Will be able to able to describe prescribed diabetic medications and how to properly take these medications

## 2021-06-09 NOTE — BH TREATMENT PLAN - NSTXDIABINTERRN_PSY_ALL_CORE
Patient will follow all blood glucose monitoring procedures and will adhere to diabetic diet
Provide teaching on proper diet and provide positive reinforcement for good FS

## 2021-06-09 NOTE — BH TREATMENT PLAN - NSTXPSYCHOINTERRN_PSY_ALL_CORE
Encourage pt to verbalize thoughts and feelings; staff will utilize redirections and reality testing as needed
Patient will verbalize all feelings of distress and staff will provide reality orientation as needed

## 2021-06-09 NOTE — BH INPATIENT PSYCHIATRY PROGRESS NOTE - MSE UNSTRUCTURED FT
Patient is dressed in hospital scrubs, appears stated age. Fair grooming and hygiene. Fair eye contact, no involuntary movements, irritable. Speech with loud volume, normal rate. Mood is "---" Affect is dysphoric, constricted, some lability, congruent with stated mood. Thought process is goal-directed, some loosening of associations. Thought content without any overt delusional thinking.  Denies SI/HI, no AVH.  Insight and judgment poor, attention poor, gait intact, language fluent. Patient is dressed in hospital scrubs, appears stated age. Fair grooming and hygiene. Fair eye contact, no involuntary movements, irritable. Speech with loud volume, normal rate. Mood is "fine" Affect is dysphoric, constricted, some lability, congruent with stated mood. Thought process is goal-directed, some loosening of associations. Thought content without any overt delusional thinking.  Denies SI/HI, no AVH.  Insight and judgment poor, attention poor, gait intact, language fluent. Poor impulse control

## 2021-06-09 NOTE — BH PSYCHOLOGY - CLINICIAN PSYCHOTHERAPY NOTE - NSBHPSYCHOLINT_PSY_A_CORE
Supported coping skills/Supportive therapy/Treatment compliance encouraged
Supportive therapy/Treatment compliance encouraged

## 2021-06-09 NOTE — BH PSYCHOLOGY - CLINICIAN PSYCHOTHERAPY NOTE - NSBHPSYCHOLPROBS_PSY_ALL_CORE
Academic/Vocational/Social Dysfunction/Aggression/Anger/Irritability
Academic/Vocational/Social Dysfunction/Aggression/Anger/Irritability

## 2021-06-09 NOTE — BH PSYCHOLOGY - CLINICIAN PSYCHOTHERAPY NOTE - NSBHPSYCHOLGOALS_PSY_A_CORE
Decrease symptoms/Improve social/vocational/coping skills/Treatment compliance
Decrease symptoms/Treatment compliance

## 2021-06-09 NOTE — BH INPATIENT PSYCHIATRY DISCHARGE NOTE - NSDCMRMEDTOKEN_GEN_ALL_CORE_FT
ammonium lactate 12% topical lotion: 1 application topically every 12 hours  ARIPiprazole 5 mg oral tablet: 1 tab(s) orally once a day  benztropine 1 mg oral tablet: 1 tab(s) orally 2 times a day  chlorproMAZINE 100 mg oral tablet: 1 tab(s) orally 2 times a day. Please take one tab in the morning and one tab in the afternoon at 1:00 PM.   chlorproMAZINE 200 mg oral tablet: 1 tab(s) orally once a day (at bedtime)  divalproex sodium 250 mg oral delayed release tablet: 3 tab(s) orally 2 times a day  FLUoxetine 40 mg oral capsule: 1 cap(s) orally once a day  ocular lubricant ophthalmic solution: 1 drop(s) to each affected eye every 6 hours, As needed, dry eye

## 2021-06-09 NOTE — BH INPATIENT PSYCHIATRY DISCHARGE NOTE - HOSPITAL COURSE
During hospitalization....    There were no behavioral problems on the unit.  Patient did not become agitated, did not require emergency intramuscular medications or seclusion/restraints, and did not self-harm on the unit. Patient remained actively engaged in treatment and participated in individual, group, and milieu therapy.    Medically, during this hospitalization there were no issues.     DAY OF DISCHARGE  Patient is dressed in hospital scrubs, appears stated age. Fair grooming and hygiene. Fair eye contact, no involuntary movements, irritable. Speech with loud volume, normal rate. Mood is "fine" Affect is dysphoric, constricted, some lability, congruent with stated mood. Thought process is goal-directed, some loosening of associations. Thought content without any overt delusional thinking.  Denies SI/HI, no AVH.  Insight and judgment poor, attention poor, gait intact, language fluent. Poor impulse control      - Suicidal and aggression risk assessments  The patient is at elevated chronic risk of self-harm due to **. Modifiable risk factors included ***. Immediate risk was minimized by inpatient admission to a safe environment with appropriate supervision and limited access to lethal means. Protective factors for suicide and aggression include ***. Future risk was minimized before discharge by maximizing outpatient support, providing relevant patient education, discussing emergency procedures, and ensuring close follow-up. Patient denies all suicidal and aggressive/homicidal ideation, intent and plan, and, in view of demonstrated clinical improvement, is NOT an acute danger to self or others at this time. Although the patient remains at their CHRONIC baseline risk of self-harm, such risk cannot be further ameliorated by continued inpatient treatment and the patient is therefore appropriate for discharge.     - Summary  On day of discharge, the patient has improved significantly and no longer requires inpatient treatment and care. Pt will be discharged and follow-up with outpatient care. Patient was provided with a 14-day supply of medications, extensive psychoeducation on treatment options and motivational counseling targeting healthy lifestyle. Patient was instructed on actions for crisis situations, understood and agreed to follow instructions for handling crisis, including coming to ER or calling 911 should the patient or their family feel that they are in danger of hurting self or others. Patient also was given Suicide Prevention Lifeline number 5-476-924-5918 and provided with instructions on its use.     Patient will be discharged with the following DSM5 Diagnoses:     Patient will be discharged on the following medications: ****    Verbal handoff was given to the following, including discussion of hospital course, treatment, and medications. The patient’s appointment is:   During hospitalization, the patient was treated for agitation, increased paranoia, aggression.  Patient was a poor history with childlike affect, somatically focused. She initially did not wish to return back to her group home and stated that she wanted to stay at the hospital "forever" as she felt safe here. During course of hospitalization, patient was initially uncooperative to meet with treatment team. She would become agitated, terminate interview immediately, and walk away from interviewers. As hospitalization continued, a behavioral plan was created due to patient's several outbursts, medication-seeking, demanding. She was started on Thorazine which was subsequently titrated to  100 mg qAM, 100 mg at 1300, and 200 mg qHS. She also was started on Abilify 2 mg which was titrated to 5 mg daily for psychotic symptoms. She was continued on her home Depakote 750 mg BID and Prozac 40 mg daily.  On day of discharge, patient reported feeling calmer and requested her discharge back to her group home. Some of her continued behaviors are likely secondary to her intellectual disability and would not be further ameliorated with continued inpatient admission.     There were several behavioral problems on the unit.  Patient became agitated on multiple occasions, requiring PO PRNS. She did not require emergency intramuscular medications or seclusion/restraints, and did not self-harm on the unit. Patient remained actively engaged in treatment and participated in individual, group, and milieu therapy.    Medically, during this hospitalization there were no issues.     DAY OF DISCHARGE  Patient is dressed in hospital scrubs, appears stated age. Fair grooming and hygiene. Fair eye contact, no involuntary movements, irritable. Speech with loud volume, normal rate. Mood is "fine" Affect is dysphoric, constricted, some lability, congruent with stated mood. Thought process is goal-directed, some loosening of associations. Thought content without any overt delusional thinking.  Denies SI/HI, no AVH.  Insight and judgment poor, attention poor, gait intact, language fluent. Poor impulse control      - Suicidal and aggression risk assessments  The patient is at elevated chronic risk of self-harm due to **. Modifiable risk factors included ***. Immediate risk was minimized by inpatient admission to a safe environment with appropriate supervision and limited access to lethal means. Protective factors for suicide and aggression include ***. Future risk was minimized before discharge by maximizing outpatient support, providing relevant patient education, discussing emergency procedures, and ensuring close follow-up. Patient denies all suicidal and aggressive/homicidal ideation, intent and plan, and, in view of demonstrated clinical improvement, is NOT an acute danger to self or others at this time. Although the patient remains at their CHRONIC baseline risk of self-harm, such risk cannot be further ameliorated by continued inpatient treatment and the patient is therefore appropriate for discharge.     - Summary  On day of discharge, the patient has improved significantly and no longer requires inpatient treatment and care. Pt will be discharged and follow-up with outpatient care. Patient was provided with a 14-day supply of medications, extensive psychoeducation on treatment options and motivational counseling targeting healthy lifestyle. Patient was instructed on actions for crisis situations, understood and agreed to follow instructions for handling crisis, including coming to ER or calling 911 should the patient or their family feel that they are in danger of hurting self or others. Patient also was given Suicide Prevention Lifeline number 0-522-305-3071 and provided with instructions on its use.     Patient will be discharged with the following DSM5 Diagnoses:     Patient will be discharged on the following medications: ****    Verbal handoff was given to the following, including discussion of hospital course, treatment, and medications. The patient’s appointment is:   During hospitalization, the patient was treated for agitation, increased paranoia, aggression.  Patient was a poor history with childlike affect, somatically focused. She initially did not wish to return back to her group home and stated that she wanted to stay at the hospital "forever" as she felt safe here. During course of hospitalization, patient was initially uncooperative to meet with treatment team. She would become agitated, terminate interview immediately, and walk away from interviewers. As hospitalization continued, a behavioral plan was created due to patient's several outbursts, medication-seeking, demanding. She was started on Thorazine which was subsequently titrated to  100 mg qAM, 100 mg at 1300, and 200 mg qHS. She also was started on Abilify 2 mg which was titrated to 5 mg daily for psychotic symptoms. She was continued on her home Depakote 750 mg BID and Prozac 40 mg daily.  On day of discharge, patient reported feeling calmer and requested her discharge back to her group home. Some of her continued behaviors are likely secondary to her intellectual disability and would not be further ameliorated with continued inpatient admission.     There were several behavioral problems on the unit.  Patient became agitated on multiple occasions, requiring PO PRNS. She did not require emergency intramuscular medications or seclusion/restraints, and did not self-harm on the unit. Patient remained actively engaged in treatment and participated in individual, group, and milieu therapy.    Medically, during this hospitalization there were no issues.     DAY OF DISCHARGE  Patient is dressed in hospital scrubs, appears stated age. Fair grooming and hygiene. Fair eye contact, no involuntary movements, irritable. Speech with loud volume, normal rate. Mood is "fine" Affect is dysphoric, constricted, some lability, congruent with stated mood. Thought process is goal-directed, some loosening of associations. Thought content without any overt delusional thinking.  Denies SI/HI, no AVH.  Insight and judgment poor, attention poor, gait intact, language fluent. Poor impulse control      - Suicidal and aggression risk assessments  The patient is at elevated chronic risk of self-harm due to her intellectual disability, hx of schizoaffective disorder. Modifiable risk factors included impulsivity, agitation. Immediate risk was minimized by inpatient admission to a safe environment with appropriate supervision and limited access to lethal means. Protective factors for suicide and aggression include supportive staff at group home, no substance abuse, help-seeking, no current SI. Future risk was minimized before discharge by maximizing outpatient support, providing relevant patient education, discussing emergency procedures, and ensuring close follow-up. Patient denies all suicidal and aggressive/homicidal ideation, intent and plan, and, in view of demonstrated clinical improvement, is NOT an acute danger to self or others at this time. Although the patient remains at their CHRONIC baseline risk of self-harm, such risk cannot be further ameliorated by continued inpatient treatment and the patient is therefore appropriate for discharge.     - Summary  On day of discharge, the patient has improved significantly and no longer requires inpatient treatment and care. Pt will be discharged and follow-up with outpatient care. Patient was provided with a 14-day supply of medications, extensive psychoeducation on treatment options and motivational counseling targeting healthy lifestyle. Patient was instructed on actions for crisis situations, understood and agreed to follow instructions for handling crisis, including coming to ER or calling 911 should the patient or their family feel that they are in danger of hurting self or others. Patient also was given Suicide Prevention Lifeline number 1-114.877.8245 and provided with instructions on its use.     Patient will be discharged with the following DSM5 Diagnoses: schizoaffective disorder, intellectual disability    Patient will be discharged on the following medications: ****    Verbal handoff was given to the following, including discussion of hospital course, treatment, and medications. The patient’s appointment is:   During hospitalization, the patient was treated for agitation, increased paranoia, aggression.  Patient was a poor history with childlike affect, somatically focused. She initially did not wish to return back to her group home and stated that she wanted to stay at the hospital "forever" as she felt safe here. During course of hospitalization, patient was initially uncooperative to meet with treatment team. She would become agitated, terminate interview immediately, and walk away from interviewers. As hospitalization continued, a behavioral plan was created due to patient's several outbursts, medication-seeking, demanding. She was started on Thorazine which was subsequently titrated to  100 mg qAM, 100 mg at 1300, and 200 mg qHS. She also was started on Abilify 2 mg which was titrated to 5 mg daily for psychotic symptoms. She was continued on her home Depakote 750 mg BID and Prozac 40 mg daily.  On day of discharge, patient reported feeling calmer and requested her discharge back to her group home. Some of her continued behaviors are likely secondary to her intellectual disability and would not be further ameliorated with continued inpatient admission.     There were several behavioral problems on the unit.  Patient became agitated on multiple occasions, requiring PO PRNS. She did not require emergency intramuscular medications or seclusion/restraints, and did not self-harm on the unit. Patient remained actively engaged in treatment and participated in individual, group, and milieu therapy.    Medically, during this hospitalization there were no issues.     DAY OF DISCHARGE  Patient is dressed in hospital scrubs, appears stated age. Fair grooming and hygiene. Fair eye contact, no involuntary movements, irritable. Speech with loud volume, normal rate. Mood is "fine" Affect is dysphoric, constricted, some lability, congruent with stated mood. Thought process is goal-directed, some loosening of associations. Thought content without any overt delusional thinking.  Denies SI/HI, no AVH.  Insight and judgment poor, attention poor, gait intact, language fluent. Poor impulse control      - Suicidal and aggression risk assessments  The patient is at elevated chronic risk of self-harm due to her intellectual disability, hx of schizoaffective disorder. Modifiable risk factors included impulsivity, agitation. Immediate risk was minimized by inpatient admission to a safe environment with appropriate supervision and limited access to lethal means. Protective factors for suicide and aggression include supportive staff at group home, no substance abuse, help-seeking, no current SI. Future risk was minimized before discharge by maximizing outpatient support, providing relevant patient education, discussing emergency procedures, and ensuring close follow-up. Patient denies all suicidal and aggressive/homicidal ideation, intent and plan, and, in view of demonstrated clinical improvement, is NOT an acute danger to self or others at this time. Although the patient remains at their CHRONIC baseline risk of self-harm, such risk cannot be further ameliorated by continued inpatient treatment and the patient is therefore appropriate for discharge.     - Summary  On day of discharge, the patient has improved significantly and no longer requires inpatient treatment and care. Pt will be discharged and follow-up with outpatient care. Patient was provided with a 14-day supply of medications, extensive psychoeducation on treatment options and motivational counseling targeting healthy lifestyle. Patient was instructed on actions for crisis situations, understood and agreed to follow instructions for handling crisis, including coming to ER or calling 911 should the patient or their family feel that they are in danger of hurting self or others. Patient also was given Suicide Prevention Lifeline number 1-589.883.7599 and provided with instructions on its use.     Patient will be discharged with the following DSM5 Diagnoses: schizoaffective disorder, intellectual disability    Patient will be discharged on the following medications: Abilify 5 mg daily, Cogentin 1 mg BID, Thorazine 100 mg daily, Thorazine 100 mg at 13:00, Thorazine 200 mg qHS, Prozac 40 mg daily, Depakote 750 mg BID    Verbal handoff was given to the following, including discussion of hospital course, treatment, and medications. The patient’s appointment is:   During hospitalization, the patient was treated for agitation, increased paranoia, aggression.  Patient was a poor history with childlike affect, somatically focused. She initially did not wish to return back to her group home and stated that she wanted to stay at the hospital "forever" as she felt safe here. During course of hospitalization, patient was initially uncooperative to meet with treatment team. She would become agitated, terminate interview immediately, and walk away from interviewers. As hospitalization continued, a behavioral plan was created due to patient's several outbursts, medication-seeking, demanding. She was started on Thorazine which was subsequently titrated to  100 mg qAM, 100 mg at 1300, and 200 mg qHS. She also was started on Abilify 2 mg which was titrated to 5 mg daily for psychotic symptoms. She was continued on her home Depakote 750 mg BID and Prozac 40 mg daily.  On day of discharge, patient reported feeling calmer and requested her discharge back to her group home. Some of her continued behaviors are likely secondary to her intellectual disability and would not be further ameliorated with continued inpatient admission.     There were several behavioral problems on the unit.  Patient became agitated on multiple occasions, requiring PO PRNS. She did not require emergency intramuscular medications or seclusion/restraints, and did not self-harm on the unit. Patient remained actively engaged in treatment and participated in individual, group, and milieu therapy.    Medically, during this hospitalization there were no issues.     DAY OF DISCHARGE  Patient is dressed in hospital scrubs, appears stated age. Fair grooming and hygiene. Fair eye contact, no involuntary movements, irritable. Speech with loud volume, normal rate. Mood is "fine" Affect is dysphoric, constricted, some lability, congruent with stated mood. Thought process is goal-directed, some loosening of associations. Thought content without any overt delusional thinking.  Denies SI/HI, no AVH.  Insight and judgment poor, attention poor, gait intact, language fluent. Poor impulse control      - Suicidal and aggression risk assessments  The patient is at elevated chronic risk of self-harm due to her intellectual disability, hx of schizoaffective disorder. Modifiable risk factors included impulsivity, agitation. Immediate risk was minimized by inpatient admission to a safe environment with appropriate supervision and limited access to lethal means. Protective factors for suicide and aggression include supportive staff at group home, no substance abuse, help-seeking, no current SI. Future risk was minimized before discharge by maximizing outpatient support, providing relevant patient education, discussing emergency procedures, and ensuring close follow-up. Patient denies all suicidal and aggressive/homicidal ideation, intent and plan, and, in view of demonstrated clinical improvement, is NOT an acute danger to self or others at this time. Although the patient remains at their CHRONIC baseline risk of self-harm, such risk cannot be further ameliorated by continued inpatient treatment and the patient is therefore appropriate for discharge.     - Summary  On day of discharge, the patient has improved significantly and no longer requires inpatient treatment and care. Pt will be discharged and follow-up with outpatient care. Patient was provided with a 14-day supply of medications, extensive psychoeducation on treatment options and motivational counseling targeting healthy lifestyle. Patient was instructed on actions for crisis situations, understood and agreed to follow instructions for handling crisis, including coming to ER or calling 911 should the patient or their family feel that they are in danger of hurting self or others. Patient also was given Suicide Prevention Lifeline number 1-397.353.6740 and provided with instructions on its use.     Patient will be discharged with the following DSM5 Diagnoses: schizoaffective disorder, intellectual disability    Patient will be discharged on the following medications: Abilify 5 mg daily, Cogentin 1 mg BID, Thorazine 100 mg daily, Thorazine 100 mg at 13:00, Thorazine 200 mg qHS, Prozac 40 mg daily, Depakote 750 mg BID    Handoff was given to the following, including discussion of hospital course, treatment, and medications to the patient's current group home. The patient’s appointment is at [facility] (in the [place]) on Tuesday, Lana 15, 2021 at 2 PM.

## 2021-06-09 NOTE — BH PSYCHOLOGY - CLINICIAN PSYCHOTHERAPY NOTE - NSTXIMPULSGOALOTHER_PSY_ALL_CORE
Will identify and utilize 2-3 coping skills to help with agitation while on the unit.
Will identify and utilize 2-3 coping skills to help with agitation while on the unit.

## 2021-06-10 LAB
GLUCOSE BLDC GLUCOMTR-MCNC: 117 MG/DL — HIGH (ref 70–99)
GLUCOSE BLDC GLUCOMTR-MCNC: 90 MG/DL — SIGNIFICANT CHANGE UP (ref 70–99)

## 2021-06-10 PROCEDURE — 99231 SBSQ HOSP IP/OBS SF/LOW 25: CPT

## 2021-06-10 RX ADMIN — Medication 100 MILLIGRAM(S): at 09:32

## 2021-06-10 RX ADMIN — DIVALPROEX SODIUM 750 MILLIGRAM(S): 500 TABLET, DELAYED RELEASE ORAL at 09:31

## 2021-06-10 RX ADMIN — Medication 50 MILLIGRAM(S): at 08:01

## 2021-06-10 RX ADMIN — Medication 1 MILLIGRAM(S): at 20:27

## 2021-06-10 RX ADMIN — OLANZAPINE 10 MILLIGRAM(S): 15 TABLET, FILM COATED ORAL at 08:01

## 2021-06-10 RX ADMIN — OLANZAPINE 10 MILLIGRAM(S): 15 TABLET, FILM COATED ORAL at 15:51

## 2021-06-10 RX ADMIN — Medication 2 MILLIGRAM(S): at 08:01

## 2021-06-10 RX ADMIN — Medication 2 MILLIGRAM(S): at 21:06

## 2021-06-10 RX ADMIN — Medication 40 MILLIGRAM(S): at 09:31

## 2021-06-10 RX ADMIN — Medication 100 MILLIGRAM(S): at 16:53

## 2021-06-10 RX ADMIN — DIVALPROEX SODIUM 750 MILLIGRAM(S): 500 TABLET, DELAYED RELEASE ORAL at 20:27

## 2021-06-10 RX ADMIN — Medication 1 APPLICATION(S): at 09:33

## 2021-06-10 RX ADMIN — Medication 1 MILLIGRAM(S): at 09:32

## 2021-06-10 RX ADMIN — Medication 50 MILLIGRAM(S): at 16:53

## 2021-06-10 RX ADMIN — Medication 50 MILLIGRAM(S): at 12:45

## 2021-06-10 RX ADMIN — Medication 2 MILLIGRAM(S): at 12:45

## 2021-06-10 RX ADMIN — ARIPIPRAZOLE 5 MILLIGRAM(S): 15 TABLET ORAL at 09:31

## 2021-06-10 RX ADMIN — Medication 2 MILLIGRAM(S): at 16:53

## 2021-06-10 RX ADMIN — Medication 100 MILLIGRAM(S): at 12:45

## 2021-06-10 RX ADMIN — Medication 200 MILLIGRAM(S): at 20:27

## 2021-06-10 NOTE — BH INPATIENT PSYCHIATRY PROGRESS NOTE - NSBHFUPINTERVALHXFT_PSY_A_CORE
Patient seen in follow up for schizoaffective disorder and intellectual disability. The case was reviewed and discussed with the interdisciplinary team. The patient received PRNs of Ativan. She took all standing medications. She slept approximately 8 hours last night.    This morning, patient  Patient seen in follow up for schizoaffective disorder and intellectual disability. The case was reviewed and discussed with the interdisciplinary team. The patient received PRNs of Ativan. She took all standing medications. She slept approximately 8 hours last night.    This morning, patient states that she wants to go home. She notes that she feels "fine" and does not want to be in the hospital anymore. Abruptly ended interview, walked out of conference room after being told she cannot leave today.

## 2021-06-10 NOTE — BH INPATIENT PSYCHIATRY PROGRESS NOTE - CASE SUMMARY
30F w/schizoaffective disorder and intellectual disability, admitted with recent disorganization and aggression  Pt remains regressed and requesting today to "go back to my house!"  Had meeting with Treatment team to discuss a behavioral plan to help her have better control of the lability of her behavior when frustrated in the moment   PLAN  1. Legal status 9.39- Continue admission for safety and stabilization.  2. No CO 1:1   3. Continue Depakote  mg bid, increase thorazine to  100/100/200 mg and add low dose abilify.   30F w/schizoaffective disorder and intellectual disability, admitted with recent disorganization and aggression  Pt remains regressed and requesting today to "go back to my house TODAY!"  Has difficulty with limit setting and dissapointment   PLAN  1. Legal status 9.39- Continue admission for safety and stabilization.  2. No CO 1:1   3. Continue Depakote  mg bid, Thorazine to  100/100/200 mg and add low dose abilify at 5 mg

## 2021-06-10 NOTE — BH INPATIENT PSYCHIATRY PROGRESS NOTE - NSBHASSESSSUMMFT_PSY_ALL_CORE
The patient is a 30yr old woman w/schizoaffective disorder and intellectual disability, admitted with recent disorganization and aggression. Patient continues to present as poor historian with labile and childlike affect. Becoming tearful when informed that [facility] is an acute care hospital, showing poor frustration tolerance. In light of patient's recent ED visits for aggression, patient to be monitored on ES with low threshold to initiate CO if displays persistent aggression or agitation towards staff/peers. Information regarding psychiatric history and home med regimen obtained from group home and previous outpatient psychiatric NP. Per ED documentation, patient may have missed several doses of her standing home medications due to her frequent ED visits, suspect that this may at least in part be contributing to her apparent decompensation. Patient requires inpatient hospitalization for safety and stabilization.     6/7: some irritability, refuses to return to her previous group home  6/8: irritable, refuses to engage in interview  6/9: irritable, tearful, demanding discharge    Plan:  1. Legal: Legal status 9.39  2. Safety: No CO 1:1 for now, continue ES.  3. Psychiatric: Continue Thorazine 100 mg daily, 100 mg at 1300.  Continue Thorazine 200mg qHS, continue Depakote DR 750mg BID, fluoxetine 40mg daily, continue Abilify 5 mg daily starting 6/8. Reviewed ER documentation, appears limited benefit from Haldol 5 mg/Ativan 2mg/Benadryl 50 mg combination, as well as olanzapine. ER has used Versed on multiple occasions.  Will order chlorpromazine PRNs as documentation states outpatient team was wanting to switch her to this. PRNs: Thorazine 50 mg IM / 100 mg PO, Benadryl 50 mg PO/IM, Zyprexa 10 mg PO/IM for agitation  4. Medical- continue bromocriptine for hyperprolactinemia, metformin for diabetes, eye drops q6hrs and foot lotion BID as per documentation from group home, will consider offering Depo Provera given patient on Depakote. Prolactin level 10.5. Hx of seizure disorder, EEG scheduled for 6/11.   5. I/G/M therapy as appropriate. Behavioral Plan created and discussed with pt.    6. Spoke with outpatient psychiatric NP and group home for collateral, dispo likely back to group home pending symptomatic improvement    The patient is a 30yr old woman w/schizoaffective disorder and intellectual disability, admitted with recent disorganization and aggression. Patient continues to present as poor historian with labile and childlike affect. Becoming tearful when informed that [facility] is an acute care hospital, showing poor frustration tolerance. In light of patient's recent ED visits for aggression, patient to be monitored on ES with low threshold to initiate CO if displays persistent aggression or agitation towards staff/peers. Information regarding psychiatric history and home med regimen obtained from group home and previous outpatient psychiatric NP. Per ED documentation, patient may have missed several doses of her standing home medications due to her frequent ED visits, suspect that this may at least in part be contributing to her apparent decompensation. Patient requires inpatient hospitalization for safety and stabilization.     6/7: some irritability, refuses to return to her previous group home  6/8: irritable, refuses to engage in interview  6/9: irritable, tearful, demanding discharge  6/10: irritable, tearful, demanding discharge    Plan:  1. Legal: Legal status 9.39  2. Safety: On 1:1 CO   3. Psychiatric: Continue Thorazine 100 mg daily, 100 mg at 1300.  Continue Thorazine 200mg qHS, continue Depakote DR 750mg BID, fluoxetine 40mg daily, continue Abilify 5 mg daily starting 6/8. Reviewed ER documentation, appears limited benefit from Haldol 5 mg/Ativan 2mg/Benadryl 50 mg combination, as well as olanzapine. ER has used Versed on multiple occasions.   PRNs: Thorazine 50 mg IM / 100 mg PO, Benadryl 50 mg PO/IM, Zyprexa 10 mg PO/IM for agitation  4. Medical- continue bromocriptine for hyperprolactinemia, metformin for diabetes, eye drops q6hrs and foot lotion BID as per documentation from group home, will consider offering Depo Provera given patient on Depakote. Prolactin level 10.5. Hx of seizure disorder, EEG scheduled for 6/11.   5. I/G/M therapy as appropriate. Behavioral Plan created and discussed with pt.    6. Spoke with outpatient psychiatric NP and group home for collateral, dispo likely back to group home pending symptomatic improvement

## 2021-06-10 NOTE — BH INPATIENT PSYCHIATRY PROGRESS NOTE - MSE UNSTRUCTURED FT
Patient is dressed in hospital scrubs, appears stated age. Fair grooming and hygiene. Fair eye contact, no involuntary movements, irritable. Speech with loud volume, normal rate. Mood is "fine" Affect is dysphoric, constricted, some lability, congruent with stated mood. Thought process is goal-directed, some loosening of associations. Thought content without any overt delusional thinking.  Denies SI/HI, no AVH.  Insight and judgment poor, attention poor, gait intact, language fluent. Poor impulse control

## 2021-06-11 LAB
GLUCOSE BLDC GLUCOMTR-MCNC: 90 MG/DL — SIGNIFICANT CHANGE UP (ref 70–99)
SARS-COV-2 RNA SPEC QL NAA+PROBE: SIGNIFICANT CHANGE UP

## 2021-06-11 PROCEDURE — 99231 SBSQ HOSP IP/OBS SF/LOW 25: CPT

## 2021-06-11 RX ADMIN — Medication 1 APPLICATION(S): at 08:37

## 2021-06-11 RX ADMIN — Medication 2 MILLIGRAM(S): at 11:44

## 2021-06-11 RX ADMIN — Medication 1 MILLIGRAM(S): at 08:17

## 2021-06-11 RX ADMIN — Medication 40 MILLIGRAM(S): at 08:17

## 2021-06-11 RX ADMIN — Medication 1 MILLIGRAM(S): at 21:46

## 2021-06-11 RX ADMIN — Medication 100 MILLIGRAM(S): at 08:18

## 2021-06-11 RX ADMIN — Medication 1 APPLICATION(S): at 21:45

## 2021-06-11 RX ADMIN — ARIPIPRAZOLE 5 MILLIGRAM(S): 15 TABLET ORAL at 08:17

## 2021-06-11 RX ADMIN — OLANZAPINE 10 MILLIGRAM(S): 15 TABLET, FILM COATED ORAL at 07:22

## 2021-06-11 RX ADMIN — Medication 50 MILLIGRAM(S): at 07:22

## 2021-06-11 RX ADMIN — Medication 2 MILLIGRAM(S): at 07:22

## 2021-06-11 RX ADMIN — DIVALPROEX SODIUM 750 MILLIGRAM(S): 500 TABLET, DELAYED RELEASE ORAL at 20:32

## 2021-06-11 RX ADMIN — DIVALPROEX SODIUM 750 MILLIGRAM(S): 500 TABLET, DELAYED RELEASE ORAL at 08:17

## 2021-06-11 RX ADMIN — OLANZAPINE 10 MILLIGRAM(S): 15 TABLET, FILM COATED ORAL at 13:35

## 2021-06-11 RX ADMIN — Medication 200 MILLIGRAM(S): at 20:31

## 2021-06-11 RX ADMIN — Medication 50 MILLIGRAM(S): at 11:45

## 2021-06-11 RX ADMIN — Medication 100 MILLIGRAM(S): at 12:44

## 2021-06-11 NOTE — BH INPATIENT PSYCHIATRY PROGRESS NOTE - CASE SUMMARY
30F w/schizoaffective disorder and intellectual disability, admitted with recent disorganization and aggression  Pt remains regressed and requesting today to "go back to my house TODAY!"  Has difficulty with limit setting and dissapointment   PLAN  1. Legal status 9.39- Continue admission for safety and stabilization.  2. No CO 1:1   3. Continue Depakote  mg bid, Thorazine to  100/100/200 mg and add low dose abilify at 5 mg 30F w/schizoaffective disorder and intellectual disability, admitted with recent disorganization and aggression  Pt remains regressed and requesting to be discharged as soon as possible  Has difficulty with limit setting and disappointment   PLAN  1. Legal status 9.39- Continue admission for safety and stabilization.  2. No CO 1:1   3. Continue Depakote  mg bid, Thorazine to  100/100/200 mg and add low dose abilify at 5 mg

## 2021-06-11 NOTE — BH DISCHARGE NOTE NURSING/SOCIAL WORK/PSYCH REHAB - DISCHARGE INSTRUCTIONS AFTERCARE APPOINTMENTS
In order to check the location, date, or time of your aftercare appointment, please refer to your Discharge Instructions Document given to you upon leaving the hospital.  If you have lost the instructions please call [phone]

## 2021-06-11 NOTE — BH DISCHARGE NOTE NURSING/SOCIAL WORK/PSYCH REHAB - PATIENT PORTAL LINK FT
You can access the FollowMyHealth Patient Portal offered by [facility] by registering at the following website: [email or web address]. By joining [facility]’s FollowMyHealth portal, you will also be able to view your health information using other applications (apps) compatible with our system.

## 2021-06-11 NOTE — BH INPATIENT PSYCHIATRY PROGRESS NOTE - MSE UNSTRUCTURED FT
Patient is dressed in hospital scrubs, appears stated age. Fair grooming and hygiene. Fair eye contact, no involuntary movements, irritable. Speech with loud volume, normal rate. Mood is "fine" Affect is dysphoric, constricted, some lability, congruent with stated mood. Thought process is goal-directed, some loosening of associations. Thought content without any overt delusional thinking.  Denies SI/HI, no AVH.  Insight and judgment poor, attention poor, gait intact, language fluent. Poor impulse control  Patient is dressed in hospital scrubs, appears stated age. Fair grooming and hygiene. Fair eye contact, no involuntary movements, irritable. Speech with loud volume, normal rate. Mood is "hurt my feelings" Affect is dysphoric, constricted, some lability, congruent with stated mood. Thought process is goal-directed, some loosening of associations. Thought content without any overt delusional thinking.  Denies SI/HI, no AVH.  Insight and judgment poor, attention poor, gait intact, language fluent. Poor impulse control

## 2021-06-11 NOTE — BH INPATIENT PSYCHIATRY PROGRESS NOTE - NSBHASSESSSUMMFT_PSY_ALL_CORE
The patient is a 30yr old woman w/schizoaffective disorder and intellectual disability, admitted with recent disorganization and aggression. Patient continues to present as poor historian with labile and childlike affect. Becoming tearful when informed that [facility] is an acute care hospital, showing poor frustration tolerance. In light of patient's recent ED visits for aggression, patient to be monitored on ES with low threshold to initiate CO if displays persistent aggression or agitation towards staff/peers. Information regarding psychiatric history and home med regimen obtained from group home and previous outpatient psychiatric NP. Per ED documentation, patient may have missed several doses of her standing home medications due to her frequent ED visits, suspect that this may at least in part be contributing to her apparent decompensation. Patient requires inpatient hospitalization for safety and stabilization.     6/7: some irritability, refuses to return to her previous group home  6/8: irritable, refuses to engage in interview  6/9: irritable, tearful, demanding discharge  6/10: irritable, tearful, demanding discharge    Plan:  1. Legal: Legal status 9.39  2. Safety: On 1:1 CO   3. Psychiatric: Continue Thorazine 100 mg daily, 100 mg at 1300.  Continue Thorazine 200mg qHS, continue Depakote DR 750mg BID, fluoxetine 40mg daily, continue Abilify 5 mg daily starting 6/8. Reviewed ER documentation, appears limited benefit from Haldol 5 mg/Ativan 2mg/Benadryl 50 mg combination, as well as olanzapine. ER has used Versed on multiple occasions.   PRNs: Thorazine 50 mg IM / 100 mg PO, Benadryl 50 mg PO/IM, Zyprexa 10 mg PO/IM for agitation  4. Medical- continue bromocriptine for hyperprolactinemia, metformin for diabetes, eye drops q6hrs and foot lotion BID as per documentation from group home, will consider offering Depo Provera given patient on Depakote. Prolactin level 10.5. Hx of seizure disorder, EEG scheduled for 6/11.   5. I/G/M therapy as appropriate. Behavioral Plan created and discussed with pt.    6. Spoke with outpatient psychiatric NP and group home for collateral, dispo likely back to group home pending symptomatic improvement    The patient is a 30yr old woman w/schizoaffective disorder and intellectual disability, admitted with recent disorganization and aggression. Patient continues to present as poor historian with labile and childlike affect. Becoming tearful when informed that [facility] is an acute care hospital, showing poor frustration tolerance. In light of patient's recent ED visits for aggression, patient to be monitored on ES with low threshold to initiate CO if displays persistent aggression or agitation towards staff/peers. Information regarding psychiatric history and home med regimen obtained from group home and previous outpatient psychiatric NP. Per ED documentation, patient may have missed several doses of her standing home medications due to her frequent ED visits, suspect that this may at least in part be contributing to her apparent decompensation. Patient requires inpatient hospitalization for safety and stabilization.     6/7: some irritability, refuses to return to her previous group home  6/8: irritable, refuses to engage in interview  6/9: irritable, tearful, demanding discharge  6/10: irritable, tearful, demanding discharge  6/11: irritable, tearful     Plan:  1. Legal: Legal status 9.39  2. Safety: On 1:1 CO   3. Psychiatric: Continue Thorazine 100 mg daily, 100 mg at 1300.  Continue Thorazine 200mg qHS, continue Depakote DR 750mg BID, fluoxetine 40mg daily, continue Abilify 5 mg daily starting 6/8. Reviewed ER documentation, appears limited benefit from Haldol 5 mg/Ativan 2mg/Benadryl 50 mg combination, as well as olanzapine. ER has used Versed on multiple occasions.   PRNs: Thorazine 50 mg IM / 100 mg PO, Benadryl 50 mg PO/IM, Zyprexa 10 mg PO/IM for agitation  4. Medical- continue bromocriptine for hyperprolactinemia, metformin for diabetes, eye drops q6hrs and foot lotion BID as per documentation from group home, will consider offering Depo Provera given patient on Depakote. Prolactin level 10.5. Hx of seizure disorder, EEG scheduled for 6/11.   5. I/G/M therapy as appropriate. Behavioral Plan created and discussed with pt.    6. Spoke with outpatient psychiatric NP and group home for collateral, dispo likely back to group home pending their acceptance of the patient.   The patient is a 30yr old woman w/schizoaffective disorder and intellectual disability, admitted with recent disorganization and aggression. Patient continues to present as poor historian with labile and childlike affect. Becoming tearful when informed that [facility] is an acute care hospital, showing poor frustration tolerance. In light of patient's recent ED visits for aggression, patient to be monitored on ES with low threshold to initiate CO if displays persistent aggression or agitation towards staff/peers. Information regarding psychiatric history and home med regimen obtained from group home and previous outpatient psychiatric NP. Per ED documentation, patient may have missed several doses of her standing home medications due to her frequent ED visits, suspect that this may at least in part be contributing to her apparent decompensation. Patient requires inpatient hospitalization for safety and stabilization.     6/7: some irritability, refuses to return to her previous group home  6/8: irritable, refuses to engage in interview  6/9: irritable, tearful, demanding discharge  6/10: irritable, tearful, demanding discharge  6/11: irritable, tearful     Plan:  1. Legal: Legal status 9.39  2. Safety: On 1:1 CO   3. Psychiatric: Continue Thorazine 100 mg daily, 100 mg at 1300.  Continue Thorazine 200mg qHS, continue Depakote DR 750mg BID, fluoxetine 40mg daily, continue Abilify 5 mg daily starting 6/8. Reviewed ER documentation, appears limited benefit from Haldol 5 mg/Ativan 2mg/Benadryl 50 mg combination, as well as olanzapine. ER has used Versed on multiple occasions.   PRNs: Thorazine 50 mg IM / 100 mg PO, Benadryl 50 mg PO/IM, Zyprexa 10 mg PO/IM for agitation  4. Medical- continue bromocriptine for hyperprolactinemia, metformin for diabetes, eye drops q6hrs and foot lotion BID as per documentation from group home, will consider offering Depo Provera given patient on Depakote. Prolactin level 10.5. Hx of seizure disorder, EEG cancelled as pt unlikely will be cooperative.  5. I/G/M therapy as appropriate. Behavioral Plan created and discussed with pt.    6. Spoke with outpatient psychiatric NP and group home for collateral, dispo likely back to group home pending their acceptance of the patient.

## 2021-06-11 NOTE — BH DISCHARGE NOTE NURSING/SOCIAL WORK/PSYCH REHAB - NSDCPRGOAL_PSY_ALL_CORE
Pt’s psych rehab goal was addressed as it relates to identify coping skills to help with agitation on the unit. Pt was able to identify listening to music and praying as her coping strategies to calm her down. Pt demonstrated overall improvement. Pt reported that her mood is “good”. Pt reported good sleep and appetite. Pt demonstrated medication compliance. Pt was in better behavioral control. Pt was calmer and more cooperative with staff. Pt was able to follow directions on the unit. Pt attended most of psych rehab groups. Pt was able to participate in group activities. Pt required some redirection in groups due to poor social boundary. Pt’s ADLs and appearance are good. Pt denied SI/HI/AH/VH and any psychotic sxs.

## 2021-06-11 NOTE — BH DISCHARGE NOTE NURSING/SOCIAL WORK/PSYCH REHAB - NSCDUDCCRISIS_PSY_A_CORE
On license of [facility] Well  1 (957) On license of [facility]-WELL ([phone])  Text "WELL" to [phone]  Website: www.[employer]/.[employer] 1 [phone] 781-MVLC (3049) Website: www.safehorizon.org/.National Suicide Prevention Lifeline 5 (810) 083-7873/.  [employer]  1 (250) LIFENET (764-3257)/.  [facility]  [phone] [place] [phone]   Hours:  Monday through Friday from 9 AM to 3 PM/.  [employer] - [facility] Crisis Line  [phone], Option 1

## 2021-06-11 NOTE — BH INPATIENT PSYCHIATRY PROGRESS NOTE - NSBHFUPINTERVALHXFT_PSY_A_CORE
Patient seen in follow up for schizoaffective disorder and intellectual disability. The case was reviewed and discussed with the interdisciplinary team. The patient received PRNs of Ativan, Benadryl, and Zyprexa. She took all standing medications. She slept approximately 8 hours last night.    This morning, patient states  Patient seen in follow up for schizoaffective disorder and intellectual disability. The case was reviewed and discussed with the interdisciplinary team. The patient received PRNs of Ativan, Benadryl, and Zyprexa. She took all standing medications. She slept approximately 8 hours last night.    This morning, patient states that she wants to be discharged today and reports that the tx team hurt her feelings because she can't leave.

## 2021-06-12 LAB — GLUCOSE BLDC GLUCOMTR-MCNC: 84 MG/DL — SIGNIFICANT CHANGE UP (ref 70–99)

## 2021-06-12 PROCEDURE — 99231 SBSQ HOSP IP/OBS SF/LOW 25: CPT

## 2021-06-12 RX ADMIN — Medication 50 MILLIGRAM(S): at 18:38

## 2021-06-12 RX ADMIN — Medication 200 MILLIGRAM(S): at 20:13

## 2021-06-12 RX ADMIN — Medication 50 MILLIGRAM(S): at 12:30

## 2021-06-12 RX ADMIN — ARIPIPRAZOLE 5 MILLIGRAM(S): 15 TABLET ORAL at 08:04

## 2021-06-12 RX ADMIN — DIVALPROEX SODIUM 750 MILLIGRAM(S): 500 TABLET, DELAYED RELEASE ORAL at 08:04

## 2021-06-12 RX ADMIN — Medication 1 MILLIGRAM(S): at 08:14

## 2021-06-12 RX ADMIN — Medication 1 APPLICATION(S): at 08:08

## 2021-06-12 RX ADMIN — Medication 1 MILLIGRAM(S): at 20:13

## 2021-06-12 RX ADMIN — OLANZAPINE 10 MILLIGRAM(S): 15 TABLET, FILM COATED ORAL at 09:24

## 2021-06-12 RX ADMIN — Medication 100 MILLIGRAM(S): at 08:04

## 2021-06-12 RX ADMIN — Medication 100 MILLIGRAM(S): at 16:36

## 2021-06-12 RX ADMIN — Medication 2 MILLIGRAM(S): at 08:04

## 2021-06-12 RX ADMIN — OLANZAPINE 10 MILLIGRAM(S): 15 TABLET, FILM COATED ORAL at 18:37

## 2021-06-12 RX ADMIN — Medication 50 MILLIGRAM(S): at 08:04

## 2021-06-12 RX ADMIN — Medication 40 MILLIGRAM(S): at 08:03

## 2021-06-12 RX ADMIN — Medication 2 MILLIGRAM(S): at 12:29

## 2021-06-12 RX ADMIN — Medication 100 MILLIGRAM(S): at 12:29

## 2021-06-12 RX ADMIN — Medication 2 MILLIGRAM(S): at 16:36

## 2021-06-12 RX ADMIN — DIVALPROEX SODIUM 750 MILLIGRAM(S): 500 TABLET, DELAYED RELEASE ORAL at 20:12

## 2021-06-12 NOTE — BH INPATIENT PSYCHIATRY PROGRESS NOTE - MSE UNSTRUCTURED FT
Patient calmer and more cooperative  Fair eye contact, no involuntary movement  Speech with normal volume, normal rate.   Mood is "better today"   Affect less lability, congruent with stated mood.   Thought process is goal-directed, some loosening of associations.   Thought content without any overt delusional thinking.    Denies SI/HI,   Denies AVH.   Insight and judgment poor,  Improving impulse control

## 2021-06-12 NOTE — BH INPATIENT PSYCHIATRY PROGRESS NOTE - NSBHASSESSSUMMFT_PSY_ALL_CORE
The patient is a 30yr old woman w/schizoaffective disorder and intellectual disability, admitted with recent disorganization and aggression. Patient continues to present as poor historian with labile and childlike affect. Becoming tearful when informed that [facility] is an acute care hospital, showing poor frustration tolerance. In light of patient's recent ED visits for aggression, patient to be monitored on ES with low threshold to initiate CO if displays persistent aggression or agitation towards staff/peers. Information regarding psychiatric history and home med regimen obtained from group home and previous outpatient psychiatric NP. Per ED documentation, patient may have missed several doses of her standing home medications due to her frequent ED visits, suspect that this may at least in part be contributing to her apparent decompensation. Patient requires inpatient hospitalization for safety and stabilization.     6/7: some irritability, refuses to return to her previous group home  6/8: irritable, refuses to engage in interview  6/9: irritable, tearful, demanding discharge  6/10: irritable, tearful, demanding discharge  6/11: irritable, tearful   6/12 some improvement    Plan:  1. Legal: Legal status 9.39  2. Safety: On 1:1 CO   3. Psychiatric: Continue Thorazine 100 mg daily, 100 mg at 1300.  Continue Thorazine 200mg qHS, continue Depakote DR 750mg BID, fluoxetine 40mg daily, continue Abilify 5 mg daily starting 6/8. Reviewed ER documentation, appears limited benefit from Haldol 5 mg/Ativan 2mg/Benadryl 50 mg combination, as well as olanzapine. ER has used Versed on multiple occasions.   PRNs: Thorazine 50 mg IM / 100 mg PO, Benadryl 50 mg PO/IM, Zyprexa 10 mg PO/IM for agitation  4. Medical- continue bromocriptine for hyperprolactinemia, metformin for diabetes, eye drops q6hrs and foot lotion BID as per documentation from group home, will consider offering Depo Provera given patient on Depakote. Prolactin level 10.5. Hx of seizure disorder, EEG cancelled as pt unlikely will be cooperative.  5. I/G/M therapy as appropriate. Behavioral Plan created and discussed with pt.    6. Spoke with outpatient psychiatric NP and group home for collateral, dispo likely back to group home pending their acceptance of the patient.

## 2021-06-12 NOTE — BH INPATIENT PSYCHIATRY PROGRESS NOTE - NSBHFUPINTERVALHXFT_PSY_A_CORE
Patient seen in follow up for schizoaffective disorder and intellectual disability.   Discussed case with nursing staff  Patient in better behavioral control and less labile

## 2021-06-13 LAB
GLUCOSE BLDC GLUCOMTR-MCNC: 135 MG/DL — HIGH (ref 70–99)
GLUCOSE BLDC GLUCOMTR-MCNC: 88 MG/DL — SIGNIFICANT CHANGE UP (ref 70–99)

## 2021-06-13 PROCEDURE — 99231 SBSQ HOSP IP/OBS SF/LOW 25: CPT

## 2021-06-13 RX ADMIN — Medication 50 MILLIGRAM(S): at 14:08

## 2021-06-13 RX ADMIN — Medication 100 MILLIGRAM(S): at 12:50

## 2021-06-13 RX ADMIN — ARIPIPRAZOLE 5 MILLIGRAM(S): 15 TABLET ORAL at 08:51

## 2021-06-13 RX ADMIN — Medication 100 MILLIGRAM(S): at 17:30

## 2021-06-13 RX ADMIN — Medication 100 MILLIGRAM(S): at 08:51

## 2021-06-13 RX ADMIN — Medication 40 MILLIGRAM(S): at 08:51

## 2021-06-13 RX ADMIN — DIVALPROEX SODIUM 750 MILLIGRAM(S): 500 TABLET, DELAYED RELEASE ORAL at 08:51

## 2021-06-13 RX ADMIN — Medication 2 MILLIGRAM(S): at 07:39

## 2021-06-13 RX ADMIN — OLANZAPINE 10 MILLIGRAM(S): 15 TABLET, FILM COATED ORAL at 11:08

## 2021-06-13 RX ADMIN — Medication 50 MILLIGRAM(S): at 07:39

## 2021-06-13 RX ADMIN — Medication 1 MILLIGRAM(S): at 08:51

## 2021-06-13 NOTE — BH INPATIENT PSYCHIATRY PROGRESS NOTE - NSBHASSESSSUMMFT_PSY_ALL_CORE
The patient is a 30yr old woman w/schizoaffective disorder and intellectual disability, admitted with recent disorganization and aggression. Patient continues to present as poor historian with labile and childlike affect. Becoming tearful when informed that [facility] is an acute care hospital, showing poor frustration tolerance. In light of patient's recent ED visits for aggression, patient to be monitored on ES with low threshold to initiate CO if displays persistent aggression or agitation towards staff/peers. Information regarding psychiatric history and home med regimen obtained from group home and previous outpatient psychiatric NP. Per ED documentation, patient may have missed several doses of her standing home medications due to her frequent ED visits, suspect that this may at least in part be contributing to her apparent decompensation. Patient requires inpatient hospitalization for safety and stabilization.     6/7: some irritability, refuses to return to her previous group home  6/8: irritable, refuses to engage in interview  6/9: irritable, tearful, demanding discharge  6/10: irritable, tearful, demanding discharge  6/11: irritable, tearful   6/12 some improvement    Plan:  1. Legal: Legal status 9.39  2. Safety: On 1:1 CO   3. Psychiatric: Continue Thorazine 100 mg daily, 100 mg at 1300.  Continue Thorazine 200mg qHS, continue Depakote DR 750mg BID, fluoxetine 40mg daily, continue Abilify 5 mg daily starting 6/8. Reviewed ER documentation, appears limited benefit from Haldol 5 mg/Ativan 2mg/Benadryl 50 mg combination, as well as olanzapine. ER has used Versed on multiple occasions.   PRNs: Thorazine 50 mg IM / 100 mg PO, Benadryl 50 mg PO/IM, Zyprexa 10 mg PO/IM for agitation  4. Medical- continue bromocriptine for hyperprolactinemia, metformin for diabetes, eye drops q6hrs and foot lotion BID as per documentation from group home, will consider offering Depo Provera given patient on Depakote. Prolactin level 10.5. Hx of seizure disorder, EEG cancelled as pt unlikely will be cooperative.  5. I/G/M therapy as appropriate. Behavioral Plan created and discussed with pt.    6. Spoke with outpatient psychiatric NP and group home for collateral, dispo likely back to group home pending their acceptance of the patient.

## 2021-06-13 NOTE — BH INPATIENT PSYCHIATRY PROGRESS NOTE - NSBHFUPINTERVALHXFT_PSY_A_CORE
Patient seen in follow up for schizoaffective disorder and intellectual disability.   Discussed case with nursing staff  Patient in better behavioral control and less labile  Has been compliant with his meds

## 2021-06-13 NOTE — BH INPATIENT PSYCHIATRY PROGRESS NOTE - MSE UNSTRUCTURED FT
Patient calmer and more cooperative  Fair eye contact, no involuntary movement  Speech with normal volume, normal rate.   Mood is "better"   Affect less lability, congruent with stated mood.   Thought process is goal-directed, some loosening of associations.   Thought content without any overt delusional thinking.    Denies SI/HI,   Denies AVH.   Insight and judgment poor,  Improving impulse control

## 2021-06-13 NOTE — BH INPATIENT PSYCHIATRY PROGRESS NOTE - NSBHCHARTREVIEWLAB_PSY_A_CORE FT
Prolactin level - 10.5

## 2021-06-14 VITALS — TEMPERATURE: 98 F

## 2021-06-14 LAB — GLUCOSE BLDC GLUCOMTR-MCNC: 106 MG/DL — HIGH (ref 70–99)

## 2021-06-14 PROCEDURE — 99238 HOSP IP/OBS DSCHRG MGMT 30/<: CPT

## 2021-06-14 RX ADMIN — Medication 2 MILLIGRAM(S): at 08:11

## 2021-06-14 RX ADMIN — ARIPIPRAZOLE 5 MILLIGRAM(S): 15 TABLET ORAL at 08:12

## 2021-06-14 RX ADMIN — Medication 1 APPLICATION(S): at 08:24

## 2021-06-14 RX ADMIN — Medication 100 MILLIGRAM(S): at 08:12

## 2021-06-14 RX ADMIN — OLANZAPINE 10 MILLIGRAM(S): 15 TABLET, FILM COATED ORAL at 10:45

## 2021-06-14 RX ADMIN — Medication 100 MILLIGRAM(S): at 08:11

## 2021-06-14 RX ADMIN — Medication 50 MILLIGRAM(S): at 08:12

## 2021-06-14 RX ADMIN — DIVALPROEX SODIUM 750 MILLIGRAM(S): 500 TABLET, DELAYED RELEASE ORAL at 08:13

## 2021-06-14 RX ADMIN — Medication 40 MILLIGRAM(S): at 08:11

## 2021-06-14 RX ADMIN — Medication 1 MILLIGRAM(S): at 08:12

## 2021-06-14 NOTE — BH INPATIENT PSYCHIATRY PROGRESS NOTE - NSBHCONSDANGEROTHERS_PSY_A_CORE
assaultive behavior

## 2021-06-14 NOTE — BH INPATIENT PSYCHIATRY PROGRESS NOTE - NSTXPROBDIAB_PSY_ALL_CORE
DIABETES MANAGEMENT

## 2021-06-14 NOTE — BH INPATIENT PSYCHIATRY PROGRESS NOTE - NSTXDCFAMDATETRGT_PSY_ALL_CORE
16-Jun-2021
14-Jun-2021
14-Jun-2021
16-Jun-2021
14-Jun-2021
16-Jun-2021

## 2021-06-14 NOTE — BH INPATIENT PSYCHIATRY PROGRESS NOTE - NSICDXBHSECONDARYDX_PSY_ALL_CORE
Intellectual disability   F81  

## 2021-06-14 NOTE — BH INPATIENT PSYCHIATRY PROGRESS NOTE - MODIFICATIONS
Patient interviewed and evaluated with resident present to follow up on symptoms and the case has been reviewed with resident and treatment team this morning.   I was physically present during the service to the patient and personally examined the patient and I was directly involved in the management plan and recommendations of the care provided to the patient.  I have reviewed the resident's progress note and the mental status examination and I agree with its contents and treatment plan and I have made modifications to the note when needed and as indicated.  Patient seen individually for discharge day management. Greater than 30 minutes was spent with patient, staff and charting as part of discharge day management. The patient's case has been reviewed with the treatment team.  I was physically present during the service to the patient and personally examined the patient and I was directly involved in the management plan and recommendations of the care provided to the patient.  I have reviewed the resident's progress note and agree with its contents and I have made changes as indicated

## 2021-06-14 NOTE — BH INPATIENT PSYCHIATRY PROGRESS NOTE - CURRENT MEDICATION
MEDICATIONS  (STANDING):  ammonium lactate 12% Lotion 1 Application(s) Topical every 12 hours  ARIPiprazole 2 milliGRAM(s) Oral daily  benztropine 1 milliGRAM(s) Oral two times a day  bromocriptine Tablet 5 milliGRAM(s) Oral two times a day  chlorproMAZINE    Tablet 200 milliGRAM(s) Oral at bedtime  diVALproex  milliGRAM(s) Oral two times a day  FLUoxetine 40 milliGRAM(s) Oral daily  insulin lispro (ADMELOG) corrective regimen sliding scale   SubCutaneous two times a day before meals    MEDICATIONS  (PRN):  artificial tears (preservative free) Ophthalmic Solution 1 Drop(s) Both EYES every 6 hours PRN dry eye  chlorproMAZINE    Injectable 100 milliGRAM(s) IntraMuscular once PRN severe aggression  chlorproMAZINE    Tablet 50 milliGRAM(s) Oral every 4 hours PRN agitation/aggression  diphenhydrAMINE 50 milliGRAM(s) Oral every 4 hours PRN agitation  LORazepam     Tablet 2 milliGRAM(s) Oral every 4 hours PRN Agitation  LORazepam   Injectable 2 milliGRAM(s) IntraMuscular Once PRN sevree agitation  OLANZapine 10 milliGRAM(s) Oral every 6 hours PRN agitation  
MEDICATIONS  (STANDING):  ammonium lactate 12% Lotion 1 Application(s) Topical every 12 hours  ARIPiprazole 5 milliGRAM(s) Oral daily  benztropine 1 milliGRAM(s) Oral two times a day  chlorproMAZINE    Tablet 200 milliGRAM(s) Oral at bedtime  chlorproMAZINE    Tablet 100 milliGRAM(s) Oral <User Schedule>  chlorproMAZINE    Tablet 100 milliGRAM(s) Oral daily  diVALproex  milliGRAM(s) Oral two times a day  FLUoxetine 40 milliGRAM(s) Oral daily  insulin lispro (ADMELOG) corrective regimen sliding scale   SubCutaneous two times a day before meals    MEDICATIONS  (PRN):  artificial tears (preservative free) Ophthalmic Solution 1 Drop(s) Both EYES every 6 hours PRN dry eye  chlorproMAZINE    Injectable 50 milliGRAM(s) IntraMuscular once PRN agitation  chlorproMAZINE    Tablet 100 milliGRAM(s) Oral every 4 hours PRN agitation  diphenhydrAMINE 50 milliGRAM(s) Oral every 4 hours PRN agitation  LORazepam     Tablet 2 milliGRAM(s) Oral every 4 hours PRN Agitation  LORazepam   Injectable 2 milliGRAM(s) IntraMuscular Once PRN sevree agitation  OLANZapine 10 milliGRAM(s) Oral every 6 hours PRN agitation  
MEDICATIONS  (STANDING):  ammonium lactate 12% Lotion 1 Application(s) Topical every 12 hours  artificial tears (preservative free) Ophthalmic Solution 1 Drop(s) Both EYES every 6 hours  benztropine 1 milliGRAM(s) Oral two times a day  bromocriptine Tablet 5 milliGRAM(s) Oral two times a day  diVALproex  milliGRAM(s) Oral two times a day  FLUoxetine 40 milliGRAM(s) Oral daily  haloperidol     Tablet 5 milliGRAM(s) Oral daily  haloperidol     Tablet 10 milliGRAM(s) Oral at bedtime  insulin lispro (ADMELOG) corrective regimen sliding scale   SubCutaneous two times a day before meals  metFORMIN 1000 milliGRAM(s) Oral daily    MEDICATIONS  (PRN):  chlorproMAZINE    Injectable 100 milliGRAM(s) IntraMuscular once PRN severe aggression  chlorproMAZINE    Tablet 50 milliGRAM(s) Oral every 4 hours PRN agitation/aggression  LORazepam     Tablet 2 milliGRAM(s) Oral every 4 hours PRN Agitation  LORazepam   Injectable 2 milliGRAM(s) IntraMuscular Once PRN sevree agitation  
MEDICATIONS  (STANDING):  ammonium lactate 12% Lotion 1 Application(s) Topical every 12 hours  ARIPiprazole 5 milliGRAM(s) Oral daily  benztropine 1 milliGRAM(s) Oral two times a day  chlorproMAZINE    Tablet 200 milliGRAM(s) Oral at bedtime  chlorproMAZINE    Tablet 100 milliGRAM(s) Oral <User Schedule>  chlorproMAZINE    Tablet 100 milliGRAM(s) Oral daily  diVALproex  milliGRAM(s) Oral two times a day  FLUoxetine 40 milliGRAM(s) Oral daily  insulin lispro (ADMELOG) corrective regimen sliding scale   SubCutaneous two times a day before meals    MEDICATIONS  (PRN):  artificial tears (preservative free) Ophthalmic Solution 1 Drop(s) Both EYES every 6 hours PRN dry eye  chlorproMAZINE    Injectable 50 milliGRAM(s) IntraMuscular once PRN agitation  chlorproMAZINE    Tablet 100 milliGRAM(s) Oral every 4 hours PRN agitation  diphenhydrAMINE 50 milliGRAM(s) Oral every 4 hours PRN agitation  LORazepam     Tablet 2 milliGRAM(s) Oral every 4 hours PRN Agitation  LORazepam   Injectable 2 milliGRAM(s) IntraMuscular Once PRN sevree agitation  OLANZapine 10 milliGRAM(s) Oral every 6 hours PRN agitation  
MEDICATIONS  (STANDING):  ammonium lactate 12% Lotion 1 Application(s) Topical every 12 hours  ARIPiprazole 5 milliGRAM(s) Oral daily  benztropine 1 milliGRAM(s) Oral two times a day  chlorproMAZINE    Tablet 200 milliGRAM(s) Oral at bedtime  chlorproMAZINE    Tablet 100 milliGRAM(s) Oral <User Schedule>  chlorproMAZINE    Tablet 100 milliGRAM(s) Oral daily  diVALproex  milliGRAM(s) Oral two times a day  FLUoxetine 40 milliGRAM(s) Oral daily  insulin lispro (ADMELOG) corrective regimen sliding scale   SubCutaneous two times a day before meals    MEDICATIONS  (PRN):  artificial tears (preservative free) Ophthalmic Solution 1 Drop(s) Both EYES every 6 hours PRN dry eye  chlorproMAZINE    Injectable 50 milliGRAM(s) IntraMuscular once PRN agitation  chlorproMAZINE    Tablet 100 milliGRAM(s) Oral every 4 hours PRN agitation  diphenhydrAMINE 50 milliGRAM(s) Oral every 4 hours PRN agitation  LORazepam     Tablet 2 milliGRAM(s) Oral every 4 hours PRN Agitation  LORazepam   Injectable 2 milliGRAM(s) IntraMuscular Once PRN sevree agitation  OLANZapine 10 milliGRAM(s) Oral every 6 hours PRN agitation  
MEDICATIONS  (STANDING):  ammonium lactate 12% Lotion 1 Application(s) Topical every 12 hours  ARIPiprazole 2 milliGRAM(s) Oral daily  benztropine 1 milliGRAM(s) Oral two times a day  bromocriptine Tablet 5 milliGRAM(s) Oral two times a day  chlorproMAZINE    Tablet 200 milliGRAM(s) Oral at bedtime  diVALproex  milliGRAM(s) Oral two times a day  FLUoxetine 40 milliGRAM(s) Oral daily  insulin lispro (ADMELOG) corrective regimen sliding scale   SubCutaneous two times a day before meals    MEDICATIONS  (PRN):  artificial tears (preservative free) Ophthalmic Solution 1 Drop(s) Both EYES every 6 hours PRN dry eye  chlorproMAZINE    Injectable 100 milliGRAM(s) IntraMuscular once PRN severe aggression  chlorproMAZINE    Tablet 50 milliGRAM(s) Oral every 4 hours PRN agitation/aggression  diphenhydrAMINE 50 milliGRAM(s) Oral every 4 hours PRN agitation  LORazepam     Tablet 2 milliGRAM(s) Oral every 4 hours PRN Agitation  LORazepam   Injectable 2 milliGRAM(s) IntraMuscular Once PRN sevree agitation  OLANZapine 10 milliGRAM(s) Oral every 6 hours PRN agitation  
MEDICATIONS  (STANDING):  ammonium lactate 12% Lotion 1 Application(s) Topical every 12 hours  ARIPiprazole 5 milliGRAM(s) Oral daily  benztropine 1 milliGRAM(s) Oral two times a day  chlorproMAZINE    Tablet 200 milliGRAM(s) Oral at bedtime  chlorproMAZINE    Tablet 100 milliGRAM(s) Oral <User Schedule>  chlorproMAZINE    Tablet 100 milliGRAM(s) Oral daily  diVALproex  milliGRAM(s) Oral two times a day  FLUoxetine 40 milliGRAM(s) Oral daily  insulin lispro (ADMELOG) corrective regimen sliding scale   SubCutaneous two times a day before meals    MEDICATIONS  (PRN):  artificial tears (preservative free) Ophthalmic Solution 1 Drop(s) Both EYES every 6 hours PRN dry eye  chlorproMAZINE    Injectable 50 milliGRAM(s) IntraMuscular once PRN agitation  chlorproMAZINE    Tablet 100 milliGRAM(s) Oral every 4 hours PRN agitation  diphenhydrAMINE 50 milliGRAM(s) Oral every 4 hours PRN agitation  LORazepam     Tablet 2 milliGRAM(s) Oral every 4 hours PRN Agitation  LORazepam   Injectable 2 milliGRAM(s) IntraMuscular Once PRN sevree agitation  OLANZapine 10 milliGRAM(s) Oral every 6 hours PRN agitation  
MEDICATIONS  (STANDING):  ammonium lactate 12% Lotion 1 Application(s) Topical every 12 hours  ARIPiprazole 5 milliGRAM(s) Oral daily  benztropine 1 milliGRAM(s) Oral two times a day  chlorproMAZINE    Tablet 200 milliGRAM(s) Oral at bedtime  chlorproMAZINE    Tablet 100 milliGRAM(s) Oral <User Schedule>  chlorproMAZINE    Tablet 100 milliGRAM(s) Oral daily  diVALproex  milliGRAM(s) Oral two times a day  FLUoxetine 40 milliGRAM(s) Oral daily  insulin lispro (ADMELOG) corrective regimen sliding scale   SubCutaneous two times a day before meals    MEDICATIONS  (PRN):  artificial tears (preservative free) Ophthalmic Solution 1 Drop(s) Both EYES every 6 hours PRN dry eye  chlorproMAZINE    Injectable 50 milliGRAM(s) IntraMuscular once PRN agitation  chlorproMAZINE    Tablet 100 milliGRAM(s) Oral every 4 hours PRN agitation  diphenhydrAMINE 50 milliGRAM(s) Oral every 4 hours PRN agitation  LORazepam     Tablet 2 milliGRAM(s) Oral every 4 hours PRN Agitation  LORazepam   Injectable 2 milliGRAM(s) IntraMuscular Once PRN sevree agitation  OLANZapine 10 milliGRAM(s) Oral every 6 hours PRN agitation  
MEDICATIONS  (STANDING):  ammonium lactate 12% Lotion 1 Application(s) Topical every 12 hours  benztropine 1 milliGRAM(s) Oral two times a day  bromocriptine Tablet 5 milliGRAM(s) Oral two times a day  chlorproMAZINE    Tablet 150 milliGRAM(s) Oral at bedtime  diVALproex  milliGRAM(s) Oral two times a day  FLUoxetine 40 milliGRAM(s) Oral daily  insulin lispro (ADMELOG) corrective regimen sliding scale   SubCutaneous two times a day before meals    MEDICATIONS  (PRN):  artificial tears (preservative free) Ophthalmic Solution 1 Drop(s) Both EYES every 6 hours PRN dry eye  chlorproMAZINE    Injectable 100 milliGRAM(s) IntraMuscular once PRN severe aggression  chlorproMAZINE    Tablet 50 milliGRAM(s) Oral every 4 hours PRN agitation/aggression  LORazepam     Tablet 2 milliGRAM(s) Oral every 4 hours PRN Agitation  LORazepam   Injectable 2 milliGRAM(s) IntraMuscular Once PRN sevree agitation  
MEDICATIONS  (STANDING):  ammonium lactate 12% Lotion 1 Application(s) Topical every 12 hours  benztropine 1 milliGRAM(s) Oral two times a day  bromocriptine Tablet 5 milliGRAM(s) Oral two times a day  chlorproMAZINE    Tablet 100 milliGRAM(s) Oral at bedtime  diVALproex  milliGRAM(s) Oral two times a day  FLUoxetine 40 milliGRAM(s) Oral daily  haloperidol     Tablet 5 milliGRAM(s) Oral daily  haloperidol     Tablet 5 milliGRAM(s) Oral at bedtime  insulin lispro (ADMELOG) corrective regimen sliding scale   SubCutaneous two times a day before meals  metFORMIN 1000 milliGRAM(s) Oral daily    MEDICATIONS  (PRN):  artificial tears (preservative free) Ophthalmic Solution 1 Drop(s) Both EYES every 6 hours PRN dry eye  chlorproMAZINE    Injectable 100 milliGRAM(s) IntraMuscular once PRN severe aggression  chlorproMAZINE    Tablet 50 milliGRAM(s) Oral every 4 hours PRN agitation/aggression  LORazepam     Tablet 2 milliGRAM(s) Oral every 4 hours PRN Agitation  LORazepam   Injectable 2 milliGRAM(s) IntraMuscular Once PRN sevree agitation  
MEDICATIONS  (STANDING):  ammonium lactate 12% Lotion 1 Application(s) Topical every 12 hours  ARIPiprazole 2 milliGRAM(s) Oral daily  benztropine 1 milliGRAM(s) Oral two times a day  bromocriptine Tablet 5 milliGRAM(s) Oral two times a day  chlorproMAZINE    Tablet 200 milliGRAM(s) Oral at bedtime  diVALproex  milliGRAM(s) Oral two times a day  FLUoxetine 40 milliGRAM(s) Oral daily  insulin lispro (ADMELOG) corrective regimen sliding scale   SubCutaneous two times a day before meals    MEDICATIONS  (PRN):  artificial tears (preservative free) Ophthalmic Solution 1 Drop(s) Both EYES every 6 hours PRN dry eye  chlorproMAZINE    Injectable 100 milliGRAM(s) IntraMuscular once PRN severe aggression  chlorproMAZINE    Tablet 50 milliGRAM(s) Oral every 4 hours PRN agitation/aggression  diphenhydrAMINE 50 milliGRAM(s) Oral every 4 hours PRN agitation  LORazepam     Tablet 2 milliGRAM(s) Oral every 4 hours PRN Agitation  LORazepam   Injectable 2 milliGRAM(s) IntraMuscular Once PRN sevree agitation  OLANZapine 10 milliGRAM(s) Oral every 6 hours PRN agitation  
MEDICATIONS  (STANDING):  ammonium lactate 12% Lotion 1 Application(s) Topical every 12 hours  ARIPiprazole 5 milliGRAM(s) Oral daily  benztropine 1 milliGRAM(s) Oral two times a day  chlorproMAZINE    Tablet 200 milliGRAM(s) Oral at bedtime  chlorproMAZINE    Tablet 100 milliGRAM(s) Oral <User Schedule>  chlorproMAZINE    Tablet 100 milliGRAM(s) Oral daily  diVALproex  milliGRAM(s) Oral two times a day  FLUoxetine 40 milliGRAM(s) Oral daily  insulin lispro (ADMELOG) corrective regimen sliding scale   SubCutaneous two times a day before meals    MEDICATIONS  (PRN):  artificial tears (preservative free) Ophthalmic Solution 1 Drop(s) Both EYES every 6 hours PRN dry eye  chlorproMAZINE    Injectable 50 milliGRAM(s) IntraMuscular once PRN agitation  chlorproMAZINE    Tablet 100 milliGRAM(s) Oral every 4 hours PRN agitation  diphenhydrAMINE 50 milliGRAM(s) Oral every 4 hours PRN agitation  LORazepam     Tablet 2 milliGRAM(s) Oral every 4 hours PRN Agitation  LORazepam   Injectable 2 milliGRAM(s) IntraMuscular Once PRN sevree agitation  OLANZapine 10 milliGRAM(s) Oral every 6 hours PRN agitation  
MEDICATIONS  (STANDING):  ammonium lactate 12% Lotion 1 Application(s) Topical every 12 hours  ARIPiprazole 2 milliGRAM(s) Oral daily  benztropine 1 milliGRAM(s) Oral two times a day  bromocriptine Tablet 5 milliGRAM(s) Oral two times a day  chlorproMAZINE    Tablet 200 milliGRAM(s) Oral at bedtime  diVALproex  milliGRAM(s) Oral two times a day  FLUoxetine 40 milliGRAM(s) Oral daily  insulin lispro (ADMELOG) corrective regimen sliding scale   SubCutaneous two times a day before meals    MEDICATIONS  (PRN):  artificial tears (preservative free) Ophthalmic Solution 1 Drop(s) Both EYES every 6 hours PRN dry eye  chlorproMAZINE    Injectable 100 milliGRAM(s) IntraMuscular once PRN severe aggression  chlorproMAZINE    Tablet 50 milliGRAM(s) Oral every 4 hours PRN agitation/aggression  diphenhydrAMINE 50 milliGRAM(s) Oral every 4 hours PRN agitation  LORazepam     Tablet 2 milliGRAM(s) Oral every 4 hours PRN Agitation  LORazepam   Injectable 2 milliGRAM(s) IntraMuscular Once PRN sevree agitation  OLANZapine 10 milliGRAM(s) Oral every 6 hours PRN agitation

## 2021-06-14 NOTE — BH INPATIENT PSYCHIATRY PROGRESS NOTE - NSBHFUPINTERVALHXFT_PSY_A_CORE
Patient seen in follow up for history of schizoaffective disorder and intellectual disability. No acute events overnight, patient received Thorazine 100/Benadryl 50/Ativan 2mg PO x 1 dose each in last 24hrs for irritability/agitation. Per log, patient slept throughout the night. This morning, patient is found eating breakfast in dining area. Patient reports she feels "ok and ready to go home." Patient says "I take 20 medications at home," reinforced importance of continuing to take medications as prescribed upon return to group home and patient states she is agreeable. Patient says "I also want to have Ensure to drink when I go home so I can stay strong." Patient denies SI/HI, no AH/VH elicited. Patient is future-oriented, reports she looks forward to returning to her routine at home including doing household chores.

## 2021-06-14 NOTE — BH INPATIENT PSYCHIATRY PROGRESS NOTE - NSBHMETABOLIC_PSY_ALL_CORE_FT
BMI: BMI (kg/m2): 31.3 (06-01-21 @ 16:20)  HbA1c: A1C with Estimated Average Glucose Result: 5.2 % (06-02-21 @ 10:04)    Glucose: POCT Blood Glucose.: 77 mg/dL (06-08-21 @ 07:41)    BP: 133/78 (06-06-21 @ 07:55) (133/78 - 133/78)  Lipid Panel: 
BMI: BMI (kg/m2): 31.3 (06-01-21 @ 16:20)  HbA1c:   Glucose: POCT Blood Glucose.: 98 mg/dL (06-02-21 @ 08:01)    BP: 112/67 (06-01-21 @ 14:35) (106/65 - 112/88)  Lipid Panel: 
BMI: BMI (kg/m2): 31.3 (06-01-21 @ 16:20)  HbA1c: A1C with Estimated Average Glucose Result: 5.2 % (06-02-21 @ 10:04)    Glucose: POCT Blood Glucose.: 117 mg/dL (06-10-21 @ 16:28)    BP: 90/58 (06-11-21 @ 07:39) (90/58 - 134/76)  Lipid Panel: 
BMI: BMI (kg/m2): 31.3 (06-01-21 @ 16:20)  HbA1c: A1C with Estimated Average Glucose Result: 5.2 % (06-02-21 @ 10:04)    Glucose: POCT Blood Glucose.: 94 mg/dL (06-05-21 @ 11:34)    BP: 101/62 (06-04-21 @ 08:15) (101/62 - 112/66)  Lipid Panel: 
BMI: BMI (kg/m2): 31.3 (06-01-21 @ 16:20)  HbA1c: A1C with Estimated Average Glucose Result: 5.2 % (06-02-21 @ 10:04)    Glucose: POCT Blood Glucose.: 86 mg/dL (06-09-21 @ 07:52)    BP: --  Lipid Panel: 
BMI: BMI (kg/m2): 31.3 (06-01-21 @ 16:20)  HbA1c: A1C with Estimated Average Glucose Result: 5.2 % (06-02-21 @ 10:04)    Glucose: POCT Blood Glucose.: 90 mg/dL (06-10-21 @ 07:42)    BP: --  Lipid Panel: 
BMI: BMI (kg/m2): 31.3 (06-01-21 @ 16:20)  HbA1c: A1C with Estimated Average Glucose Result: 5.2 % (06-02-21 @ 10:04)    Glucose: POCT Blood Glucose.: 68 mg/dL (06-04-21 @ 07:56)    BP: 101/62 (06-04-21 @ 08:15) (101/62 - 112/67)  Lipid Panel: 
BMI: BMI (kg/m2): 31.3 (06-01-21 @ 16:20)  HbA1c: A1C with Estimated Average Glucose Result: 5.2 % (06-02-21 @ 10:04)    Glucose: POCT Blood Glucose.: 85 mg/dL (06-06-21 @ 07:55)    BP: 133/78 (06-06-21 @ 07:55) (101/62 - 133/78)  Lipid Panel: 
BMI: BMI (kg/m2): 31.3 (06-01-21 @ 16:20)  HbA1c: A1C with Estimated Average Glucose Result: 5.2 % (06-02-21 @ 10:04)    Glucose: POCT Blood Glucose.: 106 mg/dL (06-14-21 @ 07:38)    BP: 111/76 (06-13-21 @ 07:49) (111/76 - 133/72)  Lipid Panel: 
BMI: BMI (kg/m2): 31.3 (06-01-21 @ 16:20)  HbA1c: A1C with Estimated Average Glucose Result: 5.2 % (06-02-21 @ 10:04)    Glucose: POCT Blood Glucose.: 78 mg/dL (06-07-21 @ 07:49)    BP: 133/78 (06-06-21 @ 07:55) (133/78 - 133/78)  Lipid Panel: 
BMI: BMI (kg/m2): 31.3 (06-01-21 @ 16:20)  HbA1c: A1C with Estimated Average Glucose Result: 5.2 % (06-02-21 @ 10:04)    Glucose: POCT Blood Glucose.: 92 mg/dL (06-03-21 @ 07:41)    BP: 112/66 (06-03-21 @ 08:49) (106/65 - 112/88)  Lipid Panel: 
BMI: BMI (kg/m2): 31.3 (06-01-21 @ 16:20)  HbA1c: A1C with Estimated Average Glucose Result: 5.2 % (06-02-21 @ 10:04)    Glucose: POCT Blood Glucose.: 84 mg/dL (06-12-21 @ 07:40)    BP: 133/72 (06-12-21 @ 07:50) (90/58 - 134/76)  Lipid Panel: 
BMI: BMI (kg/m2): 31.3 (06-01-21 @ 16:20)  HbA1c: A1C with Estimated Average Glucose Result: 5.2 % (06-02-21 @ 10:04)    Glucose: POCT Blood Glucose.: 88 mg/dL (06-13-21 @ 07:39)    BP: 111/76 (06-13-21 @ 07:49) (90/58 - 133/72)  Lipid Panel:

## 2021-06-14 NOTE — BH INPATIENT PSYCHIATRY PROGRESS NOTE - NSTXDCFAMGOAL_PSY_ALL_CORE
Will agree to involve supports/family in treatment and discharge planning

## 2021-06-14 NOTE — BH INPATIENT PSYCHIATRY PROGRESS NOTE - NSBHCONSULTIPREASON_PSY_A_CORE
danger to others; mental illness expected to respond to inpatient care

## 2021-06-14 NOTE — BH INPATIENT PSYCHIATRY PROGRESS NOTE - NSTREATMENTCERTY_PSY_ALL_CORE

## 2021-06-14 NOTE — BH INPATIENT PSYCHIATRY PROGRESS NOTE - NSTXPSYCHODATEEST_PSY_ALL_CORE
01-Jun-2021

## 2021-06-14 NOTE — BH INPATIENT PSYCHIATRY PROGRESS NOTE - NSTXPROBPSYCHO_PSY_ALL_CORE
PSYCHOTIC SYMPTOMS

## 2021-06-14 NOTE — BH INPATIENT PSYCHIATRY PROGRESS NOTE - NSTXDCFAMPROGRES_PSY_ALL_CORE
No Change
Met - goal discontinued
No Change
Met - goal discontinued
No Change
Met - goal discontinued
No Change
No Change

## 2021-06-14 NOTE — BH INPATIENT PSYCHIATRY PROGRESS NOTE - PRN MEDS
MEDICATIONS  (PRN):  artificial tears (preservative free) Ophthalmic Solution 1 Drop(s) Both EYES every 6 hours PRN dry eye  chlorproMAZINE    Injectable 100 milliGRAM(s) IntraMuscular once PRN severe aggression  chlorproMAZINE    Tablet 50 milliGRAM(s) Oral every 4 hours PRN agitation/aggression  diphenhydrAMINE 50 milliGRAM(s) Oral every 4 hours PRN agitation  LORazepam     Tablet 2 milliGRAM(s) Oral every 4 hours PRN Agitation  LORazepam   Injectable 2 milliGRAM(s) IntraMuscular Once PRN sevree agitation  OLANZapine 10 milliGRAM(s) Oral every 6 hours PRN agitation  
MEDICATIONS  (PRN):  artificial tears (preservative free) Ophthalmic Solution 1 Drop(s) Both EYES every 6 hours PRN dry eye  chlorproMAZINE    Injectable 100 milliGRAM(s) IntraMuscular once PRN severe aggression  chlorproMAZINE    Tablet 50 milliGRAM(s) Oral every 4 hours PRN agitation/aggression  diphenhydrAMINE 50 milliGRAM(s) Oral every 4 hours PRN agitation  LORazepam     Tablet 2 milliGRAM(s) Oral every 4 hours PRN Agitation  LORazepam   Injectable 2 milliGRAM(s) IntraMuscular Once PRN sevree agitation  OLANZapine 10 milliGRAM(s) Oral every 6 hours PRN agitation  
MEDICATIONS  (PRN):  artificial tears (preservative free) Ophthalmic Solution 1 Drop(s) Both EYES every 6 hours PRN dry eye  chlorproMAZINE    Injectable 50 milliGRAM(s) IntraMuscular once PRN agitation  chlorproMAZINE    Tablet 100 milliGRAM(s) Oral every 4 hours PRN agitation  diphenhydrAMINE 50 milliGRAM(s) Oral every 4 hours PRN agitation  LORazepam     Tablet 2 milliGRAM(s) Oral every 4 hours PRN Agitation  LORazepam   Injectable 2 milliGRAM(s) IntraMuscular Once PRN sevree agitation  OLANZapine 10 milliGRAM(s) Oral every 6 hours PRN agitation  
MEDICATIONS  (PRN):  artificial tears (preservative free) Ophthalmic Solution 1 Drop(s) Both EYES every 6 hours PRN dry eye  chlorproMAZINE    Injectable 100 milliGRAM(s) IntraMuscular once PRN severe aggression  chlorproMAZINE    Tablet 50 milliGRAM(s) Oral every 4 hours PRN agitation/aggression  diphenhydrAMINE 50 milliGRAM(s) Oral every 4 hours PRN agitation  LORazepam     Tablet 2 milliGRAM(s) Oral every 4 hours PRN Agitation  LORazepam   Injectable 2 milliGRAM(s) IntraMuscular Once PRN sevree agitation  OLANZapine 10 milliGRAM(s) Oral every 6 hours PRN agitation  
MEDICATIONS  (PRN):  artificial tears (preservative free) Ophthalmic Solution 1 Drop(s) Both EYES every 6 hours PRN dry eye  chlorproMAZINE    Injectable 50 milliGRAM(s) IntraMuscular once PRN agitation  chlorproMAZINE    Tablet 100 milliGRAM(s) Oral every 4 hours PRN agitation  diphenhydrAMINE 50 milliGRAM(s) Oral every 4 hours PRN agitation  LORazepam     Tablet 2 milliGRAM(s) Oral every 4 hours PRN Agitation  LORazepam   Injectable 2 milliGRAM(s) IntraMuscular Once PRN sevree agitation  OLANZapine 10 milliGRAM(s) Oral every 6 hours PRN agitation  
MEDICATIONS  (PRN):  chlorproMAZINE    Injectable 100 milliGRAM(s) IntraMuscular once PRN severe aggression  chlorproMAZINE    Tablet 50 milliGRAM(s) Oral every 4 hours PRN agitation/aggression  LORazepam     Tablet 2 milliGRAM(s) Oral every 4 hours PRN Agitation  LORazepam   Injectable 2 milliGRAM(s) IntraMuscular Once PRN sevree agitation  
MEDICATIONS  (PRN):  artificial tears (preservative free) Ophthalmic Solution 1 Drop(s) Both EYES every 6 hours PRN dry eye  chlorproMAZINE    Injectable 50 milliGRAM(s) IntraMuscular once PRN agitation  chlorproMAZINE    Tablet 100 milliGRAM(s) Oral every 4 hours PRN agitation  diphenhydrAMINE 50 milliGRAM(s) Oral every 4 hours PRN agitation  LORazepam     Tablet 2 milliGRAM(s) Oral every 4 hours PRN Agitation  LORazepam   Injectable 2 milliGRAM(s) IntraMuscular Once PRN sevree agitation  OLANZapine 10 milliGRAM(s) Oral every 6 hours PRN agitation  
MEDICATIONS  (PRN):  artificial tears (preservative free) Ophthalmic Solution 1 Drop(s) Both EYES every 6 hours PRN dry eye  chlorproMAZINE    Injectable 50 milliGRAM(s) IntraMuscular once PRN agitation  chlorproMAZINE    Tablet 100 milliGRAM(s) Oral every 4 hours PRN agitation  diphenhydrAMINE 50 milliGRAM(s) Oral every 4 hours PRN agitation  LORazepam     Tablet 2 milliGRAM(s) Oral every 4 hours PRN Agitation  LORazepam   Injectable 2 milliGRAM(s) IntraMuscular Once PRN sevree agitation  OLANZapine 10 milliGRAM(s) Oral every 6 hours PRN agitation  
MEDICATIONS  (PRN):  artificial tears (preservative free) Ophthalmic Solution 1 Drop(s) Both EYES every 6 hours PRN dry eye  chlorproMAZINE    Injectable 100 milliGRAM(s) IntraMuscular once PRN severe aggression  chlorproMAZINE    Tablet 50 milliGRAM(s) Oral every 4 hours PRN agitation/aggression  diphenhydrAMINE 50 milliGRAM(s) Oral every 4 hours PRN agitation  LORazepam     Tablet 2 milliGRAM(s) Oral every 4 hours PRN Agitation  LORazepam   Injectable 2 milliGRAM(s) IntraMuscular Once PRN sevree agitation  OLANZapine 10 milliGRAM(s) Oral every 6 hours PRN agitation  
MEDICATIONS  (PRN):  artificial tears (preservative free) Ophthalmic Solution 1 Drop(s) Both EYES every 6 hours PRN dry eye  chlorproMAZINE    Injectable 50 milliGRAM(s) IntraMuscular once PRN agitation  chlorproMAZINE    Tablet 100 milliGRAM(s) Oral every 4 hours PRN agitation  diphenhydrAMINE 50 milliGRAM(s) Oral every 4 hours PRN agitation  LORazepam     Tablet 2 milliGRAM(s) Oral every 4 hours PRN Agitation  LORazepam   Injectable 2 milliGRAM(s) IntraMuscular Once PRN sevree agitation  OLANZapine 10 milliGRAM(s) Oral every 6 hours PRN agitation  
MEDICATIONS  (PRN):  artificial tears (preservative free) Ophthalmic Solution 1 Drop(s) Both EYES every 6 hours PRN dry eye  chlorproMAZINE    Injectable 50 milliGRAM(s) IntraMuscular once PRN agitation  chlorproMAZINE    Tablet 100 milliGRAM(s) Oral every 4 hours PRN agitation  diphenhydrAMINE 50 milliGRAM(s) Oral every 4 hours PRN agitation  LORazepam     Tablet 2 milliGRAM(s) Oral every 4 hours PRN Agitation  LORazepam   Injectable 2 milliGRAM(s) IntraMuscular Once PRN sevree agitation  OLANZapine 10 milliGRAM(s) Oral every 6 hours PRN agitation  
MEDICATIONS  (PRN):  artificial tears (preservative free) Ophthalmic Solution 1 Drop(s) Both EYES every 6 hours PRN dry eye  chlorproMAZINE    Injectable 100 milliGRAM(s) IntraMuscular once PRN severe aggression  chlorproMAZINE    Tablet 50 milliGRAM(s) Oral every 4 hours PRN agitation/aggression  LORazepam     Tablet 2 milliGRAM(s) Oral every 4 hours PRN Agitation  LORazepam   Injectable 2 milliGRAM(s) IntraMuscular Once PRN sevree agitation  
MEDICATIONS  (PRN):  artificial tears (preservative free) Ophthalmic Solution 1 Drop(s) Both EYES every 6 hours PRN dry eye  chlorproMAZINE    Injectable 100 milliGRAM(s) IntraMuscular once PRN severe aggression  chlorproMAZINE    Tablet 50 milliGRAM(s) Oral every 4 hours PRN agitation/aggression  LORazepam     Tablet 2 milliGRAM(s) Oral every 4 hours PRN Agitation  LORazepam   Injectable 2 milliGRAM(s) IntraMuscular Once PRN sevree agitation

## 2021-06-14 NOTE — BH INPATIENT PSYCHIATRY PROGRESS NOTE - NSBHFUPINTERVALCCFT_PSY_A_CORE
"I am ready to go back to my residence tomorrow"
"I want to watch TV not these video games."
"I know my multiplication tables."
"I like being here, but I'm feeling tired today."
"I ate raisin bran for breakfast."
"I want to stay here forever."
"I feel safe here" 
"I take 20 medications at home."
"I took my medications and they help"
"I have diabetes"
"hurt my feelings"
"I wanna go home TODAY!" 
"I want to go home."

## 2021-06-14 NOTE — BH INPATIENT PSYCHIATRY PROGRESS NOTE - NSBHASSESSSUMMFT_PSY_ALL_CORE
The patient is a 30yr old woman w/schizoaffective disorder and intellectual disability, admitted with recent disorganization and aggression. Patient continues to present as poor historian with labile and childlike affect. Becoming tearful when informed that [facility] is an acute care hospital, showing poor frustration tolerance. In light of patient's recent ED visits for aggression, patient to be monitored on ES with low threshold to initiate CO if displays persistent aggression or agitation towards staff/peers. Information regarding psychiatric history and home med regimen obtained from group home and previous outpatient psychiatric NP. Per ED documentation, patient may have missed several doses of her standing home medications due to her frequent ED visits, suspect that this may at least in part be contributing to her apparent decompensation. Patient with improvement in mood and psychotic symptoms, remains adherent with standing medication regimen. She will be discharged back to her group home today.     The patient is at elevated chronic risk of self-harm due to her intellectual disability, hx of schizoaffective disorder. Modifiable risk factors included impulsivity, agitation. Immediate risk was minimized by inpatient admission to a safe environment with appropriate supervision and limited access to lethal means. Protective factors for suicide and aggression include supportive staff at group home, no substance abuse, help-seeking, no current SI. Future risk was minimized before discharge by maximizing outpatient support, providing relevant patient education, discussing emergency procedures, and ensuring close follow-up. Patient denies all suicidal and aggressive/homicidal ideation, intent and plan, and, in view of demonstrated clinical improvement, is NOT an acute danger to self or others at this time. Although the patient remains at their CHRONIC baseline risk of self-harm, such risk cannot be further ameliorated by continued inpatient treatment and the patient is therefore appropriate for discharge.     6/7: some irritability, refuses to return to her previous group home  6/8: irritable, refuses to engage in interview  6/9: irritable, tearful, demanding discharge  6/10: irritable, tearful, demanding discharge  6/11: irritable, tearful   6/14: calm, cooperative on interview, amenable to returning to group home    Plan:  1. Legal: Legal status 9.39  2. Safety: On 1:1 CO   3. Psychiatric: Continue Thorazine 100 mg daily, 100 mg at 1300.  Continue Thorazine 200mg qHS, continue Depakote DR 750mg BID, fluoxetine 40mg daily, continue Abilify 5 mg daily starting 6/8. Reviewed ER documentation, appears limited benefit from Haldol 5 mg/Ativan 2mg/Benadryl 50 mg combination, as well as olanzapine. ER has used Versed on multiple occasions.   PRNs: Thorazine 50 mg IM / 100 mg PO, Benadryl 50 mg PO/IM, Zyprexa 10 mg PO/IM for agitation  4. Medical- continue bromocriptine for hyperprolactinemia, metformin for diabetes, eye drops q6hrs and foot lotion BID as per documentation from group home, will consider offering Depo Provera given patient on Depakote. Prolactin level 10.5. Hx of seizure disorder, EEG cancelled as pt unlikely will be cooperative.  5. I/G/M therapy as appropriate. Behavioral Plan created and discussed with pt.    6. Spoke with outpatient psychiatric NP and group home for collateral, dispo likely back to group home pending their acceptance of the patient.

## 2021-06-14 NOTE — BH INPATIENT PSYCHIATRY PROGRESS NOTE - NSTXIMPULSGOALOTHER_PSY_ALL_CORE
Will identify and utilize 2-3 coping skills to help with agitation while on the unit.

## 2021-06-14 NOTE — BH INPATIENT PSYCHIATRY PROGRESS NOTE - MSE OPTIONS
Unstructured MSE

## 2021-06-14 NOTE — BH INPATIENT PSYCHIATRY PROGRESS NOTE - NSBHCONSBHPROVDETAILS_PSY_A_CORE  FT
ROBERT, spoke with NP Neil Osman 6/2

## 2021-06-14 NOTE — BH INPATIENT PSYCHIATRY PROGRESS NOTE - NSDCCRITERIA_PSY_ALL_CORE
When pt is no longer an acute or imminent risk of harm to self or others, and is able to care for self safely, pt may then be discharged. 

## 2021-06-14 NOTE — BH INPATIENT PSYCHIATRY PROGRESS NOTE - NSTXIMPULSDATEEST_PSY_ALL_CORE
02-Jun-2021

## 2021-06-14 NOTE — BH INPATIENT PSYCHIATRY PROGRESS NOTE - NSBHCHARTREVIEWVS_PSY_A_CORE FT
Vital Signs Last 24 Hrs  T(C): 36.2 (06 Jun 2021 07:55), Max: 36.7 (05 Jun 2021 17:17)  T(F): 97.1 (06 Jun 2021 07:55), Max: 98.1 (05 Jun 2021 17:17)  HR: 95 (06 Jun 2021 07:55) (95 - 95)  BP: 133/78 (06 Jun 2021 07:55) (133/78 - 133/78)  BP(mean): --  RR: --  SpO2: --
Vital Signs Last 24 Hrs  T(C): 36.3 (09 Jun 2021 06:30), Max: 36.4 (08 Jun 2021 17:18)  T(F): 97.3 (09 Jun 2021 06:30), Max: 97.5 (08 Jun 2021 17:18)  HR: --  BP: --  BP(mean): --  RR: 18 (09 Jun 2021 06:30) (18 - 18)  SpO2: --
Vital Signs Last 24 Hrs  T(C): 36.4 (11 Jun 2021 07:39), Max: 36.7 (10 Kareem 2021 17:06)  T(F): 97.5 (11 Jun 2021 07:39), Max: 98.1 (10 Kareem 2021 17:06)  HR: 96 (11 Jun 2021 07:39) (96 - 96)  BP: 90/58 (11 Jun 2021 07:39) (90/58 - 134/76)  BP(mean): 88 (10 Kareem 2021 08:32) (88 - 88)  RR: --  SpO2: --
Vital Signs Last 24 Hrs  T(C): 36.2 (05 Jun 2021 07:29), Max: 36.4 (04 Jun 2021 17:40)  T(F): 97.1 (05 Jun 2021 07:29), Max: 97.6 (04 Jun 2021 17:40)  HR: --  BP: --  BP(mean): --  RR: --  SpO2: --
Vital Signs Last 24 Hrs  T(C): 37 (02 Jun 2021 07:18), Max: 37 (02 Jun 2021 07:18)  T(F): 98.6 (02 Jun 2021 07:18), Max: 98.6 (02 Jun 2021 07:18)  HR: 80 (01 Jun 2021 14:35) (80 - 80)  BP: 112/67 (01 Jun 2021 14:35) (112/67 - 112/67)  BP(mean): --  RR: 18 (01 Jun 2021 14:35) (18 - 18)  SpO2: 100% (01 Jun 2021 14:35) (100% - 100%)
Vital Signs Last 24 Hrs  T(C): 36.2 (07 Jun 2021 18:26), Max: 36.4 (07 Jun 2021 08:53)  T(F): 97.2 (07 Jun 2021 18:26), Max: 97.5 (07 Jun 2021 08:53)  HR: --  BP: --  BP(mean): --  RR: --  SpO2: --
Vital Signs Last 24 Hrs  T(C): 36.9 (09 Jun 2021 17:16), Max: 36.9 (09 Jun 2021 17:16)  T(F): 98.4 (09 Jun 2021 17:16), Max: 98.4 (09 Jun 2021 17:16)  HR: --  BP: --  BP(mean): --  RR: --  SpO2: --
Vital Signs Last 24 Hrs  T(C): 36.4 (07 Jun 2021 08:53), Max: 37.1 (06 Jun 2021 17:26)  T(F): 97.5 (07 Jun 2021 08:53), Max: 98.7 (06 Jun 2021 17:26)  HR: --  BP: --  BP(mean): --  RR: --  SpO2: --
Vital Signs Last 24 Hrs  T(C): 36.4 (03 Jun 2021 08:49), Max: 36.4 (03 Jun 2021 08:49)  T(F): 97.6 (03 Jun 2021 08:49), Max: 97.6 (03 Jun 2021 08:49)  HR: 101 (03 Jun 2021 08:49) (101 - 101)  BP: 112/66 (03 Jun 2021 08:49) (112/66 - 112/66)  BP(mean): --  RR: --  SpO2: --
Vital Signs Last 24 Hrs  T(C): 36.6 (14 Jun 2021 08:18), Max: 36.6 (14 Jun 2021 08:18)  T(F): 97.8 (14 Jun 2021 08:18), Max: 97.8 (14 Jun 2021 08:18)  HR: --  BP: --  BP(mean): --  RR: --  SpO2: --
Vital Signs Last 24 Hrs  T(C): 36.5 (04 Jun 2021 08:15), Max: 36.5 (04 Jun 2021 08:15)  T(F): 97.7 (04 Jun 2021 08:15), Max: 97.7 (04 Jun 2021 08:15)  HR: 100 (04 Jun 2021 08:15) (100 - 100)  BP: 101/62 (04 Jun 2021 08:15) (101/62 - 101/62)  BP(mean): --  RR: --  SpO2: --
Vital Signs Last 24 Hrs  T(C): 36.4 (13 Jun 2021 07:49), Max: 36.4 (13 Jun 2021 07:49)  T(F): 97.5 (13 Jun 2021 07:49), Max: 97.5 (13 Jun 2021 07:49)  HR: 93 (13 Jun 2021 07:49) (93 - 93)  BP: 111/76 (13 Jun 2021 07:49) (111/76 - 111/76)  BP(mean): --  RR: --  SpO2: --
Vital Signs Last 24 Hrs  T(C): 36.3 (12 Jun 2021 07:50), Max: 36.9 (11 Jun 2021 17:21)  T(F): 97.3 (12 Jun 2021 07:50), Max: 98.5 (11 Jun 2021 17:21)  HR: 103 (12 Jun 2021 07:50) (103 - 103)  BP: 133/72 (12 Jun 2021 07:50) (133/72 - 133/72)  BP(mean): --  RR: --  SpO2: --

## 2021-06-14 NOTE — BH INPATIENT PSYCHIATRY PROGRESS NOTE - NSTXPSYCHODATETRGT_PSY_ALL_CORE
10-Kareem-2021

## 2021-06-14 NOTE — BH INPATIENT PSYCHIATRY PROGRESS NOTE - NSTXPROBDCFAM_PSY_ALL_CORE
DISCHARGE ISSUE - POOR ENGAGEMENT WITH SUPPORTS/FAMILY

## 2021-06-14 NOTE — BH INPATIENT PSYCHIATRY PROGRESS NOTE - CASE SUMMARY
30F w/schizoaffective disorder and intellectual disability, admitted with recent disorganization and aggression  Pt remains regressed and requesting to be discharged as soon as possible  Has difficulty with limit setting and disappointment   PLAN  1. Legal status 9.39- Continue admission for safety and stabilization.  2. No CO 1:1   3. Continue Depakote  mg bid, Thorazine to  100/100/200 mg and add low dose abilify at 5 mg 30F w/schizoaffective disorder and intellectual disability, admitted with recent disorganization and aggression  Pt has improved significantly and is ready for discharge     Suicide and risk assessment performed prior to discharge. The patient has a low acute risk and low chronic risk of self-harm. Protective factors include denying SI, no SIB, good social supports in  her residence, no substance abuse, no current mood symptoms, no hopelessness, future-oriented in returning to home, no access to firearms.  Risk factors include presenting illness. Immediate risk was minimized by inpatient admission to a safe environment with appropriate supervision and limited access to lethal means. Future risk was minimized before discharge by treatment of acute episode, maximizing outpatient support, providing relevant patient education, discussing emergency procedures, and ensuring close follow-up. The patient remains at a low risk of self-harm, and such risk cannot be further ameliorated by continued inpatient treatment and the patient is therefore appropriate for discharge.      There were no behavioral problems on the unit over the last week.   Patient did not self-harm on the unit.  Patient remained actively engaged in treatment.  Patient participated in individual, group, and milieu therapy.  Patient got along appropriately with staff and peers.   Patient did not have any medical problems during this hospitalization.  There were no medical consultations.  A full discussion of the factors that predict treatment success and relapse was held including safety planning.  A discussion of the risks and benefits of patient’s medication was held including a discussion of the metabolic risks and risk of EPS and TD was done     The patient has improved significantly and no longer requires inpatient treatment and care. Patient denies all suicidal and aggressive ideation, intent and plan. Patient denies anxiety symptoms and panic attacks. Patient is not judged to be an acute danger to self or others at this time. Patient will be discharged today to her group home and outpatient follow up at [facility]

## 2021-06-14 NOTE — BH INPATIENT PSYCHIATRY PROGRESS NOTE - NSTXIMPULSGOAL_PSY_ALL_CORE
Other...

## 2021-06-14 NOTE — BH INPATIENT PSYCHIATRY PROGRESS NOTE - NSTXPSYCHOGOAL_PSY_ALL_CORE
Will report command hallucinations to staff

## 2021-06-14 NOTE — BH INPATIENT PSYCHIATRY PROGRESS NOTE - NSTXDCOPLKPROGRES_PSY_ALL_CORE
Met - goal discontinued
No Change
Met - goal discontinued
No Change
No Change
Met - goal discontinued
No Change

## 2021-06-14 NOTE — BH INPATIENT PSYCHIATRY PROGRESS NOTE - NSTXDCOPLKDATETRGT_PSY_ALL_CORE
09-Jun-2021
14-Jun-2021
09-Jun-2021
14-Jun-2021
14-Jun-2021

## 2021-06-14 NOTE — BH INPATIENT PSYCHIATRY PROGRESS NOTE - NSTXPROBIMPULS_PSY_ALL_CORE
IMPULSIVITY/AGITATION

## 2021-06-14 NOTE — BH INPATIENT PSYCHIATRY PROGRESS NOTE - NSTXDCFAMDATEEST_PSY_ALL_CORE
02-Jun-2021

## 2021-06-14 NOTE — BH INPATIENT PSYCHIATRY PROGRESS NOTE - NSTXDIABPROGRES_PSY_ALL_CORE
No Change

## 2021-06-14 NOTE — BH INPATIENT PSYCHIATRY PROGRESS NOTE - NSTXIMPULSDATETRGT_PSY_ALL_CORE
15-Kareem-2021
09-Jun-2021
09-Jun-2021
15-Kareem-2021
09-Jun-2021
09-Jun-2021
15-Kareem-2021
09-Jun-2021
15-Kareem-2021

## 2021-06-14 NOTE — BH INPATIENT PSYCHIATRY PROGRESS NOTE - NSICDXBHPRIMARYDX_PSY_ALL_CORE
Schizoaffective disorder   F25.9  

## 2021-06-14 NOTE — BH INPATIENT PSYCHIATRY PROGRESS NOTE - NSBHATTESTSEENBY_PSY_A_CORE
Attending Psychiatrist supervising NP/Trainee, meeting pt...
attending Psychiatrist without NP/Trainee
Attending Psychiatrist supervising NP/Trainee, meeting pt...
attending Psychiatrist without NP/Trainee
attending Psychiatrist without NP/Trainee
Attending Psychiatrist supervising NP/Trainee, meeting pt...
attending Psychiatrist without NP/Trainee

## 2021-06-14 NOTE — BH INPATIENT PSYCHIATRY PROGRESS NOTE - MSE UNSTRUCTURED FT
Patient calmer and more cooperative  Fair eye contact, no involuntary movements  Speech with normal volume, normal rate.   Mood is "good"   Affect with less lability, congruent with stated mood.   Thought process is goal-directed, some loosening of associations.   Thought content without any overt delusional thinking.    Denies SI/HI,   Denies AVH.   Insight and judgment poor,  Improving impulse control

## 2021-07-03 ENCOUNTER — EMERGENCY (EMERGENCY)
Facility: HOSPITAL | Age: 31
LOS: 1 days | Discharge: HOME | End: 2021-07-03
Admitting: EMERGENCY MEDICINE
Payer: MEDICAID

## 2021-07-03 VITALS
RESPIRATION RATE: 18 BRPM | HEART RATE: 67 BPM | TEMPERATURE: 100 F | HEIGHT: 61 IN | OXYGEN SATURATION: 100 % | DIASTOLIC BLOOD PRESSURE: 72 MMHG | SYSTOLIC BLOOD PRESSURE: 112 MMHG

## 2021-07-03 PROCEDURE — 99284 EMERGENCY DEPT VISIT MOD MDM: CPT

## 2021-07-03 RX ORDER — CHLORPROMAZINE HCL 10 MG
50 TABLET ORAL ONCE
Refills: 0 | Status: COMPLETED | OUTPATIENT
Start: 2021-07-03 | End: 2021-07-03

## 2021-07-03 RX ORDER — DIPHENHYDRAMINE HCL 50 MG
50 CAPSULE ORAL ONCE
Refills: 0 | Status: COMPLETED | OUTPATIENT
Start: 2021-07-03 | End: 2021-07-03

## 2021-07-03 RX ADMIN — Medication 50 MILLIGRAM(S): at 14:32

## 2021-07-03 RX ADMIN — Medication 2 MILLIGRAM(S): at 16:10

## 2021-07-03 RX ADMIN — Medication 50 MILLIGRAM(S): at 16:10
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
## 2021-07-03 NOTE — ED BEHAVIORAL HEALTH NOTE - BEHAVIORAL HEALTH NOTE
Patient resides at [facility]. SW contacted [facility] ([phone]) and spoke with Renee  informed Renee patient is medically cleared for discharge and will be discharged back to facility within 2 hour. Renee is in agreement with discharge plans. Patient will be discharge to [address] [place] [address].

## 2021-07-03 NOTE — ED ADULT NURSE NOTE - OBJECTIVE STATEMENT
Pt states that she's pregnant and that she didn't do anything.  Pt states, "I just wanna go home."  Pt was in handcuffs and received Thorazine 50mg IM and Benadryl 50mg IM for agitation.  Pt became very tearful and began yelling at staff and gave writer a threatening look.  Pt denies SI/HI, AH/VH.

## 2021-07-03 NOTE — ED ADULT TRIAGE NOTE - CCCP TRG CHIEF CMPLNT
[employer] called pt brought in with cuffs does not appear agitated but my be delusion " we are going to have a cookout and some liquor "/agitation

## 2021-07-03 NOTE — ED PROVIDER NOTE - PATIENT PORTAL LINK FT
You can access the FollowMyHealth Patient Portal offered by [facility] by registering at the following website: [email or web address]. By joining [facility] FollowMyHealth portal, you will also be able to view your health information using other applications (apps) compatible with our system.
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
## 2021-07-03 NOTE — ED PROVIDER NOTE - CLINICAL SUMMARY MEDICAL DECISION MAKING FREE TEXT BOX
29 y/o F hx Intellectual Disability, Impulse control D/O, DM    Medical evaluation performed. There is no clinical evidence of intoxication or any acute medical problem requiring immediate intervention.  Medication offered, tolerated same well. D/C to [place] via EMS .

## 2021-07-03 NOTE — ED ADULT NURSE REASSESSMENT NOTE - NS ED NURSE REASSESS COMMENT FT1
Pt is now calm and cooperative.  Pt cleared for discharge.  Discharge instructions provided to the EMS drivers.  Belongings placed on stretcher.

## 2021-07-03 NOTE — ED ADULT NURSE REASSESSMENT NOTE - NS ED NURSE REASSESS COMMENT FT1
Pt continued to be verbally aggressive and agitated screaming, "I wanna f'in go home."  Pt was medicated with Thorazine 50mg IM and Ativan 2mg IM in the right dorsogluteal area.  Pt came out and sat in chair.  Monitoring ongoing.

## 2021-07-21 ENCOUNTER — EMERGENCY (EMERGENCY)
Facility: HOSPITAL | Age: 31
LOS: 1 days | Discharge: HOME | End: 2021-07-21
Admitting: EMERGENCY MEDICINE
Payer: MEDICAID

## 2021-07-21 VITALS
HEIGHT: 61 IN | RESPIRATION RATE: 18 BRPM | HEART RATE: 103 BPM | OXYGEN SATURATION: 100 % | SYSTOLIC BLOOD PRESSURE: 105 MMHG | TEMPERATURE: 98 F | DIASTOLIC BLOOD PRESSURE: 50 MMHG

## 2021-07-21 PROCEDURE — 99285 EMERGENCY DEPT VISIT HI MDM: CPT

## 2021-07-21 RX ORDER — CHLORPROMAZINE HCL 10 MG
50 TABLET ORAL ONCE
Refills: 0 | Status: COMPLETED | OUTPATIENT
Start: 2021-07-21 | End: 2021-07-21

## 2021-07-21 RX ADMIN — Medication 2 MILLIGRAM(S): at 14:41

## 2021-07-21 RX ADMIN — Medication 2 MILLIGRAM(S): at 15:29

## 2021-07-21 RX ADMIN — Medication 50 MILLIGRAM(S): at 14:41

## 2021-07-21 RX ADMIN — Medication 50 MILLIGRAM(S): at 15:29

## 2021-07-21 NOTE — ED ADULT NURSE NOTE - OBJECTIVE STATEMENT
Pt is yelling, cursing, and screaming and states she doesn't want to be here and she wants to go home.  Pt believes she is pregnant and the amount of weeks changes depending on who is asking her the question.  Pt required Thorazine 50mg and Ativan 2mg IM in her left dorsogluteal area for agitation.

## 2021-07-21 NOTE — ED ADULT NURSE NOTE - CHIEF COMPLAINT QUOTE
Patient arrives from a group home for EDP since a change in her medications. Patient yelling and very agitated in triage,  States that she is 4 months pregnant.

## 2021-07-21 NOTE — ED PROVIDER NOTE - PHYSICAL EXAMINATION
GEN:  anxious screaming, cursing at staff  EYES:   PERRL, extra-occular movements intact  RESP:   non-labored, speaking in full sentences  ABD:   soft, non tender, no guarding  MSK:   no musculoskeletal tenderness, 5/5 strength, moving all extremities  SKIN:   dry, intact, no rash  NEURO:   AOx3, no focal weakness or loss of sensation, gait normal, GCS 15  PSYCH: anxious, screaming, crying, cursing, threatening staff

## 2021-07-21 NOTE — ED BEHAVIORAL HEALTH NOTE - BEHAVIORAL HEALTH NOTE
Writer called [facility] [phone] left a voicemail requesting a callback to social work phone.

## 2021-07-21 NOTE — ED PROVIDER NOTE - NSFOLLOWUPINSTRUCTIONS_ED_ALL_ED_FT
Follow up with your primary care physician and psychiatrist in 48-72 hours.  You may also see the psychiatrist at [facility]:    [id number] [place] [address]  Phone: [phone]      SEEK IMMEDIATE MEDICAL CARE IF YOU HAVE ANY OF THE FOLLOWING SYMPTOMS: thoughts about hurting or killing yourself, thoughts about hurting or killing somebody else, hallucinations or any other worsening or persistent symptoms OR ANY NEW OR CONCERNING SYMPTOMS.

## 2021-07-21 NOTE — ED PROVIDER NOTE - PROGRESS NOTE DETAILS
After two wounds of IM medications, patient now calm and cooperative. There was no further occurrence of agitation/aggressive behavior.  Pt did not verbalize any passive/ active SI/HI. There were no signs/symptoms of acute domenico or florid psychosis.  Pt did not exhibit any signs/ symptoms  of intoxication or withdrawal.  Pt is not delirious.   Will discharge patient, SW called facility, huddle performed.  Patient requires outpatient management of chronic agitation.  EMS called.

## 2021-07-21 NOTE — ED BEHAVIORAL HEALTH NOTE - BEHAVIORAL HEALTH NOTE
Per provider, Marcella Toure, patient is cleared and is able to return to their previous residence, [facility].  has spoken to supervisor May  confirmed that patients mode of transportation is AMBULANCE and that patient travels INDEPENDENTLY.  No staff member with patient. Clinical provider is in agreement with AMBULANCE back to senior care. Verbal huddle regarding coordination of care completed with interdisciplinary team.  Rn will arrange for ambulance to pt’s group home.

## 2021-07-21 NOTE — ED PROVIDER NOTE - CLINICAL SUMMARY MEDICAL DECISION MAKING FREE TEXT BOX
Patient well known to this ED presents to the ED for aggressive behavior.  Medical evaluation performed. There is no clinical evidence of intoxication or any acute medical problem requiring immediate intervention. Patient behavior expected from her baseline intellectual function and chronic psychiatric conditions.  Will provide medications to patient due to severe agitation in ED. Will engage SW to obtain collateral from facility and if no concerns will discharge back to facility.

## 2021-07-21 NOTE — ED PROVIDER NOTE - PATIENT PORTAL LINK FT
You can access the FollowMyHealth Patient Portal offered by [facility] by registering at the following website: [email or web address]. By joining [employer]’s FollowMyHealth portal, you will also be able to view your health information using other applications (apps) compatible with our system.

## 2021-07-21 NOTE — ED ADULT TRIAGE NOTE - CHIEF COMPLAINT QUOTE
Patient arrives from a group home for EDP since a sheikh in her medications. Patient arrives from a group home for EDP since a change in her medications. Patient yelling and very agitated in triage,  States that she is 4 months pregnant.

## 2021-07-21 NOTE — ED ADULT NURSE REASSESSMENT NOTE - NS ED NURSE REASSESS COMMENT FT1
Pt continued to be agitated, yelling, cursing and screaming about ginger ale and staff members allegedly laughing at her. Pt was verbally threatening. Pt required Thorazine 50mg and Ativan 2mg IM in the right deltoid.  Pt did not require 4-point restraints. Monitoring ongoing.

## 2021-07-21 NOTE — ED PROVIDER NOTE - OBJECTIVE STATEMENT
29 y/o/F well known to this ED, hx Intellectual Disability, Impulse control D/O, DM  BIBA secondary to a agitation while at her group home.   Denies any physical altercation.  Admits that she was upset   Explicitly denies SI/HI/AH/VH.  No evidence of physical injuries, broken  skin or deformities.  Denies falling, punching or kicking any objects. Denies pain, SOB, fever , chills, chest/ abdominal discomfort.  Denies recent use of alcohol or other  illicit drugs.
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
## 2021-07-21 NOTE — ED ADULT NURSE REASSESSMENT NOTE - NS ED NURSE REASSESS COMMENT FT1
Pt is cleared for discharge back to group home, she is awaiting pickup from ambulance.  Pt has been sleeping for the last few hours with no further behavioral issues.

## 2021-08-01 ENCOUNTER — EMERGENCY (EMERGENCY)
Facility: HOSPITAL | Age: 31
LOS: 0 days | Discharge: HOME | End: 2021-08-02
Attending: EMERGENCY MEDICINE
Payer: MEDICAID

## 2021-08-01 VITALS
OXYGEN SATURATION: 100 % | DIASTOLIC BLOOD PRESSURE: 66 MMHG | SYSTOLIC BLOOD PRESSURE: 110 MMHG | TEMPERATURE: 98 F | WEIGHT: 164.91 LBS | HEIGHT: 61 IN | RESPIRATION RATE: 17 BRPM | HEART RATE: 101 BPM

## 2021-08-01 DIAGNOSIS — F79 UNSPECIFIED INTELLECTUAL DISABILITIES: ICD-10-CM

## 2021-08-01 DIAGNOSIS — Z00.8 ENCOUNTER FOR OTHER GENERAL EXAMINATION: ICD-10-CM

## 2021-08-01 DIAGNOSIS — F63.9 IMPULSE DISORDER, UNSPECIFIED: ICD-10-CM

## 2021-08-01 DIAGNOSIS — R45.1 RESTLESSNESS AND AGITATION: ICD-10-CM

## 2021-08-01 DIAGNOSIS — E11.9 TYPE 2 DIABETES MELLITUS WITHOUT COMPLICATIONS: ICD-10-CM

## 2021-08-01 DIAGNOSIS — Z91.018 ALLERGY TO OTHER FOODS: ICD-10-CM

## 2021-08-01 LAB
BASOPHILS # BLD AUTO: 0.03 K/UL — SIGNIFICANT CHANGE UP (ref 0–0.2)
BASOPHILS NFR BLD AUTO: 0.6 % — SIGNIFICANT CHANGE UP (ref 0–2)
EOSINOPHIL # BLD AUTO: 0.08 K/UL — SIGNIFICANT CHANGE UP (ref 0–0.5)
EOSINOPHIL NFR BLD AUTO: 1.5 % — SIGNIFICANT CHANGE UP (ref 0–6)
HCT VFR BLD CALC: 33.3 % — LOW (ref 34.5–45)
HGB BLD-MCNC: 10.8 G/DL — LOW (ref 11.5–15.5)
IMM GRANULOCYTES NFR BLD AUTO: 0.4 % — SIGNIFICANT CHANGE UP (ref 0–1.5)
LYMPHOCYTES # BLD AUTO: 2.22 K/UL — SIGNIFICANT CHANGE UP (ref 1–3.3)
LYMPHOCYTES # BLD AUTO: 41 % — SIGNIFICANT CHANGE UP (ref 13–44)
MCHC RBC-ENTMCNC: 31.2 PG — SIGNIFICANT CHANGE UP (ref 27–34)
MCHC RBC-ENTMCNC: 32.4 GM/DL — SIGNIFICANT CHANGE UP (ref 32–36)
MCV RBC AUTO: 96.2 FL — SIGNIFICANT CHANGE UP (ref 80–100)
MONOCYTES # BLD AUTO: 0.67 K/UL — SIGNIFICANT CHANGE UP (ref 0–0.9)
MONOCYTES NFR BLD AUTO: 12.4 % — SIGNIFICANT CHANGE UP (ref 2–14)
NEUTROPHILS # BLD AUTO: 2.4 K/UL — SIGNIFICANT CHANGE UP (ref 1.8–7.4)
NEUTROPHILS NFR BLD AUTO: 44.1 % — SIGNIFICANT CHANGE UP (ref 43–77)
NRBC # BLD: 0 /100 WBCS — SIGNIFICANT CHANGE UP (ref 0–0)
PLATELET # BLD AUTO: 211 K/UL — SIGNIFICANT CHANGE UP (ref 150–400)
RBC # BLD: 3.46 M/UL — LOW (ref 3.8–5.2)
RBC # FLD: 14.4 % — SIGNIFICANT CHANGE UP (ref 10.3–14.5)
WBC # BLD: 5.42 K/UL — SIGNIFICANT CHANGE UP (ref 3.8–10.5)
WBC # FLD AUTO: 5.42 K/UL — SIGNIFICANT CHANGE UP (ref 3.8–10.5)

## 2021-08-01 PROCEDURE — 99283 EMERGENCY DEPT VISIT LOW MDM: CPT

## 2021-08-01 RX ORDER — MIDAZOLAM HYDROCHLORIDE 1 MG/ML
4 INJECTION, SOLUTION INTRAMUSCULAR; INTRAVENOUS ONCE
Refills: 0 | Status: DISCONTINUED | OUTPATIENT
Start: 2021-08-01 | End: 2021-08-01

## 2021-08-01 RX ORDER — HALOPERIDOL DECANOATE 100 MG/ML
5 INJECTION INTRAMUSCULAR ONCE
Refills: 0 | Status: COMPLETED | OUTPATIENT
Start: 2021-08-01 | End: 2021-08-01

## 2021-08-01 RX ADMIN — MIDAZOLAM HYDROCHLORIDE 4 MILLIGRAM(S): 1 INJECTION, SOLUTION INTRAMUSCULAR; INTRAVENOUS at 21:00

## 2021-08-01 RX ADMIN — HALOPERIDOL DECANOATE 5 MILLIGRAM(S): 100 INJECTION INTRAMUSCULAR at 21:00

## 2021-08-01 NOTE — ED PROVIDER NOTE - OBJECTIVE STATEMENT
29 yo F with aggressive agitated behavior, bibems from group home.   ROS: negative for fever, cough, headache, chest pain, shortness of breath, abd pain, nausea, vomiting, diarrhea, rash, paresthesia, and weakness--all other systems reviewed are negative.   PMH:  Intellectual Disability, Impulse control D/O, DM; Meds: See EMR for list; SH: Denies smoking/drinking/drug use

## 2021-08-01 NOTE — ED PROVIDER NOTE - PROGRESS NOTE DETAILS
pt. is not acutely psychotic, she is making sense when she speaks, no longer agitated  she denies SI/HI/hallucinations, notes her frustration comes from living in her current home Results reported to patient--grossly benign, labs unremarkable   Pt. reports feeling better after meds and sleeping, clinically sober, ambulating with steady gait, no longer aggressive/agitated; pt. apologizes for her behavior   pt. agrees to f/u with primary care outpt. asap  pt. understands to return to ED if symptoms worsen; will d/c pt. now becoming acutely agitated after finding out no breakfast trays are available  pt. spitting on, cursing at staff, interfering with patient care, threatening staff because there is no food for her to eat  redirection/limit setting attempted with no improvement   will sedate again and monitor

## 2021-08-01 NOTE — ED PROVIDER NOTE - PHYSICAL EXAMINATION
Vitals: WNL  Gen: AAOx3, aggressive, agitated, screaming, interfering with patient care, violent and threatening   Head: ncat, perrla, eomi b/l  Neck: supple, no lymphadenopathy, no midline deviation  Heart: rrr, no m/r/g  Lungs: CTA b/l, no rales/ronchi/wheezes  Abd: soft, nontender, non-distended, no rebound or guarding  Ext: no clubbing/cyanosis/edema  Neuro: sensation and muscle strength intact b/l, steady gait

## 2021-08-01 NOTE — ED PROVIDER NOTE - PATIENT PORTAL LINK FT
You can access the FollowMyHealth Patient Portal offered by [facility] by registering at the following website: [email or web address]. By joining [employer]’s FollowMyHealth portal, you will also be able to view your health information using other applications (apps) compatible with our system. You can access the FollowMyHealth Patient Portal offered by [facility] by registering at the following website: [email or web address]. By joining [employer]’s FollowMyHealth portal, you will also be able to view your health information using other applications (apps) compatible with our system.

## 2021-08-01 NOTE — ED PROVIDER NOTE - CLINICAL SUMMARY MEDICAL DECISION MAKING FREE TEXT BOX
31 yo F with agitated behavior, limit setting/redirection no help, will sedate, ?drug/etoh intox/abuse vs decompensated psych  -basic labs, etoh, ua, cx, drug scree, hcg, tsh, ekg, iv, monitor, 1:1 obs, haldol/versed iv, monitor  -f/u results, reeval

## 2021-08-02 LAB
ALBUMIN SERPL ELPH-MCNC: 2.8 G/DL — LOW (ref 3.3–5)
ALP SERPL-CCNC: 34 U/L — LOW (ref 40–120)
ALT FLD-CCNC: 11 U/L — LOW (ref 12–78)
ANION GAP SERPL CALC-SCNC: 8 MMOL/L — SIGNIFICANT CHANGE UP (ref 5–17)
AST SERPL-CCNC: 9 U/L — LOW (ref 15–37)
BILIRUB SERPL-MCNC: 0.2 MG/DL — SIGNIFICANT CHANGE UP (ref 0.2–1.2)
BUN SERPL-MCNC: 15 MG/DL — SIGNIFICANT CHANGE UP (ref 7–23)
CALCIUM SERPL-MCNC: 8.1 MG/DL — LOW (ref 8.5–10.1)
CHLORIDE SERPL-SCNC: 109 MMOL/L — HIGH (ref 96–108)
CO2 SERPL-SCNC: 24 MMOL/L — SIGNIFICANT CHANGE UP (ref 22–31)
CREAT SERPL-MCNC: 0.7 MG/DL — SIGNIFICANT CHANGE UP (ref 0.5–1.3)
ETHANOL SERPL-MCNC: <10 MG/DL — SIGNIFICANT CHANGE UP (ref 0–10)
GLUCOSE BLDC GLUCOMTR-MCNC: 77 MG/DL — SIGNIFICANT CHANGE UP (ref 70–99)
GLUCOSE SERPL-MCNC: 88 MG/DL — SIGNIFICANT CHANGE UP (ref 70–99)
HCG SERPL-ACNC: <1 MIU/ML — SIGNIFICANT CHANGE UP
POTASSIUM SERPL-MCNC: 4.4 MMOL/L — SIGNIFICANT CHANGE UP (ref 3.5–5.3)
POTASSIUM SERPL-SCNC: 4.4 MMOL/L — SIGNIFICANT CHANGE UP (ref 3.5–5.3)
PROT SERPL-MCNC: 6.3 GM/DL — SIGNIFICANT CHANGE UP (ref 6–8.3)
SODIUM SERPL-SCNC: 141 MMOL/L — SIGNIFICANT CHANGE UP (ref 135–145)
TSH SERPL-MCNC: 1.2 UU/ML — SIGNIFICANT CHANGE UP (ref 0.36–3.74)

## 2021-08-02 PROCEDURE — 93010 ELECTROCARDIOGRAM REPORT: CPT

## 2021-08-02 RX ORDER — MIDAZOLAM HYDROCHLORIDE 1 MG/ML
4 INJECTION, SOLUTION INTRAMUSCULAR; INTRAVENOUS ONCE
Refills: 0 | Status: DISCONTINUED | OUTPATIENT
Start: 2021-08-02 | End: 2021-08-02

## 2021-08-02 RX ORDER — HALOPERIDOL DECANOATE 100 MG/ML
5 INJECTION INTRAMUSCULAR ONCE
Refills: 0 | Status: COMPLETED | OUTPATIENT
Start: 2021-08-02 | End: 2021-08-02

## 2021-08-02 RX ADMIN — MIDAZOLAM HYDROCHLORIDE 4 MILLIGRAM(S): 1 INJECTION, SOLUTION INTRAMUSCULAR; INTRAVENOUS at 03:20

## 2021-08-02 RX ADMIN — HALOPERIDOL DECANOATE 5 MILLIGRAM(S): 100 INJECTION INTRAMUSCULAR at 03:20

## 2021-08-02 NOTE — ED ADULT NURSE REASSESSMENT NOTE - NS ED NURSE REASSESS COMMENT FT1
pt alert and very disruptive, yelling screaming, throwing things, agitated, aggressive towards staff, attempting to leave,, attempted to redirect pt with negative outcome.  md made aware, meds given as ordered, safety measures in place. Pt awaiting d/c home, will continue to monitor.

## 2021-08-02 NOTE — ED ADULT NURSE NOTE - NSIMPLEMENTINTERV_GEN_ALL_ED
Implemented All Universal Safety Interventions:  [place] to call system. Call bell, personal items and telephone within reach. Instruct patient to call for assistance. Room bathroom lighting operational. Non-slip footwear when patient is off stretcher. Physically safe environment: no spills, clutter or unnecessary equipment. Stretcher in lowest position, wheels locked, appropriate side rails in place.

## 2021-08-02 NOTE — ED ADULT NURSE NOTE - NS ED NURSE DISCH DISPOSITION
[facility] contacted our clinic and [facility] is requesting a refill of amlodipine. Pt reported taking this medication on 4/24/2020 but didn't seem to need refills at that time. Please advise.    Thanks, Ida      Pending Prescriptions:                       Disp   Refills    amLODIPine (NORVASC) 2.5 MG tablet        90 tab*1            Sig: Take 1 tablet (2.5 mg) by mouth daily      
Discharged

## 2021-08-02 NOTE — ED ADULT NURSE NOTE - OBJECTIVE STATEMENT
Pt presents to the ED complaining of psych eval. Pt from group home, left group home and has been off meds. Hx schizoaffective disorder, bipolar, DM. pt very loug, agitated and angry in triage

## 2021-08-04 ENCOUNTER — EMERGENCY (EMERGENCY)
Facility: HOSPITAL | Age: 31
LOS: 1 days | Discharge: HOME | End: 2021-08-04
Attending: EMERGENCY MEDICINE | Admitting: EMERGENCY MEDICINE
Payer: MEDICAID

## 2021-08-04 ENCOUNTER — EMERGENCY (EMERGENCY)
Facility: HOSPITAL | Age: 31
LOS: 1 days | Discharge: HOME | End: 2021-08-04
Admitting: EMERGENCY MEDICINE
Payer: MEDICAID

## 2021-08-04 VITALS
SYSTOLIC BLOOD PRESSURE: 120 MMHG | RESPIRATION RATE: 20 BRPM | HEIGHT: 61 IN | TEMPERATURE: 98 F | OXYGEN SATURATION: 100 % | HEART RATE: 100 BPM | DIASTOLIC BLOOD PRESSURE: 73 MMHG

## 2021-08-04 VITALS
HEIGHT: 61 IN | OXYGEN SATURATION: 100 % | RESPIRATION RATE: 20 BRPM | DIASTOLIC BLOOD PRESSURE: 66 MMHG | HEART RATE: 80 BPM | TEMPERATURE: 98 F | SYSTOLIC BLOOD PRESSURE: 125 MMHG

## 2021-08-04 VITALS
SYSTOLIC BLOOD PRESSURE: 104 MMHG | HEART RATE: 70 BPM | RESPIRATION RATE: 16 BRPM | TEMPERATURE: 98 F | OXYGEN SATURATION: 100 % | DIASTOLIC BLOOD PRESSURE: 67 MMHG

## 2021-08-04 PROCEDURE — 99284 EMERGENCY DEPT VISIT MOD MDM: CPT

## 2021-08-04 RX ORDER — CHLORPROMAZINE HCL 10 MG
50 TABLET ORAL ONCE
Refills: 0 | Status: COMPLETED | OUTPATIENT
Start: 2021-08-04 | End: 2021-08-04

## 2021-08-04 RX ADMIN — Medication 2 MILLIGRAM(S): at 20:20

## 2021-08-04 RX ADMIN — Medication 50 MILLIGRAM(S): at 20:28

## 2021-08-04 RX ADMIN — Medication 50 MILLIGRAM(S): at 10:39

## 2021-08-04 RX ADMIN — Medication 50 MILLIGRAM(S): at 20:20

## 2021-08-04 RX ADMIN — Medication 2 MILLIGRAM(S): at 10:39

## 2021-08-04 NOTE — ED BEHAVIORAL HEALTH NOTE - BEHAVIORAL HEALTH NOTE
Worker called patient's group home ([phone]) and left multiple messages for call back. Worker called alternate number ([phone]) and phone is picked up and then disconnected. Worker made various attempts to call.

## 2021-08-04 NOTE — ED PROVIDER NOTE - NS ED ROS FT
REVIEW OF SYSTEMS:   CONSTITUTIONAL: No fever, weight loss, or fatigue  EYES: No eye pain, visual disturbances, or discharge  ENMT:  No difficulty hearing, tinnitus, vertigo; No sinus or throat pain  NECK: No pain or stiffness  RESPIRATORY: No cough, wheezing, chills or hemoptysis; No shortness of breath  CARDIOVASCULAR: No chest pain, palpitations, dizziness, or leg swelling  GASTROINTESTINAL: No abdominal or epigastric pain. No nausea, vomiting, or hematemesis; No diarrhea or constipation. No melena or hematochezia.  GENITOURINARY: No dysuria, frequency, hematuria, or incontinence  NEUROLOGICAL: No headaches, memory loss, loss of strength, numbness, or tremors  SKIN: No itching, burning, rashes, or lesions

## 2021-08-04 NOTE — ED PROVIDER NOTE - PATIENT PORTAL LINK FT
You can access the FollowMyHealth Patient Portal offered by [facility] by registering at the following website: [email or web address]. By joining [employer]’s FollowMyHealth portal, you will also be able to view your health information using other applications (apps) compatible with our system.

## 2021-08-04 NOTE — ED PROVIDER NOTE - PATIENT PORTAL LINK FT
You can access the FollowMyHealth Patient Portal offered by [facility] by registering at the following website: [email or web address]. By joining [employer]’s FollowMyHealth portal, you will also be able to view your health information using other applications (apps) compatible with our system.

## 2021-08-04 NOTE — ED PROVIDER NOTE - PHYSICAL EXAMINATION
GEN - NAD; well appearing; A+O x3; non-toxic appearing  CARD -s1s2, RRR, no M,G,R;   PULM - CTA b/l, symmetric breath sounds;   ABD -  +BS, ND, NT, soft, no guarding, no rebound, no masses;   BACK - no CVA tenderness, Normal  spine;   EXT - symmetric pulses, 2+ dp, capillary refill < 2 seconds, no cyanosis, no edema;   NEURO - no focal neuro deficits, no slurred speech  PSYCH - Calm initially in triage, brought back to Psych area and was noted to become agitated screaming.  Pt was given ativan 2mg and thorazine 50mg PO.  Pt redirectable.
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
## 2021-08-04 NOTE — ED BEHAVIORAL HEALTH NOTE - BEHAVIORAL HEALTH NOTE
Writer called [facility] ([phone]) and left a voicemail requesting a callback Writer called  in pt's chart, wrong number.  Writer called  phone rings then mailbox has not been set up and disconnects.  Writer called

## 2021-08-04 NOTE — ED PROVIDER NOTE - CLINICAL SUMMARY MEDICAL DECISION MAKING FREE TEXT BOX
NP Ruby- Collateral info obtained by yessi, refer to her note. Upon reassessment pt at her base line, no aggressive behaviour, no agitation. There is no clinical evidence of intoxication, or any acute medical problem requiring immediate intervention.  transport arranged by RN
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
## 2021-08-04 NOTE — ED ADULT NURSE NOTE - OBJECTIVE STATEMENT
pt sent to ed by group home staff for agitation and aggressive behavior. pt came in screaming yelling, was verbally de-escalated and took PO meds. Will continue to monitor.
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
## 2021-08-04 NOTE — ED PROVIDER NOTE - CPE EDP MUSC NORM
Received pt from ER at 0600. Pt alert, oriented, following commands. On 2L nasal cannula with diminished breath sounds. Afebrile. Blood pressure stable. Normal sinus rhythm. SCD's placed for DVT prophylaxis per MD order. Bowel sounds active. Kept NPO.  Isa Vaughn normal...

## 2021-08-05 VITALS
DIASTOLIC BLOOD PRESSURE: 61 MMHG | HEART RATE: 85 BPM | TEMPERATURE: 98 F | SYSTOLIC BLOOD PRESSURE: 100 MMHG | RESPIRATION RATE: 15 BRPM | OXYGEN SATURATION: 100 %

## 2021-08-05 RX ADMIN — Medication 2 MILLIGRAM(S): at 06:19

## 2021-08-05 NOTE — ED ADULT NURSE NOTE - OBJECTIVE STATEMENT
8/5/21 @ 0000:  Report received from day shift RN, already medicated x2 for aggression and agitation.  Pt presently sleeping, in NAD.  As per triage note, pt from group home, was evaluated here earlier and discharged, sent back for agitation and aggression with no improvement from prior visit.  Respirations even and unlabored on room air.  Appears comfortable.  Will continue to monitor.

## 2021-08-05 NOTE — ED ADULT NURSE NOTE - NSIMPLEMENTINTERV_GEN_ALL_ED
Implemented All Fall Risk Interventions:  [place] to call system. Call bell, personal items and telephone within reach. Instruct patient to call for assistance. Room bathroom lighting operational. Non-slip footwear when patient is off stretcher. Physically safe environment: no spills, clutter or unnecessary equipment. Stretcher in lowest position, wheels locked, appropriate side rails in place. Provide visual cue, wrist band, yellow gown, etc. Monitor gait and stability. Monitor for mental status changes and reorient to person, place, and time. Review medications for side effects contributing to fall risk. Reinforce activity limits and safety measures with patient and family.
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
## 2021-08-05 NOTE — ED ADULT NURSE REASSESSMENT NOTE - NS ED NURSE REASSESS COMMENT FT1
Spoke with manager May from [facility] ([phone]), states they will accept the patient back.  EMS transport set up, ETA for 0900.

## 2021-08-06 ENCOUNTER — EMERGENCY (EMERGENCY)
Facility: HOSPITAL | Age: 31
LOS: 1 days | Discharge: HOME | End: 2021-08-06
Attending: STUDENT IN AN ORGANIZED HEALTH CARE EDUCATION/TRAINING PROGRAM | Admitting: STUDENT IN AN ORGANIZED HEALTH CARE EDUCATION/TRAINING PROGRAM
Payer: MEDICAID

## 2021-08-06 VITALS
HEIGHT: 61 IN | OXYGEN SATURATION: 100 % | HEART RATE: 108 BPM | RESPIRATION RATE: 18 BRPM | DIASTOLIC BLOOD PRESSURE: 63 MMHG | SYSTOLIC BLOOD PRESSURE: 107 MMHG | TEMPERATURE: 98 F

## 2021-08-06 VITALS
TEMPERATURE: 98 F | OXYGEN SATURATION: 100 % | DIASTOLIC BLOOD PRESSURE: 75 MMHG | HEART RATE: 78 BPM | SYSTOLIC BLOOD PRESSURE: 105 MMHG | RESPIRATION RATE: 16 BRPM

## 2021-08-06 PROCEDURE — 99284 EMERGENCY DEPT VISIT MOD MDM: CPT

## 2021-08-06 RX ORDER — DIPHENHYDRAMINE HCL 50 MG
50 CAPSULE ORAL ONCE
Refills: 0 | Status: COMPLETED | OUTPATIENT
Start: 2021-08-06 | End: 2021-08-06

## 2021-08-06 RX ORDER — HALOPERIDOL DECANOATE 100 MG/ML
5 INJECTION INTRAMUSCULAR EVERY 6 HOURS
Refills: 0 | Status: DISCONTINUED | OUTPATIENT
Start: 2021-08-06 | End: 2021-08-09

## 2021-08-06 RX ADMIN — Medication 50 MILLIGRAM(S): at 10:20

## 2021-08-06 RX ADMIN — HALOPERIDOL DECANOATE 5 MILLIGRAM(S): 100 INJECTION INTRAMUSCULAR at 10:20

## 2021-08-06 RX ADMIN — Medication 2 MILLIGRAM(S): at 10:20
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
## 2021-08-06 NOTE — ED BEHAVIORAL HEALTH NOTE - BEHAVIORAL HEALTH NOTE
Per provider Marcella Tate , patient is cleared and is able to return to their previous residence [facility].  has spoken to Supervisor May ([phone]),  confirmed that patients mode of transportation is AMBULANCE and that patient travels I/WITH SUPERVISION via transport from ems. No staff with patient. Clinical provider is in agreement with AMBULANCE back to long-term. Verbal huddle regarding coordination of care completed with interdisciplinary team. Per provider Marcella Tate , patient is cleared and is able to return to their previous residence [facility].  has spoken to Supervisor May ([phone]),  confirmed that patients mode of transportation is AMBULANCE and that patient travels WITH SUPERVISION via transport from ems. No staff with patient. Clinical provider is in agreement with AMBULANCE back to alf. Verbal huddle regarding coordination of care completed with interdisciplinary team. Rn to arrange transport. Per provider Marcella Tate , patient is cleared and is able to return to their previous residence [facility].  has spoken to Supervisor May ([phone]),  confirmed that patients mode of transportation is AMBULANCE and that patient travels WITH SUPERVISION via transport from ems. No staff with patient. Clinical provider is in agreement with AMBULANCE back to assisted. Verbal huddle regarding coordination of care completed with interdisciplinary team. Rn to arrange transport. Worker confirmed address to  as [address], [facility].

## 2021-08-06 NOTE — ED PROVIDER NOTE - PATIENT PORTAL LINK FT
You can access the FollowMyHealth Patient Portal offered by [facility] by registering at the following website: [email or web address]. By joining [employer]’s FollowMyHealth portal, you will also be able to view your health information using other applications (apps) compatible with our system.

## 2021-08-06 NOTE — ED ADULT NURSE NOTE - OBJECTIVE STATEMENT
Received pt in  pt bought in by EMS/[employer] from group home for agitation & aggression, pt yelling & screaming unable to be verbally deescalated pt medicated as per MD rx, safety & comfort measures maintained eval on going.
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
## 2021-08-06 NOTE — ED ADULT NURSE REASSESSMENT NOTE - NS ED NURSE REASSESS COMMENT FT1
pt calm & cooperative denies si/hi/avh presently pt cleared & d/c by provider pt transported via EMS.
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
## 2021-08-06 NOTE — ED PROVIDER NOTE - CARE PLAN
Principal Discharge DX:	Agitation   Principal Discharge DX:	Impulse control disorder  Secondary Diagnosis:	Intellectual disability  Secondary Diagnosis:	Agitation

## 2021-08-06 NOTE — ED PROVIDER NOTE - CLINICAL SUMMARY MEDICAL DECISION MAKING FREE TEXT BOX
30F h/o developmental delay, schizoaffective disorder p/w severe agitation. Unable to de-escalate on arrival to , requiring meds. SW to obtain collateral

## 2021-08-16 ENCOUNTER — EMERGENCY (EMERGENCY)
Facility: HOSPITAL | Age: 31
LOS: 1 days | Discharge: HOME | End: 2021-08-16
Admitting: EMERGENCY MEDICINE
Payer: MEDICAID

## 2021-08-16 VITALS
HEART RATE: 96 BPM | OXYGEN SATURATION: 100 % | DIASTOLIC BLOOD PRESSURE: 99 MMHG | HEIGHT: 61 IN | RESPIRATION RATE: 18 BRPM | TEMPERATURE: 98 F | SYSTOLIC BLOOD PRESSURE: 148 MMHG

## 2021-08-16 PROCEDURE — 99284 EMERGENCY DEPT VISIT MOD MDM: CPT

## 2021-08-16 RX ORDER — CHLORPROMAZINE HCL 10 MG
50 TABLET ORAL ONCE
Refills: 0 | Status: COMPLETED | OUTPATIENT
Start: 2021-08-16 | End: 2021-08-16

## 2021-08-16 RX ADMIN — Medication 2 MILLIGRAM(S): at 23:45

## 2021-08-16 RX ADMIN — Medication 50 MILLIGRAM(S): at 23:45

## 2021-08-16 NOTE — ED ADULT NURSE NOTE - PAIN RATING/NUMBER SCALE (0-10): REST
Referred by Dr. Christi Snu on 02/06/2018    Rory passed away 10/14/2019.     Anemia Latest Ref Rng & Units 9/6/2019 9/13/2019 9/20/2019 9/24/2019 9/27/2019 10/4/2019 10/11/2019   MARY LOU Dose - 20,000 units 20,000 units - - 20,000 units - -   Hemoglobin 13.3 - 17.7 g/dL 9.8(A) 9.7(A) 10.1(A) 9.9(L) - 10.6(A) 10.1(A)   IV Iron Dose - - - - - - 750mg 750mg   TSAT % - - - 9(L) - 12 -   Ferritin ng/mL - - - 93 - 80 -       Anemia labs discontinued, therapy plans cancelled, removed from active patient list, and referring provider notified.      Belinda Escalona RN   Anemia Services  [address] [address]   [email or web address]   Office : [phone]  Fax: [phone]             0

## 2021-08-16 NOTE — ED PROVIDER NOTE - PHYSICAL EXAMINATION
GEN:   screaming, physically cooperative, AOx3  EYES:   PERRL, extra-occular movements intact  RESP:   clear to auscultation bilaterally, non-labored, speaking in full sentences  ABD:   soft, non tender, no guarding  :   no cva tenderness  MSK:   no musculoskeletal tenderness, 5/5 strength, moving all extremities  SKIN:   dry, intact, no rash  NEURO:   AOx3, no focal weakness or loss of sensation, gait normal, GCS 15  PSYCH: agitated, screaming.
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
## 2021-08-16 NOTE — ED PROVIDER NOTE - CLINICAL SUMMARY MEDICAL DECISION MAKING FREE TEXT BOX
Patient well known to this ED presents to the ED for aggressive behavior.  Medical evaluation performed. There is no clinical evidence of intoxication or any acute medical problem requiring immediate intervention. Patient behavior expected from her baseline intellectual function and chronic psychiatric conditions. No acute psychiatric emergency, however due to behavioral control issues of screaming and cursing, patient medicated. Will allow for observation after meds administered, likely discharge.

## 2021-08-16 NOTE — ED PROVIDER NOTE - NSICDXPASTMEDICALHX_GEN_ALL_CORE_FT
PAST MEDICAL HISTORY:  Bipolar disorder     Development delay     DM (diabetes mellitus)     DM (diabetes mellitus)     Intellectual disability     Schizoaffective disorder     Schizoaffective disorder

## 2021-08-16 NOTE — ED PROVIDER NOTE - PROGRESS NOTE DETAILS
DONATO: I was signed out this pt pending re-evaluation which pt is now improved, no complaints. Will discharge back to  with SW assistance.  staff aware. Pt calm and cooperative.

## 2021-08-16 NOTE — ED ADULT TRIAGE NOTE - RESPIRATORY RATE (BREATHS/MIN)
"Patient underwent prior 10/08/19 surgery and this occurred 7 weeks ago.    Fish Hong overall has the following complaints:    Mr. Fish Hong is an 89 year old male who presents to clinic today s/p left inguinal hernia repair without obstruction or gangrene on 10/08/19. In the post-operative period, he reports his left scrotum has become swollen with an area of firmness in the middle of it, which he reports is similar to how it was before his operation. He however denies experiencing pain in the site of previous hernia. Also denies scrotal discomfort on the left greater than the right. Denies loss or change in appetite, n/v, constipation, diarrhea, or dysuria in association with the left inguinal hernia repair. He does not have any symptoms associated with his right scrotum and does not have concerns about his right scrotum. He has no other concerns or questions at present time.    On exam:  /60 (BP Location: Left arm, Patient Position: Sitting, Cuff Size: Adult Large)   Pulse 68   Temp 97.6  F (36.4  C) (Oral)   Ht 1.803 m (5' 11\")   Wt 107 kg (235 lb 14.4 oz)   SpO2 97%   BMI 32.90 kg/m    Lung exam: breathing unlabored  Abdominal exam: soft; nontender; nondistended; incisions c/d/i  Genitourinary exam: Left scrotum is swollen to a diameter roughly 6-7cm, firm to palpation, non-tender, and small area of overlying mild erythema    Assessment/Plan:  Unilateral inguinal hernia without obstruction or gangrene, recurrence not specified   - US Left scrotum for further evaluation of fluid vs non-fluid collection; if patient can be scheduled today, we will follow-up later today, otherwise follow-up per completion of ultrasound.  - Patient is free to resume usual activities with no restrictions    Orders Placed This Encounter   Procedures     US Abdomen Limited       Scribe Disclosure:   I, Natalia Vazquez, am serving as a scribe; to document services personally performed by Dr. Landaverde- -based on data " collection and the provider's statements to me.     Provider Disclosure:  I agree with above History, Review of Systems, Physical exam and Plan.  I have reviewed the content of the documentation and have edited it as needed. I have personally performed the services documented here and the documentation accurately represents those services and the decisions I have made.      Electronically signed by:    Physician Attestation  I, Antonio Landaverde, saw and evaluated this patient as part of a shared visit.  I have reviewed and discussed with the advanced practice provider and/or resident their history, physical and plan.    I personally reviewed the vital signs, medications, labs and imaging.    My key history or physical exam findings: had hernia repair with left orchiectomy; has bulge in left groin.  No obstructions.    Key management decisions made by me: U/S.  Would drain if fluid.  U/S report showed no fluid to drain.  F/u prn.    Antonio Landaverde MD  Date of Service (when I saw the patient): 11/21/19    Answers for HPI/ROS submitted by the patient on 11/21/2019   General Symptoms: No  Skin Symptoms: No  HENT Symptoms: No  EYE SYMPTOMS: No  HEART SYMPTOMS: No  LUNG SYMPTOMS: No  INTESTINAL SYMPTOMS: No  URINARY SYMPTOMS: No  REPRODUCTIVE SYMPTOMS: No  SKELETAL SYMPTOMS: No  BLOOD SYMPTOMS: No  NERVOUS SYSTEM SYMPTOMS: No  MENTAL HEALTH SYMPTOMS: No     18
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
## 2021-08-16 NOTE — ED PROVIDER NOTE - PATIENT PORTAL LINK FT
You can access the FollowMyHealth Patient Portal offered by [facility] by registering at the following website: [email or web address]. By joining [facility]’s FollowMyHealth portal, you will also be able to view your health information using other applications (apps) compatible with our system.

## 2021-08-16 NOTE — ED PROVIDER NOTE - NSFOLLOWUPINSTRUCTIONS_ED_ALL_ED_FT
No signs of emergency medical condition on today's workup.  Presumptive diagnosis made, but further evaluation may be required by your primary care doctor or specialist for a definitive diagnosis.  Therefore, follow up as directed and if symptoms change/worsen or any emergency conditions, please return to the ER.   Please follow up with your primary care doctor after you leave the emergency department so that they can follow up and conduct more testing and treatment as they deem necessary. If you have worsening signs or symptoms of what you came in to the Emergency Department today and are not able to see your doctor, go to your nearest emergency department or return to the [facility] emergency department for further care and management.

## 2021-08-17 VITALS
DIASTOLIC BLOOD PRESSURE: 63 MMHG | OXYGEN SATURATION: 99 % | SYSTOLIC BLOOD PRESSURE: 108 MMHG | HEART RATE: 78 BPM | RESPIRATION RATE: 16 BRPM

## 2021-08-17 NOTE — ED ADULT NURSE REASSESSMENT NOTE - NS ED NURSE REASSESS COMMENT FT1
Received pt from RN break coverage s/p IM meds received for agitation. Pt is currently laying in bed, sleeping, NAD, even unlabored respirations observed. VSS. MC for dc via ems to group home. DC huddle completed.
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
## 2021-08-18 NOTE — ED ADULT NURSE NOTE - NS_NURSE_DISC_TEACHING_YN_ED_ALL_ED
Yes Drysol Counseling:  I discussed with the patient the risks of drysol/aluminum chloride including but not limited to skin rash, itching, irritation, burning.

## 2021-08-25 NOTE — ED BEHAVIORAL HEALTH ASSESSMENT NOTE - NS ED BHA PLAN
[facility]    Procedure: IR Procedure Note    Date/Time: 8/25/2021 10:01 AM  Performed by: Nick Kessler MD  Authorized by: Nick Kessler MD   IR Fellow Physician:  Radiology Resident Physician: Raffi Roberto      UNIVERSAL PROTOCOL   Site Marked: NA  Prior Images Obtained and Reviewed:  Yes  Required items: Required blood products, implants, devices and special equipment available    Patient identity confirmed:  Verbally with patient, arm band, provided demographic data and hospital-assigned identification number  Patient was reevaluated immediately before administering moderate or deep sedation or anesthesia  Confirmation Checklist:  Patient's identity using two indicators, relevant allergies, procedure was appropriate and matched the consent or emergent situation and correct equipment/implants were available  Time out: Immediately prior to the procedure a time out was called    Universal Protocol: the Joint Commission Universal Protocol was followed    Preparation: Patient was prepped and draped in usual sterile fashion           ANESTHESIA    Anesthesia: Local infiltration  Local Anesthetic:  Lidocaine 1% without epinephrine      SEDATION    Patient Sedated: Yes    Sedation:  Midazolam and fentanyl  Vital signs: Vital signs monitored during sedation    See dictated procedure note for full details.  Findings: Successful fistuloplasty of the outflow tract    Specimens: none    Complications: None    Condition: Stable    Plan: Return to patient care unit    PROCEDURE   Patient Tolerance:  Patient tolerated the procedure well with no immediate complications    Length of time physician/provider present for 1:1 monitoring during sedation: 60       Treat and Release

## 2021-08-28 ENCOUNTER — EMERGENCY (EMERGENCY)
Facility: HOSPITAL | Age: 31
LOS: 1 days | Discharge: HOME | End: 2021-08-28
Attending: STUDENT IN AN ORGANIZED HEALTH CARE EDUCATION/TRAINING PROGRAM | Admitting: EMERGENCY MEDICINE
Payer: MEDICAID

## 2021-08-28 VITALS
TEMPERATURE: 98 F | DIASTOLIC BLOOD PRESSURE: 81 MMHG | SYSTOLIC BLOOD PRESSURE: 114 MMHG | OXYGEN SATURATION: 100 % | RESPIRATION RATE: 18 BRPM | HEART RATE: 97 BPM

## 2021-08-28 VITALS — HEIGHT: 61 IN

## 2021-08-28 PROCEDURE — 99284 EMERGENCY DEPT VISIT MOD MDM: CPT

## 2021-08-28 RX ORDER — CHLORPROMAZINE HCL 10 MG
50 TABLET ORAL ONCE
Refills: 0 | Status: COMPLETED | OUTPATIENT
Start: 2021-08-28 | End: 2021-08-28

## 2021-08-28 RX ADMIN — Medication 50 MILLIGRAM(S): at 15:41

## 2021-08-28 RX ADMIN — Medication 2 MILLIGRAM(S): at 15:40
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
## 2021-08-28 NOTE — ED ADULT NURSE REASSESSMENT NOTE - NS ED NURSE REASSESS COMMENT FT1
Pt received at 8:30pm shift change. Pt discharged by MD; DC huddle performed by SW; discharge vitals as noted.

## 2021-08-28 NOTE — ED PROVIDER NOTE - PHYSICAL EXAMINATION
AFTER MEDS:  VITALS: Initial triage and subsequent vitals have been reviewed by me.  Gen: Labile mood, NAD, alert  Head: NCAT  HEENT: MMM, normal conjunctiva, anicteric, neck supple  Lung: CTAB, no adventitious sounds  CV: RRR, no murmurs, 2+symmetric peripheral pulses  Abd: soft, NTND, no rebound or guarding, no palpable masses  MSK: No edema, no visible deformities  Neuro: Moving all extremity grossly, following commands appropriately, fluid speech  Skin: Warm and dry, no evidence of rash
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
## 2021-08-28 NOTE — ED PROVIDER NOTE - PATIENT PORTAL LINK FT
You can access the FollowMyHealth Patient Portal offered by [facility] by registering at the following website: [email or web address]. By joining [facility]’s FollowMyHealth portal, you will also be able to view your health information using other applications (apps) compatible with our system.

## 2021-08-28 NOTE — ED BEHAVIORAL HEALTH NOTE - BEHAVIORAL HEALTH NOTE
As per Dr. Mora, pt is cleared for discharge. Pt from [facility]. Writer contacted [facility] , Miladys Case, at [phone]. Writer received VM with message left and return call requested to  phone #[phone]. Writer then called another number found for staff to be [phone]. Writer also received VM. Other numbers attempted were [phone]. Writer also called [phone] which was found to be wrong number.     Writer later able to reach Assistant , May Isiah, at [phone]. Ms. Joyce was informed of pt’s discharge and will inform group [place] staff. Mode of transportation for discharge is AMBULANCE for safety. Verbal huddle occurred with team. Nursing to arrange transport. [facility] address confirmed to be [facility] [address], [place].

## 2021-08-28 NOTE — ED ADULT TRIAGE NOTE - NS_BH TRG QUESTION7_ED_ALL_ED
Depression (without Suicidality or Psychosis)/Nedra (includes Bipolar Disorder)/Anxiety (includes Panic, OCD)/Behavioral Problems (includes ADHD, Oppositionality)
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
## 2021-08-31 NOTE — ED BEHAVIORAL HEALTH ASSESSMENT NOTE - PATIENT'S CHIEF COMPLAINT
"Let me explain why I'm here" Eucrisa Counseling: Patient may experience a mild burning sensation during topical application. Eucrisa is not approved in children less than 2 years of age.

## 2021-09-21 ENCOUNTER — EMERGENCY (EMERGENCY)
Facility: HOSPITAL | Age: 31
LOS: 0 days | Discharge: HOME | End: 2021-09-22
Attending: EMERGENCY MEDICINE
Payer: MEDICAID

## 2021-09-21 VITALS
DIASTOLIC BLOOD PRESSURE: 66 MMHG | TEMPERATURE: 98 F | HEIGHT: 61 IN | RESPIRATION RATE: 17 BRPM | SYSTOLIC BLOOD PRESSURE: 101 MMHG | OXYGEN SATURATION: 100 % | WEIGHT: 171.96 LBS | HEART RATE: 100 BPM

## 2021-09-21 DIAGNOSIS — Z00.00 ENCOUNTER FOR GENERAL ADULT MEDICAL EXAMINATION WITHOUT ABNORMAL FINDINGS: ICD-10-CM

## 2021-09-21 DIAGNOSIS — R45.1 RESTLESSNESS AND AGITATION: ICD-10-CM

## 2021-09-21 DIAGNOSIS — E11.9 TYPE 2 DIABETES MELLITUS WITHOUT COMPLICATIONS: ICD-10-CM

## 2021-09-21 DIAGNOSIS — Z91.018 ALLERGY TO OTHER FOODS: ICD-10-CM

## 2021-09-21 DIAGNOSIS — F20.9 SCHIZOPHRENIA, UNSPECIFIED: ICD-10-CM

## 2021-09-21 DIAGNOSIS — F81.9 DEVELOPMENTAL DISORDER OF SCHOLASTIC SKILLS, UNSPECIFIED: ICD-10-CM

## 2021-09-21 DIAGNOSIS — F31.9 BIPOLAR DISORDER, UNSPECIFIED: ICD-10-CM

## 2021-09-21 LAB
ALBUMIN SERPL ELPH-MCNC: 2.8 G/DL — LOW (ref 3.3–5)
ALP SERPL-CCNC: 35 U/L — LOW (ref 40–120)
ALT FLD-CCNC: 13 U/L — SIGNIFICANT CHANGE UP (ref 12–78)
ANION GAP SERPL CALC-SCNC: 3 MMOL/L — LOW (ref 5–17)
APAP SERPL-MCNC: <2 UG/ML — LOW (ref 10–30)
APPEARANCE UR: CLEAR — SIGNIFICANT CHANGE UP
AST SERPL-CCNC: 22 U/L — SIGNIFICANT CHANGE UP (ref 15–37)
BASOPHILS # BLD AUTO: 0.02 K/UL — SIGNIFICANT CHANGE UP (ref 0–0.2)
BASOPHILS NFR BLD AUTO: 0.3 % — SIGNIFICANT CHANGE UP (ref 0–2)
BILIRUB SERPL-MCNC: 0.2 MG/DL — SIGNIFICANT CHANGE UP (ref 0.2–1.2)
BILIRUB UR-MCNC: NEGATIVE — SIGNIFICANT CHANGE UP
BUN SERPL-MCNC: 13 MG/DL — SIGNIFICANT CHANGE UP (ref 7–23)
CALCIUM SERPL-MCNC: 8.6 MG/DL — SIGNIFICANT CHANGE UP (ref 8.5–10.1)
CHLORIDE SERPL-SCNC: 107 MMOL/L — SIGNIFICANT CHANGE UP (ref 96–108)
CO2 SERPL-SCNC: 29 MMOL/L — SIGNIFICANT CHANGE UP (ref 22–31)
COLOR SPEC: YELLOW — SIGNIFICANT CHANGE UP
CREAT SERPL-MCNC: 0.51 MG/DL — SIGNIFICANT CHANGE UP (ref 0.5–1.3)
DIFF PNL FLD: NEGATIVE — SIGNIFICANT CHANGE UP
EOSINOPHIL # BLD AUTO: 0.06 K/UL — SIGNIFICANT CHANGE UP (ref 0–0.5)
EOSINOPHIL NFR BLD AUTO: 1 % — SIGNIFICANT CHANGE UP (ref 0–6)
ETHANOL SERPL-MCNC: <10 MG/DL — SIGNIFICANT CHANGE UP (ref 0–10)
GLUCOSE SERPL-MCNC: 90 MG/DL — SIGNIFICANT CHANGE UP (ref 70–99)
GLUCOSE UR QL: NEGATIVE MG/DL — SIGNIFICANT CHANGE UP
HCT VFR BLD CALC: 31.7 % — LOW (ref 34.5–45)
HGB BLD-MCNC: 10.3 G/DL — LOW (ref 11.5–15.5)
IMM GRANULOCYTES NFR BLD AUTO: 0.2 % — SIGNIFICANT CHANGE UP (ref 0–1.5)
KETONES UR-MCNC: NEGATIVE — SIGNIFICANT CHANGE UP
LEUKOCYTE ESTERASE UR-ACNC: ABNORMAL
LYMPHOCYTES # BLD AUTO: 2.41 K/UL — SIGNIFICANT CHANGE UP (ref 1–3.3)
LYMPHOCYTES # BLD AUTO: 41 % — SIGNIFICANT CHANGE UP (ref 13–44)
MCHC RBC-ENTMCNC: 30.6 PG — SIGNIFICANT CHANGE UP (ref 27–34)
MCHC RBC-ENTMCNC: 32.5 GM/DL — SIGNIFICANT CHANGE UP (ref 32–36)
MCV RBC AUTO: 94.1 FL — SIGNIFICANT CHANGE UP (ref 80–100)
MONOCYTES # BLD AUTO: 0.63 K/UL — SIGNIFICANT CHANGE UP (ref 0–0.9)
MONOCYTES NFR BLD AUTO: 10.7 % — SIGNIFICANT CHANGE UP (ref 2–14)
NEUTROPHILS # BLD AUTO: 2.75 K/UL — SIGNIFICANT CHANGE UP (ref 1.8–7.4)
NEUTROPHILS NFR BLD AUTO: 46.8 % — SIGNIFICANT CHANGE UP (ref 43–77)
NITRITE UR-MCNC: NEGATIVE — SIGNIFICANT CHANGE UP
NRBC # BLD: 0 /100 WBCS — SIGNIFICANT CHANGE UP (ref 0–0)
PH UR: 5 — SIGNIFICANT CHANGE UP (ref 5–8)
PLATELET # BLD AUTO: 219 K/UL — SIGNIFICANT CHANGE UP (ref 150–400)
POTASSIUM SERPL-MCNC: 4.5 MMOL/L — SIGNIFICANT CHANGE UP (ref 3.5–5.3)
POTASSIUM SERPL-SCNC: 4.5 MMOL/L — SIGNIFICANT CHANGE UP (ref 3.5–5.3)
PROT SERPL-MCNC: 6.3 GM/DL — SIGNIFICANT CHANGE UP (ref 6–8.3)
PROT UR-MCNC: NEGATIVE MG/DL — SIGNIFICANT CHANGE UP
RBC # BLD: 3.37 M/UL — LOW (ref 3.8–5.2)
RBC # FLD: 13.9 % — SIGNIFICANT CHANGE UP (ref 10.3–14.5)
SALICYLATES SERPL-MCNC: <1.7 MG/DL — LOW (ref 2.8–20)
SODIUM SERPL-SCNC: 139 MMOL/L — SIGNIFICANT CHANGE UP (ref 135–145)
SP GR SPEC: 1.01 — SIGNIFICANT CHANGE UP (ref 1.01–1.02)
UROBILINOGEN FLD QL: NEGATIVE MG/DL — SIGNIFICANT CHANGE UP
WBC # BLD: 5.88 K/UL — SIGNIFICANT CHANGE UP (ref 3.8–10.5)
WBC # FLD AUTO: 5.88 K/UL — SIGNIFICANT CHANGE UP (ref 3.8–10.5)

## 2021-09-21 PROCEDURE — 99285 EMERGENCY DEPT VISIT HI MDM: CPT

## 2021-09-21 RX ORDER — DIPHENHYDRAMINE HCL 50 MG
50 CAPSULE ORAL ONCE
Refills: 0 | Status: COMPLETED | OUTPATIENT
Start: 2021-09-21 | End: 2021-09-21

## 2021-09-21 RX ORDER — HALOPERIDOL DECANOATE 100 MG/ML
5 INJECTION INTRAMUSCULAR ONCE
Refills: 0 | Status: COMPLETED | OUTPATIENT
Start: 2021-09-21 | End: 2021-09-21

## 2021-09-21 RX ADMIN — Medication 50 MILLIGRAM(S): at 23:20

## 2021-09-21 RX ADMIN — Medication 2 MILLIGRAM(S): at 23:20

## 2021-09-21 RX ADMIN — HALOPERIDOL DECANOATE 5 MILLIGRAM(S): 100 INJECTION INTRAMUSCULAR at 23:20
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
## 2021-09-22 VITALS — HEART RATE: 84 BPM | DIASTOLIC BLOOD PRESSURE: 50 MMHG | TEMPERATURE: 98 F | SYSTOLIC BLOOD PRESSURE: 101 MMHG

## 2021-09-22 LAB
BACTERIA # UR AUTO: ABNORMAL
EPI CELLS # UR: SIGNIFICANT CHANGE UP
FLUAV AG NPH QL: SIGNIFICANT CHANGE UP
FLUBV AG NPH QL: SIGNIFICANT CHANGE UP
RBC CASTS # UR COMP ASSIST: NEGATIVE /HPF — SIGNIFICANT CHANGE UP (ref 0–4)
SARS-COV-2 RNA SPEC QL NAA+PROBE: SIGNIFICANT CHANGE UP
VALPROATE SERPL-MCNC: 71 UG/ML — SIGNIFICANT CHANGE UP (ref 50–100)
WBC UR QL: SIGNIFICANT CHANGE UP

## 2021-09-22 PROCEDURE — 90792 PSYCH DIAG EVAL W/MED SRVCS: CPT | Mod: 95

## 2021-09-22 RX ORDER — HALOPERIDOL DECANOATE 100 MG/ML
5 INJECTION INTRAMUSCULAR ONCE
Refills: 0 | Status: COMPLETED | OUTPATIENT
Start: 2021-09-22 | End: 2021-09-22

## 2021-09-22 RX ADMIN — HALOPERIDOL DECANOATE 5 MILLIGRAM(S): 100 INJECTION INTRAMUSCULAR at 13:40

## 2021-09-22 RX ADMIN — Medication 2 MILLIGRAM(S): at 20:14
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
## 2021-09-22 NOTE — ED ADULT TRIAGE NOTE - PAIN: PRESENCE, MLM
Occupational Therapy  Unable to see pt at this time due to pt currently off floor at dialysis at this time. Will follow up with pt as time allows.     Bel Bonds, OTR/L complains of pain/discomfort
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
## 2021-09-22 NOTE — ED BEHAVIORAL HEALTH ASSESSMENT NOTE - DESCRIPTION
see BTCM note DM, hyperprolactinemia AA female, single, resides at [facility] home, unemployed, non-caregiver

## 2021-09-22 NOTE — ED ADULT NURSE NOTE - CAS EDN DISCHARGE ASSESSMENT
Detail Level: Detailed Patient baseline mental status Quality 130: Documentation Of Current Medications In The Medical Record: Current Medications Documented
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
## 2021-09-22 NOTE — ED BEHAVIORAL HEALTH ASSESSMENT NOTE - NS ED BHA TELEPSYCH PATIENT LOCATION
Continue Regimen: Minocycline
Plan: Continue with Everwell soak Antibacterial soaps daily.
Detail Level: Zone
[facility]
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
## 2021-09-22 NOTE — ED ADULT NURSE REASSESSMENT NOTE - NS ED NURSE REASSESS COMMENT FT1
Received pt lying on stretcher with 1:1 in bed p asleep in no acute distress, will assess once awake.

## 2021-09-24 ENCOUNTER — EMERGENCY (EMERGENCY)
Facility: HOSPITAL | Age: 31
LOS: 1 days | Discharge: HOME | End: 2021-09-26
Attending: STUDENT IN AN ORGANIZED HEALTH CARE EDUCATION/TRAINING PROGRAM
Payer: MEDICAID

## 2021-09-24 VITALS
WEIGHT: 171.96 LBS | HEART RATE: 79 BPM | DIASTOLIC BLOOD PRESSURE: 82 MMHG | SYSTOLIC BLOOD PRESSURE: 115 MMHG | HEIGHT: 61 IN | TEMPERATURE: 98 F | RESPIRATION RATE: 19 BRPM | OXYGEN SATURATION: 97 %

## 2021-09-24 DIAGNOSIS — F48.9 NONPSYCHOTIC MENTAL DISORDER, UNSPECIFIED: ICD-10-CM

## 2021-09-24 DIAGNOSIS — F25.9 SCHIZOAFFECTIVE DISORDER, UNSPECIFIED: ICD-10-CM

## 2021-09-24 DIAGNOSIS — F41.9 ANXIETY DISORDER, UNSPECIFIED: ICD-10-CM

## 2021-09-24 DIAGNOSIS — F31.9 BIPOLAR DISORDER, UNSPECIFIED: ICD-10-CM

## 2021-09-24 DIAGNOSIS — E11.9 TYPE 2 DIABETES MELLITUS WITHOUT COMPLICATIONS: ICD-10-CM

## 2021-09-24 DIAGNOSIS — F43.9 REACTION TO SEVERE STRESS, UNSPECIFIED: ICD-10-CM

## 2021-09-24 DIAGNOSIS — Z91.018 ALLERGY TO OTHER FOODS: ICD-10-CM

## 2021-09-24 DIAGNOSIS — F79 UNSPECIFIED INTELLECTUAL DISABILITIES: ICD-10-CM

## 2021-09-24 PROCEDURE — 99283 EMERGENCY DEPT VISIT LOW MDM: CPT

## 2021-09-24 NOTE — ED PROVIDER NOTE - CLINICAL SUMMARY MEDICAL DECISION MAKING FREE TEXT BOX
29yo F w/ PMH bipolar, developmental delay, DM BIBEMS from  d/t feeling stressed. AFVSS. Well appearing, in NAD. Plan: Obtain collateral from . Anticipate d/c to .

## 2021-09-24 NOTE — ED PROVIDER NOTE - PATIENT PORTAL LINK FT
You can access the FollowMyHealth Patient Portal offered by [facility] by registering at the following website: [email or web address]. By joining [employer]’s FollowMyHealth portal, you will also be able to view your health information using other applications (apps) compatible with our system.

## 2021-09-24 NOTE — ED ADULT NURSE NOTE - OBJECTIVE STATEMENT
Pt is a 29 yo F BIBA from group home c/o being too stressed at the residence. Pt states she feels comfortable going back to residence and would like to be discharged.
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
## 2021-09-24 NOTE — ED PROVIDER NOTE - PROGRESS NOTE DETAILS
Multiple attempts made to contact [facility], d/c home pending contact w/ staff member. Pt sleeping comfortably. Pt calm / cooperative throughout time in ED. Cleared for return to . Pt calm / cooperative throughout time in ED. Cleared for return to . Contacted Reyes Mascorro # [phone] at .

## 2021-09-25 ENCOUNTER — EMERGENCY (EMERGENCY)
Facility: HOSPITAL | Age: 31
LOS: 1 days | Discharge: HOME | End: 2021-09-25
Attending: EMERGENCY MEDICINE | Admitting: EMERGENCY MEDICINE
Payer: MEDICAID

## 2021-09-25 VITALS
OXYGEN SATURATION: 100 % | TEMPERATURE: 98 F | SYSTOLIC BLOOD PRESSURE: 124 MMHG | RESPIRATION RATE: 18 BRPM | DIASTOLIC BLOOD PRESSURE: 86 MMHG | HEART RATE: 86 BPM

## 2021-09-25 VITALS
TEMPERATURE: 98 F | SYSTOLIC BLOOD PRESSURE: 119 MMHG | HEART RATE: 115 BPM | DIASTOLIC BLOOD PRESSURE: 88 MMHG | HEIGHT: 61 IN | OXYGEN SATURATION: 100 % | RESPIRATION RATE: 18 BRPM

## 2021-09-25 VITALS
OXYGEN SATURATION: 98 % | TEMPERATURE: 98 F | SYSTOLIC BLOOD PRESSURE: 130 MMHG | DIASTOLIC BLOOD PRESSURE: 72 MMHG | HEART RATE: 76 BPM | RESPIRATION RATE: 18 BRPM

## 2021-09-25 DIAGNOSIS — F79 UNSPECIFIED INTELLECTUAL DISABILITIES: ICD-10-CM

## 2021-09-25 DIAGNOSIS — F25.9 SCHIZOAFFECTIVE DISORDER, UNSPECIFIED: ICD-10-CM

## 2021-09-25 LAB
ALBUMIN SERPL ELPH-MCNC: 4 G/DL — SIGNIFICANT CHANGE UP (ref 3.3–5)
ALP SERPL-CCNC: 42 U/L — SIGNIFICANT CHANGE UP (ref 40–120)
ALT FLD-CCNC: 13 U/L — SIGNIFICANT CHANGE UP (ref 4–33)
ANION GAP SERPL CALC-SCNC: 12 MMOL/L — SIGNIFICANT CHANGE UP (ref 7–14)
AST SERPL-CCNC: 20 U/L — SIGNIFICANT CHANGE UP (ref 4–32)
BASOPHILS # BLD AUTO: 0.02 K/UL — SIGNIFICANT CHANGE UP (ref 0–0.2)
BASOPHILS NFR BLD AUTO: 0.4 % — SIGNIFICANT CHANGE UP (ref 0–2)
BILIRUB SERPL-MCNC: 0.2 MG/DL — SIGNIFICANT CHANGE UP (ref 0.2–1.2)
BUN SERPL-MCNC: 9 MG/DL — SIGNIFICANT CHANGE UP (ref 7–23)
CALCIUM SERPL-MCNC: 9.3 MG/DL — SIGNIFICANT CHANGE UP (ref 8.4–10.5)
CHLORIDE SERPL-SCNC: 100 MMOL/L — SIGNIFICANT CHANGE UP (ref 98–107)
CO2 SERPL-SCNC: 24 MMOL/L — SIGNIFICANT CHANGE UP (ref 22–31)
CREAT SERPL-MCNC: 0.63 MG/DL — SIGNIFICANT CHANGE UP (ref 0.5–1.3)
EOSINOPHIL # BLD AUTO: 0.03 K/UL — SIGNIFICANT CHANGE UP (ref 0–0.5)
EOSINOPHIL NFR BLD AUTO: 0.6 % — SIGNIFICANT CHANGE UP (ref 0–6)
GLUCOSE SERPL-MCNC: 93 MG/DL — SIGNIFICANT CHANGE UP (ref 70–99)
HCT VFR BLD CALC: 32.5 % — LOW (ref 34.5–45)
HGB BLD-MCNC: 10.8 G/DL — LOW (ref 11.5–15.5)
IANC: 2.88 K/UL — SIGNIFICANT CHANGE UP (ref 1.5–8.5)
IMM GRANULOCYTES NFR BLD AUTO: 0.2 % — SIGNIFICANT CHANGE UP (ref 0–1.5)
LYMPHOCYTES # BLD AUTO: 1.59 K/UL — SIGNIFICANT CHANGE UP (ref 1–3.3)
LYMPHOCYTES # BLD AUTO: 31.4 % — SIGNIFICANT CHANGE UP (ref 13–44)
MCHC RBC-ENTMCNC: 31.1 PG — SIGNIFICANT CHANGE UP (ref 27–34)
MCHC RBC-ENTMCNC: 33.2 GM/DL — SIGNIFICANT CHANGE UP (ref 32–36)
MCV RBC AUTO: 93.7 FL — SIGNIFICANT CHANGE UP (ref 80–100)
MONOCYTES # BLD AUTO: 0.54 K/UL — SIGNIFICANT CHANGE UP (ref 0–0.9)
MONOCYTES NFR BLD AUTO: 10.7 % — SIGNIFICANT CHANGE UP (ref 2–14)
NEUTROPHILS # BLD AUTO: 2.88 K/UL — SIGNIFICANT CHANGE UP (ref 1.8–7.4)
NEUTROPHILS NFR BLD AUTO: 56.7 % — SIGNIFICANT CHANGE UP (ref 43–77)
NRBC # BLD: 0 /100 WBCS — SIGNIFICANT CHANGE UP
NRBC # FLD: 0 K/UL — SIGNIFICANT CHANGE UP
PLATELET # BLD AUTO: 242 K/UL — SIGNIFICANT CHANGE UP (ref 150–400)
POTASSIUM SERPL-MCNC: 4.6 MMOL/L — SIGNIFICANT CHANGE UP (ref 3.5–5.3)
POTASSIUM SERPL-SCNC: 4.6 MMOL/L — SIGNIFICANT CHANGE UP (ref 3.5–5.3)
PROT SERPL-MCNC: 6.7 G/DL — SIGNIFICANT CHANGE UP (ref 6–8.3)
RBC # BLD: 3.47 M/UL — LOW (ref 3.8–5.2)
RBC # FLD: 13.8 % — SIGNIFICANT CHANGE UP (ref 10.3–14.5)
SARS-COV-2 RNA SPEC QL NAA+PROBE: SIGNIFICANT CHANGE UP
SODIUM SERPL-SCNC: 136 MMOL/L — SIGNIFICANT CHANGE UP (ref 135–145)
TOXICOLOGY SCREEN, DRUGS OF ABUSE, SERUM RESULT: SIGNIFICANT CHANGE UP
TSH SERPL-MCNC: 1.19 UIU/ML — SIGNIFICANT CHANGE UP (ref 0.27–4.2)
VALPROATE SERPL-MCNC: 72.5 UG/ML — SIGNIFICANT CHANGE UP (ref 50–100)
WBC # BLD: 5.07 K/UL — SIGNIFICANT CHANGE UP (ref 3.8–10.5)
WBC # FLD AUTO: 5.07 K/UL — SIGNIFICANT CHANGE UP (ref 3.8–10.5)

## 2021-09-25 PROCEDURE — 99285 EMERGENCY DEPT VISIT HI MDM: CPT

## 2021-09-25 PROCEDURE — 90792 PSYCH DIAG EVAL W/MED SRVCS: CPT

## 2021-09-25 RX ORDER — HALOPERIDOL DECANOATE 100 MG/ML
5 INJECTION INTRAMUSCULAR ONCE
Refills: 0 | Status: COMPLETED | OUTPATIENT
Start: 2021-09-25 | End: 2021-09-25

## 2021-09-25 RX ADMIN — HALOPERIDOL DECANOATE 5 MILLIGRAM(S): 100 INJECTION INTRAMUSCULAR at 12:05

## 2021-09-25 RX ADMIN — Medication 2 MILLIGRAM(S): at 12:05
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
## 2021-09-25 NOTE — ED BEHAVIORAL HEALTH ASSESSMENT NOTE - OTHER PAST PSYCHIATRIC HISTORY (INCLUDE DETAILS REGARDING ONSET, COURSE OF ILLNESS, INPATIENT/OUTPATIENT TREATMENT)
recent hospitalization at [facility] in June 2021, receives psych care at [facility] assigned new MD

## 2021-09-25 NOTE — ED ADULT TRIAGE NOTE - CHIEF COMPLAINT QUOTE
pt was seen and d/c'd from [facility] and sent back to group home. pt does not want to reside in the group home anymore and ran away. [employer] [place] few blocks away and placed her in handcuffs. pt tearful in triage, hyperverbal and yelling. states she is hearing voices tell her to kill herself. denies active plan.

## 2021-09-25 NOTE — ED ADULT NURSE NOTE - OBJECTIVE STATEMENT
Pt arrived to  reporting that she does not want to live at her group home. Pt changed into  clothing. PErsonal property collected and logged.

## 2021-09-25 NOTE — ED ADULT NURSE NOTE - CHIEF COMPLAINT QUOTE
pt was seen and d/c'd from [facility] and sent back to group home. pt does not want to reside in the group home anymore and ran away. [employer] [place] few blocks away and placed her in handcuffs. pt tearful in triage, hyperverbal and yelling. states she is hearing voices tell her to kill herself. denies active plan.

## 2021-09-25 NOTE — ED PROVIDER NOTE - PROGRESS NOTE DETAILS
DD ED ATTG:  d/w psych Dr Solis - cleared by psych.  Labs reviewed, benign.   for d/c back to group home.

## 2021-09-25 NOTE — ED BEHAVIORAL HEALTH ASSESSMENT NOTE - DESCRIPTION
VS wnl. Yelling, hyperverbal, tearful, received Benadryl 50mg, Ativan 2mg, Haldol 5mg IM DM, hyperprolactinemia single, resides at [facility] home, disabled, non-caregiver Yelling, hyperverbal, tearful, received Benadryl 50mg, Ativan 2mg, Haldol 5mg IM  Vital Signs Last 24 Hrs  T(C): 36.7 (25 Sep 2021 19:37), Max: 36.7 (25 Sep 2021 09:57)  T(F): 98 (25 Sep 2021 19:37), Max: 98.1 (25 Sep 2021 11:26)  HR: 86 (25 Sep 2021 19:37) (76 - 115)  BP: 124/86 (25 Sep 2021 19:37) (119/88 - 130/72)  BP(mean): --  RR: 18 (25 Sep 2021 19:37) (18 - 18)  SpO2: 100% (25 Sep 2021 19:37) (98% - 100%)
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
## 2021-09-25 NOTE — ED PROVIDER NOTE - PATIENT PORTAL LINK FT
You can access the FollowMyHealth Patient Portal offered by [facility] by registering at the following website: [email or web address]. By joining "[employer]’s FollowMyHealth portal, you will also be able to view your health information using other applications (apps) compatible with our system.

## 2021-09-25 NOTE — ED BEHAVIORAL HEALTH ASSESSMENT NOTE - CURRENT MEDICATION
Per group home: docusil 100mg BID, multivitamin, fibercon 625mg 2 tabs BID, reguloid, Prozac 40mg daily, famotidine 20mg BID, bromocriptine 5mg BID, glucophage 1g BID, abilify 5mg qHS, depakote ER 750mg BID, thorazine 100mg qAM, PM, thorazine 200mg qHS, cogentin 1mg BID, slow fe 3 tabs daily, calc carb w/d 600/400 BID, artificial tears, vit c 500mg, simethicone 80mg QID PRN, pepcid daily PRN
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
## 2021-09-25 NOTE — ED PROVIDER NOTE - PHYSICAL EXAMINATION
VS:  unremarkable except tachycardia improving    GEN - tearful, illogical;   Alert.  Orientation difficult to assess as refusing to answer questions.  no physical distress.  In handcuffs.     HEAD - NC/AT     ENT - PEERL, EOMI, mucous membranes    moist , no discharge      NECK: Neck supple, non-tender without lymphadenopathy, no masses, no JVD  PULM - CTA b/l,  symmetric breath sounds  COR -  normal heart sounds    ABD - , ND, NT, soft,  BACK - no CVA tenderness, nontender spine     EXTREMS - no edema, no deformity, warm and well perfused    SKIN - no rash    or bruising      NEUROLOGIC - alert, face symmetric, speech fluent, sensation nl, motor no focal deficit.
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
## 2021-09-25 NOTE — ED BEHAVIORAL HEALTH ASSESSMENT NOTE - RISK ASSESSMENT
Low Acute Suicide Risk Static risk factors include hx of frequent ED visits, hx of aggression. Modifiable risk factors include limited social supports, impulsivity. Protective factors include no current SI/HI, no known access, no hx of SA, no substance use, housing, engaged in tx.
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
## 2021-09-25 NOTE — ED PROVIDER NOTE - CARE PLAN
1 Principal Discharge DX:	Agitation requiring sedation protocol  Secondary Diagnosis:	Bipolar disorder

## 2021-09-26 ENCOUNTER — EMERGENCY (EMERGENCY)
Facility: HOSPITAL | Age: 31
LOS: 0 days | Discharge: HOME | End: 2021-09-27
Attending: STUDENT IN AN ORGANIZED HEALTH CARE EDUCATION/TRAINING PROGRAM
Payer: MEDICAID

## 2021-09-26 VITALS — HEIGHT: 61 IN | WEIGHT: 139.99 LBS

## 2021-09-26 DIAGNOSIS — Z91.018 ALLERGY TO OTHER FOODS: ICD-10-CM

## 2021-09-26 DIAGNOSIS — F25.9 SCHIZOAFFECTIVE DISORDER, UNSPECIFIED: ICD-10-CM

## 2021-09-26 DIAGNOSIS — E11.9 TYPE 2 DIABETES MELLITUS WITHOUT COMPLICATIONS: ICD-10-CM

## 2021-09-26 DIAGNOSIS — R45.1 RESTLESSNESS AND AGITATION: ICD-10-CM

## 2021-09-26 DIAGNOSIS — R45.851 SUICIDAL IDEATIONS: ICD-10-CM

## 2021-09-26 DIAGNOSIS — Z20.822 CONTACT WITH AND (SUSPECTED) EXPOSURE TO COVID-19: ICD-10-CM

## 2021-09-26 DIAGNOSIS — F31.9 BIPOLAR DISORDER, UNSPECIFIED: ICD-10-CM

## 2021-09-26 DIAGNOSIS — R41.0 DISORIENTATION, UNSPECIFIED: ICD-10-CM

## 2021-09-26 DIAGNOSIS — F79 UNSPECIFIED INTELLECTUAL DISABILITIES: ICD-10-CM

## 2021-09-26 LAB
ALBUMIN SERPL ELPH-MCNC: 3.1 G/DL — LOW (ref 3.3–5)
ALP SERPL-CCNC: 38 U/L — LOW (ref 40–120)
ALT FLD-CCNC: 16 U/L — SIGNIFICANT CHANGE UP (ref 12–78)
ANION GAP SERPL CALC-SCNC: 5 MMOL/L — SIGNIFICANT CHANGE UP (ref 5–17)
APAP SERPL-MCNC: <2 UG/ML — LOW (ref 10–30)
APPEARANCE UR: CLEAR — SIGNIFICANT CHANGE UP
AST SERPL-CCNC: 27 U/L — SIGNIFICANT CHANGE UP (ref 15–37)
BASOPHILS # BLD AUTO: 0.02 K/UL — SIGNIFICANT CHANGE UP (ref 0–0.2)
BASOPHILS NFR BLD AUTO: 0.3 % — SIGNIFICANT CHANGE UP (ref 0–2)
BILIRUB SERPL-MCNC: 0.2 MG/DL — SIGNIFICANT CHANGE UP (ref 0.2–1.2)
BILIRUB UR-MCNC: NEGATIVE — SIGNIFICANT CHANGE UP
BUN SERPL-MCNC: 10 MG/DL — SIGNIFICANT CHANGE UP (ref 7–23)
CALCIUM SERPL-MCNC: 8.6 MG/DL — SIGNIFICANT CHANGE UP (ref 8.5–10.1)
CARBAMAZEPINE SERPL-MCNC: <0.5 UG/ML — LOW (ref 4–12)
CHLORIDE SERPL-SCNC: 105 MMOL/L — SIGNIFICANT CHANGE UP (ref 96–108)
CO2 SERPL-SCNC: 28 MMOL/L — SIGNIFICANT CHANGE UP (ref 22–31)
COLOR SPEC: YELLOW — SIGNIFICANT CHANGE UP
CREAT SERPL-MCNC: 0.48 MG/DL — LOW (ref 0.5–1.3)
DIFF PNL FLD: NEGATIVE — SIGNIFICANT CHANGE UP
EOSINOPHIL # BLD AUTO: 0.07 K/UL — SIGNIFICANT CHANGE UP (ref 0–0.5)
EOSINOPHIL NFR BLD AUTO: 1 % — SIGNIFICANT CHANGE UP (ref 0–6)
ETHANOL SERPL-MCNC: <10 MG/DL — SIGNIFICANT CHANGE UP (ref 0–10)
FLUAV AG NPH QL: SIGNIFICANT CHANGE UP
FLUBV AG NPH QL: SIGNIFICANT CHANGE UP
GLUCOSE SERPL-MCNC: 90 MG/DL — SIGNIFICANT CHANGE UP (ref 70–99)
GLUCOSE UR QL: NEGATIVE MG/DL — SIGNIFICANT CHANGE UP
HCG SERPL-ACNC: 1 MIU/ML — SIGNIFICANT CHANGE UP
HCT VFR BLD CALC: 31.7 % — LOW (ref 34.5–45)
HGB BLD-MCNC: 10.8 G/DL — LOW (ref 11.5–15.5)
IMM GRANULOCYTES NFR BLD AUTO: 0.3 % — SIGNIFICANT CHANGE UP (ref 0–1.5)
KETONES UR-MCNC: NEGATIVE — SIGNIFICANT CHANGE UP
LEUKOCYTE ESTERASE UR-ACNC: NEGATIVE — SIGNIFICANT CHANGE UP
LYMPHOCYTES # BLD AUTO: 2.5 K/UL — SIGNIFICANT CHANGE UP (ref 1–3.3)
LYMPHOCYTES # BLD AUTO: 37.3 % — SIGNIFICANT CHANGE UP (ref 13–44)
MCHC RBC-ENTMCNC: 31.2 PG — SIGNIFICANT CHANGE UP (ref 27–34)
MCHC RBC-ENTMCNC: 34.1 GM/DL — SIGNIFICANT CHANGE UP (ref 32–36)
MCV RBC AUTO: 91.6 FL — SIGNIFICANT CHANGE UP (ref 80–100)
MONOCYTES # BLD AUTO: 0.82 K/UL — SIGNIFICANT CHANGE UP (ref 0–0.9)
MONOCYTES NFR BLD AUTO: 12.2 % — SIGNIFICANT CHANGE UP (ref 2–14)
NEUTROPHILS # BLD AUTO: 3.27 K/UL — SIGNIFICANT CHANGE UP (ref 1.8–7.4)
NEUTROPHILS NFR BLD AUTO: 48.9 % — SIGNIFICANT CHANGE UP (ref 43–77)
NITRITE UR-MCNC: NEGATIVE — SIGNIFICANT CHANGE UP
NRBC # BLD: 0 /100 WBCS — SIGNIFICANT CHANGE UP (ref 0–0)
PH UR: 6 — SIGNIFICANT CHANGE UP (ref 5–8)
PLATELET # BLD AUTO: 229 K/UL — SIGNIFICANT CHANGE UP (ref 150–400)
POTASSIUM SERPL-MCNC: 4.3 MMOL/L — SIGNIFICANT CHANGE UP (ref 3.5–5.3)
POTASSIUM SERPL-SCNC: 4.3 MMOL/L — SIGNIFICANT CHANGE UP (ref 3.5–5.3)
PROT SERPL-MCNC: 6.6 GM/DL — SIGNIFICANT CHANGE UP (ref 6–8.3)
PROT UR-MCNC: NEGATIVE MG/DL — SIGNIFICANT CHANGE UP
RBC # BLD: 3.46 M/UL — LOW (ref 3.8–5.2)
RBC # FLD: 13.9 % — SIGNIFICANT CHANGE UP (ref 10.3–14.5)
SALICYLATES SERPL-MCNC: <1.7 MG/DL — LOW (ref 2.8–20)
SARS-COV-2 RNA SPEC QL NAA+PROBE: SIGNIFICANT CHANGE UP
SODIUM SERPL-SCNC: 138 MMOL/L — SIGNIFICANT CHANGE UP (ref 135–145)
SP GR SPEC: 1.01 — SIGNIFICANT CHANGE UP (ref 1.01–1.02)
UROBILINOGEN FLD QL: NEGATIVE MG/DL — SIGNIFICANT CHANGE UP
VALPROATE SERPL-MCNC: 63 UG/ML — SIGNIFICANT CHANGE UP (ref 50–100)
WBC # BLD: 6.7 K/UL — SIGNIFICANT CHANGE UP (ref 3.8–10.5)
WBC # FLD AUTO: 6.7 K/UL — SIGNIFICANT CHANGE UP (ref 3.8–10.5)

## 2021-09-26 PROCEDURE — 99292 CRITICAL CARE ADDL 30 MIN: CPT

## 2021-09-26 PROCEDURE — 93010 ELECTROCARDIOGRAM REPORT: CPT

## 2021-09-26 PROCEDURE — 99291 CRITICAL CARE FIRST HOUR: CPT

## 2021-09-26 RX ORDER — MIDAZOLAM HYDROCHLORIDE 1 MG/ML
5 INJECTION, SOLUTION INTRAMUSCULAR; INTRAVENOUS ONCE
Refills: 0 | Status: DISCONTINUED | OUTPATIENT
Start: 2021-09-26 | End: 2021-09-26

## 2021-09-26 RX ORDER — HALOPERIDOL DECANOATE 100 MG/ML
5 INJECTION INTRAMUSCULAR ONCE
Refills: 0 | Status: COMPLETED | OUTPATIENT
Start: 2021-09-26 | End: 2021-09-26

## 2021-09-26 RX ORDER — KETAMINE HYDROCHLORIDE 100 MG/ML
150 INJECTION INTRAMUSCULAR; INTRAVENOUS ONCE
Refills: 0 | Status: DISCONTINUED | OUTPATIENT
Start: 2021-09-26 | End: 2021-09-26

## 2021-09-26 RX ADMIN — KETAMINE HYDROCHLORIDE 150 MILLIGRAM(S): 100 INJECTION INTRAMUSCULAR; INTRAVENOUS at 22:52

## 2021-09-26 RX ADMIN — HALOPERIDOL DECANOATE 5 MILLIGRAM(S): 100 INJECTION INTRAMUSCULAR at 22:01

## 2021-09-26 RX ADMIN — MIDAZOLAM HYDROCHLORIDE 5 MILLIGRAM(S): 1 INJECTION, SOLUTION INTRAMUSCULAR; INTRAVENOUS at 22:08

## 2021-09-26 RX ADMIN — MIDAZOLAM HYDROCHLORIDE 5 MILLIGRAM(S): 1 INJECTION, SOLUTION INTRAMUSCULAR; INTRAVENOUS at 22:47

## 2021-09-26 NOTE — ED ADULT NURSE NOTE - ED STAT RN HANDOFF DETAILS
Report received from ADRY Jain at 11pm. Assessment available on [facility]. will continue to monitor. 1:1 constant observation continues. pt is asleep at this time. awaiting vitals, EKG, and telepsych consult Report received from ADRY Jain at 11pm. Assessment available on [facility]. will continue to monitor. 1:1 constant observation continues. pt recently received medications s/p episode of agitation while yelling and cursing at staff. pt is asleep at this time. awaiting vitals, EKG, and telepsych consult
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
## 2021-09-26 NOTE — ED BEHAVIORAL HEALTH NOTE - BEHAVIORAL HEALTH NOTE
HIGH RISK FOLLOW-UP LOG: ROCIO called pt at [phone] however voicemail is not accepting messages at this time.

## 2021-09-26 NOTE — ED PROVIDER NOTE - PSYCHIATRIC [-], MLM
no depression/no anxiety/no insomnia/not exhibiting opposition/not suicidal no depression/no anxiety/no insomnia

## 2021-09-26 NOTE — ED ADULT NURSE NOTE - ED STAT RN HANDOFF DETAILS 2
Report endorsed to oncoming RN. Safety checks completed this shift. Safety rounds completed hourly. Fall & skin precautions in place. Any issues endorsed to oncoming RN for follow up. on cardiac monitor at bedside. 1:1 constant observation continues

## 2021-09-26 NOTE — ED ADULT NURSE NOTE - NS ED NOTE  TALK SOMEONE YN
[facility] Internal Medicine Clinic Note   Clinic History and Physical    Patient: Myron Vallecillo Date: 2018   : 1952    65 year old male        HISTORY OF PRESENT ILLNESS     This is a 65 year old year-old male with a past medical history as below who presents today with the following complaints:    #Dyspepsia of one year duration, patient reported that he was admitted to the hospital  for food poisoning. After he was discharged from hospital he started to have blowing up of his abdomen and aching pain around his epigastric area. Patient is worried if he has cancer or H. pylori infection. Patient reports that he has bowel movement of 1 every day, his stool is formed, no blood, no mucus associated with it. He denies nausea and vomiting but he reported having program of burping. His abdominal discomfort is not worsened or improved by any kind of diet. Patient denies weight loss, fever, excessive night sweats waiting, recent weight loss, chest pain, shortness of breath, family history of cancer, skin color change.      PAST MEDICAL HISTORY  Past Medical History:   Diagnosis Date   • NO HX OF        MEDICATIONS:  Current Outpatient Prescriptions   Medication Sig   • glycerin, adult, 2 g suppository Place 1 suppository rectally as needed for Constipation.   • pantoprazole (PROTONIX) 40 MG tablet Take 1 tablet by mouth daily.     No current facility-administered medications for this visit.        ALLERGIES:  ALLERGIES:  No Known Allergies    PAST SURGICAL HISTORY:  Past Surgical History:   Procedure Laterality Date   • INGUINAL HERNIA REPAIR         FAMILY HISTORY:  Family History   Problem Relation Age of Onset   • Diabetes Mother    • Heart disease Mother    • Diabetes Sister    • Heart disease Sister        SOCIAL HISTORY:  Social History   Substance Use Topics   • Smoking status: Former Smoker     Packs/day: 0.25     Years: 4.00     Types: Cigarettes     Quit date: 2000   • Smokeless tobacco:  Never Used      Comment: NOT SMOKING NOW   • Alcohol use Yes      Comment: rare         Past medical, social, surgical, family history, allergies and medications were reviewed in [facility].     REVIEW OF SYSTEMS     Eye Problem(s):negative and visual blurring  ENT Problem(s):negative, tinnitus, vertigo and epistaxis  Cardiovascular problem(s):negative, chest pain, claudication, dyspnea on exertion, orthopnea and palpitations  Respiratory problem(s):negative, cough, hemoptysis, shortness of breath and sputum  Gastro-intestinal problem(s):negative GI, constipation, diarrhea, dyspepsia and dysphagia  Genito-urinary problem(s):negative, nocturia, dysuria, frequency, hesitancy and urgency  Musculoskeletal problem(s):negative, fracture,  arthritis and gout  Integumentary problem(s):negative, hair changes and itching  Neurological problem(s):negative, rich-paresis, paralysis, syncope and seizures  Psychiatric problem(s):negative, anxiety and depression  Endocrine problem(s):negative, cold intolerance and heat intolerance  Hematologic and/or Lymphatic problem(s):negative, easy bruising, chills and weight loss     PHYSICAL EXAM     Vital Signs:   height is 5' 8\" (1.727 m) and weight is 78.5 kg. His temporal artery temperature is 96.4 °F (35.8 °C). His blood pressure is 119/71 and his pulse is 58.     Constiutional: NAD, comfortable appearing male. Patient speaks freely in full sentences. Patient is well groomed.  HEENT: PERRL, no scleral icterus or conjunctival pallor, no nasal discharge, MMM, oropharynx without erythema or exudate  Neck: trachea midline, supple, no cervical or supraclavicular lymphadenopathy or masses  Cardiovascular: RRR, normal S1 S2, no murmurs, no JVD, no carotid bruits. Non-displaced PMI.  Respiratory: Normal rate. No retractions or increased work of breathing. Clear to auscultation and percussion bilaterally.  Gastrointestinal: +BS, non-distended, soft, non-tender, no rebound or guarding, no  hepatosplenomegaly  Genitourinary: no suprapubic or costovertebral angle tenderness  Musculoskeletal: ROM and motor strength grossly normal. No clubbing, edema, or cyanosis. DP and radial pulses 2+ and symmetric. Positive leg raise test on the left side.  Skin: no rashes, jaundice or other lesions  Neurologic: EOMI, CN 2-12 grossly intact. no gross motor or sensory deficits, patellar and bicep DTR 2+ and symmetric  Psychiatric: affect and mood appropriate. The patient is alert, interactive, appropriate.        LABS     Reviewed     ASSESSMENT AND PLAN     This is a 65 year old year-old male who presents with:    #Dyspepsia  (primary encounter diagnosis)  Comment: CT abdomen done showed gastric distention with scattered air-fluid level within nondistended small bowel and colon suggestive of gastroenteritis.  Plan: H PYLORI AB IGG SCREEN, HEPATITIS C ANTIBODY         WITH REFLEX, HEPATITIS PANEL CHRONIC WITH         REFLEX        We will try short-term pantoprazole.  Will consider GI referral if patient's symptoms are not improving.      # Osteoarthritis of left knee, unspecified osteoarthritis type  Comment: X-ray of left knee showed degenerative changes  Plan: Tylenol p.r.n.        Best practices    #Screening for colon cancer  Plan: OPEN ACCESS COLONOSCOPY    # Need for immunization against influenza     #Need for vaccination  Plan: INFLUENZA ENHANCED HIGH DOSE SPLIT PRES FREE         VACC, IM, INFLUENZA ENHANCED HIGH DOSE SPLIT         PRES FREE VACC, IM                  HEALTH MAINTENANCE     The patient is to return to the clinic for follow up in 6 months    Patient's medical conditions, proposed management plan, risks, benefits and alternatives were discussed with the patient in detail. The patient understands and is agreeable.    This patient was seen, examined and discussed with , who agrees with the above findings, assessment and plan.    Soham Gonzalez MD  PGY-I  IM  2/5/2018 2:42 PM  Pager:   [phone]       No

## 2021-09-26 NOTE — ED PROVIDER NOTE - PROGRESS NOTE DETAILS
Patient extremely agitated, screaming in ED, verbally  unable to de-escalate with versed 5mg x 2 and haldol 5mg IM. Will treat now with ketamine 150mg IM for agitated delirium. Placed on cardiac monitor. patient cleared by psych. pending re-eval and observation and pending transport to group home. patient still sedated s/p ketamine for agitated delirium. psych cleared, will need re-eval and group home transport/accept. Pt is awake now, informed she will be going back to group home. Ambulance arranged. Patient extremely agitated, screaming in ED, verbally abusive towards staff. Initially unable to verbally de-escalate. Chemical sedation w/ 5mg IM versed was not effective.     Unable to de-escalate with additional versed 5mg and haldol 5mg IM. Patient now standing on stretcher, disruptive to ED and in danger to staff. Extremely agitated, security called. Will treat now with ketamine 150mg IM for agitated delirium. Placed on cardiac monitor.    Hemodynamically stable. Remains hemodynamically stable, sleeping and sedated on stretcher.     patient cleared by psych. pending re-eval and observation and pending transport to group home. patient still sedated s/p ketamine for agitated delirium. Hemodynamically stable. Sleeping.     psych already cleared, will need re-eval and group home transport/accept.

## 2021-09-26 NOTE — ED PROVIDER NOTE - PATIENT PORTAL LINK FT
You can access the FollowMyHealth Patient Portal offered by [facility] by registering at the following website: [email or web address]. By joining [employer]’s FollowMyHealth portal, you will also be able to view your health information using other applications (apps) compatible with our system.

## 2021-09-26 NOTE — ED PROVIDER NOTE - CLINICAL SUMMARY MEDICAL DECISION MAKING FREE TEXT BOX
This patient presents with symptoms consistent with an underlying psychiatric disorder most likely due to underlying Intellectual disability. Presentation is not consistent with acute organic causes to delirium, dementia, or drug induced disorders (Acute ingestions or withdrawal; no evidence of anticholinergic, sympathomimetic, sedative-hypnotic, opioid toxidrome). Given the history and presentation, I suspect this patient is (+) suicidal, not homicidal, not gravely-disabled and will require psychiatric care. Will consult psychiatry to evaluate the patient for potential hold for agitation.   PLAN:  - Psychiatric labs, EKG, ASA/APAP/ETOH, UDS, Psychiatry consult when BAL < 100.  - Medical detainment  - Reassess, BDZ and antipsychotic PRN for agitation This patient presents with symptoms consistent with an underlying psychiatric disorder most likely due to underlying Intellectual disability. Presentation is not consistent with acute organic causes to delirium, dementia, or drug induced disorders (Acute ingestions or withdrawal; no evidence of anticholinergic, sympathomimetic, sedative-hypnotic, opioid toxidrome). Given the history and presentation, I suspect this patient is (+) suicidal, not homicidal, not gravely-disabled and will require psychiatric care. Will consult psychiatry to evaluate the patient for potential hold for agitation.   PLAN:  - Psychiatric labs, EKG, ASA/APAP/ETOH, UDS, Psychiatry consult when BAL < 100.  - Medical detainment  - Reassess, BDZ and antipsychotic PRN for agitation  - Ketamine for agitated delirium. This patient presents with symptoms consistent with an underlying psychiatric disorder most likely due to underlying Intellectual disability. Presentation is not consistent with acute organic causes to delirium, dementia, or drug induced disorders (Acute ingestions or withdrawal; no evidence of anticholinergic, sympathomimetic, sedative-hypnotic, opioid toxidrome). Given the history and presentation, I suspect this patient is (+) suicidal, but not homicidal, not gravely-disabled and will require psychiatric care. Will consult psychiatry to evaluate the patient for potential hold for agitated delirium and SI.  PLAN:  - Psychiatric labs, EKG, ASA/APAP/ETOH, UDS, Psychiatry consult when BAL < 100.  - Medical detainment for agitated delirium   - Reassess, BDZ and antipsychotic PRN for agitation  - Ketamine for agitated delirium.  - Cardiac monitor and airway monitoring while on ketamine  - continuous close evaluation  - placed on 1:1 until discharge for elopement and agitation risk.

## 2021-09-26 NOTE — ED PROVIDER NOTE - OBJECTIVE STATEMENT
30F 30F hx of intellectual disability, bipolar do, presenting with agitation from group home. In the ED, yelling that she wants to "kill everyone" and "kill herself". Treated w/ sedation versed and haldol with no effect. Continued disrupting staff and jumped on bed. Treated w/ ketamine IM for agitated delirium. 30F hx of intellectual disability, bipolar do, presenting with agitation from group home. History of multiple ED visits for agitation/SI at group home. In the ED, yelling that she wants to "kill everyone" and "kill herself". Unable to get a coherent history due to agitation. Treated w/ sedation versed and haldol with no effect. Continued disrupting staff and jumped on bed. Treated w/ ketamine IM for agitated delirium.

## 2021-09-26 NOTE — ED PROVIDER NOTE - PHYSICAL EXAMINATION
VITAL SIGNS: I have reviewed nursing notes and confirm.   GEN: Well-developed; well-nourished; in severe agitated distress (+) screaming at staff and standing on stretcher. Speaking full sentences.  SKIN: Warm, pink, no rash, no diaphoresis, no cyanosis, well perfused.   HEAD: Normocephalic; atraumatic. No scalp lacerations, no abrasions.  NECK: Supple; non tender.   EYES: Pupils 3mm equal, round, reactive to light and accomodation, conjunctiva and sclera clear. Extra-ocular movements intact bilaterally.  ENT: No nasal discharge; airway clear. Trachea is midline. ORAL: No oropharyngeal exudates or erythema. Normal dentition.  CV: Regular rate and rhythm. S1, S2 normal; no murmurs, gallops, or rubs. No lower extremity pitting edema bilaterally. Capillary refill < 2 seconds throughout. Distal pulses intact 2+ throughout.  RESP: CTA bilaterally. No wheezes, rales, or rhonchi.   ABD: Normal bowel sounds, soft, non-distended, non-tender, no hepatosplenomegaly. No CVA tenderness bilaterally.  MSK: Normal range of motion and movement of all 4 extremities. No joint or muscular pain throughout. No clubbing.   BACK: No thoracolumbar midline or paravertebral tenderness. No step-offs or obvious deformities.  NEURO: Alert & oriented x 3, Grossly unremarkable. Sensory and motor intact throughout. No focal deficits. Gait: Fluid.    PSYCH: (+) uncooperative, (+) agitated in the ED, (+) pressured speech.

## 2021-09-26 NOTE — ED PROVIDER NOTE - NSFOLLOWUPINSTRUCTIONS_ED_ALL_ED_FT
Rest, drink plenty of fluids.  Advance activity as tolerated.  Continue all previously prescribed medications as directed.  Follow up with your PMD 2-3 days and bring copies of your results.  Return to the ER for worsening symptoms.

## 2021-09-27 VITALS
RESPIRATION RATE: 25 BRPM | SYSTOLIC BLOOD PRESSURE: 120 MMHG | DIASTOLIC BLOOD PRESSURE: 68 MMHG | HEART RATE: 77 BPM | TEMPERATURE: 98 F | OXYGEN SATURATION: 100 %

## 2021-09-27 DIAGNOSIS — F79 UNSPECIFIED INTELLECTUAL DISABILITIES: ICD-10-CM

## 2021-09-27 LAB — LITHIUM SERPL-MCNC: <0.2 MMOL/L — LOW (ref 0.6–1.2)

## 2021-09-27 PROCEDURE — 90792 PSYCH DIAG EVAL W/MED SRVCS: CPT | Mod: 95

## 2021-09-27 RX ORDER — SODIUM CHLORIDE 9 MG/ML
1000 INJECTION INTRAMUSCULAR; INTRAVENOUS; SUBCUTANEOUS ONCE
Refills: 0 | Status: DISCONTINUED | OUTPATIENT
Start: 2021-09-27 | End: 2021-09-27

## 2021-09-27 RX ORDER — DIPHENHYDRAMINE HCL 50 MG
25 CAPSULE ORAL EVERY 4 HOURS
Refills: 0 | Status: DISCONTINUED | OUTPATIENT
Start: 2021-09-27 | End: 2021-09-27

## 2021-09-27 RX ORDER — HALOPERIDOL DECANOATE 100 MG/ML
5 INJECTION INTRAMUSCULAR ONCE
Refills: 0 | Status: COMPLETED | OUTPATIENT
Start: 2021-09-27 | End: 2021-09-27

## 2021-09-27 RX ADMIN — Medication 25 MILLIGRAM(S): at 10:05

## 2021-09-27 RX ADMIN — Medication 1 MILLIGRAM(S): at 10:06

## 2021-09-27 RX ADMIN — HALOPERIDOL DECANOATE 5 MILLIGRAM(S): 100 INJECTION INTRAMUSCULAR at 10:55

## 2021-09-27 NOTE — ED BEHAVIORAL HEALTH NOTE - BEHAVIORAL HEALTH NOTE
HIGH RISK LOG: Writer called  informed by group home patient went to another hospital and was admitted.
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
## 2021-09-27 NOTE — ED BEHAVIORAL HEALTH ASSESSMENT NOTE - OTHER
[facility] Unable to assess sedate and anxious sedated and calm at present unable to assess chronically impaired. No acutely impaired at time of assessment given sedation verbalizing somatic complaints Records Checked: [facility], [facility], [facility], [facility], [facility] Inpatient, [facility] CL, [facility] Out[facility] Medical, [facility] Outpatient BH, [facility] ED, [facility] Inpatient, C[facility] Outpatient, [facility] Inpatient, Tier E&A, [facility] Inpatient, Med[facility]ch ED, Quick Docs, [facility], [facility], [facility] Inpatient, [facility] CL, [facility], Social Media (For example - Facebook, [employer], [employer]), Web search, Forensic Databases

## 2021-09-27 NOTE — ED BEHAVIORAL HEALTH ASSESSMENT NOTE - ADDITIONAL DETAILS ALL
Zoila Guadalupe  YOB: 1933  June 24, 2020      CHIEF COMPLAINT:  Severe COPD, atrial flutter/fibrillation, hypertension, elev chol, malnutrition, fatigue and weakness, Chronic respiratory failure    HISTORY OF PRESENT ILLNESS:  This is a 87 year old female resident of [facility], Room 412, who is being seen for a routine nursing home follow-up visit.      She has severe COPD and chronic respiratory failure requiring supplemental oxygen 3-4 LPM.  She was last hospitalized at [facility] February 5 through February 18 and then transferred to [facility] February 18 through March 3rd.  Her condition was quite grave but she has gradually regained ground and now is conversant and gives much of her history though she does have some cognitive deficits.  She has an activated power of , her daughter.  During that hospital stay she had EEG due to encephalopathy and epilepsy was ruled out.  At that time POA was activated.  Currently wears oxygen 4 liters/minute.  Currently maintained on Brovana nebulizer BID, Spiriva daily, and Duonebs TID.  Last seen by pulmonology, Dr. Erwin on May 22 who recommended to continue current management with 3 month follow-up.  States that she is coughing all the time and not bringing up sputum.  Denies fever and chronic shortness of breath is unchanged.    Today she complains that her energy is quite low.  She is not sure whether she needs more oxygen but she is not high tight up her oxygen on her own.  She feels that she is on the proper inhalers and nebulizers right now.  She denies any coughing, wheezing, fluid retention, fever.  She just has low energy.  She still cannot get over how her health has declined so significantly since her hip fracture in October of 2019. She has lost all independence.  I suggested cutting back on some of her medication which she is readily interested in.  Furthermore she reports that she would like to be up walking with  her walker 3 times per day.     Atrial fibrillation, atrial flutter:  She is maintained on Eliquis and metoprolol.  Heart rates have been in the 70-80s. She denies palpitations or chest pain.  Last seen by Dr. Randall on 4/22/20 at which time metoprolol dosage was reduced from 25 mg twice daily to 12.5mg twice daily.   Not a candidate for pacemaker, poor surgical candidate.  Did have cardiac pauses in the hospital when rate was aggressively controlled so EP recommends permissive tachycardia.     Malnutrition :  Patient had lost quite a bit of weight and had a poor appetite.  She always has been slim and her weight has stayed between 105 and 112.  She is maintained on house supplement.  She requests that family bring her in her own food which she prefers, especially ravioli stuffed with squash.  I think this is reasonable.    Left hip fracture: October 2019 with subsequent surgical peritrochanteric nailing of left hip fracture on 10/12/19.  She has experienced some leg length discrepancy which is corrected with a 0.5 in shoe insert on the left.  She ambulates with the assist of a walker.  Shows motivation to want to get better and would like assistance with hallway ambulation 3 times daily.            PAST MEDICAL HISTORY    COPD (chronic obstructive pulmonary disease) w*               Emphysema                                       12/13/2013    History of pulmonary embolism                   5/29/2015     HTN (hypertension)                                            CAD (coronary artery disease)                                 Hyperlipidemia                                                Anxiety                                                       GERD (gastroesophageal reflux disease)                        Status post hip surgery                         10/19/2019      Comment: Saundra-trochanteric femur ascencion     PAST SURGICAL HISTORY  Past Surgical History:   Procedure Laterality Date   • Colonoscopy w/  polypectomy     • Coronary art dil,one vessel     • Eye surgery Bilateral     Bilateral cataract surgery   • Orif hip fracture Left 10/2019     FAMILY/SOCIAL HISTORY  Power Of  designee/healthcare agent - Activated:  Name:  Izabel Guadalupe  Relationship: Daughter   Phone number:  [phone]  Marital Status: .  Children: 3 sons and 1 daughter  [place] they [place] all over.      FUNCTIONAL SUMMARY:  Activities of Daily Living: Independent  Mobility: walker  Transfers: independent  Continence: Continent bladder and bowel   Adaptive equipment: Walker  Diet: GENERAL  Eating habits: Feeds self  Safety: No alarms/restraints  Activities: In room and on unit       CURRENT MEDICATIONS  Current Outpatient Medications   Medication Sig Dispense Refill   • oxyCODONE, IMM REL, (ROXICODONE) 5 MG immediate release tablet Take 0.5-1 tablets by mouth every 6 hours as needed for Pain. 20 tablet 0   • latanoprost (XALATAN) 0.005 % ophthalmic solution Place 1 drop into both eyes at bedtime. 7.5 mL 1   • cetirizine (ZYRTEC) 10 MG tablet Take 1 tablet by mouth daily.     • ipratropium-albuterol (DUONEB) 0.5-2.5 (3) MG/3ML nebulizer solution Take 3 mLs by nebulization every 6 hours as needed for Wheezing.     • metoPROLOL tartrate (LOPRESSOR) 25 MG tablet Take 0.5 tablets by mouth 2 times daily. 60 tablet 0   • calcium carbonate (TUMS) 500 MG chewable tablet Chew 2 tablets by mouth 2 times daily as needed for Heartburn.     • atorvastatin (LIPITOR) 10 MG tablet Take 1 tablet by mouth daily.  Need fasting lipid panel done before next refill. 90 tablet 0   • Losartan 25 MG tablet Take 1 tablet by mouth daily. Do not start before March 3, 2020. 30 tablet 1   • hydrALAZINE (APRESOLINE) 10 MG tablet Take 1 tablet by mouth 3 times daily.     • timolol (TIMOPTIC) 0.5 % ophthalmic solution Apply 1 drop(s) into both eyes every day by ophthalmic route. 5 mL 6   • acetaminophen (TYLENOL) 500  MG tablet Take 1,000 mg by mouth nightly.     • potassium CHLORIDE (KLOR-CON M) 20 MEQ reuben ER tablet Take 20 mEq by mouth 2 times daily.     • guaifenesin 100 MG/5ML Give 15 ml by mouth every 4 hours as needed for cough.     • acetaminophen (TYLENOL) 650 MG suppository Place 650 mg rectally every 4 hours as needed for Fever or Pain.     • apixaBAN (ELIQUIS) 2.5 MG Tab Take 1 tablet by mouth every 12 hours. 60 tablet    • VITAMIN D, CHOLECALCIFEROL, PO Take 2,000 Units by mouth daily.     • albuterol (VENTOLIN) (5 MG/ML) 0.5% nebulizer solution Take 2.5 mg by nebulization every 4 hours as needed for Wheezing or Shortness of Breath.     • glucagon (GLUCAGON EMERGENCY) 1 MG injection kit Inject 1 mg into the muscle as needed. Indications: Disorder with Low Blood Sugar     • muscle rub (THERA-GESIC PLUS) 10-15 % Cream Apply 1 application topically every 6 hours as needed (pain).     • brimonidine (ALPHAGAN) 0.2 % ophthalmic solution Place 1 drop into both eyes 2 times daily. 5 mL 6   •       • fluticasone (FLONASE) 50 MCG/ACT nasal spray Spray 2 sprays in each nostril daily. 16 g 12   • pantoprazole (PROTONIX) 40 MG tablet Take 1 tablet by mouth daily.. 90 tablet 1   • arformoterol (BROVANA) 15 MCG/2ML Nebu Soln Inhale 1 vial per handheld nebulizer twice daily 120 mL 6   • magnesium hydroxide (MILK OF MAGNESIA) 400 MG/5ML suspension Take 30 mLs by mouth daily as needed for Constipation. 360 mL 0   • acetaminophen (TYLENOL) 325 MG tablet Take 2 tablets by mouth every 6 hours as needed for Pain or Fever. 30 tablet 0   • bisacodyl (DULCOLAX) 10 MG suppository Place 1 suppository rectally daily as needed for Constipation. 12 each 0   • ferrous sulfate 325 (65 FE) MG tablet Take 1 tablet by mouth daily (with breakfast). Do not start before October 16, 2019. 60 tablet 0   • nitroGLYcerin (NITROSTAT) 0.4 MG sublingual tablet Place 1 tablet under the tongue every 5 minutes as needed for Chest pain.     • albuterol-ipratropium  (COMBIVENT RESPIMAT) 100-20 MCG/ACT inhaler Inhale 1 puff into the lungs every 4 hours as needed for Shortness of Breath. 4 g 3   • tiotropium (SPIRIVA HANDIHALER) 18 MCG capsule for inhaler Place 1 capsule into inhaler and inhale daily. 90 capsule 3   • furosemide (LASIX) 20 MG tablet Take 1 tablet by mouth daily.. 90 tablet 1   • fexofenadine (ALLEGRA) 180 MG tablet Take 1 tablet by mouth daily. 90 tablet 1            ALLERGIES:   Allergen Reactions   • Daliresp [Roflumilast] DIARRHEA   • Sulfa Antibiotics      Hallucinations         LABORATORY DATA  Sodium (mmol/L)   Date Value   03/23/2020 141     Potassium (mmol/L)   Date Value   03/23/2020 4.8     Chloride (mmol/L)   Date Value   03/23/2020 102     Glucose (mg/dL)   Date Value   03/23/2020 189 (H)     CALCIUM (mg/dL)   Date Value   03/23/2020 9.5     Carbon Dioxide (mmol/L)   Date Value   03/23/2020 30     BUN (mg/dl)   Date Value   03/23/2020 17     Creatinine (mg/dL)   Date Value   03/23/2020 0.80     WBC (K/mcL)   Date Value   03/05/2020 6.0   01/29/2020 5.8     RBC (mil/mcL)   Date Value   03/05/2020 3.78 (L)   01/29/2020 4.11     HCT (%)   Date Value   03/05/2020 36.7   01/29/2020 41.0     HGB (g/dL)   Date Value   03/05/2020 11.6 (L)   01/29/2020 13.0     PLT (K/mcL)   Date Value   03/05/2020 293   01/29/2020 152       CRITICAL CAREPLAN  Do Not Resuscitate    REVIEW OF SYSTEMS  See HPI    PHYSICAL EXAM  GENERAL: Alert, in no acute distress.  VITAL SIGNS: Blood pressure 108/65, respirations 20, temperature 98.1, heart rate 82, pulse ox 98% on 4 liters.  (as documented by facility staff)  INTEGUMENT: Pink, dry-appearing; no noted rash, bruising, or open are as. Edema: none.  HEENT: Oral mucous membranes appear moist.  Wearing supplemental oxygen nasal cannula.  Pupils equal, round, reactive to light, postsurgical cataract changes.    CARDIAC: No cyanosis.  Regular rate and rhythm.  RESPIRATORY: Normal respiratory rate and rhythm. No use of accessory  muscles. Normal excursion at rest and with large motor movement.  Mild dry crackles bilateral bases.  GASTROINTESTINAL: Appears nondistended. Thin  NEUROLOGIC: Alert, oriented x3. No twitching or tremors. Cranial nerves II-XII grossly intact. No one-sided weakness or facial droop.  EXTREMITIES:  No edema and no rash.        IMPRESSION/PLAN    Chronic respiratory failure, severe COPD with hypoxemia:  Maintained on supplemental oxygen 4 liters/minute.  Goal oxygen saturation is 88-90% to lessen the risk of hypercapnia.  Continue with Brovana nebulizer morning and at HS.  Continue with DuoNeb 3 times daily and Spiriva 18 mcg daily.  Last office visit with Dr. Erwin May 22 and next office visit is due 3 months from then.  For chronic cough would recommend guaifenesin codeine syrup 10 mL q.4 hours p.r.n..      Atrial flutter, atrial fibrillation unspecified type (CMS/HCC):  Appears stable, rate controlled. Continue metoprolol 12.5 mg twice daily and  with Eliquis 2.5 mg twice daily anticoagulation.      Closed fracture of left hip with routine healing, subsequent encounter  (primary encounter diagnosis), Status post hip surgery  Plan:  Continue ambulation with walker.  Offer hallway ambulation with assist 3 times daily.  Recommend reduce acetaminophen to 750 mg 3 times daily to avoid acetaminophen toxicity in patient of low weight.    Essential hypertension:  Lower blood pressures are noted with systolic blood pressure often 110 or less.  Reduce hydralazine to 5 mg twice daily and continue on losartan 25 mg daily.      Malnutrition:  Weight has remained low but stable for the last several months.  Maintained on house supplement.  Given low BMI, age greater than 85, no code status, low energy and muscle weakness will discontinue atorvastatin.  Would request that family can bring in food for her to supplement her diet.        Mely Zaldivar MD   no reported verbalizations of SI during ED course or prior to arrival. No history of suicide attempts. chart history of self injurious behavior.
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
## 2021-09-27 NOTE — ED ADULT NURSE REASSESSMENT NOTE - NS ED NURSE REASSESS COMMENT FT1
pt asleep in bed 2. 1:1 constant observation continues. on cardiac monitor at bedside. no acute distress noted. will continue to monitor
pt is alert and verbally responsive- able to make all needs known-pt is cursing and using profanity while in no immediate threat to herself and others. Pt remains on 1:1 until discharged.

## 2021-09-27 NOTE — ED BEHAVIORAL HEALTH NOTE - BEHAVIORAL HEALTH NOTE
===================  PRE-HOSPITAL COURSE  ===================  SOURCE:  RN and triage documentation.   DETAILS:  Patient KAI from [facility]; chief complaint of agitation.    ============  ED COURSE   ============  SOURCE: RN and triage documentation.   ARRIVAL: Patient was uncooperative with triage process/combative while in ED. Patient gowned, wanded, and placed in private room with 1:1 observation. Patient presents with fair hygiene/grooming.   BELONGINGS: None notable.    BEHAVIOR: Per RN patient has been calm however has episodes of severe agitation earlier that required medication intervention. Blood/urine provided for routine labs without noted incident. Patient does not endorse SI/HI/AH/VH to current RN however endorsed SI/HI upon arrival to ED. Patient is AOx4 with normal speech rate/occasionally volume and logical thought process, presents with irritable mood. Patient has been resting in hospital bed.   TREATMENT: Patient has received  Versed 5mg IM 22:21, Haldol IM 21:49, Ketamine 150mg IM 22:47.   VISITORS: Patient presently unaccompanied by social supports.      [facility] left message for [facility] [phone]; [phone] requesting callback.       COVID Exposure Screen- collateral (i.e. third-party, chart review, belongings, etc; include EMS and ED staff)  1.	*Has the patient had a COVID-19 test in the last 90 days?  ( X) Yes   (  ) No   (  ) Unknown- Reason: _____  IF YES PROCEED TO QUESTION #2. IF NO OR UNKNOWN, PLEASE SKIP TO QUESTION #3.  2.	Date of test(s) and result(s): ___Neg____  3.	*Has the patient tested positive for COVID-19 antibodies? (  ) Yes   (  ) No   ( X) Unknown- Reason: ____  IF YES PROCEED TO QUESTION #4. IF NO or UNKNOWN, PLEASE SKIP TO QUESTION #5.  4.	Date of positive antibody test: _______  5.	*Has the patient received 2 doses of the COVID-19 vaccine? (  ) Yes   (  ) No   ( X) Unknown- Reason: ____  IF YES PROCEED TO QUESTION #6. IF NO or UNKNOWN, PLEASE SKIP TO QUESTION #7.  6.	 Date of second dose: ______  7.	*In the past 10 days, has the patient been around anyone with a positive COVID-19 test?* (  ) Yes   (  ) No   ( X) Unknown- Reason: _____  IF YES PROCEED TO QUESTION #8. IF NO or UNKNOWN, PLEASE SKIP TO QUESTION #13.  8.	Was the patient within 6 feet of them for at least 15 minutes? (  ) Yes   (  ) No   (  ) Unknown- Reason: _____  9.	Did the patient provide care for them? (  ) Yes   (  ) No   (  ) Unknown- Reason: ______  10.	Did the patient have direct physical contact with them (touched, hugged, or kissed them)? (  ) Yes   (  ) No    (  ) Unknown- Reason: _____   11.	Did the patient share eating or drinking utensils with them? (  ) Yes   (  ) No    (  ) Unknown- Reason: ____  12.	Did they sneeze, cough, or somehow get respiratory droplets on the patient? (  ) Yes   (  ) No    (  ) Unknown- Reason: ______  13.	*Has the patient been out of [place] within the past 10 days?* (  ) Yes   (  ) No   ( X) Unknown- Reason: ____  IF YES PLEASE ANSWER THE FOLLOWING QUESTIONS:  14.	Which state/country did they go to? _____  15.	Were they there over 24 hours? (  ) Yes   (  ) No    (  ) Unknown- Reason: ______  16.	Date of return to [facility]: ______.

## 2021-09-27 NOTE — ED BEHAVIORAL HEALTH ASSESSMENT NOTE - NSACTIVEVENT_PSY_ALL_CORE
Chronically poor frustration tolerance with poor impulse control in the setting of minor triggers including dissatisfaction in group home/Triggering events leading to humiliation, shame, and/or despair (e.g., Loss of relationship, financial or health status) (real or anticipated)
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
## 2021-09-27 NOTE — ED BEHAVIORAL HEALTH ASSESSMENT NOTE - CURRENT MEDICATION
Per chart review of last documented medication list from group home: docusil 100mg BID, multivitamin, fibercon 625mg 2 tabs BID, reguloid, Prozac 40mg daily, famotidine 20mg BID, bromocriptine 5mg BID, glucophage 1g BID, abilify 5mg qHS, depakote ER 750mg BID, thorazine 100mg qAM, PM, thorazine 200mg qHS, cogentin 1mg BID, slow fe 3 tabs daily, calc carb w/d 600/400 BID, artificial tears, vit c 500mg, simethicone 80mg QID PRN, pepcid daily PRN

## 2021-09-27 NOTE — ED BEHAVIORAL HEALTH ASSESSMENT NOTE - VIOLENCE RISK FACTORS:
Violent ideation/threat/speech/Affective dysregulation/Impulsivity/History of being victimized/traumatized/Lack of insight into violence risk/need for treatment/Community stressors that increase the risk of destabilization/Irritability/Elopement history or risk

## 2021-09-27 NOTE — ED BEHAVIORAL HEALTH ASSESSMENT NOTE - DESCRIPTION
ED course and any available collateral are as per BTCM (ED Behavioral health) note to follow As per HPI as per HPI ED course and any additional collateral (or attempts at collateral) are as per BTCM (ED Behavioral health) note to follow

## 2021-09-27 NOTE — ED BEHAVIORAL HEALTH ASSESSMENT NOTE - INVOLUNTARY INTRAMUSCULAR MEDICATION DETAILS
Haldol 5 mg IM, Versed 5 mg IM x 2 then Ketamine 150 mg IM between 22:01 and 22:52 given for agitation

## 2021-09-29 LAB
CULTURE RESULTS: SIGNIFICANT CHANGE UP
SPECIMEN SOURCE: SIGNIFICANT CHANGE UP

## 2021-10-05 ENCOUNTER — EMERGENCY (EMERGENCY)
Facility: HOSPITAL | Age: 31
LOS: 1 days | Discharge: HOME | End: 2021-10-05
Attending: EMERGENCY MEDICINE | Admitting: EMERGENCY MEDICINE
Payer: MEDICAID

## 2021-10-05 VITALS
SYSTOLIC BLOOD PRESSURE: 122 MMHG | TEMPERATURE: 99 F | RESPIRATION RATE: 18 BRPM | HEART RATE: 99 BPM | OXYGEN SATURATION: 99 % | DIASTOLIC BLOOD PRESSURE: 80 MMHG | HEIGHT: 61 IN

## 2021-10-05 LAB
APPEARANCE UR: CLEAR — SIGNIFICANT CHANGE UP
BILIRUB UR-MCNC: NEGATIVE — SIGNIFICANT CHANGE UP
COLOR SPEC: SIGNIFICANT CHANGE UP
DIFF PNL FLD: NEGATIVE — SIGNIFICANT CHANGE UP
GLUCOSE UR QL: NEGATIVE — SIGNIFICANT CHANGE UP
HCG UR QL: NEGATIVE — SIGNIFICANT CHANGE UP
KETONES UR-MCNC: NEGATIVE — SIGNIFICANT CHANGE UP
LEUKOCYTE ESTERASE UR-ACNC: NEGATIVE — SIGNIFICANT CHANGE UP
NITRITE UR-MCNC: NEGATIVE — SIGNIFICANT CHANGE UP
PCP SPEC-MCNC: SIGNIFICANT CHANGE UP
PH UR: 6.5 — SIGNIFICANT CHANGE UP (ref 5–8)
PROT UR-MCNC: NEGATIVE — SIGNIFICANT CHANGE UP
SP GR SPEC: 1.01 — SIGNIFICANT CHANGE UP (ref 1–1.05)
UROBILINOGEN FLD QL: SIGNIFICANT CHANGE UP

## 2021-10-05 PROCEDURE — 99284 EMERGENCY DEPT VISIT MOD MDM: CPT

## 2021-10-05 PROCEDURE — 90792 PSYCH DIAG EVAL W/MED SRVCS: CPT
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
## 2021-10-05 NOTE — ED BEHAVIORAL HEALTH ASSESSMENT NOTE - ADDITIONAL DETAILS ALL
no reported verbalizations of SI during ED course or prior to arrival. No history of suicide attempts. chart history of self injurious behavior.
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
## 2021-10-05 NOTE — ED BEHAVIORAL HEALTH ASSESSMENT NOTE - DESCRIPTION
As per HPI Pt was calm throughout ED stay without requiring any prn medications.  Vital Signs Last 24 Hrs  T(C): 37.2 (05 Oct 2021 10:09), Max: 37.2 (05 Oct 2021 10:09)  T(F): 98.9 (05 Oct 2021 10:09), Max: 98.9 (05 Oct 2021 10:09)  HR: 99 (05 Oct 2021 10:09) (99 - 99)  BP: 122/80 (05 Oct 2021 10:09) (122/80 - 122/80)  BP(mean): --  RR: 18 (05 Oct 2021 10:09) (18 - 18)  SpO2: 99% (05 Oct 2021 10:09) (99% - 99%) as per HPI
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
## 2021-10-05 NOTE — ED PROVIDER NOTE - PHYSICAL EXAMINATION
GEN - NAD; well appearing; A+O x3   HEAD - NC/AT   EYES- PERRL, EOMI  ENT: Airway patent, mmm, Oral cavity and pharynx normal.   NECK: Neck supple  PULMONARY - CTA b/l, symmetric breath sounds.   CARDIAC -s1s2, RRR, no M,G,R  ABDOMEN - +BS, ND, NT, soft, no guarding, no rebound, no masses   BACK - no CVA tenderness, Normal  spine   EXTREMITIES - FROM  SKIN - no rash or bruising   NEUROLOGIC - alert, speech clear, no focal deficits  PSYCH -calm, cooperative, speech normal, denies si/hi

## 2021-10-05 NOTE — ED PROVIDER NOTE - NS ED ROS FT
ROS:  GENERAL: No fever, no chills  EYES: no change in vision  HEENT: no trouble swallowing, no trouble speaking  CARDIAC: no chest pain  PULMONARY: no cough, no shortness of breath  GI: no abdominal pain  : No dysuria  SKIN: no rashes  NEURO: no headache, no weakness  MSK: No joint pain

## 2021-10-05 NOTE — ED ADULT NURSE NOTE - OBJECTIVE STATEMENT
pt sent to ed from group home with ems for agitated behavior at the group home. Pt is upset on arrival, but redirectable and cooperative. pt checked into , wanted with fs taken on arrival. Will continue monitoring.

## 2021-10-05 NOTE — ED PROVIDER NOTE - CARE PLAN
1 Principal Discharge DX:	Intermittent explosive disorder  Secondary Diagnosis:	Moderate intellectual disability
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
## 2021-10-05 NOTE — ED PROVIDER NOTE - PATIENT PORTAL LINK FT
You can access the FollowMyHealth Patient Portal offered by [facility] by registering at the following website: [email or web address]. By joining "[employer]’s FollowMyHealth portal, you will also be able to view your health information using other applications (apps) compatible with our system.

## 2021-10-05 NOTE — ED PROVIDER NOTE - PROGRESS NOTE DETAILS
per sw-facility was requesting various evaluations and to hold patient so they can orchestrate a transfer for her to another facility. Patient has no medical complaints and no indication for further emergent medical evaluation at this time. Psych clearing for dc. Ua sent as per their request with no urinary symptoms. NP Bereczky- psych consult recommendation out patient follow up, ua pregnancy negative, pt will be discharged back to group home.
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
## 2021-10-05 NOTE — ED BEHAVIORAL HEALTH ASSESSMENT NOTE - OTHER
[facility] Unable to assess sedate and anxious chronically impaired. No acutely impaired at time of assessment [facility] Records Checked: [facility], [facility], [facility], [facility]
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
## 2021-10-06 LAB
CULTURE RESULTS: SIGNIFICANT CHANGE UP
SPECIMEN SOURCE: SIGNIFICANT CHANGE UP

## 2021-10-11 ENCOUNTER — EMERGENCY (EMERGENCY)
Facility: HOSPITAL | Age: 31
LOS: 0 days | Discharge: HOME | End: 2021-10-11
Attending: EMERGENCY MEDICINE
Payer: MEDICAID

## 2021-10-11 VITALS
HEIGHT: 61 IN | DIASTOLIC BLOOD PRESSURE: 84 MMHG | RESPIRATION RATE: 19 BRPM | WEIGHT: 179.9 LBS | SYSTOLIC BLOOD PRESSURE: 122 MMHG | HEART RATE: 100 BPM | TEMPERATURE: 98 F | OXYGEN SATURATION: 98 %
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
## 2021-10-11 DIAGNOSIS — R45.1 RESTLESSNESS AND AGITATION: ICD-10-CM

## 2021-10-11 DIAGNOSIS — S00.81XA ABRASION OF OTHER PART OF HEAD, INITIAL ENCOUNTER: ICD-10-CM

## 2021-10-11 DIAGNOSIS — Z91.018 ALLERGY TO OTHER FOODS: ICD-10-CM
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
## 2021-10-11 PROCEDURE — 99283 EMERGENCY DEPT VISIT LOW MDM: CPT

## 2021-10-11 RX ORDER — ACETAMINOPHEN 500 MG
975 TABLET ORAL ONCE
Refills: 0 | Status: COMPLETED | OUTPATIENT
Start: 2021-10-11 | End: 2021-10-11

## 2021-10-11 RX ORDER — OLANZAPINE 15 MG/1
15 TABLET, FILM COATED ORAL ONCE
Refills: 0 | Status: COMPLETED | OUTPATIENT
Start: 2021-10-11 | End: 2021-10-11

## 2021-10-11 RX ORDER — ALPRAZOLAM 0.25 MG
1 TABLET ORAL ONCE
Refills: 0 | Status: DISCONTINUED | OUTPATIENT
Start: 2021-10-11 | End: 2021-10-11

## 2021-10-11 RX ADMIN — OLANZAPINE 15 MILLIGRAM(S): 15 TABLET, FILM COATED ORAL at 16:02

## 2021-10-11 RX ADMIN — Medication 1 MILLIGRAM(S): at 16:01

## 2021-10-11 RX ADMIN — Medication 975 MILLIGRAM(S): at 16:01

## 2021-10-11 NOTE — ED PROVIDER NOTE - PATIENT PORTAL LINK FT
You can access the FollowMyHealth Patient Portal offered by [facility] by registering at the following website: [email or web address]. By joining [employer]’s FollowMyHealth portal, you will also be able to view your health information using other applications (apps) compatible with our system.

## 2021-10-11 NOTE — ED PROVIDER NOTE - PHYSICAL EXAMINATION
Gen: Alert, NAD  Head: superficial excoriations to the face   Eyes: PERRL, EOMI, normal lids/conjunctiva  ENT: normal hearing, patent oropharynx without erythema/exudate, uvula midline  Neck: supple, no tenderness, Trachea midline  Pulm: Bilateral BS, normal resp effort, no wheeze/stridor/retractions  CV: RRR, no M/R/G, 2+ radial and dp pulses bl, no edema  Abd: soft, NT/ND, +BS, no hepatosplenomegaly  Mskel: extremities x4 with normal ROM and no joint effusions. no ctl spine ttp.   Skin: no rash, no bruising   Neuro: AAOx3, no sensory/motor deficits, CN 2-12 intact

## 2021-10-11 NOTE — ED PROVIDER NOTE - CLINICAL SUMMARY MEDICAL DECISION MAKING FREE TEXT BOX
superficial excoriations. bandaids. given pts propensity to severe agitation, will give anxiolytics while wound care takes place. ok for dc josé [email or web address].e
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
## 2021-10-11 NOTE — ED ADULT NURSE NOTE - NSIMPLEMENTINTERV_GEN_ALL_ED
Implemented All Universal Safety Interventions:  [place] to call system. Call bell, personal items and telephone within reach. Instruct patient to call for assistance. Room bathroom lighting operational. Non-slip footwear when patient is off stretcher. Physically safe environment: no spills, clutter or unnecessary equipment. Stretcher in lowest position, wheels locked, appropriate side rails in place.
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
## 2021-10-13 ENCOUNTER — EMERGENCY (EMERGENCY)
Facility: HOSPITAL | Age: 31
LOS: 1 days | Discharge: HOME | End: 2021-10-13
Admitting: EMERGENCY MEDICINE
Payer: MEDICAID

## 2021-10-13 VITALS
HEART RATE: 95 BPM | OXYGEN SATURATION: 97 % | TEMPERATURE: 98 F | RESPIRATION RATE: 16 BRPM | HEIGHT: 61 IN | SYSTOLIC BLOOD PRESSURE: 116 MMHG | DIASTOLIC BLOOD PRESSURE: 66 MMHG

## 2021-10-13 PROCEDURE — 99284 EMERGENCY DEPT VISIT MOD MDM: CPT

## 2021-10-13 RX ORDER — CHLORPROMAZINE HCL 10 MG
50 TABLET ORAL ONCE
Refills: 0 | Status: DISCONTINUED | OUTPATIENT
Start: 2021-10-13 | End: 2021-10-13

## 2021-10-13 RX ORDER — CHLORPROMAZINE HCL 10 MG
50 TABLET ORAL ONCE
Refills: 0 | Status: COMPLETED | OUTPATIENT
Start: 2021-10-13 | End: 2021-10-13

## 2021-10-13 RX ADMIN — Medication 2 MILLIGRAM(S): at 16:21

## 2021-10-13 RX ADMIN — Medication 50 MILLIGRAM(S): at 16:22

## 2021-10-13 NOTE — ED BEHAVIORAL HEALTH NOTE - BEHAVIORAL HEALTH NOTE
Worker called Miladys Case,  [phone] for collateral information. All information is as per Ms. Case:    Ms. Case states that the patient has been compliant with medications; given by staff. She states that the patient was seen at [facility] yesterday in the medical ER and then discharged to the street, without them being informed. She states that the patient was discharged in the morning and then walked home around 2:30pm. She states that they did a missing report and collaborating with [facility] at that time. She states that the patient was sent to [facility] today because the patient was having a crisis. She states that they sent the patient to the emergency room because she was threatening staff today and having a behavioral outburst. She states that the patient is connected to [facility] and they are trying to get her admitted to [facility]. No SI noted today but patient mentioned in the past she wanted to drink cleaning supplies. Patient is not using drugs or alcohol. Case discussed with TEDDY Toure. Worker called Miladys Caes,  [phone] for collateral information. All information is as per Ms. Case:    Ms. Case states that the patient has been compliant with medications; given by staff. She states that the patient was seen at [facility] yesterday in the medical ER and then discharged to the street, without them being informed. She states that the patient was discharged in the morning and then walked home around 2:30pm. She states that they did a missing report and collaborating with [facility] at that time. She states that the patient was sent to [facility] today because the patient was having a crisis. She states that they sent the patient to the emergency room because she was threatening staff today and having a behavioral outburst. She states that the patient is connected to [facility] and they are trying to get her admitted to [facility]. No SI noted today but patient mentioned in the past she wanted to drink cleaning supplies. Patient is not using drugs or alcohol. Case discussed with TEDDY Toure.    Per provider, Dr. Bolivar, patient is cleared and is able to return to their previous residence, [facility].  has spoken to Miladys Case at [facility]  [phone],  confirmed that patients mode of transportation is [employer] and that patient travels INDEPENDENTLY. Clinical provider is in agreement with AMBULANCE back to jail. Verbal huddle regarding coordination of care completed with interdisciplinary team. Transportation coordinated via STANDARD TRANSPORTATION DOCUMENTATION. [address] [place] [address]  [phone] Worker called Miladys Case,  [phone] for collateral information. All information is as per Ms. Case:    Ms. Case states that the patient has been compliant with medications; given by staff. She states that the patient was seen at [facility] yesterday in the medical ER and then discharged to the street, without them being informed. She states that the patient was discharged in the morning and then walked home around 2:30pm. She states that they did a missing report and collaborating with [facility] at that time. She states that the patient was sent to [facility] today because the patient was having a crisis. She states that they sent the patient to the emergency room because she was threatening staff today and having a behavioral outburst. She states that the patient is connected to [facility] and they are trying to get her admitted to [facility]. No SI noted today but patient mentioned in the past she wanted to drink cleaning supplies. Patient is not using drugs or alcohol. Case discussed with TEDDY Toure.    Per provider, TEDDY Toure, patient is cleared and is able to return to their previous residence, [facility].  has spoken to Miladys Case at [facility]  [phone],  confirmed that patients mode of transportation is AMBULance and that patient travels INDEPENDENTLY. Clinical provider is in agreement with AMBULANCE back to [facility]. Verbal huddle regarding coordination of care completed with interdisciplinary team. Transportation coordinated via STANDARD TRANSPORTATION DOCUMENTATION. [address] [place] [address]  [phone]

## 2021-10-13 NOTE — ED PROVIDER NOTE - NSFOLLOWUPINSTRUCTIONS_ED_ALL_ED_FT
Follow up with your primary care physician and psychiatrist in 48-72 hours.  You may also see the psychiatrist at [facility]:    [address] [place] [address]  Phone: [phone]      SEEK IMMEDIATE MEDICAL CARE IF YOU HAVE ANY OF THE FOLLOWING SYMPTOMS: thoughts about hurting or killing yourself, thoughts about hurting or killing somebody else, hallucinations or any other worsening or persistent symptoms OR ANY NEW OR CONCERNING SYMPTOMS.

## 2021-10-13 NOTE — ED PROVIDER NOTE - PATIENT PORTAL LINK FT
You can access the FollowMyHealth Patient Portal offered by [facility] by registering at the following website: [email or web address]. By joining [employer]’s FollowMyHealth portal, you will also be able to view your health information using other applications (apps) compatible with our system.

## 2021-10-13 NOTE — ED PROVIDER NOTE - PHYSICAL EXAMINATION
GEN:   comfortable, in no apparent distress, AOx3  EYES:   PERRL, extra-occular movements intact  RESP:   clear to auscultation bilaterally, non-labored, speaking in full sentences  ABD:   soft, non tender, no guarding  :   no cva tenderness  MSK:   no musculoskeletal tenderness, 5/5 strength, moving all extremities  SKIN:   dry, intact, no rash  NEURO:   AOx3, no focal weakness or loss of sensation, gait normal, GCS 15  PSYCH: irritable but cooperative, no evidence of hallucinations, paranoia, intoxication or withdrawal from any substances.

## 2021-10-13 NOTE — ED PROVIDER NOTE - CLINICAL SUMMARY MEDICAL DECISION MAKING FREE TEXT BOX
Patient well known to this ED presents to the ED for aggressive behavior.  Medical evaluation performed. There is no clinical evidence of intoxication or any acute medical problem requiring immediate intervention. Patient behavior expected from her baseline intellectual function and chronic psychiatric conditions. No acute psychiatric emergency, however pt is irritable so will provide meds.  Patient denies SI and HI and not hallucinating. Will engage SW to obtain collateral from facility and if no concerns will discharge back to facility.

## 2021-10-13 NOTE — ED PROVIDER NOTE - COVID-19 RESULT DATE/TIME
Refused;     Disp Refills Start End    Cholecalciferol (Vitamin D3) 50 mcg (2,000 units) capsule 30 capsule 11 8/17/2020     Sig - Route: Take 1 capsule by mouth daily. - Oral    Sent to pharmacy as: Vitamin D3 50 MCG (2000 UT) Oral Capsule    Class: Eprescribe    Notes to [facility]: 50 mcg = 2,000 units    E-Prescribing Status: Receipt confirmed by pharmacy (8/17/2020  3:13 PM CDT)      Not due for refills.    
26-Sep-2021 22:28

## 2021-10-13 NOTE — ED ADULT NURSE REASSESSMENT NOTE - NS ED NURSE REASSESS COMMENT FT1
Pt cleared for discharge back to [facility] home via ambulance.  Pt was medicated with Thorazine 50mg PO and Ativan 2mg PO upon arrival and has eaten, hydrated, and slept since that time.  Pt had no further behavioral disturbances but was laughing to herself a couple of times.  Pt provided with discharge instructions for follow-up as needed.
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
## 2021-10-13 NOTE — ED ADULT TRIAGE NOTE - NS ED NURSE AMBULANCES
Patient Education    BRIGHT [employer] HANDOUT- PARENT  10 YEAR VISIT  Here are some suggestions from [employer] experts that may be of value to your family.     HOW YOUR FAMILY IS DOING  Encourage your child to be independent and responsible. Hug and praise him.  Spend time with your child. Get to know his friends and their families.  Take pride in your child for good behavior and doing well in school.  Help your child deal with conflict.  If you are worried about your living or food situation, talk with us. Community agencies and programs such as [employer] can also provide information and assistance.  Don t smoke or use e-cigarettes. Keep your home and car smoke-free. Tobacco-free spaces keep children healthy.  Don t use alcohol or drugs. If you re worried about a family member s use, let us know, or reach out to local or online resources that can help.  Put the family computer in a central place.  Watch your child s computer use.  Know who he talks with online.  Install a safety filter.    STAYING HEALTHY  Take your child to the dentist twice a year.  Give your child a fluoride supplement if the dentist recommends it.  Remind your child to brush his teeth twice a day  After breakfast  Before bed  Use a pea-sized amount of toothpaste with fluoride.  Remind your child to floss his teeth once a day.  Encourage your child to always wear a mouth guard to protect his teeth while playing sports.  Encourage healthy eating by  Eating together often as a family  Serving vegetables, fruits, whole grains, lean protein, and low-fat or fat-free dairy  Limiting sugars, salt, and low-nutrient foods  Limit screen time to 2 hours (not counting schoolwork).  Don t put a TV or computer in your child s bedroom.  Consider making a family media use plan. It helps you make rules for media use and balance screen time with other activities, including exercise.  Encourage your child to play actively for at least 1 hour daily.    YOUR GROWING  CHILD  Be a model for your child by saying you are sorry when you make a mistake.  Show your child how to use her words when she is angry.  Teach your child to help others.  Give your child chores to do and expect them to be done.  Give your child her own personal space.  Get to know your child s friends and their families.  Understand that your child s friends are very important.  Answer questions about puberty. Ask us for help if you don t feel comfortable answering questions.  Teach your child the importance of delaying sexual behavior. Encourage your child to ask questions.  Teach your child how to be safe with other adults.  No adult should ask a child to keep secrets from parents.  No adult should ask to see a child s private parts.  No adult should ask a child for help with the adult s own private parts.    SCHOOL  Show interest in your child s school activities.  If you have any concerns, ask your child s teacher for help.  Praise your child for doing things well at school.  Set a routine and make a quiet place for doing homework.  Talk with your child and her teacher about bullying.    SAFETY  The back seat is the safest place to ride in a car until your child is 13 years old.  Your child should use a belt-positioning booster seat until the vehicle s lap and shoulder belts fit.  Provide a properly fitting helmet and safety gear for riding scooters, biking, skating, in-line skating, skiing, snowboarding, and horseback riding.  Teach your child to swim and watch him in the water.  Use a hat, sun protection clothing, and sunscreen with SPF of 15 or higher on his exposed skin. Limit time outside when the sun is strongest (11:00 am-3:00 pm).  If it is necessary to keep a gun in your home, store it unloaded and locked with the ammunition locked separately from the gun.        Helpful Resources:  Family Media Use Plan: www.healthychildren.org/MediaUsePlan  Smoking Quit Line: 927.379.7567 Information About Car  Safety Seats: www.safercar.gov/parents  Toll-free Auto Safety Hotline: 867.592.6738  Consistent with Bright Futures: Guidelines for Health Supervision of Infants, Children, and Adolescents, 4th Edition  For more information, go to https://brightfutures.aap.org.            [employer]

## 2021-10-21 NOTE — ED BEHAVIORAL HEALTH ASSESSMENT NOTE - NS ED BHA MSE GENERAL APPEARANCE
I called pt back, Terazosin sent to preferred pharmacy for pt to start taking 7mg daily.   Next cataract surgery will be on 10/28, states she had reactions to drugs on first surgery so they will be only numbing the eye. (those meds were added to allergy list).     Call pt on 10/26 to get BP readings.    No deformities present

## 2021-11-30 ENCOUNTER — EMERGENCY (EMERGENCY)
Facility: HOSPITAL | Age: 31
LOS: 1 days | Discharge: HOME | End: 2021-11-30
Admitting: STUDENT IN AN ORGANIZED HEALTH CARE EDUCATION/TRAINING PROGRAM
Payer: MEDICAID

## 2021-11-30 VITALS
HEART RATE: 112 BPM | TEMPERATURE: 98 F | RESPIRATION RATE: 17 BRPM | HEIGHT: 61 IN | OXYGEN SATURATION: 100 % | SYSTOLIC BLOOD PRESSURE: 135 MMHG | DIASTOLIC BLOOD PRESSURE: 93 MMHG

## 2021-11-30 PROCEDURE — 99284 EMERGENCY DEPT VISIT MOD MDM: CPT
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
## 2021-11-30 NOTE — ED PROVIDER NOTE - PATIENT PORTAL LINK FT
You can access the FollowMyHealth Patient Portal offered by [facility] by registering at the following website: [email or web address]. By joining [employer]’s FollowMyHealth portal, you will also be able to view your health information using other applications (apps) compatible with our system.
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
## 2021-12-01 ENCOUNTER — EMERGENCY (EMERGENCY)
Facility: HOSPITAL | Age: 31
LOS: 1 days | Discharge: HOME | End: 2021-12-01
Attending: EMERGENCY MEDICINE | Admitting: EMERGENCY MEDICINE
Payer: MEDICAID

## 2021-12-01 VITALS
HEIGHT: 61 IN | OXYGEN SATURATION: 98 % | TEMPERATURE: 98 F | HEART RATE: 99 BPM | RESPIRATION RATE: 20 BRPM | DIASTOLIC BLOOD PRESSURE: 84 MMHG | SYSTOLIC BLOOD PRESSURE: 122 MMHG

## 2021-12-01 LAB
AMPHET UR-MCNC: NEGATIVE — SIGNIFICANT CHANGE UP
APPEARANCE UR: CLEAR — SIGNIFICANT CHANGE UP
BARBITURATES UR SCN-MCNC: NEGATIVE — SIGNIFICANT CHANGE UP
BENZODIAZ UR-MCNC: NEGATIVE — SIGNIFICANT CHANGE UP
BILIRUB UR-MCNC: NEGATIVE — SIGNIFICANT CHANGE UP
COCAINE METAB.OTHER UR-MCNC: NEGATIVE — SIGNIFICANT CHANGE UP
COLOR SPEC: SIGNIFICANT CHANGE UP
CREATININE URINE RESULT, DAU: 70 MG/DL — SIGNIFICANT CHANGE UP
DIFF PNL FLD: NEGATIVE — SIGNIFICANT CHANGE UP
GLUCOSE UR QL: NEGATIVE — SIGNIFICANT CHANGE UP
HCG UR QL: NEGATIVE — SIGNIFICANT CHANGE UP
KETONES UR-MCNC: NEGATIVE — SIGNIFICANT CHANGE UP
LEUKOCYTE ESTERASE UR-ACNC: NEGATIVE — SIGNIFICANT CHANGE UP
METHADONE UR-MCNC: NEGATIVE — SIGNIFICANT CHANGE UP
NITRITE UR-MCNC: NEGATIVE — SIGNIFICANT CHANGE UP
OPIATES UR-MCNC: POSITIVE
OXYCODONE UR-MCNC: NEGATIVE — SIGNIFICANT CHANGE UP
PCP SPEC-MCNC: SIGNIFICANT CHANGE UP
PCP UR-MCNC: NEGATIVE — SIGNIFICANT CHANGE UP
PH UR: 6.5 — SIGNIFICANT CHANGE UP (ref 5–8)
PROT UR-MCNC: NEGATIVE — SIGNIFICANT CHANGE UP
SP GR SPEC: 1.02 — SIGNIFICANT CHANGE UP (ref 1–1.05)
THC UR QL: NEGATIVE — SIGNIFICANT CHANGE UP
UROBILINOGEN FLD QL: SIGNIFICANT CHANGE UP

## 2021-12-01 PROCEDURE — 99284 EMERGENCY DEPT VISIT MOD MDM: CPT

## 2021-12-01 RX ORDER — CHLORPROMAZINE HCL 10 MG
50 TABLET ORAL ONCE
Refills: 0 | Status: COMPLETED | OUTPATIENT
Start: 2021-12-01 | End: 2021-12-01

## 2021-12-01 NOTE — ED PROVIDER NOTE - NSICDXPASTMEDICALHX_GEN_ALL_CORE_FT
PAST MEDICAL HISTORY:  Bipolar disorder     Development delay     DM (diabetes mellitus)     Intellectual disability     Schizoaffective disorder
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
## 2021-12-01 NOTE — ED ADULT TRIAGE NOTE - CHIEF COMPLAINT QUOTE
alert from group home states she doesn't fel good is pregnant with 500 babies hx bipolar schizophrenia depression

## 2021-12-01 NOTE — ED PROVIDER NOTE - CLINICAL SUMMARY MEDICAL DECISION MAKING FREE TEXT BOX
This is sa 30 yr old f, pmh MR, developmental delay, schizoaffective bipolar with c/o anxiety. As per ems pt activated 911at the group home, because she is "pregnant with 500 babies and her whole body hears". Calm cooperative but does not want to give info, " leave me alone" Pt well know to provider and staff members.  UA/tox/pregnancy ordered  Collateral info This is sa 30 yr old f, pmh MR, developmental delay, schizoaffective bipolar with c/o anxiety. As per ems pt activated 911at the group home, because she is "pregnant with 500 babies and her whole body hears". Calm cooperative but does not want to give info, " leave me alone" Pt well know to provider and staff members.  UA/tox/pregnancy ordered  Collateral info obtained by provider from  [facility] ( [phone]) who reports she did not get notified pt is in the hospital. No safety concerns pt at her baseline. Frequent acting out behaviour, and delusions. Pt was upset on Thanksgiving because her family did not visit her or took her for the holiday. PT can return to group home via ems.   There is no clinical evidence of intoxication, or any acute medical problem requiring immediate intervention.

## 2021-12-01 NOTE — ED ADULT NURSE NOTE - OBJECTIVE STATEMENT
30 yr old female pt from group home presents to ED for psychiatric evaluation. Pt states she is pregnant with "500 babies" and is not feeling good today. pt with history of Bipolar disorder, schizoaffective disorder and developmental delays, arrives alert, in nad, calm and cooperative, denies any active S/I, H/I, A/V/H, denies use of etoh/illicit substances.

## 2021-12-01 NOTE — ED ADULT NURSE REASSESSMENT NOTE - NS ED NURSE REASSESS COMMENT FT1
Pt remains awake , sitting in HW chair, calm at baseline mental status. NAD. Denies s/i h/i i/p. DC via ems at this time.
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
## 2021-12-01 NOTE — ED PROVIDER NOTE - NSFOLLOWUPINSTRUCTIONS_ED_ALL_ED_FT
YOU WERE GIVEN THORAZINE 50 MG AND ATIVAN 2 MG ORALLY DURING YOUR HOSPITAL STAY.   YOU MAY TAKE YOUR NIGHT TIME MEDICATION AS IT PRESCRIBED TO YOU.  PLEASE MAKE SURE YOU WILL FOLLOW UP WITH YOUR OUT PATIENT PSYCHIATRIC TEAM AS SOON AS POSSIBLE AND DISCUSS YOUR FREQUENT PSYCHIATRIC EMERGENCY VISITATIONS.  URINE AND TOXICOLOGY WITHIN NORMAL LIMITS.

## 2021-12-01 NOTE — ED PROVIDER NOTE - PATIENT PORTAL LINK FT
You can access the FollowMyHealth Patient Portal offered by [facility] by registering at the following website: [email or web address]. By joining [employer]’s FollowMyHealth portal, you will also be able to view your health information using other applications (apps) compatible with our system.

## 2021-12-02 VITALS
HEART RATE: 91 BPM | RESPIRATION RATE: 16 BRPM | SYSTOLIC BLOOD PRESSURE: 119 MMHG | DIASTOLIC BLOOD PRESSURE: 76 MMHG | OXYGEN SATURATION: 99 % | TEMPERATURE: 98 F

## 2021-12-02 RX ADMIN — Medication 50 MILLIGRAM(S): at 04:42

## 2021-12-02 RX ADMIN — Medication 2 MILLIGRAM(S): at 04:50

## 2021-12-03 ENCOUNTER — EMERGENCY (EMERGENCY)
Facility: HOSPITAL | Age: 31
LOS: 0 days | Discharge: HOME | End: 2021-12-04
Attending: EMERGENCY MEDICINE
Payer: MEDICAID

## 2021-12-03 VITALS
OXYGEN SATURATION: 99 % | RESPIRATION RATE: 16 BRPM | WEIGHT: 250 LBS | SYSTOLIC BLOOD PRESSURE: 113 MMHG | TEMPERATURE: 98 F | DIASTOLIC BLOOD PRESSURE: 58 MMHG | HEIGHT: 61 IN | HEART RATE: 107 BPM

## 2021-12-03 DIAGNOSIS — R10.9 UNSPECIFIED ABDOMINAL PAIN: ICD-10-CM

## 2021-12-03 DIAGNOSIS — R00.0 TACHYCARDIA, UNSPECIFIED: ICD-10-CM

## 2021-12-03 DIAGNOSIS — M79.18 MYALGIA, OTHER SITE: ICD-10-CM

## 2021-12-03 DIAGNOSIS — R05.9 COUGH, UNSPECIFIED: ICD-10-CM

## 2021-12-03 DIAGNOSIS — R07.9 CHEST PAIN, UNSPECIFIED: ICD-10-CM

## 2021-12-03 DIAGNOSIS — Z20.822 CONTACT WITH AND (SUSPECTED) EXPOSURE TO COVID-19: ICD-10-CM

## 2021-12-03 DIAGNOSIS — F25.9 SCHIZOAFFECTIVE DISORDER, UNSPECIFIED: ICD-10-CM

## 2021-12-03 DIAGNOSIS — F31.9 BIPOLAR DISORDER, UNSPECIFIED: ICD-10-CM

## 2021-12-03 DIAGNOSIS — Z91.018 ALLERGY TO OTHER FOODS: ICD-10-CM

## 2021-12-03 DIAGNOSIS — E11.9 TYPE 2 DIABETES MELLITUS WITHOUT COMPLICATIONS: ICD-10-CM

## 2021-12-03 LAB
APPEARANCE UR: CLEAR — SIGNIFICANT CHANGE UP
BILIRUB UR-MCNC: NEGATIVE — SIGNIFICANT CHANGE UP
COLOR SPEC: YELLOW — SIGNIFICANT CHANGE UP
DIFF PNL FLD: NEGATIVE — SIGNIFICANT CHANGE UP
FLUAV AG NPH QL: SIGNIFICANT CHANGE UP
FLUBV AG NPH QL: SIGNIFICANT CHANGE UP
GLUCOSE BLDC GLUCOMTR-MCNC: 101 MG/DL — HIGH (ref 70–99)
GLUCOSE UR QL: NEGATIVE MG/DL — SIGNIFICANT CHANGE UP
HCG UR QL: NEGATIVE — SIGNIFICANT CHANGE UP
KETONES UR-MCNC: ABNORMAL
LEUKOCYTE ESTERASE UR-ACNC: NEGATIVE — SIGNIFICANT CHANGE UP
NITRITE UR-MCNC: NEGATIVE — SIGNIFICANT CHANGE UP
PH UR: 5 — SIGNIFICANT CHANGE UP (ref 5–8)
PROT UR-MCNC: NEGATIVE MG/DL — SIGNIFICANT CHANGE UP
SARS-COV-2 RNA SPEC QL NAA+PROBE: SIGNIFICANT CHANGE UP
SP GR SPEC: 1.02 — SIGNIFICANT CHANGE UP (ref 1.01–1.02)
UROBILINOGEN FLD QL: NEGATIVE MG/DL — SIGNIFICANT CHANGE UP

## 2021-12-03 PROCEDURE — 99285 EMERGENCY DEPT VISIT HI MDM: CPT

## 2021-12-03 PROCEDURE — 74019 RADEX ABDOMEN 2 VIEWS: CPT | Mod: 26

## 2021-12-03 PROCEDURE — 93010 ELECTROCARDIOGRAM REPORT: CPT

## 2021-12-03 PROCEDURE — 71045 X-RAY EXAM CHEST 1 VIEW: CPT | Mod: 26

## 2021-12-03 RX ORDER — SENNA PLUS 8.6 MG/1
1 TABLET ORAL ONCE
Refills: 0 | Status: COMPLETED | OUTPATIENT
Start: 2021-12-03 | End: 2021-12-03

## 2021-12-03 RX ADMIN — Medication 30 MILLILITER(S): at 21:06

## 2021-12-03 NOTE — ED PROVIDER NOTE - NSFOLLOWUPINSTRUCTIONS_ED_ALL_ED_FT
1) Drink fluids to stay hydrated.    2) Eat a well balanced diet  3) Follow up with your primary care doctor  4) Return to the ER for worsening or concerning symptoms

## 2021-12-03 NOTE — ED PROVIDER NOTE - OBJECTIVE STATEMENT
This patient is a 31 year old woman with hx of developmental and intellectual delay with frequent ER visits for various complaints who presents to the ER today c/o abdominal pain after eating.  Patient states, "I overate."  No vomiting or fever.  LMP unknown.  She denies vaginal bleeding.  Patient did not take any medication for pain.  She reports diffuse 10/10 pain in her abdomen that is radiating to her chest and throughout her entire body.

## 2021-12-03 NOTE — ED PROVIDER NOTE - CLINICAL SUMMARY MEDICAL DECISION MAKING FREE TEXT BOX
Patient with various c/o including abdominal pain benign exam.  Will check UA/Upreg, give maalox check Xray r/o obstruction or constipation.

## 2021-12-03 NOTE — ED PROVIDER NOTE - PATIENT PORTAL LINK FT
You can access the FollowMyHealth Patient Portal offered by [facility] by registering at the following website: [email or web address]. By joining [employer]’s FollowMyHealth portal, you will also be able to view your health information using other applications (apps) compatible with our system.

## 2021-12-04 VITALS
DIASTOLIC BLOOD PRESSURE: 89 MMHG | SYSTOLIC BLOOD PRESSURE: 129 MMHG | OXYGEN SATURATION: 99 % | TEMPERATURE: 98 F | RESPIRATION RATE: 18 BRPM | HEART RATE: 100 BPM

## 2021-12-04 RX ADMIN — SENNA PLUS 1 TABLET(S): 8.6 TABLET ORAL at 00:30

## 2021-12-05 ENCOUNTER — EMERGENCY (EMERGENCY)
Facility: HOSPITAL | Age: 31
LOS: 1 days | Discharge: HOME | End: 2021-12-05
Attending: EMERGENCY MEDICINE
Payer: MEDICAID

## 2021-12-05 VITALS
DIASTOLIC BLOOD PRESSURE: 70 MMHG | RESPIRATION RATE: 16 BRPM | TEMPERATURE: 98 F | HEIGHT: 61 IN | OXYGEN SATURATION: 99 % | HEART RATE: 100 BPM | WEIGHT: 250 LBS | SYSTOLIC BLOOD PRESSURE: 112 MMHG

## 2021-12-05 DIAGNOSIS — R10.9 UNSPECIFIED ABDOMINAL PAIN: ICD-10-CM

## 2021-12-05 DIAGNOSIS — Z91.018 ALLERGY TO OTHER FOODS: ICD-10-CM

## 2021-12-05 LAB — GLUCOSE BLDC GLUCOMTR-MCNC: 110 MG/DL — HIGH (ref 70–99)

## 2021-12-05 PROCEDURE — 99284 EMERGENCY DEPT VISIT MOD MDM: CPT

## 2021-12-05 NOTE — ED PROVIDER NOTE - NSFOLLOWUPINSTRUCTIONS_ED_ALL_ED_FT
1) Follow up with your primary care doctor   2) Return to the ER for worsening or concerning symptoms

## 2021-12-05 NOTE — ED ADULT NURSE NOTE - CHIEF COMPLAINT QUOTE
as per EMS pt having generalized abdominal pain after eating chinese food, history schizophrenia, seen at [facility] earlier today for same complaint. Denies Nausea, Vomiting.

## 2021-12-05 NOTE — ED PROVIDER NOTE - NSICDXPASTMEDICALHX_GEN_ALL_CORE_FT
PAST MEDICAL HISTORY:  Bipolar disorder     Development delay     DM (diabetes mellitus)     Intellectual disability     Schizoaffective disorder      PAST MEDICAL HISTORY:  Bipolar disorder     Development delay     DM (diabetes mellitus)     Intellectual disability     Schizoaffective disorder

## 2021-12-05 NOTE — ED PROVIDER NOTE - CLINICAL SUMMARY MEDICAL DECISION MAKING FREE TEXT BOX
Patient with abdominal pain s/p eating chinese food. Similar to previous ED visits. Benign abdominal exam. Will check finger stick as Patient is diabetic, PO challenge and DC. Patient is requesting food in the ED.

## 2021-12-05 NOTE — ED PROVIDER NOTE - OBJECTIVE STATEMENT
Physical Therapy Discharge Summary    Referred by: Edi Pepe MD  Medical Diagnosis (from order):   Chronic bilateral low back pain without sciatica [M54.5, G89.29]    OBJECTIVE   6 Minute Walk Test: (6MWT)    Assistive Device used: None  Total distance walked in feet: 925ft  Number of rest stops: None    Final BP: 144/71  Final HR: 79    Norms:  All age/gender: 4443-1487 feet  60-69 years: male 7233-7916 feet, female 9487-0303 feet  70-79 years: male 9246-7155 feet, female 8043-1210 feet   80-89 years: male 890-1845 feet, female 730-1840 feet  MDIC = 50meters (~164ft)    Outcome Measure: (Outcome Scoring) Oswestry Disability Index  Initial Outcome Score:had only completed front of form  Discharge Outcome Score: not completed    ASSESSMENT   Patient reassessed today for discharge. To date the patient has made gains as expected as reported.  Patient did not meet all goals but improved strength, balance, activity tolerance with reduced pain positively impacting safety and quality of life.  Patient compliant with HEP and encouraged increase aerobic component such as walking.  Recommended getting  involved with a dementia support group that will also benefit her as caregiver.       Discharge from skilled therapy with instructions/recommendations: HEP, stay active    PLAN    Discharge from therapy with HEP    Goals: To be obtained by end of this plan of care:  1. Patient will be independent with progressed and modified home exercise program.  MET   2. Decrease pain/symptoms to 0/10 NOT MET 0/10 with rest and worst pain 1-2/10  through improvements listed above patient will:  3. Demonstrate ability to negotiate level and unlevel surfaces at variable velocities, including change of direction without increased pain or instability to return to age appropriate and community activities at prior level of function.  MET 12/5/18  4. Patient to complete 1200ft with 6MWT (IE 825ft) to be able to complete community  activities safely such as crossing street, parking lot and grocery shopping.  NOT MET 925ft (.78m/s) improvement      G-Code:  G-Code Score ABN form  D/C: report discharge and goal status with C modifier   : Goal Mobility Limitation,  CI - 1% to 19% impaired, limited or restricted  : D/C Mobility Limitation,  CI - 1% to 19% impaired, limited or restricted  Modifier based on outcome measure(s)/functional testing/clinical judgement as listed above    Discharge Measures:   Total Number of Visits: 10  Treatment Category: Lumbar, Non-Surgical Without Radiculopathy  Outcome Measure: above  Primary Clinician: Karma Gomez      Physical Therapy Daily Treatment    Visit Count: 10 Plan of Care: 10/24/2018 Through: 12/19/2018  Insurance Information:  Therapy Benefits     Payor: [facility] - Medicare Complete  Authorization Needed: No  Maximum Visit Limit Per Year: Based on medical necessity at point of claim  CoPay: $40.00*  Notes: Follow Medicare guidelines  Call Ref #: Online  Hyper link to: insurance WIKI      Precautions: falls, HTN, arthritis, osteoporosis, scacroplasty    SUBJECTIVE   Patient states she has been a little stiff when getting up in the morning.  She has been doing a lot of present wrapping so increased up and down off floor.    Overall, I feel better since starting therapy.     Compliant with HEP.      Current Pain (0-10 scale): 0  in lower back - there but livable / 1-2 in morning  Functional Change:  See above    OBJECTIVE     Treatment   Therapeutic Exercise:   Reviewed and completed HEP:  Cued to decrease speed  Supine Hip External Rotation Stretch or in Sitting 2x30s  Seated Hamstring Stretch  2x30s  Supine Butterfly Groin Stretch x30s  Butterfly Groin Stretch  x60s  Standing Hip Extension with Counter Support x10  Standing Hip Abduction with Counter Support x10 - cued to keep toes forward  Heel Raises with Counter Support x10  Mini Squat with Counter Support x5  Sit to stand x5 no UE  Standing  March with Counter Support hold few sec x15  Core sets x10  Tandem 2x30s     Skilled input:  reviewed exercise techniques., stretching vs strengthening,importance of continuing HEP after discharge, return to PT if symptoms return/worsen    Home Program:   Access Code: [email or web address]   URL: [email or web address]   Date: 12/07/2018   Prepared by: Karma Gomez     Exercises   Supine Hip External Rotation Stretch - 2 reps - 1 sets - 30 hold - 2x daily - 7x weekly or in sitting  Seated Hamstring Stretch - 2-3 reps - 1 sets - 20-30s hold - 2x daily - 7x weekly   Supine Butterfly Groin Stretch - 2-3 reps - 1 sets - 20-30s hold - 2x daily - 7x weekly   Butterfly Groin Stretch - 2-3 reps - 1 sets - 20-30s hold - 2x daily - 7x weekly   Standing Hip Extension with Unilateral Counter Support - 10 reps - 2 sets - 2x daily - 7x weekly   Standing Hip Abduction with Counter Support - 10 reps - 2 sets - 2x daily - 7x weekly   Heel Raises with Counter Support - 10 reps - 2 sets - 2x daily - 7x weekly   Mini Squat with Counter Support - 10 reps - 2 sets - 2x daily - 7x weekly or sit to stand no UE  Standing March with Counter Support - 10 reps - 2 sets - 2-3s hold - 2x daily - 7x weekly   Core sets  Wall snow kaiser  Tandem       Writer verbally educated the patient and received verbal consent from the patient on hand placement, positioning of patient, and techniques to be performed today including positioning of pants and shirt for manual and how they are pertinent to the patient's plan of care.          ASSESSMENT   See above    Pain after treatment (patient reported, 0-10 scale): 0  Result of above outlined education: Verbalizes understanding, Demonstrates understanding and Needs reinforcement           THERAPY DAILY BILLING   Insurance: [facility] MEDICARE COMPLETE 2. N/A    Evaluation Procedures:  No evaluation codes were used on this date of service    Timed Procedures:  Gait Training, 8 minutes  Therapeutic Exercise, 32  minutes    Untimed Procedures:  No untimed codes were used on this date of service    Total Treatment Time: 40 minutes   30 y/o F with a PMHx of development delay and intellectual delay with very frequent ER visits presents to the ED c/o abdominal pain s/p eating chinese food. Patient was most recently seen yesterday in this ER yesterday and another hospital earlier today. Patient states while doing [place] shopping she ate chinese food and the abdominal pain started. Patient notes she always has abdominal pain after eating chinese food but states she likes to eat chinese food. Denies nausea, emesis, fever, chills or any other related symptoms.

## 2021-12-05 NOTE — ED PROVIDER NOTE - PATIENT PORTAL LINK FT
You can access the FollowMyHealth Patient Portal offered by [facility] by registering at the following website: [email or web address]. By joining [employer]’s FollowMyHealth portal, you will also be able to view your health information using other applications (apps) compatible with our system.

## 2021-12-05 NOTE — ED ADULT NURSE REASSESSMENT NOTE - NS ED NURSE REASSESS COMMENT FT1
called General human Outreach to inform patient is being discharge. staff told Primary nurse that if transportation is set up somebody ids there to receive the pt.

## 2021-12-05 NOTE — ED ADULT NURSE NOTE - OBJECTIVE STATEMENT
received er bed 20 c/o abdominal discomfort after eating chinese food this evening denies n/v/d abdomen soft no distention noted denies fever/chills

## 2021-12-05 NOTE — ED ADULT TRIAGE NOTE - CHIEF COMPLAINT QUOTE
as per EMS pt having generalized abdominal pain after eating chinese food, history schizophrenia, seen at [facility] earlier today for same complaint. Denies Nausea, Vomiting.

## 2021-12-07 ENCOUNTER — EMERGENCY (EMERGENCY)
Facility: HOSPITAL | Age: 31
LOS: 1 days | Discharge: HOME | End: 2021-12-07
Attending: EMERGENCY MEDICINE | Admitting: EMERGENCY MEDICINE
Payer: MEDICAID

## 2021-12-07 VITALS
OXYGEN SATURATION: 100 % | DIASTOLIC BLOOD PRESSURE: 79 MMHG | RESPIRATION RATE: 18 BRPM | HEART RATE: 77 BPM | SYSTOLIC BLOOD PRESSURE: 121 MMHG | TEMPERATURE: 98 F

## 2021-12-07 VITALS
TEMPERATURE: 99 F | OXYGEN SATURATION: 100 % | SYSTOLIC BLOOD PRESSURE: 134 MMHG | DIASTOLIC BLOOD PRESSURE: 85 MMHG | RESPIRATION RATE: 18 BRPM | HEART RATE: 90 BPM

## 2021-12-07 DIAGNOSIS — F25.8 OTHER SCHIZOAFFECTIVE DISORDERS: ICD-10-CM

## 2021-12-07 DIAGNOSIS — F79 UNSPECIFIED INTELLECTUAL DISABILITIES: ICD-10-CM

## 2021-12-07 LAB — SARS-COV-2 RNA SPEC QL NAA+PROBE: DETECTED

## 2021-12-07 PROCEDURE — 90792 PSYCH DIAG EVAL W/MED SRVCS: CPT

## 2021-12-07 PROCEDURE — 99284 EMERGENCY DEPT VISIT MOD MDM: CPT

## 2021-12-07 RX ORDER — DEXAMETHASONE 0.5 MG/5ML
10 ELIXIR ORAL ONCE
Refills: 0 | Status: COMPLETED | OUTPATIENT
Start: 2021-12-07 | End: 2021-12-07

## 2021-12-07 RX ORDER — KETOROLAC TROMETHAMINE 30 MG/ML
15 SYRINGE (ML) INJECTION ONCE
Refills: 0 | Status: DISCONTINUED | OUTPATIENT
Start: 2021-12-07 | End: 2021-12-07

## 2021-12-07 RX ORDER — ACETAMINOPHEN 500 MG
650 TABLET ORAL ONCE
Refills: 0 | Status: COMPLETED | OUTPATIENT
Start: 2021-12-07 | End: 2021-12-07

## 2021-12-07 RX ADMIN — Medication 650 MILLIGRAM(S): at 08:30

## 2021-12-07 RX ADMIN — Medication 650 MILLIGRAM(S): at 07:00

## 2021-12-07 RX ADMIN — Medication 10 MILLIGRAM(S): at 08:57

## 2021-12-07 NOTE — ED BEHAVIORAL HEALTH ASSESSMENT NOTE - DESCRIPTION
Disrobing and walking about hallways but otherwise calm, not verbally or physically aggressive.  Vital Signs Last 24 Hrs  T(C): 37 (07 Dec 2021 08:49), Max: 37 (07 Dec 2021 05:56)  T(F): 98.6 (07 Dec 2021 08:49), Max: 98.6 (07 Dec 2021 05:56)  HR: 110 (07 Dec 2021 08:49) (90 - 110)  BP: 116/85 (07 Dec 2021 09:24) (105/49 - 134/85)  BP(mean): --  RR: 16 (07 Dec 2021 08:49) (16 - 18)  SpO2: 100% (07 Dec 2021 08:49) (100% - 100%)wba As per PHI As per HPI Disrobing and walking about hallways but otherwise calm, not verbally or physically aggressive. was redirectable. was on 1:1 in the main ED for elopement risk.  Vital Signs Last 24 Hrs  T(C): 37 (07 Dec 2021 08:49), Max: 37 (07 Dec 2021 05:56)  T(F): 98.6 (07 Dec 2021 08:49), Max: 98.6 (07 Dec 2021 05:56)  HR: 110 (07 Dec 2021 08:49) (90 - 110)  BP: 116/85 (07 Dec 2021 09:24) (105/49 - 134/85)  BP(mean): --  RR: 16 (07 Dec 2021 08:49) (16 - 18)  SpO2: 100% (07 Dec 2021 08:49) (100% - 100%)wba

## 2021-12-07 NOTE — ED BEHAVIORAL HEALTH ASSESSMENT NOTE - SAFETY PLAN ADDT'L DETAILS
Safety plan discussed with.../Education provided regarding environmental safety / lethal means restriction/Provision of National Suicide Prevention Lifeline 5-095-281-CXYQ (4461)

## 2021-12-07 NOTE — ED PROVIDER NOTE - NSFOLLOWUPINSTRUCTIONS_ED_ALL_ED_FT
A viral respiratory infection is an illness that affects parts of the body used for breathing, like the lungs, nose, and throat. It is caused by a germ called a virus. Symptoms can include runny nose, coughing, sneezing, fatigue, body aches, sore throat, fever, or headache. Over the counter medicine can be used to manage the symptoms but the infection typically goes away on its own in 5 to 10 days.     Take honey 1 teaspoon every 6-8 hours for sore throat.    SEEK IMMEDIATE MEDICAL CARE IF YOU HAVE ANY OF THE FOLLOWING SYMPTOMS: shortness of breath, chest pain, fever over 10 days, or lightheadedness/dizziness. May return to work when patient is afebrile for 72 hours with no tylenol or motrin or fever reducing medications.      Recommendations for Patients Advised to Self-Isolate for Possible COVID-19 Exposure  We recommend the below precautionary steps from now until 14 days from when you returned from your travel or date of your last known possible contact:  ? Do not go to work, school, or public areas. Avoid using public transportation, ride-sharing, or taxis.  ? As much as possible, separate yourself from other people in your home. If you can, you should stay in a room and away from other people in your home. Also, you should use a separate bathroom, if available.  ? Wear the supplied mask whenever you are around other people.  ? If you have a non-urgent medical appointment, please reschedule for a later date. If the appointment is urgent, please call the healthcare provider and tell them that you are on selfisolation for possible COVID-19. This will help the healthcare provider’s office take steps to keep other people from getting infected or exposed. If you can reschedule routine appointments, do so.  ? Wash your hands often with soap and water for at least 15 to 20 seconds or clean your hands with an alcohol-based hand  that contains 60 to 95% alcohol, covering all surfaces of your hands and rubbing them together until they feel dry. Soap and water should be used preferentially if hands are visibly dirty.  ? Cover your mouth and nose with a tissue when you cough or sneeze. Throw used tissues in a lined trash can; immediately wash your hands.  ? Avoid touching your eyes, nose, and mouth with your hands.  ? Avoid sharing personal household items. You should not share dishes, drinking glasses, cups, eating utensils, towels, or bedding with other people or pets in your home. After using these items, they should be washed thoroughly with soap and water.  ? Clean and disinfect all “high-touch” surfaces every day. High touch surfaces include counters, tabletops, doorknobs, light switches, remote controls, bathroom fixtures, toilets, phones, keyboards, tablets, and bedside tables. Also, clean any surfaces that may have blood, stool, or body fluids on them
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
## 2021-12-07 NOTE — ED PROVIDER NOTE - NS ED ROS FT
GENERAL: No fever, no chills  EYES: no change in vision  HEENT: +sore throat; no trouble swallowing, no trouble speaking  CARDIAC: no chest pain, no palpitations  PULMONARY: +cough, no SOB  GI: no abdominal pain, no nausea, no vomiting, no diarrhea, no constipation  : no dysuria, no frequency, no change in appearance, no odor of urine  SKIN: no rashes  NEURO: no headache, no weakness  MSK: no joint pain
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
## 2021-12-07 NOTE — ED ADULT NURSE NOTE - CHIEF COMPLAINT QUOTE
Pt is coming from [facility]. Pt is c/o sore throat and difficulty walking since this morning. Pt is no apparent distress. AS per EMS pt was ambulating on scene without any difficulty.  Denies fever. Finger stick by EMS was 126. Pt has history of Schizophrenia, obesity.

## 2021-12-07 NOTE — ED ADULT NURSE REASSESSMENT NOTE - REASSESS COMMUNICATION
Spoke to  regarding transportation for discharge. Unable to give toradol or dexamethasone until urine sample collected.

## 2021-12-07 NOTE — ED BEHAVIORAL HEALTH ASSESSMENT NOTE - ACCOMPANIED BY
Requested medication(s) are due for refill today: Yes  Patient has already received a courtesy refill: No  Other reason request has been forwarded to provider: has not been in for appt since April Self

## 2021-12-07 NOTE — ED BEHAVIORAL HEALTH ASSESSMENT NOTE - CASE SUMMARY
31F with intellectual disability and schizoaffective disorder called 911 on self complaining of throat pain, consult requested for bizarre behavior (disrobing in the medical ED). Patient has a long history of impulsive agitation and aggression, recently spent a month at [facility] Intellectual disability unit. Group home staff report pt regularly calls 911 on self for various complaints and report no change from baseline. Patient is calm during assessment, denies symptoms of an acute mood or psychotic episode, denies suicidal ideation and homicidal ideation, and exhibits good behavioral control. Patient has below average intelligence which results in limited coping skills, poor frustration tolerance, and inability to delay gratification. No evidence of acute psychosis that would require inpatient psychiatric admission. DC to group home with continued outpatient care.

## 2021-12-07 NOTE — ED PROVIDER NOTE - PHYSICAL EXAMINATION
Gen: NAD, non-toxic appearing  Head: normal appearing  HEENT: normal conjunctiva, oral mucosa moist, uvula midline without tonsilar exudates, no drooling or adenopathy  Lung: no respiratory distress, speaking in full sentences, CTA b/l     CV: regular rate and rhythm, no murmurs  Abd: soft, non distended, non tender   MSK: no visible deformities  Neuro: No focal deficits, AAOx3  Skin: Warm  Psych: normal affect

## 2021-12-07 NOTE — ED PROVIDER NOTE - OBJECTIVE STATEMENT
32y/o F w/ h/o schizophrenia (on Abilify) from group home p/w sore throat for 1d a/w productive cough. Pt has not been vaccinated against COVID. Denies fevers, chills, drooling, voice changes, nausea, vomiting, chest pain, sob, abdominal pain, back pain, dysuria, numbness, tingling.

## 2021-12-07 NOTE — ED PROVIDER NOTE - CLINICAL SUMMARY MEDICAL DECISION MAKING FREE TEXT BOX
young F unvaccinated with productive cough and sore throat for 1 d likely viral illness. No evidence of epiglottis RPA or PTA based on physical exam findings and history. plan Toradol and Decadron shot, covid test and dc with return precautions.

## 2021-12-07 NOTE — ED ADULT TRIAGE NOTE - CHIEF COMPLAINT QUOTE
Pt is coming from [facility]. Pt is c/o sore throat and difficulty walking since this morning. Pt is no apparent distress. AS per EMS pt was ambulating on scene without any difficulty.  Denies fever. Finger stick by EMS was 126. Pt has history of Schizophrenia, obesity.
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
## 2021-12-07 NOTE — PROVIDER CONTACT NOTE (OTHER) - ASSESSMENT
Per Dr. Ramos, pt is medically cleared for ambulance back to residence ([address] [place]).  Sw scheduled BLS via [facility] ems back to residence for a 630pm  trip #: [id number].  [facility] spoke with residence and confirmed all information.  Verbal huddle completed with providers.

## 2021-12-07 NOTE — ED PROVIDER NOTE - PATIENT PORTAL LINK FT
You can access the FollowMyHealth Patient Portal offered by [facility] by registering at the following website: [email or web address]. By joining [employer]’s FollowMyHealth portal, you will also be able to view your health information using other applications (apps) compatible with our system.
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
## 2021-12-07 NOTE — ED ADULT NURSE NOTE - OBJECTIVE STATEMENT
alert no distress states she is unable to walk c/o sore throat and abd pain abd soft non tender to touch  has no other complaints 1:1 CO in progress

## 2021-12-07 NOTE — ED PROVIDER NOTE - PROGRESS NOTE DETAILS
Scott Paul MD:  GI fellow paged x3 over last hour. Surgery consulted will se patient Scott Paul MD:  Patient not complaining of sore throat, able to tolerate PO well, LINDER X4 Scott Paul MD:  Informed group home that patient is COVID-19 postive, representative accepted patient for transfer

## 2021-12-07 NOTE — ED ADULT NURSE REASSESSMENT NOTE - NS ED NURSE REASSESS COMMENT FT1
pt. remains A&Ox4, awake and resting. pt. remains calm and cooperative. Pt. provided lunch at this time. pending SW consult at this time.
pt. remains A&Ox4, awake and resting. pt. endorsing abdominal pain, rating it a 9. due pain meds to be given. pt. in no acute distress. VS as noted.

## 2021-12-07 NOTE — ED BEHAVIORAL HEALTH ASSESSMENT NOTE - NS ED BHA AXIS I SECONDARY1 CODE FT
Chief Complaint:  Patient is a 56y old  Male who presents with a chief complaint of abdominal pain (2021 10:39)    Reason for consult: abd pain, DU    Interval Events: Still has pain. Otherwise feeling well. EGD showed large duodenal ulcer, likely source of pain. Biopsied for HP.     Hospital Medications:  acetaminophen   Tablet .. 650 milliGRAM(s) Oral every 6 hours  aluminum hydroxide/magnesium hydroxide/simethicone Suspension 30 milliLiter(s) Oral every 4 hours PRN  oxyCODONE    IR 5 milliGRAM(s) Oral every 6 hours PRN  pantoprazole    Tablet 40 milliGRAM(s) Oral two times a day  potassium phosphate / sodium phosphate Powder (PHOS-NaK) 1 Packet(s) Oral once  sodium chloride 0.9%. 1000 milliLiter(s) IV Continuous <Continuous>  sucralfate 1 Gram(s) Oral four times a day      ROS:   General:  No  fevers, chills, night sweats, fatigue  Eyes:  Good vision, no reported pain  ENT:  No sore throat, pain, runny nose  CV:  No pain, palpitations  Pulm:  No dyspnea, cough  GI:  See HPI, otherwise negative  :  No  incontinence, nocturia  Muscle:  No pain, weakness  Neuro:  No memory problems  Psych:  No insomnia, mood problems, depression  Endocrine:  No polyuria, polydipsia, cold/heat intolerance  Heme:  No petechiae, ecchymosis, easy bruisability  Skin:  No rash    PHYSICAL EXAM:   Vital Signs:  Vital Signs Last 24 Hrs  T(C): 36.6 (2021 05:51), Max: 37.4 (2021 11:47)  T(F): 97.9 (2021 05:51), Max: 99.4 (2021 11:47)  HR: 92 (2021 05:51) (82 - 99)  BP: 130/78 (2021 05:51) (101/60 - 130/78)  BP(mean): --  RR: 18 (2021 05:51) (16 - 25)  SpO2: 93% (2021 05:51) (93% - 96%)  Daily     Daily Weight in k.8 (2021 08:14)    GENERAL: no acute distress  NEURO: alert, no asterixis  HEENT: anicteric sclera, no conjunctival pallor appreciated  CHEST: no respiratory distress, no accessory muscle use  CARDIAC: regular rate, rhythm  ABDOMEN: soft, tender epigastrium, non-distended, no rebound or guarding  EXTREMITIES: warm, well perfused, no edema  SKIN: no lesions noted    LABS: reviewed                        14.6   12.74 )-----------( 208      ( 2021 07:04 )             45.3     01-08    132<L>  |  98  |  15  ----------------------------<  98  4.0   |  24  |  0.96    Ca    9.4      2021 07:04  Phos  1.7     01-08  Mg     2.2     -08    TPro  6.5  /  Alb  3.7  /  TBili  0.9  /  DBili  x   /  AST  11  /  ALT  14  /  AlkPhos  37<L>  -08    LIVER FUNCTIONS - ( 2021 07:04 )  Alb: 3.7 g/dL / Pro: 6.5 g/dL / ALK PHOS: 37 U/L / ALT: 14 U/L / AST: 11 U/L / GGT: x             Interval Diagnostic Studies: see sunrise for full report   F79
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
## 2021-12-07 NOTE — ED ADULT NURSE REASSESSMENT NOTE - CONDITION
Pt. alert. Difficult to assess orientation due to non-compliance. Gave tylenol and sent covid swab. NAD noted. Report given to Main ED. Placed on 1:1 observation for safety.

## 2021-12-07 NOTE — ED BEHAVIORAL HEALTH ASSESSMENT NOTE - OTHER
Pt laying in bed [facility] group home staff encouraged to implement behavior plan and monitor pt's use of phone to prevent unnecessary ED visits

## 2021-12-08 ENCOUNTER — EMERGENCY (EMERGENCY)
Facility: HOSPITAL | Age: 31
LOS: 0 days | Discharge: HOME | End: 2021-12-09
Payer: MEDICAID

## 2021-12-08 VITALS
OXYGEN SATURATION: 99 % | HEIGHT: 61 IN | RESPIRATION RATE: 18 BRPM | WEIGHT: 169.98 LBS | HEART RATE: 90 BPM | DIASTOLIC BLOOD PRESSURE: 83 MMHG | SYSTOLIC BLOOD PRESSURE: 117 MMHG | TEMPERATURE: 98 F
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
## 2021-12-08 DIAGNOSIS — F25.9 SCHIZOAFFECTIVE DISORDER, UNSPECIFIED: ICD-10-CM

## 2021-12-08 DIAGNOSIS — E11.9 TYPE 2 DIABETES MELLITUS WITHOUT COMPLICATIONS: ICD-10-CM

## 2021-12-08 DIAGNOSIS — M79.604 PAIN IN RIGHT LEG: ICD-10-CM
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
## 2021-12-08 DIAGNOSIS — Z91.018 ALLERGY TO OTHER FOODS: ICD-10-CM

## 2021-12-08 PROCEDURE — 99284 EMERGENCY DEPT VISIT MOD MDM: CPT

## 2021-12-08 RX ORDER — ACETAMINOPHEN 500 MG
975 TABLET ORAL ONCE
Refills: 0 | Status: COMPLETED | OUTPATIENT
Start: 2021-12-08 | End: 2021-12-08

## 2021-12-08 RX ORDER — LIDOCAINE 4 G/100G
1 CREAM TOPICAL ONCE
Refills: 0 | Status: COMPLETED | OUTPATIENT
Start: 2021-12-08 | End: 2021-12-08

## 2021-12-08 RX ORDER — IBUPROFEN 200 MG
600 TABLET ORAL ONCE
Refills: 0 | Status: COMPLETED | OUTPATIENT
Start: 2021-12-08 | End: 2021-12-08

## 2021-12-08 RX ADMIN — Medication 975 MILLIGRAM(S): at 20:53

## 2021-12-08 RX ADMIN — LIDOCAINE 1 PATCH: 4 CREAM TOPICAL at 21:46

## 2021-12-08 RX ADMIN — Medication 600 MILLIGRAM(S): at 20:53

## 2021-12-08 NOTE — ED PROVIDER NOTE - NS ED ROS FT
Constitutional: (-) Fever, (-) Anorexia, (-) Generalized Malaise  Eyes: (-)Discharge, (-) Irritation,  (-) Visual changes  EARS: (-) Ear Pain, (-) Apparent hearing changes  NOSE: (-) Congestion, (-) Bloody nose  MOUTH/THROAT: (-) Vocal Changes, (-) Drooling, (-) Sore throat  NECK: (-) Lumps, (-) Stiffness, (-) Pain  CV: (-) Chest Pain, (-) Palpitations, (-) Edema   RESP:  (-) Cough, (-) SOB, (-) MESA,  (-) Wheezing  GI: (-) Nausea, (-) Vomiting, (-) Abdominal Pain, (-) Diarrhea, (-) Constipation, (-) Bloody stools  : (-) Dysuria, (-) Frequency, (-) Hematuria, (-) Incontinence  MSK: (+) Joint Pain, (-) Back Pain, (-) Deformities  SKIN: (-) Wounds, (-) Color change, (-)Rash, (-) Swelling  NEURO:(-) Headache, (-) Dizziness, (-) Numbness/Tingling,  (-)LOC
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
## 2021-12-08 NOTE — ED PROVIDER NOTE - PATIENT PORTAL LINK FT
You can access the FollowMyHealth Patient Portal offered by [facility] by registering at the following website: [email or web address]. By joining [employer]’s FollowMyHealth portal, you will also be able to view your health information using other applications (apps) compatible with our system.
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
## 2021-12-08 NOTE — ED PROVIDER NOTE - PHYSICAL EXAMINATION
PE:   GEN: Awake, alert, interactive, NAD, non-toxic appearing.   HEAD AND NECK: NC/AT. Airway patent. Neck supple.   EYES: Clear b/l. PERRL  CARDIAC: RRR. S1, S2. No evident pedal edema.    RESP: Normal respiratory effort with no use of accessory muscles or retractions. Clear throughout on auscultation.  ABD: soft, non-distended, non-tender. No rebound, no guarding.   NEURO: AOx3, CN II-XII grossly intact, no focal deficits.   MSK: Moving all extremities with no apparent deformities. No echymosis or swelling. FROM, ambulatory. No pinpoint tenderness.   SKIN: Warm, dry, intact normal color
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
## 2021-12-09 VITALS
RESPIRATION RATE: 18 BRPM | SYSTOLIC BLOOD PRESSURE: 106 MMHG | HEART RATE: 100 BPM | TEMPERATURE: 98 F | OXYGEN SATURATION: 100 % | DIASTOLIC BLOOD PRESSURE: 67 MMHG

## 2021-12-10 NOTE — ED POST DISCHARGE NOTE - RESULT SUMMARY
JONATHAN Wynn: Received a call from Ms. Trejo at [facility] home request copy of covid result be faxed to [phone]

## 2021-12-17 NOTE — ED PROVIDER NOTE - PSH
Patient Information    If this happens again - start on over-the-counter omeprazole 20mg daily for 1 full month. If worsening or no improvement call me.    Follow Up    Call or return to clinic if no improvement.    Additional Educational Resources:  For additional resources regarding your symptoms, diagnosis, or further health information, please visit the Health Resources section on [employer] or the Education section in your [employer] patient portal.     As a reminder: a heart healthy diet and exercise program (20 mins of walking 5 times a week) is recommended.    For questions about the [employer] adams, call [phone] or email [email or web address]  For questions about your existing [employer] portal, call [phone]    
No significant past surgical history

## 2021-12-20 ENCOUNTER — EMERGENCY (EMERGENCY)
Facility: HOSPITAL | Age: 31
LOS: 1 days | Discharge: HOME | End: 2021-12-20
Admitting: EMERGENCY MEDICINE
Payer: MEDICAID

## 2021-12-20 VITALS
DIASTOLIC BLOOD PRESSURE: 62 MMHG | OXYGEN SATURATION: 98 % | RESPIRATION RATE: 18 BRPM | SYSTOLIC BLOOD PRESSURE: 140 MMHG | HEIGHT: 61 IN | HEART RATE: 85 BPM | TEMPERATURE: 98 F

## 2021-12-20 VITALS
RESPIRATION RATE: 16 BRPM | SYSTOLIC BLOOD PRESSURE: 104 MMHG | DIASTOLIC BLOOD PRESSURE: 67 MMHG | TEMPERATURE: 98 F | HEART RATE: 82 BPM | OXYGEN SATURATION: 100 %

## 2021-12-20 PROCEDURE — 99284 EMERGENCY DEPT VISIT MOD MDM: CPT

## 2021-12-20 RX ORDER — CHLORPROMAZINE HCL 10 MG
50 TABLET ORAL ONCE
Refills: 0 | Status: COMPLETED | OUTPATIENT
Start: 2021-12-20 | End: 2021-12-20

## 2021-12-20 RX ADMIN — Medication 2 MILLIGRAM(S): at 19:38

## 2021-12-20 RX ADMIN — Medication 50 MILLIGRAM(S): at 19:38

## 2021-12-20 NOTE — ED BEHAVIORAL HEALTH NOTE - BEHAVIORAL HEALTH NOTE
ROCIO attempted to reach  Ms. Trejo, @ [phone] to obtain collateral information. Ms. Trejo unavailable at this time therefore ROCIO left voicemail requesting call back.  Pt from [facility] [place] ([address] [address] [place] [address]). [facility] from [facility] ([address] [place] [address] SW spoke with assistant  Anoop Rodríguez (p:[phone]) for collateral.     Anoop reports that pt chronically calls 911 for no specific reason. Pt becomes agitated with staff at times when they try to stop her therefore they allow her to call 911. Anoop reports that staff attempts to deescalate her in these situations. Pt was at [facility] earlier today for the same reason. Anoop denies SI/HI, hallucination, denies ETOH/SA or new safety concerns. Pt is reportedly compliant with medications. Pt connected to [facility]. Pt with admission to [facility] in October in which medications were changed. When asked pt's current medications, Anoop advised that they were sent with pt and she does not have a list with her. Anoop does not feel medication change was helpful as she continues to have chronic behavioral outbursts.     Per provider, TEDDY Beltran, patient is cleared and is able to return to their previous residence, [facility].  has spoken to Assistant  Anoop Rodríguez ([phone]).  confirmed that patients mode of transportation is ambulance and that patient travels with EMS staff. Clinical provider is in agreement with AMBULANCE back to [facility]. Verbal huddle regarding coordination of care completed with interdisciplinary team. ROCIO confirmed home address [address] [place] [address]. RN to arrange at time of DC.

## 2021-12-20 NOTE — ED ADULT NURSE NOTE - OBJECTIVE STATEMENT
Pt arrived to  after calling EMS reporting that she has her period and no longer wants to live at the group home. Pt refusing to answer questions regarding S/I H/I A/H V/H at this time, wanded for safety, changed into  clothing, personal property collected and logged.

## 2021-12-20 NOTE — ED ADULT NURSE REASSESSMENT NOTE - NS ED NURSE REASSESS COMMENT FT1
Pt cleared for d/c by NP. Pt calm and cooperative, denies S/I H/I A/H V/H. Pt is scheduled to return to residence by [facility]. Zaiddle completed. Pt received d/c instructions.
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
## 2021-12-20 NOTE — ED ADULT NURSE REASSESSMENT NOTE - NS ED NURSE REASSESS COMMENT FT1
Break RN note- patient sitting in the chair. Patient giggling but calm and cooperative. Safety maintained. Patient to be medicated. Patient awaiting EMS.
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
## 2021-12-20 NOTE — ED PROVIDER NOTE - PATIENT PORTAL LINK FT
You can access the FollowMyHealth Patient Portal offered by [facility] by registering at the following website: [email or web address]. By joining [employer]’s FollowMyHealth portal, you will also be able to view your health information using other applications (apps) compatible with our system.

## 2021-12-20 NOTE — ED ADULT NURSE REASSESSMENT NOTE - NS ED NURSE REASSESS COMMENT FT1
Pt entered , began banging on nurse's station screaming "I'm hungry!!". Pt redirectable at this time, offered food and po fluids which was well tolerated.

## 2021-12-20 NOTE — ED ADULT TRIAGE NOTE - CHIEF COMPLAINT QUOTE
p/t sent from group home for eval, p/t denies any discomfort @ present, appears calm not answering any questions

## 2021-12-22 NOTE — ED PROVIDER NOTE - QUALITY
Per Dr. Slade, results stable. Patient to see EP in Feb.   Message left for patient with stable results. Patient was asked to call back with any questions.       1. Limited/followup  Echo.  2. Moderate left ventricular enlargement, with severely reduced systolic function, LVEF 2530%.  Severe global left ventricular  hypokinesis with regional variation, including a dyskinetic apical cap and nearnormal  thickening/wall motion of the basal anterior  wall. Severely reduced LV global longitudinal strain, 7%.  Grade II/IV diastolic dysfunction, moderately elevated filling pressures.  2. Normal right ventricular size and systolic function.  3. Mild left atrial enlargement.  No LV thrombus (with aid of Echo contrast).  Compared to the TTE performed on 7/27/2021, mild changes are seen. Most notably, there has been no interval improvement in  global LVEF. Findings are no longer consistent with pulmonary hypertension.    
anxiety producing

## 2021-12-28 ENCOUNTER — EMERGENCY (EMERGENCY)
Facility: HOSPITAL | Age: 31
LOS: 1 days | Discharge: HOME | End: 2021-12-28
Admitting: EMERGENCY MEDICINE
Payer: MEDICAID

## 2021-12-28 VITALS
TEMPERATURE: 98 F | OXYGEN SATURATION: 99 % | SYSTOLIC BLOOD PRESSURE: 118 MMHG | HEART RATE: 68 BPM | RESPIRATION RATE: 18 BRPM | DIASTOLIC BLOOD PRESSURE: 80 MMHG

## 2021-12-28 LAB
AMPHET UR-MCNC: NEGATIVE — SIGNIFICANT CHANGE UP
APPEARANCE UR: CLEAR — SIGNIFICANT CHANGE UP
BACTERIA # UR AUTO: NEGATIVE — SIGNIFICANT CHANGE UP
BARBITURATES UR SCN-MCNC: NEGATIVE — SIGNIFICANT CHANGE UP
BENZODIAZ UR-MCNC: NEGATIVE — SIGNIFICANT CHANGE UP
BILIRUB UR-MCNC: NEGATIVE — SIGNIFICANT CHANGE UP
COCAINE METAB.OTHER UR-MCNC: NEGATIVE — SIGNIFICANT CHANGE UP
COLOR SPEC: YELLOW — SIGNIFICANT CHANGE UP
CREATININE URINE RESULT, DAU: 152 MG/DL — SIGNIFICANT CHANGE UP
DIFF PNL FLD: NEGATIVE — SIGNIFICANT CHANGE UP
EPI CELLS # UR: 7 /HPF — HIGH (ref 0–5)
GLUCOSE UR QL: NEGATIVE — SIGNIFICANT CHANGE UP
HCG UR QL: NEGATIVE — SIGNIFICANT CHANGE UP
HYALINE CASTS # UR AUTO: 0 /LPF — SIGNIFICANT CHANGE UP (ref 0–7)
KETONES UR-MCNC: NEGATIVE — SIGNIFICANT CHANGE UP
LEUKOCYTE ESTERASE UR-ACNC: NEGATIVE — SIGNIFICANT CHANGE UP
METHADONE UR-MCNC: NEGATIVE — SIGNIFICANT CHANGE UP
NITRITE UR-MCNC: NEGATIVE — SIGNIFICANT CHANGE UP
OPIATES UR-MCNC: NEGATIVE — SIGNIFICANT CHANGE UP
OXYCODONE UR-MCNC: NEGATIVE — SIGNIFICANT CHANGE UP
PCP SPEC-MCNC: SIGNIFICANT CHANGE UP
PCP UR-MCNC: NEGATIVE — SIGNIFICANT CHANGE UP
PH UR: 6 — SIGNIFICANT CHANGE UP (ref 5–8)
PROT UR-MCNC: ABNORMAL
RBC CASTS # UR COMP ASSIST: 3 /HPF — SIGNIFICANT CHANGE UP (ref 0–4)
SP GR SPEC: 1.03 — SIGNIFICANT CHANGE UP (ref 1–1.05)
THC UR QL: NEGATIVE — SIGNIFICANT CHANGE UP
UROBILINOGEN FLD QL: SIGNIFICANT CHANGE UP
WBC UR QL: 2 /HPF — SIGNIFICANT CHANGE UP (ref 0–5)

## 2021-12-28 PROCEDURE — 99284 EMERGENCY DEPT VISIT MOD MDM: CPT

## 2021-12-28 NOTE — ED ADULT NURSE NOTE - NSIMPLEMENTINTERV_GEN_ALL_ED
Implemented All Universal Safety Interventions:  [place] to call system. Call bell, personal items and telephone within reach. Instruct patient to call for assistance. Room bathroom lighting operational. Non-slip footwear when patient is off stretcher. Physically safe environment: no spills, clutter or unnecessary equipment. Stretcher in lowest position, wheels locked, appropriate side rails in place.

## 2021-12-28 NOTE — ED ADULT NURSE NOTE - OBJECTIVE STATEMENT
Pt sent to ed by  staff  for agitated aggressive behavior. Calm cooperative on arrival to  ed. Pt states she is having her 500th baby, she is 5 months pregnant. Ellen stapleton,hi,ah,vh,

## 2021-12-28 NOTE — ED ADULT NURSE NOTE - NSFALLRSKUNASSIST_ED_ALL_ED
no Cyclosporine Pregnancy And Lactation Text: This medication is Pregnancy Category C and it isn't know if it is safe during pregnancy. This medication is excreted in breast milk.

## 2021-12-28 NOTE — ED PROVIDER NOTE - PATIENT PORTAL LINK FT
You can access the FollowMyHealth Patient Portal offered by [facility] by registering at the following website: [email or web address]. By joining [employer]’s FollowMyHealth portal, you will also be able to view your health information using other applications (apps) compatible with our system.

## 2021-12-28 NOTE — ED PROVIDER NOTE - OBJECTIVE STATEMENT
This is a 31 yr old F, pmh MR, schizophrenia, with c/o acting out behaviour, agitation. Pt sent in from [facility]. This is a 31 yr old F, pmh MR, schizophrenia, with c/o acting out behaviour, agitation. Pt sent in from [facility]. As per ems pt got frustrated and wanted to live residency and wanted to run into traffic. Pt well known in , cooperative, reports feels good. Denies any si/hi.

## 2021-12-28 NOTE — ED ADULT NURSE NOTE - CHIEF COMPLAINT QUOTE
Patient brought to ER by EMS from [facility] for psych evaluation. Pt has been acting up at [facility] and she states she is having her 500th baby, she is 5 months pregnant which staff says is not true.

## 2021-12-28 NOTE — ED BEHAVIORAL HEALTH NOTE - BEHAVIORAL HEALTH NOTE
*** Patient has another chart**** #[id number]-Qeopno called patient’s group home ([address] [place] [address]). ROCIO spoke with  Ms. Trejo (p: [phone]/ [phone]) for collateral.   Ms. Trejo reports that pt chronically calls 911 for no specific reason. Pt is reportedly compliant with medications. Today patient called ems on herself reporting that she twisted her ankle. Ms. Trejo states that this is not true. She states that the patient tested positive for covid-19 between the dates of 12/7-12/12 but is currently out of quarantine. She states that the patient is enrolled to [facility]. She states that the patient has a history of multiple hospitalizations. She denies that the patient is violent or aggressive today. She states that the patient has no staff at bedside.     Per provider, TEDDY Beltran, patient is cleared and is able to return to their previous residence, [facility].  has spoken to  Ms. Trejo ([phone]).  confirmed that patients mode of transportation is ambulance and that patient travels with EMS staff. Clinical provider is in agreement with AMBULANCE back to [facility] care. Verbal huddle regarding coordination of care completed with interdisciplinary team. ROCIO confirmed home address [address] [place] [address]. RN to arrange at time of DC.

## 2021-12-28 NOTE — ED PROVIDER NOTE - CLINICAL SUMMARY MEDICAL DECISION MAKING FREE TEXT BOX
Collateral info obtained by yessi, refer to her note. Transport arranged by RN. There is no clinical evidence of intoxication, or any acute medical problem requiring immediate intervention.

## 2021-12-28 NOTE — ED ADULT TRIAGE NOTE - CHIEF COMPLAINT QUOTE
Patient brought to ER by EMS from [facility] for psych evaluation. Pt has been acting up at [facility] and she states she is having her 500th baby, she is 5 months pregnant which staff says is not true.

## 2021-12-28 NOTE — ED ADULT NURSE REASSESSMENT NOTE - NS ED NURSE REASSESS COMMENT FT1
Pt cleared for d/c by NP. Pt calm and cooperative, denies S/I H/I A/H V/H. Pt received d/c instructions and all personal belongings. EMS will transport pt to residence.

## 2021-12-30 ENCOUNTER — EMERGENCY (EMERGENCY)
Facility: HOSPITAL | Age: 31
LOS: 0 days | Discharge: HOME | End: 2021-12-31
Attending: STUDENT IN AN ORGANIZED HEALTH CARE EDUCATION/TRAINING PROGRAM
Payer: MEDICAID

## 2021-12-30 VITALS
DIASTOLIC BLOOD PRESSURE: 60 MMHG | HEIGHT: 61 IN | RESPIRATION RATE: 18 BRPM | OXYGEN SATURATION: 98 % | SYSTOLIC BLOOD PRESSURE: 106 MMHG | TEMPERATURE: 98 F | HEART RATE: 100 BPM

## 2021-12-30 DIAGNOSIS — Z91.018 ALLERGY TO OTHER FOODS: ICD-10-CM

## 2021-12-30 DIAGNOSIS — F63.9 IMPULSE DISORDER, UNSPECIFIED: ICD-10-CM

## 2021-12-30 DIAGNOSIS — Z20.822 CONTACT WITH AND (SUSPECTED) EXPOSURE TO COVID-19: ICD-10-CM

## 2021-12-30 DIAGNOSIS — E11.9 TYPE 2 DIABETES MELLITUS WITHOUT COMPLICATIONS: ICD-10-CM

## 2021-12-30 LAB
ALBUMIN SERPL ELPH-MCNC: 2.8 G/DL — LOW (ref 3.3–5)
ALP SERPL-CCNC: 53 U/L — SIGNIFICANT CHANGE UP (ref 40–120)
ALT FLD-CCNC: 20 U/L — SIGNIFICANT CHANGE UP (ref 12–78)
ANION GAP SERPL CALC-SCNC: 7 MMOL/L — SIGNIFICANT CHANGE UP (ref 5–17)
APAP SERPL-MCNC: <2 UG/ML — LOW (ref 10–30)
APPEARANCE UR: CLEAR — SIGNIFICANT CHANGE UP
AST SERPL-CCNC: 20 U/L — SIGNIFICANT CHANGE UP (ref 15–37)
BASOPHILS # BLD AUTO: 0.02 K/UL — SIGNIFICANT CHANGE UP (ref 0–0.2)
BASOPHILS NFR BLD AUTO: 0.3 % — SIGNIFICANT CHANGE UP (ref 0–2)
BILIRUB SERPL-MCNC: 0.1 MG/DL — LOW (ref 0.2–1.2)
BILIRUB UR-MCNC: NEGATIVE — SIGNIFICANT CHANGE UP
BUN SERPL-MCNC: 16 MG/DL — SIGNIFICANT CHANGE UP (ref 7–23)
CALCIUM SERPL-MCNC: 8.2 MG/DL — LOW (ref 8.5–10.1)
CHLORIDE SERPL-SCNC: 106 MMOL/L — SIGNIFICANT CHANGE UP (ref 96–108)
CO2 SERPL-SCNC: 24 MMOL/L — SIGNIFICANT CHANGE UP (ref 22–31)
COLOR SPEC: YELLOW — SIGNIFICANT CHANGE UP
CREAT SERPL-MCNC: 0.78 MG/DL — SIGNIFICANT CHANGE UP (ref 0.5–1.3)
DIFF PNL FLD: NEGATIVE — SIGNIFICANT CHANGE UP
EOSINOPHIL # BLD AUTO: 0.08 K/UL — SIGNIFICANT CHANGE UP (ref 0–0.5)
EOSINOPHIL NFR BLD AUTO: 1.4 % — SIGNIFICANT CHANGE UP (ref 0–6)
ETHANOL SERPL-MCNC: <10 MG/DL — SIGNIFICANT CHANGE UP (ref 0–10)
FLUAV AG NPH QL: SIGNIFICANT CHANGE UP
FLUBV AG NPH QL: SIGNIFICANT CHANGE UP
GLUCOSE SERPL-MCNC: 123 MG/DL — HIGH (ref 70–99)
GLUCOSE UR QL: NEGATIVE MG/DL — SIGNIFICANT CHANGE UP
HCT VFR BLD CALC: 32 % — LOW (ref 34.5–45)
HGB BLD-MCNC: 10.6 G/DL — LOW (ref 11.5–15.5)
IMM GRANULOCYTES NFR BLD AUTO: 0.2 % — SIGNIFICANT CHANGE UP (ref 0–1.5)
KETONES UR-MCNC: NEGATIVE — SIGNIFICANT CHANGE UP
LEUKOCYTE ESTERASE UR-ACNC: NEGATIVE — SIGNIFICANT CHANGE UP
LYMPHOCYTES # BLD AUTO: 1.82 K/UL — SIGNIFICANT CHANGE UP (ref 1–3.3)
LYMPHOCYTES # BLD AUTO: 31.8 % — SIGNIFICANT CHANGE UP (ref 13–44)
MCHC RBC-ENTMCNC: 30.8 PG — SIGNIFICANT CHANGE UP (ref 27–34)
MCHC RBC-ENTMCNC: 33.1 GM/DL — SIGNIFICANT CHANGE UP (ref 32–36)
MCV RBC AUTO: 93 FL — SIGNIFICANT CHANGE UP (ref 80–100)
MONOCYTES # BLD AUTO: 0.55 K/UL — SIGNIFICANT CHANGE UP (ref 0–0.9)
MONOCYTES NFR BLD AUTO: 9.6 % — SIGNIFICANT CHANGE UP (ref 2–14)
NEUTROPHILS # BLD AUTO: 3.24 K/UL — SIGNIFICANT CHANGE UP (ref 1.8–7.4)
NEUTROPHILS NFR BLD AUTO: 56.7 % — SIGNIFICANT CHANGE UP (ref 43–77)
NITRITE UR-MCNC: NEGATIVE — SIGNIFICANT CHANGE UP
NRBC # BLD: 0 /100 WBCS — SIGNIFICANT CHANGE UP (ref 0–0)
PH UR: 6 — SIGNIFICANT CHANGE UP (ref 5–8)
PLATELET # BLD AUTO: 240 K/UL — SIGNIFICANT CHANGE UP (ref 150–400)
POTASSIUM SERPL-MCNC: 4.3 MMOL/L — SIGNIFICANT CHANGE UP (ref 3.5–5.3)
POTASSIUM SERPL-SCNC: 4.3 MMOL/L — SIGNIFICANT CHANGE UP (ref 3.5–5.3)
PROT SERPL-MCNC: 6.7 GM/DL — SIGNIFICANT CHANGE UP (ref 6–8.3)
PROT UR-MCNC: NEGATIVE MG/DL — SIGNIFICANT CHANGE UP
RBC # BLD: 3.44 M/UL — LOW (ref 3.8–5.2)
RBC # FLD: 13.1 % — SIGNIFICANT CHANGE UP (ref 10.3–14.5)
SALICYLATES SERPL-MCNC: <1.7 MG/DL — LOW (ref 2.8–20)
SARS-COV-2 RNA SPEC QL NAA+PROBE: SIGNIFICANT CHANGE UP
SODIUM SERPL-SCNC: 137 MMOL/L — SIGNIFICANT CHANGE UP (ref 135–145)
SP GR SPEC: 1.01 — SIGNIFICANT CHANGE UP (ref 1.01–1.02)
UROBILINOGEN FLD QL: NEGATIVE MG/DL — SIGNIFICANT CHANGE UP
WBC # BLD: 5.72 K/UL — SIGNIFICANT CHANGE UP (ref 3.8–10.5)
WBC # FLD AUTO: 5.72 K/UL — SIGNIFICANT CHANGE UP (ref 3.8–10.5)

## 2021-12-30 PROCEDURE — 99285 EMERGENCY DEPT VISIT HI MDM: CPT

## 2021-12-30 NOTE — ED PROVIDER NOTE - CLINICAL SUMMARY MEDICAL DECISION MAKING FREE TEXT BOX
pt has been calm/cooperative throughout ED stay.  Pt is chronically aggressive; No role for psych consult given repeat visits for same.
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
## 2021-12-30 NOTE — ED ADULT NURSE NOTE - OBJECTIVE STATEMENT
pt received to bed 39D stating that she ran away because they weren't feeding her. pt requesting food multiple times in the ED and states she is pregnant and needs to eat. denies: SI/HI. denies injuries. 1:1 constant observation initiated in triage

## 2021-12-30 NOTE — ED PROVIDER NOTE - PATIENT PORTAL LINK FT
You can access the FollowMyHealth Patient Portal offered by [facility] by registering at the following website: [email or web address]. By joining [employer]’s FollowMyHealth portal, you will also be able to view your health information using other applications (apps) compatible with our system.

## 2021-12-30 NOTE — ED PROVIDER NOTE - PHYSICAL EXAMINATION
Constitutional: Well appearing, awake, alert, oriented to person, place, time/situation and in no apparent distress.  ENMT: Airway patent.  Eyes: Clear bilaterally, pupils equal, round and reactive to light.  Cardiac: Normal rate, regular rhythm.  Heart sounds S1, S2.  Respiratory: Breath sounds clear and equal bilaterally.  Gastrointestinal: Abdomen soft, non-tender, no guarding.  Neurological: Alert and oriented, no focal deficits, no motor or sensory deficits.  Skin: No evidence of rash.  Psych: Insight, judgement and impulse control appear intact. Thought process logical, linear, and goal directed.

## 2021-12-30 NOTE — ED PROVIDER NOTE - NSDCPRINTRESULTS_ED_ALL_ED
Patient requests all Lab, Cardiology, and Radiology Results on their Discharge Instructions
never used

## 2021-12-30 NOTE — ED ADULT NURSE REASSESSMENT NOTE - NS ED NURSE REASSESS COMMENT FT1
pt sitting in hallway and asked several times by staff to return to room. pt became agitated and started to yell "I'm not in the fucking mood." pt throwing chairs. security paged to bedside. pt escorted back to room and stretcher
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
## 2021-12-30 NOTE — ED ADULT NURSE NOTE - COVID-19 RESULT
Virtual Brief Visit    Assessment/Plan:    Problem List Items Addressed This Visit        Respiratory    Mild intermittent asthma without complication     I reviewed her Symbicort to use 2 puffs twice a day and use her rescue inhaler as needed  Relevant Medications    budesonide-formoterol (SYMBICORT) 160-4 5 mcg/act inhaler    Obstructive sleep apnea (adult) (pediatric) - Primary     I reviewed her sleep study and ordered her auto CPAP 5 to 20  I will see her back within 90 days  Relevant Orders    CPAP Auto New DME      Other Visit Diagnoses     Gastroesophageal reflux disease with esophagitis without hemorrhage        Relevant Medications    famotidine (PEPCID) 40 MG tablet    Restrictive lung disease        Relevant Medications    budesonide-formoterol (SYMBICORT) 160-4 5 mcg/act inhaler    Other Relevant Orders    Ambulatory Referral to Pulmonary Rehabilitation                Reason for visit is   Chief Complaint   Patient presents with    Virtual Brief Visit        Encounter provider Prince Sunni DO    Provider located at [address] [place] [id number] [phone]    Recent Visits  No visits were found meeting these conditions  Showing recent visits within past 7 days and meeting all other requirements     Today's Visits  Date Type Provider Dept   02/22/21 Telemedicine Prince Sunni DO  [facility]   Showing today's visits and meeting all other requirements     Future Appointments  No visits were found meeting these conditions  Showing future appointments within next 150 days and meeting all other requirements        After connecting through telephone, the patient was identified by name and date of birth  Diallost Patel was informed that this is a telemedicine visit and that the visit is being conducted through telephone  My office door was closed  No one else was in the room    She acknowledged consent and understanding of privacy and security of the platform  The patient has agreed to participate and understands she can discontinue the visit at any time  Patient is aware this is a billable service  Subjective    Ana Laura Fairchild is a 56 y o  female see HPI  Jamie Jennings is still complaining of shortness of breath despite her Symbicort twice a day  She denies any cough or wheeze and mostly get short of breath with stairs and hills  She got her sleep study but did not ye receive her CPAP machine         Past Medical History:   Diagnosis Date    Abdominal pain, lower     [id number] RESOLVED    Acute renal failure ([email or web address] )     [id number] RESOLVED    Allergic rhinitis     [id number] RESOLVED    Ankle pain, right     10YWK2451 RESOLVED    Arthritis     47KVH2805 RESOLVED  [id number] RESOLVED    Asthma     Bilateral chronic knee pain     69USM7401    Bladder pain     [id number] RESOLVED    Blood clot in vein     Bursitis     [id number] RESOLVED    Cardiac disorder     [id number]  RESOLVED    Cardiac murmur     83NTK2176 RESOLVED    Chest tightness or pressure     11WXS4017 RESOLVED    COPD (chronic obstructive pulmonary disease) ([email or web address] )     Coronary artery disease     Curvature of spine     30ADM9216 RESOLVED    GERD (gastroesophageal reflux disease)     Hernia, hiatal     62KCP8226 RESOLVED    Hyperlipidemia     53OSD2583 RESOLVED    Hypertension     Inguinal hernia     RIGHT   11LSQ8844 RESOLVED    Jaw closure abnormality     77NWU3267 RESOLVED    Lumbar herniated disc     73XNC5528 RESOLVED    Morbid obesity (HCC)     59ENN2555    Obesity     Osteoarthritis of right knee     64HRV8703 RESOLVED    Patellar tendinitis     11WLH4343    Poor flexibility of tendon     10OAL1073    Pulmonary embolism ([email or web address] )     28CNX4685 RESOLVED    Sepsis ([facility] )     [id number] RESOLVED    Severe sepsis due to methicillin resistant Staphylococcus aureus (MRSA) with acute organ dysfunction ([facility] )     13AVJ0638 RESOLVED    Spider vein, symptomatic     98GDT3425 RESOLVED    Status post arthroscopy of right knee     [id number] RESOLVED    Stomach disorder     65TFN9369 RESOLVED    Tear of medial meniscus of right knee     SUBSEQUENT ENCOUNTER  RESOLVED [id number]    Thyroid disorder     40RLT5234    Umbilical hernia     67RWF1095 RESOLVED       Past Surgical History:   Procedure Laterality Date    COLONOSCOPY      CYSTOSCOPY  01/1994    WITH BIOPSY      EXPLORATORY LAPAROTOMY      FOOT SURGERY Left     FRACTURE SURGERY      HAND SURGERY Right     cyst    HYSTERECTOMY  2009    INCISION AND DRAINAGE ANTERIOR NECK Right 6/8/2017    Procedure: INCISION AND DRAINAGE  (I&D) NECK;  Surgeon: Damon Arias MD;  Location:  MAIN OR;  Service:    Giselle Sink HERNIA REPAIR      WITH IMPLANTATION OF MESH  13 MAY 2014  LAST ASSESSED    IR IVC FILTER REMOVAL  1/22/2019    OPEN ANTERIOR SHOULDER RECONSTRUCTION Left     OTHER SURGICAL HISTORY      BLOOD VESSEL REPAIR   13 MAY 2014 LAST ASSESSED    PERIPHERALLY INSERTED CENTRAL CATHETER INSERTION      NC KNEE SCOPE,MED/LAT MENISECTOMY Right 4/11/2016    Procedure: KNEE ARTHROSCOPY WITH MEDIAL MENISCECTOMY ;  Surgeon: Amy Peña MD;  Location: [facility] MAIN OR;  Service: Orthopedics    SHOULDER SURGERY         Current Outpatient Medications   Medication Sig Dispense Refill    albuterol (2 5 mg/3 mL) 0 083 % nebulizer solution Take 1 vial (2 5 mg total) by nebulization as needed for wheezing 60 vial 5    albuterol (PROVENTIL HFA,VENTOLIN HFA) 90 mcg/act inhaler Inhale 2 puffs every 4 (four) hours as needed for wheezing 1 Inhaler 3    atorvastatin (LIPITOR) 10 mg tablet Take 1 tablet (10 mg total) by mouth daily 30 tablet 5    budesonide-formoterol (SYMBICORT) 160-4 5 mcg/act inhaler Inhale 2 puffs 2 (two) times a day Rinse mouth after use   1 Inhaler 5    famotidine (PEPCID) 40 MG tablet Take 1 tablet (40 mg total) by mouth daily at bedtime 30 tablet 5    fluticasone (FLONASE) 50 mcg/act nasal spray 2 sprays into each nostril daily 16 g 5    furosemide (LASIX) 20 mg tablet Take 1 tablet (20 mg total) by mouth daily as needed (leg swelling) Take with potassium 30 tablet 5    levothyroxine 112 mcg tablet take 1 tablet by mouth daily 30 tablet 3    montelukast (SINGULAIR) 10 mg tablet Take 1 tablet (10 mg total) by mouth daily at bedtime 30 tablet 5    potassium chloride (MICRO-K) 10 MEQ CR capsule Take 1 capsule (10 mEq total) by mouth daily 30 capsule 1    predniSONE 10 mg tablet 4 tablets daily for 3 days, then 3 tablets daily for 3 days, then 2 tablets daily for 3 days, then 1 tablet daily for 3 days 30 tablet 0    warfarin (COUMADIN) 5 mg tablet Take daily as directed by physician 45 tablet 5     No current facility-administered medications for this visit  Allergies   Allergen Reactions    Clindamycin Angioedema    Medical Tape Itching     Welt and redness    Penicillin G Nausea Only    Penicillins GI Intolerance     Nausea and vomiting       Review of Systems   Constitutional: Negative  Negative for unexpected weight change  HENT: Negative  Negative for postnasal drip  Eyes: Negative  Respiratory: Positive for shortness of breath  Negative for cough and wheezing  Cardiovascular: Negative  Negative for chest pain and leg swelling  Gastrointestinal: Negative  Endocrine: Negative  Genitourinary: Negative  Musculoskeletal: Negative  Allergic/Immunologic: Negative  Neurological: Negative  Hematological: Negative  Vitals:    02/22/21 0934   Weight: (!) 137 kg (302 lb)   Height: 5' 5" (1 651 m)         I spent 20 minutes directly with the patient during this visit    VIRTUAL VISIT DISCLAIMER    Lamont Summersalexis acknowledges that she has consented to an online visit or consultation   She understands that the online visit is based solely on information provided by her, and that, in the absence of a face-to-face physical evaluation by the physician, the diagnosis she receives is both limited and provisional in terms of accuracy and completeness  This is not intended to replace a full medical face-to-face evaluation by the physician  Elham Butler understands and accepts these terms  POSITIVE

## 2021-12-31 VITALS
DIASTOLIC BLOOD PRESSURE: 85 MMHG | OXYGEN SATURATION: 96 % | TEMPERATURE: 98 F | HEART RATE: 102 BPM | RESPIRATION RATE: 16 BRPM | SYSTOLIC BLOOD PRESSURE: 118 MMHG

## 2021-12-31 RX ADMIN — Medication 1 MILLIGRAM(S): at 01:32

## 2021-12-31 RX ADMIN — Medication 1 MILLIGRAM(S): at 02:08

## 2021-12-31 NOTE — ED ADULT NURSE REASSESSMENT NOTE - NS ED NURSE REASSESS COMMENT FT1
pt resting in bed 40D. no adverse reaction to medication administered. pt is calm at this time. awaiting ambulance pickup.

## 2021-12-31 NOTE — ED ADULT NURSE REASSESSMENT NOTE - NS ED NURSE REASSESS COMMENT FT1
pt agitated. yelling and throwing items at staff and being uncooperative. security present. Dr. Delacruz aware. pt escorted back to bed. awaiting ambulance pickup Refusal

## 2022-01-01 NOTE — ED ADULT TRIAGE NOTE - NS_BH TRG QUESTION2_ED_ALL_ED
Pt presents to the ED with fever and cough, congestion X2 days. TMAX 103. Motrin given at 2000. Dad states he has been suctioning pt at home with nasal bulb. Denies any vomiting or diarrhea. +PO and +UOP (4 diapers). Pt is alert and appropriate. Lungs anteriorly clear and Lungs posteriorly rhonchi. Mild belly breathing noted. Pt is on full monitor. Respiratory cart side.    No

## 2022-01-04 ENCOUNTER — EMERGENCY (EMERGENCY)
Facility: HOSPITAL | Age: 32
LOS: 0 days | Discharge: HOME | End: 2022-01-04
Attending: EMERGENCY MEDICINE
Payer: MEDICAID

## 2022-01-04 VITALS
DIASTOLIC BLOOD PRESSURE: 84 MMHG | WEIGHT: 214.95 LBS | RESPIRATION RATE: 20 BRPM | HEART RATE: 115 BPM | TEMPERATURE: 98 F | OXYGEN SATURATION: 99 % | SYSTOLIC BLOOD PRESSURE: 125 MMHG | HEIGHT: 61 IN

## 2022-01-04 DIAGNOSIS — M25.562 PAIN IN LEFT KNEE: ICD-10-CM
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
## 2022-01-04 DIAGNOSIS — Z23 ENCOUNTER FOR IMMUNIZATION: ICD-10-CM

## 2022-01-04 DIAGNOSIS — Z91.018 ALLERGY TO OTHER FOODS: ICD-10-CM

## 2022-01-04 PROCEDURE — 73562 X-RAY EXAM OF KNEE 3: CPT | Mod: 26,LT

## 2022-01-04 PROCEDURE — 99283 EMERGENCY DEPT VISIT LOW MDM: CPT

## 2022-01-04 RX ORDER — ACETAMINOPHEN 500 MG
975 TABLET ORAL ONCE
Refills: 0 | Status: COMPLETED | OUTPATIENT
Start: 2022-01-04 | End: 2022-01-04

## 2022-01-04 RX ORDER — TETANUS TOXOID, REDUCED DIPHTHERIA TOXOID AND ACELLULAR PERTUSSIS VACCINE, ADSORBED 5; 2.5; 8; 8; 2.5 [IU]/.5ML; [IU]/.5ML; UG/.5ML; UG/.5ML; UG/.5ML
0.5 SUSPENSION INTRAMUSCULAR ONCE
Refills: 0 | Status: COMPLETED | OUTPATIENT
Start: 2022-01-04 | End: 2022-01-04

## 2022-01-04 RX ADMIN — TETANUS TOXOID, REDUCED DIPHTHERIA TOXOID AND ACELLULAR PERTUSSIS VACCINE, ADSORBED 0.5 MILLILITER(S): 5; 2.5; 8; 8; 2.5 SUSPENSION INTRAMUSCULAR at 22:34

## 2022-01-04 RX ADMIN — Medication 975 MILLIGRAM(S): at 22:39

## 2022-01-04 NOTE — ED PROVIDER NOTE - PHYSICAL EXAMINATION
Vitals: tachy at 115, otherwise WNL  Gen: AAOx3, NAD, sitting comfortably in stretcher  Head: ncat, perrla, eomi b/l  Neck: supple, no lymphadenopathy, no midline deviation  Heart: rrr, no m/r/g  Lungs: CTA b/l, no rales/ronchi/wheezes  Abd: soft, nontender, non-distended, no rebound or guarding  Ext: no clubbing/cyanosis/edema, L knee tenderness anteriorly, 3x2 cm abrasions to L inferior knee anteriorly, no bony deformity, normal rom  Neuro: sensation and muscle strength intact b/l, ambulating w/antalgic gait

## 2022-01-04 NOTE — ED PROVIDER NOTE - CARE PROVIDER_API CALL
Harry Morris)  Orthopaedic Surgery  [address], [address]  [place] [address]  Phone: [phone]  Fax: [phone]  Follow Up Time: 7-10 Days

## 2022-01-04 NOTE — ED PROVIDER NOTE - PROGRESS NOTE DETAILS
Results reported to patient--grossly benign, XR wnl  Pt. reports feeling better after meds, ambulating well   pt. agrees to f/u with primary care outpt., referred to ortho for additional f/u   pt. understands to return to ED if symptoms worsen; will d/c with tylenol prn otc

## 2022-01-04 NOTE — ED PROVIDER NOTE - CLINICAL SUMMARY MEDICAL DECISION MAKING FREE TEXT BOX
32 yo F with L knee pain s/p fall, doubt fracture  -tetanus booster, xr L knee, tylenol  -f/u results, reeval, d/c with pcp f/u

## 2022-01-04 NOTE — ED PROVIDER NOTE - PATIENT PORTAL LINK FT
You can access the FollowMyHealth Patient Portal offered by [facility] by registering at the following website: [email or web address]. By joining [employer]’s FollowMyHealth portal, you will also be able to view your health information using other applications (apps) compatible with our system.
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
## 2022-01-05 ENCOUNTER — EMERGENCY (EMERGENCY)
Facility: HOSPITAL | Age: 32
LOS: 0 days | Discharge: HOME | End: 2022-01-06
Attending: STUDENT IN AN ORGANIZED HEALTH CARE EDUCATION/TRAINING PROGRAM
Payer: MEDICAID

## 2022-01-05 VITALS
TEMPERATURE: 99 F | HEART RATE: 105 BPM | OXYGEN SATURATION: 100 % | DIASTOLIC BLOOD PRESSURE: 91 MMHG | SYSTOLIC BLOOD PRESSURE: 134 MMHG | RESPIRATION RATE: 20 BRPM

## 2022-01-05 VITALS
TEMPERATURE: 98 F | DIASTOLIC BLOOD PRESSURE: 78 MMHG | HEIGHT: 61 IN | WEIGHT: 250 LBS | OXYGEN SATURATION: 100 % | RESPIRATION RATE: 16 BRPM | SYSTOLIC BLOOD PRESSURE: 130 MMHG | HEART RATE: 86 BPM

## 2022-01-05 DIAGNOSIS — F20.9 SCHIZOPHRENIA, UNSPECIFIED: ICD-10-CM

## 2022-01-05 DIAGNOSIS — Z00.00 ENCOUNTER FOR GENERAL ADULT MEDICAL EXAMINATION WITHOUT ABNORMAL FINDINGS: ICD-10-CM

## 2022-01-05 DIAGNOSIS — F31.9 BIPOLAR DISORDER, UNSPECIFIED: ICD-10-CM

## 2022-01-05 DIAGNOSIS — Z91.018 ALLERGY TO OTHER FOODS: ICD-10-CM

## 2022-01-05 DIAGNOSIS — F63.9 IMPULSE DISORDER, UNSPECIFIED: ICD-10-CM

## 2022-01-05 DIAGNOSIS — E11.9 TYPE 2 DIABETES MELLITUS WITHOUT COMPLICATIONS: ICD-10-CM

## 2022-01-05 DIAGNOSIS — Z04.6 ENCOUNTER FOR GENERAL PSYCHIATRIC EXAMINATION, REQUESTED BY AUTHORITY: ICD-10-CM

## 2022-01-05 PROCEDURE — 99283 EMERGENCY DEPT VISIT LOW MDM: CPT
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
## 2022-01-05 NOTE — ED PROVIDER NOTE - PROGRESS NOTE DETAILS
Does not need acute psychiatric evaluation, no emergent diagnosis for evaluation. After ED evaluation, there is no evidence of acute emergency pathology which would necessitate emergency hospital admission. Patient was advised to follow up with primary care physician as soon as possible and have the doctor review all ED results and arrange appropriate follow up. Patient advised to return to ED if symptoms worsen.

## 2022-01-05 NOTE — ED PROVIDER NOTE - PATIENT PORTAL LINK FT
You can access the FollowMyHealth Patient Portal offered by [facility] by registering at the following website: [email or web address]. By joining [employer]’s FollowMyHealth portal, you will also be able to view your health information using other applications (apps) compatible with our system.
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
## 2022-01-06 VITALS
OXYGEN SATURATION: 99 % | SYSTOLIC BLOOD PRESSURE: 106 MMHG | HEART RATE: 88 BPM | DIASTOLIC BLOOD PRESSURE: 71 MMHG | RESPIRATION RATE: 18 BRPM

## 2022-01-07 ENCOUNTER — EMERGENCY (EMERGENCY)
Facility: HOSPITAL | Age: 32
LOS: 0 days | Discharge: HOME | End: 2022-01-08
Attending: EMERGENCY MEDICINE
Payer: MEDICAID

## 2022-01-07 VITALS
HEART RATE: 96 BPM | SYSTOLIC BLOOD PRESSURE: 117 MMHG | TEMPERATURE: 98 F | WEIGHT: 250 LBS | HEIGHT: 61 IN | RESPIRATION RATE: 18 BRPM | DIASTOLIC BLOOD PRESSURE: 73 MMHG | OXYGEN SATURATION: 99 %

## 2022-01-07 DIAGNOSIS — E11.9 TYPE 2 DIABETES MELLITUS WITHOUT COMPLICATIONS: ICD-10-CM

## 2022-01-07 DIAGNOSIS — F31.9 BIPOLAR DISORDER, UNSPECIFIED: ICD-10-CM

## 2022-01-07 DIAGNOSIS — Z91.018 ALLERGY TO OTHER FOODS: ICD-10-CM

## 2022-01-07 DIAGNOSIS — M25.562 PAIN IN LEFT KNEE: ICD-10-CM

## 2022-01-07 DIAGNOSIS — F79 UNSPECIFIED INTELLECTUAL DISABILITIES: ICD-10-CM

## 2022-01-07 DIAGNOSIS — F20.9 SCHIZOPHRENIA, UNSPECIFIED: ICD-10-CM

## 2022-01-07 PROCEDURE — 99283 EMERGENCY DEPT VISIT LOW MDM: CPT

## 2022-01-07 RX ORDER — ALPRAZOLAM 0.25 MG
2 TABLET ORAL ONCE
Refills: 0 | Status: DISCONTINUED | OUTPATIENT
Start: 2022-01-07 | End: 2022-01-07

## 2022-01-07 RX ORDER — TRAZODONE HCL 50 MG
50 TABLET ORAL ONCE
Refills: 0 | Status: COMPLETED | OUTPATIENT
Start: 2022-01-07 | End: 2022-01-07

## 2022-01-07 RX ORDER — IBUPROFEN 200 MG
600 TABLET ORAL ONCE
Refills: 0 | Status: COMPLETED | OUTPATIENT
Start: 2022-01-07 | End: 2022-01-07

## 2022-01-07 RX ORDER — ACETAMINOPHEN 500 MG
975 TABLET ORAL ONCE
Refills: 0 | Status: COMPLETED | OUTPATIENT
Start: 2022-01-07 | End: 2022-01-07

## 2022-01-07 RX ADMIN — Medication 2 MILLIGRAM(S): at 18:31

## 2022-01-07 RX ADMIN — Medication 600 MILLIGRAM(S): at 18:32

## 2022-01-07 RX ADMIN — Medication 975 MILLIGRAM(S): at 18:32

## 2022-01-07 RX ADMIN — Medication 50 MILLIGRAM(S): at 20:05
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
## 2022-01-07 NOTE — ED ADULT TRIAGE NOTE - WEIGHT METHOD
Hospitalist Progress Note      PCP: Jonathan Lowry    [facility] [facility] [S72.002A]    Chronic prescription opiate use [Z79.891]    Hyponatremia [E87.1]    Peptic ulcer [K27.9]    COPD, mild (HCC) [J44.9]    Dementia ([facility] [facility] [Vanda Spicer MD stated

## 2022-01-07 NOTE — ED PROVIDER NOTE - PATIENT PORTAL LINK FT
You can access the FollowMyHealth Patient Portal offered by [facility] by registering at the following website: [email or web address]. By joining [employer]’s FollowMyHealth portal, you will also be able to view your health information using other applications (apps) compatible with our system.

## 2022-01-07 NOTE — ED ADULT NURSE NOTE - OBJECTIVE STATEMENT
Pt presents to ED c/o 8/10 left knee pain s/p trip and fall a few days ago. Pt also states she hasn't eaten in 2 days. She is requesting multiple trays of food while in the ED. Pt has no other complaints at this time. NAD noted. Respirations even and unlabored. Pt denies any chance of pregnancy. VSS in triage. Pt placed on CO for safety for hx of impulsivity, is a flight risk.

## 2022-01-07 NOTE — ED PROVIDER NOTE - OBJECTIVE STATEMENT
31F PMHX of DM, s/p impulse control disorder, s/p schizophrenia, s/p bipolar d/o, pw achy left knee pain after fall several days ago. previous neg xrays. hasn't taken anything additional for pain. also notes hunger.     ros neg for ha, vision loss, rhinorrhea, cp, sob, abd pain, rash, bleeding, numbness, dysuria, repugnancy, si, hi, fever, vomiting

## 2022-01-07 NOTE — ED ADULT NURSE REASSESSMENT NOTE - NS ED NURSE REASSESS COMMENT FT1
Patient received from ADRY Espinal. patient calm in room. No apparent distress. pending ambulance p/u.

## 2022-01-08 VITALS
TEMPERATURE: 97 F | RESPIRATION RATE: 18 BRPM | HEART RATE: 90 BPM | DIASTOLIC BLOOD PRESSURE: 77 MMHG | SYSTOLIC BLOOD PRESSURE: 114 MMHG | OXYGEN SATURATION: 99 %

## 2022-01-10 ENCOUNTER — EMERGENCY (EMERGENCY)
Facility: HOSPITAL | Age: 32
LOS: 0 days | Discharge: HOME | End: 2022-01-10
Payer: MEDICAID

## 2022-01-10 VITALS
SYSTOLIC BLOOD PRESSURE: 112 MMHG | HEIGHT: 61 IN | DIASTOLIC BLOOD PRESSURE: 84 MMHG | WEIGHT: 250 LBS | HEART RATE: 100 BPM | TEMPERATURE: 98 F | RESPIRATION RATE: 18 BRPM | OXYGEN SATURATION: 99 %

## 2022-01-10 VITALS
TEMPERATURE: 98 F | SYSTOLIC BLOOD PRESSURE: 106 MMHG | OXYGEN SATURATION: 98 % | RESPIRATION RATE: 18 BRPM | HEART RATE: 97 BPM | DIASTOLIC BLOOD PRESSURE: 74 MMHG

## 2022-01-10 DIAGNOSIS — F25.9 SCHIZOAFFECTIVE DISORDER, UNSPECIFIED: ICD-10-CM
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
## 2022-01-10 DIAGNOSIS — Z87.891 PERSONAL HISTORY OF NICOTINE DEPENDENCE: ICD-10-CM

## 2022-01-10 DIAGNOSIS — S80.219A ABRASION, UNSPECIFIED KNEE, INITIAL ENCOUNTER: ICD-10-CM

## 2022-01-10 DIAGNOSIS — E11.9 TYPE 2 DIABETES MELLITUS WITHOUT COMPLICATIONS: ICD-10-CM

## 2022-01-10 PROCEDURE — 99283 EMERGENCY DEPT VISIT LOW MDM: CPT

## 2022-01-10 RX ORDER — ACETAMINOPHEN 500 MG
650 TABLET ORAL ONCE
Refills: 0 | Status: COMPLETED | OUTPATIENT
Start: 2022-01-10 | End: 2022-01-10

## 2022-01-10 RX ADMIN — Medication 650 MILLIGRAM(S): at 17:27

## 2022-01-10 NOTE — ED PROVIDER NOTE - PATIENT PORTAL LINK FT
You can access the FollowMyHealth Patient Portal offered by [facility] by registering at the following website: [email or web address]. By joining [employer]’s FollowMyHealth portal, you will also be able to view your health information using other applications (apps) compatible with our system.

## 2022-01-10 NOTE — ED ADULT NURSE REASSESSMENT NOTE - NS ED NURSE REASSESS COMMENT FT1
patient is aware she is being discharged back to group home. Stephon Trejo () made aware. pt in NAD. awaiting transportation.

## 2022-01-10 NOTE — ED PROVIDER NOTE - PHYSICAL EXAMINATION
PE:   GEN: Awake, alert, interactive, NAD, non-toxic appearing.   HEAD AND NECK: NC/AT. Airway patent. Neck supple.   EYES: Clear b/l. PERRL  CARDIAC: RRR. S1, S2. No evident pedal edema.    RESP: Normal respiratory effort with no use of accessory muscles or retractions. Clear throughout on auscultation.  ABD: soft, non-distended, non-tender. No rebound, no guarding.   NEURO: AOx3, CN II-XII grossly intact, no focal deficits.   MSK: Moving all extremities with no apparent deformities. No bony tenderness trhoughout LLE  SKIN: 9vge9et skin tear on L knee. No discharge. No surrounding redness or swelling.

## 2022-01-10 NOTE — ED PROVIDER NOTE - NS ED ROS FT
Constitutional: (-) Fever, (-) Anorexia, (-) Generalized Malaise  Eyes: (-)Discharge, (-) Irritation,  (-) Visual changes  EARS: (-) Ear Pain, (-) Apparent hearing changes  NOSE: (-) Congestion, (-) Bloody nose  MOUTH/THROAT: (-) Vocal Changes, (-) Drooling, (-) Sore throat  NECK: (-) Lumps, (-) Stiffness, (-) Pain  CV: (-) Chest Pain, (-) Palpitations, (-) Edema   RESP:  (-) Cough, (-) SOB, (-) MESA,  (-) Wheezing  GI: (-) Nausea, (-) Vomiting, (-) Abdominal Pain, (-) Diarrhea, (-) Constipation, (-) Bloody stools  : (-) Dysuria, (-) Frequency, (-) Hematuria, (-) Incontinence  MSK: (-) Joint Pain, (-) Back Pain, (-) Deformities  SKIN: (+) Wounds, (-) Color change, (-)Rash, (-) Swelling  NEURO:(-) Headache, (-) Dizziness, (-) Numbness/Tingling,  (-)LOC

## 2022-01-10 NOTE — ED PROVIDER NOTE - CLINICAL SUMMARY MEDICAL DECISION MAKING FREE TEXT BOX
32yo female with pmh of DM, schizzoaffective disorder, presents for knee scrape from previous fall. Site is clean with no signs of infection. Previous neg xrays. FROM, ambulatory. Will celan and dress. Will give analgesia and dc.,

## 2022-01-10 NOTE — ED PROVIDER NOTE - OBJECTIVE STATEMENT
32yo female with pmh of DM, schizzoaffective disorder, presents for knee scrape from previous fall. Denies increasing pain, redness, swelling, inability to ambulate and other associated sx. Previous xray neg for bony pathology.
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
## 2022-01-10 NOTE — ED ADULT NURSE NOTE - OBJECTIVE STATEMENT
pt is 32 y/o female c/c of L knee abrasion. Pt AAOX3, displaying bizarre behavior, playing loud music, pt able to be redirected. denies suicide or homicide ideation. pt in no acute distress. PMH of DM, schizoaffective disorder, development delay, bipolar disorder.

## 2022-01-10 NOTE — ED PROVIDER NOTE - NSFOLLOWUPINSTRUCTIONS_ED_ALL_ED_FT
Keep area clean.     SEEK IMMEDIATE MEDICAL CARE IF YOU HAVE ANY OF THE FOLLOWING SYMPTOMS: swelling around the wound, worsening pain, drainage from the wound, red streaking going away from your wound, inability to move finger or toe near the wound or discoloration of skin near the wound

## 2022-01-13 ENCOUNTER — EMERGENCY (EMERGENCY)
Facility: HOSPITAL | Age: 32
LOS: 0 days | Discharge: HOME | End: 2022-01-13
Attending: EMERGENCY MEDICINE
Payer: MEDICAID

## 2022-01-13 VITALS
HEART RATE: 110 BPM | SYSTOLIC BLOOD PRESSURE: 124 MMHG | OXYGEN SATURATION: 97 % | TEMPERATURE: 98 F | DIASTOLIC BLOOD PRESSURE: 86 MMHG | RESPIRATION RATE: 16 BRPM | HEIGHT: 61 IN | WEIGHT: 250 LBS

## 2022-01-13 VITALS
SYSTOLIC BLOOD PRESSURE: 122 MMHG | DIASTOLIC BLOOD PRESSURE: 60 MMHG | OXYGEN SATURATION: 99 % | RESPIRATION RATE: 14 BRPM | HEART RATE: 74 BPM

## 2022-01-13 DIAGNOSIS — E22.1 HYPERPROLACTINEMIA: ICD-10-CM

## 2022-01-13 DIAGNOSIS — R45.6 VIOLENT BEHAVIOR: ICD-10-CM

## 2022-01-13 DIAGNOSIS — F79 UNSPECIFIED INTELLECTUAL DISABILITIES: ICD-10-CM

## 2022-01-13 DIAGNOSIS — Z20.822 CONTACT WITH AND (SUSPECTED) EXPOSURE TO COVID-19: ICD-10-CM

## 2022-01-13 DIAGNOSIS — Z91.018 ALLERGY TO OTHER FOODS: ICD-10-CM

## 2022-01-13 DIAGNOSIS — E11.9 TYPE 2 DIABETES MELLITUS WITHOUT COMPLICATIONS: ICD-10-CM

## 2022-01-13 LAB
ALBUMIN SERPL ELPH-MCNC: 2.5 G/DL — LOW (ref 3.3–5)
ALP SERPL-CCNC: 46 U/L — SIGNIFICANT CHANGE UP (ref 40–120)
ALT FLD-CCNC: 24 U/L — SIGNIFICANT CHANGE UP (ref 12–78)
AMPHET UR-MCNC: NEGATIVE — SIGNIFICANT CHANGE UP
ANION GAP SERPL CALC-SCNC: 8 MMOL/L — SIGNIFICANT CHANGE UP (ref 5–17)
ANISOCYTOSIS BLD QL: SLIGHT — SIGNIFICANT CHANGE UP
APAP SERPL-MCNC: <2 UG/ML — LOW (ref 10–30)
AST SERPL-CCNC: 21 U/L — SIGNIFICANT CHANGE UP (ref 15–37)
BARBITURATES UR SCN-MCNC: NEGATIVE — SIGNIFICANT CHANGE UP
BASOPHILS # BLD AUTO: 0 K/UL — SIGNIFICANT CHANGE UP (ref 0–0.2)
BASOPHILS NFR BLD AUTO: 0 % — SIGNIFICANT CHANGE UP (ref 0–2)
BENZODIAZ UR-MCNC: NEGATIVE — SIGNIFICANT CHANGE UP
BILIRUB SERPL-MCNC: 0.2 MG/DL — SIGNIFICANT CHANGE UP (ref 0.2–1.2)
BUN SERPL-MCNC: 23 MG/DL — SIGNIFICANT CHANGE UP (ref 7–23)
CALCIUM SERPL-MCNC: 8.7 MG/DL — SIGNIFICANT CHANGE UP (ref 8.5–10.1)
CHLORIDE SERPL-SCNC: 100 MMOL/L — SIGNIFICANT CHANGE UP (ref 96–108)
CO2 SERPL-SCNC: 30 MMOL/L — SIGNIFICANT CHANGE UP (ref 22–31)
COCAINE METAB.OTHER UR-MCNC: NEGATIVE — SIGNIFICANT CHANGE UP
CREAT SERPL-MCNC: 0.73 MG/DL — SIGNIFICANT CHANGE UP (ref 0.5–1.3)
EOSINOPHIL # BLD AUTO: 0.17 K/UL — SIGNIFICANT CHANGE UP (ref 0–0.5)
EOSINOPHIL NFR BLD AUTO: 3 % — SIGNIFICANT CHANGE UP (ref 0–6)
ETHANOL SERPL-MCNC: <10 MG/DL — SIGNIFICANT CHANGE UP (ref 0–10)
FLUAV AG NPH QL: SIGNIFICANT CHANGE UP
FLUBV AG NPH QL: SIGNIFICANT CHANGE UP
GLUCOSE SERPL-MCNC: 114 MG/DL — HIGH (ref 70–99)
HCG SERPL-ACNC: 2 MIU/ML — SIGNIFICANT CHANGE UP
HCT VFR BLD CALC: 31.5 % — LOW (ref 34.5–45)
HGB BLD-MCNC: 10.1 G/DL — LOW (ref 11.5–15.5)
LYMPHOCYTES # BLD AUTO: 1.85 K/UL — SIGNIFICANT CHANGE UP (ref 1–3.3)
LYMPHOCYTES # BLD AUTO: 33 % — SIGNIFICANT CHANGE UP (ref 13–44)
MANUAL SMEAR VERIFICATION: SIGNIFICANT CHANGE UP
MCHC RBC-ENTMCNC: 30.1 PG — SIGNIFICANT CHANGE UP (ref 27–34)
MCHC RBC-ENTMCNC: 32.1 GM/DL — SIGNIFICANT CHANGE UP (ref 32–36)
MCV RBC AUTO: 94 FL — SIGNIFICANT CHANGE UP (ref 80–100)
METHADONE UR-MCNC: NEGATIVE — SIGNIFICANT CHANGE UP
MICROCYTES BLD QL: SLIGHT — SIGNIFICANT CHANGE UP
MONOCYTES # BLD AUTO: 0.51 K/UL — SIGNIFICANT CHANGE UP (ref 0–0.9)
MONOCYTES NFR BLD AUTO: 9 % — SIGNIFICANT CHANGE UP (ref 2–14)
NEUTROPHILS # BLD AUTO: 3.09 K/UL — SIGNIFICANT CHANGE UP (ref 1.8–7.4)
NEUTROPHILS NFR BLD AUTO: 53 % — SIGNIFICANT CHANGE UP (ref 43–77)
NEUTS BAND # BLD: 2 % — SIGNIFICANT CHANGE UP (ref 0–8)
NRBC # BLD: 0 /100 — SIGNIFICANT CHANGE UP (ref 0–0)
NRBC # BLD: SIGNIFICANT CHANGE UP /100 WBCS (ref 0–0)
OPIATES UR-MCNC: NEGATIVE — SIGNIFICANT CHANGE UP
PCP SPEC-MCNC: SIGNIFICANT CHANGE UP
PCP UR-MCNC: NEGATIVE — SIGNIFICANT CHANGE UP
PLAT MORPH BLD: NORMAL — SIGNIFICANT CHANGE UP
PLATELET # BLD AUTO: 297 K/UL — SIGNIFICANT CHANGE UP (ref 150–400)
POTASSIUM SERPL-MCNC: 4 MMOL/L — SIGNIFICANT CHANGE UP (ref 3.5–5.3)
POTASSIUM SERPL-SCNC: 4 MMOL/L — SIGNIFICANT CHANGE UP (ref 3.5–5.3)
PROT SERPL-MCNC: 6.1 GM/DL — SIGNIFICANT CHANGE UP (ref 6–8.3)
RBC # BLD: 3.35 M/UL — LOW (ref 3.8–5.2)
RBC # FLD: 13.8 % — SIGNIFICANT CHANGE UP (ref 10.3–14.5)
RBC BLD AUTO: ABNORMAL
SALICYLATES SERPL-MCNC: <1.7 MG/DL — LOW (ref 2.8–20)
SARS-COV-2 RNA SPEC QL NAA+PROBE: SIGNIFICANT CHANGE UP
SODIUM SERPL-SCNC: 138 MMOL/L — SIGNIFICANT CHANGE UP (ref 135–145)
THC UR QL: NEGATIVE — SIGNIFICANT CHANGE UP
WBC # BLD: 5.62 K/UL — SIGNIFICANT CHANGE UP (ref 3.8–10.5)
WBC # FLD AUTO: 5.62 K/UL — SIGNIFICANT CHANGE UP (ref 3.8–10.5)

## 2022-01-13 PROCEDURE — 93010 ELECTROCARDIOGRAM REPORT: CPT

## 2022-01-13 PROCEDURE — 99284 EMERGENCY DEPT VISIT MOD MDM: CPT | Mod: GT

## 2022-01-13 PROCEDURE — 99285 EMERGENCY DEPT VISIT HI MDM: CPT

## 2022-01-13 RX ORDER — HALOPERIDOL DECANOATE 100 MG/ML
5 INJECTION INTRAMUSCULAR ONCE
Refills: 0 | Status: COMPLETED | OUTPATIENT
Start: 2022-01-13 | End: 2022-01-13

## 2022-01-13 RX ORDER — DIPHENHYDRAMINE HCL 50 MG
50 CAPSULE ORAL ONCE
Refills: 0 | Status: COMPLETED | OUTPATIENT
Start: 2022-01-13 | End: 2022-01-13

## 2022-01-13 RX ORDER — KETAMINE HYDROCHLORIDE 100 MG/ML
50 INJECTION INTRAMUSCULAR; INTRAVENOUS ONCE
Refills: 0 | Status: DISCONTINUED | OUTPATIENT
Start: 2022-01-13 | End: 2022-01-13

## 2022-01-13 RX ADMIN — HALOPERIDOL DECANOATE 5 MILLIGRAM(S): 100 INJECTION INTRAMUSCULAR at 04:17

## 2022-01-13 RX ADMIN — Medication 50 MILLIGRAM(S): at 04:15

## 2022-01-13 RX ADMIN — Medication 2 MILLIGRAM(S): at 04:18
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
## 2022-01-13 NOTE — ED BEHAVIORAL HEALTH ASSESSMENT NOTE - CURRENT MEDICATION
Per chart review and confimed from group home:     · 	ammonium lactate 12% topical lotion: 1 application topically every 12 hours  · 	ocular lubricant ophthalmic solution: 1 drop(s) to each affected eye every 6 hours, As needed, dry eye  · 	chlorproMAZINE 200 mg oral tablet: 1 tab(s) orally once a day (at bedtime)  · 	chlorproMAZINE 100 mg oral tablet: 1 tab(s) orally 2 times a day. Please take one tab in the morning and one tab in the afternoon at 1:00 PM.   · 	ARIPiprazole 5 mg oral tablet: 1 tab(s) orally once a day  · 	benztropine 1 mg oral tablet: 1 tab(s) orally 2 times a day  · 	FLUoxetine 40 mg oral capsule: 1 cap(s) orally once a day  · 	divalproex sodium 250 mg oral delayed release tablet: 3 tab(s) orally 2 times a day

## 2022-01-13 NOTE — ED ADULT NURSE NOTE - NSIMPLEMENTINTERV_GEN_ALL_ED
Implemented All Fall Risk Interventions:  [place] to call system. Call bell, personal items and telephone within reach. Instruct patient to call for assistance. Room bathroom lighting operational. Non-slip footwear when patient is off stretcher. Physically safe environment: no spills, clutter or unnecessary equipment. Stretcher in lowest position, wheels locked, appropriate side rails in place. Provide visual cue, wrist band, yellow gown, etc. Monitor gait and stability. Monitor for mental status changes and reorient to person, place, and time. Review medications for side effects contributing to fall risk. Reinforce activity limits and safety measures with patient and family.

## 2022-01-13 NOTE — ED PROVIDER NOTE - PHYSICAL EXAMINATION
Gen: Alert, NAD, well appearing  Head: NC, AT, EOMI, normal lids/conjunctiva  ENT: normal hearing, patent oropharynx without erythema/exudate, uvula midline  Neck: +supple, no tenderness/meningismus/JVD, +Trachea midline  Pulm: Bilateral BS, normal resp effort, no wheeze/stridor/retractions  CV: RRR, no M/R/G, +dist pulses  Abd: soft, NT/ND, Negative [place] signs, +BS, no palpable masses  Mskel: no edema/erythema/cyanosis  Skin: no rash, warm/dry  Neuro: AAOx3, no apparent sensory/motor deficits, coordination intact

## 2022-01-13 NOTE — ED BEHAVIORAL HEALTH ASSESSMENT NOTE - ADDITIONAL DETAILS ALL
As per HPI; no reported verbalizations of SI during ED course or prior to arrival. No history of suicide attempts. chart history of self injurious behavior.

## 2022-01-13 NOTE — ED BEHAVIORAL HEALTH ASSESSMENT NOTE - AXIS III
Acute: Iatrogenic sedation  Chronic: congenital/developmental disability, DM and hyperprolactinemia congenital/developmental disability, DM and hyperprolactinemia
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
## 2022-01-13 NOTE — ED BEHAVIORAL HEALTH ASSESSMENT NOTE - DESCRIPTION
Patient came into ED initially agitated and required Haldol 5 mg IM, Ativan 2 mg IM, Benadryl 50 mg IM to maintain safety at 3:41 AM; ketamine was ordered but was ultimately not needed and was not given. Patient has been calm, cooperative and in no acute distress since that time. as per HPI As per HPI
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
## 2022-01-13 NOTE — ED PROVIDER NOTE - EKG ADDITIONAL INFORMATION FREE TEXT
NSR, rate 110, no st e/d, no twi, qtc 652 NSR, rate 110, no st e/d, no twi, qtc 652 (suspect qtc of 652 is incorrect) NSR, rate 110, no st e/d, nonspecific t wave abn, qtc 652 (suspect qtc of 652 is incorrect) NSR, rate 110, no st e/d, nonspecific t wave abn, qtc 652, repeat EKG with qtc 362.

## 2022-01-13 NOTE — ED PROVIDER NOTE - PATIENT PORTAL LINK FT
You can access the FollowMyHealth Patient Portal offered by [facility] by registering at the following website: [email or web address]. By joining [facility]’s FollowMyHealth portal, you will also be able to view your health information using other applications (apps) compatible with our system.

## 2022-01-13 NOTE — ED BEHAVIORAL HEALTH ASSESSMENT NOTE - VIOLENCE RISK FACTORS:
Impulsivity/Lack of insight into violence risk/need for treatment/Community stressors that increase the risk of destabilization

## 2022-01-14 ENCOUNTER — EMERGENCY (EMERGENCY)
Facility: HOSPITAL | Age: 32
LOS: 0 days | Discharge: ANOTHER HOSPITAL | End: 2022-01-15
Attending: STUDENT IN AN ORGANIZED HEALTH CARE EDUCATION/TRAINING PROGRAM
Payer: MEDICAID

## 2022-01-14 ENCOUNTER — EMERGENCY (EMERGENCY)
Facility: HOSPITAL | Age: 32
LOS: 0 days | Discharge: HOME | End: 2022-01-14
Payer: MEDICAID

## 2022-01-14 VITALS
RESPIRATION RATE: 18 BRPM | TEMPERATURE: 98 F | DIASTOLIC BLOOD PRESSURE: 63 MMHG | OXYGEN SATURATION: 100 % | HEART RATE: 105 BPM | SYSTOLIC BLOOD PRESSURE: 103 MMHG | WEIGHT: 250 LBS | HEIGHT: 61 IN

## 2022-01-14 VITALS
SYSTOLIC BLOOD PRESSURE: 123 MMHG | HEIGHT: 61 IN | RESPIRATION RATE: 17 BRPM | OXYGEN SATURATION: 98 % | WEIGHT: 250 LBS | TEMPERATURE: 97 F | DIASTOLIC BLOOD PRESSURE: 85 MMHG | HEART RATE: 100 BPM

## 2022-01-14 DIAGNOSIS — F25.9 SCHIZOAFFECTIVE DISORDER, UNSPECIFIED: ICD-10-CM

## 2022-01-14 DIAGNOSIS — Z00.8 ENCOUNTER FOR OTHER GENERAL EXAMINATION: ICD-10-CM

## 2022-01-14 DIAGNOSIS — R45.1 RESTLESSNESS AND AGITATION: ICD-10-CM

## 2022-01-14 DIAGNOSIS — E11.9 TYPE 2 DIABETES MELLITUS WITHOUT COMPLICATIONS: ICD-10-CM

## 2022-01-14 DIAGNOSIS — Z20.822 CONTACT WITH AND (SUSPECTED) EXPOSURE TO COVID-19: ICD-10-CM

## 2022-01-14 DIAGNOSIS — F99 MENTAL DISORDER, NOT OTHERWISE SPECIFIED: ICD-10-CM

## 2022-01-14 DIAGNOSIS — Z91.018 ALLERGY TO OTHER FOODS: ICD-10-CM

## 2022-01-14 DIAGNOSIS — F79 UNSPECIFIED INTELLECTUAL DISABILITIES: ICD-10-CM

## 2022-01-14 PROCEDURE — 90792 PSYCH DIAG EVAL W/MED SRVCS: CPT | Mod: 95

## 2022-01-14 PROCEDURE — 99285 EMERGENCY DEPT VISIT HI MDM: CPT

## 2022-01-14 PROCEDURE — 99283 EMERGENCY DEPT VISIT LOW MDM: CPT

## 2022-01-14 RX ORDER — HALOPERIDOL DECANOATE 100 MG/ML
5 INJECTION INTRAMUSCULAR ONCE
Refills: 0 | Status: COMPLETED | OUTPATIENT
Start: 2022-01-14 | End: 2022-01-14

## 2022-01-14 RX ORDER — DIPHENHYDRAMINE HCL 50 MG
50 CAPSULE ORAL ONCE
Refills: 0 | Status: COMPLETED | OUTPATIENT
Start: 2022-01-14 | End: 2022-01-14

## 2022-01-14 RX ADMIN — HALOPERIDOL DECANOATE 5 MILLIGRAM(S): 100 INJECTION INTRAMUSCULAR at 22:46

## 2022-01-14 RX ADMIN — Medication 2 MILLIGRAM(S): at 22:46

## 2022-01-14 RX ADMIN — Medication 50 MILLIGRAM(S): at 23:40

## 2022-01-14 NOTE — ED BEHAVIORAL HEALTH ASSESSMENT NOTE - VIOLENCE RISK FACTORS:
Impulsivity/Lack of insight into violence risk/need for treatment/Community stressors that increase the risk of destabilization Violent ideation/threat/speech/Impulsivity/Lack of insight into violence risk/need for treatment/Community stressors that increase the risk of destabilization
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
## 2022-01-14 NOTE — ED ADULT NURSE NOTE - OBJECTIVE STATEMENT
Patient at baseline mental status, she is crying during interview. Patient doesn't want to talk about what is bothering her. Patient was d/c from hospital about 1 hour ago. She denies hi/si, ah or vh.
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
## 2022-01-14 NOTE — ED PROVIDER NOTE - PATIENT PORTAL LINK FT
You can access the FollowMyHealth Patient Portal offered by [facility] by registering at the following website: [email or web address]. By joining [facility]’s FollowMyHealth portal, you will also be able to view your health information using other applications (apps) compatible with our system.

## 2022-01-14 NOTE — ED PROVIDER NOTE - OBJECTIVE STATEMENT
31F history of schizoaffective disorder, DM, here with knee pain. She has been seen multiple times for the same. Sent from her group home. Denies new injury. NO redness, warmth or drainage. No fever or chills. Denies SI/HI/AH/VH.

## 2022-01-14 NOTE — ED BEHAVIORAL HEALTH ASSESSMENT NOTE - ADDITIONAL DETAILS ALL
As per HPI; no reported verbalizations of SI during ED course or prior to arrival. No history of suicide attempts. chart history of self injurious behavior. denies thoughts of hurting self .

## 2022-01-14 NOTE — ED PROVIDER NOTE - PROGRESS NOTE DETAILS
Zandra: pt attempted to elope from ED. Aggressive towards staff. Attempted to redirect. 5 haldol and 2 ativan for aggression. Zandra: patient again aggressive with staff, threw phone at tech, yelling and crying in ED. Telepsych aware.
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
## 2022-01-14 NOTE — ED BEHAVIORAL HEALTH ASSESSMENT NOTE - RISK ASSESSMENT
Pertinent known risk and protective factors are documented above. Low Acute Suicide Risk High Acute Suicide Risk Moderate Acute Suicide Risk

## 2022-01-14 NOTE — ED BEHAVIORAL HEALTH ASSESSMENT NOTE - CURRENT MEDICATION
Per chart review and confimed from group home:     · 	ammonium lactate 12% topical lotion: 1 application topically every 12 hours  · 	ocular lubricant ophthalmic solution: 1 drop(s) to each affected eye every 6 hours, As needed, dry eye  · 	chlorproMAZINE 200 mg oral tablet: 1 tab(s) orally once a day (at bedtime)  · 	chlorproMAZINE 100 mg oral tablet: 1 tab(s) orally 2 times a day. Please take one tab in the morning and one tab in the afternoon at 1:00 PM.   · 	ARIPiprazole 5 mg oral tablet: 1 tab(s) orally once a day  · 	benztropine 1 mg oral tablet: 1 tab(s) orally 2 times a day  · 	FLUoxetine 40 mg oral capsule: 1 cap(s) orally once a day  · 	divalproex sodium 250 mg oral delayed release tablet: 3 tab(s) orally 2 times a day Per chart review and confimed from group home 1/13/21:     · 	ammonium lactate 12% topical lotion: 1 application topically every 12 hours  · 	ocular lubricant ophthalmic solution: 1 drop(s) to each affected eye every 6 hours, As needed, dry eye  · 	chlorproMAZINE 200 mg oral tablet: 1 tab(s) orally once a day (at bedtime)  · 	chlorproMAZINE 100 mg oral tablet: 1 tab(s) orally 2 times a day. Please take one tab in the morning and one tab in the afternoon at 1:00 PM.   · 	ARIPiprazole 5 mg oral tablet: 1 tab(s) orally once a day  · 	benztropine 1 mg oral tablet: 1 tab(s) orally 2 times a day  · 	FLUoxetine 40 mg oral capsule: 1 cap(s) orally once a day  · 	divalproex sodium 250 mg oral delayed release tablet: 3 tab(s) orally 2 times a day Spoke with staff Naya at group home patient is taking the following medications   1. artificial tears ophthalmic solution: 1 drop(s) to each affected eye every 6 hours, As needed, dry eyes   Clozapine 100 mg bid,  Fluoxitine 30 mg daily, Fenna 8.6 mg daily, Miralax 71 gm daily, Pantoprazole 40 mg daily, Melatonin 10 mg hs, Metformin Er 500 mg am.

## 2022-01-14 NOTE — ED BEHAVIORAL HEALTH NOTE - BEHAVIORAL HEALTH NOTE
===================  PRE-HOSPITAL COURSE  ===================  SOURCE:  RN and triage documentation.   DETAILS:  Patient KAI from group home; chief complaint of agitation.    ============  ED COURSE   ============  SOURCE: RN and triage documentation.   ARRIVAL: Patient was uncooperative with triage process/combative while in ED. Patient gowned, wanded, and placed in private room with 1:1 observation. Patient presents with fair hygiene/grooming.   BELONGINGS: None notable.    BEHAVIOR: Per RN patient has been calm however has episodes of severe agitation earlier that required medication intervention. Patient reportedly ambulating around ED, attempted to leave, and was throwing ED equipment on the floor. Blood provided for routine labs. Patient does not endorse SI/HI/AH/VH to current RN. Patient is AOx4 with normal speech rate/occasionally volume and logical thought process, presents with irritable mood, at baseline. Patient has been resting in hospital bed.   TREATMENT: Patient has received Ativan 2mg IM 22:46, Haldol 5mg IM 22:46, and Benadryl 50mg IM 23:34 for acute agitation.   VISITORS: Patient presently unaccompanied by social supports.    COVID Exposure Screen- collateral (i.e. third-party, chart review, belongings, etc; include EMS and ED staff)      1. *Has the patient been tested for COVID-19 in the last 90 days? (X) Yes ( ) No ( ) Unknown- Reason: _____    IF YES PROCEED TO QUESTION #2. IF NO OR UNKNOWN, PLEASE SKIP TO QUESTION #3.    2. Date of test(s), type of test(s), result(s) for ALL tests in last 90 days: ____1/13, negative per charting____    3. *Has the patient received a COVID-19 vaccine? (x) Yes ( ) No ( ) Unknown- Reason: _____    IF YES PROCEED TO QUESTION #4. IF NO or UNKNOWN, PLEASE SKIP TO QUESTION #7.    4. Moderna (X) Pfizer ( ) J&J ( )    5. Number of doses: ___3_____    6. Date of last dose: ____1/3/2022____    7. *In the past 10 days, has the patient been around anyone with a positive COVID-19 test?* ( ) Yes ( ) No (X) Unknown- Reason: ____    IF YES PLEASE ANSWER THE FOLLOWING QUESTIONS:    8. Was the patient within 6 feet of them for at least 15 minutes? ( ) Yes ( ) No ( ) Unknown- Reason: _____    9. Did the patient provide care for them? ( ) Yes ( ) No ( ) Unknown- Reason: ______    10. Did the patient have direct physical contact with them (touched, hugged, or kissed them)? ( ) Yes ( ) No ( ) Unknown- Reason: __    11. Did the patient share eating or drinking utensils with them? ( ) Yes ( ) No ( ) Unknown- Reason: ____    12. Did they sneeze, cough, or somehow get respiratory droplets on the patient? ( ) Yes ( ) No ( ) Unknown- Reason: ______

## 2022-01-14 NOTE — ED BEHAVIORAL HEALTH ASSESSMENT NOTE - PRIMARY DX
Intellectual disability with other impairment of behaviour Schizoaffective disorder, unspecified type

## 2022-01-14 NOTE — ED BEHAVIORAL HEALTH ASSESSMENT NOTE - DIFFERENTIAL
Intellectual Disability with behavioral disturbance  Chart hx of Schizoaffective disorder Intellectual Disability with behavioral disturbance  Schizoaffective disorder

## 2022-01-14 NOTE — ED PROVIDER NOTE - CARE PROVIDER_API CALL
Supa Rubio (DO)  Orthopaedic Surgery  [address] [place] [address]  Phone: [phone]  Fax: [phone]  Follow Up Time:

## 2022-01-14 NOTE — ED BEHAVIORAL HEALTH ASSESSMENT NOTE - DESCRIPTION
As per HPI as per HPI Patient came into ED initially agitated and required Haldol 5 mg IM, Ativan 2 mg IM, Benadryl 50 mg IM to maintain safety at 3:41 AM; ketamine was ordered but was ultimately not needed and was not given. Patient has been calm, cooperative and in no acute distress since that time. Ativan 2 mg, Haldol 5 mg and Benadryl 50 mg
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
## 2022-01-14 NOTE — ED BEHAVIORAL HEALTH ASSESSMENT NOTE - CASE SUMMARY
32 yo F, with hx of schizoaffective disorder and ID, with numerous past similar ED presentation, who was BIB EMS activated by group home staff for agitation/aggression.  Pt remains dysregulated in the ED consistent with pattern of previous ED visits (where pt is known to have poor impulse control in the context of ID, requiring PRN sedation, and typically calm the following morning).  Will hold pt for reassessment in the morning to determine if safe dispo plan can be made.      Covid Screen - Patient  unable to obtain due to pt agitation

## 2022-01-14 NOTE — ED PROVIDER NOTE - MDM ORDERS SUBMITTED SELECTION
Patient stated she cannot make it in to see the coumadin nurse. Would like to know if she can wait till next wed,    EKG

## 2022-01-14 NOTE — ED ADULT NURSE NOTE - ED STAT RN HANDOFF DETAILS
received from ADRY Beauchamp at 19:15. Patient pending ambulance p/u. Patient in no apparent distress. Patient awake.

## 2022-01-14 NOTE — ED BEHAVIORAL HEALTH ASSESSMENT NOTE - INVOLUNTARY INTRAMUSCULAR MEDICATION DETAILS
Haldol 5 mg IM, Ativan 2 mg IM, Benadryl 50 mg IM to maintain safety at 3:41 AM Haldol 5 mg IM, Ativan 2 mg IM, Benadryl 50 mg IM to maintain safety

## 2022-01-14 NOTE — ED PROVIDER NOTE - PHYSICAL EXAMINATION
VITALS: reviewed  GEN: NAD, A & O x 4  HEAD/EYES: NCAT, EOMI, anicteric sclerae,   ENT: mucus membranes moist, oropharynx WNL, trachea midline,   RESP: unlabored breathing  CV: distal pulses intact and symmetric bilaterally  MSK: extremities atraumatic and nontender, no edema, no asymmetry.   SKIN: warm, dry, no rash, no bruising, no cyanosis. color appropriate for ethnicity  NEURO: alert, mentating appropriately, no facial asymmetry.  PSYCH: Affect appropriate
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
## 2022-01-15 ENCOUNTER — INPATIENT (INPATIENT)
Facility: HOSPITAL | Age: 32
LOS: 11 days | Discharge: NURSING, REHAB OR LONG-TERM CARE | End: 2022-01-27
Attending: PSYCHIATRY & NEUROLOGY | Admitting: PSYCHIATRY & NEUROLOGY
Payer: MEDICAID

## 2022-01-15 VITALS
SYSTOLIC BLOOD PRESSURE: 110 MMHG | OXYGEN SATURATION: 98 % | TEMPERATURE: 98 F | RESPIRATION RATE: 18 BRPM | HEART RATE: 99 BPM | DIASTOLIC BLOOD PRESSURE: 77 MMHG

## 2022-01-15 VITALS — HEIGHT: 63 IN | WEIGHT: 229.94 LBS | TEMPERATURE: 98 F

## 2022-01-15 DIAGNOSIS — F25.9 SCHIZOAFFECTIVE DISORDER, UNSPECIFIED: ICD-10-CM

## 2022-01-15 DIAGNOSIS — F25.1 SCHIZOAFFECTIVE DISORDER, DEPRESSIVE TYPE: ICD-10-CM

## 2022-01-15 DIAGNOSIS — F79 UNSPECIFIED INTELLECTUAL DISABILITIES: ICD-10-CM

## 2022-01-15 LAB
ALBUMIN SERPL ELPH-MCNC: 2.6 G/DL — LOW (ref 3.3–5)
ALP SERPL-CCNC: 45 U/L — SIGNIFICANT CHANGE UP (ref 40–120)
ALT FLD-CCNC: 23 U/L — SIGNIFICANT CHANGE UP (ref 12–78)
ANION GAP SERPL CALC-SCNC: 4 MMOL/L — LOW (ref 5–17)
APPEARANCE UR: CLEAR — SIGNIFICANT CHANGE UP
AST SERPL-CCNC: 18 U/L — SIGNIFICANT CHANGE UP (ref 15–37)
BILIRUB SERPL-MCNC: 0.2 MG/DL — SIGNIFICANT CHANGE UP (ref 0.2–1.2)
BILIRUB UR-MCNC: NEGATIVE — SIGNIFICANT CHANGE UP
BUN SERPL-MCNC: 16 MG/DL — SIGNIFICANT CHANGE UP (ref 7–23)
CALCIUM SERPL-MCNC: 8.9 MG/DL — SIGNIFICANT CHANGE UP (ref 8.5–10.1)
CHLORIDE SERPL-SCNC: 106 MMOL/L — SIGNIFICANT CHANGE UP (ref 96–108)
CO2 SERPL-SCNC: 28 MMOL/L — SIGNIFICANT CHANGE UP (ref 22–31)
COLOR SPEC: YELLOW — SIGNIFICANT CHANGE UP
CREAT SERPL-MCNC: 0.66 MG/DL — SIGNIFICANT CHANGE UP (ref 0.5–1.3)
DIFF PNL FLD: NEGATIVE — SIGNIFICANT CHANGE UP
ETHANOL SERPL-MCNC: <10 MG/DL — SIGNIFICANT CHANGE UP (ref 0–10)
FLUAV AG NPH QL: SIGNIFICANT CHANGE UP
FLUBV AG NPH QL: SIGNIFICANT CHANGE UP
GLUCOSE SERPL-MCNC: 98 MG/DL — SIGNIFICANT CHANGE UP (ref 70–99)
GLUCOSE UR QL: NEGATIVE MG/DL — SIGNIFICANT CHANGE UP
HCT VFR BLD CALC: 32.4 % — LOW (ref 34.5–45)
HGB BLD-MCNC: 10.6 G/DL — LOW (ref 11.5–15.5)
KETONES UR-MCNC: NEGATIVE — SIGNIFICANT CHANGE UP
LEUKOCYTE ESTERASE UR-ACNC: NEGATIVE — SIGNIFICANT CHANGE UP
MCHC RBC-ENTMCNC: 30.5 PG — SIGNIFICANT CHANGE UP (ref 27–34)
MCHC RBC-ENTMCNC: 32.7 GM/DL — SIGNIFICANT CHANGE UP (ref 32–36)
MCV RBC AUTO: 93.4 FL — SIGNIFICANT CHANGE UP (ref 80–100)
NITRITE UR-MCNC: NEGATIVE — SIGNIFICANT CHANGE UP
NRBC # BLD: 0 /100 WBCS — SIGNIFICANT CHANGE UP (ref 0–0)
PH UR: 6 — SIGNIFICANT CHANGE UP (ref 5–8)
PLATELET # BLD AUTO: 293 K/UL — SIGNIFICANT CHANGE UP (ref 150–400)
POTASSIUM SERPL-MCNC: 4.4 MMOL/L — SIGNIFICANT CHANGE UP (ref 3.5–5.3)
POTASSIUM SERPL-SCNC: 4.4 MMOL/L — SIGNIFICANT CHANGE UP (ref 3.5–5.3)
PROT SERPL-MCNC: 6.5 GM/DL — SIGNIFICANT CHANGE UP (ref 6–8.3)
PROT UR-MCNC: NEGATIVE MG/DL — SIGNIFICANT CHANGE UP
RBC # BLD: 3.47 M/UL — LOW (ref 3.8–5.2)
RBC # FLD: 13.7 % — SIGNIFICANT CHANGE UP (ref 10.3–14.5)
SARS-COV-2 RNA SPEC QL NAA+PROBE: SIGNIFICANT CHANGE UP
SODIUM SERPL-SCNC: 138 MMOL/L — SIGNIFICANT CHANGE UP (ref 135–145)
SP GR SPEC: 1.02 — SIGNIFICANT CHANGE UP (ref 1.01–1.02)
TSH SERPL-MCNC: 0.97 UU/ML — SIGNIFICANT CHANGE UP (ref 0.36–3.74)
UROBILINOGEN FLD QL: NEGATIVE MG/DL — SIGNIFICANT CHANGE UP
WBC # BLD: 6.16 K/UL — SIGNIFICANT CHANGE UP (ref 3.8–10.5)
WBC # FLD AUTO: 6.16 K/UL — SIGNIFICANT CHANGE UP (ref 3.8–10.5)

## 2022-01-15 PROCEDURE — 99214 OFFICE O/P EST MOD 30 MIN: CPT | Mod: 95

## 2022-01-15 RX ORDER — INSULIN LISPRO 100/ML
VIAL (ML) SUBCUTANEOUS
Refills: 0 | Status: DISCONTINUED | OUTPATIENT
Start: 2022-01-15 | End: 2022-01-27

## 2022-01-15 RX ORDER — METFORMIN HYDROCHLORIDE 850 MG/1
250 TABLET ORAL
Refills: 0 | Status: DISCONTINUED | OUTPATIENT
Start: 2022-01-15 | End: 2022-01-19

## 2022-01-15 RX ORDER — KETAMINE HYDROCHLORIDE 100 MG/ML
200 INJECTION INTRAMUSCULAR; INTRAVENOUS ONCE
Refills: 0 | Status: DISCONTINUED | OUTPATIENT
Start: 2022-01-15 | End: 2022-01-15

## 2022-01-15 RX ORDER — HALOPERIDOL DECANOATE 100 MG/ML
5 INJECTION INTRAMUSCULAR EVERY 6 HOURS
Refills: 0 | Status: DISCONTINUED | OUTPATIENT
Start: 2022-01-15 | End: 2022-01-19

## 2022-01-15 RX ORDER — SENNA PLUS 8.6 MG/1
2 TABLET ORAL AT BEDTIME
Refills: 0 | Status: DISCONTINUED | OUTPATIENT
Start: 2022-01-15 | End: 2022-01-27

## 2022-01-15 RX ORDER — LANOLIN ALCOHOL/MO/W.PET/CERES
10 CREAM (GRAM) TOPICAL AT BEDTIME
Refills: 0 | Status: DISCONTINUED | OUTPATIENT
Start: 2022-01-15 | End: 2022-01-27

## 2022-01-15 RX ORDER — PANTOPRAZOLE SODIUM 20 MG/1
40 TABLET, DELAYED RELEASE ORAL
Refills: 0 | Status: DISCONTINUED | OUTPATIENT
Start: 2022-01-15 | End: 2022-01-27

## 2022-01-15 RX ORDER — INSULIN LISPRO 100/ML
VIAL (ML) SUBCUTANEOUS AT BEDTIME
Refills: 0 | Status: DISCONTINUED | OUTPATIENT
Start: 2022-01-15 | End: 2022-01-27

## 2022-01-15 RX ORDER — HALOPERIDOL DECANOATE 100 MG/ML
5 INJECTION INTRAMUSCULAR ONCE
Refills: 0 | Status: DISCONTINUED | OUTPATIENT
Start: 2022-01-15 | End: 2022-01-19

## 2022-01-15 RX ORDER — FLUOXETINE HCL 10 MG
30 CAPSULE ORAL DAILY
Refills: 0 | Status: DISCONTINUED | OUTPATIENT
Start: 2022-01-16 | End: 2022-01-27

## 2022-01-15 RX ORDER — CLOZAPINE 150 MG/1
100 TABLET, ORALLY DISINTEGRATING ORAL
Refills: 0 | Status: DISCONTINUED | OUTPATIENT
Start: 2022-01-15 | End: 2022-01-19

## 2022-01-15 RX ORDER — DIPHENHYDRAMINE HCL 50 MG
50 CAPSULE ORAL ONCE
Refills: 0 | Status: DISCONTINUED | OUTPATIENT
Start: 2022-01-15 | End: 2022-01-19

## 2022-01-15 RX ORDER — POLYETHYLENE GLYCOL 3350 17 G/17G
17 POWDER, FOR SOLUTION ORAL AT BEDTIME
Refills: 0 | Status: DISCONTINUED | OUTPATIENT
Start: 2022-01-15 | End: 2022-01-27

## 2022-01-15 RX ADMIN — METFORMIN HYDROCHLORIDE 250 MILLIGRAM(S): 850 TABLET ORAL at 22:10

## 2022-01-15 RX ADMIN — Medication 2 MILLIGRAM(S): at 22:09

## 2022-01-15 RX ADMIN — Medication 10 MILLIGRAM(S): at 22:10

## 2022-01-15 RX ADMIN — SENNA PLUS 2 TABLET(S): 8.6 TABLET ORAL at 22:09

## 2022-01-15 RX ADMIN — CLOZAPINE 100 MILLIGRAM(S): 150 TABLET, ORALLY DISINTEGRATING ORAL at 22:09

## 2022-01-15 RX ADMIN — HALOPERIDOL DECANOATE 5 MILLIGRAM(S): 100 INJECTION INTRAMUSCULAR at 22:10

## 2022-01-15 RX ADMIN — KETAMINE HYDROCHLORIDE 200 MILLIGRAM(S): 100 INJECTION INTRAMUSCULAR; INTRAVENOUS at 01:12

## 2022-01-15 NOTE — ED ADULT NURSE REASSESSMENT NOTE - NS ED NURSE REASSESS COMMENT FT1
received report form [facility], pt on the bed awake alert, laughing, on 1:1 observation, pt identified self introduced. safety measures checked.
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
## 2022-01-16 DIAGNOSIS — R62.50 UNSPECIFIED LACK OF EXPECTED NORMAL PHYSIOLOGICAL DEVELOPMENT IN CHILDHOOD: ICD-10-CM

## 2022-01-16 DIAGNOSIS — E11.9 TYPE 2 DIABETES MELLITUS WITHOUT COMPLICATIONS: ICD-10-CM

## 2022-01-16 LAB
GLUCOSE BLDC GLUCOMTR-MCNC: 109 MG/DL — HIGH (ref 70–99)
GLUCOSE BLDC GLUCOMTR-MCNC: 110 MG/DL — HIGH (ref 70–99)
GLUCOSE BLDC GLUCOMTR-MCNC: 120 MG/DL — HIGH (ref 70–99)
GLUCOSE BLDC GLUCOMTR-MCNC: 86 MG/DL — SIGNIFICANT CHANGE UP (ref 70–99)

## 2022-01-16 PROCEDURE — 99222 1ST HOSP IP/OBS MODERATE 55: CPT

## 2022-01-16 RX ADMIN — POLYETHYLENE GLYCOL 3350 17 GRAM(S): 17 POWDER, FOR SOLUTION ORAL at 21:27

## 2022-01-16 RX ADMIN — Medication 30 MILLIGRAM(S): at 09:28

## 2022-01-16 RX ADMIN — METFORMIN HYDROCHLORIDE 250 MILLIGRAM(S): 850 TABLET ORAL at 09:28

## 2022-01-16 RX ADMIN — METFORMIN HYDROCHLORIDE 250 MILLIGRAM(S): 850 TABLET ORAL at 21:02

## 2022-01-16 RX ADMIN — CLOZAPINE 100 MILLIGRAM(S): 150 TABLET, ORALLY DISINTEGRATING ORAL at 09:28

## 2022-01-16 RX ADMIN — CLOZAPINE 100 MILLIGRAM(S): 150 TABLET, ORALLY DISINTEGRATING ORAL at 21:03

## 2022-01-16 RX ADMIN — PANTOPRAZOLE SODIUM 40 MILLIGRAM(S): 20 TABLET, DELAYED RELEASE ORAL at 09:28

## 2022-01-16 RX ADMIN — SENNA PLUS 2 TABLET(S): 8.6 TABLET ORAL at 21:02

## 2022-01-16 RX ADMIN — Medication 10 MILLIGRAM(S): at 21:03

## 2022-01-16 NOTE — BH PATIENT PROFILE - PRIMARY ROLES/RESPONSIBILITIES
RN notified pt that his prescription was switched over to [facility] on [address]. Patient verbalized understanding.   none

## 2022-01-16 NOTE — BH INPATIENT PSYCHIATRY ASSESSMENT NOTE - NSBHMETABOLIC_PSY_ALL_CORE_FT
BMI: BMI (kg/m2): 40.8 (01-15-22 @ 21:52)  HbA1c: A1C with Estimated Average Glucose Result: 5.2 % (06-02-21 @ 10:04)    Glucose: POCT Blood Glucose.: 120 mg/dL (01-16-22 @ 11:46)    BP: --  Lipid Panel:
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
## 2022-01-16 NOTE — BH INPATIENT PSYCHIATRY ASSESSMENT NOTE - MSE UNSTRUCTURED FT
MSE:  Appearance: appears stated age, dressed in gown, malodorous. Behavior: cooperative, averts eyes, in behavioral control. Motor: no PMR/PMA. No abnormal movements including tremor. Speech: no increased latency, normal rate, tone, volume. TP: linear. TC: no overt delusions, paranoia, denies SI/HI/I/P, no urges for self-harm.  Denies AH/VH. Mood: "ok." Affect: child-like, neutral Cognition: alert Fund of knowledge: intact. Attention/Concentration: limited Insight: limited. Judgment: limited. Impulse control: fair on assessment, tenuous per hx

## 2022-01-16 NOTE — BH INPATIENT PSYCHIATRY ASSESSMENT NOTE - NSBHASSESSSUMMFT_PSY_ALL_CORE
Plan:  >Legal: 9.27  >Obs: Routine, no current SI. no need for CO,   >Psychiatric Meds: Restart outpatient medication regimen: clozaril 100mg qhs and Prozac 30mg daily. Observe for tolerability and efficacy.   PRN medications:  Ativan 2mg oral Q6HR PRN for agitation and anxiety.  Haldol 5mg oral Q6HR PRN for agitation.   Benadryl 50mg oral Q6HR PRN for agitation.   Vistaril 50mg oral Q6HR PRN for anxiety.  Desyrel 50mg oral QHS PRN for insomnia.   >Labs: Admission labs reviewed, no acute findings. Labs pending for tomorrow: A1c and Lipid panel. QT/QTc: 264/362ms. Hold antipsychotics if QTc >500  >Medical:   No acute concerns. No consultations needed at this time. No indication for CIWA. Patient with consistently stable VS, During the course of treatment, will collaborate with medical team to manage medical issues.  >Diet:Regular  >Social: milieu/structured therapy  >Treatment Interventions: Groups and Individual Therapy/CBT, Motivational counseling for substance abuse related issues.   >Dispo: Collateral and dispo planning pending further symptom and medication optimization

## 2022-01-16 NOTE — BH INPATIENT PSYCHIATRY ASSESSMENT NOTE - CURRENT MEDICATION
MEDICATIONS  (STANDING):  cloZAPine 100 milliGRAM(s) Oral two times a day  FLUoxetine 30 milliGRAM(s) Oral daily  insulin lispro (ADMELOG) corrective regimen sliding scale   SubCutaneous three times a day before meals  insulin lispro (ADMELOG) corrective regimen sliding scale   SubCutaneous at bedtime  melatonin. 10 milliGRAM(s) Oral at bedtime  metFORMIN 250 milliGRAM(s) Oral two times a day  pantoprazole    Tablet 40 milliGRAM(s) Oral before breakfast  polyethylene glycol 3350 17 Gram(s) Oral at bedtime  senna 2 Tablet(s) Oral at bedtime    MEDICATIONS  (PRN):  artificial  tears Solution 1 Drop(s) Both EYES every 6 hours PRN dry eyes  diphenhydrAMINE Injectable 50 milliGRAM(s) IntraMuscular once PRN EPS ppx  haloperidol     Tablet 5 milliGRAM(s) Oral every 6 hours PRN agitation  haloperidol    Injectable 5 milliGRAM(s) IntraMuscular once PRN severe agitation  LORazepam     Tablet 2 milliGRAM(s) Oral every 6 hours PRN agitation or anxiety  LORazepam   Injectable 2 milliGRAM(s) IntraMuscular once PRN severe agitation or anxiety

## 2022-01-16 NOTE — BH INPATIENT PSYCHIATRY ASSESSMENT NOTE - OTHER PAST PSYCHIATRIC HISTORY (INCLUDE DETAILS REGARDING ONSET, COURSE OF ILLNESS, INPATIENT/OUTPATIENT TREATMENT)
multiple past admissions. Receiving psychiatric care at [facility], with past psychiatric admissions (last known to [facility] 6/2021), with h/o multiple recurrent ED visits (without admission; seen several times a month; often seen recurrent days in a row then has a limited few weeks without visits)
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
## 2022-01-16 NOTE — BH PATIENT PROFILE - FALL HARM RISK - UNIVERSAL INTERVENTIONS
Bed in lowest position, wheels locked, appropriate side rails in place/Call bell, personal items and telephone in reach/Instruct patient to call for assistance before getting out of bed or chair/Non-slip footwear when patient is out of bed/[place] to call system/Physically safe environment - no spills, clutter or unnecessary equipment/Purposeful Proactive Rounding/Room/bathroom lighting operational, light cord in reach
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
## 2022-01-16 NOTE — BH PATIENT PROFILE - HOME MEDICATIONS
ammonium lactate 12% topical lotion , 1 application topically every 12 hours  ocular lubricant ophthalmic solution , 1 drop(s) to each affected eye every 6 hours, As needed, dry eye  chlorproMAZINE 100 mg oral tablet , 1 tab(s) orally 2 times a day. Please take one tab in the morning and one tab in the afternoon at 1:00 PM.   chlorproMAZINE 200 mg oral tablet , 1 tab(s) orally once a day (at bedtime)  ARIPiprazole 5 mg oral tablet , 1 tab(s) orally once a day  benztropine 1 mg oral tablet , 1 tab(s) orally 2 times a day  FLUoxetine 40 mg oral capsule , 1 cap(s) orally once a day  divalproex sodium 250 mg oral delayed release tablet , 3 tab(s) orally 2 times a day

## 2022-01-16 NOTE — BH INPATIENT PSYCHIATRY ASSESSMENT NOTE - VIOLENCE RISK FACTORS:
Feeling of being under threat and being unable to control threat/Impulsivity/History of being victimized/traumatized/Noncompliance with treatment/Community stressors that increase the risk of destabilization/Irritability

## 2022-01-16 NOTE — BH INPATIENT PSYCHIATRY ASSESSMENT NOTE - RISK ASSESSMENT
Immediate risk is minimized by inpatient admission to safe environment with appropriate supervision and limited access to lethal means. Future risk to be minimized by treatment of agitation, maximizing outpatient supports, providing patient education, discussing emergency procedures, and ensuring close follow-up.

## 2022-01-16 NOTE — BH INPATIENT PSYCHIATRY ASSESSMENT NOTE - NSBHCHARTREVIEWVS_PSY_A_CORE FT
Vital Signs Last 24 Hrs  T(C): 36.8 (01-16-22 @ 09:01), Max: 36.8 (01-15-22 @ 21:52)  T(F): 98.2 (01-16-22 @ 09:01), Max: 98.3 (01-15-22 @ 21:52)  HR: 99 (01-15-22 @ 20:18) (95 - 99)  BP: 110/77 (01-15-22 @ 20:18) (108/73 - 110/77)  BP(mean): --  RR: 18 (01-15-22 @ 20:18) (18 - 18)  SpO2: 98% (01-15-22 @ 20:18) (97% - 98%)    Orthostatic VS  01-16-22 @ 09:01  Lying BP: 112/63 HR: 99  Sitting BP: 99/66 HR: 105  Standing BP: --/-- HR: --  Site: --  Mode: --  Orthostatic VS  01-15-22 @ 21:52  Lying BP: --/-- HR: --  Sitting BP: 121/80 HR: 103  Standing BP: 118/92 HR: 98  Site: --  Mode: --
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
## 2022-01-16 NOTE — PSYCHIATRIC REHAB INITIAL EVALUATION - NSPROEDAABILITYLEARN_GEN_A_NUR
Pt has diagnosis of Intellectual Disability and presents with limitations when engaging, relating, and communicating with others./cognitive limitations

## 2022-01-16 NOTE — BH INPATIENT PSYCHIATRY ASSESSMENT NOTE - NSBHLEGALSTATUS_PSY_ALL_CORE
[FreeTextEntry1] : 1. GERD\par \par Avoid spicy oily greasy foods\par \par Low acid diet \par \par PPI\par \par Wt control \par \par Exercise plan\par \par \par 2. BLOAT\par \par LOW FODMAP\par \par Fiber supp daily \par \par Increase fluids\par \par \par 3. GAS\par \par Avoid leafy vegetables\par \par GASEX PRN\par \par \par \par F/U in 4 weeks \par \par Continue current Tx\par  9.27 (2PC)

## 2022-01-17 LAB
A1C WITH ESTIMATED AVERAGE GLUCOSE RESULT: 5.2 % — SIGNIFICANT CHANGE UP (ref 4–5.6)
CHOLEST SERPL-MCNC: 183 MG/DL — SIGNIFICANT CHANGE UP
ESTIMATED AVERAGE GLUCOSE: 103 — SIGNIFICANT CHANGE UP
GLUCOSE BLDC GLUCOMTR-MCNC: 100 MG/DL — HIGH (ref 70–99)
GLUCOSE BLDC GLUCOMTR-MCNC: 114 MG/DL — HIGH (ref 70–99)
GLUCOSE BLDC GLUCOMTR-MCNC: 119 MG/DL — HIGH (ref 70–99)
GLUCOSE BLDC GLUCOMTR-MCNC: 148 MG/DL — HIGH (ref 70–99)
HDLC SERPL-MCNC: 84 MG/DL — SIGNIFICANT CHANGE UP
LIPID PNL WITH DIRECT LDL SERPL: 90 MG/DL — SIGNIFICANT CHANGE UP
NON HDL CHOLESTEROL: 99 MG/DL — SIGNIFICANT CHANGE UP
TRIGL SERPL-MCNC: 44 MG/DL — SIGNIFICANT CHANGE UP

## 2022-01-17 PROCEDURE — 99232 SBSQ HOSP IP/OBS MODERATE 35: CPT

## 2022-01-17 RX ADMIN — Medication 10 MILLIGRAM(S): at 20:20

## 2022-01-17 RX ADMIN — METFORMIN HYDROCHLORIDE 250 MILLIGRAM(S): 850 TABLET ORAL at 09:03

## 2022-01-17 RX ADMIN — POLYETHYLENE GLYCOL 3350 17 GRAM(S): 17 POWDER, FOR SOLUTION ORAL at 20:21

## 2022-01-17 RX ADMIN — CLOZAPINE 100 MILLIGRAM(S): 150 TABLET, ORALLY DISINTEGRATING ORAL at 20:20

## 2022-01-17 RX ADMIN — PANTOPRAZOLE SODIUM 40 MILLIGRAM(S): 20 TABLET, DELAYED RELEASE ORAL at 09:02

## 2022-01-17 RX ADMIN — Medication 30 MILLIGRAM(S): at 09:03

## 2022-01-17 RX ADMIN — CLOZAPINE 100 MILLIGRAM(S): 150 TABLET, ORALLY DISINTEGRATING ORAL at 09:02

## 2022-01-17 RX ADMIN — SENNA PLUS 2 TABLET(S): 8.6 TABLET ORAL at 20:20

## 2022-01-17 RX ADMIN — METFORMIN HYDROCHLORIDE 250 MILLIGRAM(S): 850 TABLET ORAL at 20:20
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
## 2022-01-18 LAB
GLUCOSE BLDC GLUCOMTR-MCNC: 130 MG/DL — HIGH (ref 70–99)
GLUCOSE BLDC GLUCOMTR-MCNC: 90 MG/DL — SIGNIFICANT CHANGE UP (ref 70–99)
GLUCOSE BLDC GLUCOMTR-MCNC: 94 MG/DL — SIGNIFICANT CHANGE UP (ref 70–99)
GLUCOSE BLDC GLUCOMTR-MCNC: 94 MG/DL — SIGNIFICANT CHANGE UP (ref 70–99)

## 2022-01-18 PROCEDURE — 99231 SBSQ HOSP IP/OBS SF/LOW 25: CPT

## 2022-01-18 RX ADMIN — CLOZAPINE 100 MILLIGRAM(S): 150 TABLET, ORALLY DISINTEGRATING ORAL at 08:59

## 2022-01-18 RX ADMIN — POLYETHYLENE GLYCOL 3350 17 GRAM(S): 17 POWDER, FOR SOLUTION ORAL at 20:44

## 2022-01-18 RX ADMIN — SENNA PLUS 2 TABLET(S): 8.6 TABLET ORAL at 20:45

## 2022-01-18 RX ADMIN — CLOZAPINE 100 MILLIGRAM(S): 150 TABLET, ORALLY DISINTEGRATING ORAL at 20:45

## 2022-01-18 RX ADMIN — Medication 30 MILLIGRAM(S): at 09:00

## 2022-01-18 RX ADMIN — METFORMIN HYDROCHLORIDE 250 MILLIGRAM(S): 850 TABLET ORAL at 08:59

## 2022-01-18 RX ADMIN — PANTOPRAZOLE SODIUM 40 MILLIGRAM(S): 20 TABLET, DELAYED RELEASE ORAL at 09:00

## 2022-01-18 RX ADMIN — METFORMIN HYDROCHLORIDE 250 MILLIGRAM(S): 850 TABLET ORAL at 20:44

## 2022-01-18 RX ADMIN — Medication 10 MILLIGRAM(S): at 20:44
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
## 2022-01-18 NOTE — BH SOCIAL WORK INITIAL PSYCHOSOCIAL EVALUATION - OTHER PAST PSYCHIATRIC HISTORY (INCLUDE DETAILS REGARDING ONSET, COURSE OF ILLNESS, INPATIENT/OUTPATIENT TREATMENT)
Patient was admitted due to increasingly aggressive behavior and had refused her medication, Clozapine.   She has not been aggressive on the unit but has not been taking care of her adls and is malodorous.  She has been compliant on the unit and has been seen in the day room dancing and coloring.  Patient to return to her group home and treatement at [facility].  She has Medicaid, #[id number].  Previous Note:  Per EMR, pt is developmentally delayed, has a hx of schizoaffective d/o delusional behaviors, has 1 prior inpt hospitalization last 11/20-1/21 (facility unk), had over 10 ED visits @ [facility] in the past several months due to increased aggression at the group home. Pt has a hx of violent aggressive behavior towards others, was in tx with a [facility] [place] for several yrs however was advised pt recently changed outpt provider to [facility] clinic ,pt was currently hospitalized before medication regimen began, per EMR pt has no hx of SA/SI, has AH/VH, delusions, paranoia, disorganized thoughts, has childlike demeanor and needs constant redirecting when agitated, becomes combative when symptomatic and endorsed thoughts of hurting others. Pt has no hx of substance use, no legal hx, has a medical hx of diabetes

## 2022-01-19 LAB
GLUCOSE BLDC GLUCOMTR-MCNC: 124 MG/DL — HIGH (ref 70–99)
GLUCOSE BLDC GLUCOMTR-MCNC: 132 MG/DL — HIGH (ref 70–99)
GLUCOSE BLDC GLUCOMTR-MCNC: 89 MG/DL — SIGNIFICANT CHANGE UP (ref 70–99)
SARS-COV-2 RNA SPEC QL NAA+PROBE: SIGNIFICANT CHANGE UP

## 2022-01-19 RX ORDER — CHLORPROMAZINE HCL 10 MG
50 TABLET ORAL EVERY 6 HOURS
Refills: 0 | Status: DISCONTINUED | OUTPATIENT
Start: 2022-01-19 | End: 2022-01-27

## 2022-01-19 RX ORDER — METFORMIN HYDROCHLORIDE 850 MG/1
500 TABLET ORAL
Refills: 0 | Status: DISCONTINUED | OUTPATIENT
Start: 2022-01-19 | End: 2022-01-27

## 2022-01-19 RX ORDER — CLOZAPINE 150 MG/1
100 TABLET, ORALLY DISINTEGRATING ORAL DAILY
Refills: 0 | Status: DISCONTINUED | OUTPATIENT
Start: 2022-01-19 | End: 2022-01-27

## 2022-01-19 RX ORDER — CHLORPROMAZINE HCL 10 MG
50 TABLET ORAL ONCE
Refills: 0 | Status: DISCONTINUED | OUTPATIENT
Start: 2022-01-19 | End: 2022-01-27

## 2022-01-19 RX ORDER — DIVALPROEX SODIUM 500 MG/1
500 TABLET, DELAYED RELEASE ORAL
Refills: 0 | Status: DISCONTINUED | OUTPATIENT
Start: 2022-01-19 | End: 2022-01-20

## 2022-01-19 RX ORDER — CLOZAPINE 150 MG/1
125 TABLET, ORALLY DISINTEGRATING ORAL AT BEDTIME
Refills: 0 | Status: DISCONTINUED | OUTPATIENT
Start: 2022-01-19 | End: 2022-01-21

## 2022-01-19 RX ADMIN — CLOZAPINE 100 MILLIGRAM(S): 150 TABLET, ORALLY DISINTEGRATING ORAL at 09:39

## 2022-01-19 RX ADMIN — Medication 30 MILLIGRAM(S): at 09:39

## 2022-01-19 RX ADMIN — METFORMIN HYDROCHLORIDE 250 MILLIGRAM(S): 850 TABLET ORAL at 09:38

## 2022-01-19 RX ADMIN — CLOZAPINE 125 MILLIGRAM(S): 150 TABLET, ORALLY DISINTEGRATING ORAL at 20:59

## 2022-01-19 RX ADMIN — DIVALPROEX SODIUM 500 MILLIGRAM(S): 500 TABLET, DELAYED RELEASE ORAL at 20:58

## 2022-01-19 RX ADMIN — SENNA PLUS 2 TABLET(S): 8.6 TABLET ORAL at 20:58

## 2022-01-19 RX ADMIN — POLYETHYLENE GLYCOL 3350 17 GRAM(S): 17 POWDER, FOR SOLUTION ORAL at 21:00

## 2022-01-19 RX ADMIN — METFORMIN HYDROCHLORIDE 500 MILLIGRAM(S): 850 TABLET ORAL at 20:58

## 2022-01-19 RX ADMIN — Medication 10 MILLIGRAM(S): at 20:58

## 2022-01-19 RX ADMIN — PANTOPRAZOLE SODIUM 40 MILLIGRAM(S): 20 TABLET, DELAYED RELEASE ORAL at 09:38
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
## 2022-01-20 LAB
BASOPHILS # BLD AUTO: 0.02 K/UL — SIGNIFICANT CHANGE UP (ref 0–0.2)
BASOPHILS NFR BLD AUTO: 0.3 % — SIGNIFICANT CHANGE UP (ref 0–2)
CRP SERPL-MCNC: 15.7 MG/L — HIGH
EOSINOPHIL # BLD AUTO: 0.09 K/UL — SIGNIFICANT CHANGE UP (ref 0–0.5)
EOSINOPHIL NFR BLD AUTO: 1.4 % — SIGNIFICANT CHANGE UP (ref 0–6)
GLUCOSE BLDC GLUCOMTR-MCNC: 128 MG/DL — HIGH (ref 70–99)
GLUCOSE BLDC GLUCOMTR-MCNC: 93 MG/DL — SIGNIFICANT CHANGE UP (ref 70–99)
HCT VFR BLD CALC: 31.1 % — LOW (ref 34.5–45)
HGB BLD-MCNC: 9.7 G/DL — LOW (ref 11.5–15.5)
IANC: 3.87 K/UL — SIGNIFICANT CHANGE UP (ref 1.5–8.5)
IMM GRANULOCYTES NFR BLD AUTO: 0.9 % — SIGNIFICANT CHANGE UP (ref 0–1.5)
LYMPHOCYTES # BLD AUTO: 1.63 K/UL — SIGNIFICANT CHANGE UP (ref 1–3.3)
LYMPHOCYTES # BLD AUTO: 25.3 % — SIGNIFICANT CHANGE UP (ref 13–44)
MCHC RBC-ENTMCNC: 30.5 PG — SIGNIFICANT CHANGE UP (ref 27–34)
MCHC RBC-ENTMCNC: 31.2 GM/DL — LOW (ref 32–36)
MCV RBC AUTO: 97.8 FL — SIGNIFICANT CHANGE UP (ref 80–100)
MONOCYTES # BLD AUTO: 0.78 K/UL — SIGNIFICANT CHANGE UP (ref 0–0.9)
MONOCYTES NFR BLD AUTO: 12.1 % — SIGNIFICANT CHANGE UP (ref 2–14)
NEUTROPHILS # BLD AUTO: 3.87 K/UL — SIGNIFICANT CHANGE UP (ref 1.8–7.4)
NEUTROPHILS NFR BLD AUTO: 60 % — SIGNIFICANT CHANGE UP (ref 43–77)
NRBC # BLD: 0 /100 WBCS — SIGNIFICANT CHANGE UP
NRBC # FLD: 0.02 K/UL — HIGH
PLATELET # BLD AUTO: 273 K/UL — SIGNIFICANT CHANGE UP (ref 150–400)
RBC # BLD: 3.18 M/UL — LOW (ref 3.8–5.2)
RBC # FLD: 13.6 % — SIGNIFICANT CHANGE UP (ref 10.3–14.5)
TROPONIN T, HIGH SENSITIVITY RESULT: <6 NG/L — SIGNIFICANT CHANGE UP
WBC # BLD: 6.45 K/UL — SIGNIFICANT CHANGE UP (ref 3.8–10.5)
WBC # FLD AUTO: 6.45 K/UL — SIGNIFICANT CHANGE UP (ref 3.8–10.5)

## 2022-01-20 PROCEDURE — 99232 SBSQ HOSP IP/OBS MODERATE 35: CPT

## 2022-01-20 RX ORDER — DIVALPROEX SODIUM 500 MG/1
750 TABLET, DELAYED RELEASE ORAL
Refills: 0 | Status: DISCONTINUED | OUTPATIENT
Start: 2022-01-20 | End: 2022-01-27

## 2022-01-20 RX ADMIN — Medication 10 MILLIGRAM(S): at 21:08

## 2022-01-20 RX ADMIN — METFORMIN HYDROCHLORIDE 500 MILLIGRAM(S): 850 TABLET ORAL at 11:52

## 2022-01-20 RX ADMIN — Medication 30 MILLIGRAM(S): at 11:52

## 2022-01-20 RX ADMIN — CLOZAPINE 125 MILLIGRAM(S): 150 TABLET, ORALLY DISINTEGRATING ORAL at 20:20

## 2022-01-20 RX ADMIN — DIVALPROEX SODIUM 500 MILLIGRAM(S): 500 TABLET, DELAYED RELEASE ORAL at 11:52

## 2022-01-20 RX ADMIN — DIVALPROEX SODIUM 750 MILLIGRAM(S): 500 TABLET, DELAYED RELEASE ORAL at 20:20

## 2022-01-20 RX ADMIN — SENNA PLUS 2 TABLET(S): 8.6 TABLET ORAL at 21:08

## 2022-01-20 RX ADMIN — CLOZAPINE 100 MILLIGRAM(S): 150 TABLET, ORALLY DISINTEGRATING ORAL at 11:52

## 2022-01-20 RX ADMIN — POLYETHYLENE GLYCOL 3350 17 GRAM(S): 17 POWDER, FOR SOLUTION ORAL at 21:08

## 2022-01-20 RX ADMIN — PANTOPRAZOLE SODIUM 40 MILLIGRAM(S): 20 TABLET, DELAYED RELEASE ORAL at 11:53

## 2022-01-20 RX ADMIN — METFORMIN HYDROCHLORIDE 500 MILLIGRAM(S): 850 TABLET ORAL at 21:08

## 2022-01-20 NOTE — BH PSYCHOLOGY - CLINICIAN PSYCHOTHERAPY NOTE - NSBHPSYCHOLGOALS_PSY_A_CORE
Decrease symptoms/Assessment/Improve level of independent functioning/Improve social/vocational/coping skills/Psychoeducation/Treatment compliance

## 2022-01-20 NOTE — BH PSYCHOLOGY - CLINICIAN PSYCHOTHERAPY NOTE - NSBHPSYCHOLPROBSFT_PSY_ALL_CORE
Schizoaffective disorder   F25.9  Developmental delay, severe   R62.50  DM (diabetes mellitus)   E11.9  Will demonstrate purposeful and predictable thoughts/behaviors by making a request

## 2022-01-20 NOTE — BH PSYCHOLOGY - CLINICIAN PSYCHOTHERAPY NOTE - NSBHPSYCHOLINT_PSY_A_CORE
Cognitive/behavioral therapy/Problem-solving techniques discussed/Stress management/Supported coping skills/Treatment compliance encouraged

## 2022-01-20 NOTE — BH PSYCHOLOGY - CLINICIAN PSYCHOTHERAPY NOTE - NSTXVIOLNTGOALOTHER_PSY_ALL_CORE
Pt will identify and utilize 2 coping skills that assist in de-escalation when starting to display violent or aggressive behaviors by day 7.
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
## 2022-01-20 NOTE — BH PSYCHOLOGY - CLINICIAN PSYCHOTHERAPY NOTE - NSBHPSYCHOLNARRATIVE_PSY_A_CORE FT
Treatment team requested a behavior plan be written for the Patient due to her behaviors last evening which resulted in a four point restraint. Patient was unable to follow redirection and became verbally aggressive towards the staff. Behavior plan was drawn up and reviewed by the team nurse and nurse practitioner. Treatment team then went to present the plan. Initially, the patient feigned sleeping but opened her eyes when the team reviewed the plan with her. Writer clarified points and expectations of the patient during the explanation. Patient inquired about discharge. She did not have questions for the team afterwards.    Behavior Plan    Patient Name: 	Eloise VILLEGAS  Date of Plan:	1/20/22    Explain the behavior you want to change: Ms. Villegas has had difficulty following staff redirection and has been unable to maintain appropriate behaviors (i.e., making demands, cursing at staff, etc.).  Reasons for the behavior: She has had difficulty expressing her needs and becomes agitated when her needs are not met.  She requires frequent redirection and intervention from staff in order to control her behavior.   Appropriate replacement behavior(s):   According to the Individual Safety Plan, the following behaviors help her maintain control:  ?	Time away/Going to her room  ?	Engaging/problem-solving with staff  ?	Being reminded of unit expectations/limit-setting/structure  Support – How to make the adaptive/healthy behavior happen more often:  ?	All personal items will be removed from Ms. Villegas’s room   o	If Ms. Villegas is able to stay in behavioral control for the weekend (i.e., displaying appropriate behaviors and requests, following staff redirection, etc.) team will allow her to begin earning her personal items on a schedule next week. If she is unable to do so, the clock will reset until behavioral control is observed for three consecutive days.    **Due to the Patient’s Aggression and Difficulties Engaging Appropriately: If Patient becomes verbally/physically aggressive towards staff/patients OR refuses to follow staff redirection:  o	Staff will ask Patient to go to room and redirect three times (allowing 30 seconds each time) to comply;  o	If Patient does not comply after the Third Request:  •	Call Support Team  •	Escort to room (or seclusion room)  •	Prepare oral (and IM) PRN medications and encourage oral PRN medications first: one offer.  o	If Patient still does not comply, then:  •	Call Psych Emergency  •	Escort to room (or seclusion room)  •	Encourage IM medications  (Restraints / Seclusion to be used to maintain safety of the unit and as a last resort.)    Evaluate – How will you know if it is working:   ?	 Ms. Villegas will be better able to control her behavior and will exhibit decreased agitation and aggression.

## 2022-01-21 LAB
GLUCOSE BLDC GLUCOMTR-MCNC: 113 MG/DL — HIGH (ref 70–99)
GLUCOSE BLDC GLUCOMTR-MCNC: 125 MG/DL — HIGH (ref 70–99)
GLUCOSE BLDC GLUCOMTR-MCNC: 150 MG/DL — HIGH (ref 70–99)

## 2022-01-21 PROCEDURE — 99232 SBSQ HOSP IP/OBS MODERATE 35: CPT

## 2022-01-21 RX ORDER — BACITRACIN ZINC 500 UNIT/G
1 OINTMENT IN PACKET (EA) TOPICAL DAILY
Refills: 0 | Status: DISCONTINUED | OUTPATIENT
Start: 2022-01-21 | End: 2022-01-27

## 2022-01-21 RX ORDER — CLOZAPINE 150 MG/1
150 TABLET, ORALLY DISINTEGRATING ORAL AT BEDTIME
Refills: 0 | Status: DISCONTINUED | OUTPATIENT
Start: 2022-01-21 | End: 2022-01-27

## 2022-01-21 RX ADMIN — METFORMIN HYDROCHLORIDE 500 MILLIGRAM(S): 850 TABLET ORAL at 08:14

## 2022-01-21 RX ADMIN — CLOZAPINE 100 MILLIGRAM(S): 150 TABLET, ORALLY DISINTEGRATING ORAL at 08:14

## 2022-01-21 RX ADMIN — DIVALPROEX SODIUM 750 MILLIGRAM(S): 500 TABLET, DELAYED RELEASE ORAL at 20:19

## 2022-01-21 RX ADMIN — Medication 10 MILLIGRAM(S): at 20:19

## 2022-01-21 RX ADMIN — CLOZAPINE 150 MILLIGRAM(S): 150 TABLET, ORALLY DISINTEGRATING ORAL at 20:19

## 2022-01-21 RX ADMIN — PANTOPRAZOLE SODIUM 40 MILLIGRAM(S): 20 TABLET, DELAYED RELEASE ORAL at 08:14

## 2022-01-21 RX ADMIN — SENNA PLUS 2 TABLET(S): 8.6 TABLET ORAL at 20:19

## 2022-01-21 RX ADMIN — DIVALPROEX SODIUM 750 MILLIGRAM(S): 500 TABLET, DELAYED RELEASE ORAL at 08:14

## 2022-01-21 RX ADMIN — POLYETHYLENE GLYCOL 3350 17 GRAM(S): 17 POWDER, FOR SOLUTION ORAL at 20:18

## 2022-01-21 RX ADMIN — METFORMIN HYDROCHLORIDE 500 MILLIGRAM(S): 850 TABLET ORAL at 20:19

## 2022-01-21 RX ADMIN — Medication 30 MILLIGRAM(S): at 08:14
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
## 2022-01-22 LAB
GLUCOSE BLDC GLUCOMTR-MCNC: 100 MG/DL — HIGH (ref 70–99)
GLUCOSE BLDC GLUCOMTR-MCNC: 107 MG/DL — HIGH (ref 70–99)
GLUCOSE BLDC GLUCOMTR-MCNC: 127 MG/DL — HIGH (ref 70–99)
GLUCOSE BLDC GLUCOMTR-MCNC: 132 MG/DL — HIGH (ref 70–99)
SARS-COV-2 RNA SPEC QL NAA+PROBE: SIGNIFICANT CHANGE UP

## 2022-01-22 RX ADMIN — POLYETHYLENE GLYCOL 3350 17 GRAM(S): 17 POWDER, FOR SOLUTION ORAL at 20:27

## 2022-01-22 RX ADMIN — Medication 10 MILLIGRAM(S): at 20:28

## 2022-01-22 RX ADMIN — Medication 30 MILLIGRAM(S): at 08:16

## 2022-01-22 RX ADMIN — Medication 1 APPLICATION(S): at 08:17

## 2022-01-22 RX ADMIN — DIVALPROEX SODIUM 750 MILLIGRAM(S): 500 TABLET, DELAYED RELEASE ORAL at 08:16

## 2022-01-22 RX ADMIN — METFORMIN HYDROCHLORIDE 500 MILLIGRAM(S): 850 TABLET ORAL at 20:28

## 2022-01-22 RX ADMIN — METFORMIN HYDROCHLORIDE 500 MILLIGRAM(S): 850 TABLET ORAL at 08:16

## 2022-01-22 RX ADMIN — CLOZAPINE 150 MILLIGRAM(S): 150 TABLET, ORALLY DISINTEGRATING ORAL at 20:28

## 2022-01-22 RX ADMIN — CLOZAPINE 100 MILLIGRAM(S): 150 TABLET, ORALLY DISINTEGRATING ORAL at 08:16

## 2022-01-22 RX ADMIN — DIVALPROEX SODIUM 750 MILLIGRAM(S): 500 TABLET, DELAYED RELEASE ORAL at 20:28

## 2022-01-22 RX ADMIN — SENNA PLUS 2 TABLET(S): 8.6 TABLET ORAL at 20:27

## 2022-01-22 RX ADMIN — PANTOPRAZOLE SODIUM 40 MILLIGRAM(S): 20 TABLET, DELAYED RELEASE ORAL at 08:16

## 2022-01-23 LAB
GLUCOSE BLDC GLUCOMTR-MCNC: 130 MG/DL — HIGH (ref 70–99)
GLUCOSE BLDC GLUCOMTR-MCNC: 179 MG/DL — HIGH (ref 70–99)

## 2022-01-23 RX ADMIN — CLOZAPINE 150 MILLIGRAM(S): 150 TABLET, ORALLY DISINTEGRATING ORAL at 20:46

## 2022-01-23 RX ADMIN — Medication 10 MILLIGRAM(S): at 20:46

## 2022-01-23 RX ADMIN — Medication 30 MILLIGRAM(S): at 08:20

## 2022-01-23 RX ADMIN — Medication 1 APPLICATION(S): at 08:21

## 2022-01-23 RX ADMIN — SENNA PLUS 2 TABLET(S): 8.6 TABLET ORAL at 20:46

## 2022-01-23 RX ADMIN — DIVALPROEX SODIUM 750 MILLIGRAM(S): 500 TABLET, DELAYED RELEASE ORAL at 20:46

## 2022-01-23 RX ADMIN — METFORMIN HYDROCHLORIDE 500 MILLIGRAM(S): 850 TABLET ORAL at 20:46

## 2022-01-23 RX ADMIN — DIVALPROEX SODIUM 750 MILLIGRAM(S): 500 TABLET, DELAYED RELEASE ORAL at 08:21

## 2022-01-23 RX ADMIN — PANTOPRAZOLE SODIUM 40 MILLIGRAM(S): 20 TABLET, DELAYED RELEASE ORAL at 08:22

## 2022-01-23 RX ADMIN — POLYETHYLENE GLYCOL 3350 17 GRAM(S): 17 POWDER, FOR SOLUTION ORAL at 20:47

## 2022-01-23 RX ADMIN — METFORMIN HYDROCHLORIDE 500 MILLIGRAM(S): 850 TABLET ORAL at 08:20

## 2022-01-24 LAB
GLUCOSE BLDC GLUCOMTR-MCNC: 106 MG/DL — HIGH (ref 70–99)
GLUCOSE BLDC GLUCOMTR-MCNC: 126 MG/DL — HIGH (ref 70–99)
GLUCOSE BLDC GLUCOMTR-MCNC: 127 MG/DL — HIGH (ref 70–99)
GLUCOSE BLDC GLUCOMTR-MCNC: 129 MG/DL — HIGH (ref 70–99)

## 2022-01-24 PROCEDURE — 99231 SBSQ HOSP IP/OBS SF/LOW 25: CPT

## 2022-01-24 RX ADMIN — METFORMIN HYDROCHLORIDE 500 MILLIGRAM(S): 850 TABLET ORAL at 20:33

## 2022-01-24 RX ADMIN — CLOZAPINE 150 MILLIGRAM(S): 150 TABLET, ORALLY DISINTEGRATING ORAL at 20:32

## 2022-01-24 RX ADMIN — DIVALPROEX SODIUM 750 MILLIGRAM(S): 500 TABLET, DELAYED RELEASE ORAL at 20:33

## 2022-01-24 RX ADMIN — Medication 10 MILLIGRAM(S): at 20:32

## 2022-01-24 RX ADMIN — DIVALPROEX SODIUM 750 MILLIGRAM(S): 500 TABLET, DELAYED RELEASE ORAL at 08:17

## 2022-01-24 RX ADMIN — POLYETHYLENE GLYCOL 3350 17 GRAM(S): 17 POWDER, FOR SOLUTION ORAL at 20:33

## 2022-01-24 RX ADMIN — SENNA PLUS 2 TABLET(S): 8.6 TABLET ORAL at 20:33

## 2022-01-24 RX ADMIN — Medication 30 MILLIGRAM(S): at 08:17

## 2022-01-24 RX ADMIN — CLOZAPINE 100 MILLIGRAM(S): 150 TABLET, ORALLY DISINTEGRATING ORAL at 08:17

## 2022-01-24 RX ADMIN — Medication 1 APPLICATION(S): at 08:17

## 2022-01-24 NOTE — BH INPATIENT PSYCHIATRY PROGRESS NOTE - NSBHFUPINTERVALHXFT_PSY_A_CORE
Patient is followed up for Intellectual disability and Schizoaffective disorder, admitted for increasing aggression at her group home.  Chart reviewed, case discussed with treatment team. No events reported overnight. Pt initially elevated and happy because she thought she was leaving. Pt is childlike; pt believed she was being discharged today, but when reality tested, pt did become upset. Though upset, pt was able to maintain control, briefly cursed at this writer, and walked away without incident. Pt provided with positive reinforcement for maintaining control later in the day. Improved ADLs with staff assistance. Patient reports feeling “good.” Pt at time of interview patient denies SI/SIB/HI, no formulated plan or intent, and engages in safety planning. Denies HIIP, A/VH. Pt is discharge perseverative. POCT/BS: 106. Pt offers no complaints. Clozapine increased to 100 mg PO QD and 150 mg PO QHS

## 2022-01-25 LAB
CLOZAPINE SERPL-MCNC: 81 NG/ML — LOW (ref 350–600)
CLOZAPINE SPEC-SCNC: 116 NG/ML — LOW
GLUCOSE BLDC GLUCOMTR-MCNC: 106 MG/DL — HIGH (ref 70–99)
GLUCOSE BLDC GLUCOMTR-MCNC: 111 MG/DL — HIGH (ref 70–99)
GLUCOSE BLDC GLUCOMTR-MCNC: 113 MG/DL — HIGH (ref 70–99)
NORCLOZAPINE SERPL-MCNC: 35 NG/ML — SIGNIFICANT CHANGE UP

## 2022-01-25 PROCEDURE — 99231 SBSQ HOSP IP/OBS SF/LOW 25: CPT

## 2022-01-25 RX ORDER — PANTOPRAZOLE SODIUM 20 MG/1
1 TABLET, DELAYED RELEASE ORAL
Qty: 14 | Refills: 0
Start: 2022-01-25 | End: 2022-02-07

## 2022-01-25 RX ORDER — POLYETHYLENE GLYCOL 3350 17 G/17G
17 POWDER, FOR SOLUTION ORAL
Qty: 1 | Refills: 0
Start: 2022-01-25 | End: 2022-02-07

## 2022-01-25 RX ORDER — FLUOXETINE HCL 10 MG
3 CAPSULE ORAL
Qty: 42 | Refills: 0
Start: 2022-01-25 | End: 2022-02-07

## 2022-01-25 RX ORDER — METFORMIN HYDROCHLORIDE 850 MG/1
1 TABLET ORAL
Qty: 28 | Refills: 0
Start: 2022-01-25 | End: 2022-02-07

## 2022-01-25 RX ORDER — POLYETHYLENE GLYCOL 3350 17 G/17G
17 POWDER, FOR SOLUTION ORAL
Qty: 1 | Refills: 0
Start: 2022-01-25 | End: 2023-01-11

## 2022-01-25 RX ORDER — DIVALPROEX SODIUM 500 MG/1
3 TABLET, DELAYED RELEASE ORAL
Qty: 84 | Refills: 0
Start: 2022-01-25 | End: 2022-02-07

## 2022-01-25 RX ORDER — SENNA PLUS 8.6 MG/1
2 TABLET ORAL
Qty: 28 | Refills: 0
Start: 2022-01-25 | End: 2023-01-11

## 2022-01-25 RX ORDER — SENNA PLUS 8.6 MG/1
2 TABLET ORAL
Qty: 28 | Refills: 0
Start: 2022-01-25 | End: 2022-02-07

## 2022-01-25 RX ORDER — CLOZAPINE 150 MG/1
1 TABLET, ORALLY DISINTEGRATING ORAL
Qty: 7 | Refills: 0
Start: 2022-01-25 | End: 2022-01-31

## 2022-01-25 RX ORDER — CLOZAPINE 150 MG/1
1 TABLET, ORALLY DISINTEGRATING ORAL
Qty: 14 | Refills: 0
Start: 2022-01-25 | End: 2022-01-31

## 2022-01-25 RX ORDER — LANOLIN ALCOHOL/MO/W.PET/CERES
1 CREAM (GRAM) TOPICAL
Qty: 14 | Refills: 0
Start: 2022-01-25 | End: 2022-02-07

## 2022-01-25 RX ADMIN — CLOZAPINE 100 MILLIGRAM(S): 150 TABLET, ORALLY DISINTEGRATING ORAL at 09:34

## 2022-01-25 RX ADMIN — METFORMIN HYDROCHLORIDE 500 MILLIGRAM(S): 850 TABLET ORAL at 20:18

## 2022-01-25 RX ADMIN — DIVALPROEX SODIUM 750 MILLIGRAM(S): 500 TABLET, DELAYED RELEASE ORAL at 09:34

## 2022-01-25 RX ADMIN — METFORMIN HYDROCHLORIDE 500 MILLIGRAM(S): 850 TABLET ORAL at 09:34

## 2022-01-25 RX ADMIN — DIVALPROEX SODIUM 750 MILLIGRAM(S): 500 TABLET, DELAYED RELEASE ORAL at 20:18

## 2022-01-25 RX ADMIN — Medication 30 MILLIGRAM(S): at 09:33

## 2022-01-25 RX ADMIN — Medication 10 MILLIGRAM(S): at 20:18

## 2022-01-25 RX ADMIN — Medication 1 APPLICATION(S): at 09:34

## 2022-01-25 RX ADMIN — CLOZAPINE 150 MILLIGRAM(S): 150 TABLET, ORALLY DISINTEGRATING ORAL at 20:18

## 2022-01-25 RX ADMIN — POLYETHYLENE GLYCOL 3350 17 GRAM(S): 17 POWDER, FOR SOLUTION ORAL at 20:17

## 2022-01-25 RX ADMIN — SENNA PLUS 2 TABLET(S): 8.6 TABLET ORAL at 20:17

## 2022-01-25 RX ADMIN — PANTOPRAZOLE SODIUM 40 MILLIGRAM(S): 20 TABLET, DELAYED RELEASE ORAL at 09:34

## 2022-01-25 NOTE — BH INPATIENT PSYCHIATRY PROGRESS NOTE - NSBHFUPINTERVALHXFT_PSY_A_CORE
Patient is followed up for Intellectual disability and Schizoaffective disorder, admitted for increasing aggression at her group home.  Chart reviewed, case discussed with treatment team. No events reported overnight. Pt initially elevated and happy; pt disappointed with news that SW was unable to contact her group home for discharge planning yesterday, but was able to maintain behavioral control. Pt provided with a great deal of positive reinforcement for this. Pt is childlike; reporting that her fixed delusions of having 500 babies in her stomach were making her eat more food, laughing at herself. Denies AH of the babies today. Improved ADLs with staff assistance. Patient reports feeling “good.” Pt at time of interview patient denies SI/SIB/HI, no formulated plan or intent, and engages in safety planning. Denies HIIP, A/VH. Pt is discharge perseverative. Pt offers no complaints. Clozapine increased to 100 mg PO QD and 150 mg PO QHS. Projected discharge this week

## 2022-01-26 LAB
GLUCOSE BLDC GLUCOMTR-MCNC: 111 MG/DL — HIGH (ref 70–99)
GLUCOSE BLDC GLUCOMTR-MCNC: 114 MG/DL — HIGH (ref 70–99)
GLUCOSE BLDC GLUCOMTR-MCNC: 126 MG/DL — HIGH (ref 70–99)
GLUCOSE BLDC GLUCOMTR-MCNC: 93 MG/DL — SIGNIFICANT CHANGE UP (ref 70–99)
SARS-COV-2 RNA SPEC QL NAA+PROBE: SIGNIFICANT CHANGE UP

## 2022-01-26 PROCEDURE — 99231 SBSQ HOSP IP/OBS SF/LOW 25: CPT

## 2022-01-26 RX ADMIN — Medication 1 APPLICATION(S): at 08:24

## 2022-01-26 RX ADMIN — Medication 30 MILLIGRAM(S): at 08:25

## 2022-01-26 RX ADMIN — METFORMIN HYDROCHLORIDE 500 MILLIGRAM(S): 850 TABLET ORAL at 08:25

## 2022-01-26 RX ADMIN — DIVALPROEX SODIUM 750 MILLIGRAM(S): 500 TABLET, DELAYED RELEASE ORAL at 08:24

## 2022-01-26 RX ADMIN — Medication 10 MILLIGRAM(S): at 20:18

## 2022-01-26 RX ADMIN — CLOZAPINE 100 MILLIGRAM(S): 150 TABLET, ORALLY DISINTEGRATING ORAL at 08:24

## 2022-01-26 RX ADMIN — DIVALPROEX SODIUM 750 MILLIGRAM(S): 500 TABLET, DELAYED RELEASE ORAL at 20:19

## 2022-01-26 RX ADMIN — POLYETHYLENE GLYCOL 3350 17 GRAM(S): 17 POWDER, FOR SOLUTION ORAL at 20:19

## 2022-01-26 RX ADMIN — PANTOPRAZOLE SODIUM 40 MILLIGRAM(S): 20 TABLET, DELAYED RELEASE ORAL at 08:26

## 2022-01-26 RX ADMIN — CLOZAPINE 150 MILLIGRAM(S): 150 TABLET, ORALLY DISINTEGRATING ORAL at 20:18

## 2022-01-26 RX ADMIN — METFORMIN HYDROCHLORIDE 500 MILLIGRAM(S): 850 TABLET ORAL at 20:18

## 2022-01-26 RX ADMIN — SENNA PLUS 2 TABLET(S): 8.6 TABLET ORAL at 20:18
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
## 2022-01-26 NOTE — BH INPATIENT PSYCHIATRY DISCHARGE NOTE - VIOLENCE RISK FACTORS:
Feeling of being under threat and being unable to control threat/Affective dysregulation/Impulsivity/History of being victimized/traumatized/Irritability

## 2022-01-26 NOTE — BH DISCHARGE NOTE NURSING/SOCIAL WORK/PSYCH REHAB - NSDCVIVACCINE_GEN_ALL_CORE_FT
none Tdap; 04-Jan-2022 22:34; Sharlene Montez (RN); [employer]; [id number] (Exp. Date: 04-Jan-2022); IntraMuscular; Deltoid Left.; 0.5 milliLiter(s); VIS (VIS Published: 09-May-2013, VIS Presented: 04-Jan-2022);

## 2022-01-26 NOTE — BH INPATIENT PSYCHIATRY DISCHARGE NOTE - NSBHDCRISKMITIGATE_PSY_ALL_CORE
Safety planning/Family/Other social support involvement/Medications targeting suicidality/non-suicidal self injurious behavior/Other

## 2022-01-26 NOTE — BH DISCHARGE NOTE NURSING/SOCIAL WORK/PSYCH REHAB - NSDCPEEDUCATION_PSY_ALL_CORE
Diabetes Coronavirus/COVID19/Diabetes/Healthy Living/Safe and Effective Use of Medications/Relapse Prevention

## 2022-01-26 NOTE — BH DISCHARGE NOTE NURSING/SOCIAL WORK/PSYCH REHAB - NSDCPRRECOMMEND_PSY_ALL_CORE
Psychiatric Rehabilitation staff recommends that the patient engage in outpatient services for continued medication and symptom management. Upon discharge, patient has an appointment scheduled with  [facility] - Dr. Guzman.

## 2022-01-26 NOTE — BH DISCHARGE NOTE NURSING/SOCIAL WORK/PSYCH REHAB - NSDCADDINFO1FT_PSY_ALL_CORE
Marbella Quinn  [phone]  Primary Coordinator - [facility]  [email or web address]

## 2022-01-26 NOTE — BH INPATIENT PSYCHIATRY DISCHARGE NOTE - OTHER PAST PSYCHIATRIC HISTORY (INCLUDE DETAILS REGARDING ONSET, COURSE OF ILLNESS, INPATIENT/OUTPATIENT TREATMENT)
Patient was admitted due to increasingly aggressive behavior and had refused her medication, Clozapine.   She has not been aggressive on the unit but has not been taking care of her adls and is malodorous.  She has been compliant on the unit and has been seen in the day room dancing and coloring.  Patient to return to her group home and treatement at [facility].  She has Medicaid, #[id number].  Previous Note:  Per EMR, pt is developmentally delayed, has a hx of schizoaffective d/o delusional behaviors, has 1 prior inpt hospitalization last 11/20-1/21 (facility unk), had over 10 ED visits @ [facility] in the past several months due to increased aggression at the group home. Pt has a hx of violent aggressive behavior towards others, was in tx with a [facility] [place] for several yrs however was advised pt recently changed outpt provider to [facility] clinic ,pt was currently hospitalized before medication regimen began, per EMR pt has no hx of SA/SI, has AH/VH, delusions, paranoia, disorganized thoughts, has childlike demeanor and needs constant redirecting when agitated, becomes combative when symptomatic and endorsed thoughts of hurting others. Pt has no hx of substance use, no legal hx, has a medical hx of diabetes

## 2022-01-26 NOTE — BH DISCHARGE NOTE NURSING/SOCIAL WORK/PSYCH REHAB - PATIENT PORTAL LINK FT
You can access the FollowMyHealth Patient Portal offered by [facility] by registering at the following website: [email or web address]. By joining [facility]’s FollowMyHealth portal, you will also be able to view your health information using other applications (apps) compatible with our system.

## 2022-01-26 NOTE — BH DISCHARGE NOTE NURSING/SOCIAL WORK/PSYCH REHAB - NSBHDCAGENCY1FT_PSY_A_CORE
[facility] - Dr. Guzman  **TELEHEALTH**

## 2022-01-26 NOTE — BH INPATIENT PSYCHIATRY DISCHARGE NOTE - NSBHMETABOLIC_PSY_ALL_CORE_FT
BMI: BMI (kg/m2): 40.8 (01-15-22 @ 21:52)  HbA1c: A1C with Estimated Average Glucose Result: 5.2 % (01-17-22 @ 11:45)    Glucose: POCT Blood Glucose.: 93 mg/dL (01-26-22 @ 11:29)    BP: 120/73 (01-26-22 @ 08:29) (120/73 - 120/73)  Lipid Panel: Date/Time: 01-17-22 @ 11:45  Cholesterol, Serum: 183  Direct LDL: --  HDL Cholesterol, Serum: 84  Total Cholesterol/HDL Ration Measurement: --  Triglycerides, Serum: 44

## 2022-01-26 NOTE — BH DISCHARGE NOTE NURSING/SOCIAL WORK/PSYCH REHAB - NSCDUDCCRISIS_PSY_A_CORE
Critical access hospital [facility]  1 [phone]) Critical access hospital-WELL ([phone])  Text "WELL" to [phone]  Website: [email or web address]/.[employer] 1 (838) 631-AABS (5031) Website: www.safehorizon.org/.National Suicide Prevention Lifeline 4 (542) 375-2957/.  [employer]  1 (449) LIFENET (240-7081)/.  [facility]  [phone] [place] [id number] [phone]   Hours:  Monday through Friday from 9 AM to 3 PM/.  [employer] Dept of  Affairs - Veterans Crisis Line  [phone], Option 1
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
## 2022-01-26 NOTE — BH INPATIENT PSYCHIATRY DISCHARGE NOTE - NSBHDCHANDOFFFT_PSY_ALL_CORE
Spoke to pt's housing nurse Meghana; [facility] faxed discharge summary, medication list, and this writer's contact # [phone]

## 2022-01-26 NOTE — BH INPATIENT PSYCHIATRY DISCHARGE NOTE - NSDCMRMEDTOKEN_GEN_ALL_CORE_FT
cloZAPine 100 mg oral tablet: 1 tab(s) orally 2 times a day   cloZAPine 50 mg oral tablet: 1 tab(s) orally once a day (at bedtime)   divalproex sodium 250 mg oral delayed release tablet: 3 tab(s) orally 2 times a day  FLUoxetine 10 mg oral capsule: 3 cap(s) orally once a day  melatonin 10 mg oral tablet: 1 tab(s) orally once a day (at bedtime)  metFORMIN 500 mg oral tablet: 1 tab(s) orally 2 times a day  pantoprazole 40 mg oral delayed release tablet: 1 tab(s) orally once a day (before a meal)  polyethylene glycol 3350 oral powder for reconstitution: 17 gram(s) orally once a day (at bedtime)  senna oral tablet: 2 tab(s) orally once a day (at bedtime)   ARIPiprazole 5 mg oral tablet: 1 tab(s) orally once a day  cloZAPine 100 mg oral tablet: 1 tab(s) orally 2 times a day   cloZAPine 50 mg oral tablet: 1 tab(s) orally once a day (at bedtime)   divalproex sodium 250 mg oral delayed release tablet: 3 tab(s) orally 2 times a day  FLUoxetine 10 mg oral capsule: 3 cap(s) orally once a day  melatonin 10 mg oral tablet: 1 tab(s) orally once a day (at bedtime)  metFORMIN 500 mg oral tablet: 1 tab(s) orally 2 times a day  pantoprazole 40 mg oral delayed release tablet: 1 tab(s) orally once a day (before a meal)  polyethylene glycol 3350 oral powder for reconstitution: 17 gram(s) orally once a day (at bedtime)  senna oral tablet: 2 tab(s) orally once a day (at bedtime)

## 2022-01-26 NOTE — BH DISCHARGE NOTE NURSING/SOCIAL WORK/PSYCH REHAB - DISCHARGE INSTRUCTIONS AFTERCARE APPOINTMENTS
In order to check the location, date, or time of your aftercare appointment, please refer to your Discharge Instructions Document given to you upon leaving the hospital.  If you have lost the instructions please call [phone]
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
## 2022-01-27 VITALS — TEMPERATURE: 98 F | HEART RATE: 98 BPM | SYSTOLIC BLOOD PRESSURE: 100 MMHG | DIASTOLIC BLOOD PRESSURE: 60 MMHG

## 2022-01-27 LAB
BASOPHILS # BLD AUTO: 0.02 K/UL — SIGNIFICANT CHANGE UP (ref 0–0.2)
BASOPHILS NFR BLD AUTO: 0.5 % — SIGNIFICANT CHANGE UP (ref 0–2)
CRP SERPL-MCNC: 7.2 MG/L — HIGH
EOSINOPHIL # BLD AUTO: 0.1 K/UL — SIGNIFICANT CHANGE UP (ref 0–0.5)
EOSINOPHIL NFR BLD AUTO: 2.3 % — SIGNIFICANT CHANGE UP (ref 0–6)
GLUCOSE BLDC GLUCOMTR-MCNC: 113 MG/DL — HIGH (ref 70–99)
GLUCOSE BLDC GLUCOMTR-MCNC: 316 MG/DL — HIGH (ref 70–99)
GLUCOSE BLDC GLUCOMTR-MCNC: 94 MG/DL — SIGNIFICANT CHANGE UP (ref 70–99)
HCT VFR BLD CALC: 33.6 % — LOW (ref 34.5–45)
HGB BLD-MCNC: 10.7 G/DL — LOW (ref 11.5–15.5)
IANC: 2.24 K/UL — SIGNIFICANT CHANGE UP (ref 1.5–8.5)
IMM GRANULOCYTES NFR BLD AUTO: 1.6 % — HIGH (ref 0–1.5)
LYMPHOCYTES # BLD AUTO: 1.44 K/UL — SIGNIFICANT CHANGE UP (ref 1–3.3)
LYMPHOCYTES # BLD AUTO: 33.4 % — SIGNIFICANT CHANGE UP (ref 13–44)
MCHC RBC-ENTMCNC: 29.8 PG — SIGNIFICANT CHANGE UP (ref 27–34)
MCHC RBC-ENTMCNC: 31.8 GM/DL — LOW (ref 32–36)
MCV RBC AUTO: 93.6 FL — SIGNIFICANT CHANGE UP (ref 80–100)
MONOCYTES # BLD AUTO: 0.44 K/UL — SIGNIFICANT CHANGE UP (ref 0–0.9)
MONOCYTES NFR BLD AUTO: 10.2 % — SIGNIFICANT CHANGE UP (ref 2–14)
NEUTROPHILS # BLD AUTO: 2.24 K/UL — SIGNIFICANT CHANGE UP (ref 1.8–7.4)
NEUTROPHILS NFR BLD AUTO: 52 % — SIGNIFICANT CHANGE UP (ref 43–77)
NRBC # BLD: 0 /100 WBCS — SIGNIFICANT CHANGE UP
NRBC # FLD: 0.02 K/UL — HIGH
PLATELET # BLD AUTO: 320 K/UL — SIGNIFICANT CHANGE UP (ref 150–400)
PROLACTIN SERPL-MCNC: 17.6 NG/ML — SIGNIFICANT CHANGE UP (ref 3.4–24.1)
RBC # BLD: 3.59 M/UL — LOW (ref 3.8–5.2)
RBC # FLD: 13.7 % — SIGNIFICANT CHANGE UP (ref 10.3–14.5)
TROPONIN T, HIGH SENSITIVITY RESULT: <6 NG/L — SIGNIFICANT CHANGE UP
VALPROATE SERPL-MCNC: 67.9 UG/ML — SIGNIFICANT CHANGE UP (ref 50–100)
WBC # BLD: 4.31 K/UL — SIGNIFICANT CHANGE UP (ref 3.8–10.5)
WBC # FLD AUTO: 4.31 K/UL — SIGNIFICANT CHANGE UP (ref 3.8–10.5)

## 2022-01-27 RX ORDER — INFLUENZA VIRUS VACCINE 15; 15; 15; 15 UG/.5ML; UG/.5ML; UG/.5ML; UG/.5ML
0.5 SUSPENSION INTRAMUSCULAR ONCE
Refills: 0 | Status: DISCONTINUED | OUTPATIENT
Start: 2022-01-27 | End: 2022-01-27

## 2022-01-27 RX ORDER — ARIPIPRAZOLE 15 MG/1
1 TABLET ORAL
Qty: 14 | Refills: 0
Start: 2022-01-27 | End: 2022-02-09

## 2022-01-27 RX ORDER — ARIPIPRAZOLE 15 MG/1
5 TABLET ORAL DAILY
Refills: 0 | Status: DISCONTINUED | OUTPATIENT
Start: 2022-01-27 | End: 2022-01-27

## 2022-01-27 RX ADMIN — METFORMIN HYDROCHLORIDE 500 MILLIGRAM(S): 850 TABLET ORAL at 08:33

## 2022-01-27 RX ADMIN — ARIPIPRAZOLE 5 MILLIGRAM(S): 15 TABLET ORAL at 08:32

## 2022-01-27 RX ADMIN — CLOZAPINE 100 MILLIGRAM(S): 150 TABLET, ORALLY DISINTEGRATING ORAL at 08:32

## 2022-01-27 RX ADMIN — Medication 30 MILLIGRAM(S): at 08:32

## 2022-01-27 RX ADMIN — DIVALPROEX SODIUM 750 MILLIGRAM(S): 500 TABLET, DELAYED RELEASE ORAL at 08:32

## 2022-01-27 RX ADMIN — PANTOPRAZOLE SODIUM 40 MILLIGRAM(S): 20 TABLET, DELAYED RELEASE ORAL at 08:32

## 2022-01-27 NOTE — BH INPATIENT PSYCHIATRY PROGRESS NOTE - CURRENT MEDICATION
MEDICATIONS  (STANDING):  ARIPiprazole 5 milliGRAM(s) Oral daily  BACItracin   Ointment 1 Application(s) Topical daily  cloZAPine 100 milliGRAM(s) Oral daily  cloZAPine 150 milliGRAM(s) Oral at bedtime  diVALproex  milliGRAM(s) Oral two times a day  FLUoxetine 30 milliGRAM(s) Oral daily  influenza   Vaccine 0.5 milliLiter(s) IntraMuscular once  insulin lispro (ADMELOG) corrective regimen sliding scale   SubCutaneous three times a day before meals  insulin lispro (ADMELOG) corrective regimen sliding scale   SubCutaneous at bedtime  melatonin. 10 milliGRAM(s) Oral at bedtime  metFORMIN 500 milliGRAM(s) Oral two times a day  pantoprazole    Tablet 40 milliGRAM(s) Oral before breakfast  polyethylene glycol 3350 17 Gram(s) Oral at bedtime  senna 2 Tablet(s) Oral at bedtime    MEDICATIONS  (PRN):  aluminum hydroxide/magnesium hydroxide/simethicone Suspension 30 milliLiter(s) Oral once PRN Indigestion  artificial  tears Solution 1 Drop(s) Both EYES every 6 hours PRN dry eyes  chlorproMAZINE    Injectable 50 milliGRAM(s) IntraMuscular once PRN agitation  chlorproMAZINE    Injectable 50 milliGRAM(s) IntraMuscular once PRN agitation  chlorproMAZINE    Tablet 50 milliGRAM(s) Oral every 6 hours PRN agitation  LORazepam     Tablet 2 milliGRAM(s) Oral every 6 hours PRN agitation or anxiety  LORazepam   Injectable 2 milliGRAM(s) IntraMuscular once PRN severe agitation or anxiety  LORazepam   Injectable 2 milliGRAM(s) IntraMuscular once PRN severe agitation or anxiety  
MEDICATIONS  (STANDING):  BACItracin   Ointment 1 Application(s) Topical daily  cloZAPine 150 milliGRAM(s) Oral at bedtime  cloZAPine 100 milliGRAM(s) Oral daily  diVALproex  milliGRAM(s) Oral two times a day  FLUoxetine 30 milliGRAM(s) Oral daily  insulin lispro (ADMELOG) corrective regimen sliding scale   SubCutaneous three times a day before meals  insulin lispro (ADMELOG) corrective regimen sliding scale   SubCutaneous at bedtime  melatonin. 10 milliGRAM(s) Oral at bedtime  metFORMIN 500 milliGRAM(s) Oral two times a day  pantoprazole    Tablet 40 milliGRAM(s) Oral before breakfast  polyethylene glycol 3350 17 Gram(s) Oral at bedtime  senna 2 Tablet(s) Oral at bedtime    MEDICATIONS  (PRN):  aluminum hydroxide/magnesium hydroxide/simethicone Suspension 30 milliLiter(s) Oral once PRN Indigestion  artificial  tears Solution 1 Drop(s) Both EYES every 6 hours PRN dry eyes  chlorproMAZINE    Injectable 50 milliGRAM(s) IntraMuscular once PRN agitation  chlorproMAZINE    Injectable 50 milliGRAM(s) IntraMuscular once PRN agitation  chlorproMAZINE    Tablet 50 milliGRAM(s) Oral every 6 hours PRN agitation  LORazepam     Tablet 2 milliGRAM(s) Oral every 6 hours PRN agitation or anxiety  LORazepam   Injectable 2 milliGRAM(s) IntraMuscular once PRN severe agitation or anxiety  LORazepam   Injectable 2 milliGRAM(s) IntraMuscular once PRN severe agitation or anxiety  
MEDICATIONS  (STANDING):  cloZAPine 100 milliGRAM(s) Oral daily  cloZAPine 125 milliGRAM(s) Oral at bedtime  FLUoxetine 30 milliGRAM(s) Oral daily  insulin lispro (ADMELOG) corrective regimen sliding scale   SubCutaneous three times a day before meals  insulin lispro (ADMELOG) corrective regimen sliding scale   SubCutaneous at bedtime  melatonin. 10 milliGRAM(s) Oral at bedtime  metFORMIN 250 milliGRAM(s) Oral two times a day  pantoprazole    Tablet 40 milliGRAM(s) Oral before breakfast  polyethylene glycol 3350 17 Gram(s) Oral at bedtime  senna 2 Tablet(s) Oral at bedtime    MEDICATIONS  (PRN):  artificial  tears Solution 1 Drop(s) Both EYES every 6 hours PRN dry eyes  diphenhydrAMINE Injectable 50 milliGRAM(s) IntraMuscular once PRN EPS ppx  haloperidol     Tablet 5 milliGRAM(s) Oral every 6 hours PRN agitation  haloperidol    Injectable 5 milliGRAM(s) IntraMuscular once PRN severe agitation  LORazepam     Tablet 2 milliGRAM(s) Oral every 6 hours PRN agitation or anxiety  LORazepam   Injectable 2 milliGRAM(s) IntraMuscular once PRN severe agitation or anxiety  
MEDICATIONS  (STANDING):  BACItracin   Ointment 1 Application(s) Topical daily  cloZAPine 150 milliGRAM(s) Oral at bedtime  cloZAPine 100 milliGRAM(s) Oral daily  diVALproex  milliGRAM(s) Oral two times a day  FLUoxetine 30 milliGRAM(s) Oral daily  insulin lispro (ADMELOG) corrective regimen sliding scale   SubCutaneous three times a day before meals  insulin lispro (ADMELOG) corrective regimen sliding scale   SubCutaneous at bedtime  melatonin. 10 milliGRAM(s) Oral at bedtime  metFORMIN 500 milliGRAM(s) Oral two times a day  pantoprazole    Tablet 40 milliGRAM(s) Oral before breakfast  polyethylene glycol 3350 17 Gram(s) Oral at bedtime  senna 2 Tablet(s) Oral at bedtime    MEDICATIONS  (PRN):  aluminum hydroxide/magnesium hydroxide/simethicone Suspension 30 milliLiter(s) Oral once PRN Indigestion  artificial  tears Solution 1 Drop(s) Both EYES every 6 hours PRN dry eyes  chlorproMAZINE    Injectable 50 milliGRAM(s) IntraMuscular once PRN agitation  chlorproMAZINE    Injectable 50 milliGRAM(s) IntraMuscular once PRN agitation  chlorproMAZINE    Tablet 50 milliGRAM(s) Oral every 6 hours PRN agitation  LORazepam     Tablet 2 milliGRAM(s) Oral every 6 hours PRN agitation or anxiety  LORazepam   Injectable 2 milliGRAM(s) IntraMuscular once PRN severe agitation or anxiety  LORazepam   Injectable 2 milliGRAM(s) IntraMuscular once PRN severe agitation or anxiety  
MEDICATIONS  (STANDING):  BACItracin   Ointment 1 Application(s) Topical daily  cloZAPine 150 milliGRAM(s) Oral at bedtime  cloZAPine 100 milliGRAM(s) Oral daily  diVALproex  milliGRAM(s) Oral two times a day  FLUoxetine 30 milliGRAM(s) Oral daily  insulin lispro (ADMELOG) corrective regimen sliding scale   SubCutaneous three times a day before meals  insulin lispro (ADMELOG) corrective regimen sliding scale   SubCutaneous at bedtime  melatonin. 10 milliGRAM(s) Oral at bedtime  metFORMIN 500 milliGRAM(s) Oral two times a day  pantoprazole    Tablet 40 milliGRAM(s) Oral before breakfast  polyethylene glycol 3350 17 Gram(s) Oral at bedtime  senna 2 Tablet(s) Oral at bedtime    MEDICATIONS  (PRN):  aluminum hydroxide/magnesium hydroxide/simethicone Suspension 30 milliLiter(s) Oral once PRN Indigestion  artificial  tears Solution 1 Drop(s) Both EYES every 6 hours PRN dry eyes  chlorproMAZINE    Injectable 50 milliGRAM(s) IntraMuscular once PRN agitation  chlorproMAZINE    Injectable 50 milliGRAM(s) IntraMuscular once PRN agitation  chlorproMAZINE    Tablet 50 milliGRAM(s) Oral every 6 hours PRN agitation  LORazepam     Tablet 2 milliGRAM(s) Oral every 6 hours PRN agitation or anxiety  LORazepam   Injectable 2 milliGRAM(s) IntraMuscular once PRN severe agitation or anxiety  LORazepam   Injectable 2 milliGRAM(s) IntraMuscular once PRN severe agitation or anxiety  
MEDICATIONS  (STANDING):  cloZAPine 100 milliGRAM(s) Oral daily  cloZAPine 125 milliGRAM(s) Oral at bedtime  diVALproex  milliGRAM(s) Oral two times a day  FLUoxetine 30 milliGRAM(s) Oral daily  insulin lispro (ADMELOG) corrective regimen sliding scale   SubCutaneous three times a day before meals  insulin lispro (ADMELOG) corrective regimen sliding scale   SubCutaneous at bedtime  melatonin. 10 milliGRAM(s) Oral at bedtime  metFORMIN 500 milliGRAM(s) Oral two times a day  pantoprazole    Tablet 40 milliGRAM(s) Oral before breakfast  polyethylene glycol 3350 17 Gram(s) Oral at bedtime  senna 2 Tablet(s) Oral at bedtime    MEDICATIONS  (PRN):  aluminum hydroxide/magnesium hydroxide/simethicone Suspension 30 milliLiter(s) Oral once PRN Indigestion  artificial  tears Solution 1 Drop(s) Both EYES every 6 hours PRN dry eyes  chlorproMAZINE    Injectable 50 milliGRAM(s) IntraMuscular once PRN agitation  chlorproMAZINE    Injectable 50 milliGRAM(s) IntraMuscular once PRN agitation  chlorproMAZINE    Tablet 50 milliGRAM(s) Oral every 6 hours PRN agitation  LORazepam     Tablet 2 milliGRAM(s) Oral every 6 hours PRN agitation or anxiety  LORazepam   Injectable 2 milliGRAM(s) IntraMuscular once PRN severe agitation or anxiety  LORazepam   Injectable 2 milliGRAM(s) IntraMuscular once PRN severe agitation or anxiety  
MEDICATIONS  (STANDING):  cloZAPine 100 milliGRAM(s) Oral two times a day  FLUoxetine 30 milliGRAM(s) Oral daily  insulin lispro (ADMELOG) corrective regimen sliding scale   SubCutaneous three times a day before meals  insulin lispro (ADMELOG) corrective regimen sliding scale   SubCutaneous at bedtime  melatonin. 10 milliGRAM(s) Oral at bedtime  metFORMIN 250 milliGRAM(s) Oral two times a day  pantoprazole    Tablet 40 milliGRAM(s) Oral before breakfast  polyethylene glycol 3350 17 Gram(s) Oral at bedtime  senna 2 Tablet(s) Oral at bedtime    MEDICATIONS  (PRN):  artificial  tears Solution 1 Drop(s) Both EYES every 6 hours PRN dry eyes  diphenhydrAMINE Injectable 50 milliGRAM(s) IntraMuscular once PRN EPS ppx  haloperidol     Tablet 5 milliGRAM(s) Oral every 6 hours PRN agitation  haloperidol    Injectable 5 milliGRAM(s) IntraMuscular once PRN severe agitation  LORazepam     Tablet 2 milliGRAM(s) Oral every 6 hours PRN agitation or anxiety  LORazepam   Injectable 2 milliGRAM(s) IntraMuscular once PRN severe agitation or anxiety  
MEDICATIONS  (STANDING):  BACItracin   Ointment 1 Application(s) Topical daily  cloZAPine 150 milliGRAM(s) Oral at bedtime  cloZAPine 100 milliGRAM(s) Oral daily  diVALproex  milliGRAM(s) Oral two times a day  FLUoxetine 30 milliGRAM(s) Oral daily  insulin lispro (ADMELOG) corrective regimen sliding scale   SubCutaneous three times a day before meals  insulin lispro (ADMELOG) corrective regimen sliding scale   SubCutaneous at bedtime  melatonin. 10 milliGRAM(s) Oral at bedtime  metFORMIN 500 milliGRAM(s) Oral two times a day  pantoprazole    Tablet 40 milliGRAM(s) Oral before breakfast  polyethylene glycol 3350 17 Gram(s) Oral at bedtime  senna 2 Tablet(s) Oral at bedtime    MEDICATIONS  (PRN):  aluminum hydroxide/magnesium hydroxide/simethicone Suspension 30 milliLiter(s) Oral once PRN Indigestion  artificial  tears Solution 1 Drop(s) Both EYES every 6 hours PRN dry eyes  chlorproMAZINE    Injectable 50 milliGRAM(s) IntraMuscular once PRN agitation  chlorproMAZINE    Injectable 50 milliGRAM(s) IntraMuscular once PRN agitation  chlorproMAZINE    Tablet 50 milliGRAM(s) Oral every 6 hours PRN agitation  LORazepam     Tablet 2 milliGRAM(s) Oral every 6 hours PRN agitation or anxiety  LORazepam   Injectable 2 milliGRAM(s) IntraMuscular once PRN severe agitation or anxiety  LORazepam   Injectable 2 milliGRAM(s) IntraMuscular once PRN severe agitation or anxiety  
MEDICATIONS  (STANDING):  cloZAPine 100 milliGRAM(s) Oral two times a day  FLUoxetine 30 milliGRAM(s) Oral daily  insulin lispro (ADMELOG) corrective regimen sliding scale   SubCutaneous three times a day before meals  insulin lispro (ADMELOG) corrective regimen sliding scale   SubCutaneous at bedtime  melatonin. 10 milliGRAM(s) Oral at bedtime  metFORMIN 250 milliGRAM(s) Oral two times a day  pantoprazole    Tablet 40 milliGRAM(s) Oral before breakfast  polyethylene glycol 3350 17 Gram(s) Oral at bedtime  senna 2 Tablet(s) Oral at bedtime    MEDICATIONS  (PRN):  artificial  tears Solution 1 Drop(s) Both EYES every 6 hours PRN dry eyes  diphenhydrAMINE Injectable 50 milliGRAM(s) IntraMuscular once PRN EPS ppx  haloperidol     Tablet 5 milliGRAM(s) Oral every 6 hours PRN agitation  haloperidol    Injectable 5 milliGRAM(s) IntraMuscular once PRN severe agitation  LORazepam     Tablet 2 milliGRAM(s) Oral every 6 hours PRN agitation or anxiety  LORazepam   Injectable 2 milliGRAM(s) IntraMuscular once PRN severe agitation or anxiety

## 2022-01-27 NOTE — BH INPATIENT PSYCHIATRY PROGRESS NOTE - NSTXVIOLNTINTERMD_PSY_ALL_CORE
medication management

## 2022-01-27 NOTE — BH INPATIENT PSYCHIATRY PROGRESS NOTE - NSBHCHARTREVIEWVS_PSY_A_CORE FT
Vital Signs Last 24 Hrs  T(C): 36.6 (01-27-22 @ 06:52), Max: 36.7 (01-26-22 @ 19:59)  T(F): 97.8 (01-27-22 @ 06:52), Max: 98 (01-26-22 @ 19:59)  HR: 98 (01-27-22 @ 06:52) (98 - 98)  BP: 100/60 (01-27-22 @ 06:52) (100/60 - 100/60)  BP(mean): --  RR: --  SpO2: --    Orthostatic VS  01-26-22 @ 19:59  Lying BP: --/-- HR: --  Sitting BP: 125/67 HR: 112  Standing BP: 112/56 HR: 106  Site: --  Mode: --  Orthostatic VS  01-25-22 @ 19:29  Lying BP: --/-- HR: --  Sitting BP: 121/68 HR: 104  Standing BP: 120/77 HR: 113  Site: upper left arm  Mode: electronic  
Vital Signs Last 24 Hrs  T(C): 36.2 (01-17-22 @ 08:43), Max: 36.6 (01-16-22 @ 19:35)  T(F): 97.2 (01-17-22 @ 08:43), Max: 97.9 (01-16-22 @ 19:35)  HR: --  BP: --  BP(mean): --  RR: --  SpO2: --    Orthostatic VS  01-17-22 @ 08:43  Lying BP: --/-- HR: --  Sitting BP: 124/78 HR: 114  Standing BP: 126/69 HR: 107  Site: --  Mode: --  Orthostatic VS  01-16-22 @ 21:06  Lying BP: --/-- HR: --  Sitting BP: 116/52 HR: 100  Standing BP: 117/78 HR: 118  Site: --  Mode: --  Orthostatic VS  01-16-22 @ 19:35  Lying BP: --/-- HR: --  Sitting BP: 106/66 HR: 119  Standing BP: --/-- HR: --  Site: --  Mode: --  Orthostatic VS  01-16-22 @ 09:01  Lying BP: 112/63 HR: 99  Sitting BP: 99/66 HR: 105  Standing BP: --/-- HR: --  Site: --  Mode: --  Orthostatic VS  01-15-22 @ 21:52  Lying BP: --/-- HR: --  Sitting BP: 121/80 HR: 103  Standing BP: 118/92 HR: 98  Site: --  Mode: --  
Vital Signs Last 24 Hrs  T(C): 36.4 (01-19-22 @ 07:54), Max: 36.4 (01-19-22 @ 07:54)  T(F): 97.6 (01-19-22 @ 07:54), Max: 97.6 (01-19-22 @ 07:54)  HR: --  BP: --  BP(mean): --  RR: --  SpO2: --    Orthostatic VS  01-19-22 @ 07:54  Lying BP: --/-- HR: --  Sitting BP: 99/60 HR: 70  Standing BP: 98/60 HR: 80  Site: --  Mode: --  Orthostatic VS  01-17-22 @ 19:34  Lying BP: --/-- HR: --  Sitting BP: 108/76 HR: 107  Standing BP: 114/78 HR: 114  Site: upper left arm  Mode: electronic  
Vital Signs Last 24 Hrs  T(C): 36.4 (01-21-22 @ 08:54), Max: 37.3 (01-20-22 @ 20:47)  T(F): 97.5 (01-21-22 @ 08:54), Max: 99.1 (01-20-22 @ 20:47)  HR: --  BP: --  BP(mean): --  RR: --  SpO2: --    Orthostatic VS  01-21-22 @ 08:54  Lying BP: --/-- HR: --  Sitting BP: 118/68 HR: 80  Standing BP: 124/73 HR: 103  Site: --  Mode: --  Orthostatic VS  01-20-22 @ 06:42  Lying BP: --/-- HR: --  Sitting BP: 127/73 HR: 106  Standing BP: 114/81 HR: 114  Site: --  Mode: --  Orthostatic VS  01-19-22 @ 20:11  Lying BP: 137/64 HR: 109  Sitting BP: 123/57 HR: 111  Standing BP: --/-- HR: --  Site: --  Mode: --  
Vital Signs Last 24 Hrs  T(C): 36.9 (01-17-22 @ 19:34), Max: 36.9 (01-17-22 @ 19:34)  T(F): 98.4 (01-17-22 @ 19:34), Max: 98.4 (01-17-22 @ 19:34)  HR: --  BP: --  BP(mean): --  RR: --  SpO2: --    Orthostatic VS  01-17-22 @ 19:34  Lying BP: --/-- HR: --  Sitting BP: 108/76 HR: 107  Standing BP: 114/78 HR: 114  Site: upper left arm  Mode: electronic  Orthostatic VS  01-17-22 @ 08:43  Lying BP: --/-- HR: --  Sitting BP: 124/78 HR: 114  Standing BP: 126/69 HR: 107  Site: --  Mode: --  Orthostatic VS  01-16-22 @ 21:06  Lying BP: --/-- HR: --  Sitting BP: 116/52 HR: 100  Standing BP: 117/78 HR: 118  Site: --  Mode: --  Orthostatic VS  01-16-22 @ 19:35  Lying BP: --/-- HR: --  Sitting BP: 106/66 HR: 119  Standing BP: --/-- HR: --  Site: --  Mode: --  
Vital Signs Last 24 Hrs  T(C): 36.9 (01-26-22 @ 08:29), Max: 37 (01-25-22 @ 19:29)  T(F): 98.5 (01-26-22 @ 08:29), Max: 98.6 (01-25-22 @ 19:29)  HR: 984 (01-26-22 @ 08:29) (984 - 984)  BP: 120/73 (01-26-22 @ 08:29) (120/73 - 120/73)  BP(mean): --  RR: --  SpO2: --    Orthostatic VS  01-25-22 @ 19:29  Lying BP: --/-- HR: --  Sitting BP: 121/68 HR: 104  Standing BP: 120/77 HR: 113  Site: upper left arm  Mode: electronic  Orthostatic VS  01-25-22 @ 06:56  Lying BP: --/-- HR: --  Sitting BP: 123/77 HR: 99  Standing BP: 116/85 HR: 114  Site: --  Mode: --  Orthostatic VS  01-24-22 @ 19:00  Lying BP: --/-- HR: --  Sitting BP: 130/68 HR: 109  Standing BP: 129/64 HR: 114  Site: --  Mode: --  
Vital Signs Last 24 Hrs  T(C): 36.6 (01-24-22 @ 06:57), Max: 36.9 (01-23-22 @ 19:23)  T(F): 97.8 (01-24-22 @ 06:57), Max: 98.5 (01-23-22 @ 19:23)  HR: --  BP: --  BP(mean): --  RR: --  SpO2: --    Orthostatic VS  01-24-22 @ 06:57  Lying BP: --/-- HR: --  Sitting BP: 119/62 HR: 75  Standing BP: 119/56 HR: 68  Site: --  Mode: --  Orthostatic VS  01-23-22 @ 19:23  Lying BP: --/-- HR: --  Sitting BP: 123/69 HR: 104  Standing BP: 138/72 HR: 113  Site: --  Mode: --  Orthostatic VS  01-22-22 @ 19:34  Lying BP: --/-- HR: --  Sitting BP: 111/71 HR: 112  Standing BP: 100/78 HR: 116  Site: upper left arm  Mode: electronic  
Vital Signs Last 24 Hrs  T(C): 36.7 (01-20-22 @ 06:42), Max: 37.3 (01-19-22 @ 20:11)  T(F): 98.1 (01-20-22 @ 06:42), Max: 99.2 (01-19-22 @ 20:11)  HR: --  BP: --  BP(mean): --  RR: --  SpO2: --    Orthostatic VS  01-20-22 @ 06:42  Lying BP: --/-- HR: --  Sitting BP: 127/73 HR: 106  Standing BP: 114/81 HR: 114  Site: --  Mode: --  Orthostatic VS  01-19-22 @ 20:11  Lying BP: 137/64 HR: 109  Sitting BP: 123/57 HR: 111  Standing BP: --/-- HR: --  Site: --  Mode: --  Orthostatic VS  01-19-22 @ 07:54  Lying BP: --/-- HR: --  Sitting BP: 99/60 HR: 70  Standing BP: 98/60 HR: 80  Site: --  Mode: --  
Vital Signs Last 24 Hrs  T(C): 36.7 (01-25-22 @ 06:56), Max: 36.9 (01-24-22 @ 19:00)  T(F): 98 (01-25-22 @ 06:56), Max: 98.4 (01-24-22 @ 19:00)  HR: --  BP: --  BP(mean): --  RR: --  SpO2: --    Orthostatic VS  01-25-22 @ 06:56  Lying BP: --/-- HR: --  Sitting BP: 123/77 HR: 99  Standing BP: 116/85 HR: 114  Site: --  Mode: --  Orthostatic VS  01-24-22 @ 19:00  Lying BP: --/-- HR: --  Sitting BP: 130/68 HR: 109  Standing BP: 129/64 HR: 114  Site: --  Mode: --  Orthostatic VS  01-24-22 @ 06:57  Lying BP: --/-- HR: --  Sitting BP: 119/62 HR: 75  Standing BP: 119/56 HR: 68  Site: --  Mode: --  Orthostatic VS  01-23-22 @ 19:23  Lying BP: --/-- HR: --  Sitting BP: 123/69 HR: 104  Standing BP: 138/72 HR: 113  Site: --  Mode: --

## 2022-01-27 NOTE — BH INPATIENT PSYCHIATRY PROGRESS NOTE - NSTXVIOLNTPROGRES_PSY_ALL_CORE
Met - goal discontinued
Improving
No Change
Improving
No Change

## 2022-01-27 NOTE — BH INPATIENT PSYCHIATRY PROGRESS NOTE - NSDCCRITERIA_PSY_ALL_CORE
Symptom stabilization  CGI<=3

## 2022-01-27 NOTE — BH INPATIENT PSYCHIATRY PROGRESS NOTE - NSTXDCOPNOGOAL_PSY_ALL_CORE
Will agree to participate in appropriate outpatient care

## 2022-01-27 NOTE — BH INPATIENT PSYCHIATRY PROGRESS NOTE - NSTXDISORGDATEEST_PSY_ALL_CORE
16-Jan-2022
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
## 2022-01-27 NOTE — BH INPATIENT PSYCHIATRY PROGRESS NOTE - NSBHMETABOLIC_PSY_ALL_CORE_FT
BMI: BMI (kg/m2): 40.8 (01-15-22 @ 21:52)  HbA1c: A1C with Estimated Average Glucose Result: 5.2 % (01-17-22 @ 11:45)    Glucose: POCT Blood Glucose.: 130 mg/dL (01-18-22 @ 11:27)    BP: --  Lipid Panel: Date/Time: 01-17-22 @ 11:45  Cholesterol, Serum: 183  Direct LDL: --  HDL Cholesterol, Serum: 84  Total Cholesterol/HDL Ration Measurement: --  Triglycerides, Serum: 44  
BMI: BMI (kg/m2): 40.8 (01-15-22 @ 21:52)  HbA1c: A1C with Estimated Average Glucose Result: 5.2 % (01-17-22 @ 11:45)    Glucose: POCT Blood Glucose.: 128 mg/dL (01-20-22 @ 11:46)    BP: --  Lipid Panel: Date/Time: 01-17-22 @ 11:45  Cholesterol, Serum: 183  Direct LDL: --  HDL Cholesterol, Serum: 84  Total Cholesterol/HDL Ration Measurement: --  Triglycerides, Serum: 44  
BMI: BMI (kg/m2): 40.8 (01-15-22 @ 21:52)  HbA1c: A1C with Estimated Average Glucose Result: 5.2 % (01-17-22 @ 11:45)    Glucose: POCT Blood Glucose.: 129 mg/dL (01-24-22 @ 11:31)    BP: --  Lipid Panel: Date/Time: 01-17-22 @ 11:45  Cholesterol, Serum: 183  Direct LDL: --  HDL Cholesterol, Serum: 84  Total Cholesterol/HDL Ration Measurement: --  Triglycerides, Serum: 44  
BMI: BMI (kg/m2): 40.8 (01-15-22 @ 21:52)  HbA1c: A1C with Estimated Average Glucose Result: 5.2 % (01-17-22 @ 11:45)    Glucose: POCT Blood Glucose.: 316 mg/dL (01-27-22 @ 11:47)    BP: 100/60 (01-27-22 @ 06:52) (100/60 - 120/73)  Lipid Panel: Date/Time: 01-17-22 @ 11:45  Cholesterol, Serum: 183  Direct LDL: --  HDL Cholesterol, Serum: 84  Total Cholesterol/HDL Ration Measurement: --  Triglycerides, Serum: 44  
BMI: BMI (kg/m2): 40.8 (01-15-22 @ 21:52)  HbA1c: A1C with Estimated Average Glucose Result: 5.2 % (01-17-22 @ 11:45)    Glucose: POCT Blood Glucose.: 111 mg/dL (01-25-22 @ 11:32)    BP: --  Lipid Panel: Date/Time: 01-17-22 @ 11:45  Cholesterol, Serum: 183  Direct LDL: --  HDL Cholesterol, Serum: 84  Total Cholesterol/HDL Ration Measurement: --  Triglycerides, Serum: 44  
BMI: BMI (kg/m2): 40.8 (01-15-22 @ 21:52)  HbA1c: A1C with Estimated Average Glucose Result: 5.2 % (01-17-22 @ 11:45)    Glucose: POCT Blood Glucose.: 125 mg/dL (01-21-22 @ 11:34)    BP: --  Lipid Panel: Date/Time: 01-17-22 @ 11:45  Cholesterol, Serum: 183  Direct LDL: --  HDL Cholesterol, Serum: 84  Total Cholesterol/HDL Ration Measurement: --  Triglycerides, Serum: 44  
BMI: BMI (kg/m2): 40.8 (01-15-22 @ 21:52)  HbA1c: A1C with Estimated Average Glucose Result: 5.2 % (01-17-22 @ 11:45)    Glucose: POCT Blood Glucose.: 93 mg/dL (01-26-22 @ 11:29)    BP: 120/73 (01-26-22 @ 08:29) (120/73 - 120/73)  Lipid Panel: Date/Time: 01-17-22 @ 11:45  Cholesterol, Serum: 183  Direct LDL: --  HDL Cholesterol, Serum: 84  Total Cholesterol/HDL Ration Measurement: --  Triglycerides, Serum: 44  
BMI: BMI (kg/m2): 40.8 (01-15-22 @ 21:52)  HbA1c: A1C with Estimated Average Glucose Result: 5.2 % (01-17-22 @ 11:45)    Glucose: POCT Blood Glucose.: 89 mg/dL (01-19-22 @ 07:51)    BP: --  Lipid Panel: Date/Time: 01-17-22 @ 11:45  Cholesterol, Serum: 183  Direct LDL: --  HDL Cholesterol, Serum: 84  Total Cholesterol/HDL Ration Measurement: --  Triglycerides, Serum: 44  
BMI: BMI (kg/m2): 40.8 (01-15-22 @ 21:52)  HbA1c: A1C with Estimated Average Glucose Result: 5.2 % (06-02-21 @ 10:04)    Glucose: POCT Blood Glucose.: 114 mg/dL (01-17-22 @ 11:48)    BP: --  Lipid Panel:

## 2022-01-27 NOTE — BH INPATIENT PSYCHIATRY PROGRESS NOTE - NSBHINPTBILLING_PSY_ALL_CORE
72496 - Inpatient Low Complexity
48420 - Inpatient Moderate Complexity
81874 - Inpatient Moderate Complexity
08385 - Inpatient Low Complexity
17117 - Inpatient Moderate Complexity
58239 - Inpatient Moderate Complexity
09752 - Inpatient Low Complexity
12106 - Hospital Discharge Day Management; 30 min or less
76919 - Inpatient Low Complexity

## 2022-01-27 NOTE — BH INPATIENT PSYCHIATRY PROGRESS NOTE - NSTXPROBDCOPNO_PSY_ALL_CORE
DISCHARGE ISSUE - NON-ADHERENT WITH OUTPATIENT SERVICES

## 2022-01-27 NOTE — BH PSYCHOLOGY - GROUP THERAPY NOTE - NSBHPSYCHOLPARTICIPCOMMENT_PSY_A_CORE FT
Patient appeared attentive and interested in the group discussion.  Although the patient did not contribute spontaneously during the group session, patient was able to read from the worksheet when prompted. Patient was able to engage with the  and other members appropriately.

## 2022-01-27 NOTE — BH INPATIENT PSYCHIATRY PROGRESS NOTE - NSTXVIOLNTDATETRGT_PSY_ALL_CORE
30-Jan-2022
30-Jan-2022
23-Jan-2022
27-Jan-2022
23-Jan-2022
23-Jan-2022
30-Jan-2022
23-Jan-2022
27-Jan-2022

## 2022-01-27 NOTE — BH INPATIENT PSYCHIATRY PROGRESS NOTE - NSTXIMPULSDATETRGT_PSY_ALL_CORE
23-Jan-2022
27-Jan-2022
03-Feb-2022
23-Jan-2022
27-Jan-2022
27-Jan-2022
23-Jan-2022
23-Jan-2022
27-Jan-2022

## 2022-01-27 NOTE — BH INPATIENT PSYCHIATRY PROGRESS NOTE - NSBHCHARTREVIEWLAB_PSY_A_CORE FT
Admission labs reviewed, no acute findings

## 2022-01-27 NOTE — BH INPATIENT PSYCHIATRY PROGRESS NOTE - PRN MEDS
MEDICATIONS  (PRN):  aluminum hydroxide/magnesium hydroxide/simethicone Suspension 30 milliLiter(s) Oral once PRN Indigestion  artificial  tears Solution 1 Drop(s) Both EYES every 6 hours PRN dry eyes  chlorproMAZINE    Injectable 50 milliGRAM(s) IntraMuscular once PRN agitation  chlorproMAZINE    Injectable 50 milliGRAM(s) IntraMuscular once PRN agitation  chlorproMAZINE    Tablet 50 milliGRAM(s) Oral every 6 hours PRN agitation  LORazepam     Tablet 2 milliGRAM(s) Oral every 6 hours PRN agitation or anxiety  LORazepam   Injectable 2 milliGRAM(s) IntraMuscular once PRN severe agitation or anxiety  LORazepam   Injectable 2 milliGRAM(s) IntraMuscular once PRN severe agitation or anxiety  
MEDICATIONS  (PRN):  artificial  tears Solution 1 Drop(s) Both EYES every 6 hours PRN dry eyes  diphenhydrAMINE Injectable 50 milliGRAM(s) IntraMuscular once PRN EPS ppx  haloperidol     Tablet 5 milliGRAM(s) Oral every 6 hours PRN agitation  haloperidol    Injectable 5 milliGRAM(s) IntraMuscular once PRN severe agitation  LORazepam     Tablet 2 milliGRAM(s) Oral every 6 hours PRN agitation or anxiety  LORazepam   Injectable 2 milliGRAM(s) IntraMuscular once PRN severe agitation or anxiety  
MEDICATIONS  (PRN):  aluminum hydroxide/magnesium hydroxide/simethicone Suspension 30 milliLiter(s) Oral once PRN Indigestion  artificial  tears Solution 1 Drop(s) Both EYES every 6 hours PRN dry eyes  chlorproMAZINE    Injectable 50 milliGRAM(s) IntraMuscular once PRN agitation  chlorproMAZINE    Injectable 50 milliGRAM(s) IntraMuscular once PRN agitation  chlorproMAZINE    Tablet 50 milliGRAM(s) Oral every 6 hours PRN agitation  LORazepam     Tablet 2 milliGRAM(s) Oral every 6 hours PRN agitation or anxiety  LORazepam   Injectable 2 milliGRAM(s) IntraMuscular once PRN severe agitation or anxiety  LORazepam   Injectable 2 milliGRAM(s) IntraMuscular once PRN severe agitation or anxiety  
MEDICATIONS  (PRN):  artificial  tears Solution 1 Drop(s) Both EYES every 6 hours PRN dry eyes  diphenhydrAMINE Injectable 50 milliGRAM(s) IntraMuscular once PRN EPS ppx  haloperidol     Tablet 5 milliGRAM(s) Oral every 6 hours PRN agitation  haloperidol    Injectable 5 milliGRAM(s) IntraMuscular once PRN severe agitation  LORazepam     Tablet 2 milliGRAM(s) Oral every 6 hours PRN agitation or anxiety  LORazepam   Injectable 2 milliGRAM(s) IntraMuscular once PRN severe agitation or anxiety  
MEDICATIONS  (PRN):  aluminum hydroxide/magnesium hydroxide/simethicone Suspension 30 milliLiter(s) Oral once PRN Indigestion  artificial  tears Solution 1 Drop(s) Both EYES every 6 hours PRN dry eyes  chlorproMAZINE    Injectable 50 milliGRAM(s) IntraMuscular once PRN agitation  chlorproMAZINE    Injectable 50 milliGRAM(s) IntraMuscular once PRN agitation  chlorproMAZINE    Tablet 50 milliGRAM(s) Oral every 6 hours PRN agitation  LORazepam     Tablet 2 milliGRAM(s) Oral every 6 hours PRN agitation or anxiety  LORazepam   Injectable 2 milliGRAM(s) IntraMuscular once PRN severe agitation or anxiety  LORazepam   Injectable 2 milliGRAM(s) IntraMuscular once PRN severe agitation or anxiety  
MEDICATIONS  (PRN):  artificial  tears Solution 1 Drop(s) Both EYES every 6 hours PRN dry eyes  diphenhydrAMINE Injectable 50 milliGRAM(s) IntraMuscular once PRN EPS ppx  haloperidol     Tablet 5 milliGRAM(s) Oral every 6 hours PRN agitation  haloperidol    Injectable 5 milliGRAM(s) IntraMuscular once PRN severe agitation  LORazepam     Tablet 2 milliGRAM(s) Oral every 6 hours PRN agitation or anxiety  LORazepam   Injectable 2 milliGRAM(s) IntraMuscular once PRN severe agitation or anxiety  
MEDICATIONS  (PRN):  aluminum hydroxide/magnesium hydroxide/simethicone Suspension 30 milliLiter(s) Oral once PRN Indigestion  artificial  tears Solution 1 Drop(s) Both EYES every 6 hours PRN dry eyes  chlorproMAZINE    Injectable 50 milliGRAM(s) IntraMuscular once PRN agitation  chlorproMAZINE    Injectable 50 milliGRAM(s) IntraMuscular once PRN agitation  chlorproMAZINE    Tablet 50 milliGRAM(s) Oral every 6 hours PRN agitation  LORazepam     Tablet 2 milliGRAM(s) Oral every 6 hours PRN agitation or anxiety  LORazepam   Injectable 2 milliGRAM(s) IntraMuscular once PRN severe agitation or anxiety  LORazepam   Injectable 2 milliGRAM(s) IntraMuscular once PRN severe agitation or anxiety

## 2022-01-27 NOTE — BH INPATIENT PSYCHIATRY PROGRESS NOTE - NSICDXBHPRIMARYDX_PSY_ALL_CORE
Schizoaffective disorder   F25.9  
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
## 2022-01-27 NOTE — BH INPATIENT PSYCHIATRY PROGRESS NOTE - NSBHCHARTREVIEWINVESTIGATE_PSY_A_CORE FT
EKG:NSR  QT/QTC: 264/362ms

## 2022-01-27 NOTE — BH INPATIENT PSYCHIATRY PROGRESS NOTE - NSTXDISORGDATETRGT_PSY_ALL_CORE
27-Jan-2022
23-Jan-2022
27-Jan-2022
23-Jan-2022
03-Feb-2022
27-Jan-2022
27-Jan-2022
23-Jan-2022
23-Jan-2022

## 2022-01-27 NOTE — BH INPATIENT PSYCHIATRY PROGRESS NOTE - NSBHMETABOLICLABS_PSY_ALL_CORE
All labs not within last 12 months, ordered

## 2022-01-27 NOTE — BH INPATIENT PSYCHIATRY PROGRESS NOTE - NSICDXBHSECONDARYDX_PSY_ALL_CORE
Developmental delay, severe   R62.50  DM (diabetes mellitus)   E11.9  

## 2022-01-27 NOTE — BH INPATIENT PSYCHIATRY PROGRESS NOTE - NSTXDISORGGOAL_PSY_ALL_CORE
Will demonstrate purposeful and predictable thoughts/behaviors by making a request

## 2022-01-27 NOTE — BH INPATIENT PSYCHIATRY PROGRESS NOTE - NSBHCONSDANGEROTHERS_PSY_A_CORE
assaultive behavior

## 2022-02-02 NOTE — ED PROVIDER NOTE - DISPOSITION TYPE
Start Date: 06/15/21 End Date: 12/16/21   Discontinued by: Lauren Baugh M.D. on 12/16/2021 14:53         Written Date: 06/15/21 Expiration Date: 06/15/22       Diagnosis Association: Essential hypertension (I10); Atrial fibrillation with rapid ventricular response (HCC) (I48.91)   Original Order:  DILTIAZem CD (CARDIZEM CD) 120 MG CAPSULE SR 24 HR [[id number]]     Providers        Med accidentally DC'd and then added as historical med at Dr. Lauren Baugh's office. New RX sent to pharmacy on file.     Spouse aware   DISCHARGE

## 2022-02-07 ENCOUNTER — EMERGENCY (EMERGENCY)
Facility: HOSPITAL | Age: 32
LOS: 1 days | Discharge: HOME | End: 2022-02-07
Admitting: STUDENT IN AN ORGANIZED HEALTH CARE EDUCATION/TRAINING PROGRAM
Payer: MEDICAID

## 2022-02-07 VITALS
HEART RATE: 105 BPM | TEMPERATURE: 98 F | OXYGEN SATURATION: 100 % | DIASTOLIC BLOOD PRESSURE: 75 MMHG | RESPIRATION RATE: 18 BRPM | SYSTOLIC BLOOD PRESSURE: 131 MMHG | HEIGHT: 63 IN

## 2022-02-07 PROCEDURE — 99284 EMERGENCY DEPT VISIT MOD MDM: CPT
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
## 2022-02-07 NOTE — ED PROVIDER NOTE - PATIENT PORTAL LINK FT
You can access the FollowMyHealth Patient Portal offered by [facility] by registering at the following website: [email or web address]. By joining [facility]’s FollowMyHealth portal, you will also be able to view your health information using other applications (apps) compatible with our system.
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
## 2022-02-07 NOTE — ED PROVIDER NOTE - NSICDXPASTMEDICALHX_GEN_ALL_CORE_FT
PAST MEDICAL HISTORY:  Bipolar disorder     Development delay     DM (diabetes mellitus)     Intellectual disability     Schizoaffective disorder     Schizophrenia
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
## 2022-02-07 NOTE — ED PROVIDER NOTE - CLINICAL SUMMARY MEDICAL DECISION MAKING FREE TEXT BOX
Collateral info obtained by sw, refer to her note. There is no clinical evidence of intoxication, or any acute medical problem requiring immediate intervention. Collateral info obtained by yessi, refer to her note. There is no clinical evidence of intoxication, or any acute medical problem requiring immediate intervention.  There is no clinical evidence of intoxication, or any acute medical problem requiring immediate intervention.  With good behaviour during bh.
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
## 2022-02-08 NOTE — ED ADULT NURSE NOTE - HOW OFTEN DO YOU HAVE A DRINK CONTAINING ALCOHOL?
Impression: Keratoconjunctivitis sicca, bilateral Plan: Discussed daily artificial tears to improve with dry eye, and informed can purchase eyedrops over the counter.
Impression: Long term (current) use of insulin Plan: See Plan E10.9
Impression: Ocular hypertension, bilateral Plan: IOP stable
Impression: Peripheral opacity of cornea, bilateral: C57.596. Plan: Discussed daily artificial tears to improve with dry eye, and informed can purchase eye  drops over the counter , three to four times daily.
Impression: Presence of intraocular lens: Z96.1.  Plan: In good position
Impression: Type 1 diabetes mellitus without complications Plan: No Background Diabetic Retinopathy, no Diabetic Macular Edema and no Neovascularization of the iris, disc, or elsewhere. Disc. ocular and systemic benefits of blood sugar control. The American Diabetes Association recommends target glycohemoglobin at 7.0% or less to minimize incidence of retinopathy as well as other systemic complications of diabetes mellitus. Send notes to PCP. Check annually.
Impression: Vitreous degeneration, bilateral Plan: Reviewed with patient. Patient will contact the office immediately  if notes any new  vision changes.
Never

## 2022-02-09 ENCOUNTER — EMERGENCY (EMERGENCY)
Facility: HOSPITAL | Age: 32
LOS: 1 days | Discharge: HOME | End: 2022-02-09
Admitting: EMERGENCY MEDICINE
Payer: MEDICAID

## 2022-02-09 VITALS
SYSTOLIC BLOOD PRESSURE: 142 MMHG | HEART RATE: 104 BPM | RESPIRATION RATE: 16 BRPM | DIASTOLIC BLOOD PRESSURE: 83 MMHG | HEIGHT: 63 IN | TEMPERATURE: 98 F | OXYGEN SATURATION: 100 %

## 2022-02-09 PROCEDURE — 99283 EMERGENCY DEPT VISIT LOW MDM: CPT
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
## 2022-02-09 NOTE — ED PROVIDER NOTE - CLINICAL SUMMARY MEDICAL DECISION MAKING FREE TEXT BOX
Collateral info obtained by sw, refer to her note. There is no clinical evidence of intoxication, or any acute medical problem requiring immediate intervention.

## 2022-02-09 NOTE — ED PROVIDER NOTE - PATIENT PORTAL LINK FT
You can access the FollowMyHealth Patient Portal offered by [facility] by registering at the following website: [email or web address]. By joining [facility]’s FollowMyHealth portal, you will also be able to view your health information using other applications (apps) compatible with our system.

## 2022-02-10 VITALS
TEMPERATURE: 98 F | OXYGEN SATURATION: 100 % | HEART RATE: 96 BPM | DIASTOLIC BLOOD PRESSURE: 71 MMHG | RESPIRATION RATE: 17 BRPM | SYSTOLIC BLOOD PRESSURE: 121 MMHG

## 2022-02-10 NOTE — ED ADULT NURSE REASSESSMENT NOTE - NS ED NURSE REASSESS COMMENT FT1
pt received from [facility], remains in  ED awaiting disposition. pt currently sleeping in [facility], will monitor.

## 2022-02-17 ENCOUNTER — EMERGENCY (EMERGENCY)
Facility: HOSPITAL | Age: 32
LOS: 0 days | Discharge: HOME | End: 2022-02-18
Attending: EMERGENCY MEDICINE
Payer: MEDICAID

## 2022-02-17 VITALS
WEIGHT: 238.98 LBS | TEMPERATURE: 98 F | RESPIRATION RATE: 19 BRPM | HEIGHT: 63 IN | SYSTOLIC BLOOD PRESSURE: 106 MMHG | DIASTOLIC BLOOD PRESSURE: 78 MMHG | OXYGEN SATURATION: 99 % | HEART RATE: 100 BPM

## 2022-02-17 DIAGNOSIS — R45.1 RESTLESSNESS AND AGITATION: ICD-10-CM

## 2022-02-17 DIAGNOSIS — Z91.018 ALLERGY TO OTHER FOODS: ICD-10-CM

## 2022-02-17 DIAGNOSIS — B34.9 VIRAL INFECTION, UNSPECIFIED: ICD-10-CM

## 2022-02-17 DIAGNOSIS — F63.81 INTERMITTENT EXPLOSIVE DISORDER: ICD-10-CM

## 2022-02-17 DIAGNOSIS — F79 UNSPECIFIED INTELLECTUAL DISABILITIES: ICD-10-CM

## 2022-02-17 DIAGNOSIS — F25.0 SCHIZOAFFECTIVE DISORDER, BIPOLAR TYPE: ICD-10-CM

## 2022-02-17 DIAGNOSIS — Z79.84 LONG TERM (CURRENT) USE OF ORAL HYPOGLYCEMIC DRUGS: ICD-10-CM

## 2022-02-17 DIAGNOSIS — J02.9 ACUTE PHARYNGITIS, UNSPECIFIED: ICD-10-CM

## 2022-02-17 DIAGNOSIS — R51.9 HEADACHE, UNSPECIFIED: ICD-10-CM

## 2022-02-17 DIAGNOSIS — E11.9 TYPE 2 DIABETES MELLITUS WITHOUT COMPLICATIONS: ICD-10-CM

## 2022-02-17 PROCEDURE — 99284 EMERGENCY DEPT VISIT MOD MDM: CPT

## 2022-02-17 RX ORDER — IBUPROFEN 200 MG
600 TABLET ORAL ONCE
Refills: 0 | Status: COMPLETED | OUTPATIENT
Start: 2022-02-17 | End: 2022-02-17

## 2022-02-17 RX ORDER — ACETAMINOPHEN 500 MG
975 TABLET ORAL ONCE
Refills: 0 | Status: COMPLETED | OUTPATIENT
Start: 2022-02-17 | End: 2022-02-17

## 2022-02-17 RX ADMIN — Medication 975 MILLIGRAM(S): at 23:46

## 2022-02-17 NOTE — ED ADULT NURSE NOTE - NS PRO PASSIVE SMOKE EXP
Subjective:       Patient ID: Kishan Huff is a 33 y.o. male.    Vitals:    10/12/18 1142   BP: 115/70   Pulse: 78   Resp: 18   Temp: 97.6 °F (36.4 °C)   SpO2: 100%   Weight: 72.6 kg (160 lb)   Height: 6' (1.829 m)       Chief Complaint: Mass (LT SIDE OF THE BACK 1 MONTH NOW ); Fatigue; and Groin Pain    Patient presents with 3 complaints today     1. Cyst to back x 1 month   2 fatigue for 3-4 weeks   3. Groin discomfort- left side     He recently moved here and does not have a PCP.     Denies fever or chills. No known STD exposure. No discharge. No dysuria.         Cyst   This is a new problem. The current episode started 1 to 4 weeks ago. The problem occurs constantly. The problem has been unchanged. Pertinent negatives include no abdominal pain, chest pain, chills, fever, headaches, nausea, rash, sore throat or vomiting. Nothing aggravates the symptoms. He has tried nothing for the symptoms.     Review of Systems   Constitution: Positive for decreased appetite (decreased x 1 month ) and malaise/fatigue (x 1 month ). Negative for chills, fever, weight gain and weight loss.   HENT: Negative for sore throat.    Eyes: Negative for blurred vision and discharge.   Cardiovascular: Negative for chest pain and dyspnea on exertion.   Respiratory: Negative for shortness of breath.    Skin: Positive for suspicious lesions (left upper back ). Negative for flushing and rash.   Musculoskeletal: Negative for back pain and joint pain.   Gastrointestinal: Negative for abdominal pain, constipation, diarrhea, hematochezia, melena, nausea and vomiting.   Genitourinary: Negative for bladder incontinence, dysuria, flank pain, genital sores, hematuria, incomplete emptying, nocturia and urgency.        Left sided groin pain    Neurological: Negative for headaches.   Psychiatric/Behavioral: The patient is not nervous/anxious.        Objective:      Physical Exam   Constitutional: He is oriented to person, place, and time. He  appears well-developed and well-nourished. No distress.   NAD  Afebrile    HENT:   Head: Normocephalic and atraumatic. Head is without abrasion, without contusion and without laceration.   Right Ear: External ear normal.   Left Ear: External ear normal.   Nose: Nose normal. No nasal deformity. No epistaxis.   Mouth/Throat: Oropharynx is clear and moist and mucous membranes are normal.   Eyes: Conjunctivae, EOM and lids are normal. Pupils are equal, round, and reactive to light.   Neck: Trachea normal, normal range of motion, full passive range of motion without pain and phonation normal. Neck supple.   Cardiovascular: Normal rate, regular rhythm, normal heart sounds and intact distal pulses.   Pulmonary/Chest: Effort normal and breath sounds normal. No accessory muscle usage or stridor. No tachypnea. No respiratory distress. He has no decreased breath sounds. He has no wheezes. He has no rhonchi. He has no rales.   Abdominal: Soft. Normal appearance and bowel sounds are normal. He exhibits no distension. There is no tenderness. There is no rigidity, no rebound, no guarding, no CVA tenderness, no tenderness at McBurney's point and negative Alvarez's sign.   No CVA or flank tenderness    Genitourinary: Testes normal and penis normal. Right testis shows no mass, no swelling and no tenderness. Left testis shows no mass, no swelling and no tenderness. Circumcised. No penile erythema or penile tenderness. No discharge found.         Genitourinary Comments: Chaperoned by Tianna CRUZ    Musculoskeletal: Normal range of motion.   Lymphadenopathy: No inguinal adenopathy noted on the right or left side.        Right: No inguinal adenopathy present.        Left: No inguinal adenopathy present.   Neurological: He is alert and oriented to person, place, and time. He has normal reflexes.   Skin: Skin is warm, dry and intact. Capillary refill takes less than 2 seconds. Lesion noted. No abrasion, no bruising, no burn, no ecchymosis, no  laceration and no rash noted. He is not diaphoretic. No erythema.        See picture below for skin lesion    Psychiatric: He has a normal mood and affect. His speech is normal and behavior is normal. Judgment and thought content normal. Cognition and memory are normal.   Nursing note and vitals reviewed.                Results for orders placed or performed in visit on 10/12/18   POCT Urinalysis, Dipstick, Automated, W/O Scope   Result Value Ref Range    POC Blood, Urine Negative Negative    POC Bilirubin, Urine Negative Negative    POC Urobilinogen, Urine normal 0.3 - 2.2    POC Ketones, Urine Negative Negative    POC Protein, Urine Negative Negative    POC Nitrates, Urine Negative Negative    POC Glucose, Urine Negative Negative    pH, UA 7.0     POC Specific Gravity, Urine 1.025 1.003 - 1.029    POC Leukocytes, Urine Negative Negative         Assessment:       1. Groin pain, left    2. Fatigue, unspecified type    3. Skin lesion of back    4. Encounter to establish care    5. Tinea cruris        Plan:       Kishan was seen today for mass, fatigue and groin pain.    Diagnoses and all orders for this visit:    Groin pain, left  -     ketoconazole (NIZORAL) 2 % cream; Apply topically 2 (two) times daily. for 10 days  -     POCT Urinalysis, Dipstick, Automated, W/O Scope  -     C. trachomatis/N. gonorrhoeae by AMP DNA [facility]; Urine    Fatigue, unspecified type  -     Ambulatory referral to Internal Medicine    Skin lesion of back  -     Ambulatory referral to Dermatology    Encounter to establish care  -     Ambulatory referral to Internal Medicine    Tinea cruris  -     ketoconazole (NIZORAL) 2 % cream; Apply topically 2 (two) times daily. for 10 days        He deferred outpatient lab testing today.   He will follow up with PCP (referral placed) for full lab testing and work up for fatigue. He needs to establish care as well.   No signs of kidney stone. No penile discharge. Will send urine to r/o  gonorrhea/chlaymida. He denies dysuria. No testicular swelling. Has had 1 recent sexual intercourse and used protection.     Referral placed to dermatology for back lesion that has been present for at least 1 month. Maybe longer. No signs of infection. Likely needs to be sent to lab for pathology.         Patient Instructions     Follow up with internal med and dermatology as we discussed. Call [phone] to schedule an appointment     Internal med- fatigue workup and labs.   Dermatology for lesion on back     If you've had labs sent out, it will generally take 4-7 days for results to return. We will call you with the results.      Please return here or go to the Emergency Department for any concerns or worsening of condition.  If you were prescribed [facility], please take them to completion.  If you were prescribed a narcotic medication, do not drive or operate heavy equipment or machinery while taking these medications.  Please follow up with your primary care doctor or specialist as needed.    If you  smoke, please stop smoking.      Jock Itch (Tinea Cruris, General)  Jock itch (tinea cruris) is a red, itchy rash in the groin caused by a fungal infection. It occurs in skin folds where it is warm and moist. It commonly starts as a small, red, itchy patch that grows larger. The patch is usually in the shape of a round ring, 1 to 2 inches wide. It may cause the skin to flake. It may also spread to the scrotum or the skin that covers your testicles. This infection is treated with skin creams or oral medicine.  Home care  · If you were prescribed a cream, use it exactly as directed. You can buy some antifungal creams without a prescription.  · It may take a week before the fungus starts to go away. It can take about 2 to 3 weeks to completely clear. To stop the rash from coming back, keep using the medicine until the rash is all gone.  · Wash the area at least once a day with soap and water. Pat dry and apply  medicine.   · Wear loose-fitting underwear to let your skin breathe. Change your underwear daily.  · Once the rash is gone, keep the area clean and dry to prevent reinfection. If recurrence is a problem, use a medicated antifungal powder daily. This is available over the counter.  Prevention  The following tips may help prevent jock itch:  · Don't share clothes, towels, or sports gear with others unless they have been washed.  · Change your underwear daily.  · Keep skin clean and dry, especially after showering or swimming.  · Lose weight.  · Do not wear tight underwear.  · Treat athlete's foot if it occurs.  Follow-up care  Follow up with your healthcare provider, or as advised. Call your provider if the rash is not starting to improve after 10 days of treatment, or if the rash continues to spread.  When to seek medical advice  Call your healthcare provider right away if any of these occur:  · Increasing pain in the rash area  · Redness that spreads around the rash  · Fluid draining from the rash  · Rash returns soon after treatment  · Fever of 100.4°F (38°C) or higher, or as directed by your provider  Date Last Reviewed: 8/1/2016  © 8677-9319 [employer]. [address] [place] [address], [place] [address]. All rights reserved. This information is not intended as a substitute for professional medical care. Always follow your healthcare professional's instructions.        Managing Fatigue     Family members can help with meals and chores around the house.   Fatigue is common. It can be caused by worry, lack of sleep, or poor appetite. Fatigue can also be a sign of anemia, a shortage of red blood cells. You might need medical treatment for anemia. The tips below can help you feel better.  Conserving energy  · Keep track of the times of day when you are most tired and plan around them. For instance, if you are more tired in the afternoon, try to get tasks done in the morning.  · Decide which tasks are most  important. Do those first.  · Pass tasks along to others when you need to. Ask for help.  · Accept help when its offered. Tell people what they can do to help. For instance, you may need someone to fix a meal, fold clothes, or put gas in your car.  · Plan rest times. You may want to take a nap each day. Just sitting quietly for a few minutes can make you feel more rested.  What you can do to feel better  · Relax before you try to sleep. Take a bath or read for a while.  · Form a sleep pattern. Go to bed at the same time each night and get up at the same time each morning.  · Eat well. Choose foods from all of the food groups each day.  · Exercise. Take a brisk walk to help increase your energy.  · Avoid caffeine and alcohol. Drink plenty of water or fruit juices instead.  Treating anemia  If you begin to feel more tired than normal, tell your doctor. Fatigue could be a sign of anemia. This problem is fairly common in cancer patients, especially during chemotherapy and radiation treatments. If your red blood cell count is too low, you may get a blood transfusion. In some cases, you may need medicine to increase the number of red blood cells your body makes.  When to call your healthcare provider  Call your healthcare provider if you have:  · Shortness of breath or chest pain  · A dizzy feeling when you get up from lying or sitting down  · Paler skin than normal  · Extreme tiredness that is not helped by sleep   Date Last Reviewed: 1/3/2016  © 1254-6170 [employer]. [address] [place] [address], [place] [address]. All rights reserved. This information is not intended as a substitute for professional medical care. Always follow your healthcare professional's instructions.             No

## 2022-02-17 NOTE — ED ADULT NURSE NOTE - OBJECTIVE STATEMENT
patient alert and oriented x4. pt states she has been having throat pain and headache for the past week. pt also reports fever. temperature 98.1 in triage. pain 8/10. pmh bipolar, schizo-affective disorder.

## 2022-02-18 VITALS
TEMPERATURE: 97 F | RESPIRATION RATE: 19 BRPM | HEIGHT: 63 IN | WEIGHT: 238.98 LBS | OXYGEN SATURATION: 100 % | HEART RATE: 101 BPM | SYSTOLIC BLOOD PRESSURE: 131 MMHG | DIASTOLIC BLOOD PRESSURE: 85 MMHG

## 2022-02-18 VITALS
RESPIRATION RATE: 20 BRPM | SYSTOLIC BLOOD PRESSURE: 108 MMHG | OXYGEN SATURATION: 98 % | DIASTOLIC BLOOD PRESSURE: 66 MMHG | TEMPERATURE: 97 F | HEART RATE: 100 BPM

## 2022-02-18 VITALS
SYSTOLIC BLOOD PRESSURE: 128 MMHG | DIASTOLIC BLOOD PRESSURE: 84 MMHG | OXYGEN SATURATION: 98 % | HEART RATE: 95 BPM | RESPIRATION RATE: 18 BRPM | TEMPERATURE: 99 F

## 2022-02-18 DIAGNOSIS — R51.9 HEADACHE, UNSPECIFIED: ICD-10-CM

## 2022-02-18 DIAGNOSIS — Z79.84 LONG TERM (CURRENT) USE OF ORAL HYPOGLYCEMIC DRUGS: ICD-10-CM
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
## 2022-02-18 DIAGNOSIS — F91.9 CONDUCT DISORDER, UNSPECIFIED: ICD-10-CM
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
## 2022-02-18 DIAGNOSIS — R45.1 RESTLESSNESS AND AGITATION: ICD-10-CM

## 2022-02-18 DIAGNOSIS — Z91.018 ALLERGY TO OTHER FOODS: ICD-10-CM

## 2022-02-18 LAB
ALBUMIN SERPL ELPH-MCNC: 2.7 G/DL — LOW (ref 3.3–5)
ALP SERPL-CCNC: 65 U/L — SIGNIFICANT CHANGE UP (ref 40–120)
ALT FLD-CCNC: 11 U/L — LOW (ref 12–78)
ANION GAP SERPL CALC-SCNC: 4 MMOL/L — LOW (ref 5–17)
AST SERPL-CCNC: 13 U/L — LOW (ref 15–37)
BILIRUB SERPL-MCNC: 0.2 MG/DL — SIGNIFICANT CHANGE UP (ref 0.2–1.2)
BUN SERPL-MCNC: 13 MG/DL — SIGNIFICANT CHANGE UP (ref 7–23)
CALCIUM SERPL-MCNC: 8.7 MG/DL — SIGNIFICANT CHANGE UP (ref 8.5–10.1)
CHLORIDE SERPL-SCNC: 102 MMOL/L — SIGNIFICANT CHANGE UP (ref 96–108)
CO2 SERPL-SCNC: 30 MMOL/L — SIGNIFICANT CHANGE UP (ref 22–31)
CREAT SERPL-MCNC: 0.67 MG/DL — SIGNIFICANT CHANGE UP (ref 0.5–1.3)
GLUCOSE SERPL-MCNC: 121 MG/DL — HIGH (ref 70–99)
HCG SERPL-ACNC: <1 MIU/ML — SIGNIFICANT CHANGE UP
POTASSIUM SERPL-MCNC: 4.5 MMOL/L — SIGNIFICANT CHANGE UP (ref 3.5–5.3)
POTASSIUM SERPL-SCNC: 4.5 MMOL/L — SIGNIFICANT CHANGE UP (ref 3.5–5.3)
PROT SERPL-MCNC: 7.6 GM/DL — SIGNIFICANT CHANGE UP (ref 6–8.3)
S PYO DNA THROAT QL NAA+PROBE: ABNORMAL
SODIUM SERPL-SCNC: 136 MMOL/L — SIGNIFICANT CHANGE UP (ref 135–145)

## 2022-02-18 PROCEDURE — 99284 EMERGENCY DEPT VISIT MOD MDM: CPT

## 2022-02-18 RX ORDER — ALPRAZOLAM 0.25 MG
1 TABLET ORAL ONCE
Refills: 0 | Status: DISCONTINUED | OUTPATIENT
Start: 2022-02-18 | End: 2022-02-18

## 2022-02-18 RX ORDER — HALOPERIDOL DECANOATE 100 MG/ML
20 INJECTION INTRAMUSCULAR ONCE
Refills: 0 | Status: DISCONTINUED | OUTPATIENT
Start: 2022-02-18 | End: 2022-02-18

## 2022-02-18 RX ORDER — ACETAMINOPHEN 500 MG
650 TABLET ORAL ONCE
Refills: 0 | Status: COMPLETED | OUTPATIENT
Start: 2022-02-18 | End: 2022-02-18

## 2022-02-18 RX ORDER — HALOPERIDOL DECANOATE 100 MG/ML
20 INJECTION INTRAMUSCULAR ONCE
Refills: 0 | Status: COMPLETED | OUTPATIENT
Start: 2022-02-18 | End: 2022-02-18

## 2022-02-18 RX ORDER — CLOZAPINE 150 MG/1
50 TABLET, ORALLY DISINTEGRATING ORAL ONCE
Refills: 0 | Status: DISCONTINUED | OUTPATIENT
Start: 2022-02-18 | End: 2022-02-18

## 2022-02-18 RX ADMIN — Medication 650 MILLIGRAM(S): at 22:15

## 2022-02-18 RX ADMIN — Medication 1 MILLIGRAM(S): at 17:54

## 2022-02-18 RX ADMIN — Medication 650 MILLIGRAM(S): at 17:54

## 2022-02-18 RX ADMIN — Medication 600 MILLIGRAM(S): at 01:27

## 2022-02-18 RX ADMIN — HALOPERIDOL DECANOATE 20 MILLIGRAM(S): 100 INJECTION INTRAMUSCULAR at 01:45

## 2022-02-18 RX ADMIN — Medication 975 MILLIGRAM(S): at 01:46

## 2022-02-18 NOTE — ED PROVIDER NOTE - PROGRESS NOTE DETAILS
Results reported to patient--grossly benign, labs unremarkable   Pt. reports feeling better after meds  pt. agrees to f/u with primary care outpt. asap, referred to ENT for f/u   pt. understands to return to ED if symptoms worsen; will d/c

## 2022-02-18 NOTE — ED PROVIDER NOTE - OBJECTIVE STATEMENT
30 yo F with sore throat for 2 days, headache, no other complaints.  Pt. feels otherwise well.  No sick contacts.  ROS: negative for fever, cough, headache, chest pain, shortness of breath, abd pain, nausea, vomiting, diarrhea, rash, paresthesia, and weakness--all other systems reviewed are negative.   PMH: developmental disability, schizoaffective disorder, bipolar , intermittent explosive disorder with c/o sever agitation; Meds: See EMR for list; SH: Denies smoking/drinking/drug use
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
## 2022-02-18 NOTE — ED PROVIDER NOTE - PATIENT PORTAL LINK FT
You can access the FollowMyHealth Patient Portal offered by [facility] by registering at the following website: [email or web address]. By joining [employer]’s FollowMyHealth portal, you will also be able to view your health information using other applications (apps) compatible with our system.

## 2022-02-18 NOTE — ED ADULT NURSE NOTE - NSIMPLEMENTINTERV_GEN_ALL_ED
Implemented All Fall Risk Interventions:  [place] to call system. Call bell, personal items and telephone within reach. Instruct patient to call for assistance. Room bathroom lighting operational. Non-slip footwear when patient is off stretcher. Physically safe environment: no spills, clutter or unnecessary equipment. Stretcher in lowest position, wheels locked, appropriate side rails in place. Provide visual cue, wrist band, yellow gown, etc. Monitor gait and stability. Monitor for mental status changes and reorient to person, place, and time. Review medications for side effects contributing to fall risk. Reinforce activity limits and safety measures with patient and family.
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
## 2022-02-18 NOTE — ED PROVIDER NOTE - CARE PROVIDER_API CALL
Jason Enrique)  Otolaryngology  [address], [place] [address]  Phone: [phone]  Fax: [phone]  Follow Up Time: 4-6 Days
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
## 2022-02-18 NOTE — ED PROVIDER NOTE - CLINICAL SUMMARY MEDICAL DECISION MAKING FREE TEXT BOX
30 yo F with sore throat, likely viral, afebrile, normal vitals, benign exam  -rvp/covid, preg, ua, cx, strep swab, motrin/tylenol  -f/u results, reeval

## 2022-02-18 NOTE — ED PROVIDER NOTE - PHYSICAL EXAMINATION
Vitals: WNL  Gen: AAOx3, NAD, sitting comfortably in stretcher, calm, non-toxic   ENT: erythema or posterior oropharynx, no exudate or significant swelling, no uvular deviation   Head: ncat, perrla, eomi b/l  Neck: supple, no lymphadenopathy, no midline deviation  Heart: rrr, no m/r/g  Lungs: CTA b/l, no rales/ronchi/wheezes  Abd: soft, nontender, non-distended, no rebound or guarding  Ext: no clubbing/cyanosis/edema  Neuro: sensation and muscle strength intact b/l, steady gait

## 2022-02-18 NOTE — ED PROVIDER NOTE - PATIENT PORTAL LINK FT
You can access the FollowMyHealth Patient Portal offered by [facility] by registering at the following website: [email or web address]. By joining [employer]’s FollowMyHealth portal, you will also be able to view your health information using other applications (apps) compatible with our system.

## 2022-02-20 LAB
CULTURE RESULTS: SIGNIFICANT CHANGE UP
SPECIMEN SOURCE: SIGNIFICANT CHANGE UP

## 2022-02-22 LAB — GLUCOSE BLDC GLUCOMTR-MCNC: 98 MG/DL — SIGNIFICANT CHANGE UP (ref 70–99)

## 2022-02-23 ENCOUNTER — EMERGENCY (EMERGENCY)
Facility: HOSPITAL | Age: 32
LOS: 0 days | Discharge: HOME | End: 2022-02-24
Attending: EMERGENCY MEDICINE
Payer: MEDICAID

## 2022-02-23 VITALS
DIASTOLIC BLOOD PRESSURE: 89 MMHG | OXYGEN SATURATION: 99 % | SYSTOLIC BLOOD PRESSURE: 121 MMHG | WEIGHT: 220.02 LBS | RESPIRATION RATE: 18 BRPM | HEART RATE: 90 BPM | HEIGHT: 63 IN | TEMPERATURE: 98 F

## 2022-02-23 DIAGNOSIS — Z79.84 LONG TERM (CURRENT) USE OF ORAL HYPOGLYCEMIC DRUGS: ICD-10-CM

## 2022-02-23 DIAGNOSIS — E11.9 TYPE 2 DIABETES MELLITUS WITHOUT COMPLICATIONS: ICD-10-CM

## 2022-02-23 DIAGNOSIS — J02.9 ACUTE PHARYNGITIS, UNSPECIFIED: ICD-10-CM

## 2022-02-23 DIAGNOSIS — R45.1 RESTLESSNESS AND AGITATION: ICD-10-CM

## 2022-02-23 DIAGNOSIS — Z91.018 ALLERGY TO OTHER FOODS: ICD-10-CM

## 2022-02-23 PROCEDURE — 99284 EMERGENCY DEPT VISIT MOD MDM: CPT
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
## 2022-02-23 NOTE — ED PROVIDER NOTE - PHYSICAL EXAMINATION
Gen: Alert, NAD, well appearing  Head: NC, AT, EOMI, normal lids/conjunctiva  ENT: normal hearing, patent oropharynx without erythema/exudate, uvula midline  Neck: +supple, no tenderness/meningismus/JVD, +Trachea midline  Pulm: Bilateral BS, normal resp effort, no wheeze/stridor/retractions  CV: RRR, no M/R/G, +dist pulses  Abd: soft, NT/ND, Negative [place] signs, +BS, no palpable masses  Mskel: no edema/erythema/cyanosis  Skin: no rash, warm/dry  Neuro: AAOx3, no apparent sensory/motor deficits, coordination intact

## 2022-02-23 NOTE — ED PROVIDER NOTE - PATIENT PORTAL LINK FT
You can access the FollowMyHealth Patient Portal offered by [facility] by registering at the following website: [email or web address]. By joining [employer]’s FollowMyHealth portal, you will also be able to view your health information using other applications (apps) compatible with our system.
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
## 2022-02-24 VITALS
OXYGEN SATURATION: 99 % | TEMPERATURE: 98 F | RESPIRATION RATE: 16 BRPM | HEART RATE: 103 BPM | DIASTOLIC BLOOD PRESSURE: 80 MMHG | SYSTOLIC BLOOD PRESSURE: 115 MMHG

## 2022-02-24 NOTE — ED ADULT NURSE REASSESSMENT NOTE - NS ED NURSE REASSESS COMMENT FT1
Pt continue to be sleeping in bed without any distress. Pt awaiting SW for safe d/c home.
pt resting comfortably in stretcher, pt ate lunch. in NAD
Pt calm and sleeping in bed. Writer tried for 30mins to connect with [facility] facility where pt resides to discuss d/c and transportation. All attempts to communicated with individual form [facility] were unsuccessfully. Writer also called emergency contact w/o any response. MD notified. Will continue to monitor.

## 2022-03-22 NOTE — BH INPATIENT PSYCHIATRY PROGRESS NOTE - NS ED BHA REVIEW OF ED CHART AVAILABLE INVESTIGATIONS REVIEWED
None available
PAST MEDICAL HISTORY:  No pertinent past medical history     
None available

## 2022-03-28 ENCOUNTER — EMERGENCY (EMERGENCY)
Facility: HOSPITAL | Age: 32
LOS: 0 days | Discharge: HOME | End: 2022-03-28
Attending: EMERGENCY MEDICINE
Payer: MEDICAID

## 2022-03-28 VITALS
RESPIRATION RATE: 20 BRPM | HEIGHT: 63 IN | WEIGHT: 246.92 LBS | TEMPERATURE: 98 F | HEART RATE: 70 BPM | DIASTOLIC BLOOD PRESSURE: 80 MMHG | SYSTOLIC BLOOD PRESSURE: 122 MMHG | OXYGEN SATURATION: 100 %

## 2022-03-28 VITALS
DIASTOLIC BLOOD PRESSURE: 77 MMHG | TEMPERATURE: 98 F | RESPIRATION RATE: 19 BRPM | SYSTOLIC BLOOD PRESSURE: 121 MMHG | OXYGEN SATURATION: 97 % | HEART RATE: 89 BPM

## 2022-03-28 DIAGNOSIS — Z91.018 ALLERGY TO OTHER FOODS: ICD-10-CM

## 2022-03-28 DIAGNOSIS — F63.9 IMPULSE DISORDER, UNSPECIFIED: ICD-10-CM

## 2022-03-28 DIAGNOSIS — Z79.84 LONG TERM (CURRENT) USE OF ORAL HYPOGLYCEMIC DRUGS: ICD-10-CM

## 2022-03-28 DIAGNOSIS — R46.89 OTHER SYMPTOMS AND SIGNS INVOLVING APPEARANCE AND BEHAVIOR: ICD-10-CM

## 2022-03-28 PROCEDURE — 99284 EMERGENCY DEPT VISIT MOD MDM: CPT

## 2022-03-28 RX ORDER — ALPRAZOLAM 0.25 MG
2 TABLET ORAL ONCE
Refills: 0 | Status: DISCONTINUED | OUTPATIENT
Start: 2022-03-28 | End: 2022-03-28

## 2022-03-28 RX ORDER — DIPHENHYDRAMINE HCL 50 MG
50 CAPSULE ORAL ONCE
Refills: 0 | Status: COMPLETED | OUTPATIENT
Start: 2022-03-28 | End: 2022-03-28

## 2022-03-28 RX ADMIN — Medication 50 MILLIGRAM(S): at 18:46

## 2022-03-28 RX ADMIN — Medication 2 MILLIGRAM(S): at 18:48
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
## 2022-03-28 NOTE — ED PROVIDER NOTE - PATIENT PORTAL LINK FT
You can access the FollowMyHealth Patient Portal offered by [facility] by registering at the following website: [email or web address]. By joining [employer]’s FollowMyHealth portal, you will also be able to view your health information using other applications (apps) compatible with our system.
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
## 2022-03-28 NOTE — ED PROVIDER NOTE - PHYSICAL EXAMINATION
Gen: Alert, NAD  Head: NC, AT   Eyes: PERRL, EOMI, normal lids/conjunctiva  ENT: normal hearing, patent oropharynx without erythema/exudate, uvula midline  Neck: supple, no tenderness, Trachea midline  Pulm: Bilateral BS, normal resp effort, no wheeze/stridor/retractions  CV: RRR, no M/R/G, 2+ radial and dp pulses bl, no edema  Abd: soft, NT/ND, +BS, no hepatosplenomegaly  Mskel: extremities x4 with normal ROM and no joint effusions. no ctl spine ttp.   Skin: healing excoriation of the left anterior knee   Neuro: AAOx3, no sensory/motor deficits, CN 2-12 intact
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
## 2022-03-28 NOTE — ED ADULT NURSE NOTE - ED STAT RN HANDOFF DETAILS
Report from ADRY Burkett. Safety checks completed this shift. Safety rounds completed hourly. No IV.  Fall & skin precautions in place. Pending  from ambulance. Will continue to monitor.

## 2022-04-08 ENCOUNTER — EMERGENCY (EMERGENCY)
Facility: HOSPITAL | Age: 32
LOS: 0 days | Discharge: HOME | End: 2022-04-08
Attending: EMERGENCY MEDICINE
Payer: MEDICAID

## 2022-04-08 VITALS
RESPIRATION RATE: 17 BRPM | SYSTOLIC BLOOD PRESSURE: 115 MMHG | TEMPERATURE: 98 F | HEART RATE: 74 BPM | OXYGEN SATURATION: 99 % | DIASTOLIC BLOOD PRESSURE: 74 MMHG

## 2022-04-08 VITALS
WEIGHT: 246.92 LBS | DIASTOLIC BLOOD PRESSURE: 69 MMHG | HEART RATE: 96 BPM | RESPIRATION RATE: 16 BRPM | HEIGHT: 63 IN | SYSTOLIC BLOOD PRESSURE: 108 MMHG | OXYGEN SATURATION: 98 % | TEMPERATURE: 97 F

## 2022-04-08 DIAGNOSIS — Z79.84 LONG TERM (CURRENT) USE OF ORAL HYPOGLYCEMIC DRUGS: ICD-10-CM

## 2022-04-08 DIAGNOSIS — R45.1 RESTLESSNESS AND AGITATION: ICD-10-CM

## 2022-04-08 DIAGNOSIS — Z91.018 ALLERGY TO OTHER FOODS: ICD-10-CM

## 2022-04-08 DIAGNOSIS — O21.2 LATE VOMITING OF PREGNANCY: ICD-10-CM

## 2022-04-08 DIAGNOSIS — O99.342 OTHER MENTAL DISORDERS COMPLICATING PREGNANCY, SECOND TRIMESTER: ICD-10-CM

## 2022-04-08 DIAGNOSIS — Z3A.20 20 WEEKS GESTATION OF PREGNANCY: ICD-10-CM

## 2022-04-08 DIAGNOSIS — Z20.822 CONTACT WITH AND (SUSPECTED) EXPOSURE TO COVID-19: ICD-10-CM

## 2022-04-08 DIAGNOSIS — F63.9 IMPULSE DISORDER, UNSPECIFIED: ICD-10-CM

## 2022-04-08 LAB
ALBUMIN SERPL ELPH-MCNC: 3 G/DL — LOW (ref 3.3–5)
ALP SERPL-CCNC: 54 U/L — SIGNIFICANT CHANGE UP (ref 40–120)
ALT FLD-CCNC: 12 U/L — SIGNIFICANT CHANGE UP (ref 12–78)
ANION GAP SERPL CALC-SCNC: 6 MMOL/L — SIGNIFICANT CHANGE UP (ref 5–17)
AST SERPL-CCNC: 11 U/L — LOW (ref 15–37)
BASOPHILS # BLD AUTO: 0.02 K/UL — SIGNIFICANT CHANGE UP (ref 0–0.2)
BASOPHILS NFR BLD AUTO: 0.2 % — SIGNIFICANT CHANGE UP (ref 0–2)
BILIRUB SERPL-MCNC: 0.2 MG/DL — SIGNIFICANT CHANGE UP (ref 0.2–1.2)
BUN SERPL-MCNC: 18 MG/DL — SIGNIFICANT CHANGE UP (ref 7–23)
CALCIUM SERPL-MCNC: 9.2 MG/DL — SIGNIFICANT CHANGE UP (ref 8.5–10.1)
CHLORIDE SERPL-SCNC: 106 MMOL/L — SIGNIFICANT CHANGE UP (ref 96–108)
CO2 SERPL-SCNC: 27 MMOL/L — SIGNIFICANT CHANGE UP (ref 22–31)
CREAT SERPL-MCNC: 0.69 MG/DL — SIGNIFICANT CHANGE UP (ref 0.5–1.3)
EGFR: 120 ML/MIN/1.73M2 — SIGNIFICANT CHANGE UP
EOSINOPHIL # BLD AUTO: 0.01 K/UL — SIGNIFICANT CHANGE UP (ref 0–0.5)
EOSINOPHIL NFR BLD AUTO: 0.1 % — SIGNIFICANT CHANGE UP (ref 0–6)
FLUAV AG NPH QL: SIGNIFICANT CHANGE UP
FLUBV AG NPH QL: SIGNIFICANT CHANGE UP
GLUCOSE SERPL-MCNC: 95 MG/DL — SIGNIFICANT CHANGE UP (ref 70–99)
HCG SERPL-ACNC: <1 MIU/ML — SIGNIFICANT CHANGE UP
HCT VFR BLD CALC: 38 % — SIGNIFICANT CHANGE UP (ref 34.5–45)
HGB BLD-MCNC: 12.5 G/DL — SIGNIFICANT CHANGE UP (ref 11.5–15.5)
IMM GRANULOCYTES NFR BLD AUTO: 0.4 % — SIGNIFICANT CHANGE UP (ref 0–1.5)
LYMPHOCYTES # BLD AUTO: 1.35 K/UL — SIGNIFICANT CHANGE UP (ref 1–3.3)
LYMPHOCYTES # BLD AUTO: 11.9 % — LOW (ref 13–44)
MAGNESIUM SERPL-MCNC: 1.7 MG/DL — SIGNIFICANT CHANGE UP (ref 1.6–2.6)
MCHC RBC-ENTMCNC: 29.6 PG — SIGNIFICANT CHANGE UP (ref 27–34)
MCHC RBC-ENTMCNC: 32.9 G/DL — SIGNIFICANT CHANGE UP (ref 32–36)
MCV RBC AUTO: 89.8 FL — SIGNIFICANT CHANGE UP (ref 80–100)
MONOCYTES # BLD AUTO: 1.18 K/UL — HIGH (ref 0–0.9)
MONOCYTES NFR BLD AUTO: 10.4 % — SIGNIFICANT CHANGE UP (ref 2–14)
NEUTROPHILS # BLD AUTO: 8.78 K/UL — HIGH (ref 1.8–7.4)
NEUTROPHILS NFR BLD AUTO: 77 % — SIGNIFICANT CHANGE UP (ref 43–77)
NRBC # BLD: 0 /100 WBCS — SIGNIFICANT CHANGE UP (ref 0–0)
PLATELET # BLD AUTO: 286 K/UL — SIGNIFICANT CHANGE UP (ref 150–400)
POTASSIUM SERPL-MCNC: 4.5 MMOL/L — SIGNIFICANT CHANGE UP (ref 3.5–5.3)
POTASSIUM SERPL-SCNC: 4.5 MMOL/L — SIGNIFICANT CHANGE UP (ref 3.5–5.3)
PROT SERPL-MCNC: 7.6 GM/DL — SIGNIFICANT CHANGE UP (ref 6–8.3)
RBC # BLD: 4.23 M/UL — SIGNIFICANT CHANGE UP (ref 3.8–5.2)
RBC # FLD: 14.6 % — HIGH (ref 10.3–14.5)
SARS-COV-2 RNA SPEC QL NAA+PROBE: SIGNIFICANT CHANGE UP
SODIUM SERPL-SCNC: 139 MMOL/L — SIGNIFICANT CHANGE UP (ref 135–145)
WBC # BLD: 11.39 K/UL — HIGH (ref 3.8–10.5)
WBC # FLD AUTO: 11.39 K/UL — HIGH (ref 3.8–10.5)

## 2022-04-08 PROCEDURE — 99284 EMERGENCY DEPT VISIT MOD MDM: CPT

## 2022-04-08 RX ORDER — DIPHENHYDRAMINE HCL 50 MG
25 CAPSULE ORAL ONCE
Refills: 0 | Status: DISCONTINUED | OUTPATIENT
Start: 2022-04-08 | End: 2022-04-08

## 2022-04-08 RX ORDER — ONDANSETRON 8 MG/1
4 TABLET, FILM COATED ORAL ONCE
Refills: 0 | Status: COMPLETED | OUTPATIENT
Start: 2022-04-08 | End: 2022-04-08

## 2022-04-08 RX ORDER — SODIUM CHLORIDE 9 MG/ML
1000 INJECTION INTRAMUSCULAR; INTRAVENOUS; SUBCUTANEOUS ONCE
Refills: 0 | Status: COMPLETED | OUTPATIENT
Start: 2022-04-08 | End: 2022-04-08

## 2022-04-08 RX ADMIN — Medication 2 MILLIGRAM(S): at 14:24

## 2022-04-08 RX ADMIN — SODIUM CHLORIDE 1000 MILLILITER(S): 9 INJECTION INTRAMUSCULAR; INTRAVENOUS; SUBCUTANEOUS at 15:30

## 2022-04-08 RX ADMIN — SODIUM CHLORIDE 1000 MILLILITER(S): 9 INJECTION INTRAMUSCULAR; INTRAVENOUS; SUBCUTANEOUS at 14:40

## 2022-04-08 RX ADMIN — ONDANSETRON 4 MILLIGRAM(S): 8 TABLET, FILM COATED ORAL at 15:29

## 2022-04-08 NOTE — ED ADULT NURSE NOTE - OBJECTIVE STATEMENT
pt BIBEMS with c/o generalized abdominal pain. states she vomited earlier. pt denies diarrhea/vaginal discharge.

## 2022-04-08 NOTE — ED PROVIDER NOTE - PHYSICAL EXAMINATION
Gen: Alert, aggressive behavior   Head: NC, AT   Eyes: PERRL, EOMI, normal lids/conjunctiva  ENT: normal hearing, patent oropharynx without erythema/exudate, uvula midline  Neck: supple, no tenderness, Trachea midline  Pulm: Bilateral BS, normal resp effort, no wheeze/stridor/retractions  CV: RRR, no M/R/G, 2+ radial and dp pulses bl, no edema  Abd: soft, NT/ND, +BS, no hepatosplenomegaly  Mskel: extremities x4 with normal ROM and no joint effusions. no ctl spine ttp.   Skin: no rash, no bruising   Neuro: AAOx3, no sensory/motor deficits, CN 2-12 intact

## 2022-04-08 NOTE — ED PROVIDER NOTE - PATIENT PORTAL LINK FT
You can access the FollowMyHealth Patient Portal offered by [facility] by registering at the following website: [email or web address]. By joining [facility]’s FollowMyHealth portal, you will also be able to view your health information using other applications (apps) compatible with our system.

## 2022-04-08 NOTE — ED PROVIDER NOTE - OBJECTIVE STATEMENT
31F with impulse control pw vomiting. pt consuming a lot of junk food. pt angry at being here and stating that "she never gets help here" she denies ha, vision loss, rhinorrhea, cp, sob abd pain, fever, chills, rash, bleeding, dysuria, si. pt notes that she is 5 months pregnant and that she wants to eat.

## 2022-04-08 NOTE — ED ADULT NURSE NOTE - CAS ELECT INFOMATION PROVIDED
DC instructions Discharge completed for other RN.  instructions provided and verbalizes understanding of medication regimen and follow up care. Educational material provided. Denies any pain at this time./DC instructions
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
## 2022-04-26 NOTE — ED PROVIDER NOTE - DATE/TIME 1
UROLOGY PROGRESS NOTE   Patient Identifiers: Roly Ruby (MRN [id number])  Date of Service: 4/26/2022    Subjective:     56-year-old man history of prostate cancer  He had a robotic prostatectomy 2009  His PSA remains< 0 1  He was seen last month with a penile rash potentially balanitis  Treatment with oral Diflucan and Lotrisone cream   Back for follow-up  He reports persistent redness on the glans without pain itching or drainage        Reason for visit:  Penile rash follow-up     Objective:     VITALS:    Vitals:    04/22/22 1349   BP: 134/72     AUA SYMPTOM SCORE      Most Recent Value   AUA SYMPTOM SCORE    How often have you had a sensation of not emptying your bladder completely after you finished urinating? 1 (P)     How often have you had to urinate again less than two hours after you finished urinating? 2 (P)     How often have you found you stopped and started again several times when you urinate? 1 (P)     How often have you found it difficult to postpone urination? 1 (P)     How often have you had a weak urinary stream? 1 (P)     How often have you had to push or strain to begin urination? 1 (P)     How many times did you most typically get up to urinate from the time you went to bed at night until the time you got up in the morning? 5 (P)     Quality of Life: If you were to spend the rest of your life with your urinary condition just the way it is now, how would you feel about that? 2 (P)     AUA SYMPTOM SCORE 12 (P)             LABS:  No results found for: HGB, HCT, WBC, PLT]    Lab Results   Component Value Date    K 3 9 02/28/2022     (H) 02/28/2022    CO2 27 02/28/2022    BUN 23 02/28/2022    CREATININE 1 15 02/28/2022    CALCIUM 9 2 02/28/2022   ]        INPATIENT MEDS:    Current Outpatient Medications:     atenolol (TENORMIN) 25 mg tablet, Take 1 tablet (25 mg total) by mouth daily, Disp: 90 tablet, Rfl: 3    azelastine (ASTELIN) 0 1 % nasal spray, 1 spray into each nostril 2 (two) times a day Use in each nostril as directed, Disp: 30 mL, Rfl: 1    Calcium Carbonate-Vit D-Min (CALCIUM 1200 PO), Take 1 capsule by mouth daily with breakfast, Disp: , Rfl:     cholecalciferol (VITAMIN D3) 1,000 units tablet, Take 1,000 Units by mouth 2 (two) times a day , Disp: , Rfl:     co-enzyme Q-10 30 MG capsule, Take 30 mg by mouth 3 (three) times a day, Disp: , Rfl:     CRANBERRY PO, Take by mouth daily To prevent gout, Disp: , Rfl:     diphenhydrAMINE (BENADRYL) 50 MG tablet, Take 50 mg by mouth daily at bedtime as needed for itching Sinus drainage/sleep aid, Disp: , Rfl:     glucosamine-chondroitin 500-400 MG tablet, Take 1 tablet by mouth 2 (two) times a day with meals , Disp: , Rfl:     Misc Natural Products (Tart Cherry Advanced) CAPS, Take 1 capsule by mouth daily with breakfast 1000 mg to prevent gout, Disp: , Rfl:     Omega-3 Fatty Acids (fish oil) 1,000 mg, Take 1,000 mg by mouth daily, Disp: , Rfl:     rosuvastatin (CRESTOR) 5 mg tablet, Take 1 tablet (5 mg total) by mouth daily at bedtime, Disp: 90 tablet, Rfl: 3    Zinc 50 MG CAPS, Take 1 capsule by mouth daily, Disp: , Rfl:     clotrimazole-betamethasone (LOTRISONE) 1-0 05 % cream, Apply topically 2 (two) times a day for 14 days, Disp: 30 g, Rfl: 0      Physical Exam:   /72   Ht 5' 9" (1 753 m)   Wt 81 6 kg (180 lb)   BMI 26 58 kg/m²   GEN: no acute distress    RESP: breathing comfortably with no accessory muscle use    ABD: soft, non-tender, non-distended   INCISION:    EXT: no significant peripheral edema   (Male): Penis circumcised, phallus normal, meatus patent  Some redness on the glans without crusting  No pustules  Nontender  RADIOLOGY:     None     Assessment:    1  Penile rash   2   History of prostate cancer     Plan:   - I believe this represents more of a contact dermatitis or irritation from his pad  - there are no signs of infection or Candida  - I recommend he stop Lotrisone and antibiotic ointment  - use barrier cream only such as Vaseline or a and D ointment when he wears his pad  - he will follow-up in August for his regularly scheduled appointment call with any further questions or concerns 08-Nov-2020 19:54

## 2022-04-28 NOTE — ED PROVIDER NOTE - CONDITION AT DISCHARGE:
Patient's chart updated to reflect:      . - HF care plan, HF education points and HF discharge instructions.  -Orders: 2 gram sodium diet, daily weights, I/O.  -PCP and cardiology follow up appointments to be scheduled within 7 days of hospital discharge. -CHF education session will be provided to the patient prior to hospital discharge.     Prasanth Escoto RN RN, BSN  Heart Failure Navigator
Problem: Chronic Conditions and Co-morbidities  Goal: Patient's chronic conditions and co-morbidity symptoms are monitored and maintained or improved  4/27/2022 0322 by Barb Santana RN  Outcome: Progressing  4/26/2022 1615 by Bulmaro Plasencia RN  Outcome: Progressing     Problem: Discharge Planning  Goal: Discharge to home or other facility with appropriate resources  4/26/2022 1615 by Bulmaro Plasencia RN  Outcome: Progressing     Problem: Pain  Goal: Verbalizes/displays adequate comfort level or baseline comfort level  4/26/2022 1615 by Bulmaro Plasencia RN  Outcome: Progressing     Problem: ABCDS Injury Assessment  Goal: Absence of physical injury  Outcome: Progressing
Problem: Chronic Conditions and Co-morbidities  Goal: Patient's chronic conditions and co-morbidity symptoms are monitored and maintained or improved  Outcome: Progressing     Problem: Discharge Planning  Goal: Discharge to home or other facility with appropriate resources  Outcome: Progressing     Problem: Pain  Goal: Verbalizes/displays adequate comfort level or baseline comfort level  Outcome: Progressing     Problem: ABCDS Injury Assessment  Goal: Absence of physical injury  Outcome: Progressing
Satisfactory

## 2022-05-01 ENCOUNTER — EMERGENCY (EMERGENCY)
Facility: HOSPITAL | Age: 32
LOS: 0 days | Discharge: HOME | End: 2022-05-01
Attending: STUDENT IN AN ORGANIZED HEALTH CARE EDUCATION/TRAINING PROGRAM
Payer: MEDICAID

## 2022-05-01 VITALS
DIASTOLIC BLOOD PRESSURE: 68 MMHG | OXYGEN SATURATION: 98 % | RESPIRATION RATE: 18 BRPM | SYSTOLIC BLOOD PRESSURE: 104 MMHG | TEMPERATURE: 98 F | HEART RATE: 105 BPM

## 2022-05-01 VITALS
OXYGEN SATURATION: 98 % | SYSTOLIC BLOOD PRESSURE: 114 MMHG | RESPIRATION RATE: 20 BRPM | WEIGHT: 251.11 LBS | TEMPERATURE: 98 F | HEIGHT: 63 IN | DIASTOLIC BLOOD PRESSURE: 82 MMHG | HEART RATE: 107 BPM

## 2022-05-01 DIAGNOSIS — Z91.018 ALLERGY TO OTHER FOODS: ICD-10-CM

## 2022-05-01 DIAGNOSIS — F25.9 SCHIZOAFFECTIVE DISORDER, UNSPECIFIED: ICD-10-CM

## 2022-05-01 DIAGNOSIS — M79.602 PAIN IN LEFT ARM: ICD-10-CM

## 2022-05-01 DIAGNOSIS — E11.9 TYPE 2 DIABETES MELLITUS WITHOUT COMPLICATIONS: ICD-10-CM

## 2022-05-01 DIAGNOSIS — F31.9 BIPOLAR DISORDER, UNSPECIFIED: ICD-10-CM

## 2022-05-01 DIAGNOSIS — Z91.013 ALLERGY TO SEAFOOD: ICD-10-CM

## 2022-05-01 DIAGNOSIS — Z79.84 LONG TERM (CURRENT) USE OF ORAL HYPOGLYCEMIC DRUGS: ICD-10-CM

## 2022-05-01 PROCEDURE — 99283 EMERGENCY DEPT VISIT LOW MDM: CPT

## 2022-05-01 RX ORDER — ACETAMINOPHEN 500 MG
650 TABLET ORAL ONCE
Refills: 0 | Status: COMPLETED | OUTPATIENT
Start: 2022-05-01 | End: 2022-05-01

## 2022-05-01 RX ADMIN — Medication 650 MILLIGRAM(S): at 10:08

## 2022-05-01 NOTE — ED ADULT TRIAGE NOTE - CHIEF COMPLAINT QUOTE
Left arm pain after getting shots yesterday at [facility] and started to run away from Group Home where she lives, risk from elopement, frequents ED H/O Schizoaffective

## 2022-05-01 NOTE — ED PROVIDER NOTE - CLINICAL SUMMARY MEDICAL DECISION MAKING FREE TEXT BOX
Obtain x-ray in arm to r/o fractures, treat with hot pack, recommend and give Tylenol, likely D/C home.

## 2022-05-01 NOTE — ED ADULT NURSE NOTE - OBJECTIVE STATEMENT
31yf presents from group home for left arm pain. Pt states "I had 18 shots yesterday". Unable to answer why she received shots. Uncooperative. Sleeping on exam. When questioned pt became agitated, yelling at staff then went back to sleep. Denies SI/HI. Ambulatory. Spont breathing on room air. Hot pack applied.
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
## 2022-05-01 NOTE — ED ADULT NURSE NOTE - CHIEF COMPLAINT QUOTE
Left arm pain after getting shots yesterday at [facility] and started to run away from Group Home where she lives, risk from elopement, frequents ED H/O Schizoaffective

## 2022-05-01 NOTE — ED PROVIDER NOTE - NS ED ROS FT
CONST: no fevers, no chills, no trauma  EYES: no pain, no visual disturbances  ENT: no sore throat, no epistaxis, no rhinorrhea, no hearing changes  CV: no chest pain, no palpitations, no orthopnea, no extremity pain or swelling  RESP: no shortness of breath, no cough, no sputum, no pleurisy, no wheezing  ABD: no abdominal pain, no nausea, no vomiting, no diarrhea, no black or bloody stool  : no dysuria, no hematuria, no frequency, no urgency  MSK: lt arm pain  NEURO: no headache, no sensory disturbances, no focal weakness, no dizziness  HEME: no easy bleeding or bruising  SKIN: no diaphoresis, no rash

## 2022-05-01 NOTE — ED PROVIDER NOTE - PATIENT PORTAL LINK FT
You can access the FollowMyHealth Patient Portal offered by [facility] by registering at the following website: [email or web address]. By joining [email or web address]’s FollowMyHealth portal, you will also be able to view your health information using other applications (apps) compatible with our system.

## 2022-05-03 ENCOUNTER — EMERGENCY (EMERGENCY)
Facility: HOSPITAL | Age: 32
LOS: 0 days | Discharge: HOME | End: 2022-05-03
Attending: STUDENT IN AN ORGANIZED HEALTH CARE EDUCATION/TRAINING PROGRAM
Payer: MEDICAID

## 2022-05-03 VITALS
OXYGEN SATURATION: 99 % | SYSTOLIC BLOOD PRESSURE: 116 MMHG | WEIGHT: 251.11 LBS | DIASTOLIC BLOOD PRESSURE: 73 MMHG | HEART RATE: 103 BPM | HEIGHT: 63 IN | TEMPERATURE: 98 F | RESPIRATION RATE: 18 BRPM

## 2022-05-03 VITALS
HEART RATE: 96 BPM | TEMPERATURE: 98 F | SYSTOLIC BLOOD PRESSURE: 101 MMHG | RESPIRATION RATE: 18 BRPM | OXYGEN SATURATION: 98 % | DIASTOLIC BLOOD PRESSURE: 65 MMHG

## 2022-05-03 DIAGNOSIS — F20.9 SCHIZOPHRENIA, UNSPECIFIED: ICD-10-CM

## 2022-05-03 DIAGNOSIS — Z79.84 LONG TERM (CURRENT) USE OF ORAL HYPOGLYCEMIC DRUGS: ICD-10-CM

## 2022-05-03 DIAGNOSIS — R62.50 UNSPECIFIED LACK OF EXPECTED NORMAL PHYSIOLOGICAL DEVELOPMENT IN CHILDHOOD: ICD-10-CM

## 2022-05-03 DIAGNOSIS — Z91.013 ALLERGY TO SEAFOOD: ICD-10-CM

## 2022-05-03 DIAGNOSIS — M79.602 PAIN IN LEFT ARM: ICD-10-CM

## 2022-05-03 DIAGNOSIS — F79 UNSPECIFIED INTELLECTUAL DISABILITIES: ICD-10-CM

## 2022-05-03 DIAGNOSIS — M79.601 PAIN IN RIGHT ARM: ICD-10-CM

## 2022-05-03 DIAGNOSIS — Z91.018 ALLERGY TO OTHER FOODS: ICD-10-CM

## 2022-05-03 DIAGNOSIS — E11.9 TYPE 2 DIABETES MELLITUS WITHOUT COMPLICATIONS: ICD-10-CM

## 2022-05-03 PROCEDURE — 99283 EMERGENCY DEPT VISIT LOW MDM: CPT

## 2022-05-03 NOTE — ED PROVIDER NOTE - PATIENT PORTAL LINK FT
You can access the FollowMyHealth Patient Portal offered by [facility] by registering at the following website: [email or web address]. By joining [employer]’s FollowMyHealth portal, you will also be able to view your health information using other applications (apps) compatible with our system.

## 2022-05-03 NOTE — ED ADULT NURSE REASSESSMENT NOTE - NS ED NURSE REASSESS COMMENT FT1
Pt received from [facility]. Sleeping comfortably in stretcher. Discharged to group home. Pending ambulette . NAD

## 2022-05-03 NOTE — ED PROVIDER NOTE - OBJECTIVE STATEMENT
31F PMHx of Schizoaffective with Impulse Control, Developmental Delay, Intellectual Delay, Bipolar Disorder and DM presenting to the ED BIBEMS c/o bilateral arm pain 2/2 receiving "too many shots" in her arm recently. Pt seen in [facility] ED 5/1 for same complaint. Pt overall a poor historian, only semi cooperative with ED evaluation. No further details able to be elucidated.

## 2022-05-03 NOTE — ED PROVIDER NOTE - PHYSICAL EXAMINATION
Constitutional: Somewhat uncooperative; awake & alert.   ENMT: Airway patent.  Eyes: Clear bilaterally, pupils equal, round and reactive to light.  Cardiac: Normal rate, regular rhythm.  Heart sounds S1, S2.  Respiratory: Breath sounds clear and equal bilaterally.  Gastrointestinal: Abdomen soft, non-tender, no guarding.  Neurological: Alert, no focal deficits, no motor or sensory deficits.  Skin: No evidence of rash.

## 2022-05-03 NOTE — ED ADULT TRIAGE NOTE - CHIEF COMPLAINT QUOTE
Patient STEFFEN DESAI from group home c/o receiving 18 shots to left arm at [facility] yesterday and has pain to left arm. States she is 5 months pregnant and has 9 babies. Speaking erratically.

## 2022-05-03 NOTE — ED PROVIDER NOTE - NSFOLLOWUPINSTRUCTIONS_ED_ALL_ED_FT
Take 600mg Motrin (also called Advil or Ibuprofen) every 6 hours as needed for fever/pain/discomfort/swelling. You can take this with Tylenol 650 mg (also called acetaminophen) every 6 hours.   Don't use more than 3500mg of Tylenol in any 24-hour period. Make sure your other prescription/over-the-counter medications don't contain any Tylenol so you don't take too much.   If you have any stomach discomfort while taking Motrin, you can use TUMS or Pepcid or Zantac (these can all be bought without a prescription).

## 2022-05-03 NOTE — ED PROVIDER NOTE - CLINICAL SUMMARY MEDICAL DECISION MAKING FREE TEXT BOX
sent from [facility] for evaluation  no acute emergency identified during todays visit. Pt moving both arms/shoulders, no swelling or evidence of infection.  will dc back to group home.

## 2022-05-03 NOTE — ED ADULT NURSE NOTE - NS ED NURSE RECORD ANOTHER VITAL SIGN
Verified patient's name, date of birth and allergies. Patient advised to remain in building 15 min after injection.     Yes

## 2022-05-03 NOTE — ED ADULT NURSE NOTE - CHIEF COMPLAINT QUOTE
Patient STEFFEN DESAI from group home c/o receiving 18 shots to left arm at [facility] yesterday and has pain to left arm. States she is 5 months pregnant and has 9 babies. Speaking erratically.

## 2022-05-03 NOTE — ED ADULT TRIAGE NOTE - ESI TRIAGE ACUITY LEVEL, MLM
"Chief Complaint   Patient presents with     Musculoskeletal Problem     MRI results right shoulder       Initial /62 (BP Location: Right arm, Cuff Size: Adult Regular)  Pulse 80  Temp 97.3  F (36.3  C) (Tympanic)  Ht 5' 11.5\" (1.816 m)  Wt 179 lb (81.2 kg)  SpO2 96%  BMI 24.62 kg/m2 Estimated body mass index is 24.62 kg/(m^2) as calculated from the following:    Height as of this encounter: 5' 11.5\" (1.816 m).    Weight as of this encounter: 179 lb (81.2 kg).  Medication Reconciliation: complete   Indira Santoro LPN      "
4

## 2022-05-10 ENCOUNTER — EMERGENCY (EMERGENCY)
Facility: HOSPITAL | Age: 32
LOS: 0 days | Discharge: HOME | End: 2022-05-11
Attending: STUDENT IN AN ORGANIZED HEALTH CARE EDUCATION/TRAINING PROGRAM
Payer: MEDICAID

## 2022-05-10 VITALS
WEIGHT: 251.11 LBS | OXYGEN SATURATION: 96 % | RESPIRATION RATE: 16 BRPM | TEMPERATURE: 98 F | HEART RATE: 96 BPM | HEIGHT: 63 IN | DIASTOLIC BLOOD PRESSURE: 54 MMHG | SYSTOLIC BLOOD PRESSURE: 104 MMHG

## 2022-05-10 DIAGNOSIS — Z79.84 LONG TERM (CURRENT) USE OF ORAL HYPOGLYCEMIC DRUGS: ICD-10-CM

## 2022-05-10 DIAGNOSIS — M54.50 LOW BACK PAIN, UNSPECIFIED: ICD-10-CM

## 2022-05-10 DIAGNOSIS — F25.9 SCHIZOAFFECTIVE DISORDER, UNSPECIFIED: ICD-10-CM
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
## 2022-05-10 DIAGNOSIS — Z76.5 MALINGERER [CONSCIOUS SIMULATION]: ICD-10-CM

## 2022-05-10 DIAGNOSIS — F31.9 BIPOLAR DISORDER, UNSPECIFIED: ICD-10-CM

## 2022-05-10 DIAGNOSIS — Z91.013 ALLERGY TO SEAFOOD: ICD-10-CM

## 2022-05-10 DIAGNOSIS — E11.9 TYPE 2 DIABETES MELLITUS WITHOUT COMPLICATIONS: ICD-10-CM
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
## 2022-05-10 DIAGNOSIS — Z91.018 ALLERGY TO OTHER FOODS: ICD-10-CM

## 2022-05-10 PROCEDURE — 99284 EMERGENCY DEPT VISIT MOD MDM: CPT

## 2022-05-10 RX ORDER — ACETAMINOPHEN 500 MG
650 TABLET ORAL ONCE
Refills: 0 | Status: COMPLETED | OUTPATIENT
Start: 2022-05-10 | End: 2022-05-10

## 2022-05-10 RX ADMIN — Medication 650 MILLIGRAM(S): at 22:01

## 2022-05-10 NOTE — ED ADULT NURSE NOTE - CAS EDN DISCHARGE ASSESSMENT
Alert and oriented to person, place and time/Patient baseline mental status/Awake Alert and oriented to person, place and time/Patient baseline mental status/Awake/No adverse reaction to first time med in ED

## 2022-05-10 NOTE — ED ADULT NURSE NOTE - ED STAT RN HANDOFF DETAILS 2
Pt is discharged. Transport will be arranged for the pt to go the [facility] group home. Talked with Ms. Person over the phone. She will open the door. Also made her aware that EMS will call to verify.

## 2022-05-10 NOTE — ED ADULT NURSE NOTE - CHIEF COMPLAINT QUOTE
BIBA, from [facility] home.  pt c/o L-hip pain since yesterday.  seen at [facility] yesterday for same.  "they didn't send me home with anything"  denies injury/trauma

## 2022-05-10 NOTE — ED ADULT NURSE NOTE - ED STAT RN HANDOFF DETAILS
Report received from RN at 11 pm. Assessment available on [facility]. will continue to monitor , sleeping at this time

## 2022-05-10 NOTE — ED ADULT TRIAGE NOTE - CHIEF COMPLAINT QUOTE
BIBA, from [facility] home.  pt c/o L-hip pain since yesterday.  seen at [facility] yesterday for same.  "they didn't send me home with anything"  denies injury/trauma

## 2022-05-10 NOTE — ED ADULT NURSE NOTE - OBJECTIVE STATEMENT
c/o L-hip pain since yesterday, state I was carrying heavy grocery bags yesterday. denies fall/trauma in the affected area

## 2022-05-11 VITALS
OXYGEN SATURATION: 98 % | HEART RATE: 97 BPM | RESPIRATION RATE: 17 BRPM | DIASTOLIC BLOOD PRESSURE: 71 MMHG | TEMPERATURE: 98 F | SYSTOLIC BLOOD PRESSURE: 105 MMHG

## 2022-05-11 RX ADMIN — Medication 650 MILLIGRAM(S): at 02:19

## 2022-05-11 NOTE — ED PROVIDER NOTE - PHYSICAL EXAMINATION
Constitutional: Well appearing, awake, alert, oriented to person, place, time/situation and in no apparent distress.  ENMT: Airway patent.  Eyes: Clear bilaterally, pupils equal, round and reactive to light.  Cardiac: Normal rate, regular rhythm.  Heart sounds S1, S2.  Respiratory: Breath sounds clear and equal bilaterally.  Gastrointestinal: Abdomen soft, non-tender, no guarding.  Neurological: Alert and oriented, no focal deficits, no motor or sensory deficits.  Skin: No evidence of rash.   MSK: No midline tenderness of the lower back.

## 2022-05-11 NOTE — ED PROVIDER NOTE - OBJECTIVE STATEMENT
31F frequent visitor to the ED from [facility], presents for lower back pain complaint. Pt not forthcoming with further details. Pt falls asleep shortly after arrival to ED.

## 2022-05-11 NOTE — ED PROVIDER NOTE - PATIENT PORTAL LINK FT
You can access the FollowMyHealth Patient Portal offered by [facility] by registering at the following website: [email or web address]. By joining [facility]’s FollowMyHealth portal, you will also be able to view your health information using other applications (apps) compatible with our system.

## 2022-05-11 NOTE — ED ADULT NURSE REASSESSMENT NOTE - NS ED NURSE REASSESS COMMENT FT1
Pt received from Jennifer Daniels. Sleeping comfortably in stretcher. NAD. DC'd to group home. Pending ambulance p/u

## 2022-05-11 NOTE — ED ADULT NURSE NOTE - TEMPLATE
Patient presented to the ED with cc of cough, nasal congestion and hives to face. Patient tested positive for corona virus. Mom covid positive. Abdominal Pain, N/V/D

## 2022-05-13 ENCOUNTER — EMERGENCY (EMERGENCY)
Facility: HOSPITAL | Age: 32
LOS: 0 days | Discharge: HOME | End: 2022-05-14
Attending: EMERGENCY MEDICINE
Payer: MEDICAID

## 2022-05-13 VITALS
DIASTOLIC BLOOD PRESSURE: 93 MMHG | HEIGHT: 63 IN | TEMPERATURE: 97 F | RESPIRATION RATE: 16 BRPM | SYSTOLIC BLOOD PRESSURE: 133 MMHG | HEART RATE: 84 BPM | WEIGHT: 250 LBS | OXYGEN SATURATION: 97 %

## 2022-05-13 DIAGNOSIS — F41.9 ANXIETY DISORDER, UNSPECIFIED: ICD-10-CM

## 2022-05-13 DIAGNOSIS — F31.9 BIPOLAR DISORDER, UNSPECIFIED: ICD-10-CM

## 2022-05-13 DIAGNOSIS — Z91.018 ALLERGY TO OTHER FOODS: ICD-10-CM

## 2022-05-13 DIAGNOSIS — Z91.013 ALLERGY TO SEAFOOD: ICD-10-CM

## 2022-05-13 DIAGNOSIS — E11.9 TYPE 2 DIABETES MELLITUS WITHOUT COMPLICATIONS: ICD-10-CM

## 2022-05-13 DIAGNOSIS — R10.9 UNSPECIFIED ABDOMINAL PAIN: ICD-10-CM

## 2022-05-13 DIAGNOSIS — Z79.84 LONG TERM (CURRENT) USE OF ORAL HYPOGLYCEMIC DRUGS: ICD-10-CM

## 2022-05-13 DIAGNOSIS — F20.9 SCHIZOPHRENIA, UNSPECIFIED: ICD-10-CM

## 2022-05-13 PROCEDURE — 99284 EMERGENCY DEPT VISIT MOD MDM: CPT

## 2022-05-13 RX ORDER — ALPRAZOLAM 0.25 MG
1 TABLET ORAL ONCE
Refills: 0 | Status: DISCONTINUED | OUTPATIENT
Start: 2022-05-13 | End: 2022-05-13

## 2022-05-13 RX ADMIN — Medication 30 MILLILITER(S): at 23:14

## 2022-05-13 NOTE — ED ADULT NURSE NOTE - NSSUHOSCREENINGYN_ED_ALL_ED
Mable Tian needs to be seen for an appointment before further refills are given.  Patient does not need refill, please do not send it as auto refill   Yes - the patient is able to be screened

## 2022-05-13 NOTE — ED PROVIDER NOTE - OBJECTIVE STATEMENT
31F hx impulse control and obesity pw abd pain. says ate too much. no vomiting, fever, chills, dysuria, constipation, vaginal dc. ros neg for ha, vision loss, rhinorrhea, cp, sob, cough, bleeding, numbness, rash, bleeding, anxiety.

## 2022-05-13 NOTE — ED ADULT NURSE NOTE - OBJECTIVE STATEMENT
pt alert and oriented x3. pt reports generalized ABD pain rated 9/10 that started after drinking orange juice. pt reports 2 episodes vomiting, denies fever, diarrhea. pt reports being 5 months pregnant, LMP 02/02/2022. pt alert and oriented x3. pt reports generalized ABD pain rated 9/10 that started after drinking orange juice. pt reports 2 episodes vomiting, denies fever, diarrhea.

## 2022-05-13 NOTE — ED PROVIDER NOTE - PATIENT PORTAL LINK FT
You can access the FollowMyHealth Patient Portal offered by [facility] by registering at the following website: [email or web address]. By joining [employer]’s FollowMyHealth portal, you will also be able to view your health information using other applications (apps) compatible with our system.

## 2022-05-13 NOTE — ED ADULT NURSE REASSESSMENT NOTE - NS ED NURSE REASSESS COMMENT FT1
spoke to staff at [facility], regarding d/c. per yong, staff will be home to let pt into house. pt to be p/u by ambulance.

## 2022-05-14 ENCOUNTER — EMERGENCY (EMERGENCY)
Facility: HOSPITAL | Age: 32
LOS: 0 days | Discharge: HOME | End: 2022-05-14
Payer: MEDICAID

## 2022-05-14 VITALS
HEART RATE: 102 BPM | SYSTOLIC BLOOD PRESSURE: 127 MMHG | WEIGHT: 251.11 LBS | RESPIRATION RATE: 16 BRPM | TEMPERATURE: 98 F | DIASTOLIC BLOOD PRESSURE: 82 MMHG | HEIGHT: 62 IN | OXYGEN SATURATION: 97 %

## 2022-05-14 VITALS
SYSTOLIC BLOOD PRESSURE: 119 MMHG | DIASTOLIC BLOOD PRESSURE: 84 MMHG | OXYGEN SATURATION: 99 % | RESPIRATION RATE: 19 BRPM | HEART RATE: 100 BPM | TEMPERATURE: 98 F

## 2022-05-14 VITALS
RESPIRATION RATE: 16 BRPM | SYSTOLIC BLOOD PRESSURE: 100 MMHG | HEART RATE: 87 BPM | OXYGEN SATURATION: 97 % | DIASTOLIC BLOOD PRESSURE: 64 MMHG | TEMPERATURE: 98 F

## 2022-05-14 DIAGNOSIS — J02.9 ACUTE PHARYNGITIS, UNSPECIFIED: ICD-10-CM

## 2022-05-14 DIAGNOSIS — R13.10 DYSPHAGIA, UNSPECIFIED: ICD-10-CM

## 2022-05-14 DIAGNOSIS — F31.9 BIPOLAR DISORDER, UNSPECIFIED: ICD-10-CM

## 2022-05-14 DIAGNOSIS — E11.9 TYPE 2 DIABETES MELLITUS WITHOUT COMPLICATIONS: ICD-10-CM

## 2022-05-14 DIAGNOSIS — I10 ESSENTIAL (PRIMARY) HYPERTENSION: ICD-10-CM

## 2022-05-14 DIAGNOSIS — Z91.018 ALLERGY TO OTHER FOODS: ICD-10-CM

## 2022-05-14 DIAGNOSIS — F25.9 SCHIZOAFFECTIVE DISORDER, UNSPECIFIED: ICD-10-CM

## 2022-05-14 DIAGNOSIS — Z79.84 LONG TERM (CURRENT) USE OF ORAL HYPOGLYCEMIC DRUGS: ICD-10-CM

## 2022-05-14 DIAGNOSIS — Z20.822 CONTACT WITH AND (SUSPECTED) EXPOSURE TO COVID-19: ICD-10-CM

## 2022-05-14 DIAGNOSIS — Z91.013 ALLERGY TO SEAFOOD: ICD-10-CM

## 2022-05-14 LAB
FLUAV AG NPH QL: SIGNIFICANT CHANGE UP
FLUBV AG NPH QL: SIGNIFICANT CHANGE UP
GLUCOSE BLDC GLUCOMTR-MCNC: 140 MG/DL — HIGH (ref 70–99)
HCG UR QL: NEGATIVE — SIGNIFICANT CHANGE UP
SARS-COV-2 RNA SPEC QL NAA+PROBE: SIGNIFICANT CHANGE UP

## 2022-05-14 PROCEDURE — 99284 EMERGENCY DEPT VISIT MOD MDM: CPT

## 2022-05-14 NOTE — ED PROVIDER NOTE - PATIENT PORTAL LINK FT
You can access the FollowMyHealth Patient Portal offered by [facility] by registering at the following website: [email or web address]. By joining [employer]’s FollowMyHealth portal, you will also be able to view your health information using other applications (apps) compatible with our system.

## 2022-05-14 NOTE — ED PROVIDER NOTE - CLINICAL SUMMARY MEDICAL DECISION MAKING FREE TEXT BOX
this is a 31 y.o Female with a PMhx of HTN, DM, schizoaffective with impulse control, developmental delay intellectual delay, bipolar presented ot the ED complaining of having having a sore throat and pain with swallowing, Patient tolerating PO without difficulties, denies having any other symptoms.

## 2022-05-14 NOTE — ED ADULT TRIAGE NOTE - CHIEF COMPLAINT QUOTE
sore throat , nausea and vomiting .pt was here yesterday for the same thing . H/o HTN, DM, schizophrenia

## 2022-05-14 NOTE — ED ADULT NURSE NOTE - OBJECTIVE STATEMENT
31yf presents from group home complaining of sore throat, subjective fever, nausea, and vomiting. Pt loud, uncooperative on arrival. Yelling at staff. Stating she is pregnant. Ambulatory. Spont breathing on room air. NAD

## 2022-05-14 NOTE — ED PROVIDER NOTE - NSFOLLOWUPINSTRUCTIONS_ED_ALL_ED_FT
Rest, drink plenty of fluids.  Advance activity as tolerated.  Continue all previously prescribed medications as directed.  Follow up with your primary care physician in 48-72 hours- bring copies of your results.  Return to the ER for worsening or persistent symptoms, and/or ANY NEW OR CONCERNING SYMPTOMS. If you have issues obtaining follow up, please call: [phone] to obtain a doctor or specialist who takes your insurance in your area.  You may call [phone] to make an appointment with the internal medicine clinic.

## 2022-05-14 NOTE — ED PROVIDER NOTE - OBJECTIVE STATEMENT
this is a 31 y.o Female with a PMhx of HTN, DM, schizoaffective with impulse control, developmental delay intellectual delay, bipolar presented ot the ED complaining of having having a sore throat and pain with swallowing, however patient is demanding food here, patient was here yesterday evaluated and discharged. Patient is not cooperative with provider, patietn is full vaccinated, she refusing examination or covid testing.

## 2022-05-15 ENCOUNTER — EMERGENCY (EMERGENCY)
Facility: HOSPITAL | Age: 32
LOS: 0 days | Discharge: HOME | End: 2022-05-15
Attending: STUDENT IN AN ORGANIZED HEALTH CARE EDUCATION/TRAINING PROGRAM
Payer: MEDICAID

## 2022-05-15 VITALS
OXYGEN SATURATION: 98 % | HEIGHT: 62 IN | DIASTOLIC BLOOD PRESSURE: 73 MMHG | SYSTOLIC BLOOD PRESSURE: 110 MMHG | WEIGHT: 251.11 LBS | HEART RATE: 105 BPM | TEMPERATURE: 98 F | RESPIRATION RATE: 16 BRPM

## 2022-05-15 VITALS
SYSTOLIC BLOOD PRESSURE: 101 MMHG | HEART RATE: 90 BPM | TEMPERATURE: 98 F | RESPIRATION RATE: 18 BRPM | DIASTOLIC BLOOD PRESSURE: 69 MMHG | OXYGEN SATURATION: 100 %

## 2022-05-15 DIAGNOSIS — R62.50 UNSPECIFIED LACK OF EXPECTED NORMAL PHYSIOLOGICAL DEVELOPMENT IN CHILDHOOD: ICD-10-CM

## 2022-05-15 DIAGNOSIS — F63.9 IMPULSE DISORDER, UNSPECIFIED: ICD-10-CM

## 2022-05-15 DIAGNOSIS — F31.9 BIPOLAR DISORDER, UNSPECIFIED: ICD-10-CM

## 2022-05-15 DIAGNOSIS — F25.9 SCHIZOAFFECTIVE DISORDER, UNSPECIFIED: ICD-10-CM

## 2022-05-15 DIAGNOSIS — E11.9 TYPE 2 DIABETES MELLITUS WITHOUT COMPLICATIONS: ICD-10-CM

## 2022-05-15 DIAGNOSIS — R10.30 LOWER ABDOMINAL PAIN, UNSPECIFIED: ICD-10-CM

## 2022-05-15 DIAGNOSIS — R30.0 DYSURIA: ICD-10-CM

## 2022-05-15 DIAGNOSIS — F79 UNSPECIFIED INTELLECTUAL DISABILITIES: ICD-10-CM

## 2022-05-15 DIAGNOSIS — Z91.013 ALLERGY TO SEAFOOD: ICD-10-CM

## 2022-05-15 DIAGNOSIS — Z91.018 ALLERGY TO OTHER FOODS: ICD-10-CM

## 2022-05-15 DIAGNOSIS — Z79.84 LONG TERM (CURRENT) USE OF ORAL HYPOGLYCEMIC DRUGS: ICD-10-CM

## 2022-05-15 DIAGNOSIS — N39.0 URINARY TRACT INFECTION, SITE NOT SPECIFIED: ICD-10-CM

## 2022-05-15 DIAGNOSIS — I10 ESSENTIAL (PRIMARY) HYPERTENSION: ICD-10-CM

## 2022-05-15 LAB
APPEARANCE UR: CLEAR — SIGNIFICANT CHANGE UP
BACTERIA # UR AUTO: ABNORMAL
BILIRUB UR-MCNC: NEGATIVE — SIGNIFICANT CHANGE UP
COLOR SPEC: YELLOW — SIGNIFICANT CHANGE UP
DIFF PNL FLD: ABNORMAL
EPI CELLS # UR: ABNORMAL
GLUCOSE UR QL: NEGATIVE MG/DL — SIGNIFICANT CHANGE UP
KETONES UR-MCNC: NEGATIVE — SIGNIFICANT CHANGE UP
LEUKOCYTE ESTERASE UR-ACNC: ABNORMAL
NITRITE UR-MCNC: NEGATIVE — SIGNIFICANT CHANGE UP
PH UR: 6.5 — SIGNIFICANT CHANGE UP (ref 5–8)
PROT UR-MCNC: 30 MG/DL
RBC CASTS # UR COMP ASSIST: >50 /HPF (ref 0–4)
SP GR SPEC: 1.01 — SIGNIFICANT CHANGE UP (ref 1.01–1.02)
UROBILINOGEN FLD QL: NEGATIVE MG/DL — SIGNIFICANT CHANGE UP
WBC UR QL: ABNORMAL

## 2022-05-15 PROCEDURE — 99284 EMERGENCY DEPT VISIT MOD MDM: CPT

## 2022-05-15 RX ORDER — CEFPODOXIME PROXETIL 100 MG
1 TABLET ORAL
Qty: 14 | Refills: 0
Start: 2022-05-15 | End: 2022-05-21

## 2022-05-15 RX ORDER — FLUCONAZOLE 150 MG/1
150 TABLET ORAL ONCE
Refills: 0 | Status: COMPLETED | OUTPATIENT
Start: 2022-05-15 | End: 2022-05-15

## 2022-05-15 RX ORDER — ACETAMINOPHEN 500 MG
650 TABLET ORAL ONCE
Refills: 0 | Status: COMPLETED | OUTPATIENT
Start: 2022-05-15 | End: 2022-05-15

## 2022-05-15 RX ORDER — CEFPODOXIME PROXETIL 100 MG
100 TABLET ORAL ONCE
Refills: 0 | Status: COMPLETED | OUTPATIENT
Start: 2022-05-15 | End: 2022-05-15

## 2022-05-15 RX ADMIN — Medication 650 MILLIGRAM(S): at 02:34

## 2022-05-15 RX ADMIN — Medication 100 MILLIGRAM(S): at 03:24

## 2022-05-15 RX ADMIN — Medication 650 MILLIGRAM(S): at 01:34

## 2022-05-15 RX ADMIN — FLUCONAZOLE 150 MILLIGRAM(S): 150 TABLET ORAL at 01:34

## 2022-05-15 NOTE — ED ADULT TRIAGE NOTE - CHIEF COMPLAINT QUOTE
BIBA, from group home.  pt c/o generalized abd last night.  pt also states she got her period today.  denies N/V/D.  pt in hospital gown from home

## 2022-05-15 NOTE — ED PROVIDER NOTE - NSFOLLOWUPINSTRUCTIONS_ED_ALL_ED_FT
you have a urinary tract infection (UTI)    [facility] have been sent to your pharmacy.   Take ALL of the [facility] as prescribed    return to the emergency room if not feeling well

## 2022-05-15 NOTE — ED ADULT NURSE NOTE - ED STAT RN HANDOFF DETAILS
Report endorsed to oncoming RN Amena. Pt is stable and resting comfortably in bed. No signs and symptoms of acute distress noted at this time. Safety measures maintained.  Any issues endorsed to oncoming RN for follow up. Pt pending ambulance pickup.

## 2022-05-15 NOTE — ED ADULT TRIAGE NOTE - PAIN RATING/NUMBER SCALE (0-10): REST
IMPRESSION: Rehab of Debilitation     PRECAUTIONS: [   x ] Cardiac  [ x   ] Respiratory  [    ] Seizures [    ] Contact Isolation  [    ] Droplet Isolation  [    ] Other    Weight Bearing Status:     RECOMMENDATION:    Out of Bed to Chair     DVT/Decubiti Prophylaxis    REHAB PLAN:     [ x    ] Bedside P/T 3-5 times a week   [     ] Bedside O/T  2-3 times a week   [     ] No Rehab Therapy Indicated   [     ]  Speech Therapy   Conditioning/ROM                                 ADL  Bed Mobility                                            Conditioning/ROM  Transfers                                                  Bed Mobility  Sitting /Standing Balance                      Transfers                                        Gait Training                                            Sitting/Standing Balance  Stair Training [ x  ]Applicable                 Home equipment Eval                                                                     Splinting  [   ] Only      GOALS:   ADL   [  x  ]   Independent         Transfers  [ x   ] Independent            Ambulation  [ x    ] Independent     [   x  ] With device                            [    ]  CG                                               [    ]  CG                                                    [     ] CG                            [    ] Min A                                          [    ] Min A                                                [     ] Min  A          DISCHARGE PLAN:   [     ]  Good candidate for Intensive Rehabilitation/Hospital based                                             Will tolerate 3hrs Intensive Rehab Daily                                       [      ]  Short Term Rehab in Skilled Nursing Facility                                       [ x     ]  Home with Outpatient or VN services ? WALKER FOR DC                                         [      ]  Possible Candidate for Intensive Hospital based Rehab 4

## 2022-05-15 NOTE — ED PROVIDER NOTE - CLINICAL SUMMARY MEDICAL DECISION MAKING FREE TEXT BOX
well appearing. labs and history consistent with uti. not sexually active. will treat for possible vaginitis, negative pregnancy test yesterday, currently on her period. tolerating po without difficulty in ED. abdominal exam benign

## 2022-05-15 NOTE — ED PROVIDER NOTE - OBJECTIVE STATEMENT
31 y.o Female with a PMhx of HTN, DM, schizoaffective with impulse control, developmental delay intellectual delay, bipolar. lower abdominal discomfort and burning with urination since yesterday. no NVDC.

## 2022-05-15 NOTE — ED PROVIDER NOTE - PHYSICAL EXAMINATION
General: Awake, alert and oriented. No acute distress. Well developed, hydrated and nourished. Appears stated age.   Skin: Skin in warm, dry and intact without rashes or lesions. Appropriate color for ethnicity  HENMT: head normocephalic and atraumatic; bilateral external ears without swelling. no nasal discharge. moist oral mucosa. supple neck, trachea midline  EYES: Conjunctiva clear. nonicteric sclera. EOM intact, Eyelids are normal in appearance without swelling or lesions.  Cardiac: well perfused  Respiratory: breathing comfortably on room air. no audible wheezing or stridor  Abdominal: nondistended, soft, nontender. no CVA ttp  MSK: Neck and back are without deformity, visible external skin changes, or signs of trauma. Curvature of the cervical, thoracic, and lumbar spine are within normal limits. no external signs of trauma. no apparent deficits in ROM of any extremity  Neurological: The patient is awake, alert and oriented to person, place, and time with normal speech. CN 2-12 grossly intact. no apparent deficits. Memory is normal and thought process is intact. No gait abnormalities are appreciated.   Psychiatric: Appropriate mood and affect. Good judgement and insight. No visual or auditory hallucinations.

## 2022-05-15 NOTE — ED PROVIDER NOTE - PATIENT PORTAL LINK FT
You can access the FollowMyHealth Patient Portal offered by [facility] by registering at the following website: [email or web address]. By joining [employer]’s FollowMyHealth portal, you will also be able to view your health information using other applications (apps) compatible with our system.
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
## 2022-05-26 ENCOUNTER — EMERGENCY (EMERGENCY)
Facility: HOSPITAL | Age: 32
LOS: 0 days | Discharge: HOME | End: 2022-05-27
Attending: STUDENT IN AN ORGANIZED HEALTH CARE EDUCATION/TRAINING PROGRAM
Payer: MEDICAID

## 2022-05-26 VITALS
WEIGHT: 246.92 LBS | HEART RATE: 92 BPM | SYSTOLIC BLOOD PRESSURE: 125 MMHG | DIASTOLIC BLOOD PRESSURE: 91 MMHG | OXYGEN SATURATION: 99 % | RESPIRATION RATE: 16 BRPM | HEIGHT: 62 IN | TEMPERATURE: 97 F

## 2022-05-26 DIAGNOSIS — M79.606 PAIN IN LEG, UNSPECIFIED: ICD-10-CM

## 2022-05-26 DIAGNOSIS — F31.9 BIPOLAR DISORDER, UNSPECIFIED: ICD-10-CM

## 2022-05-26 DIAGNOSIS — Z79.84 LONG TERM (CURRENT) USE OF ORAL HYPOGLYCEMIC DRUGS: ICD-10-CM

## 2022-05-26 DIAGNOSIS — Z91.018 ALLERGY TO OTHER FOODS: ICD-10-CM

## 2022-05-26 DIAGNOSIS — Z91.013 ALLERGY TO SEAFOOD: ICD-10-CM

## 2022-05-26 DIAGNOSIS — Z76.5 MALINGERER [CONSCIOUS SIMULATION]: ICD-10-CM

## 2022-05-26 DIAGNOSIS — F25.9 SCHIZOAFFECTIVE DISORDER, UNSPECIFIED: ICD-10-CM

## 2022-05-26 DIAGNOSIS — E11.9 TYPE 2 DIABETES MELLITUS WITHOUT COMPLICATIONS: ICD-10-CM

## 2022-05-26 PROCEDURE — 99283 EMERGENCY DEPT VISIT LOW MDM: CPT
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
## 2022-05-26 NOTE — ED ADULT NURSE NOTE - OBJECTIVE STATEMENT
BIBA as per patient c/o bilateral leg pain radiating down starting today. No difficulty ambulating, denies injury or fall. Hx: DM, Seizures, psych, schizo, HIV, asthma.

## 2022-05-26 NOTE — ED ADULT TRIAGE NOTE - BRAND OF COVID-19 VACCINATION
5/19/20 5:18 PM     Lidocaine Injection      NDC: [address]-76    Lot Number: [id number]    Body Part: right knee Office Procedures:  No orders of the defined types were placed in this encounter. Plan: Pertinent imaging was reviewed. The etiology, natural history, and treatment options for the disorder were discussed. The roles of activity medication, antiinflammatories, injections, bracing, physical therapy, and surgical interventions were all described to the patient and questions were answered. The nature and natural history of osteoarthritis was discussed in detail the patient today. Treatment options both surgical and nonsurgical were discussed in detail. Patient was counseled with regard to the importance of activity modification as well as weight control. The role for medications, intra-articular injections as well as surgery were discussed. Patient's questions were answered.       We believe patient is a candidate for an intraarticular steroid injection along with supervised physical therapy. He would like to do this at the [place] office. He can continue with the diclofenac on a more constant basis and wean off as he start to make progress. In regard to his ankle, we will get him set up with an mri to evaluate a cyst on the tendon nodule, status post repair. We will call him with the results. Follow up 4-6 weeks for his knee. Saintclair Bender is in agreement with this plan. All questions were answered to patient's satisfaction and was encouraged to call with any further questions. The indications and risks of steroid injection as well as treatment alternatives were discussed with the patient who consented to the procedure. Under sterile conditions and after informed consent was obtained the patient was given an injection into the right  knee. 2cc 40 mg of Depo-Medrol and 4 mL of 1% lidocaine were placed in the knee after it was prepped with chlorhexidine. This resulted in good relief of symptoms. There were no complications.  The patient was advised to ice the knee this evening and to avoid vigorous Moderna dose 1 and 2

## 2022-05-26 NOTE — ED PROVIDER NOTE - PATIENT PORTAL LINK FT
You can access the FollowMyHealth Patient Portal offered by [facility] by registering at the following website: [email or web address]. By joining [employer]’s FollowMyHealth portal, you will also be able to view your health information using other applications (apps) compatible with our system.

## 2022-05-26 NOTE — ED ADULT TRIAGE NOTE - CHIEF COMPLAINT QUOTE
BIBA as per patient c/o bilateral leg pain radiating down starting today. No difficulty ambulating. Hx: DM, Seizures, psych, schizo, HIV, asthma.

## 2022-05-26 NOTE — ED ADULT NURSE NOTE - PRIMARY CARE PROVIDER
Patient Name: Tomi Rosas  Procedure Date: 4/24/2019 9:51 AM  MRN: [id number]  Account Number: [id number]  YOB: Viraj Bernard MD, Johnny Moore MD  Referring MD:         Reinaldo Ybarra DO, Bright Bashir Md  Attending Participation:       n/a

## 2022-05-27 VITALS
DIASTOLIC BLOOD PRESSURE: 88 MMHG | HEART RATE: 75 BPM | OXYGEN SATURATION: 99 % | SYSTOLIC BLOOD PRESSURE: 136 MMHG | TEMPERATURE: 98 F | RESPIRATION RATE: 20 BRPM

## 2022-05-30 ENCOUNTER — EMERGENCY (EMERGENCY)
Facility: HOSPITAL | Age: 32
LOS: 0 days | Discharge: HOME | End: 2022-05-31
Attending: EMERGENCY MEDICINE
Payer: MEDICAID

## 2022-05-30 VITALS
HEART RATE: 94 BPM | OXYGEN SATURATION: 97 % | WEIGHT: 251.11 LBS | HEIGHT: 62 IN | RESPIRATION RATE: 16 BRPM | DIASTOLIC BLOOD PRESSURE: 80 MMHG | SYSTOLIC BLOOD PRESSURE: 118 MMHG | TEMPERATURE: 98 F

## 2022-05-30 VITALS
SYSTOLIC BLOOD PRESSURE: 110 MMHG | DIASTOLIC BLOOD PRESSURE: 78 MMHG | RESPIRATION RATE: 17 BRPM | HEART RATE: 90 BPM | OXYGEN SATURATION: 98 % | TEMPERATURE: 98 F

## 2022-05-30 DIAGNOSIS — Z79.84 LONG TERM (CURRENT) USE OF ORAL HYPOGLYCEMIC DRUGS: ICD-10-CM

## 2022-05-30 DIAGNOSIS — R10.9 UNSPECIFIED ABDOMINAL PAIN: ICD-10-CM

## 2022-05-30 DIAGNOSIS — Z91.013 ALLERGY TO SEAFOOD: ICD-10-CM

## 2022-05-30 DIAGNOSIS — Z91.018 ALLERGY TO OTHER FOODS: ICD-10-CM

## 2022-05-30 PROCEDURE — 99284 EMERGENCY DEPT VISIT MOD MDM: CPT

## 2022-05-30 RX ORDER — ALPRAZOLAM 0.25 MG
2 TABLET ORAL ONCE
Refills: 0 | Status: DISCONTINUED | OUTPATIENT
Start: 2022-05-30 | End: 2022-05-30

## 2022-05-30 RX ADMIN — Medication 2 MILLIGRAM(S): at 22:10

## 2022-05-30 RX ADMIN — Medication 30 MILLILITER(S): at 22:10

## 2022-05-30 NOTE — ED PROVIDER NOTE - PATIENT PORTAL LINK FT
You can access the FollowMyHealth Patient Portal offered by [facility] by registering at the following website: [email or web address]. By joining [employer]’s FollowMyHealth portal, you will also be able to view your health information using other applications (apps) compatible with our system.

## 2022-05-30 NOTE — ED ADULT NURSE NOTE - BREATHING, MLM
Patient calling as she is having issues with diarrhea, constipation, and problems with pills getting stuck in her esophagus.  Years ago she had her esophagus stretched.    Patient would like to inquire if this is something provider would be able to do for her again along with a colonoscopy.    Please call patient to advise.    Ok to leave a detailed message.     Spontaneous, unlabored and symmetrical
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
## 2022-05-31 ENCOUNTER — EMERGENCY (EMERGENCY)
Facility: HOSPITAL | Age: 32
LOS: 0 days | Discharge: HOME | End: 2022-06-01
Attending: STUDENT IN AN ORGANIZED HEALTH CARE EDUCATION/TRAINING PROGRAM
Payer: MEDICAID

## 2022-05-31 VITALS
DIASTOLIC BLOOD PRESSURE: 67 MMHG | HEIGHT: 62 IN | HEART RATE: 92 BPM | OXYGEN SATURATION: 95 % | RESPIRATION RATE: 18 BRPM | SYSTOLIC BLOOD PRESSURE: 107 MMHG | TEMPERATURE: 98 F | WEIGHT: 251.11 LBS

## 2022-05-31 DIAGNOSIS — R11.0 NAUSEA: ICD-10-CM

## 2022-05-31 DIAGNOSIS — Z91.018 ALLERGY TO OTHER FOODS: ICD-10-CM

## 2022-05-31 DIAGNOSIS — Z79.84 LONG TERM (CURRENT) USE OF ORAL HYPOGLYCEMIC DRUGS: ICD-10-CM

## 2022-05-31 DIAGNOSIS — Z91.013 ALLERGY TO SEAFOOD: ICD-10-CM

## 2022-05-31 DIAGNOSIS — Z00.00 ENCOUNTER FOR GENERAL ADULT MEDICAL EXAMINATION WITHOUT ABNORMAL FINDINGS: ICD-10-CM

## 2022-05-31 PROCEDURE — 99282 EMERGENCY DEPT VISIT SF MDM: CPT

## 2022-05-31 NOTE — ED PROVIDER NOTE - PATIENT PORTAL LINK FT
You can access the FollowMyHealth Patient Portal offered by [facility] by registering at the following website: [email or web address]. By joining [employer]’s FollowMyHealth portal, you will also be able to view your health information using other applications (apps) compatible with our system.

## 2022-06-01 VITALS
RESPIRATION RATE: 19 BRPM | SYSTOLIC BLOOD PRESSURE: 110 MMHG | TEMPERATURE: 98 F | OXYGEN SATURATION: 97 % | DIASTOLIC BLOOD PRESSURE: 70 MMHG | HEART RATE: 80 BPM

## 2022-06-01 NOTE — ED ADULT NURSE NOTE - OBJECTIVE STATEMENT
Patient aaox3. Patient complaint of nausea. Patient is calm and cooperative and lay down on the bed and ask for food. No vomiting. No sob no pain

## 2022-06-04 ENCOUNTER — EMERGENCY (EMERGENCY)
Facility: HOSPITAL | Age: 32
LOS: 0 days | Discharge: HOME | End: 2022-06-05
Attending: STUDENT IN AN ORGANIZED HEALTH CARE EDUCATION/TRAINING PROGRAM
Payer: MEDICAID

## 2022-06-04 VITALS
HEIGHT: 62 IN | SYSTOLIC BLOOD PRESSURE: 105 MMHG | DIASTOLIC BLOOD PRESSURE: 71 MMHG | RESPIRATION RATE: 19 BRPM | TEMPERATURE: 98 F | OXYGEN SATURATION: 97 % | HEART RATE: 89 BPM | WEIGHT: 251.11 LBS

## 2022-06-04 DIAGNOSIS — F63.9 IMPULSE DISORDER, UNSPECIFIED: ICD-10-CM

## 2022-06-04 DIAGNOSIS — N64.4 MASTODYNIA: ICD-10-CM

## 2022-06-04 DIAGNOSIS — F25.9 SCHIZOAFFECTIVE DISORDER, UNSPECIFIED: ICD-10-CM
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
## 2022-06-04 DIAGNOSIS — Z91.013 ALLERGY TO SEAFOOD: ICD-10-CM

## 2022-06-04 DIAGNOSIS — E11.9 TYPE 2 DIABETES MELLITUS WITHOUT COMPLICATIONS: ICD-10-CM

## 2022-06-04 DIAGNOSIS — M79.603 PAIN IN ARM, UNSPECIFIED: ICD-10-CM

## 2022-06-04 DIAGNOSIS — Z91.018 ALLERGY TO OTHER FOODS: ICD-10-CM

## 2022-06-04 DIAGNOSIS — F31.9 BIPOLAR DISORDER, UNSPECIFIED: ICD-10-CM
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
## 2022-06-04 DIAGNOSIS — Z79.84 LONG TERM (CURRENT) USE OF ORAL HYPOGLYCEMIC DRUGS: ICD-10-CM

## 2022-06-04 PROCEDURE — 99284 EMERGENCY DEPT VISIT MOD MDM: CPT

## 2022-06-04 RX ORDER — CLOZAPINE 150 MG/1
100 TABLET, ORALLY DISINTEGRATING ORAL AT BEDTIME
Refills: 0 | Status: DISCONTINUED | OUTPATIENT
Start: 2022-06-04 | End: 2022-06-04

## 2022-06-04 RX ORDER — ACETAMINOPHEN 500 MG
975 TABLET ORAL ONCE
Refills: 0 | Status: COMPLETED | OUTPATIENT
Start: 2022-06-04 | End: 2022-06-04

## 2022-06-04 RX ORDER — CLOZAPINE 150 MG/1
150 TABLET, ORALLY DISINTEGRATING ORAL ONCE
Refills: 0 | Status: DISCONTINUED | OUTPATIENT
Start: 2022-06-04 | End: 2022-06-04

## 2022-06-04 RX ORDER — IBUPROFEN 200 MG
400 TABLET ORAL ONCE
Refills: 0 | Status: COMPLETED | OUTPATIENT
Start: 2022-06-04 | End: 2022-06-04

## 2022-06-04 RX ORDER — DIVALPROEX SODIUM 500 MG/1
750 TABLET, DELAYED RELEASE ORAL ONCE
Refills: 0 | Status: COMPLETED | OUTPATIENT
Start: 2022-06-04 | End: 2022-06-04

## 2022-06-04 RX ADMIN — Medication 400 MILLIGRAM(S): at 18:38

## 2022-06-04 RX ADMIN — Medication 975 MILLIGRAM(S): at 18:38

## 2022-06-04 NOTE — ED PROVIDER NOTE - PATIENT PORTAL LINK FT
You can access the FollowMyHealth Patient Portal offered by [facility] by registering at the following website: [email or web address]. By joining [employer]’s FollowMyHealth portal, you will also be able to view your health information using other applications (apps) compatible with our system.
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
## 2022-06-04 NOTE — ED PROVIDER NOTE - PHYSICAL EXAMINATION
VITALS: reviewed  GEN: NAD, A & O x 4  HEAD/EYES: NCAT, EOMI, anicteric sclerae,   ENT: mucus membranes moist, oropharynx WNL, trachea midline,   RESP: unlabored breathing  CV: distal pulses intact and symmetric bilaterally  MSK: extremities atraumatic and nontender, no edema, no asymmetry. FROM all extremities  SKIN: warm, dry, no rash, no bruising, no cyanosis. color appropriate for ethnicity  NEURO: alert, mentating appropriately, no facial asymmetry.  PSYCH: Affect appropriate
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
## 2022-06-05 ENCOUNTER — EMERGENCY (EMERGENCY)
Facility: HOSPITAL | Age: 32
LOS: 0 days | Discharge: HOME | End: 2022-06-06
Attending: STUDENT IN AN ORGANIZED HEALTH CARE EDUCATION/TRAINING PROGRAM

## 2022-06-05 VITALS
WEIGHT: 250 LBS | DIASTOLIC BLOOD PRESSURE: 80 MMHG | HEART RATE: 68 BPM | OXYGEN SATURATION: 100 % | HEIGHT: 62 IN | SYSTOLIC BLOOD PRESSURE: 130 MMHG | TEMPERATURE: 98 F | RESPIRATION RATE: 20 BRPM

## 2022-06-05 VITALS
OXYGEN SATURATION: 98 % | RESPIRATION RATE: 18 BRPM | TEMPERATURE: 98 F | SYSTOLIC BLOOD PRESSURE: 129 MMHG | DIASTOLIC BLOOD PRESSURE: 85 MMHG | HEART RATE: 100 BPM

## 2022-06-05 DIAGNOSIS — F63.9 IMPULSE DISORDER, UNSPECIFIED: ICD-10-CM

## 2022-06-05 DIAGNOSIS — E22.1 HYPERPROLACTINEMIA: ICD-10-CM

## 2022-06-05 DIAGNOSIS — Z79.84 LONG TERM (CURRENT) USE OF ORAL HYPOGLYCEMIC DRUGS: ICD-10-CM

## 2022-06-05 DIAGNOSIS — Z91.018 ALLERGY TO OTHER FOODS: ICD-10-CM

## 2022-06-05 DIAGNOSIS — F31.9 BIPOLAR DISORDER, UNSPECIFIED: ICD-10-CM

## 2022-06-05 DIAGNOSIS — E11.9 TYPE 2 DIABETES MELLITUS WITHOUT COMPLICATIONS: ICD-10-CM

## 2022-06-05 DIAGNOSIS — F25.9 SCHIZOAFFECTIVE DISORDER, UNSPECIFIED: ICD-10-CM

## 2022-06-05 DIAGNOSIS — F20.9 SCHIZOPHRENIA, UNSPECIFIED: ICD-10-CM

## 2022-06-05 DIAGNOSIS — F79 UNSPECIFIED INTELLECTUAL DISABILITIES: ICD-10-CM

## 2022-06-05 DIAGNOSIS — Z91.013 ALLERGY TO SEAFOOD: ICD-10-CM

## 2022-06-05 LAB
ALBUMIN SERPL ELPH-MCNC: 3.2 G/DL — LOW (ref 3.3–5)
ALP SERPL-CCNC: 60 U/L — SIGNIFICANT CHANGE UP (ref 40–120)
ALT FLD-CCNC: 14 U/L — SIGNIFICANT CHANGE UP (ref 12–78)
ANION GAP SERPL CALC-SCNC: 9 MMOL/L — SIGNIFICANT CHANGE UP (ref 5–17)
AST SERPL-CCNC: 14 U/L — LOW (ref 15–37)
BASOPHILS # BLD AUTO: 0.02 K/UL — SIGNIFICANT CHANGE UP (ref 0–0.2)
BASOPHILS NFR BLD AUTO: 0.3 % — SIGNIFICANT CHANGE UP (ref 0–2)
BILIRUB SERPL-MCNC: 0.2 MG/DL — SIGNIFICANT CHANGE UP (ref 0.2–1.2)
BUN SERPL-MCNC: 13 MG/DL — SIGNIFICANT CHANGE UP (ref 7–23)
CALCIUM SERPL-MCNC: 9.1 MG/DL — SIGNIFICANT CHANGE UP (ref 8.5–10.1)
CHLORIDE SERPL-SCNC: 105 MMOL/L — SIGNIFICANT CHANGE UP (ref 96–108)
CO2 SERPL-SCNC: 25 MMOL/L — SIGNIFICANT CHANGE UP (ref 22–31)
CREAT SERPL-MCNC: 0.9 MG/DL — SIGNIFICANT CHANGE UP (ref 0.5–1.3)
EGFR: 89 ML/MIN/1.73M2 — SIGNIFICANT CHANGE UP
EOSINOPHIL # BLD AUTO: 0.06 K/UL — SIGNIFICANT CHANGE UP (ref 0–0.5)
EOSINOPHIL NFR BLD AUTO: 0.9 % — SIGNIFICANT CHANGE UP (ref 0–6)
ETHANOL SERPL-MCNC: <10 MG/DL — SIGNIFICANT CHANGE UP (ref 0–10)
GLUCOSE SERPL-MCNC: 92 MG/DL — SIGNIFICANT CHANGE UP (ref 70–99)
HCG SERPL-ACNC: <1 MIU/ML — SIGNIFICANT CHANGE UP
HCT VFR BLD CALC: 37.9 % — SIGNIFICANT CHANGE UP (ref 34.5–45)
HGB BLD-MCNC: 12.4 G/DL — SIGNIFICANT CHANGE UP (ref 11.5–15.5)
IMM GRANULOCYTES NFR BLD AUTO: 0.3 % — SIGNIFICANT CHANGE UP (ref 0–1.5)
LYMPHOCYTES # BLD AUTO: 2.4 K/UL — SIGNIFICANT CHANGE UP (ref 1–3.3)
LYMPHOCYTES # BLD AUTO: 37.1 % — SIGNIFICANT CHANGE UP (ref 13–44)
MCHC RBC-ENTMCNC: 29.1 PG — SIGNIFICANT CHANGE UP (ref 27–34)
MCHC RBC-ENTMCNC: 32.7 G/DL — SIGNIFICANT CHANGE UP (ref 32–36)
MCV RBC AUTO: 89 FL — SIGNIFICANT CHANGE UP (ref 80–100)
MONOCYTES # BLD AUTO: 0.47 K/UL — SIGNIFICANT CHANGE UP (ref 0–0.9)
MONOCYTES NFR BLD AUTO: 7.3 % — SIGNIFICANT CHANGE UP (ref 2–14)
NEUTROPHILS # BLD AUTO: 3.5 K/UL — SIGNIFICANT CHANGE UP (ref 1.8–7.4)
NEUTROPHILS NFR BLD AUTO: 54.1 % — SIGNIFICANT CHANGE UP (ref 43–77)
NRBC # BLD: 0 /100 WBCS — SIGNIFICANT CHANGE UP (ref 0–0)
PLATELET # BLD AUTO: 248 K/UL — SIGNIFICANT CHANGE UP (ref 150–400)
POTASSIUM SERPL-MCNC: 4.2 MMOL/L — SIGNIFICANT CHANGE UP (ref 3.5–5.3)
POTASSIUM SERPL-SCNC: 4.2 MMOL/L — SIGNIFICANT CHANGE UP (ref 3.5–5.3)
PROT SERPL-MCNC: 7.5 GM/DL — SIGNIFICANT CHANGE UP (ref 6–8.3)
RBC # BLD: 4.26 M/UL — SIGNIFICANT CHANGE UP (ref 3.8–5.2)
RBC # FLD: 13.8 % — SIGNIFICANT CHANGE UP (ref 10.3–14.5)
SODIUM SERPL-SCNC: 139 MMOL/L — SIGNIFICANT CHANGE UP (ref 135–145)
WBC # BLD: 6.47 K/UL — SIGNIFICANT CHANGE UP (ref 3.8–10.5)
WBC # FLD AUTO: 6.47 K/UL — SIGNIFICANT CHANGE UP (ref 3.8–10.5)

## 2022-06-05 PROCEDURE — 90792 PSYCH DIAG EVAL W/MED SRVCS: CPT | Mod: GC,95

## 2022-06-05 PROCEDURE — 99284 EMERGENCY DEPT VISIT MOD MDM: CPT

## 2022-06-05 RX ORDER — DIPHENHYDRAMINE HCL 50 MG
50 CAPSULE ORAL ONCE
Refills: 0 | Status: COMPLETED | OUTPATIENT
Start: 2022-06-05 | End: 2022-06-05

## 2022-06-05 RX ORDER — HALOPERIDOL DECANOATE 100 MG/ML
5 INJECTION INTRAMUSCULAR ONCE
Refills: 0 | Status: COMPLETED | OUTPATIENT
Start: 2022-06-05 | End: 2022-06-05

## 2022-06-05 RX ORDER — HALOPERIDOL DECANOATE 100 MG/ML
5 INJECTION INTRAMUSCULAR ONCE
Refills: 0 | Status: COMPLETED | OUTPATIENT
Start: 2022-06-05 | End: 2022-06-06

## 2022-06-05 RX ORDER — DIPHENHYDRAMINE HCL 50 MG
50 CAPSULE ORAL ONCE
Refills: 0 | Status: COMPLETED | OUTPATIENT
Start: 2022-06-05 | End: 2022-06-06

## 2022-06-05 NOTE — ED BEHAVIORAL HEALTH ASSESSMENT NOTE - OTHER
housing limited unable to assess unable to assess - ID group home not observed did not assess external billing
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
## 2022-06-05 NOTE — ED BEHAVIORAL HEALTH NOTE - BEHAVIORAL HEALTH NOTE
Consult requested by Dr. De Paz at 22:12. Telepsychiatry attempted consult at 22:20 but unable to initiate due to inability to reach ED staff. ED Staff eventually reachable at 22:45 and educated to notify telepsychiatry once patient is set up on device.
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
## 2022-06-05 NOTE — ED BEHAVIORAL HEALTH ASSESSMENT NOTE - CURRENT MEDICATION
artificial tears ophthalmic solution: 1 drop(s) to each affected eye every 6 hours, As needed, dry eyes   Clozapine 100 mg bid,  Fluoxitine 30 mg daily, Senna 8.6 mg daily, Miralax 71 gm daily, Pantoprazole 40 mg daily, Melatonin 10 mg hs, Metformin Er 500 mg am. Clozapine 100mg daily + 150mg QHS  Abilify 5mg daily  Depakote 750mg BID  Prozac 30mg daily  Senna 8.6 mg daily, Miralax 71 gm daily, Pantoprazole 40 mg daily, Melatonin 10 mg hs, Metformin 500 mg BID
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
## 2022-06-05 NOTE — ED PROVIDER NOTE - OBJECTIVE STATEMENT
32 yo F hx impulse control disorder presenting for food. Spoke with Natalia at Group Home who states patient is on a strict diet and refuses to eat food that is given to her. Patient received Sha's at [facility] yesterday after she tried eloping from ED before  and she demanded Sha's.
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
## 2022-06-05 NOTE — ED PROVIDER NOTE - PATIENT PORTAL LINK FT
You can access the FollowMyHealth Patient Portal offered by [facility] by registering at the following website: [email or web address]. By joining [employer]’s FollowMyHealth portal, you will also be able to view your health information using other applications (apps) compatible with our system. You can access the FollowMyHealth Patient Portal offered by [facility] by registering at the following website: [email or web address]. By joining [employer]’s FollowMyHealth portal, you will also be able to view your health information using other applications (apps) compatible with our system.
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
## 2022-06-05 NOTE — ED BEHAVIORAL HEALTH ASSESSMENT NOTE - DESCRIPTION
as per HPI As per HPI Patient is calm and cooperative with interview, although demanding at times.     Vital Signs Last 24 Hrs  T(C): 36.7 (05 Jun 2022 17:45), Max: 36.7 (05 Jun 2022 17:45)  T(F): 98 (05 Jun 2022 17:45), Max: 98 (05 Jun 2022 17:45)  HR: 68 (05 Jun 2022 17:45) (68 - 100)  BP: 130/80 (05 Jun 2022 17:45) (105/71 - 130/80)  BP(mean): 100 (05 Jun 2022 07:40) (100 - 100)  RR: 20 (05 Jun 2022 17:45) (18 - 20)  SpO2: 100% (05 Jun 2022 17:45) (98% - 100%)
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
## 2022-06-05 NOTE — ED BEHAVIORAL HEALTH ASSESSMENT NOTE - VIOLENCE RISK FACTORS:
Violent ideation/threat/speech/Impulsivity/Lack of insight into violence risk/need for treatment/Community stressors that increase the risk of destabilization
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
## 2022-06-06 VITALS
HEART RATE: 113 BPM | DIASTOLIC BLOOD PRESSURE: 76 MMHG | OXYGEN SATURATION: 98 % | SYSTOLIC BLOOD PRESSURE: 120 MMHG | TEMPERATURE: 98 F

## 2022-06-06 DIAGNOSIS — F25.9 SCHIZOAFFECTIVE DISORDER, UNSPECIFIED: ICD-10-CM

## 2022-06-06 DIAGNOSIS — F79 UNSPECIFIED INTELLECTUAL DISABILITIES: ICD-10-CM

## 2022-06-06 LAB — GLUCOSE BLDC GLUCOMTR-MCNC: 112 MG/DL — HIGH (ref 70–99)

## 2022-06-06 PROCEDURE — 99215 OFFICE O/P EST HI 40 MIN: CPT | Mod: 95

## 2022-06-06 RX ADMIN — Medication 2 MILLIGRAM(S): at 01:30

## 2022-06-06 RX ADMIN — HALOPERIDOL DECANOATE 5 MILLIGRAM(S): 100 INJECTION INTRAMUSCULAR at 01:30

## 2022-06-06 RX ADMIN — Medication 50 MILLIGRAM(S): at 01:30

## 2022-06-06 NOTE — PROGRESS NOTE BEHAVIORAL HEALTH - RISK ASSESSMENT
Brief summary of previous assessments of danger to self/others (ED RN, ED MD,  Provider,  Allied Health, etc.):   "Patient's risk of harm is elevated by an underlying history of ID and SAD with risk factors including: recent aggression at group home in the context of behavioral issues. However, protective factors include: no current active SI/HI, no current NSSIB thoughts, no manic or psychotic symptoms, no substance use, no access to guns, patient is help-seeking.  Moderate Acute Suicide Risk  Chronic risk: Yes"    Updated Acute Suicide Risk:  (  ) High   (  ) Moderate   ( X ) Low   (  ) Unable to determine   Rationale:  Modifiable risk factors: ongoing behavioral issues at group home; possible reinforcement of behavior to come to ED reported  Unmodifiable risk factors: history of psychotic disorder and ID  Protective factors:  no co-morbid substance use; domiciled status; future-orientation; lack of current suicidality or homicidally; lack of urges to self-harm or engage in NSSIB; identifies various reasons for living; ability to safety plan this morning; reassuring collateral from group home at patient is at her baseline    Elevated Chronic Risk:   ( X ) Yes __as per above; safety plan completed with patient and reviewed again with her  Details ___________  (  ) No   ___________
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
## 2022-06-06 NOTE — PROGRESS NOTE BEHAVIORAL HEALTH - NSBHCONSULTRECOMMENDOTHER_PSY_A_CORE FT
T&R back to group home; reviewed with ED attending to give patient her morning medications; safety plan completed; group home staff

## 2022-06-06 NOTE — ED BEHAVIORAL HEALTH NOTE - BEHAVIORAL HEALTH NOTE
Telepsychiatry reassessment    Pt currently sleeping. Nursing report: pt received IM haldol 5 mg, ativan 2 mg, benadryl 50 mg for acute agitation before midnight.    Continue holding for reassessment in AM - needs collateral from /supervisor.

## 2022-06-06 NOTE — ED ADULT NURSE REASSESSMENT NOTE - NS ED NURSE REASSESS COMMENT FT1
RN spoke with ambulance to give report for patient needs, pt will be picked up at 2;30 and taken back to group home
pt refused lab work. Dr De Paz aware. police at bedside.
pt received from previous shift resting on stretcher , NAD remains on 1;1 . will continue to monitor

## 2022-06-06 NOTE — PROGRESS NOTE BEHAVIORAL HEALTH - NSBHFUPINTERVALHXFT_PSY_A_CORE
DISCUSSED PLAN WITH ED ATTENDING  SPOKE TO GROUP HOME FOR COLLATERAL  PATIENT IS CLEARED FOR DISCHARGE Patient seen and examined on reassessment this morning. Chart reviewed. Case discussed with EM provider. Patient reports feeling "better" and "good" this morning. Reports having slept and ate breakfast. Denies any acute psychiatric complaints this morning. Denies suicidal ideation, intent or plan. Denies any homicidal/aggressive ideation. Denies any symptoms concerning for domenico/hypomania. Denies AH/VH/paranoid ideation. Denies any other perceptual disturbances.  He is requesting to leave the ED to go back to group home.    Collateral was obtained from supervisor at [facility]. See [facility] note. Supervisor relates patient is at her baseline and they have no acute safety concerns. They express that she does not like the food at group home and that in the hospital she gets food she prefers. Concern raised that going to the ED has been reinforcing of her behaviors. They are in agreement with patient returning to group home.
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
## 2022-06-08 LAB — DRUG SCREEN, SERUM: SIGNIFICANT CHANGE UP

## 2022-06-14 NOTE — ED ADULT NURSE NOTE - FINAL NURSING ELECTRONIC SIGNATURE
14-Jan-2022 20:38 Burow's Advancement Flap Text: The defect edges were debeveled with a #15 scalpel blade.  Given the location of the defect and the proximity to free margins a Burow's advancement flap was deemed most appropriate.  Using a sterile surgical marker, the appropriate advancement flap was drawn incorporating the defect and placing the expected incisions within the relaxed skin tension lines where possible.    The area thus outlined was incised deep to adipose tissue with a #15 scalpel blade.  The skin margins were undermined to an appropriate distance in all directions utilizing iris scissors.

## 2022-06-15 ENCOUNTER — EMERGENCY (EMERGENCY)
Facility: HOSPITAL | Age: 32
LOS: 0 days | Discharge: HOME | End: 2022-06-16
Attending: STUDENT IN AN ORGANIZED HEALTH CARE EDUCATION/TRAINING PROGRAM
Payer: MEDICAID

## 2022-06-15 VITALS
SYSTOLIC BLOOD PRESSURE: 99 MMHG | HEART RATE: 106 BPM | DIASTOLIC BLOOD PRESSURE: 64 MMHG | TEMPERATURE: 98 F | OXYGEN SATURATION: 97 % | RESPIRATION RATE: 18 BRPM | WEIGHT: 251.11 LBS | HEIGHT: 62 IN

## 2022-06-15 DIAGNOSIS — M54.2 CERVICALGIA: ICD-10-CM

## 2022-06-15 DIAGNOSIS — R00.0 TACHYCARDIA, UNSPECIFIED: ICD-10-CM

## 2022-06-15 DIAGNOSIS — Z91.018 ALLERGY TO OTHER FOODS: ICD-10-CM

## 2022-06-15 DIAGNOSIS — F63.9 IMPULSE DISORDER, UNSPECIFIED: ICD-10-CM

## 2022-06-15 DIAGNOSIS — Z79.84 LONG TERM (CURRENT) USE OF ORAL HYPOGLYCEMIC DRUGS: ICD-10-CM

## 2022-06-15 DIAGNOSIS — Z91.013 ALLERGY TO SEAFOOD: ICD-10-CM

## 2022-06-15 PROCEDURE — 99283 EMERGENCY DEPT VISIT LOW MDM: CPT

## 2022-06-15 RX ORDER — ACETAMINOPHEN 500 MG
650 TABLET ORAL ONCE
Refills: 0 | Status: COMPLETED | OUTPATIENT
Start: 2022-06-15 | End: 2022-06-15

## 2022-06-15 RX ADMIN — Medication 650 MILLIGRAM(S): at 23:07

## 2022-06-15 NOTE — ED PROVIDER NOTE - CLINICAL SUMMARY MEDICAL DECISION MAKING FREE TEXT BOX
neck supple, nontender. patient speaking with full sentences, no sob. mild tachycardia; relative hypotension is within range of prior ed visits. no clinical signs of PE. no fever. well appearing, well known to me, appears at her baseline. will dc. no c-spine ttp. no trauma. no signs of infection

## 2022-06-15 NOTE — BH INPATIENT PSYCHIATRY PROGRESS NOTE - MODIFICATIONS
show Patient interviewed and evaluated with resident present to follow up on symptoms and the case has been reviewed with resident and treatment team this morning.   I was physically present during the service to the patient and personally examined the patient and I was directly involved in the management plan and recommendations of the care provided to the patient.  I have reviewed the resident's progress note and the mental status examination and I agree with its contents and treatment plan and I have made modifications to the note when needed and as indicated.

## 2022-06-15 NOTE — ED PROVIDER NOTE - PATIENT PORTAL LINK FT
You can access the FollowMyHealth Patient Portal offered by [facility] by registering at the following website: [email or web address]. By joining [employer]’s FollowMyHealth portal, you will also be able to view your health information using other applications (apps) compatible with our system.

## 2022-06-15 NOTE — ED PROVIDER NOTE - OBJECTIVE STATEMENT
eye/right 31F hx impulsive control disorder presenting for neck pain to the right side of her neck. dnies trauma, fevers, sob, or issues wallowing. requesting food, particularly hamburgers and french fries.

## 2022-06-15 NOTE — ED PROVIDER NOTE - PHYSICAL EXAMINATION
General: Awake, alert and oriented. No acute distress. Well developed, hydrated and nourished. Appears stated age.   Skin: Skin in warm, dry and intact without rashes or lesions. Appropriate color for ethnicity  HENMT: head normocephalic and atraumatic; bilateral external ears without swelling. no nasal discharge. moist oral mucosa. supple neck, trachea midline, neck supple, no lymphadenopathy, skin changes, or ttp.  EYES: Conjunctiva clear. nonicteric sclera. EOM intact, Eyelids are normal in appearance without swelling or lesions.  Cardiac: well perfused  Respiratory: breathing comfortably on room air. no audible wheezing or stridor  Abdominal: nondistended  MSK: Neck and back are without deformity, visible external skin changes, or signs of trauma. Curvature of the cervical, thoracic, and lumbar spine are within normal limits. no external signs of trauma. no apparent deficits in ROM of any extremity  Neurological: The patient is awake, alert and oriented to person, place, and time with normal speech. CN 2-12 grossly intact. no apparent deficits. Memory is normal and thought process is intact. No gait abnormalities are appreciated.   Psychiatric: Appropriate mood and affect. Good judgement and insight. No visual or auditory hallucinations.

## 2022-06-15 NOTE — ED ADULT TRIAGE NOTE - CHIEF COMPLAINT QUOTE
c/o r sided neck pain today denies known injury known hx of aggressive behavior but calm at time of triage

## 2022-06-16 VITALS
DIASTOLIC BLOOD PRESSURE: 69 MMHG | HEART RATE: 96 BPM | SYSTOLIC BLOOD PRESSURE: 116 MMHG | RESPIRATION RATE: 18 BRPM | OXYGEN SATURATION: 98 % | TEMPERATURE: 98 F

## 2022-06-16 RX ADMIN — Medication 650 MILLIGRAM(S): at 02:47

## 2022-06-16 NOTE — ED ADULT NURSE NOTE - CAS TRG GENERAL NORM CIRC DET
Quality 226: Preventive Care And Screening: Tobacco Use: Screening And Cessation Intervention: Patient screened for tobacco use and is an ex/non-smoker Quality 130: Documentation Of Current Medications In The Medical Record: Current Medications Documented Quality 431: Preventive Care And Screening: Unhealthy Alcohol Use - Screening: Patient screened for unhealthy alcohol use using a single question and scores less than 2 times per year Detail Level: Detailed Strong peripheral pulses

## 2022-06-16 NOTE — ED ADULT NURSE NOTE - ED STAT RN HANDOFF DETAILS
Report received from ADRY paige. Safety checks compld this shift/Safety rounds completed hourly. Meds given as ord with no s/s of adverse RXNs. Fall +skin precs in place. pt resting comfortably and calm in stretcher, denies pain, no acute distress noted. pending transfer to group home. Facility called and as per facility expecting patient.

## 2022-06-16 NOTE — ED ADULT NURSE NOTE - OBJECTIVE STATEMENT
Patient was received in ED c/o pain to neck after attempting to stretch it . Patient was given meds as ordered and is calm and relaxed in bed

## 2022-06-16 NOTE — ED ADULT NURSE REASSESSMENT NOTE - NS ED NURSE REASSESS COMMENT FT1
Patient is calm and relaxed following commands in bed . Patient is awaiting ambulance transport back to facility .

## 2022-06-24 ENCOUNTER — EMERGENCY (EMERGENCY)
Facility: HOSPITAL | Age: 32
LOS: 0 days | Discharge: HOME | End: 2022-06-25
Attending: EMERGENCY MEDICINE

## 2022-06-24 VITALS
WEIGHT: 251.11 LBS | SYSTOLIC BLOOD PRESSURE: 113 MMHG | DIASTOLIC BLOOD PRESSURE: 88 MMHG | RESPIRATION RATE: 17 BRPM | OXYGEN SATURATION: 99 % | TEMPERATURE: 99 F | HEIGHT: 62 IN | HEART RATE: 92 BPM

## 2022-06-24 DIAGNOSIS — R45.1 RESTLESSNESS AND AGITATION: ICD-10-CM

## 2022-06-24 DIAGNOSIS — Z91.018 ALLERGY TO OTHER FOODS: ICD-10-CM

## 2022-06-24 DIAGNOSIS — Z91.013 ALLERGY TO SEAFOOD: ICD-10-CM

## 2022-06-24 DIAGNOSIS — N94.6 DYSMENORRHEA, UNSPECIFIED: ICD-10-CM

## 2022-06-24 DIAGNOSIS — Z79.84 LONG TERM (CURRENT) USE OF ORAL HYPOGLYCEMIC DRUGS: ICD-10-CM

## 2022-06-24 DIAGNOSIS — F63.9 IMPULSE DISORDER, UNSPECIFIED: ICD-10-CM

## 2022-06-24 PROCEDURE — 99284 EMERGENCY DEPT VISIT MOD MDM: CPT

## 2022-06-24 RX ORDER — MIDAZOLAM HYDROCHLORIDE 1 MG/ML
6 INJECTION, SOLUTION INTRAMUSCULAR; INTRAVENOUS ONCE
Refills: 0 | Status: DISCONTINUED | OUTPATIENT
Start: 2022-06-24 | End: 2022-06-24

## 2022-06-24 RX ORDER — HALOPERIDOL DECANOATE 100 MG/ML
5 INJECTION INTRAMUSCULAR ONCE
Refills: 0 | Status: COMPLETED | OUTPATIENT
Start: 2022-06-24 | End: 2022-06-24

## 2022-06-24 RX ADMIN — MIDAZOLAM HYDROCHLORIDE 6 MILLIGRAM(S): 1 INJECTION, SOLUTION INTRAMUSCULAR; INTRAVENOUS at 23:11

## 2022-06-24 RX ADMIN — HALOPERIDOL DECANOATE 5 MILLIGRAM(S): 100 INJECTION INTRAMUSCULAR at 23:11

## 2022-06-24 NOTE — ED PROVIDER NOTE - PATIENT PORTAL LINK FT
You can access the FollowMyHealth Patient Portal offered by [facility] by registering at the following website: [email or web address]. By joining [employer]’s FollowMyHealth portal, you will also be able to view your health information using other applications (apps) compatible with our system.

## 2022-06-24 NOTE — ED PROVIDER NOTE - CLINICAL SUMMARY MEDICAL DECISION MAKING FREE TEXT BOX
extreme agitation. requires chemical sedation. reassess extreme agitation. requires chemical sedation. reassess  pt calm and tolerating po. will dc home.

## 2022-06-24 NOTE — ED ADULT TRIAGE NOTE - CHIEF COMPLAINT QUOTE
pt reports began menstrual cycle yesterday PW bilat lower ABD cramping " I don't feel well". pt reports began menstrual cycle yesterday reports " my inside hurt ( pointing to her pelvis), " I don't feel well".
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
## 2022-06-24 NOTE — ED PROVIDER NOTE - PHYSICAL EXAMINATION
Gen: Alert, NAD  Head: NC, AT   Eyes: PERRL, EOMI, normal lids/conjunctiva  ENT: normal hearing, patent oropharynx without erythema/exudate, uvula midline  Neck: supple, no tenderness, Trachea midline  Pulm: Bilateral BS, normal resp effort, no wheeze/stridor/retractions  CV: RRR, no M/R/G, 2+ radial and dp pulses bl, no edema  Abd: morbidly obese, soft, NT/ND, +BS, no hepatosplenomegaly  Mskel: extremities x4 with normal ROM and no joint effusions. no ctl spine ttp.   Skin: no rash, no bruising   Neuro: AAOx3, no sensory/motor deficits, CN 2-12 intact  Psych: extreme agitation

## 2022-06-25 VITALS
HEART RATE: 112 BPM | RESPIRATION RATE: 18 BRPM | OXYGEN SATURATION: 98 % | DIASTOLIC BLOOD PRESSURE: 64 MMHG | SYSTOLIC BLOOD PRESSURE: 107 MMHG | TEMPERATURE: 98 F

## 2022-06-25 VITALS
DIASTOLIC BLOOD PRESSURE: 81 MMHG | RESPIRATION RATE: 16 BRPM | OXYGEN SATURATION: 95 % | TEMPERATURE: 98 F | HEIGHT: 62 IN | SYSTOLIC BLOOD PRESSURE: 124 MMHG | HEART RATE: 100 BPM | WEIGHT: 251.11 LBS
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
## 2022-06-25 DIAGNOSIS — R45.1 RESTLESSNESS AND AGITATION: ICD-10-CM

## 2022-06-25 DIAGNOSIS — Z79.84 LONG TERM (CURRENT) USE OF ORAL HYPOGLYCEMIC DRUGS: ICD-10-CM
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
## 2022-06-25 DIAGNOSIS — T73.0XXA STARVATION, INITIAL ENCOUNTER: ICD-10-CM

## 2022-06-25 DIAGNOSIS — Z91.013 ALLERGY TO SEAFOOD: ICD-10-CM

## 2022-06-25 DIAGNOSIS — F25.9 SCHIZOAFFECTIVE DISORDER, UNSPECIFIED: ICD-10-CM

## 2022-06-25 DIAGNOSIS — E11.9 TYPE 2 DIABETES MELLITUS WITHOUT COMPLICATIONS: ICD-10-CM

## 2022-06-25 DIAGNOSIS — F31.9 BIPOLAR DISORDER, UNSPECIFIED: ICD-10-CM

## 2022-06-25 DIAGNOSIS — Z91.018 ALLERGY TO OTHER FOODS: ICD-10-CM

## 2022-06-25 DIAGNOSIS — Z00.00 ENCOUNTER FOR GENERAL ADULT MEDICAL EXAMINATION WITHOUT ABNORMAL FINDINGS: ICD-10-CM

## 2022-06-25 PROCEDURE — 99283 EMERGENCY DEPT VISIT LOW MDM: CPT

## 2022-06-25 NOTE — ED PROVIDER NOTE - OBJECTIVE STATEMENT
31F w/ impulse control disorder presenting to the ED sent from [facility] for tantrum regarding [employer] chicken. Patient here states she is hungry. Denies any acute complaints. No agitation or aggression here. Chart review shows patient was here yesterday required anxiolysis with Haldol/Versed.

## 2022-06-25 NOTE — ED ADULT NURSE NOTE - CHIEF COMPLAINT QUOTE
pt reports began menstrual cycle yesterday reports " my inside hurt ( pointing to her pelvis), " I don't feel well".

## 2022-06-25 NOTE — ED PROVIDER NOTE - PHYSICAL EXAMINATION
PHYSICAL EXAMINATION:  VITALS REVIEWED.  VS normal, HR 95  GENERALIZED APPEARANCE:  Comfortable, no acute distress, ambulating without difficulty  SKIN:  Warm, dry, no cyanosis  HEAD:  No obvious scalp lesions  EYES:  Conjunctiva pink, no icterus  ENMT:  Mucus membranes moist, no stridor  NECK:  Supple, non-tender  CHEST AND RESPIRATORY:  Clear to auscultation B/L, good air entry B/L, equal chest expansion  HEART AND CARDIOVASCULAR:  Regular rate, no obvious murmur  ABDOMEN AND GI:  Soft, non-tender, non-distended.  No rebound, no guarding, no CVA-area tenderness  EXTREMITIES:  No deformity, edema, or calf tenderness  NEURO: AAOx3, gross motor and sensory intact  PSYCH: Normal affect

## 2022-06-25 NOTE — ED ADULT NURSE REASSESSMENT NOTE - NS ED NURSE REASSESS COMMENT FT1
pt is redirected after walking around in the hallway. pt received and finished her food tray. no distress noted. safety maintained, will continue to monitor pt pending  ride to her group home. pt is redirected after walking around in the hallway. pt received and finished her food tray. no distress noted. safety maintained, will continue to monitor

## 2022-06-25 NOTE — ED PROVIDER NOTE - CLINICAL SUMMARY MEDICAL DECISION MAKING FREE TEXT BOX
31F presenting to the ED sent for agitation after she was having [employer] chicken; patient has no SI/HI/AH/VH; no emergent findings on MSE, calm no agitation here, will send back to facility at this time.
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
## 2022-06-27 ENCOUNTER — EMERGENCY (EMERGENCY)
Facility: HOSPITAL | Age: 32
LOS: 0 days | Discharge: HOME | End: 2022-06-27
Attending: EMERGENCY MEDICINE
Payer: MEDICAID

## 2022-06-27 VITALS
HEART RATE: 100 BPM | WEIGHT: 251.11 LBS | TEMPERATURE: 98 F | SYSTOLIC BLOOD PRESSURE: 102 MMHG | OXYGEN SATURATION: 100 % | HEIGHT: 62 IN | DIASTOLIC BLOOD PRESSURE: 65 MMHG | RESPIRATION RATE: 17 BRPM
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
## 2022-06-27 DIAGNOSIS — T73.0XXA STARVATION, INITIAL ENCOUNTER: ICD-10-CM

## 2022-06-27 DIAGNOSIS — Z91.018 ALLERGY TO OTHER FOODS: ICD-10-CM
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
## 2022-06-27 DIAGNOSIS — Z79.84 LONG TERM (CURRENT) USE OF ORAL HYPOGLYCEMIC DRUGS: ICD-10-CM

## 2022-06-27 DIAGNOSIS — Z91.013 ALLERGY TO SEAFOOD: ICD-10-CM

## 2022-06-27 PROCEDURE — 99284 EMERGENCY DEPT VISIT MOD MDM: CPT

## 2022-06-27 RX ORDER — HALOPERIDOL DECANOATE 100 MG/ML
5 INJECTION INTRAMUSCULAR ONCE
Refills: 0 | Status: COMPLETED | OUTPATIENT
Start: 2022-06-27 | End: 2022-06-27

## 2022-06-27 RX ORDER — ALPRAZOLAM 0.25 MG
2 TABLET ORAL ONCE
Refills: 0 | Status: DISCONTINUED | OUTPATIENT
Start: 2022-06-27 | End: 2022-06-27

## 2022-06-27 RX ADMIN — Medication 2 MILLIGRAM(S): at 21:49

## 2022-06-27 RX ADMIN — HALOPERIDOL DECANOATE 5 MILLIGRAM(S): 100 INJECTION INTRAMUSCULAR at 21:49

## 2022-06-27 NOTE — ED PROVIDER NOTE - OBJECTIVE STATEMENT
31F hx impulse control disorder pw hunger. pt notes she didn't eat in her group home and as such he is hungry. she denies vomiting, fever, chills, diarrhea, dysuria. ros neg for ha, vision loss, rhinorrhea, cp, sob, cough, rash, bleeding, weakness, numbness, si.

## 2022-06-27 NOTE — ED PROVIDER NOTE - HIV OFFER
[de-identified] : Dr. Rouse takes care of this very pleasant 71-year-old female here with her daughter\par \par Patient with long history of probable irritable bowel\par \par Had colonoscopy in July reportedly normal\par \par Blood work in hospital showed mild elevated white blood cell count, no anemia and no other significant pathology\par \par The patient describes lifelong abdominal symptoms of some queasiness and bloating and just general discomfort at times\par \par Sometimes mild constipation\par \par She generally just manages with keeping to a very plain diet\par \par She does not require laxative\par \par She has a prescription for dicyclomine but hardly uses it, she feels it makes her very sleepy\par \par Previous gastroenterologist also raised the possibility of putting her on a low dose antidepressant for symptoms, she prefers to hold off on this\par \par There is no family history of celiac disease, inflammatory bowel disease or colon or rectal cancers that she is aware of\par \par She is thin but not losing weight\par \par Generally speaking her appetite is good\par \par 
Previously Declined (within the last year)
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
## 2022-06-27 NOTE — ED PROVIDER NOTE - PATIENT PORTAL LINK FT
You can access the FollowMyHealth Patient Portal offered by [facility] by registering at the following website: [email or web address]. By joining [employer]’s FollowMyHealth portal, you will also be able to view your health information using other applications (apps) compatible with our system.

## 2022-06-27 NOTE — PROGRESS NOTE BEHAVIORAL HEALTH - NSBHATTESTSEENBY_PSY_A_CORE
Patient returns call and was informed that the olanzapine was discontinued at his last appointment. Patient states he has not been taking the olanzapine and has not yet picked up the new medication Abilify. Patient states he will stop by pharmacy to  and start new medication.    attending Psychiatrist without NP/Trainee

## 2022-06-27 NOTE — ED ADULT NURSE REASSESSMENT NOTE - NS ED NURSE REASSESS COMMENT FT1
pt resting in chair, eating dinner. pending transportation back to Group Home. no distress noted, safety maintained, will continue to monitor

## 2022-06-27 NOTE — ED ADULT TRIAGE NOTE - CHIEF COMPLAINT QUOTE
from [facility] group home. patient states that she did not eat today at group home and now has stomach pain. on birth control. LMP unknown

## 2022-07-03 ENCOUNTER — EMERGENCY (EMERGENCY)
Facility: HOSPITAL | Age: 32
LOS: 0 days | Discharge: HOME | End: 2022-07-04
Attending: EMERGENCY MEDICINE

## 2022-07-03 VITALS
HEIGHT: 62 IN | RESPIRATION RATE: 17 BRPM | HEART RATE: 90 BPM | OXYGEN SATURATION: 100 % | TEMPERATURE: 99 F | WEIGHT: 251.99 LBS | DIASTOLIC BLOOD PRESSURE: 78 MMHG | SYSTOLIC BLOOD PRESSURE: 100 MMHG

## 2022-07-03 DIAGNOSIS — Z91.018 ALLERGY TO OTHER FOODS: ICD-10-CM

## 2022-07-03 DIAGNOSIS — F31.9 BIPOLAR DISORDER, UNSPECIFIED: ICD-10-CM

## 2022-07-03 DIAGNOSIS — Z91.013 ALLERGY TO SEAFOOD: ICD-10-CM

## 2022-07-03 DIAGNOSIS — R62.50 UNSPECIFIED LACK OF EXPECTED NORMAL PHYSIOLOGICAL DEVELOPMENT IN CHILDHOOD: ICD-10-CM

## 2022-07-03 DIAGNOSIS — F63.9 IMPULSE DISORDER, UNSPECIFIED: ICD-10-CM

## 2022-07-03 DIAGNOSIS — E11.9 TYPE 2 DIABETES MELLITUS WITHOUT COMPLICATIONS: ICD-10-CM

## 2022-07-03 DIAGNOSIS — F79 UNSPECIFIED INTELLECTUAL DISABILITIES: ICD-10-CM

## 2022-07-03 DIAGNOSIS — Z79.84 LONG TERM (CURRENT) USE OF ORAL HYPOGLYCEMIC DRUGS: ICD-10-CM

## 2022-07-03 DIAGNOSIS — R10.9 UNSPECIFIED ABDOMINAL PAIN: ICD-10-CM

## 2022-07-03 DIAGNOSIS — F25.9 SCHIZOAFFECTIVE DISORDER, UNSPECIFIED: ICD-10-CM

## 2022-07-03 PROCEDURE — 99283 EMERGENCY DEPT VISIT LOW MDM: CPT

## 2022-07-03 NOTE — ED ADULT NURSE NOTE - NS ED NOTE  TALK SOMEONE YN
Request for authorization     Amlodipine-benazepril 5-20 mg(Lotrel)5-20 mg per cap    hydrochlorthiazide (microzide) 12.5 mg cap  
No

## 2022-07-03 NOTE — ED ADULT NURSE NOTE - OBJECTIVE STATEMENT
pt states she has abdominal pain started when she came to the hospital. denies n/v, denies other complain. pt is calm on bed and cooperative.

## 2022-07-04 VITALS
OXYGEN SATURATION: 100 % | SYSTOLIC BLOOD PRESSURE: 134 MMHG | TEMPERATURE: 98 F | HEART RATE: 100 BPM | RESPIRATION RATE: 17 BRPM | DIASTOLIC BLOOD PRESSURE: 89 MMHG

## 2022-07-04 NOTE — ED PROVIDER NOTE - CLINICAL SUMMARY MEDICAL DECISION MAKING FREE TEXT BOX
Ddx: Pt feeling well, no abdominal tenderness or guarding to suggest surgical abdomen.   Plan: D/c back to group home.

## 2022-07-04 NOTE — ED PROVIDER NOTE - PATIENT PORTAL LINK FT
You can access the FollowMyHealth Patient Portal offered by [facility] by registering at the following website: [email or web address]. By joining [employer]’s FollowMyHealth portal, you will also be able to view your health information using other applications (apps) compatible with our system.

## 2022-07-04 NOTE — ED PROVIDER NOTE - OBJECTIVE STATEMENT
Pt is a 30 yo lady with a pmhx of impulse control disorder and frequent visits to the ED who presents to the ED because she is hungry. Pt upon evaluation and after eating food says she is better, and would like to go home. No cough, no fevers.

## 2022-07-08 ENCOUNTER — EMERGENCY (EMERGENCY)
Facility: HOSPITAL | Age: 32
LOS: 0 days | Discharge: HOME | End: 2022-07-08
Attending: STUDENT IN AN ORGANIZED HEALTH CARE EDUCATION/TRAINING PROGRAM

## 2022-07-08 VITALS
DIASTOLIC BLOOD PRESSURE: 83 MMHG | HEIGHT: 62 IN | WEIGHT: 259.93 LBS | TEMPERATURE: 98 F | RESPIRATION RATE: 17 BRPM | SYSTOLIC BLOOD PRESSURE: 137 MMHG | HEART RATE: 101 BPM | OXYGEN SATURATION: 100 %

## 2022-07-08 DIAGNOSIS — E11.9 TYPE 2 DIABETES MELLITUS WITHOUT COMPLICATIONS: ICD-10-CM
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
## 2022-07-08 DIAGNOSIS — Z91.013 ALLERGY TO SEAFOOD: ICD-10-CM

## 2022-07-08 DIAGNOSIS — F31.9 BIPOLAR DISORDER, UNSPECIFIED: ICD-10-CM

## 2022-07-08 DIAGNOSIS — R10.9 UNSPECIFIED ABDOMINAL PAIN: ICD-10-CM

## 2022-07-08 DIAGNOSIS — Z91.018 ALLERGY TO OTHER FOODS: ICD-10-CM

## 2022-07-08 DIAGNOSIS — Z79.84 LONG TERM (CURRENT) USE OF ORAL HYPOGLYCEMIC DRUGS: ICD-10-CM

## 2022-07-08 DIAGNOSIS — R62.50 UNSPECIFIED LACK OF EXPECTED NORMAL PHYSIOLOGICAL DEVELOPMENT IN CHILDHOOD: ICD-10-CM

## 2022-07-08 DIAGNOSIS — R00.0 TACHYCARDIA, UNSPECIFIED: ICD-10-CM

## 2022-07-08 DIAGNOSIS — F25.9 SCHIZOAFFECTIVE DISORDER, UNSPECIFIED: ICD-10-CM
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
## 2022-07-08 PROCEDURE — 99283 EMERGENCY DEPT VISIT LOW MDM: CPT
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
## 2022-07-08 NOTE — ED ADULT NURSE NOTE - OBJECTIVE STATEMENT
Pt presents to the ED complaining of abd pain. Denies n/v/d/ constipation. hx schizoaffective disorder, states pain is resolved now none d/w Dr Gale admit for SROM @ 40 wks  for epidural for pain control and vaginismus  vaginal exam after epidural  prenatals reviewed  covid swab sent
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
## 2022-07-08 NOTE — ED PROVIDER NOTE - NSFOLLOWUPINSTRUCTIONS_ED_ALL_ED_FT
Can take tylenol as needed for pain and use ice pack to the area every 3 hours for 20 minutes as needed for pain.

## 2022-07-08 NOTE — ED ADULT NURSE NOTE - CHIEF COMPLAINT QUOTE
Calling regarding: Pt is calling and wants to be seen for a sore throat issue as soon as possible.  Pt wants to know if he can come in alone? Or can he bring a note along with him because his mom just had hip surgery.Please advise.     Phone number to be reached at:[phone]([phone]   left abdominal pain x one hour, denies n,v,d from group home

## 2022-07-08 NOTE — ED PROVIDER NOTE - PATIENT PORTAL LINK FT
You can access the FollowMyHealth Patient Portal offered by [facility] by registering at the following website: [email or web address]. By joining [employer]’s FollowMyHealth portal, you will also be able to view your health information using other applications (apps) compatible with our system.

## 2022-07-08 NOTE — ED ADULT NURSE NOTE - NSIMPLEMENTINTERV_GEN_ALL_ED
Implemented All Universal Safety Interventions:  [place] to call system. Call bell, personal items and telephone within reach. Instruct patient to call for assistance. Room bathroom lighting operational. Non-slip footwear when patient is off stretcher. Physically safe environment: no spills, clutter or unnecessary equipment. Stretcher in lowest position, wheels locked, appropriate side rails in place.

## 2022-07-13 NOTE — ED ADULT NURSE NOTE - PAIN RATING/NUMBER SCALE (0-10): REST
Date of Surgery Update:  Lorri Ruiz was seen and examined. History and physical has been reviewed. The patient has been examined. There have been no significant clinical changes since the completion of the originally dated History and Physical.  Patient identified by surgeon; surgical site was confirmed by patient and surgeon.     Signed By: Michael Mckeon MD     July 13, 2022 7:29 AM 0

## 2022-07-17 NOTE — ED ADULT NURSE NOTE - CAS EDN DISCHARGE ASSESSMENT
[FreeTextEntry1] : 5 yo with croup and wheezing\par albuterol via MDI and spacer 3 puffs given, improvement in wheezing, still with stridor sounds, continue at home every 4 hours\par decadron PO given 0.8 ml po\par pharyngitis, rapid strep neg\par reviewed ER chart 7/16/22, covid neg\par re-check 2 days or earlier if needed\par Recommend using mist from a humidifier. Allow the child to breathe cool air during the night by opening a window or door. May use steam from hot water in bathroom as well. Fever can be treated with an over-the-counter medication such as acetaminophen or ibuprofen. Coughing can be treated with warm, clear fluids to loosen mucus on the vocal cords.  Keep the child's head elevated. If the child's stridor does not improve contact health care provider immediately.\par 
Alert and oriented to person, place and time
[id number]

## 2022-07-20 NOTE — ED BEHAVIORAL HEALTH ASSESSMENT NOTE - NS ED BHA REVIEW OF ED CHART VITAL SIGNS REVIEWED
If you need albuterol more than 3 times per week, then use your symbicort every day - 2 puffs twice a day - to control your symptoms.    Dermatology -   [facility]  [facility]  [address]  [place] [address]  Phone: [phone]   Yes

## 2022-08-09 ENCOUNTER — EMERGENCY (EMERGENCY)
Facility: HOSPITAL | Age: 32
LOS: 0 days | Discharge: HOME | End: 2022-08-09
Attending: STUDENT IN AN ORGANIZED HEALTH CARE EDUCATION/TRAINING PROGRAM

## 2022-08-09 VITALS
OXYGEN SATURATION: 97 % | TEMPERATURE: 99 F | SYSTOLIC BLOOD PRESSURE: 106 MMHG | HEART RATE: 86 BPM | RESPIRATION RATE: 16 BRPM | DIASTOLIC BLOOD PRESSURE: 73 MMHG

## 2022-08-09 VITALS
OXYGEN SATURATION: 96 % | WEIGHT: 210.1 LBS | SYSTOLIC BLOOD PRESSURE: 111 MMHG | DIASTOLIC BLOOD PRESSURE: 72 MMHG | RESPIRATION RATE: 16 BRPM | HEIGHT: 62 IN | HEART RATE: 110 BPM | TEMPERATURE: 98 F

## 2022-08-09 DIAGNOSIS — R11.0 NAUSEA: ICD-10-CM

## 2022-08-09 DIAGNOSIS — R62.50 UNSPECIFIED LACK OF EXPECTED NORMAL PHYSIOLOGICAL DEVELOPMENT IN CHILDHOOD: ICD-10-CM

## 2022-08-09 DIAGNOSIS — Z91.018 ALLERGY TO OTHER FOODS: ICD-10-CM

## 2022-08-09 DIAGNOSIS — F25.9 SCHIZOAFFECTIVE DISORDER, UNSPECIFIED: ICD-10-CM

## 2022-08-09 DIAGNOSIS — Z91.013 ALLERGY TO SEAFOOD: ICD-10-CM

## 2022-08-09 DIAGNOSIS — E11.9 TYPE 2 DIABETES MELLITUS WITHOUT COMPLICATIONS: ICD-10-CM

## 2022-08-09 DIAGNOSIS — F79 UNSPECIFIED INTELLECTUAL DISABILITIES: ICD-10-CM

## 2022-08-09 DIAGNOSIS — Z79.84 LONG TERM (CURRENT) USE OF ORAL HYPOGLYCEMIC DRUGS: ICD-10-CM

## 2022-08-09 DIAGNOSIS — F63.9 IMPULSE DISORDER, UNSPECIFIED: ICD-10-CM

## 2022-08-09 DIAGNOSIS — F31.9 BIPOLAR DISORDER, UNSPECIFIED: ICD-10-CM

## 2022-08-09 DIAGNOSIS — I10 ESSENTIAL (PRIMARY) HYPERTENSION: ICD-10-CM

## 2022-08-09 PROCEDURE — 99284 EMERGENCY DEPT VISIT MOD MDM: CPT
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
## 2022-08-09 NOTE — ED PROVIDER NOTE - NSDCPRINTRESULTS_ED_ALL_ED
Patient requests all Lab, Cardiology, and Radiology Results on their Discharge Instructions Attending Attestation (For Attendings USE Only)...

## 2022-08-09 NOTE — ED PROVIDER NOTE - CLINICAL SUMMARY MEDICAL DECISION MAKING FREE TEXT BOX
30yo female presenting with nausea.  NAD here, exam unremarkable.  Patient asking to eat, does not feel nauseous at this time.  Here frequently for similar complaint.  Do not suspect acute process currently.  Will po challenge and reassess.

## 2022-08-09 NOTE — ED PROVIDER NOTE - PHYSICAL EXAMINATION
General appearance: NAD, conversant, afebrile    Eyes: anicteric sclerae, LEXX, EOMI   HENT: Atraumatic; oropharynx clear, MMM and no ulcerations, no pharyngeal erythema or exudate   Neck: Trachea midline; Full range of motion, supple   Pulm: CTA bl, normal respiratory effort and no intercostal retractions, normal work of breathing   CV: RRR, No murmurs, rubs, or gallops   Abdomen: Soft, non-tender, non-distended; no guarding or rebound   Extremities: No peripheral edema, no gross deformities, FROM x4   Skin: Dry, normal temperature, turgor and texture; no rash   Psych: Appropriate affect, cooperative

## 2022-08-09 NOTE — ED PROVIDER NOTE - OBJECTIVE STATEMENT
32yo female with pmh dm, htn, developmental delay, impulse control disorder, presenting with nausea.  Patient states she ate a hamburger 2 days ago and has had nausea since.  Requesting food, ate breakfast this morning and tolerated without vomiting.  States she wants to be upstairs because there's hot food up there.  Denies abdominal pain, urinary symptoms, change in bowel habits, fevers, cough, sore throat.

## 2022-08-09 NOTE — ED PROVIDER NOTE - PATIENT PORTAL LINK FT
You can access the FollowMyHealth Patient Portal offered by [facility] by registering at the following website: [email or web address]. By joining [employer]’s FollowMyHealth portal, you will also be able to view your health information using other applications (apps) compatible with our system.

## 2022-08-09 NOTE — ED ADULT NURSE REASSESSMENT NOTE - NS ED NURSE REASSESS COMMENT FT1
patient ate food from hospital tray ,tolerated food, denies nausea, no vomiting or diarrhea noted. Pending d/c.

## 2022-08-15 ENCOUNTER — TRANSCRIPTION ENCOUNTER (OUTPATIENT)
Age: 32
End: 2022-08-15

## 2022-09-02 ENCOUNTER — EMERGENCY (EMERGENCY)
Facility: HOSPITAL | Age: 32
LOS: 0 days | Discharge: HOME | End: 2022-09-02

## 2022-09-02 VITALS
HEART RATE: 99 BPM | TEMPERATURE: 98 F | OXYGEN SATURATION: 100 % | RESPIRATION RATE: 20 BRPM | SYSTOLIC BLOOD PRESSURE: 93 MMHG | WEIGHT: 277.78 LBS | DIASTOLIC BLOOD PRESSURE: 58 MMHG | HEIGHT: 62 IN

## 2022-09-02 DIAGNOSIS — F25.9 SCHIZOAFFECTIVE DISORDER, UNSPECIFIED: ICD-10-CM

## 2022-09-02 DIAGNOSIS — E11.9 TYPE 2 DIABETES MELLITUS WITHOUT COMPLICATIONS: ICD-10-CM
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
## 2022-09-02 DIAGNOSIS — F43.10 POST-TRAUMATIC STRESS DISORDER, UNSPECIFIED: ICD-10-CM
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
## 2022-09-02 DIAGNOSIS — Z91.013 ALLERGY TO SEAFOOD: ICD-10-CM

## 2022-09-02 DIAGNOSIS — T74.11XA ADULT PHYSICAL ABUSE, CONFIRMED, INITIAL ENCOUNTER: ICD-10-CM

## 2022-09-02 DIAGNOSIS — Z79.84 LONG TERM (CURRENT) USE OF ORAL HYPOGLYCEMIC DRUGS: ICD-10-CM

## 2022-09-02 DIAGNOSIS — F31.9 BIPOLAR DISORDER, UNSPECIFIED: ICD-10-CM

## 2022-09-02 DIAGNOSIS — Z91.018 ALLERGY TO OTHER FOODS: ICD-10-CM

## 2022-09-02 LAB — HCG UR QL: NEGATIVE — SIGNIFICANT CHANGE UP

## 2022-09-02 PROCEDURE — 70450 CT HEAD/BRAIN W/O DYE: CPT | Mod: 26,MA

## 2022-09-02 PROCEDURE — 72125 CT NECK SPINE W/O DYE: CPT | Mod: 26,MA

## 2022-09-02 PROCEDURE — 70486 CT MAXILLOFACIAL W/O DYE: CPT | Mod: 26,MA

## 2022-09-02 PROCEDURE — 99285 EMERGENCY DEPT VISIT HI MDM: CPT

## 2022-09-02 NOTE — ED ADULT NURSE REASSESSMENT NOTE - NS ED NURSE REASSESS COMMENT FT1
Patient requesting to return to group home, transportation called. Group home representative with patient. MD aware.

## 2022-09-02 NOTE — ED PROVIDER NOTE - PATIENT PORTAL LINK FT
You can access the FollowMyHealth Patient Portal offered by [facility] by registering at the following website: [email or web address]. By joining [employer]’s FollowMyHealth portal, you will also be able to view your health information using other applications (apps) compatible with our system.
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
## 2022-09-09 NOTE — ED ADULT NURSE NOTE - TEMPLATE LIST FOR HEAD TO TOE ASSESSMENT
Psych/Behavioral Tazorac Counseling:  Patient advised that medication is irritating and drying.  Patient may need to apply sparingly and wash off after an hour before eventually leaving it on overnight.  The patient verbalized understanding of the proper use and possible adverse effects of tazorac.  All of the patient's questions and concerns were addressed.

## 2022-09-16 NOTE — ED PROVIDER NOTE - PROGRESS NOTE DETAILS
not sure
After receiving meds, the pt has remained calm and cooperative. There was no occurrence of additional agitation/aggressive behavior.  Pt did not verbalize any passive/ active SI/HI. There were no signs/symptoms of acute domenico or florid psychosis.  Pt did not exhibit any signs/ symptoms  of intoxication or withdrawal.  Pt is not delirious.  Pt did not test limits and was able to maintain appropriate boundaries.  Pt was easily redirected.  Overall, there was no further management issues whilst Pt was at the  ED.  Will discharge patient via EMS.  ROCIO keys performed and facility had no concerns, states behavior is baseline for patient and is accepting of patient return.

## 2022-10-24 NOTE — ED ADULT NURSE NOTE - SUICIDE SCREENING QUESTION 2
Mirvaso Pregnancy And Lactation Text: This medication has not been assigned a Pregnancy Risk Category. It is unknown if the medication is excreted in breast milk. No

## 2022-10-25 NOTE — ED ADULT NURSE NOTE - ISOLATION TYPE:
Spoke with patient who called to update symptoms since using nifedipine cream. Patient had some irritation with the cream and has since returned to previous compounded cream trying to decreases use. Patient will call if symptoms worsen or persist.    None

## 2022-11-06 ENCOUNTER — EMERGENCY (EMERGENCY)
Facility: HOSPITAL | Age: 32
LOS: 0 days | Discharge: HOME | End: 2022-11-07
Attending: EMERGENCY MEDICINE

## 2022-11-06 VITALS
SYSTOLIC BLOOD PRESSURE: 108 MMHG | OXYGEN SATURATION: 98 % | DIASTOLIC BLOOD PRESSURE: 72 MMHG | WEIGHT: 279.99 LBS | TEMPERATURE: 98 F | HEART RATE: 100 BPM | HEIGHT: 62 IN | RESPIRATION RATE: 100 BRPM

## 2022-11-06 DIAGNOSIS — Z91.013 ALLERGY TO SEAFOOD: ICD-10-CM

## 2022-11-06 DIAGNOSIS — Z79.84 LONG TERM (CURRENT) USE OF ORAL HYPOGLYCEMIC DRUGS: ICD-10-CM

## 2022-11-06 DIAGNOSIS — E11.9 TYPE 2 DIABETES MELLITUS WITHOUT COMPLICATIONS: ICD-10-CM

## 2022-11-06 DIAGNOSIS — Z91.018 ALLERGY TO OTHER FOODS: ICD-10-CM

## 2022-11-06 DIAGNOSIS — F43.10 POST-TRAUMATIC STRESS DISORDER, UNSPECIFIED: ICD-10-CM

## 2022-11-06 DIAGNOSIS — F31.81 BIPOLAR II DISORDER: ICD-10-CM

## 2022-11-06 DIAGNOSIS — F79 UNSPECIFIED INTELLECTUAL DISABILITIES: ICD-10-CM

## 2022-11-06 DIAGNOSIS — F25.9 SCHIZOAFFECTIVE DISORDER, UNSPECIFIED: ICD-10-CM

## 2022-11-06 DIAGNOSIS — R10.2 PELVIC AND PERINEAL PAIN: ICD-10-CM

## 2022-11-06 DIAGNOSIS — F91.8 OTHER CONDUCT DISORDERS: ICD-10-CM

## 2022-11-06 DIAGNOSIS — R62.50 UNSPECIFIED LACK OF EXPECTED NORMAL PHYSIOLOGICAL DEVELOPMENT IN CHILDHOOD: ICD-10-CM

## 2022-11-06 PROCEDURE — 99284 EMERGENCY DEPT VISIT MOD MDM: CPT

## 2022-11-06 RX ORDER — ACETAMINOPHEN 500 MG
975 TABLET ORAL ONCE
Refills: 0 | Status: COMPLETED | OUTPATIENT
Start: 2022-11-06 | End: 2022-11-06

## 2022-11-06 NOTE — ED PROVIDER NOTE - MUSCULOSKELETAL, MLM
Spine appears normal, range of motion is not limited, no muscle or joint tenderness. No external signs of trauma

## 2022-11-06 NOTE — ED PROVIDER NOTE - PATIENT PORTAL LINK FT
You can access the FollowMyHealth Patient Portal offered by [facility] by registering at the following website: [email or web address]. By joining [employer]’s FollowMyHealth portal, you will also be able to view your health information using other applications (apps) compatible with our system.
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
## 2022-11-06 NOTE — ED PROVIDER NOTE - CLINICAL SUMMARY MEDICAL DECISION MAKING FREE TEXT BOX
Pt from group home otherwise well appearing, no issues at this time. Will dc back to group [place].
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
## 2022-11-06 NOTE — ED PROVIDER NOTE - GASTROINTESTINAL [+], MLM
Please call the patient about normal results. Also let her know her blood type. Make sure prenatal has Iron pelvic cramps

## 2022-11-07 ENCOUNTER — EMERGENCY (EMERGENCY)
Facility: HOSPITAL | Age: 32
LOS: 0 days | Discharge: HOME | End: 2022-11-07

## 2022-11-07 VITALS
SYSTOLIC BLOOD PRESSURE: 110 MMHG | HEART RATE: 82 BPM | TEMPERATURE: 98 F | DIASTOLIC BLOOD PRESSURE: 76 MMHG | RESPIRATION RATE: 18 BRPM | OXYGEN SATURATION: 98 %

## 2022-11-07 VITALS
TEMPERATURE: 99 F | DIASTOLIC BLOOD PRESSURE: 80 MMHG | HEIGHT: 62 IN | OXYGEN SATURATION: 98 % | RESPIRATION RATE: 18 BRPM | HEART RATE: 100 BPM | SYSTOLIC BLOOD PRESSURE: 117 MMHG
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
## 2022-11-07 DIAGNOSIS — Z79.84 LONG TERM (CURRENT) USE OF ORAL HYPOGLYCEMIC DRUGS: ICD-10-CM

## 2022-11-07 DIAGNOSIS — F43.10 POST-TRAUMATIC STRESS DISORDER, UNSPECIFIED: ICD-10-CM

## 2022-11-07 DIAGNOSIS — R51.9 HEADACHE, UNSPECIFIED: ICD-10-CM

## 2022-11-07 DIAGNOSIS — Z91.018 ALLERGY TO OTHER FOODS: ICD-10-CM

## 2022-11-07 DIAGNOSIS — F20.9 SCHIZOPHRENIA, UNSPECIFIED: ICD-10-CM
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
## 2022-11-07 DIAGNOSIS — Z91.013 ALLERGY TO SEAFOOD: ICD-10-CM

## 2022-11-07 PROCEDURE — 99284 EMERGENCY DEPT VISIT MOD MDM: CPT
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
## 2022-11-07 NOTE — ED PROVIDER NOTE - PHYSICAL EXAMINATION
PHYSICAL EXAM:    GENERAL: Alert, appears stated age, well appearing, non-toxic  SKIN: Warm, pink and dry. MMM.   HEAD: NC, AT, no step offs   EYE: Normal lids/conjunctiva, PERRL, EOMI  ENT: Normal hearing, patent oropharynx  NECK: +supple. No meningismus, or JVD, +Trachea midline. no tenderness/step offs.   Pulm: Bilateral BS, normal resp effort, no wheezes, stridor, or retractions  CV: RRR, no M/R/G, 2+and = radial pulses  Abd: soft, non-tender, non-distended  Mskel: no erythema, cyanosis, edema. no calf tenderness  Neuro: AAOx3, no sensory/motor deficits. No speech slurring, pronator drift, facial asymmetry. normal finger-to-nose b/l. 5/5 strength throughout. normal gait.

## 2022-11-07 NOTE — ED PROVIDER NOTE - PATIENT PORTAL LINK FT
You can access the FollowMyHealth Patient Portal offered by [facility] by registering at the following website: [email or web address]. By joining [employer]’s FollowMyHealth portal, you will also be able to view your health information using other applications (apps) compatible with our system.

## 2022-11-07 NOTE — ED PROVIDER NOTE - NSFOLLOWUPINSTRUCTIONS_ED_ALL_ED_FT
Sexual Assault      Sexual assault is any unwanted sexual activity that occurs without clear permission (consent) from both people. If a person does not have the mental ability to give consent, consent cannot happen. No one has the right to have sexual contact with another person without the person's consent. Forms of sexual assault include:  •Unwanted touching.      •Penetration. This may include vaginal, oral, or anal penetration.      •Incest.      •Human sex trafficking.      •Sexual harassment.      •Any form of sexual activity that occurs when a person is unable to give consent.      Sexual assault can happen to a person of any age, gender, or race. It can:  •Be committed by a stranger or by someone you know.      •Include force, threats, or pressure to be involved in sexual activity that you do not want.        What are the causes?    The cause of a sexual assault is the person (perpetrator) of the violence. It is never the fault of the person who is assaulted.      What increases the risk?    The following factors make someone more likely to commit an assault:  •Substance abuse.      •Lack of concern for others.      •Aggressive behavior.      •Preference for impersonal sex.      •Hostility toward women, if a woman is assaulted.      •Hypermasculinity, if a male is the perpetrator.      •Having a history of sexual violence.        What are the signs or symptoms?    Symptoms of this condition include:  •Physical injuries in the genital area or other areas of the body.      •STIs (sexually transmitted infections).      •Unwanted pregnancy.    •Emotional or psychological problems. These may include:  •Anxiety, depression, or post-traumatic stress disorder (PTSD).      •Shock and disbelief.      •Irritability and edginess.      •Feeling overwhelmed.      •Feeling angry and having thoughts of revenge.      •Guilt or shame.        Other symptoms may include long-term health conditions, such as:  •Headaches.      •Chronic pain.      •Insomnia.      •Irritable bowel syndrome.      •Substance use disorder.        How is this diagnosed?     If you are sexually assaulted, get medical care as soon as possible. Your health care provider may perform a physical exam or test for infections. Testing for pregnancy may be done, if it applies.    It is important to know your options for the sexual assault exam. You can accept or decline any part of the exam. Your health care provider can answer any questions that you have before, during, or after the exam.    During your physical exam, your health care provider may:  •Ask you questions about what happened during the sexual assault.      •Check your body for injuries or areas of pain.      •Collect samples to test for STIs.    •Collect samples from your body for evidence, if you choose to have this done. These samples may include:  •Swabs.      •Clothing.      •Blood.      •Urine.      •Hair.      •Material or debris that is found on or in your body.      •Take photographs for documentation, if you might take legal action at a later time.  •Photographs will not be taken unless you give your consent.      •If photographs are taken, they will be kept safe, along with other samples that you may choose to have collected for evidence.        Decide whether you want to have evidence collected from your body. If you choose to have evidence collected, it is best to have it done as soon as possible.  •This evidence may be used if you choose to take legal action (press charges) at a later time.      •You may be able to ask for the evidence to be held by local authorities until you decide about taking legal action.        How is this treated?    In addition to performing a physical exam, your health care provider may:  •Offer you emergency birth control (contraception) if you are at risk for pregnancy.      •Prescribe medicines to treat or prevent STIs. You may need to have additional evaluation and testing for STIs over a period of 3–6 months after the assault.      •Give you immunizations, such as the hepatitis B and HPV vaccines. You may need to get multiple doses of immunizations over a period of several months.        Follow these instructions at home:    Talking to others     •Consider having counseling after a sexual assault. Your health care provider or a sexual assault advocate may be able to recommend a counselor.    •Consider working with a sexual assault advocate. This person may be able to provide:  •Information about crime victim assistance.      •Information on filing Orders for Protection and Harassment Restraining Orders.      •Emotional support.        General instructions     •Take over-the-counter and prescription medicines only as told by your health care provider.      •Use a condom with your sexual partner, if this applies, until all of your STI tests are negative. You may need to use a condom for 3–6 months after the sexual assault.      •Get immunizations as needed.      •Keep all follow-up visits. This is important.        Where to find more information    •National Sexual Assault Hotline: 1-513.869.2464 (HOPE) or hotline.Rewalon.2C2P      •The National Domestic Violence Hotline: 1-621.562.8728 (SAFE) or OpenLabel      •Office on Women's Health: womenshealth.gov        Contact a health care provider if:  •You have signs of infection, such as:  •Discharge from your penis or vagina.      •A bad smell coming from your vagina.      •Burning when you urinate.      •A feeling of pressure when you urinate.      •Sores or blisters on your genital area.      •Swelling in your neck (lymph nodes).        •You feel pain during sex.      •You feel pain in your abdomen.    •You have symptoms of anxiety, depression, or PTSD. Symptoms may include:  •Trouble sleeping.      •Irritability.      •Having unwanted distressing memories while awake.      •Physical reactions triggered by reminders of the trauma, such as increased heart rate, shortness of breath, sweating, and shaking.      •Having flashbacks, or feeling like you are going through the event again.      •Decreased interest or participation in daily activities.      •Loss of connection or avoiding other people.          Get help right away if:    •You are sexually assaulted.      •You have thoughts of harming yourself or others.      If you ever feel like you may hurt yourself or others, or have thoughts about taking your own life, get help right away. Go to your nearest emergency department or:    • Call your local emergency services (033 in the [place]       • Call a suicide crisis helpline, such as the National Suicide Prevention Lifeline at 1-838.432.5658 or 230 in the [place] This is open 24 hours a day in the [place]       • Text the Crisis Text Line at 380550 (in the [place]         Summary    •Sexual assault is any unwanted sexual activity that occurs without clear permission (consent) from both people.      •Sexual assault is never the fault of the person who is assaulted. No one has the right to have sexual contact with another person without the person's consent.      •It is important to get medical care as soon as possible after a sexual assault.      •It is important to know your options for the sexual assault exam. You can accept or decline any part of the exam.      •A sexual assault advocate can provide you with information about crime victim assistance and offer emotional support.      This information is not intended to replace advice given to you by your health care provider. Make sure you discuss any questions you have with your health care provider.
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
## 2022-11-07 NOTE — ED PROVIDER NOTE - NS ED ROS FT
Review of Systems    Constitutional: (-) fever   Eyes/ENT: (-) vision changes  Cardiovascular: (-) chest pain, (-) syncope (-) palpitations  Respiratory: (-) cough, (-) shortness of breath  Gastrointestinal: (-) vomiting, (-) diarrhea (-)black/bloody stools (-) abdominal pain  Genitourinary:  (-) dysuria   Musculoskeletal: (-) neck pain, (-) back pain, (-) leg pain/swelling  Integumentary: (-) rash, (-) edema  Neurological: (-) confusion  Hematologic: (-) easy bruising

## 2022-11-10 NOTE — ED ADULT TRIAGE NOTE - HEIGHT IN FEET
"   LOS: 3 days    Patient Care Team:  Quinn Cabrera MD as PCP - General (Internal Medicine)  Quinn Cabrera MD as PCP - Internal Medicine (Internal Medicine)  Xiomara Díaz MD as Consulting Physician (Obstetrics and Gynecology)    Chief Complaint:    Chief Complaint   Patient presents with   • Altered Mental Status     Subjective     Interval History:     Patient Complaints: She is calmer today although quite confused.  Not hallucinating at present.  Clearly much better this morning and she has been for most of this hospitalization.  Part of that is because she is calm but she remains very confused.    Objective     Vital Signs  Temp:  [98.1 °F (36.7 °C)-99.3 °F (37.4 °C)] 98.1 °F (36.7 °C)  Heart Rate:  [71-75] 75  Resp:  [12-16] 16  BP: (132-161)/(73-84) 138/76    Flowsheet Rows    Flowsheet Row First Filed Value   Admission Height 149.9 cm (59\") Documented at 11/03/2022 2121   Admission Weight 52 kg (114 lb 10.2 oz) Documented at 11/03/2022 2121          No intake/output data recorded.  I/O last 3 completed shifts:  In: -   Out: 600 [Urine:600]    Intake/Output Summary (Last 24 hours) at 11/10/2022 0914  Last data filed at 11/9/2022 1700  Gross per 24 hour   Intake --   Output 600 ml   Net -600 ml       Physical Exam:   General Appearance:    Alert, and awake but very confused, in no acute distress, out of restraints       Eyes:            Conjunctivae and sclerae normal, no   icterus, PERRLA   Neck:          Lungs:     Clear to auscultation,respirations regular, even and                  unlabored    Heart:    Regular rhythm and normal rate, normal S1 and S2, no            murmur, no gallop, no rub, no click       Abdomen:     Normal bowel sounds, no masses, no organomegaly, soft        non-tender, non-distended, no guarding, no rebound                tenderness       Extremities:    Pulses:        Lymph nodes:          Results Review:    I reviewed the patient's new clinical results.  Results from last " 7 days   Lab Units 11/07/22 0531 11/06/22 0724 11/03/22  1700   SODIUM mmol/L 143 141 140   POTASSIUM mmol/L 3.5 3.5 4.1   CHLORIDE mmol/L 106 106 103   CO2 mmol/L 20.6* 19.0* 23.4   BUN mg/dL 28* 29* 20   CREATININE mg/dL 0.96 0.96 0.91   CALCIUM mg/dL 9.7 9.6 10.7*   BILIRUBIN mg/dL 1.2 0.7 1.2   ALK PHOS U/L 87 89 108   ALT (SGPT) U/L 19 18 13   AST (SGOT) U/L 35* 36* 23   GLUCOSE mg/dL 124* 107* 102*       Estimated Creatinine Clearance: 36.2 mL/min (by C-G formula based on SCr of 0.96 mg/dL).    Results from last 7 days   Lab Units 11/06/22 0724 11/03/22  1700   MAGNESIUM mg/dL 2.0 1.9             Results from last 7 days   Lab Units 11/07/22 0531 11/06/22 0724 11/03/22  1700   WBC 10*3/mm3 9.37 9.85 5.29   HEMOGLOBIN g/dL 13.6 13.6 13.5   PLATELETS 10*3/mm3 286 294 308               Imaging Results (Last 24 Hours)     ** No results found for the last 24 hours. **        aspirin, 81 mg, Oral, Daily  donepezil, 5 mg, Oral, Nightly  haloperidol, 2 mg, Oral, Nightly  metoprolol tartrate, 25 mg, Oral, Q12H  potassium chloride, 20 mEq, Oral, Daily  rosuvastatin, 20 mg, Oral, Nightly  sodium chloride, 10 mL, Intravenous, Q12H  sodium chloride, 10 mL, Intravenous, Q12H  vitamin B-12, 1,000 mcg, Oral, Daily           Medication Review:   Current Facility-Administered Medications   Medication Dose Route Frequency Provider Last Rate Last Admin   • acetaminophen (TYLENOL) tablet 650 mg  650 mg Oral Q4H PRN Quinn Cabrera MD   650 mg at 11/09/22 0935   • aspirin chewable tablet 81 mg  81 mg Oral Daily Ching Abdul APRN   81 mg at 11/09/22 0950   • donepezil (ARICEPT) tablet 5 mg  5 mg Oral Nightly Arnulfo Bennett III, MD       • haloperidol (HALDOL) tablet 2 mg  2 mg Oral Nightly Arnulfo Bennett III, MD   2 mg at 11/09/22 2208   • melatonin tablet 5 mg  5 mg Oral Nightly Quinn Freire MD   5 mg at 11/06/22 2048   • metoprolol tartrate (LOPRESSOR) tablet 25 mg  25 mg Oral Q12H Deepak,  Caden LIMON MD   25 mg at 11/09/22 0935   • potassium chloride (K-DUR,KLOR-CON) ER tablet 20 mEq  20 mEq Oral Daily Quinn Cabrera MD   20 mEq at 11/09/22 0935   • rosuvastatin (CRESTOR) tablet 20 mg  20 mg Oral Nightly Quinn Cabrera MD   20 mg at 11/06/22 2048   • sodium chloride 0.9 % flush 10 mL  10 mL Intravenous PRN Davon Winston MD       • sodium chloride 0.9 % flush 10 mL  10 mL Intravenous Q12H Quinn Cabrera MD   10 mL at 11/08/22 0949   • sodium chloride 0.9 % flush 10 mL  10 mL Intravenous PRN Quinn Cabrera MD       • sodium chloride 0.9 % flush 10 mL  10 mL Intravenous Q12H Ching Abdul APRN       • sodium chloride 0.9 % flush 10 mL  10 mL Intravenous PRN Ching Abdul APRN       • vitamin B-12 (CYANOCOBALAMIN) tablet 1,000 mcg  1,000 mcg Oral Daily Ching Abdul APRN   1,000 mcg at 11/09/22 0939       Assessment & Plan       Subarachnoid hemorrhage (HCC)    Hypertension    Impaired glucose tolerance    Hyperlipidemia    Nonrheumatic aortic valve stenosis    Visual hallucination    Metabolic encephalopathy    Cerebral amyloid angiopathy (HCC)    Moderate dementia with mood disturbance    Hypotension    Dementia likely related to cerebral amyloid angiopathy.  Tiny subarachnoid hemorrhage related to this process.  MRI of brain that demonstrated several small cortical infarcts in different circulation beds raising the possibility of embolic source.  A TTE demonstrated mild aortic stenosis with a peak gradient of 33 mm and a mean gradient of 18 mm along with a EDUARDO of 0.9 cm.  No arrhythmias noted on the monitor.  Long-term anticoagulation would be problematic given the diagnosis of cerebral amyloid angiopathy as well as the current subarachnoid hemorrhage.  She has scheduled Haldol at bedtime.  She is going to require placement at discharge.  Has been out of restraints since yesterday.  Discussed with daughter and in no CODE STATUS will be placed which is appropriate.  Discussed the need  for placement, preferably a memory care unit.  48-hour Holter ordered to check for atrial arrhythmias.  We will have psychiatry help manage delirium.  Could be discharged at any time when her behavior is manageable.      PPE today included face mask..    Quinn Cabrera MD  11/10/22  09:14 EST   5

## 2022-11-26 NOTE — ED PROVIDER NOTE - NS ED MD DISPO DISCHARGE CCDA
CBI draining clear pink urine at this time, approx 5000ml of irrigation solution has been infused through arevalo, pt. tolerating well. Upon assessing pt. she had a dark, black, jelly like bowel movement, MD Macias informed and occult sample sent to lab. Pt. medicated for pain and comfort, awaiting admission to [facility]. CBI draining clear pink urine at this time, approx 2000ml of irrigation solution has been infused through arevalo, pt. tolerating well. Upon assessing pt. she had a dark, black, jelly like bowel movement, MD Macias informed and occult sample sent to lab. Pt. medicated for pain and comfort, awaiting admission to [facility]. Patient/Caregiver provided printed discharge information.

## 2022-12-04 ENCOUNTER — EMERGENCY (EMERGENCY)
Facility: HOSPITAL | Age: 32
LOS: 0 days | Discharge: HOME | End: 2022-12-04
Attending: EMERGENCY MEDICINE

## 2022-12-04 VITALS
SYSTOLIC BLOOD PRESSURE: 114 MMHG | OXYGEN SATURATION: 97 % | HEART RATE: 111 BPM | WEIGHT: 279.99 LBS | RESPIRATION RATE: 16 BRPM | DIASTOLIC BLOOD PRESSURE: 81 MMHG | TEMPERATURE: 98 F | HEIGHT: 62 IN

## 2022-12-04 VITALS
DIASTOLIC BLOOD PRESSURE: 74 MMHG | HEART RATE: 95 BPM | OXYGEN SATURATION: 98 % | SYSTOLIC BLOOD PRESSURE: 126 MMHG | TEMPERATURE: 98 F | RESPIRATION RATE: 18 BRPM

## 2022-12-04 DIAGNOSIS — R10.9 UNSPECIFIED ABDOMINAL PAIN: ICD-10-CM

## 2022-12-04 DIAGNOSIS — Z79.84 LONG TERM (CURRENT) USE OF ORAL HYPOGLYCEMIC DRUGS: ICD-10-CM

## 2022-12-04 DIAGNOSIS — F25.9 SCHIZOAFFECTIVE DISORDER, UNSPECIFIED: ICD-10-CM

## 2022-12-04 DIAGNOSIS — F79 UNSPECIFIED INTELLECTUAL DISABILITIES: ICD-10-CM

## 2022-12-04 DIAGNOSIS — Z91.013 ALLERGY TO SEAFOOD: ICD-10-CM

## 2022-12-04 DIAGNOSIS — R14.0 ABDOMINAL DISTENSION (GASEOUS): ICD-10-CM

## 2022-12-04 DIAGNOSIS — F31.9 BIPOLAR DISORDER, UNSPECIFIED: ICD-10-CM

## 2022-12-04 DIAGNOSIS — E11.9 TYPE 2 DIABETES MELLITUS WITHOUT COMPLICATIONS: ICD-10-CM

## 2022-12-04 DIAGNOSIS — Z91.018 ALLERGY TO OTHER FOODS: ICD-10-CM

## 2022-12-04 DIAGNOSIS — R62.50 UNSPECIFIED LACK OF EXPECTED NORMAL PHYSIOLOGICAL DEVELOPMENT IN CHILDHOOD: ICD-10-CM

## 2022-12-04 PROCEDURE — 99284 EMERGENCY DEPT VISIT MOD MDM: CPT

## 2022-12-04 NOTE — ED ADULT TRIAGE NOTE - CHIEF COMPLAINT QUOTE
BIBA from [facility] home complaining of abdominal pain today "I ate too much food today" . denies vomiting h/o schizo, bipolar

## 2022-12-04 NOTE — ED PROVIDER NOTE - CLINICAL SUMMARY MEDICAL DECISION MAKING FREE TEXT BOX
pt with abd discomfort - well appearing otherwise and no longer in any pain - will dc with PMD follow up.

## 2022-12-04 NOTE — ED PROVIDER NOTE - PATIENT PORTAL LINK FT
You can access the FollowMyHealth Patient Portal offered by [facility] by registering at the following website: [email or web address]. By joining [employer]’s FollowMyHealth portal, you will also be able to view your health information using other applications (apps) compatible with our system.

## 2022-12-04 NOTE — ED PROVIDER NOTE - OBJECTIVE STATEMENT
32 year old female with PMH of Schizoaffective disorder, DM II , bipolar disorder, developmental delay presenting to ED due to abdominal discomfort - states she had too much to eat and then came into ED for discomfort. Currently after being in ED for past 3-4 hours - feels no pain or discomfort any more.

## 2022-12-04 NOTE — ED ADULT NURSE NOTE - OBJECTIVE STATEMENT
PT from group home comes regularly to the hospital. Pt complaining of eating too much and being uncomfortable but now is sleeping comfortably.

## 2022-12-04 NOTE — ED ADULT NURSE NOTE - CHIEF COMPLAINT QUOTE
BIBA from [facility] home complaining of abdominal pain today "I ate too much food today" . denies vomiting h/o schizo, bipolar

## 2022-12-04 NOTE — ED PROVIDER NOTE - NSFOLLOWUPINSTRUCTIONS_ED_ALL_ED_FT
Medical Screening Exam      A medical screening exam (MSE) helps to determine whether you need immediate medical treatment relating to any number of symptoms you are having. This type of exam may be done in an emergency department, an urgent care setting, or your health care provider's office.    Depending on your symptoms and severity, you may need additional tests or medical therapy.    It is important to note that an MSE does not necessarily mean that you will need or receive further medical testing or interventions if your symptoms are not deemed to be medically urgent (emergent).      Tell a health care provider about:    •Any allergies you have.      •All medicines you are taking, including vitamins, herbs, eye drops, creams, and over-the-counter medicines.      •Any problems you or family members have had with anesthetic medicines.      •Any bleeding problems you have.      •Any surgeries you have had.      •Any medical conditions you have.      •Whether you are pregnant or may be pregnant.        What happens during the test?  A health care provider touching a person's throat during a medical exam.   During the exam, a health care provider does a short, often focused, physical exam and asks about your medical history to assess:  •Your current symptoms.      •Your overall health.      •Your need for possible further medical intervention.        What can I expect after the test?    If you have a regular health care provider, make an appointment for a follow-up visit with him or her. If you do not have a regular health care provider, ask about resources in your community.    Your medical screening exam may determine that:  •You do not need emergency treatment at this time.      •You need treatment right away.      •You need to be transferred to another medical center. This may happen if you need an emergent specialist or consultant that is not available at the medical center you are at.      •You need to have more tests.      A medical specialist may be consulted if needed.      Get help right away if:    •Your condition gets worse.      •You develop new or troubling symptoms before you see your health care provider.      These symptoms may represent a serious problem that is an emergency. Do not wait to see if the symptoms will go away. Get medical help right away. Call your local emergency services (911 in the [place] Do not drive yourself to the hospital.       Summary    •A medical screening exam helps to determine whether you need medical treatment right away. This type of exam may be done in an emergency department, an urgent care setting, or your health care provider's office.      •During the exam, a health care provider does a short physical exam and asks about your current symptoms and overall health.      •Depending on the exam, more tests or therapies may be ordered. However, an MSE does not necessarily mean that you will have further medical testing if your symptoms are not deemed to be urgent.      •If you need further care that is not offered at your current medical center, you may need to be transferred to another facility.      This information is not intended to replace advice given to you by your health care provider. Make sure you discuss any questions you have with your health care provider.

## 2022-12-16 NOTE — ED BEHAVIORAL HEALTH ASSESSMENT NOTE - SOURCE OF INFORMATION
Detail Level: Detailed Quality 226: Preventive Care And Screening: Tobacco Use: Screening And Cessation Intervention: Patient screened for tobacco use and is an ex/non-smoker Quality 130: Documentation Of Current Medications In The Medical Record: Current Medications Documented Quality 110: Preventive Care And Screening: Influenza Immunization: Influenza Immunization Administered during Influenza season Patient/Other

## 2022-12-28 ENCOUNTER — EMERGENCY (EMERGENCY)
Facility: HOSPITAL | Age: 32
LOS: 0 days | Discharge: HOME | End: 2022-12-28
Attending: STUDENT IN AN ORGANIZED HEALTH CARE EDUCATION/TRAINING PROGRAM
Payer: MEDICAID

## 2022-12-28 VITALS
OXYGEN SATURATION: 98 % | TEMPERATURE: 98 F | DIASTOLIC BLOOD PRESSURE: 90 MMHG | HEART RATE: 82 BPM | RESPIRATION RATE: 14 BRPM | SYSTOLIC BLOOD PRESSURE: 136 MMHG

## 2022-12-28 VITALS
SYSTOLIC BLOOD PRESSURE: 103 MMHG | HEIGHT: 62 IN | TEMPERATURE: 98 F | WEIGHT: 279.99 LBS | OXYGEN SATURATION: 95 % | RESPIRATION RATE: 16 BRPM | HEART RATE: 104 BPM | DIASTOLIC BLOOD PRESSURE: 69 MMHG

## 2022-12-28 DIAGNOSIS — E11.9 TYPE 2 DIABETES MELLITUS WITHOUT COMPLICATIONS: ICD-10-CM

## 2022-12-28 DIAGNOSIS — F43.10 POST-TRAUMATIC STRESS DISORDER, UNSPECIFIED: ICD-10-CM

## 2022-12-28 DIAGNOSIS — F31.9 BIPOLAR DISORDER, UNSPECIFIED: ICD-10-CM

## 2022-12-28 DIAGNOSIS — Z91.018 ALLERGY TO OTHER FOODS: ICD-10-CM

## 2022-12-28 DIAGNOSIS — F79 UNSPECIFIED INTELLECTUAL DISABILITIES: ICD-10-CM

## 2022-12-28 DIAGNOSIS — F25.9 SCHIZOAFFECTIVE DISORDER, UNSPECIFIED: ICD-10-CM

## 2022-12-28 DIAGNOSIS — R62.50 UNSPECIFIED LACK OF EXPECTED NORMAL PHYSIOLOGICAL DEVELOPMENT IN CHILDHOOD: ICD-10-CM

## 2022-12-28 DIAGNOSIS — K59.00 CONSTIPATION, UNSPECIFIED: ICD-10-CM

## 2022-12-28 DIAGNOSIS — Z79.84 LONG TERM (CURRENT) USE OF ORAL HYPOGLYCEMIC DRUGS: ICD-10-CM

## 2022-12-28 DIAGNOSIS — Z91.013 ALLERGY TO SEAFOOD: ICD-10-CM

## 2022-12-28 PROCEDURE — 99284 EMERGENCY DEPT VISIT MOD MDM: CPT

## 2022-12-28 RX ORDER — POLYETHYLENE GLYCOL 3350 17 G/17G
17 POWDER, FOR SOLUTION ORAL ONCE
Refills: 0 | Status: COMPLETED | OUTPATIENT
Start: 2022-12-28 | End: 2022-12-28

## 2022-12-28 RX ORDER — SENNA PLUS 8.6 MG/1
1 TABLET ORAL ONCE
Refills: 0 | Status: COMPLETED | OUTPATIENT
Start: 2022-12-28 | End: 2022-12-28

## 2022-12-28 RX ADMIN — POLYETHYLENE GLYCOL 3350 17 GRAM(S): 17 POWDER, FOR SOLUTION ORAL at 07:50

## 2022-12-28 RX ADMIN — SENNA PLUS 1 TABLET(S): 8.6 TABLET ORAL at 06:54

## 2022-12-28 NOTE — ED PROVIDER NOTE - NSFOLLOWUPINSTRUCTIONS_ED_ALL_ED_FT
Constipation    Constipation is when a person has fewer than three bowel movements a week, has difficulty having a bowel movement, or has stools that are dry, hard, or larger than normal. Other symptoms can include abdominal pain or bloating. As people grow older, constipation is more common. A low-fiber diet, not taking in enough fluids, and taking certain medicines, including opioid painkillers, may make constipation worse. Treatment varies but may include dietary modifications (more fiber-rich foods), lifestyle modifications, and possible medications.     SEEK IMMEDIATE MEDICAL CARE IF YOU HAVE ANY OF THE FOLLOWING SYMPTOMS: bright red blood in your stool, constipation for longer than 4 days, abdominal or rectal pain, unexplained weight loss, or inability to pass gas.     Take acetaminophen 650 mg orally every 6-8 hours for pain control as needed. Please do not exceed 4,000 mg of acetaminophen during a 24 hours period. Acetaminophen can be found in many over-the-counter cold medications as well as opioid medications that may be given for pain.    Take ibuprofen (also known as MOTRIN or ADVIL) 400 mg orally every 6-8 hours for pain control as needed with food to avoid an upset stomach. Ibuprofen can be found in many over-the-counter medications. Please do not take ibuprofen if you have a bleeding disorder, stomach or gastrointestinal ulcer, or liver disease.    If needed, you can alternate these medications so that you can take one medication every 3 hours. For example, at noon take ibuprofen, then at 3PM take acetaminophen, then at 6PM take ibuprofen.

## 2022-12-28 NOTE — ED ADULT NURSE REASSESSMENT NOTE - NS ED NURSE REASSESS COMMENT FT1
Received patient walking around ED, refused VS. Patient is discharged but still complains of constipation. Pending transportation to go back to group home.

## 2022-12-28 NOTE — ED PROVIDER NOTE - PATIENT PORTAL LINK FT
You can access the FollowMyHealth Patient Portal offered by [facility] by registering at the following website: [email or web address]. By joining [employer]’s FollowMyHealth portal, you will also be able to view your health information using other applications (apps) compatible with our system.

## 2022-12-28 NOTE — ED ADULT NURSE NOTE - NS ED NURSE DC INFO COMPLEXITY
Constitutional: (-) fever  Eyes/ENT: (-) blurry vision, (-) epistaxis  Cardiovascular: (-) chest pain, (-) syncope  Respiratory: (-) cough, (-) shortness of breath  Gastrointestinal: (-) vomiting, (-) diarrhea  : (-) dysuria, (-) hematuria  Musculoskeletal: (+) back pain, (-) neck pain, (-) joint pain  Integumentary: (-) rash, (-) edema  Neurological: (+) numbness, (-) headache, (-) altered mental status  Allergic/Immunologic: (-) pruritus
Simple: Patient demonstrates quick and easy understanding

## 2022-12-28 NOTE — ED PROVIDER NOTE - OBJECTIVE STATEMENT
32F PMH of schizoaffective disorder, bipolar type, DM, PTSD presenting with "constipation". LNBM was 1 week ago. Denies any surgical hx, chest pain, shortness of breath, abdominal pain, nausea/vomiting, diarrhea, headaches, neck pain, neck stiffness, falls, trauma, visual complaints. Patient ambulatory in the ED without complaints.

## 2022-12-28 NOTE — ED PROVIDER NOTE - CLINICAL SUMMARY MEDICAL DECISION MAKING FREE TEXT BOX
32F PMH of schizoaffective disorder, bipolar type, DM, PTSD presenting with constipation. However, abd soft nd nt, no surgical hx, doubt SBO. Will treat w/ miralax, senna.

## 2022-12-28 NOTE — ED ADULT NURSE NOTE - OBJECTIVE STATEMENT
AAOx3 c/o constipation and generalized abdominal pain x1 week. Pt reports last BM was last Friday. Pt is expressive of pain upon assessment. Bowel sounds present in all 4 quadrants. Pt denies nausea, vomiting, chills and fever. PMH: ASD, HTN, High cholesterol

## 2022-12-29 ENCOUNTER — EMERGENCY (EMERGENCY)
Facility: HOSPITAL | Age: 32
LOS: 0 days | Discharge: HOME | End: 2022-12-29
Attending: STUDENT IN AN ORGANIZED HEALTH CARE EDUCATION/TRAINING PROGRAM
Payer: MEDICAID

## 2022-12-29 VITALS
RESPIRATION RATE: 17 BRPM | HEIGHT: 62 IN | WEIGHT: 279.99 LBS | DIASTOLIC BLOOD PRESSURE: 80 MMHG | SYSTOLIC BLOOD PRESSURE: 122 MMHG | TEMPERATURE: 98 F | HEART RATE: 95 BPM | OXYGEN SATURATION: 100 %

## 2022-12-29 VITALS
DIASTOLIC BLOOD PRESSURE: 73 MMHG | TEMPERATURE: 98 F | RESPIRATION RATE: 18 BRPM | SYSTOLIC BLOOD PRESSURE: 115 MMHG | HEART RATE: 94 BPM | OXYGEN SATURATION: 100 %

## 2022-12-29 DIAGNOSIS — E11.9 TYPE 2 DIABETES MELLITUS WITHOUT COMPLICATIONS: ICD-10-CM

## 2022-12-29 DIAGNOSIS — F43.10 POST-TRAUMATIC STRESS DISORDER, UNSPECIFIED: ICD-10-CM

## 2022-12-29 DIAGNOSIS — F31.9 BIPOLAR DISORDER, UNSPECIFIED: ICD-10-CM

## 2022-12-29 DIAGNOSIS — K59.00 CONSTIPATION, UNSPECIFIED: ICD-10-CM

## 2022-12-29 DIAGNOSIS — Z91.013 ALLERGY TO SEAFOOD: ICD-10-CM

## 2022-12-29 DIAGNOSIS — F25.9 SCHIZOAFFECTIVE DISORDER, UNSPECIFIED: ICD-10-CM

## 2022-12-29 DIAGNOSIS — Z91.018 ALLERGY TO OTHER FOODS: ICD-10-CM

## 2022-12-29 DIAGNOSIS — Z79.84 LONG TERM (CURRENT) USE OF ORAL HYPOGLYCEMIC DRUGS: ICD-10-CM

## 2022-12-29 DIAGNOSIS — R62.50 UNSPECIFIED LACK OF EXPECTED NORMAL PHYSIOLOGICAL DEVELOPMENT IN CHILDHOOD: ICD-10-CM

## 2022-12-29 DIAGNOSIS — R10.9 UNSPECIFIED ABDOMINAL PAIN: ICD-10-CM

## 2022-12-29 PROCEDURE — 99284 EMERGENCY DEPT VISIT MOD MDM: CPT

## 2022-12-29 RX ORDER — SENNA PLUS 8.6 MG/1
1 TABLET ORAL ONCE
Refills: 0 | Status: DISCONTINUED | OUTPATIENT
Start: 2022-12-29 | End: 2022-12-29

## 2022-12-29 RX ORDER — MULTIVIT WITH MIN/MFOLATE/K2 340-15/3 G
1 POWDER (GRAM) ORAL ONCE
Refills: 0 | Status: DISCONTINUED | OUTPATIENT
Start: 2022-12-29 | End: 2022-12-29

## 2022-12-29 NOTE — ED PROVIDER NOTE - OBJECTIVE STATEMENT
32F PMH of schizoaffective disorder, bipolar type, DM, PTSD presenting with "constipation". Contrary to triage note, patient does not endore abdominal pain. Well known to the ED for malingering. Per EMS, patient had an argument with another resident and was picked up by EMS and brought to the ED. In the ED, patient requesting food and is sleeping without complaints. Denies any chest pain, abdominal pain, shortness of breath, nausea/vomiting, headaches, fevers, chills, diarrhea  weakness, syncope, hematuria, dysuria, urinary symptoms, subjective neurological deficits.

## 2022-12-29 NOTE — ED ADULT NURSE REASSESSMENT NOTE - NS ED NURSE REASSESS COMMENT FT1
0710 Pt received lying comfortable in bed, A&OX3 and denies pain. Pt awaiting transfer to group home. TONG Estrada RN

## 2022-12-29 NOTE — ED PROVIDER NOTE - PATIENT PORTAL LINK FT
You can access the FollowMyHealth Patient Portal offered by [facility] by registering at the following website: [email or web address]. By joining [employer]’s FollowMyHealth portal, you will also be able to view your health information using other applications (apps) compatible with our system.

## 2022-12-29 NOTE — ED PROVIDER NOTE - CLINICAL SUMMARY MEDICAL DECISION MAKING FREE TEXT BOX
32F PMH of schizoaffective disorder, bipolar type, DM, PTSD presenting with "constipation" however contrary to triage note, patient does not endorse abdominal pain.   - Well known to the ED for malingering.   - Per EMS, patient had an argument with another resident and was picked up by EMS and brought to the ED.   - In the ED, patient requesting food, tolerated oral intake, well appearing, and is sleeping without complaints.   - Denies any chest pain, abdominal pain, shortness of breath, nausea/vomiting, headaches, fevers, chills, diarrhea  weakness, syncope, hematuria, dysuria, urinary symptoms, subjective neurological deficits.  - Discharge home.

## 2022-12-29 NOTE — ED PROVIDER NOTE - CROS ED ROS STATEMENT
POA, , , alkaline phosphatase 72  Patient displayed no RUQ tenderness  Likely secondary to hepatic congestion secondary to CHF    Recent Labs     09/30/22  0446 10/01/22  0450 10/02/22  0525   AST 83* 52* 34   * 121* 95*   ALKPHOS 66 68 70   TBILI 0 77 0 83 0 79     · Trend CMP daily  · Consider RUQ US if patient clinically worsens or if LFTs do not improve after diuresis  all other ROS negative except as per HPI

## 2022-12-29 NOTE — ED ADULT NURSE NOTE - SUICIDE SCREENING QUESTION 2
Patricia Aviles MD FACS    Urology Progress Note    Subjective: Patient doing better, more alert and less agitated. Patient Vitals for the past 24 hrs:   BP Temp Temp src Pulse Resp SpO2 Weight   05/07/21 1142 115/70 97.6 °F (36.4 °C) Oral 68 18 95 % --   05/07/21 0715 130/67 97.6 °F (36.4 °C) Oral 63 18 93 % --   05/07/21 0635 -- -- -- -- -- -- 248 lb 1 oz (112.5 kg)   05/07/21 0343 108/64 98.3 °F (36.8 °C) Axillary 59 18 97 % --   05/06/21 2348 121/74 98 °F (36.7 °C) Axillary 59 16 96 % --   05/06/21 1930 115/70 97.1 °F (36.2 °C) Axillary 62 14 -- --   05/06/21 1632 127/72 97.9 °F (36.6 °C) Oral 66 20 95 % --       Intake/Output Summary (Last 24 hours) at 5/7/2021 1430  Last data filed at 5/7/2021 1313  Gross per 24 hour   Intake 4494 ml   Output 2560 ml   Net 1934 ml       Recent Labs     05/05/21  0530 05/06/21  0634 05/07/21  0529   WBC 8.2 9.6 9.3   HGB 11.2* 11.4* 10.7*   HCT 37.7* 38.0* 36.0*   MCV 90.6 90.7 91.8    263 217     Recent Labs     05/06/21  0634 05/06/21  1545 05/07/21  0529   * 152* 153*   K 4.2 4.3 4.0   * 116* 116*   CO2 18* 20 22   * 101* 99*   CREATININE 3.71* 3.39* 3.38*       No results for input(s): COLORU, PHUR, LABCAST, WBCUA, RBCUA, MUCUS, TRICHOMONAS, YEAST, BACTERIA, CLARITYU, SPECGRAV, LEUKOCYTESUR, UROBILINOGEN, BILIRUBINUR, BLOODU in the last 72 hours. Invalid input(s): NITRATE, GLUCOSEUKETONESUAMORPHOUS    Additional Lab/culture results:    Physical Exam: R and L percutaneous nephrostomy tube draining about the same.     Interval Imaging Findings:    Impression:    Patient Active Problem List   Diagnosis    Bladder cancer    DJD (degenerative joint disease)    Essential hypertension    Renal mass    Incisional hernia of anterior abdominal wall without obstruction or gangrene    Bilateral kidney stones    Right ureteral calculus    Partial small bowel obstruction (HCC)    Ventral hernia with bowel obstruction    GERD (gastroesophageal reflux disease)    KARMA (acute kidney injury) ([address]    Normochromic normocytic anemia    Iron deficiency anemia    Toxic metabolic encephalopathy    Other hydronephrosis    Vitamin B 12 deficiency    Folate deficiency    Acute respiratory failure with hypoxia (HCC)    Metabolic acidosis       Plan:   Continue bilateral percutaneous nephrostomy tubes for ileal conduit obstruction. Patient's creatinine continues to decrease with relief of obstruction. If patient's renal function and medical status continues to improve, may attempt endoscopic cutting of the suture obstructing the ileal conduit. Thank you for allowing me to participate in the care of this patient. Feel free to contact me at [phone] if you have any questions or concerns.      Janette Mata  2:30 PM 5/7/2021 No

## 2022-12-29 NOTE — ED ADULT NURSE NOTE - SUICIDE SCREENING DEPRESSION
Plan  1.  PT    2.  Can alternate heat and ice and continue to use BioFreeze gel    3.  Can alternate Tylenol and Advil every 4 hours - Tylenol every 8 hours and Advil or ibuprofen every 8 hours, can take up to 800 mg ibuprofen (four 200 mg tablets) every 8 hours    4.  She will let me know if she would like a referral to Podiatry for right foot pain.    5. Will also try some lidocaine ointment for her right trapezius.    6.  She will plan to follow-up with me after physical therapy.  If pain worsens or does not improve follow-up sooner and call with questions.    
Negative

## 2022-12-29 NOTE — ED ADULT NURSE NOTE - OBJECTIVE STATEMENT
patient from [facility] home, brought in by emt for constipation and told emt that she was 9 months pregnant. emt states patient got into an argument with another member and picked her up two blocks away from the facility (with a staff member by her side), patient laying on the bed, requesting for food
Home

## 2022-12-29 NOTE — ED PROVIDER NOTE - PHYSICAL EXAMINATION
VITAL SIGNS: I have reviewed nursing notes and confirm.   GEN: Well-developed; well-nourished; in no acute distress. Speaking full sentences. (+) sleeping in stretcher, tolerating oral intake.  SKIN: Warm, pink, no rash, no diaphoresis, no cyanosis, well perfused.   HEAD: Normocephalic; atraumatic. No scalp lacerations, no abrasions.  NECK: Supple; non tender.   EYES: Pupils 3mm equal, round, reactive to light and accomodation, conjunctiva and sclera clear. Extra-ocular movements intact bilaterally.  ENT: No nasal discharge; airway clear. Trachea is midline. ORAL: No oropharyngeal exudates or erythema. Normal dentition.  CV: Regular rate and rhythm. S1, S2 normal; no murmurs, gallops, or rubs. No lower extremity pitting edema bilaterally. Capillary refill < 2 seconds throughout. Distal pulses intact 2+ throughout.  RESP: CTA bilaterally. No wheezes, rales, or rhonchi.   ABD: Normal bowel sounds, soft, non-distended, non-tender, no rebound, no guarding, no rigidity, no hepatosplenomegaly. No CVA tenderness bilaterally.  MSK: Normal range of motion and movement of all 4 extremities. No joint or muscular pain throughout. No clubbing.   BACK: No thoracolumbar midline or paravertebral tenderness. No step-offs or obvious deformities.  NEURO: Alert & oriented x 3, Grossly unremarkable. Sensory and motor intact throughout. No focal deficits. Gait: Fluid. Normal speech and coordination.   PSYCH: Cooperative, appropriate.

## 2022-12-29 NOTE — ED ADULT NURSE REASSESSMENT NOTE - NS ED NURSE REASSESS COMMENT FT1
pt awake at this time. states she lives in [facility]. Attempted to call to give report to [facility] however was unable to reach anyone. charge ANM made aware. will attempt again at 6am.

## 2023-01-06 NOTE — ED PROVIDER NOTE - NSICDXPASTMEDICALHX_GEN_ALL_CORE_FT
[address] [place] [facility] [address] [place] [address] WALK IN CARE  [address] [place] [address] [address] [address]  Dept: [phone]    Shahrzad Castorena is a 34 PAST MEDICAL HISTORY:  Bipolar disorder     Development delay     DM (diabetes mellitus)     Intellectual disability     Schizoaffective disorder     Schizophrenia

## 2023-01-07 NOTE — ED ADULT NURSE NOTE - NS_BH TRG QUESTION4_ED_ALL_ED
EGD bx's were c/w gastritis.  Cont omeprazole daily    Inform patient colon polyps were benign including a precancerous adenoma.     Repeat colonoscopy in 3 years.    Update HM    Christina Mcqueen MD  1/7/2023  3:31 PM     No

## 2023-01-09 NOTE — BH PATIENT PROFILE - PRO INTERPRETER NEED 2
English Opzelura Pregnancy And Lactation Text: There is insufficient data to evaluate drug-associated risk for major birth defects, miscarriage, or other adverse maternal or fetal outcomes.  There is a pregnancy registry that monitors pregnancy outcomes in pregnant persons exposed to the medication during pregnancy.  It is unknown if this medication is excreted in breast milk.  Do not breastfeed during treatment and for about 4 weeks after the last dose.

## 2023-01-11 NOTE — ED ADULT TRIAGE NOTE - AS TEMP SITE
[Chaperone Present] : A chaperone was present in the examining room during all aspects of the physical examination [Appropriately responsive] : appropriately responsive [Alert] : alert [No Acute Distress] : no acute distress [No Lymphadenopathy] : no lymphadenopathy [Regular Rate Rhythm] : regular rate rhythm [No Murmurs] : no murmurs [Clear to Auscultation B/L] : clear to auscultation bilaterally [Soft] : soft [Non-tender] : non-tender oral [Non-distended] : non-distended [No HSM] : No HSM [No Lesions] : no lesions [No Mass] : no mass [Oriented x3] : oriented x3 [Examination Of The Breasts] : a normal appearance [No Masses] : no breast masses were palpable [Labia Majora] : normal [Labia Minora] : normal [Discharge] : discharge [Scant] : scant [Foul Smelling] : not foul smelling [White] : white [Thin] : thin [Erosion] : erosions [Normal] : normal [Normal Position] : in a normal position [Uterine Adnexae] : normal

## 2023-01-16 NOTE — ED ADULT NURSE NOTE - NSICDXPASTMEDICALHX_GEN_ALL_CORE_FT
PAST MEDICAL HISTORY:  Bipolar disorder     Development delay     DM (diabetes mellitus)     Intellectual disability     Schizoaffective disorder     Schizophrenia      Cyclophosphamide Pregnancy And Lactation Text: This medication is Pregnancy Category D and it isn't considered safe during pregnancy. This medication is excreted in breast milk.

## 2023-01-30 ENCOUNTER — NON-APPOINTMENT (OUTPATIENT)
Age: 33
End: 2023-01-30

## 2023-01-30 NOTE — ED ADULT NURSE NOTE - CINV DISCH TEACH PARTICIP
Patient is a 65y F PMHx DM, MG (on pyridostigmine, hospital admission every 1-2 years for IVIG), p/w SOB, orthopnea, Covid+ test at home. Patient  had Covid too. States she has been taking her medication as scheduled. Couldn't sleep last night because she had to sit upright to breath well. Has PRN prednisone at home, has taken once a day for the last 4 days with some improvement. Denies fevers, nvd. Patient

## 2023-02-24 NOTE — ED ADULT NURSE NOTE - PRIMARY CARE PROVIDER
If you are a smoker, it is important for your health to stop smoking. Please be aware that second hand smoke is also harmful. unknown

## 2023-02-28 NOTE — ED PROVIDER NOTE - PSH
No significant past surgical history     Retention Suture Text: Retention sutures were placed to support the closure and prevent dehiscence.

## 2023-03-07 NOTE — ED ADULT NURSE NOTE - CODE STROKE ACTIVE YN
Therapy                            Cancellation/No-show Note    Date: 2023  Patient: Yonis Rivas (30 y.o. male)  : 1952  MRN:  [id number]  Referring Physician: Jenise Barnett MD    Medical Diagnosis: No admission diagnoses are documented for this encounter. Visit Information:  Insurance: Payor: MEDICARE / Plan: MEDICARE PART A AND B / Product Type: *No Product type* /   Visits to Date: 3   No Show/Cancelled Appts: 0 /       For today's appointment patient:  [x]  Cancelled  []  Rescheduled appointment  []  No-show   []  Called pt to remind of next appointment     Reason given by patient:  []  Patient ill  []  Conflicting appointment  []  No transportation    []  Conflict with work  []  No reason given  [x]  Other:      [] Pt has future appointments scheduled, no follow up needed  [x] Pt requests to be on hold.     Reason:   If > 2 weeks please discuss with therapist.  [] Therapist to call pt for follow up     Comments:   Cancel all appts w/ request to be placed on hold; no reason provided     Signature: Electronically signed by Javier Hernandez PTA on 3/7/23 at 8:31 AM EST No

## 2023-03-09 NOTE — ED ADULT NURSE NOTE - NS ED NURSE DISCH DISPOSITION
Refill request for   finasteride (PROSCAR) 5 MG tablet         Sig: Take 1 tablet by mouth daily.    Disp:  90 tablet    Refills:  1    Start: 3/8/2023 - 3/7/2024    Class: Eprescribe    For: Benign non-nodular prostatic hyperplasia with lower urinary tract symptoms    Last ordered: 1 month ago by Rowena Andino MD     [facility] Alpha-Blocker Refill Protocol - 12 Month Protocol Passed 03/08/2023 12:52 PM   Protocol Details  Seen by prescribing provider or same department within the last 12 months or has a future appt in 3 months - IF FAILED PLEASE LOOK AT CHART REVIEW FOR LAST VISIT AND PROCEED ACCORDINGLY    Medication (including dose and sig) on current meds list      [facility] at: [facility] [id number]     Last ov 3/02/23  Rx escribed   Transferred Discharged

## 2023-03-21 NOTE — ED ADULT NURSE REASSESSMENT NOTE - NS ED NURSE REASSESS COMMENT FT1
Pt at baseline mental status, cheerful, calling staff her girlfriends and husbands. Transferred to group home via stretcher with [facility]. Home

## 2023-03-31 NOTE — ED PROVIDER NOTE - IV ALTEPLASE ADMIN OUTSIDE HIDDEN
I called patient and left a voicemail offering her a follow-up appointment with Dr. Ford on 4/6 @ 12:30 pm in [place].    show

## 2023-04-11 NOTE — ED PROVIDER NOTE - CHIEF COMPLAINT
The patient is a 31y Female complaining of see chief complaint quote. pt reports that he lives in an apartment in [place], +few steps to enter, +elevator access, PTA ind amb and ADLs. pt reports that he lives in an apartment in [place], +few steps to enter, +elevator access, PTA ind amb and ADLs. pt reports that he lives in an apartment in [place], +few steps to enter, +elevator access, PTA ind amb and ADLs.

## 2023-04-13 NOTE — ED PROVIDER NOTE - MDM PATIENT STATEMENT FOR ADDL TREATMENT
Called pt to schedule colonoscopy and EDG.  Pt requested appt before August 2023. Not available for 4/18/2023 schedule.  Sent message to Dr. Kaminski   Patient with one or more new problems requiring additional work-up/treatment.

## 2023-04-15 ENCOUNTER — EMERGENCY (EMERGENCY)
Facility: HOSPITAL | Age: 33
LOS: 0 days | Discharge: HOME | End: 2023-04-16
Attending: STUDENT IN AN ORGANIZED HEALTH CARE EDUCATION/TRAINING PROGRAM
Payer: MEDICAID

## 2023-04-15 VITALS
HEIGHT: 62 IN | WEIGHT: 272.05 LBS | OXYGEN SATURATION: 100 % | DIASTOLIC BLOOD PRESSURE: 79 MMHG | TEMPERATURE: 98 F | SYSTOLIC BLOOD PRESSURE: 108 MMHG | RESPIRATION RATE: 18 BRPM | HEART RATE: 93 BPM

## 2023-04-15 DIAGNOSIS — Z91.013 ALLERGY TO SEAFOOD: ICD-10-CM

## 2023-04-15 DIAGNOSIS — F79 UNSPECIFIED INTELLECTUAL DISABILITIES: ICD-10-CM

## 2023-04-15 DIAGNOSIS — R10.84 GENERALIZED ABDOMINAL PAIN: ICD-10-CM

## 2023-04-15 DIAGNOSIS — F84.9 PERVASIVE DEVELOPMENTAL DISORDER, UNSPECIFIED: ICD-10-CM

## 2023-04-15 DIAGNOSIS — F25.9 SCHIZOAFFECTIVE DISORDER, UNSPECIFIED: ICD-10-CM

## 2023-04-15 DIAGNOSIS — Z79.84 LONG TERM (CURRENT) USE OF ORAL HYPOGLYCEMIC DRUGS: ICD-10-CM

## 2023-04-15 DIAGNOSIS — E22.1 HYPERPROLACTINEMIA: ICD-10-CM

## 2023-04-15 DIAGNOSIS — Z91.018 ALLERGY TO OTHER FOODS: ICD-10-CM

## 2023-04-15 DIAGNOSIS — E11.9 TYPE 2 DIABETES MELLITUS WITHOUT COMPLICATIONS: ICD-10-CM

## 2023-04-15 DIAGNOSIS — F31.9 BIPOLAR DISORDER, UNSPECIFIED: ICD-10-CM

## 2023-04-15 DIAGNOSIS — Z00.8 ENCOUNTER FOR OTHER GENERAL EXAMINATION: ICD-10-CM

## 2023-04-15 DIAGNOSIS — F43.10 POST-TRAUMATIC STRESS DISORDER, UNSPECIFIED: ICD-10-CM

## 2023-04-15 PROCEDURE — 99285 EMERGENCY DEPT VISIT HI MDM: CPT

## 2023-04-15 NOTE — ED ADULT TRIAGE NOTE - CHIEF COMPLAINT QUOTE
PMH of DM, schizoaffective disorder, GLD, HTN  C/o " group home staff was yelling at me, I want to get away from group home and stay in the hospital for few days" "I am having chest pain, stomach pain, I am hearing voices that saying to kill myself". Speaks full complete sentences, no distress noted.  Constant observation started in triage. PMH of DM, schizoaffective disorder, HLD, HTN  C/o " group home staff was yelling at me, I want to get away from group home and stay in the hospital for few days" "I am having chest pain, stomach pain, I am hearing voices that saying to kill myself". Speaks full complete sentences, no distress noted.  Constant observation started in triage.

## 2023-04-16 VITALS
SYSTOLIC BLOOD PRESSURE: 118 MMHG | TEMPERATURE: 98 F | OXYGEN SATURATION: 100 % | RESPIRATION RATE: 18 BRPM | HEART RATE: 97 BPM | DIASTOLIC BLOOD PRESSURE: 65 MMHG

## 2023-04-16 LAB
ALBUMIN SERPL ELPH-MCNC: 2.8 G/DL — LOW (ref 3.3–5)
ALP SERPL-CCNC: 52 U/L — SIGNIFICANT CHANGE UP (ref 40–120)
ALT FLD-CCNC: 19 U/L — SIGNIFICANT CHANGE UP (ref 12–78)
ANION GAP SERPL CALC-SCNC: 5 MMOL/L — SIGNIFICANT CHANGE UP (ref 5–17)
APAP SERPL-MCNC: <2 UG/ML — LOW (ref 10–30)
APPEARANCE UR: CLEAR — SIGNIFICANT CHANGE UP
AST SERPL-CCNC: 13 U/L — LOW (ref 15–37)
BASOPHILS # BLD AUTO: 0.02 K/UL — SIGNIFICANT CHANGE UP (ref 0–0.2)
BASOPHILS NFR BLD AUTO: 0.3 % — SIGNIFICANT CHANGE UP (ref 0–2)
BILIRUB SERPL-MCNC: 0.1 MG/DL — LOW (ref 0.2–1.2)
BILIRUB UR-MCNC: NEGATIVE — SIGNIFICANT CHANGE UP
BUN SERPL-MCNC: 10 MG/DL — SIGNIFICANT CHANGE UP (ref 7–23)
CALCIUM SERPL-MCNC: 8.5 MG/DL — SIGNIFICANT CHANGE UP (ref 8.5–10.1)
CHLORIDE SERPL-SCNC: 109 MMOL/L — HIGH (ref 96–108)
CO2 SERPL-SCNC: 24 MMOL/L — SIGNIFICANT CHANGE UP (ref 22–31)
COLOR SPEC: YELLOW — SIGNIFICANT CHANGE UP
CREAT SERPL-MCNC: 0.69 MG/DL — SIGNIFICANT CHANGE UP (ref 0.5–1.3)
DIFF PNL FLD: NEGATIVE — SIGNIFICANT CHANGE UP
EGFR: 120 ML/MIN/1.73M2 — SIGNIFICANT CHANGE UP
EOSINOPHIL # BLD AUTO: 0.06 K/UL — SIGNIFICANT CHANGE UP (ref 0–0.5)
EOSINOPHIL NFR BLD AUTO: 0.9 % — SIGNIFICANT CHANGE UP (ref 0–6)
EPI CELLS # UR: ABNORMAL
ETHANOL SERPL-MCNC: <10 MG/DL — SIGNIFICANT CHANGE UP (ref 0–10)
GLUCOSE SERPL-MCNC: 105 MG/DL — HIGH (ref 70–99)
GLUCOSE UR QL: NEGATIVE MG/DL — SIGNIFICANT CHANGE UP
HCG SERPL-ACNC: <1 MIU/ML — SIGNIFICANT CHANGE UP
HCT VFR BLD CALC: 34.9 % — SIGNIFICANT CHANGE UP (ref 34.5–45)
HGB BLD-MCNC: 11.4 G/DL — LOW (ref 11.5–15.5)
IMM GRANULOCYTES NFR BLD AUTO: 0.3 % — SIGNIFICANT CHANGE UP (ref 0–0.9)
KETONES UR-MCNC: ABNORMAL
LEUKOCYTE ESTERASE UR-ACNC: NEGATIVE — SIGNIFICANT CHANGE UP
LIDOCAIN IGE QN: 90 U/L — SIGNIFICANT CHANGE UP (ref 73–393)
LYMPHOCYTES # BLD AUTO: 2.76 K/UL — SIGNIFICANT CHANGE UP (ref 1–3.3)
LYMPHOCYTES # BLD AUTO: 42.4 % — SIGNIFICANT CHANGE UP (ref 13–44)
MCHC RBC-ENTMCNC: 30.5 PG — SIGNIFICANT CHANGE UP (ref 27–34)
MCHC RBC-ENTMCNC: 32.7 G/DL — SIGNIFICANT CHANGE UP (ref 32–36)
MCV RBC AUTO: 93.3 FL — SIGNIFICANT CHANGE UP (ref 80–100)
MONOCYTES # BLD AUTO: 0.47 K/UL — SIGNIFICANT CHANGE UP (ref 0–0.9)
MONOCYTES NFR BLD AUTO: 7.2 % — SIGNIFICANT CHANGE UP (ref 2–14)
NEUTROPHILS # BLD AUTO: 3.18 K/UL — SIGNIFICANT CHANGE UP (ref 1.8–7.4)
NEUTROPHILS NFR BLD AUTO: 48.9 % — SIGNIFICANT CHANGE UP (ref 43–77)
NITRITE UR-MCNC: NEGATIVE — SIGNIFICANT CHANGE UP
NRBC # BLD: 0 /100 WBCS — SIGNIFICANT CHANGE UP (ref 0–0)
PH UR: 6 — SIGNIFICANT CHANGE UP (ref 5–8)
PLATELET # BLD AUTO: 250 K/UL — SIGNIFICANT CHANGE UP (ref 150–400)
POTASSIUM SERPL-MCNC: 3.9 MMOL/L — SIGNIFICANT CHANGE UP (ref 3.5–5.3)
POTASSIUM SERPL-SCNC: 3.9 MMOL/L — SIGNIFICANT CHANGE UP (ref 3.5–5.3)
PROT SERPL-MCNC: 6.4 GM/DL — SIGNIFICANT CHANGE UP (ref 6–8.3)
PROT UR-MCNC: 30 MG/DL
RBC # BLD: 3.74 M/UL — LOW (ref 3.8–5.2)
RBC # FLD: 14.3 % — SIGNIFICANT CHANGE UP (ref 10.3–14.5)
RBC CASTS # UR COMP ASSIST: SIGNIFICANT CHANGE UP /HPF (ref 0–4)
SALICYLATES SERPL-MCNC: <1.7 MG/DL — LOW (ref 2.8–20)
SODIUM SERPL-SCNC: 138 MMOL/L — SIGNIFICANT CHANGE UP (ref 135–145)
SP GR SPEC: 1.01 — SIGNIFICANT CHANGE UP (ref 1.01–1.02)
UROBILINOGEN FLD QL: NEGATIVE MG/DL — SIGNIFICANT CHANGE UP
WBC # BLD: 6.51 K/UL — SIGNIFICANT CHANGE UP (ref 3.8–10.5)
WBC # FLD AUTO: 6.51 K/UL — SIGNIFICANT CHANGE UP (ref 3.8–10.5)
WBC UR QL: SIGNIFICANT CHANGE UP

## 2023-04-16 PROCEDURE — 90792 PSYCH DIAG EVAL W/MED SRVCS: CPT | Mod: 1L,95

## 2023-04-16 PROCEDURE — 93010 ELECTROCARDIOGRAM REPORT: CPT

## 2023-04-16 RX ADMIN — Medication 30 MILLILITER(S): at 06:16

## 2023-04-16 NOTE — ED PROVIDER NOTE - EKG #1 DATE/TIME
Maranda 45 Transitions Follow Up Call    2019    Patient: Katie Avitia  Patient : 1942   MRN: [id number]  Loy Mulligan   2019  9:00 AM Pio Gibbs PT [facility] None   6/10/2019  9:00 AM Pio Gibbs PT [facility] None   2019  9:00 AM Pio Gibbs PT [facility] None   2019  9:00 AM Pio Gibbs PT [facility] None   2019  9:00 AM Pio Gibbs PT [facility] None   2019 11:30 AM SCHEDULE, [facility] ECHO ROOM 2 [facility] None   2019 10:15 AM Lou Jimenez MD [facility]       Kiera El RN
16-Apr-2023 00:37

## 2023-04-16 NOTE — ED BEHAVIORAL HEALTH ASSESSMENT NOTE - RISK ASSESSMENT
Patient's risk of harm is elevated by an underlying history of ID and SAD with risk factors including: recent aggression at group home in the context of behavioral issues. However, protective factors include: no current active SI/HI, no current NSSIB thoughts, no manic or psychotic symptoms, no substance use, no access to guns, patient is help-seeking.

## 2023-04-16 NOTE — ED PROVIDER NOTE - CLINICAL SUMMARY MEDICAL DECISION MAKING FREE TEXT BOX
Presenting with multiple complaints.  Denies physical complaints now.  States she had argument with staff and wants to stay in ED for a few days before going home.  Doubt acute medical or psychiatric process currently - suspect patient malingering to avoid returning to group home.  May require psych consult.  Plan for labs, meds, reassess.

## 2023-04-16 NOTE — ED ADULT NURSE NOTE - CHIEF COMPLAINT QUOTE
"Chief Complaint   Patient presents with     Back Pain     arthritis     BP (!) 142/70   Pulse 74   Temp 97.8  F (36.6  C) (Tympanic)   Resp 16   Wt 77.6 kg (171 lb)   SpO2 99%   BMI 28.02 kg/m   Estimated body mass index is 28.02 kg/m  as calculated from the following:    Height as of 6/15/22: 1.664 m (5' 5.5\").    Weight as of this encounter: 77.6 kg (171 lb).  Patient presents to the clinic using No DME      Health Maintenance that is potentially due pending provider review:    Health Maintenance Due   Topic Date Due     DEXA  12/29/2021     MEDICARE ANNUAL WELLNESS VISIT  05/05/2022        Pended.        " PMH of DM, schizoaffective disorder, HLD, HTN  C/o " group home staff was yelling at me, I want to get away from group home and stay in the hospital for few days" "I am having chest pain, stomach pain, I am hearing voices that saying to kill myself". Speaks full complete sentences, no distress noted.  Constant observation started in triage.

## 2023-04-16 NOTE — ED BEHAVIORAL HEALTH ASSESSMENT NOTE - PATIENT'S CHIEF COMPLAINT
POC and discharge instructions discussed with patient and family, no questions noted.   I want to stay in the ED for a few days

## 2023-04-16 NOTE — ED BEHAVIORAL HEALTH ASSESSMENT NOTE - DESCRIPTION
[facility]    ED Triage      ED Provider Assessment     Placed on 1:1     Routine labs for medical and psychiatric clearance     Psychiatry Consultation As per HPI as per HPI

## 2023-04-16 NOTE — ED ADULT NURSE REASSESSMENT NOTE - NS ED NURSE REASSESS COMMENT FT1
1:1 in progress, pt asleep. No acute distress noted. Assessment ongoing.
Spoke with general human outreach, Chris Albert to confirm that patient has been discharged and is expected back. SW arranging transportation back. Patient remains on EO.

## 2023-04-16 NOTE — ED PROVIDER NOTE - PROGRESS NOTE DETAILS
Attending Galilea: received sign out pending psych eval for dispo. medically cleared by overnight attending Attending Galilea: pt seen and cleared by tele psych for DC back to group home.

## 2023-04-16 NOTE — ED BEHAVIORAL HEALTH ASSESSMENT NOTE - CURRENT MEDICATION
Clozapine 100mg daily + 150mg QHS  Abilify 5mg daily  Depakote 750mg BID  Prozac 30mg daily  Senna 8.6 mg daily, Miralax 71 gm daily, Pantoprazole 40 mg daily, Melatonin 10 mg hs, Metformin 500 mg BID

## 2023-04-16 NOTE — ED PROVIDER NOTE - PATIENT PORTAL LINK FT
You can access the FollowMyHealth Patient Portal offered by [facility] by registering at the following website: [email or web address]. By joining [employer]’s FollowMyHealth portal, you will also be able to view your health information using other applications (apps) compatible with our system.
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
## 2023-04-16 NOTE — ED PROVIDER NOTE - PHYSICAL EXAMINATION
General appearance: Nontoxic appearing, conversant, afebrile    Eyes: anicteric sclerae, LEXX, EOMI   HENT: Atraumatic; oropharynx clear, MMM and no ulcerations, no pharyngeal erythema or exudate   Neck: Trachea midline; Full range of motion, supple   Pulm: CTA bl, normal respiratory effort and no intercostal retractions, normal work of breathing   CV: RRR, No murmurs, rubs, or gallops   Abdomen: Soft, non-tender, non-distended; no guarding or rebound   Extremities: No peripheral edema, no gross deformities, FROM x4   Skin: Dry, normal temperature, turgor and texture; no rash   Psych: Appropriate affect, cooperative, + AH, SI
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
## 2023-04-16 NOTE — ED PROVIDER NOTE - OBJECTIVE STATEMENT
33yo female with pmh schizoaffective disorder, bipolar type, DM, PTSD, presenting with multiple complaints.  Patient well known to this ED.  Often comes after disagreements with staff at group home requesting food.  Endorsing chest and abdominal pain which she states is consistent with previous pains.  Currently she denies this.  Ate food upon arrival here.  States she got in argument with staff at home and doesn't want to go back and wants to stay in the ED for a few days.  Endorsing hearing voices which she has heard for long period of time telling her to kill herself.

## 2023-04-16 NOTE — ED PROVIDER NOTE - NSFOLLOWUPINSTRUCTIONS_ED_ALL_ED_FT
1) Follow up with your doctor this week  2) Return to the ED immediately for new or worsening symptoms  3) Please continue to take any home medications as prescribed  4) Your test results from your ED visit were discussed with you prior to discharge  5) You were provided with a copy of your test results    Abdominal Pain    Many things can cause abdominal pain. Many times, abdominal pain is not caused by a disease and will improve without treatment. Your health care provider will do a physical exam to determine if there is a dangerous cause of your pain; blood tests and imaging may help determine the cause of your pain. However, in many cases, no cause may be found and you may need further testing as an outpatient. Monitor your abdominal pain for any changes.     SEEK IMMEDIATE MEDICAL CARE IF YOU HAVE ANY OF THE FOLLOWING SYMPTOMS: worsening abdominal pain, uncontrollable vomiting, profuse diarrhea, inability to have bowel movements or pass gas, black or bloody stools, fever accompanying chest pain or back pain, or fainting. These symptoms may represent a serious problem that is an emergency. Do not wait to see if the symptoms will go away. Get medical help right away. Call 911 and do not drive yourself to the hospital.
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
## 2023-04-17 LAB
CULTURE RESULTS: SIGNIFICANT CHANGE UP
SPECIMEN SOURCE: SIGNIFICANT CHANGE UP

## 2023-04-26 NOTE — ED PROVIDER NOTE - CHIEF COMPLAINT
You can access the FollowMyHealth Patient Portal offered by [facility] by registering at the following website: [email or web address]. By joining [employer]’s FollowMyHealth portal, you will also be able to view your health information using other applications (apps) compatible with our system. The patient is a 30y Female complaining of agitation.

## 2023-05-15 NOTE — BH INPATIENT PSYCHIATRY PROGRESS NOTE - MSE UNSTRUCTURED FT
MSE:  Appearance: appears stated age, dressed in gown, malodorous. Behavior: cooperative, averts eyes, in behavioral control. Motor: no PMR/PMA. No abnormal movements including tremor. Speech: no increased latency, normal rate, tone, volume. TP: linear. TC: no overt delusions, paranoia, denies SI/HI/I/P, no urges for self-harm.  Denies AH/VH. Mood: "ok." Affect: child-like, neutral Cognition: alert Fund of knowledge: intact. Attention/Concentration: limited Insight: limited. Judgment: limited. Impulse control: fair on assessment, tenuous per hx
MSE:  Appearance: appears stated age, dressed in gown, malodorous. Behavior: cooperative, averts eyes, in behavioral control. Motor: no PMR/PMA. No abnormal movements including tremor. Speech: no increased latency, normal rate, tone, volume. TP: linear. TC: no overt delusions, paranoia, denies SI/HI/I/P, no urges for self-harm.  Denies AH/VH. Mood: "ok." Affect: child-like, neutral Cognition: alert Fund of knowledge: intact. Attention/Concentration: limited Insight: limited. Judgment: limited. Impulse control: fair on assessment, tenuous per hx
MSE:  Appearance: appears stated age, dressed in casual clothes, improved ADLs with staff assistance. Behavior: cooperative, in behavioral control today. Motor: no PMR/PMA. No abnormal movements including tremor. Speech: no increased latency, normal rate, tone, volume. TP: linear. TC: reports fixed delusions of having 500 babies in her stomach but denies AH of these babies today, denies paranoia, denies SI/HI/I/P, no urges for self-harm.  Denies AH/VH. Mood: "good." Affect: child-like, neutral Cognition: alert Fund of knowledge: intact. Attention/Concentration: limited Insight: limited. Judgment: limited. Impulse control: fair on assessment, tenuous per hx
MSE:  Appearance: appears stated age, dressed in gown, malodorous. Behavior: minimally cooperative, averts eyes, in behavioral control today, but required IM and restraints overnight. Motor: no PMR/PMA. No abnormal movements including tremor. Speech: no increased latency, normal rate, tone, volume. TP: linear. TC: no overt delusions, paranoia, denies SI/HI/I/P, no urges for self-harm.  Denies AH/VH. Mood: "good." Affect: child-like, neutral Cognition: alert Fund of knowledge: intact. Attention/Concentration: limited Insight: limited. Judgment: limited. Impulse control: fair on assessment, tenuous per hx
MSE:  Appearance: appears stated age, dressed in gown, malodorous. Behavior: cooperative, averts eyes, in behavioral control. Motor: no PMR/PMA. No abnormal movements including tremor. Speech: no increased latency, normal rate, tone, volume. TP: linear. TC: no overt delusions, paranoia, denies SI/HI/I/P, no urges for self-harm.  Denies AH/VH. Mood: "ok." Affect: child-like, neutral Cognition: alert Fund of knowledge: intact. Attention/Concentration: limited Insight: limited. Judgment: limited. Impulse control: fair on assessment, tenuous per hx
n/a
MSE:  Appearance: appears stated age, dressed in gown, improved ADLs with staff assistance. Behavior: cooperative, in behavioral control today. Motor: no PMR/PMA. No abnormal movements including tremor. Speech: no increased latency, normal rate, tone, volume. TP: linear. TC: denies delusions today, denies paranoia, denies SI/HI/I/P, no urges for self-harm.  Denies AH/VH. Mood: "good." Affect: child-like, neutral Cognition: alert Fund of knowledge: intact. Attention/Concentration: limited Insight: limited. Judgment: limited. Impulse control: fair on assessment, tenuous per hx
MSE:  Appearance: appears stated age, dressed in gown, improved ADLs only with staff assistance. Behavior: minimally cooperative, averts eyes, in behavioral control today. Motor: no PMR/PMA. No abnormal movements including tremor. Speech: no increased latency, normal rate, tone, volume. TP: linear. TC: delusions about having 500 babies in her stomach that speak to her, denies paranoia, denies SI/HI/I/P, no urges for self-harm.  Denies AH/VH. Mood: "good." Affect: child-like, neutral Cognition: alert Fund of knowledge: intact. Attention/Concentration: limited Insight: limited. Judgment: limited. Impulse control: fair on assessment, tenuous per hx
MSE:  Appearance: appears stated age, dressed in casual clothes, improved ADLs with staff assistance. Behavior: cooperative, in behavioral control today. Motor: no PMR/PMA. No abnormal movements including tremor. Speech: no increased latency, normal rate, tone, volume. TP: linear. TC: reports fixed delusions of having 500 babies in her stomach but denies AH of these babies today and only speaks about them when prompted, denies paranoia, denies SI/HI/I/P, no urges for self-harm.  Denies AH/VH. Mood: "good." Affect: child-like, neutral Cognition: alert Fund of knowledge: intact. Attention/Concentration: limited Insight: limited. Judgment: limited. Impulse control: fair on assessment, tenuous per hx
MSE:  Appearance: appears stated age, dressed in casual clothes, improved ADLs with staff assistance. Behavior: cooperative, in behavioral control today. Motor: no PMR/PMA. No abnormal movements including tremor. Speech: no increased latency, normal rate, tone, volume. TP: linear. TC: reports fixed delusions of having 500 babies in her stomach but denies AH of these babies today, denies paranoia, denies SI/HI/I/P, no urges for self-harm.  Denies AH/VH. Mood: "good." Affect: child-like, neutral Cognition: alert Fund of knowledge: intact. Attention/Concentration: limited Insight: limited. Judgment: limited. Impulse control: fair on assessment, tenuous per hx

## 2023-05-22 ENCOUNTER — EMERGENCY (EMERGENCY)
Facility: HOSPITAL | Age: 33
LOS: 0 days | Discharge: HOME | End: 2023-05-23
Attending: STUDENT IN AN ORGANIZED HEALTH CARE EDUCATION/TRAINING PROGRAM
Payer: MEDICAID

## 2023-05-22 VITALS
SYSTOLIC BLOOD PRESSURE: 110 MMHG | RESPIRATION RATE: 18 BRPM | HEIGHT: 62 IN | HEART RATE: 102 BPM | OXYGEN SATURATION: 95 % | TEMPERATURE: 99 F | DIASTOLIC BLOOD PRESSURE: 80 MMHG | WEIGHT: 250 LBS

## 2023-05-22 DIAGNOSIS — Z00.00 ENCOUNTER FOR GENERAL ADULT MEDICAL EXAMINATION WITHOUT ABNORMAL FINDINGS: ICD-10-CM

## 2023-05-22 DIAGNOSIS — F79 UNSPECIFIED INTELLECTUAL DISABILITIES: ICD-10-CM

## 2023-05-22 DIAGNOSIS — Z79.84 LONG TERM (CURRENT) USE OF ORAL HYPOGLYCEMIC DRUGS: ICD-10-CM

## 2023-05-22 DIAGNOSIS — F31.9 BIPOLAR DISORDER, UNSPECIFIED: ICD-10-CM

## 2023-05-22 DIAGNOSIS — Z91.013 ALLERGY TO SEAFOOD: ICD-10-CM

## 2023-05-22 DIAGNOSIS — R10.9 UNSPECIFIED ABDOMINAL PAIN: ICD-10-CM

## 2023-05-22 DIAGNOSIS — Z91.018 ALLERGY TO OTHER FOODS: ICD-10-CM

## 2023-05-22 DIAGNOSIS — F43.10 POST-TRAUMATIC STRESS DISORDER, UNSPECIFIED: ICD-10-CM

## 2023-05-22 DIAGNOSIS — F25.9 SCHIZOAFFECTIVE DISORDER, UNSPECIFIED: ICD-10-CM

## 2023-05-22 DIAGNOSIS — E11.9 TYPE 2 DIABETES MELLITUS WITHOUT COMPLICATIONS: ICD-10-CM

## 2023-05-22 PROCEDURE — 99283 EMERGENCY DEPT VISIT LOW MDM: CPT

## 2023-05-22 NOTE — ED ADULT TRIAGE NOTE - CCCP TRG CHIEF CMPLNT
-remote PTCA to RFA, and left iliac arteries, 2008  - His most recent arterial duplex is suggestive of right femoral stent occlusion.  -He does not have typical symptoms of angina or signs of critical limb ischemia.   -He continues to smoke  -His PAD is clinically stable since his limiting symptom the right leg is neuropathic and not vascular   psychiatric evaluation Strong peripheral pulses

## 2023-05-22 NOTE — ED ADULT TRIAGE NOTE - CHIEF COMPLAINT QUOTE
Patient STEFFEN DESAI from [facility]. States she drank too much orange juice and now has abdominal pain. Denies n/v/d. Pmh Schizo Affective disorder.

## 2023-05-23 VITALS
TEMPERATURE: 99 F | RESPIRATION RATE: 17 BRPM | OXYGEN SATURATION: 97 % | HEART RATE: 106 BPM | DIASTOLIC BLOOD PRESSURE: 85 MMHG | SYSTOLIC BLOOD PRESSURE: 125 MMHG

## 2023-05-23 NOTE — ED ADULT NURSE NOTE - NSFALLUNIVINTERV_ED_ALL_ED
Bed/Stretcher in lowest position, wheels locked, appropriate side rails in place/Call bell, personal items and telephone in reach/Instruct patient to call for assistance before getting out of bed/chair/stretcher/Non-slip footwear applied when patient is off stretcher/[place] to call system/Physically safe environment - no spills, clutter or unnecessary equipment/Purposeful proactive rounding/Room/bathroom lighting operational, light cord in reach

## 2023-05-23 NOTE — ED PROVIDER NOTE - PATIENT PORTAL LINK FT
You can access the FollowMyHealth Patient Portal offered by [facility] by registering at the following website: [email or web address]. By joining [employer]’s FollowMyHealth portal, you will also be able to view your health information using other applications (apps) compatible with our system.

## 2023-05-23 NOTE — ED PROVIDER NOTE - OBJECTIVE STATEMENT
32F PMH of schizoaffective disorder, bipolar type, DM, PTSD presenting for "abdominal pain after drinking orange juice" from group home today. Contrary to triage note, patient does not endorse abdominal pain. Well known to the ED for malingering. In the ED, requesting a meal, tolerating PO intake. No nausea/vomiting. Sleeping in stretcher comfortably without any complaints. Asking to be discharged home. Denies any chest pain, abdominal pain, shortness of breath, nausea/vomiting, headaches, fevers, chills, diarrhea ,constipation, weakness, syncope, hematuria, dysuria, urinary symptoms, subjective neurological deficits.

## 2023-05-23 NOTE — ED PROVIDER NOTE - PROGRESS NOTE DETAILS
10/29/2020    BUN 45 10/29/2020    CREATININE 0.8 10/29/2020    GFRAA >60 10/29/2020    LABGLOM >60 10/29/2020    GLUCOSE 129 10/29/2020    GLUCOSE 112 04/27/2012    PROT 6.9 10/23/2020    LABALBU 4.3 10/23/2020    LABALBU 4.8 04/27/2012    CALCIUM 9.1 10/29/2020    BILITOT 0.5 10/23/2020    ALKPHOS 79 10/23/2020    AST 26 10/23/2020    ALT 22 10/23/2020     Hepatic Function Panel:    Lab Results   Component Value Date    ALKPHOS 79 10/23/2020    ALT 22 10/23/2020    AST 26 10/23/2020    PROT 6.9 10/23/2020    BILITOT 0.5 10/23/2020    LABALBU 4.3 10/23/2020    LABALBU 4.8 04/27/2012     HgBA1c:    Lab Results   Component Value Date    LABA1C 5.3 10/25/2020     FLP:    Lab Results   Component Value Date    TRIG 101 10/25/2020    HDL 48 10/25/2020    LDLCALC 91 10/25/2020    LABVLDL 20 10/25/2020     CTH: Findings suspicious significant right MCA infarct.  Hyperdensity in the right   MCA suggest thrombus. Significant amount of mucosal thickening in the sinuses particularly ethmoid air cells. IR mechanical art thrombectomy: RIGHT middle cerebral artery occlusion and right internal carotid artery occlusion status post successful mechanical thrombectomy. with   TICI 3 recanalization     Repeat CTH 10/25/20: 1. Encephalomalacia is seen within the right cerebral hemisphere consistent with a subacute large right MCA and BASSAM distribution infarct. 2. Cytotoxic edema seen throughout the right cerebral hemisphere with   approximately 5 mm right to left subfalcine herniation. 3. There is no acute intracranial hemorrhage. CTA head/neck: 1.  Occluded right internal carotid artery from the posterior margin   through the right M1 and likely into segment . Occluded right   vertebral artery from the origin to the basilar artery.    2. Estimated stenosis of the proximal left internal carotid artery by   NASCET criteria is not hemodynamically significant     CTP brain: There is a large core infarct exceeding 50% in the region of the   distribution of the middle cerebral artery with a relatively small   region of peripheral ischemia     MRI brain: Acute infarct right middle cerebral artery territory with edema, midline   shift and petechial hemorrhage.  This demonstrates some interval progression   allowing for difference in technique. Repeat CTh:Stable extensive right middle cerebral and anterior cerebral infarcts with   petechial hemorrhage and stable mass-effect. (On my read appears to be slightly worse)    Echo:Summary   Technically limited study with inadequate parasternal window. Left ventricle is normal in size . Mild left ventricular concentric hypertrophy noted. Hyperdynamic left ventricular systolic function. Midcavity gradient. Mild mitral annular calcification. Structurally normal aortic valve.     All labs and imaging studies reviewed independently    Assessment:     Right MCA/BASSAM stroke due to MARIA TERESA occlusion, atheroembolic, artery to artery s/p thrombectomy  --- TICI 3 recanalization   --- MRI brain shows right MCA territory infarct with cerebral edema, 8 mm midline shift and petechial hemorrhage  --- Repeat CT of head yesterday stable extensive R MCA/BASSAM marked with petechial hemorrhage and stable mass-effect (on my read appears to be slightly worse)  --- LDL 91 and A1c 5.3  --- stroke risk factors include: recent stroke  --- neuro assessment patient doing well, although he seems lethargic for me this morning, continues with left adolfo neglect/flaccid     Plan:     Neuro checks   Continue aspirin and statin   Stroke education  PT/OT  DVT prophylaxis   Neurology to follow       SHANIA Dutta, CNP  11:17 AM  10/29/2020 I have discussed with the patient about the ED workup, lab results, diagnostics results, plan for discharge home, need for follow-up with primary care physician/specialists, and return precautions. At this time, the patient does not require further workup in the ED. The patient is subjectively feeling better and would like to be discharged home. The patient had the opportunity to ask questions and I have answered all inquiries. The patient verbalizes understanding and agreement with the plan. The patient is hemodynamically stable, clinically well-appearing, ambulatory, mentating well and ready for discharge home.

## 2023-05-23 NOTE — ED ADULT NURSE NOTE - OBJECTIVE STATEMENT
Patient BIBA from [facility]. States she drank too much orange juice and now has abdominal pain. Pt ambulates without difficulty. Denies N/V/D. Pmh Schizo Affective disorder.

## 2023-05-23 NOTE — ED PROVIDER NOTE - CLINICAL SUMMARY MEDICAL DECISION MAKING FREE TEXT BOX
After ED evaluation, there is no evidence of acute emergency pathology which would necessitate emergency hospital admission. Patient was advised to follow up with primary care physician as soon as possible and have the doctor review all ED results and arrange appropriate follow up. Patient advised to return to ED if symptoms worsen.     Contrary to triage note, patient is ambulatory, in not abdominal pain, eating a meal normally. Sleeping in stretcher. To be discharged home.

## 2023-05-23 NOTE — ED ADULT NURSE NOTE - CHIEF COMPLAINT QUOTE
Patient STEFFEN DESAI from [facility]. States she drank too much orange juice and now has abdominal pain. Denies n/v/d. Pmh Schizo Affective disorder.

## 2023-05-29 ENCOUNTER — EMERGENCY (EMERGENCY)
Facility: HOSPITAL | Age: 33
LOS: 1 days | Discharge: HOME | End: 2023-05-29
Attending: EMERGENCY MEDICINE | Admitting: EMERGENCY MEDICINE
Payer: MEDICAID

## 2023-05-29 VITALS
TEMPERATURE: 99 F | OXYGEN SATURATION: 100 % | HEART RATE: 99 BPM | RESPIRATION RATE: 16 BRPM | DIASTOLIC BLOOD PRESSURE: 80 MMHG | SYSTOLIC BLOOD PRESSURE: 130 MMHG | HEIGHT: 62 IN

## 2023-05-29 LAB
ALBUMIN SERPL ELPH-MCNC: 4 G/DL — SIGNIFICANT CHANGE UP (ref 3.3–5)
ALP SERPL-CCNC: 56 U/L — SIGNIFICANT CHANGE UP (ref 40–120)
ALT FLD-CCNC: 12 U/L — SIGNIFICANT CHANGE UP (ref 4–33)
ANION GAP SERPL CALC-SCNC: 12 MMOL/L — SIGNIFICANT CHANGE UP (ref 7–14)
AST SERPL-CCNC: 17 U/L — SIGNIFICANT CHANGE UP (ref 4–32)
BASOPHILS # BLD AUTO: 0.02 K/UL — SIGNIFICANT CHANGE UP (ref 0–0.2)
BASOPHILS NFR BLD AUTO: 0.2 % — SIGNIFICANT CHANGE UP (ref 0–2)
BILIRUB SERPL-MCNC: <0.2 MG/DL — SIGNIFICANT CHANGE UP (ref 0.2–1.2)
BUN SERPL-MCNC: 15 MG/DL — SIGNIFICANT CHANGE UP (ref 7–23)
CALCIUM SERPL-MCNC: 9.3 MG/DL — SIGNIFICANT CHANGE UP (ref 8.4–10.5)
CHLORIDE SERPL-SCNC: 103 MMOL/L — SIGNIFICANT CHANGE UP (ref 98–107)
CO2 SERPL-SCNC: 23 MMOL/L — SIGNIFICANT CHANGE UP (ref 22–31)
CREAT SERPL-MCNC: 0.74 MG/DL — SIGNIFICANT CHANGE UP (ref 0.5–1.3)
EGFR: 112 ML/MIN/1.73M2 — SIGNIFICANT CHANGE UP
EOSINOPHIL # BLD AUTO: 0.06 K/UL — SIGNIFICANT CHANGE UP (ref 0–0.5)
EOSINOPHIL NFR BLD AUTO: 0.7 % — SIGNIFICANT CHANGE UP (ref 0–6)
GLUCOSE SERPL-MCNC: 101 MG/DL — HIGH (ref 70–99)
HCG SERPL-ACNC: <1 MIU/ML — SIGNIFICANT CHANGE UP
HCT VFR BLD CALC: 36.7 % — SIGNIFICANT CHANGE UP (ref 34.5–45)
HGB BLD-MCNC: 11.5 G/DL — SIGNIFICANT CHANGE UP (ref 11.5–15.5)
IANC: 4.89 K/UL — SIGNIFICANT CHANGE UP (ref 1.8–7.4)
IMM GRANULOCYTES NFR BLD AUTO: 1.2 % — HIGH (ref 0–0.9)
LYMPHOCYTES # BLD AUTO: 2.43 K/UL — SIGNIFICANT CHANGE UP (ref 1–3.3)
LYMPHOCYTES # BLD AUTO: 29.9 % — SIGNIFICANT CHANGE UP (ref 13–44)
MCHC RBC-ENTMCNC: 29.3 PG — SIGNIFICANT CHANGE UP (ref 27–34)
MCHC RBC-ENTMCNC: 31.3 GM/DL — LOW (ref 32–36)
MCV RBC AUTO: 93.6 FL — SIGNIFICANT CHANGE UP (ref 80–100)
MONOCYTES # BLD AUTO: 0.62 K/UL — SIGNIFICANT CHANGE UP (ref 0–0.9)
MONOCYTES NFR BLD AUTO: 7.6 % — SIGNIFICANT CHANGE UP (ref 2–14)
NEUTROPHILS # BLD AUTO: 4.89 K/UL — SIGNIFICANT CHANGE UP (ref 1.8–7.4)
NEUTROPHILS NFR BLD AUTO: 60.4 % — SIGNIFICANT CHANGE UP (ref 43–77)
NRBC # BLD: 0 /100 WBCS — SIGNIFICANT CHANGE UP (ref 0–0)
NRBC # FLD: 0 K/UL — SIGNIFICANT CHANGE UP (ref 0–0)
PLATELET # BLD AUTO: 326 K/UL — SIGNIFICANT CHANGE UP (ref 150–400)
POTASSIUM SERPL-MCNC: 4.2 MMOL/L — SIGNIFICANT CHANGE UP (ref 3.5–5.3)
POTASSIUM SERPL-SCNC: 4.2 MMOL/L — SIGNIFICANT CHANGE UP (ref 3.5–5.3)
PROT SERPL-MCNC: 6.7 G/DL — SIGNIFICANT CHANGE UP (ref 6–8.3)
RBC # BLD: 3.92 M/UL — SIGNIFICANT CHANGE UP (ref 3.8–5.2)
RBC # FLD: 13.9 % — SIGNIFICANT CHANGE UP (ref 10.3–14.5)
SODIUM SERPL-SCNC: 138 MMOL/L — SIGNIFICANT CHANGE UP (ref 135–145)
WBC # BLD: 8.12 K/UL — SIGNIFICANT CHANGE UP (ref 3.8–10.5)
WBC # FLD AUTO: 8.12 K/UL — SIGNIFICANT CHANGE UP (ref 3.8–10.5)

## 2023-05-29 PROCEDURE — 99285 EMERGENCY DEPT VISIT HI MDM: CPT

## 2023-05-29 RX ORDER — FAMOTIDINE 10 MG/ML
20 INJECTION INTRAVENOUS ONCE
Refills: 0 | Status: COMPLETED | OUTPATIENT
Start: 2023-05-29 | End: 2023-05-29

## 2023-05-29 RX ADMIN — FAMOTIDINE 20 MILLIGRAM(S): 10 INJECTION INTRAVENOUS at 21:04

## 2023-05-29 RX ADMIN — Medication 30 MILLILITER(S): at 21:04

## 2023-05-29 NOTE — ED PROVIDER NOTE - NSFOLLOWUPINSTRUCTIONS_ED_ALL_ED_FT
Take famotidine 20 mg 2x a day  may try maalox 30 cc every 8 hours as needed  Try to eat smaller meals, avoid caffeine.  Advance activity as tolerated.  Continue all previously prescribed medications as directed unless otherwise instructed.  Follow up with your primary care physician in 48-72 hours- bring copies of your results.  Return to the ER for worsening or persistent symptoms, and/or ANY NEW OR CONCERNING SYMPTOMS. If you have issues obtaining follow up, please call: [phone] to obtain a doctor or specialist who takes your insurance in your area.  You may call [phone] to make an appointment with the internal medicine clinic.

## 2023-05-29 NOTE — ED ADULT NURSE NOTE - NSFALLUNIVINTERV_ED_ALL_ED
Bed/Stretcher in lowest position, wheels locked, appropriate side rails in place/Call bell, personal items and telephone in reach/Instruct patient to call for assistance before getting out of bed/chair/stretcher/Non-slip footwear applied when patient is off stretcher/[place] to call system/Physically safe environment - no spills, clutter or unnecessary equipment/Purposeful proactive rounding/Room/bathroom lighting operational, light cord in reach

## 2023-05-29 NOTE — PROVIDER CONTACT NOTE (OTHER) - ASSESSMENT
ROCIO called manager of [facility], Lin, [phone]; informed her of pt's pending d/c.  As per Lin, pt travels back to the residence with supervision via BLS ambulance when she is discharged from the ER.  Also confirmed the residence address as [address] [place], [place] [address], [facility]. Discussed above transportation recommendation with provider, who is aware and in agreement with the plan.
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
## 2023-05-29 NOTE — ED PROVIDER NOTE - CLINICAL SUMMARY MEDICAL DECISION MAKING FREE TEXT BOX
32-year-old female with schizoaffective disease and history of malingering, complaining of abdominal pain associated with "" 8 months pregnancy ".  Patient not pregnant.  Benign exam.  Requesting food.  Will check lab work give Pepcid and Maalox and reassess.  No x-ray required for leg pain as patient is ambulating without difficulty and no bruising Or point tenderness

## 2023-05-29 NOTE — ED PROVIDER NOTE - PHYSICAL EXAMINATION
morbidly obese female in no acute distress   alert and oriented   HEENT normal mucous membranes moist no jaundice noticed   neck supple   heart sounds S1-S2 no murmur gallops or rubs   lungs clear   abdomen obese no focal tenderness, no CVA tenderness   extremities no edema right anterior shin no bruising mild tenderness ambulating without pain or difficulty, no calf tenderness   pulses intact sensation intact
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
## 2023-05-29 NOTE — PROVIDER CONTACT NOTE (OTHER) - ACTION/TREATMENT ORDERED:
Contacted [facility]; arranged transport for next available  time ETA 12:00 a.m. trip # [id number]. Contacted [facility]; arranged transport for next available  time ETA 12:00 a.m. trip # [id number].  [facility] invoice # [id number].

## 2023-05-29 NOTE — ED PROVIDER NOTE - PATIENT PORTAL LINK FT
You can access the FollowMyHealth Patient Portal offered by [facility] by registering at the following website: [email or web address]. By joining [facility]’s FollowMyHealth portal, you will also be able to view your health information using other applications (apps) compatible with our system.

## 2023-05-29 NOTE — ED PROVIDER NOTE - PROGRESS NOTE DETAILS
Patient acting out because of not getting food. States pain is gone.  Patient wants to return home. No acute medical emergency is apparent.  SW contacting group home and will discharge

## 2023-05-29 NOTE — ED PROVIDER NOTE - CARE PLAN
Principal Discharge DX:	Abdominal pain   1 Principal Discharge DX:	Abdominal pain  Assessment and plan of treatment:	pain resolved.
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
## 2023-05-29 NOTE — ED ADULT NURSE NOTE - OBJECTIVE STATEMENT
Pt received to Intake room 4, brought in by EMS from [facility], c/o abdominal pain. Pt is alert and oriented x3, but poor historian. Pt is ambulatory and in no apparent distress. Medications administered as ordered. Unable to obtain lab draws. ADRY Acuna at bedside attempting to obtain labs. Safety maintained. Awaiting further orders.

## 2023-05-29 NOTE — ED ADULT NURSE REASSESSMENT NOTE - NS ED NURSE REASSESS COMMENT FT1
Pt left ER through ambulance bay, pt brought back into ER and brought to a hallway stretcher in intake 10D. Report given to ADRY Harris.

## 2023-05-30 VITALS
TEMPERATURE: 98 F | RESPIRATION RATE: 16 BRPM | HEART RATE: 81 BPM | OXYGEN SATURATION: 98 % | DIASTOLIC BLOOD PRESSURE: 74 MMHG | SYSTOLIC BLOOD PRESSURE: 125 MMHG

## 2023-06-03 NOTE — ED ADULT TRIAGE NOTE - RESPIRATORY RATE (BREATHS/MIN)
Encounter Date: 6/3/2023       History     Chief Complaint   Patient presents with    Headache    Abdominal Pain     21 y/o black female presents today with c/o abdominal pain and headaches.  Symptoms started a few weeks ago.  She also reports last menstrual period in April.  She is also having nausea, vomiting, and diarrhea.  She is unable to say how many episodes of vomiting or diarrhea she has had in the past 24 hours. She is able to keep some liquids and solids down.  She does not feel dehydrated. She denies dysuria or discharge, but admits to urinary frequency. Headache pain has been in the frontal region.  She reports photophobia and phonophobia when headaches are present. She denies fever, chills, chest pain, or shortness of breath.      Nurse reports that patient told her she was here because she had a positive pregnancy test at home. Patient did not report abdominal pain or headaches to the nurse.     The history is provided by the patient.   Review of patient's allergies indicates:  No Known Allergies  Past Medical History:   Diagnosis Date    Exposure to sexually transmitted disease (STD) 07/20/2022    GC, CT, and trich     History reviewed. No pertinent surgical history.  History reviewed. No pertinent family history.  Social History     Tobacco Use    Smoking status: Every Day     Types: Vaping with nicotine    Smokeless tobacco: Never   Substance Use Topics    Alcohol use: Not Currently    Drug use: Yes     Types: Marijuana     Review of Systems   Constitutional:  Negative for chills and fever.   HENT: Negative.     Respiratory: Negative.     Cardiovascular: Negative.    Gastrointestinal:  Positive for abdominal pain, diarrhea, nausea and vomiting.   Genitourinary:  Positive for frequency and menstrual problem (last cycle in April). Negative for dysuria and vaginal discharge.   Musculoskeletal:  Positive for back pain (low back pain).   Skin: Negative.    Neurological:  Positive for headaches. Negative  for tremors, seizures, syncope, weakness, light-headedness and numbness.     Physical Exam     Initial Vitals [23 1431]   BP Pulse Resp Temp SpO2   110/66 105 16 98.2 °F (36.8 °C) 99 %      MAP       --         Physical Exam    Constitutional: She appears well-developed and well-nourished.   HENT:   Head: Normocephalic and atraumatic.   No signs or symptoms of dehydration noted   Eyes: EOM are normal. Pupils are equal, round, and reactive to light.   Neck: Neck supple.   Normal range of motion.  Cardiovascular:  Normal rate, regular rhythm and normal heart sounds.           Pulmonary/Chest: Breath sounds normal.   Abdominal: Abdomen is soft. Bowel sounds are normal. She exhibits no distension and no mass. There is abdominal tenderness (no tenderness on palpation). There is no rebound and no guarding.   Musculoskeletal:         General: Normal range of motion.      Cervical back: Normal range of motion and neck supple.     Neurological: She is alert and oriented to person, place, and time. She has normal strength. GCS score is 15. GCS eye subscore is 4. GCS verbal subscore is 5. GCS motor subscore is 6.   Skin: Skin is warm and dry.   Psychiatric: She has a normal mood and affect.       Medical Screening Exam   See Full Note    ED Course   Procedures  Labs Reviewed   URINALYSIS, REFLEX TO URINE CULTURE - Abnormal; Notable for the following components:       Result Value    Protein, UA 10 (*)     Urobilinogen, UA 2 (*)     All other components within normal limits   HCG, SERUM, QUALITATIVE   POCT URINE PREGNANCY          Imaging Results    None          Medications - No data to display  Medical Decision Makin y/o black female presents today with c/o abdominal pain and headaches.  Symptoms started a few weeks ago.  She also reports last menstrual period in April.  She is also having nausea, vomiting, and diarrhea.  She is unable to say how many episodes of vomiting or diarrhea she has had in the past 24  hours. She is able to keep some liquids and solids down.  She does not feel dehydrated. She denies dysuria or discharge, but admits to urinary frequency. Headache pain has been in the frontal region.  She reports photophobia and phonophobia when headaches are present. She denies fever, chills, chest pain, or shortness of breath.        Nurse reports that patient told her she was here because she had a positive pregnancy test at home. Patient did not report abdominal pain or headaches to the nurse.     [facility]    Patient presents for emergent evaluation of acute amenorrhea that poses a threat to life and/or bodily function.    In the ED patient found to have negative urine pregnancy test; serum pregnancy test are pending - patient requesting to leave and get results later.    Discharge [facility]  Patient requesting discharge.  She no longer complains of headache or abdominal pain.  Serum pregnancy test results are pending.  She wishes to leave.  Results can be found on My Chart.  Patient was discharged in stable condition.  Detailed return precautions discussed.              ED Course as of 06/03/23 1544   Sat Jun 03, 2023   1521 POCT urine pregnancy [SR]      ED Course User Index  [SR] KAMALA Kingston                Clinical Impression:   Final diagnoses:  [N91.2] Amenorrhea (Primary)        ED Disposition Condition    Discharge Stable          ED Prescriptions    None       Follow-up Information    None          KAMALA Kingston  06/03/23 1548     18

## 2023-06-05 ENCOUNTER — EMERGENCY (EMERGENCY)
Facility: HOSPITAL | Age: 33
LOS: 1 days | Discharge: HOME | End: 2023-06-05
Attending: STUDENT IN AN ORGANIZED HEALTH CARE EDUCATION/TRAINING PROGRAM | Admitting: STUDENT IN AN ORGANIZED HEALTH CARE EDUCATION/TRAINING PROGRAM
Payer: MEDICAID

## 2023-06-05 VITALS
RESPIRATION RATE: 17 BRPM | HEART RATE: 97 BPM | SYSTOLIC BLOOD PRESSURE: 137 MMHG | TEMPERATURE: 98 F | HEIGHT: 62 IN | DIASTOLIC BLOOD PRESSURE: 73 MMHG | OXYGEN SATURATION: 100 %

## 2023-06-05 PROCEDURE — 93010 ELECTROCARDIOGRAM REPORT: CPT

## 2023-06-05 PROCEDURE — 99285 EMERGENCY DEPT VISIT HI MDM: CPT

## 2023-06-05 RX ORDER — ACETAMINOPHEN 500 MG
650 TABLET ORAL ONCE
Refills: 0 | Status: COMPLETED | OUTPATIENT
Start: 2023-06-05 | End: 2023-06-05

## 2023-06-05 NOTE — ED PROVIDER NOTE - NSFOLLOWUPINSTRUCTIONS_ED_ALL_ED_FT
(1) Follow up with your primary care physician as discussed.    (2) Immediately seek care at your nearest emergency room if your symptoms worsen, persist, or do not resolve     (3) Take Tylenol and/or Motrin as needed for pain per the dosing instructions on the bottle.     -  Viral Syndrome    WHAT YOU NEED TO KNOW:    Viral syndrome is a term used for symptoms of an infection caused by a virus. Viruses are spread easily from person to person on shared items.    DISCHARGE INSTRUCTIONS:    Call your local emergency number (911 in the [place]), or have someone call if:   •You have a seizure.  •You cannot be woken.  •You have chest pain or trouble breathing.    Return to the emergency department if:   •You have a stiff neck, a bad headache, and sensitivity to light.  •You feel weak, dizzy, or confused.  •You stop urinating or urinate a lot less than usual.  •You cough up blood or thick yellow or green mucus.  •You have severe abdominal pain or your abdomen is larger than usual.    Call your doctor if:   •Your symptoms do not get better with treatment or get worse after 3 days.  •You have a rash or ear pain.  •You have burning when you urinate.  •You have questions or concerns about your condition or care.    Medicines: You may need any of the following:   •Acetaminophen decreases pain and fever. It is available without a doctor's order. Ask how much to take and how often to take it. Follow directions. Read the labels of all other medicines you are using to see if they also contain acetaminophen, or ask your doctor or pharmacist. Acetaminophen can cause liver damage if not taken correctly.  •NSAIDs, such as ibuprofen, help decrease swelling, pain, and fever. NSAIDs can cause stomach bleeding or kidney problems in certain people. If you take blood thinner medicine, always ask your healthcare provider if NSAIDs are safe for you. Always read the medicine label and follow directions.  •Cold medicine helps decrease swelling, control a cough, and relieve chest or nasal congestion.   •Saline nasal spray helps decrease nasal congestion.   •Take your medicine as directed. Contact your healthcare provider if you think your medicine is not helping or if you have side effects. Tell your provider if you are allergic to any medicine. Keep a list of the medicines, vitamins, and herbs you take. Include the amounts, and when and why you take them. Bring the list or the pill bottles to follow-up visits. Carry your medicine list with you in case of an emergency.    Manage your symptoms:   •Drink liquids as directed to prevent dehydration. Ask how much liquid to drink each day and which liquids are best for you. Do not drink liquids with caffeine. Caffeine can make dehydration worse.   •Get plenty of rest to help your body heal. Take naps throughout the day. Ask your healthcare provider when you can return to work and your normal activities.  •Use a cool mist humidifier to increase air moisture in your home. This may make it easier for you to breathe and help decrease your cough.   •Drink tea with honey or use cough drops for a sore throat. Cough drops are available without a doctor's order. Follow directions for taking cough drops.  •Do not smoke or be close to anyone who is smoking. Nicotine and other chemicals in cigarettes and cigars can cause lung damage. Smoking can also delay healing. Ask your healthcare provider for information if you currently smoke and need help to quit. E-cigarettes or smokeless tobacco still contain nicotine. Talk to your healthcare provider before you use these products.    Prevent the spread of germs:   •Wash your hands often throughout the day. Use soap and water. Rub your soapy hands together, lacing your fingers, for at least 20 seconds. Rinse with warm, running water. Dry your hands with a clean towel or paper towel. Use hand  that contains alcohol if soap and water are not available. Teach children how to wash their hands and use hand .    •Cover sneezes and coughs. Turn your face away and cover your mouth and nose with a tissue. Throw the tissue away. Use the bend of your arm if a tissue is not available. Then wash your hands well with soap and water or use hand . Teach children how to cover a cough or sneeze.  •Stay home while you are sick. Avoid crowds as much as possible.  •Get the influenza (flu) vaccine as soon as recommended each year. The flu vaccine is available starting in September or October. Ask your healthcare provider about the pneumonia vaccine. This vaccine is usually recommended every 5 years in older adults.     Follow up with your doctor as directed: Write down your questions so you remember to ask them during your visits.

## 2023-06-05 NOTE — ED PROVIDER NOTE - PATIENT PORTAL LINK FT
You can access the FollowMyHealth Patient Portal offered by [facility] by registering at the following website: [email or web address]. By joining [facility]’s FollowMyHealth portal, you will also be able to view your health information using other applications (apps) compatible with our system.

## 2023-06-05 NOTE — ED PROVIDER NOTE - PHYSICAL EXAMINATION
CONSTITUTIONAL: Well-developed; well-nourished; in no acute distress.   SKIN: warm, dry  HEAD: Normocephalic; atraumatic.  EYES: no conjunctival injection. PERRL.   ENT: No nasal discharge; airway clear. +uvula midline. No posterior pharynx erythema   NECK: Supple; non tender.  CARD: S1, S2 normal; Regular rate and rhythm.   RESP: No wheezes, rales or rhonchi. Good air movement bilaterally.   ABD: soft ntnd, no guarding, no distention, no rigidity.   EXT: Ambulates independently.  No cyanosis or edema.   NEURO: AOx3  PSYCH: Cooperative, appropriate.

## 2023-06-05 NOTE — ED PROVIDER NOTE - PROGRESS NOTE DETAILS
Clay OWENS PGY-3: Results explained to patient. Explained strict return precautions.  I spoke to SW regarding transport.
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
## 2023-06-05 NOTE — ED PROVIDER NOTE - OBJECTIVE STATEMENT
Clay MORENO PGY-3:  32-year-old female past medical history diabetes and per chart review shows schizoaffective disorder bipolar type presents from group home secondary to cough and sore throat.  Symptoms began today.  Patient states there are sick contacts at the group home.  Denies chest pain or shortness of breath. Clay MORENO PGY-3:  32-year-old female past medical history diabetes and per chart review shows schizoaffective disorder bipolar type presents from group home secondary to cough and sore throat.  Symptoms began today.  Patient states there are sick contacts at the group home.  Denies chest pain or shortness of breath. Pt denies SI or HI. Pt denies any auditory or visual hallucinations

## 2023-06-05 NOTE — ED PROVIDER NOTE - CLINICAL SUMMARY MEDICAL DECISION MAKING FREE TEXT BOX
Clay DO PGY-3:  32-year-old female past medical history diabetes and per chart review shows schizoaffective disorder bipolar type presents from group home secondary to cough and sore throat.  On exam lungs are clear bilaterally.  Uvula is midline.  No posterior pharynx erythema or exudates.  No elevation floor of mouth.  Probable viral syndrome.  Ordered medications and x-ray.  Will screen for pneumonia.  Will discuss with social work regarding dispo back to group home.

## 2023-06-05 NOTE — ED ADULT TRIAGE NOTE - CHIEF COMPLAINT QUOTE
No one has called pt to set up his Colonoscopy appt, @ [facility] also, said pt couldn't do due to his age? Please call pt and go over with him. Thank You   Pt sent from group home for being agitated and aggressive toward staff. Refusing to answer questions in triage.

## 2023-06-05 NOTE — ED PROVIDER NOTE - ATTENDING CONTRIBUTION TO CARE
Dr. Peralta, Attending Physician-  I performed a face to face bedside interview with patient regarding history of present illness, review of symptoms and past medical history. I completed an independent physical exam.  I have discussed patient's plan of care with the resident.    32F, schizoaffective, bipolar, MD, who presents with cough. Reports non-productive cough. Associated with mild sore throat. Still able to tolerate PO. No changes to voice/breathing. No trauma. No fevers/chills. Otherwise denies SI/HI. On ROS, denies headaches, sputum, cp, sob, abdominal pain, nvd, dysuria, hematuria, recent travel, trauma, syncope, black/bloody stools. Physical: afebrile, well appearing, neck supple, uvula midline, no posterior oropharyngela exudates, rrr, ctabl, abdomen soft. Plan: viral swab, cxr. Dispo pending. 80

## 2023-06-06 VITALS
DIASTOLIC BLOOD PRESSURE: 78 MMHG | RESPIRATION RATE: 16 BRPM | OXYGEN SATURATION: 99 % | SYSTOLIC BLOOD PRESSURE: 132 MMHG | TEMPERATURE: 98 F | HEART RATE: 87 BPM

## 2023-06-06 LAB
FLUAV AG NPH QL: SIGNIFICANT CHANGE UP
FLUBV AG NPH QL: SIGNIFICANT CHANGE UP
RSV RNA NPH QL NAA+NON-PROBE: SIGNIFICANT CHANGE UP
SARS-COV-2 RNA SPEC QL NAA+PROBE: SIGNIFICANT CHANGE UP

## 2023-06-06 PROCEDURE — 71046 X-RAY EXAM CHEST 2 VIEWS: CPT | Mod: 26

## 2023-06-06 RX ADMIN — Medication 650 MILLIGRAM(S): at 00:09

## 2023-06-06 RX ADMIN — Medication 650 MILLIGRAM(S): at 00:48

## 2023-06-06 NOTE — PROVIDER CONTACT NOTE (OTHER) - ASSESSMENT
ROCIO called residence taryn [facility]eball[facility] [phone]; message left requesting a call back.  Upon chart review, pt takes ambulance with supervision back to the residence.  Discussed with provider, who is in agreement with pt returning to the residence via ambulance.

## 2023-06-06 NOTE — ED ADULT NURSE NOTE - CHIEF COMPLAINT QUOTE
Pt arrives from [facility] group [place] c/o cough and sore throat starting tonight when she walked outside group home. States missed nightime psych meds from group home, no SI/HI, auditory or visual hallucinations. No chest pain, sob, abd pain, n/v/d, fevers/chills verbalized. Pmhx: Schizophrenia, Bipolar, DM

## 2023-06-06 NOTE — ED ADULT NURSE NOTE - OBJECTIVE STATEMENT
Pt arrives from [facility] group home c/o cough and sore throat starting tonight when she walked outside group home. States missed nightime psych meds from group home, no SI/HI, auditory or visual hallucinations. provided tylenol and RVP swab which is negative. no other complaitns noted.

## 2023-06-06 NOTE — ED ADULT NURSE REASSESSMENT NOTE - NS ED NURSE REASSESS COMMENT FT1
Pt A&Ox3, amb at baseline .vitally stable .denies chest pain, SOB, n/v, headache, dizziness, cough sore throat. states symptoms have resolved[facility] Provided DC paperwork.
pt in int room 4 vitally stable endorses symptom improvement post medicating for pain. no other complaints safety maintained

## 2023-07-06 ENCOUNTER — EMERGENCY (EMERGENCY)
Facility: HOSPITAL | Age: 33
LOS: 0 days | Discharge: HOME | End: 2023-07-07
Attending: EMERGENCY MEDICINE
Payer: MEDICAID

## 2023-07-06 VITALS
DIASTOLIC BLOOD PRESSURE: 66 MMHG | OXYGEN SATURATION: 98 % | RESPIRATION RATE: 18 BRPM | HEART RATE: 104 BPM | WEIGHT: 270.07 LBS | SYSTOLIC BLOOD PRESSURE: 108 MMHG | HEIGHT: 62 IN | TEMPERATURE: 99 F

## 2023-07-06 DIAGNOSIS — F31.9 BIPOLAR DISORDER, UNSPECIFIED: ICD-10-CM

## 2023-07-06 DIAGNOSIS — Z79.84 LONG TERM (CURRENT) USE OF ORAL HYPOGLYCEMIC DRUGS: ICD-10-CM

## 2023-07-06 DIAGNOSIS — F25.9 SCHIZOAFFECTIVE DISORDER, UNSPECIFIED: ICD-10-CM

## 2023-07-06 DIAGNOSIS — M25.561 PAIN IN RIGHT KNEE: ICD-10-CM
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
## 2023-07-06 DIAGNOSIS — R62.50 UNSPECIFIED LACK OF EXPECTED NORMAL PHYSIOLOGICAL DEVELOPMENT IN CHILDHOOD: ICD-10-CM

## 2023-07-06 DIAGNOSIS — Z91.018 ALLERGY TO OTHER FOODS: ICD-10-CM

## 2023-07-06 DIAGNOSIS — F79 UNSPECIFIED INTELLECTUAL DISABILITIES: ICD-10-CM

## 2023-07-06 DIAGNOSIS — M25.571 PAIN IN RIGHT ANKLE AND JOINTS OF RIGHT FOOT: ICD-10-CM

## 2023-07-06 DIAGNOSIS — E11.9 TYPE 2 DIABETES MELLITUS WITHOUT COMPLICATIONS: ICD-10-CM
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
## 2023-07-06 DIAGNOSIS — Z91.013 ALLERGY TO SEAFOOD: ICD-10-CM

## 2023-07-06 PROCEDURE — 99283 EMERGENCY DEPT VISIT LOW MDM: CPT

## 2023-07-06 RX ORDER — ACETAMINOPHEN 500 MG
650 TABLET ORAL ONCE
Refills: 0 | Status: COMPLETED | OUTPATIENT
Start: 2023-07-06 | End: 2023-07-06

## 2023-07-06 RX ORDER — OLANZAPINE 15 MG/1
5 TABLET, FILM COATED ORAL ONCE
Refills: 0 | Status: COMPLETED | OUTPATIENT
Start: 2023-07-06 | End: 2023-07-06

## 2023-07-06 RX ADMIN — OLANZAPINE 5 MILLIGRAM(S): 15 TABLET, FILM COATED ORAL at 21:35

## 2023-07-06 RX ADMIN — Medication 650 MILLIGRAM(S): at 21:21

## 2023-07-06 NOTE — ED PROVIDER NOTE - MUSCULOSKELETAL, MLM
Spine appears normal, range of motion is not limited, no muscle or joint tenderness. ambulating. no swelling
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
## 2023-07-06 NOTE — ED ADULT NURSE NOTE - OBJECTIVE STATEMENT
patient alert and oriented x4. pt states she was sitting on the couch when she twisted her ankle and her knee. pt c/o 101/10 pain. denies fall, head injury. patient alert and oriented x4. pt states she was sitting on the couch when she twisted her ankle and her knee. pt c/o 101/10 pain. denies fall, head injury. pt tearful and loud on exam, pt requesting food.

## 2023-07-06 NOTE — ED ADULT NURSE NOTE - NSFALLUNIVINTERV_ED_ALL_ED
Bed/Stretcher in lowest position, wheels locked, appropriate side rails in place/Call bell, personal items and telephone in reach/Instruct patient to call for assistance before getting out of bed/chair/stretcher/Non-slip footwear applied when patient is off stretcher/[place] to call system/Physically safe environment - no spills, clutter or unnecessary equipment/Purposeful proactive rounding/Room/bathroom lighting operational, light cord in reach

## 2023-07-06 NOTE — ED PROVIDER NOTE - PATIENT PORTAL LINK FT
You can access the FollowMyHealth Patient Portal offered by [facility] by registering at the following website: [email or web address]. By joining [employer]’s FollowMyHealth portal, you will also be able to view your health information using other applications (apps) compatible with our system.
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
## 2023-07-06 NOTE — ED ADULT NURSE REASSESSMENT NOTE - NS ED NURSE REASSESS COMMENT FT1
patient alert and oriented x4. pt ambulating independently with steady gait. no visible limp. pt loud and verbally abusive towards staff. pt walking around unit yelling and will not stay in bed. pt unable to be verbally redirected and deescalated. Code gray called at 21:24. dr. bragg aware.

## 2023-07-06 NOTE — ED PROVIDER NOTE - IV ALTEPLASE EXCL REL HIDDEN
Problem: Adult Inpatient Plan of Care  Goal: Plan of Care Review  Outcome: Ongoing, Progressing  Flowsheets (Taken 6/12/2023 2784)  Outcome Evaluation: VSS. RA. Complaints of HA and febrile. PRN tylenol given. No nausea noted during the shift. Labs monitored. K replaced. No other concerns at this time. Will continue with the plan of care.   Goal Outcome Evaluation:              Outcome Evaluation: VSS. RA. Complaints of HA and febrile. PRN tylenol given. No nausea noted during the shift. Labs monitored. K replaced. No other concerns at this time. Will continue with the plan of care.          show
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
## 2023-07-07 VITALS
DIASTOLIC BLOOD PRESSURE: 81 MMHG | RESPIRATION RATE: 17 BRPM | HEART RATE: 88 BPM | OXYGEN SATURATION: 97 % | TEMPERATURE: 98 F | SYSTOLIC BLOOD PRESSURE: 135 MMHG

## 2023-07-11 ENCOUNTER — EMERGENCY (EMERGENCY)
Facility: HOSPITAL | Age: 33
LOS: 1 days | Discharge: HOME | End: 2023-07-11
Admitting: STUDENT IN AN ORGANIZED HEALTH CARE EDUCATION/TRAINING PROGRAM
Payer: MEDICAID

## 2023-07-11 VITALS
RESPIRATION RATE: 20 BRPM | OXYGEN SATURATION: 97 % | SYSTOLIC BLOOD PRESSURE: 117 MMHG | DIASTOLIC BLOOD PRESSURE: 75 MMHG | HEART RATE: 106 BPM | TEMPERATURE: 98 F | HEIGHT: 62 IN

## 2023-07-11 PROCEDURE — 99283 EMERGENCY DEPT VISIT LOW MDM: CPT
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
## 2023-07-11 NOTE — ED ADULT NURSE NOTE - NSFALLUNIVINTERV_ED_ALL_ED
Bed/Stretcher in lowest position, wheels locked, appropriate side rails in place/Call bell, personal items and telephone in reach/Instruct patient to call for assistance before getting out of bed/chair/stretcher/Non-slip footwear applied when patient is off stretcher/[place] to call system/Physically safe environment - no spills, clutter or unnecessary equipment/Purposeful proactive rounding/Room/bathroom lighting operational, light cord in reach
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
## 2023-07-11 NOTE — ED BEHAVIORAL HEALTH NOTE - BEHAVIORAL HEALTH NOTE
SW received a referral from the medical team requesting assistance with obtaining collateral information for the patient. Sw called [facility] P#[phone] and spoke with Jessy (Title: ).  Per Jessy, pt is a resident of [facility], located at [address] [place] [address]. Per Jessy, she does not know details as to why the pt is in the hospital and was not provided with sign-out from previous shift.  Per Jessy, should pt be for discharge, she will need an ambulance transport back home.    TEDDY Beltran informed of above.
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
## 2023-07-11 NOTE — ED PROVIDER NOTE - PATIENT PORTAL LINK FT
You can access the FollowMyHealth Patient Portal offered by [facility] by registering at the following website: [email or web address]. By joining [employer]’s FollowMyHealth portal, you will also be able to view your health information using other applications (apps) compatible with our system.

## 2023-07-11 NOTE — ED PROVIDER NOTE - NSFOLLOWUPINSTRUCTIONS_ED_ALL_ED_FT
Follow up with your primary care physician and psychiatrist in 48-72 hours.  You may also see the psychiatrist at [facility]:    [address] [place] [address]  Phone: [phone]    [facility] on [facility] Information:    -Walk-in hours: Monday to Friday, 9am to 3pm   -Almost all walk-in patients will be able to see a psych prescriber the same day   -Scheduled, non-urgent, evening remote/virtual appointments are available on a limited basis. Call our  to inquire about these: [phone]. A crisis center clinician screens these requests in the late afternoon and if appropriate it takes a few days to set-up.   -Visits take about 2 to 4 hours total   -Mornings are the best time for patients to arrive    For Telehealth options try:  [employer]: [employer].[facility] (to access psychiatrist or therapist)  [employer]: [facility] (to access psychiatrist or therapist)  [facility]: [email or web address] (Largest online therapy group)      SEEK IMMEDIATE MEDICAL CARE IF YOU HAVE ANY OF THE FOLLOWING SYMPTOMS: thoughts about hurting or killing yourself, thoughts about hurting or killing somebody else, hallucinations or any other worsening or persistent symptoms OR ANY NEW OR CONCERNING SYMPTOMS.
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
## 2023-07-12 VITALS
DIASTOLIC BLOOD PRESSURE: 80 MMHG | SYSTOLIC BLOOD PRESSURE: 129 MMHG | TEMPERATURE: 98 F | HEART RATE: 100 BPM | OXYGEN SATURATION: 100 % | RESPIRATION RATE: 18 BRPM

## 2023-07-15 ENCOUNTER — EMERGENCY (EMERGENCY)
Facility: HOSPITAL | Age: 33
LOS: 0 days | Discharge: HOME | End: 2023-07-16
Attending: EMERGENCY MEDICINE
Payer: MEDICAID

## 2023-07-15 VITALS
RESPIRATION RATE: 16 BRPM | WEIGHT: 225.09 LBS | TEMPERATURE: 98 F | DIASTOLIC BLOOD PRESSURE: 81 MMHG | HEIGHT: 62 IN | OXYGEN SATURATION: 96 % | SYSTOLIC BLOOD PRESSURE: 122 MMHG | HEART RATE: 117 BPM

## 2023-07-15 DIAGNOSIS — E11.9 TYPE 2 DIABETES MELLITUS WITHOUT COMPLICATIONS: ICD-10-CM

## 2023-07-15 DIAGNOSIS — R10.32 LEFT LOWER QUADRANT PAIN: ICD-10-CM

## 2023-07-15 DIAGNOSIS — R10.31 RIGHT LOWER QUADRANT PAIN: ICD-10-CM

## 2023-07-15 DIAGNOSIS — R00.0 TACHYCARDIA, UNSPECIFIED: ICD-10-CM

## 2023-07-15 DIAGNOSIS — Z79.84 LONG TERM (CURRENT) USE OF ORAL HYPOGLYCEMIC DRUGS: ICD-10-CM

## 2023-07-15 DIAGNOSIS — F25.9 SCHIZOAFFECTIVE DISORDER, UNSPECIFIED: ICD-10-CM

## 2023-07-15 DIAGNOSIS — Z91.018 ALLERGY TO OTHER FOODS: ICD-10-CM

## 2023-07-15 DIAGNOSIS — Z91.013 ALLERGY TO SEAFOOD: ICD-10-CM

## 2023-07-15 PROCEDURE — 99284 EMERGENCY DEPT VISIT MOD MDM: CPT

## 2023-07-15 PROCEDURE — 76830 TRANSVAGINAL US NON-OB: CPT | Mod: 26

## 2023-07-15 RX ORDER — IOHEXOL 300 MG/ML
30 INJECTION, SOLUTION INTRAVENOUS ONCE
Refills: 0 | Status: DISCONTINUED | OUTPATIENT
Start: 2023-07-15 | End: 2023-07-16

## 2023-07-15 RX ORDER — FAMOTIDINE 10 MG/ML
20 INJECTION INTRAVENOUS ONCE
Refills: 0 | Status: DISCONTINUED | OUTPATIENT
Start: 2023-07-15 | End: 2023-07-16

## 2023-07-15 RX ORDER — ACETAMINOPHEN 500 MG
975 TABLET ORAL ONCE
Refills: 0 | Status: COMPLETED | OUTPATIENT
Start: 2023-07-15 | End: 2023-07-15

## 2023-07-15 RX ORDER — SODIUM CHLORIDE 9 MG/ML
1000 INJECTION INTRAMUSCULAR; INTRAVENOUS; SUBCUTANEOUS ONCE
Refills: 0 | Status: DISCONTINUED | OUTPATIENT
Start: 2023-07-15 | End: 2023-07-16

## 2023-07-15 NOTE — ED PROVIDER NOTE - PHYSICAL EXAMINATION
Vitals: tachy at 117, otherwise WNL  Gen: AAOx3, NAD, sitting comfortably in stretcher, non-toxic, very loud in ER, speaking loudly, walking about ER without any issue or evidence of pain, yelling that she might be pregnancy and she needs and ultrasound now  Head: ncat, perrla, eomi b/l  Neck: supple, no lymphadenopathy, no midline deviation  Heart: rrr, no m/r/g  Lungs: CTA b/l, no rales/ronchi/wheezes  Abd: soft, nontender, non-distended, no rebound or guarding  Ext: no clubbing/cyanosis/edema  Neuro: sensation and muscle strength intact b/l, steady gait

## 2023-07-15 NOTE — ED PROVIDER NOTE - OBJECTIVE STATEMENT
33 yo F with lower abd pain for 1 day, pt. also says pain radiates all over her body, no trauma or inciting event.  Pt. denies associated symptoms.  She states medication can't help me.  Pt. is concerned she could possibly be pregnant, although denies recent sexual intercourse.  No other complaints.  Pt. denies vaginal/urinary complaints at this time.  Pt. denies SI/HI/hallucinations at this time.    ROS: negative for fever, cough, headache, chest pain, shortness of breath, abd pain, nausea, vomiting, diarrhea, rash, paresthesia, and weakness--all other systems reviewed are negative.   PMH: DM, schizoaffective; Meds: See EMR for list; SH: Denies smoking/drinking/drug use

## 2023-07-15 NOTE — ED PROVIDER NOTE - CLINICAL SUMMARY MEDICAL DECISION MAKING FREE TEXT BOX
33 yo F with b/l lower abd pain, pt. is concerned for pregnancy, symptoms are concerning for pregnancy, ovarian cyst, uti, renal stone less likely, doubt appy  -basic labs, gc/chlam, ua, cx, hcg, lactate, lipase, type and screen, CT ab/pel (pending pregnancy), US transvaginal, ekg, iv, hydration, tylenol for pain, pepcid for possible gerd, npo  -f/u results, reeval

## 2023-07-15 NOTE — ED PROVIDER NOTE - PROGRESS NOTE DETAILS
pt. informed of results of US, states that she still thinks she's pregnant, and that's really why she came   pt. is now refusing labs and CT scan  will obtain urine preg, ua/cx if pt. consents  pt. now denies abd pain--walking about ER without any signs of pain  CT imaging and labs discontinued Results reported to patient--US unremarkable, pregnancy test negative  Pt. reports feeling better after tylenol, pt. refuses labs/CT scan, no further w/u indicated at this time  pt. agrees to f/u with primary care outpt. asap, additional GI referral given for f/u at pt.'s convenience   pt. understands to return to ED if symptoms worsen; will d/c with Tylenol prn OTC

## 2023-07-15 NOTE — ED ADULT NURSE REASSESSMENT NOTE - NS ED NURSE REASSESS COMMENT FT1
Multiple RNs attempted to place IV in patient and were unsuccessful. Pt became combative and verbally abusive with staff. Pt says she does not need any interventions. Pt continues to insist that she is 1 year pregnant. MD aware, IV meds held.

## 2023-07-15 NOTE — ED ADULT NURSE NOTE - OBJECTIVE STATEMENT
Pt c/o abd pain since this morning. Points to generalized abd. Denies N/V/D. Pt says she is 8 months pregnant and requests US. Denies bleeding. Hx DM

## 2023-07-15 NOTE — ED ADULT NURSE NOTE - NSFALLUNIVINTERV_ED_ALL_ED
Bed/Stretcher in lowest position, wheels locked, appropriate side rails in place/Call bell, personal items and telephone in reach/Instruct patient to call for assistance before getting out of bed/chair/stretcher/Non-slip footwear applied when patient is off stretcher/[place] to call system/Physically safe environment - no spills, clutter or unnecessary equipment/Purposeful proactive rounding/Room/bathroom lighting operational, light cord in reach

## 2023-07-15 NOTE — ED PROVIDER NOTE - PATIENT PORTAL LINK FT
You can access the FollowMyHealth Patient Portal offered by [facility] by registering at the following website: [email or web address]. By joining [employer]’s FollowMyHealth portal, you will also be able to view your health information using other applications (apps) compatible with our system.

## 2023-07-16 VITALS
TEMPERATURE: 98 F | RESPIRATION RATE: 18 BRPM | DIASTOLIC BLOOD PRESSURE: 66 MMHG | SYSTOLIC BLOOD PRESSURE: 99 MMHG | OXYGEN SATURATION: 99 % | HEART RATE: 91 BPM

## 2023-07-16 LAB — HCG UR QL: NEGATIVE — SIGNIFICANT CHANGE UP

## 2023-07-16 RX ORDER — OLANZAPINE 15 MG/1
5 TABLET, FILM COATED ORAL ONCE
Refills: 0 | Status: COMPLETED | OUTPATIENT
Start: 2023-07-16 | End: 2023-07-16

## 2023-07-16 RX ORDER — ACETAMINOPHEN 500 MG
1000 TABLET ORAL ONCE
Refills: 0 | Status: COMPLETED | OUTPATIENT
Start: 2023-07-16 | End: 2023-07-16

## 2023-07-16 RX ORDER — ACETAMINOPHEN 500 MG
650 TABLET ORAL ONCE
Refills: 0 | Status: COMPLETED | OUTPATIENT
Start: 2023-07-16 | End: 2023-07-16

## 2023-07-16 RX ADMIN — Medication 650 MILLIGRAM(S): at 11:56

## 2023-07-16 RX ADMIN — OLANZAPINE 5 MILLIGRAM(S): 15 TABLET, FILM COATED ORAL at 11:56

## 2023-07-16 RX ADMIN — Medication 1000 MILLIGRAM(S): at 15:56

## 2023-07-16 RX ADMIN — Medication 650 MILLIGRAM(S): at 02:09
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
## 2023-07-16 NOTE — ED ADULT NURSE REASSESSMENT NOTE - NS ED NURSE REASSESS COMMENT FT1
Pt continues to become aggressive and berates staff upon entering room. Pt says "I hate this hospital, no one helps me". Gave patient tylenol per order and offered refreshment which she refused. Upon leaving, pt saying "You're getting on my last nerve". Pt in view of nurses station. Awaiting further orders. No acute distress at this time.

## 2023-07-16 NOTE — ED ADULT NURSE REASSESSMENT NOTE - NS ED NURSE REASSESS COMMENT FT1
patient agitated and walking around emergency room and into emergency waiting room yelling and being disruptive to other patients. pt unable to be redirected or verbally deescalated. code flight called. security responded and patient placed back into patient room SH30. pt pending ambulette p/u for transport back to group home. patient updated on plan of care.

## 2023-07-18 ENCOUNTER — EMERGENCY (EMERGENCY)
Facility: HOSPITAL | Age: 33
LOS: 0 days | Discharge: HOME | End: 2023-07-19
Attending: EMERGENCY MEDICINE
Payer: MEDICAID

## 2023-07-18 VITALS
SYSTOLIC BLOOD PRESSURE: 101 MMHG | HEART RATE: 91 BPM | HEIGHT: 62 IN | OXYGEN SATURATION: 98 % | DIASTOLIC BLOOD PRESSURE: 64 MMHG | TEMPERATURE: 98 F | RESPIRATION RATE: 19 BRPM | WEIGHT: 272.05 LBS

## 2023-07-18 DIAGNOSIS — M79.672 PAIN IN LEFT FOOT: ICD-10-CM

## 2023-07-18 DIAGNOSIS — M79.671 PAIN IN RIGHT FOOT: ICD-10-CM
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
## 2023-07-18 DIAGNOSIS — Z79.84 LONG TERM (CURRENT) USE OF ORAL HYPOGLYCEMIC DRUGS: ICD-10-CM

## 2023-07-18 DIAGNOSIS — E11.9 TYPE 2 DIABETES MELLITUS WITHOUT COMPLICATIONS: ICD-10-CM

## 2023-07-18 DIAGNOSIS — M79.642 PAIN IN LEFT HAND: ICD-10-CM

## 2023-07-18 DIAGNOSIS — F25.9 SCHIZOAFFECTIVE DISORDER, UNSPECIFIED: ICD-10-CM

## 2023-07-18 DIAGNOSIS — Z91.018 ALLERGY TO OTHER FOODS: ICD-10-CM
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
## 2023-07-18 DIAGNOSIS — Z91.013 ALLERGY TO SEAFOOD: ICD-10-CM

## 2023-07-18 PROCEDURE — 99283 EMERGENCY DEPT VISIT LOW MDM: CPT

## 2023-07-18 RX ORDER — ACETAMINOPHEN 500 MG
975 TABLET ORAL ONCE
Refills: 0 | Status: COMPLETED | OUTPATIENT
Start: 2023-07-18 | End: 2023-07-18

## 2023-07-18 RX ADMIN — Medication 975 MILLIGRAM(S): at 22:19
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
## 2023-07-18 NOTE — ED PROVIDER NOTE - PHYSICAL EXAMINATION
Vitals: WNL  Gen: AAOx3, NAD, sitting comfortably in stretcher  Head: ncat, perrla, eomi b/l  Neck: supple, no lymphadenopathy, no midline deviation  Heart: rrr, no m/r/g  Lungs: CTA b/l, no rales/ronchi/wheezes  Abd: soft, nontender, non-distended, no rebound or guarding  Ext: no clubbing/cyanosis/edema, no gross bony deformities, all extremities demonstrates normal motion throughout, no skin changes, normal cap refill in all extremities   Neuro: sensation and muscle strength intact b/l, steady gait, no focal weakness or sensory loss, CN2-12 intact b/l
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
## 2023-07-18 NOTE — ED PROVIDER NOTE - PATIENT PORTAL LINK FT
You can access the FollowMyHealth Patient Portal offered by [facility] by registering at the following website: [email or web address]. By joining [employer]’s FollowMyHealth portal, you will also be able to view your health information using other applications (apps) compatible with our system.

## 2023-07-18 NOTE — ED PROVIDER NOTE - CLINICAL SUMMARY MEDICAL DECISION MAKING FREE TEXT BOX
33 yo F with b/l foot pain, L hand pain, doubt fracture  -XR L hand, feet, tylenol for pain, ICE  -f/u results, reeval

## 2023-07-18 NOTE — ED PROVIDER NOTE - PROGRESS NOTE DETAILS
Results reported to patient--grossly benign, xr shows no acute bony pathology/trauma   Pt. reports feeling better after meds, walking about ER without issue all night  pt. agrees to f/u with primary care outpt. asap  pt. understands to return to ED if symptoms worsen; will d/c

## 2023-07-18 NOTE — ED PROVIDER NOTE - CARE PROVIDER_API CALL
Sonu Lopez  Internal Medicine  [address] [place] [id number]  Phone: [phone]  Fax: [phone]  Follow Up Time: Urgent

## 2023-07-18 NOTE — ED PROVIDER NOTE - CARE PROVIDERS DIRECT ADDRESSES
[email or web address]
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
## 2023-07-19 VITALS
TEMPERATURE: 98 F | RESPIRATION RATE: 17 BRPM | SYSTOLIC BLOOD PRESSURE: 99 MMHG | DIASTOLIC BLOOD PRESSURE: 67 MMHG | OXYGEN SATURATION: 98 % | HEART RATE: 90 BPM

## 2023-07-19 PROCEDURE — 73630 X-RAY EXAM OF FOOT: CPT | Mod: 26,50

## 2023-07-19 PROCEDURE — 73130 X-RAY EXAM OF HAND: CPT | Mod: 26,LT

## 2023-07-21 ENCOUNTER — EMERGENCY (EMERGENCY)
Facility: HOSPITAL | Age: 33
LOS: 0 days | Discharge: HOME | End: 2023-07-21
Attending: STUDENT IN AN ORGANIZED HEALTH CARE EDUCATION/TRAINING PROGRAM
Payer: MEDICAID

## 2023-07-21 ENCOUNTER — EMERGENCY (EMERGENCY)
Facility: HOSPITAL | Age: 33
LOS: 1 days | Discharge: NURSING, REHAB OR LONG-TERM CARE | End: 2023-07-21
Attending: EMERGENCY MEDICINE | Admitting: EMERGENCY MEDICINE
Payer: MEDICAID

## 2023-07-21 VITALS
HEART RATE: 101 BPM | RESPIRATION RATE: 16 BRPM | DIASTOLIC BLOOD PRESSURE: 81 MMHG | OXYGEN SATURATION: 98 % | HEIGHT: 62 IN | SYSTOLIC BLOOD PRESSURE: 123 MMHG | TEMPERATURE: 98 F

## 2023-07-21 VITALS
RESPIRATION RATE: 18 BRPM | TEMPERATURE: 98 F | SYSTOLIC BLOOD PRESSURE: 93 MMHG | OXYGEN SATURATION: 100 % | WEIGHT: 272.05 LBS | DIASTOLIC BLOOD PRESSURE: 62 MMHG | HEIGHT: 62 IN | HEART RATE: 70 BPM

## 2023-07-21 VITALS
TEMPERATURE: 98 F | OXYGEN SATURATION: 98 % | HEART RATE: 84 BPM | SYSTOLIC BLOOD PRESSURE: 107 MMHG | RESPIRATION RATE: 18 BRPM | DIASTOLIC BLOOD PRESSURE: 76 MMHG

## 2023-07-21 DIAGNOSIS — M79.10 MYALGIA, UNSPECIFIED SITE: ICD-10-CM

## 2023-07-21 DIAGNOSIS — Z91.013 ALLERGY TO SEAFOOD: ICD-10-CM

## 2023-07-21 DIAGNOSIS — Z79.84 LONG TERM (CURRENT) USE OF ORAL HYPOGLYCEMIC DRUGS: ICD-10-CM

## 2023-07-21 DIAGNOSIS — F31.9 BIPOLAR DISORDER, UNSPECIFIED: ICD-10-CM

## 2023-07-21 DIAGNOSIS — F25.9 SCHIZOAFFECTIVE DISORDER, UNSPECIFIED: ICD-10-CM

## 2023-07-21 DIAGNOSIS — J45.909 UNSPECIFIED ASTHMA, UNCOMPLICATED: ICD-10-CM

## 2023-07-21 DIAGNOSIS — F79 UNSPECIFIED INTELLECTUAL DISABILITIES: ICD-10-CM

## 2023-07-21 DIAGNOSIS — Z91.018 ALLERGY TO OTHER FOODS: ICD-10-CM

## 2023-07-21 DIAGNOSIS — E11.9 TYPE 2 DIABETES MELLITUS WITHOUT COMPLICATIONS: ICD-10-CM

## 2023-07-21 DIAGNOSIS — R62.50 UNSPECIFIED LACK OF EXPECTED NORMAL PHYSIOLOGICAL DEVELOPMENT IN CHILDHOOD: ICD-10-CM

## 2023-07-21 PROCEDURE — 73090 X-RAY EXAM OF FOREARM: CPT | Mod: 26,LT

## 2023-07-21 PROCEDURE — L9997: CPT

## 2023-07-21 PROCEDURE — 99284 EMERGENCY DEPT VISIT MOD MDM: CPT

## 2023-07-21 PROCEDURE — 73120 X-RAY EXAM OF HAND: CPT | Mod: 26,LT

## 2023-07-21 PROCEDURE — 73562 X-RAY EXAM OF KNEE 3: CPT | Mod: 26,RT

## 2023-07-21 RX ORDER — ACETAMINOPHEN 500 MG
650 TABLET ORAL ONCE
Refills: 0 | Status: COMPLETED | OUTPATIENT
Start: 2023-07-21 | End: 2023-07-21

## 2023-07-21 RX ADMIN — Medication 650 MILLIGRAM(S): at 22:17

## 2023-07-21 NOTE — ED ADULT NURSE NOTE - OBJECTIVE STATEMENT
Pt presents from group home. C/o abdominal pain. Pt was in the ED this weekend, states that she is having the same pain. Pt resting in bed, came in eating food. Currently sitting in bed.

## 2023-07-21 NOTE — ED PROVIDER NOTE - NSICDXPASTMEDICALHX_GEN_ALL_CORE_FT
PAST MEDICAL HISTORY:  Asthma     Bipolar disorder     Development delay     DM (diabetes mellitus)     Intellectual disability     Schizoaffective disorder     Schizophrenia

## 2023-07-21 NOTE — ED PROVIDER NOTE - CCCP TRG CHIEF CMPLNT
elevate extremity/no exercise/no heavy lifting/weight bearing as tolerated/cane or crutches as needed for assistance with walking/no sports/gym abdominal pain

## 2023-07-21 NOTE — ED PROVIDER NOTE - PHYSICAL EXAMINATION
General: Well appearing obese female in no acute distress  HEENT: Normocephalic, atraumatic. Moist mucous membranes. Oropharynx clear. No lymphadenopathy.  Eyes: No scleral icterus. EOMI. EMERALD.  Neck:. Soft and supple. Full ROM without pain. No midline tenderness  Cardiac: Regular rate and regular rhythm. No murmurs, rubs, gallops. Peripheral pulses 2+ and symmetric. No LE edema.  Resp: Lungs CTAB. Speaking in full sentences. No wheezes, rales or rhonchi.  Abd: Soft, non-tender, non-distended. No guarding or rebound. No scars, masses, or lesions.  Back: Spine midline and non-tender. No CVA tenderness.    Skin: No rashes, abrasions, or lacerations.  Neuro: AO x 3. Moves all extremities symmetrically. Motor strength and sensation grossly intact.

## 2023-07-21 NOTE — ED PROVIDER NOTE - NS ED ROS FT
General: Denies fever, chills  HEENT: Denies sensory changes, sore throat  Neck: Denies neck pain, neck stiffness  Resp: Denies coughing, SOB  Cardiovascular: Denies CP, palpitations, LE edema  GI: Denies nausea, vomiting, abdominal pain, diarrhea, constipation, blood in stool  : Denies dysuria, hematuria, frequency, incontinence  MSK: + myalgias over upper torso, back and arms  Neuro: Denies HA, dizziness, numbness, weakness  Skin: Denies rashes.

## 2023-07-21 NOTE — ED ADULT NURSE NOTE - PAIN RATING/NUMBER SCALE (0-10): ACTIVITY
Abdomen soft, non-tender and non-distended without organomegaly or masses. Normal bowel sounds. 0 (no pain/absence of nonverbal indicators of pain)

## 2023-07-21 NOTE — ED PROVIDER NOTE - PATIENT PORTAL LINK FT
You can access the FollowMyHealth Patient Portal offered by [facility] by registering at the following website: [email or web address]. By joining [employer]’s FollowMyHealth portal, you will also be able to view your health information using other applications (apps) compatible with our system.
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
## 2023-07-21 NOTE — ED ADULT TRIAGE NOTE - CHIEF COMPLAINT QUOTE
patient from [facility] in [place] c/o abdominal pains and pain all over, added she is "8 months pregnant with 9 babies."

## 2023-07-21 NOTE — ED ADULT NURSE NOTE - CHIEF COMPLAINT QUOTE
patient from [facility] in [place] c/o abdominal pains and pain all over, added she is "8 months pregnant with 9 babies."

## 2023-07-21 NOTE — ED PROVIDER NOTE - PATIENT PORTAL LINK FT
You can access the FollowMyHealth Patient Portal offered by [facility] by registering at the following website: [email or web address]. By joining [employer]’s FollowMyHealth portal, you will also be able to view your health information using other applications (apps) compatible with our system.

## 2023-07-21 NOTE — ED PROVIDER NOTE - NSICDXPASTMEDICALHX_GEN_ALL_CORE_FT
PAST MEDICAL HISTORY:  Bipolar disorder     Development delay     DM (diabetes mellitus)     Intellectual disability     Schizoaffective disorder     Schizophrenia      PAST MEDICAL HISTORY:  Asthma     Bipolar disorder     Development delay     DM (diabetes mellitus)     Intellectual disability     Schizoaffective disorder     Schizophrenia

## 2023-07-21 NOTE — ED ADULT NURSE NOTE - NSFALLUNIVINTERV_ED_ALL_ED
Bed/Stretcher in lowest position, wheels locked, appropriate side rails in place/Call bell, personal items and telephone in reach/Instruct patient to call for assistance before getting out of bed/chair/stretcher/Non-slip footwear applied when patient is off stretcher/[place] to call system/Physically safe environment - no spills, clutter or unnecessary equipment/Purposeful proactive rounding/Room/bathroom lighting operational, light cord in reach
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
## 2023-07-21 NOTE — ED PROVIDER NOTE - CHIEF COMPLAINT
The patient is a 32y Female complaining of abdominal pain. The patient is a 32y Female complaining of body aches
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
## 2023-07-21 NOTE — ED PROVIDER NOTE - NSFOLLOWUPINSTRUCTIONS_ED_ALL_ED_FT
You are seen in emergency department with pain in your left arm and right knee.  X-rays were performed to evaluate for broken bones and these were negative.  You received Tylenol to control your pain.  A pregnancy test was performed which was also negative.  The results of all tests that were done today are attached to this documentation.    Please return to the emergency department if your symptoms persist or worsen    Follow-up with your primary care physician.

## 2023-07-22 ENCOUNTER — EMERGENCY (EMERGENCY)
Facility: HOSPITAL | Age: 33
LOS: 0 days | Discharge: HOME | End: 2023-07-22
Payer: MEDICAID

## 2023-07-22 ENCOUNTER — EMERGENCY (EMERGENCY)
Facility: HOSPITAL | Age: 33
LOS: 0 days | Discharge: HOME | End: 2023-07-23
Attending: EMERGENCY MEDICINE
Payer: MEDICAID

## 2023-07-22 VITALS
WEIGHT: 272.05 LBS | DIASTOLIC BLOOD PRESSURE: 76 MMHG | TEMPERATURE: 98 F | HEART RATE: 90 BPM | SYSTOLIC BLOOD PRESSURE: 115 MMHG | OXYGEN SATURATION: 99 % | RESPIRATION RATE: 19 BRPM | HEIGHT: 62 IN

## 2023-07-22 VITALS
HEART RATE: 100 BPM | DIASTOLIC BLOOD PRESSURE: 73 MMHG | TEMPERATURE: 98 F | RESPIRATION RATE: 19 BRPM | SYSTOLIC BLOOD PRESSURE: 103 MMHG | WEIGHT: 272.05 LBS | HEIGHT: 62 IN | OXYGEN SATURATION: 100 %

## 2023-07-22 VITALS
HEART RATE: 88 BPM | DIASTOLIC BLOOD PRESSURE: 80 MMHG | SYSTOLIC BLOOD PRESSURE: 102 MMHG | RESPIRATION RATE: 17 BRPM | OXYGEN SATURATION: 98 %

## 2023-07-22 VITALS
TEMPERATURE: 98 F | SYSTOLIC BLOOD PRESSURE: 100 MMHG | DIASTOLIC BLOOD PRESSURE: 60 MMHG | RESPIRATION RATE: 16 BRPM | HEART RATE: 71 BPM | OXYGEN SATURATION: 100 %

## 2023-07-22 DIAGNOSIS — M79.605 PAIN IN LEFT LEG: ICD-10-CM

## 2023-07-22 DIAGNOSIS — Z86.59 PERSONAL HISTORY OF OTHER MENTAL AND BEHAVIORAL DISORDERS: ICD-10-CM

## 2023-07-22 DIAGNOSIS — E11.9 TYPE 2 DIABETES MELLITUS WITHOUT COMPLICATIONS: ICD-10-CM

## 2023-07-22 DIAGNOSIS — J45.909 UNSPECIFIED ASTHMA, UNCOMPLICATED: ICD-10-CM

## 2023-07-22 DIAGNOSIS — Z91.013 ALLERGY TO SEAFOOD: ICD-10-CM

## 2023-07-22 DIAGNOSIS — M79.604 PAIN IN RIGHT LEG: ICD-10-CM
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
## 2023-07-22 DIAGNOSIS — R42 DIZZINESS AND GIDDINESS: ICD-10-CM
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
## 2023-07-22 DIAGNOSIS — Z79.84 LONG TERM (CURRENT) USE OF ORAL HYPOGLYCEMIC DRUGS: ICD-10-CM

## 2023-07-22 DIAGNOSIS — F31.9 BIPOLAR DISORDER, UNSPECIFIED: ICD-10-CM
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
## 2023-07-22 DIAGNOSIS — Z91.018 ALLERGY TO OTHER FOODS: ICD-10-CM

## 2023-07-22 DIAGNOSIS — T73.0XXA STARVATION, INITIAL ENCOUNTER: ICD-10-CM

## 2023-07-22 DIAGNOSIS — F25.9 SCHIZOAFFECTIVE DISORDER, UNSPECIFIED: ICD-10-CM

## 2023-07-22 PROCEDURE — 99284 EMERGENCY DEPT VISIT MOD MDM: CPT

## 2023-07-22 PROCEDURE — 99283 EMERGENCY DEPT VISIT LOW MDM: CPT

## 2023-07-22 RX ORDER — HALOPERIDOL DECANOATE 100 MG/ML
5 INJECTION INTRAMUSCULAR ONCE
Refills: 0 | Status: COMPLETED | OUTPATIENT
Start: 2023-07-22 | End: 2023-07-22

## 2023-07-22 RX ORDER — ACETAMINOPHEN 500 MG
650 TABLET ORAL ONCE
Refills: 0 | Status: COMPLETED | OUTPATIENT
Start: 2023-07-22 | End: 2023-07-22

## 2023-07-22 RX ADMIN — Medication 2 MILLIGRAM(S): at 21:44

## 2023-07-22 RX ADMIN — HALOPERIDOL DECANOATE 5 MILLIGRAM(S): 100 INJECTION INTRAMUSCULAR at 22:19

## 2023-07-22 RX ADMIN — Medication 650 MILLIGRAM(S): at 10:35

## 2023-07-22 NOTE — ED PROVIDER NOTE - OBJECTIVE STATEMENT
33 yo F with lightheadedness 2/2 not eating dinner.  Pt. denies other complaints, states she feels otherwise well.  Pt. denies urinary symptoms, she denies possibility of pregnancy.  ROS: negative for fever, cough, headache, chest pain, shortness of breath, abd pain, nausea, vomiting, diarrhea, rash, paresthesia, and weakness--all other systems reviewed are negative.   PMH: Dm, asthma; Meds: See EMR for list; SH: Denies smoking/drinking/drug use

## 2023-07-22 NOTE — ED PROVIDER NOTE - PHYSICAL EXAMINATION
Vitals: WNL  Gen: AAOx3, NAD, sitting comfortably in stretcher  Head: ncat, perrla, eomi b/l  Neck: supple, no lymphadenopathy, no midline deviation  Heart: rrr, no m/r/g  Lungs: CTA b/l, no rales/ronchi/wheezes  Abd: soft, nontender, non-distended, no rebound or guarding  Ext: no clubbing/cyanosis/edema  Neuro: sensation and muscle strength intact b/l, steady gait, no focal weakness or sensory loss

## 2023-07-22 NOTE — ED ADULT NURSE NOTE - NSFALLUNIVINTERV_ED_ALL_ED
Bed/Stretcher in lowest position, wheels locked, appropriate side rails in place/Call bell, personal items and telephone in reach/Instruct patient to call for assistance before getting out of bed/chair/stretcher/Non-slip footwear applied when patient is off stretcher/[place] to call system/Physically safe environment - no spills, clutter or unnecessary equipment/Purposeful proactive rounding/Room/bathroom lighting operational, light cord in reach

## 2023-07-22 NOTE — ED PROVIDER NOTE - CLINICAL SUMMARY MEDICAL DECISION MAKING FREE TEXT BOX
33 yo F with dizziness 2/2 not eating dinner, no other complaints or associated symptoms; no indication for labs/imaging at this time   -feed pt., d/c back to group home
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
## 2023-07-22 NOTE — PROVIDER CONTACT NOTE (OTHER) - SITUATION
Writer informed by MD that pt is cleared for discharge will require SW assistance with transportation back to group home. Writer attempted multiple times to contact pt group

## 2023-07-22 NOTE — PROVIDER CONTACT NOTE (OTHER) - ASSESSMENT
in past d/c notes as preferred mode of transport. Chart address also confirmed in prior SW note from on 7/11/23. [facility] arranged with [facility] for 5am p/up with trip #101A. Writer provided MAS invoice #[id number]. Non emergent transportation form to be placed in chart. Writer will continue to outreach group home staff.

## 2023-07-22 NOTE — PROVIDER CONTACT NOTE (OTHER) - BACKGROUND
home at [phone] (SUPERVISOR) and [phone] with no answer. Writer will continue to call. At this time transport has been arranged for [facility] with SUPERVISION as previously documented
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
## 2023-07-22 NOTE — ED ADULT NURSE NOTE - EXTENSIONS OF SELF_ADULT
(Admit Diagnosis) Infection due to severe acute respiratory syndrome coronavirus 2 (SARS-CoV-2)  (PMH) Osteoarthritis  (PMH) Hypertension  (PSH) Status post hip surgery  (PSH) History of hernia surgery  (Secondary DC/DX) Acute respiratory failure with hypoxia     None

## 2023-07-22 NOTE — ED ADULT NURSE NOTE - NSFALLUNIVINTERV_ED_ALL_ED
Bed/Stretcher in lowest position, wheels locked, appropriate side rails in place/Call bell, personal items and telephone in reach/Instruct patient to call for assistance before getting out of bed/chair/stretcher/Non-slip footwear applied when patient is off stretcher/[place] to call system/Physically safe environment - no spills, clutter or unnecessary equipment/Purposeful proactive rounding/Room/bathroom lighting operational, light cord in reach

## 2023-07-22 NOTE — PROVIDER CONTACT NOTE (OTHER) - RECOMMENDATIONS
Writer attempted contact to group home again and reached staff member, Lynn, who was informed of pt's discharge. No concerns reported.

## 2023-07-22 NOTE — ED ADULT NURSE NOTE - CHIEF COMPLAINT QUOTE
Pt marychuy from [facility] group home with c/o b/l knee pain x today

## 2023-07-22 NOTE — ED PROVIDER NOTE - PATIENT PORTAL LINK FT
You can access the FollowMyHealth Patient Portal offered by [facility] by registering at the following website: [email or web address]. By joining [employer]’s FollowMyHealth portal, you will also be able to view your health information using other applications (apps) compatible with our system.

## 2023-07-22 NOTE — ED ADULT NURSE REASSESSMENT NOTE - NS ED NURSE REASSESS COMMENT FT1
received pt from ADRY Kumar. pt is AOx3, pt is resting in stretcher at this time, NADN. pt is awaiting ambulance pickup at this time. no orders pending.

## 2023-07-22 NOTE — ED PROVIDER NOTE - NSFOLLOWUPINSTRUCTIONS_ED_ALL_ED_FT
Rest, drink plenty of fluids.  Advance activity as tolerated.  Continue all previously prescribed medications as directed.  Follow up with your primary care physician in 48-72 hours- bring copies of your results.  Return to the ER for worsening or persistent symptoms, and/or ANY NEW OR CONCERNING SYMPTOMS. If you have issues obtaining follow up, please call: [phone] to obtain a doctor or specialist who takes your insurance in your area.  You may call [phone] to make an appointment with the internal medicine clinic.

## 2023-07-22 NOTE — ED PROVIDER NOTE - PHYSICAL EXAMINATION
GEN: Awake, alert, interactive, NAD.  HEAD AND NECK: NC/AT. Airway patent. Neck supple.   EYES:  Clear b/l.    ENT: Moist mucus membranes.   CARDIAC: Regular rate, regular rhythm. No evident pedal edema.    RESP/CHEST: Normal respiratory effort with no use of accessory muscles or retractions. Clear throughout on auscultation.  ABD: soft, non-distended, non-tender. No rebound, no guarding.   BACK: No midline spinal TTP. No CVAT.   EXTREMITIES: No deformity, (+) FROM of the bilateral LE , (-) erythema, (-) edema, (+) pulses intact.   SKIN: Warm, dry, intact normal color. No rash.   NEURO: Alert , no focal deficits.   PSYCH: Appropriate mood and affect. GEN: Awake, alert, interactive, NAD. Sleeping comfortably  HEAD AND NECK: NC/AT. Airway patent. Neck supple.   EYES:  Clear b/l.    ENT: Moist mucus membranes.   CARDIAC: Regular rate, regular rhythm. No evident pedal edema.    RESP/CHEST: Normal respiratory effort with no use of accessory muscles or retractions. Clear throughout on auscultation.  ABD: soft, non-distended, non-tender. No rebound, no guarding.   BACK: No midline spinal TTP. No CVAT.   EXTREMITIES: No deformity, (+) FROM of the bilateral LE , (-) erythema, (-) edema, (+) pulses intact.   SKIN: Warm, dry, intact normal color. No rash.   NEURO: Alert , no focal deficits.   PSYCH: Appropriate mood and affect.

## 2023-07-22 NOTE — ED PROVIDER NOTE - NS ED ROS FT
CONSTITUTIONAL: No fever, no chills, no fatigue  EYES: No visual changes  ENT: No ear pain, no sore throat  CARDIOVASCULAR: No chest pain, no palpitations  RESPIRATORY: No cough, no SOB  GI: No abdominal pain, no nausea, no vomiting, no constipation, no diarrhea  GENITOURINARY: No dysuria, no frequency, no hematuria  MUSKULOSKELETAL: No backpain, no joint pain.   SKIN: No rash  NEURO: No headache    ALL OTHER SYSTEMS NEGATIVE.

## 2023-07-22 NOTE — ED PROVIDER NOTE - CLINICAL SUMMARY MEDICAL DECISION MAKING FREE TEXT BOX
31 y/o female with DM, asthma from group home here with bilateral LE pain x 1 day after stretching it.   Chart reviewed pt was just seen at [facility] ED few hours ago for similar complaint and had imaging and pregnancy test done which was negative.   Likely MSK pain.   Will treat with tylenol and discharge patient back to group home. 31 y/o female with DM, asthma from group home here with bilateral LE pain x 1 day after stretching it.   Chart reviewed pt was just seen at [facility] ED few hours ago and had imaging done.   Likely MSK pain.   Will treat with tylenol. Pt reassessed and reports feeling better. Sleeping comfortably NAD.   pt stable to be discharged back to [facility].

## 2023-07-22 NOTE — ED ADULT NURSE REASSESSMENT NOTE - NS ED NURSE REASSESS COMMENT FT1
patient observed trying to elope from room. patient swinging arms and legs at staff. patient yelling profanity loudly in hallway and needs frequent redirection. Patient not cooperative with staff, security and ADNClyde at bedside. patient awaiting transportation back to [facility] home. Patient provided with medication and placed close to nursing station.

## 2023-07-22 NOTE — ED ADULT NURSE NOTE - OBJECTIVE STATEMENT
A&oX4, c/o dizziness due to not eating dinner tonight. pt irritable and aggressive, states "I want to go to another hospital, give me a metrocard so I can leave". pt informed MD to see pt. Pt refusing to speak regarding signs and symptoms at this time. pt becoming increasing aggressive towards staff. pt walking around unit, multiple attempts made to redirect patient. pt throwing cardigan at staff. pt screaming profanity. A&oX4, c/o dizziness due to not eating dinner tonight. pt irritable and aggressive, states "I want to go to another hospital, give me a metrocard so I can leave". pt informed MD to see pt. Pt refusing to speak regarding signs and symptoms at this time. pt refusing to answer questions.  pt becoming increasing aggressive towards staff. pt walking around unit, multiple attempts made to redirect patient. pt throwing cardigan at staff. pt screaming profanity.

## 2023-07-22 NOTE — ED ADULT TRIAGE NOTE - CODE STROKE ACTIVE YN
Date of Surgery Update:  Jeremías Barnes was seen and examined. History and physical has been reviewed. The patient has been examined. There have been no significant clinical changes since the completion of the originally dated History and Physical. Will proceed with robotic cholecystectomy with firefly.      Signed By: Bhavin Nichols MD     February 16, 2018 7:02 AM
No

## 2023-07-22 NOTE — ED PROVIDER NOTE - OBJECTIVE STATEMENT
33 y/o female with Dm, asthma from group home here with bilateral leg pain after stretching her legs x 1 day. Pt reports being able to walk. Pt currently wants to eat but states she was already given food. Pt states she is 9 months pregnant with 9 babies. Chart reviewed pt was seen in [facility] ED few hours ago for similar complaints and had imaging done and pregnancy test which was negative. Denies chest pain, dyspnea, abdominal pain, vaginal bleeding. 31 y/o female with Dm, asthma from group home here with bilateral leg pain after stretching her legs x 1 day. Pt reports being able to walk. Pt currently wants to eat but states she was already given food. Chart reviewed pt was seen in [facility] ED few hours ago for similar complaints and had imaging done and pregnancy test which was negative. Denies chest pain, dyspnea, abdominal pain, vaginal bleeding.

## 2023-07-22 NOTE — ED ADULT TRIAGE NOTE - CHIEF COMPLAINT QUOTE
Pt marychuy from [facility] group home with c/o b/l knee pain x today

## 2023-07-22 NOTE — ED ADULT NURSE NOTE - NSFALLUNIVINTERV_ED_ALL_ED
Bed/Stretcher in lowest position, wheels locked, appropriate side rails in place/Call bell, personal items and telephone in reach/Instruct patient to call for assistance before getting out of bed/chair/stretcher/Non-slip footwear applied when patient is off stretcher/[place] to call system/Physically safe environment - no spills, clutter or unnecessary equipment/Purposeful proactive rounding/Room/bathroom lighting operational, light cord in reach

## 2023-07-22 NOTE — ED ADULT NURSE REASSESSMENT NOTE - NS ED NURSE REASSESS COMMENT FT1
pt aggressive with staff members, walking around and yelling profanities.  multiple attempts made to redirect patient and provide therapeutic communication. pt attempted to leave ED multiple times. Dr. Pereira informed. Security and Assistant director of nursing Elliot at bedside. Medications prescribed by Dr. Pereira and to be administered as prescribed. no acute distress noted. Respirations even and unlabored. pt ambulatory with steady gait.

## 2023-07-22 NOTE — ED ADULT TRIAGE NOTE - HEIGHT IN FEET
[FreeTextEntry1] : Chronic dyspnea probably noncardiac based on above testing, although  diastolic dysfunction is present.
5

## 2023-07-22 NOTE — ED PROVIDER NOTE - PROGRESS NOTE DETAILS
pt. becoming agitated while waiting for ride back to group home  limit setting/redirection attempted without success--pt. now ambulating around ER yelling at staff and patients   will given ativan for anxiolysis--pt. is currently not suicidal/homicidal/hallucinating   will observe and wait for pickup  multiple sandwiches offered for comfort as well

## 2023-07-22 NOTE — ED PROVIDER NOTE - PATIENT PORTAL LINK FT
You can access the FollowMyHealth Patient Portal offered by [facility] by registering at the following website: [email or web address]. By joining [employer]’s FollowMyHealth portal, you will also be able to view your health information using other applications (apps) compatible with our system.

## 2023-07-23 VITALS
OXYGEN SATURATION: 100 % | HEART RATE: 95 BPM | DIASTOLIC BLOOD PRESSURE: 64 MMHG | RESPIRATION RATE: 18 BRPM | SYSTOLIC BLOOD PRESSURE: 102 MMHG | TEMPERATURE: 97 F

## 2023-07-24 PROBLEM — J45.909 UNSPECIFIED ASTHMA, UNCOMPLICATED: Chronic | Status: ACTIVE | Noted: 2023-07-22
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
## 2023-07-29 ENCOUNTER — EMERGENCY (EMERGENCY)
Facility: HOSPITAL | Age: 33
LOS: 0 days | Discharge: HOME | End: 2023-07-30
Attending: STUDENT IN AN ORGANIZED HEALTH CARE EDUCATION/TRAINING PROGRAM
Payer: MEDICAID

## 2023-07-29 VITALS
WEIGHT: 272.05 LBS | RESPIRATION RATE: 16 BRPM | SYSTOLIC BLOOD PRESSURE: 107 MMHG | TEMPERATURE: 98 F | HEART RATE: 94 BPM | DIASTOLIC BLOOD PRESSURE: 76 MMHG | OXYGEN SATURATION: 99 % | HEIGHT: 62 IN

## 2023-07-29 DIAGNOSIS — F79 UNSPECIFIED INTELLECTUAL DISABILITIES: ICD-10-CM

## 2023-07-29 DIAGNOSIS — M79.602 PAIN IN LEFT ARM: ICD-10-CM

## 2023-07-29 DIAGNOSIS — F25.9 SCHIZOAFFECTIVE DISORDER, UNSPECIFIED: ICD-10-CM

## 2023-07-29 DIAGNOSIS — Z91.018 ALLERGY TO OTHER FOODS: ICD-10-CM

## 2023-07-29 DIAGNOSIS — R62.50 UNSPECIFIED LACK OF EXPECTED NORMAL PHYSIOLOGICAL DEVELOPMENT IN CHILDHOOD: ICD-10-CM

## 2023-07-29 DIAGNOSIS — J45.909 UNSPECIFIED ASTHMA, UNCOMPLICATED: ICD-10-CM

## 2023-07-29 DIAGNOSIS — E11.9 TYPE 2 DIABETES MELLITUS WITHOUT COMPLICATIONS: ICD-10-CM

## 2023-07-29 DIAGNOSIS — F31.9 BIPOLAR DISORDER, UNSPECIFIED: ICD-10-CM

## 2023-07-29 DIAGNOSIS — Z79.84 LONG TERM (CURRENT) USE OF ORAL HYPOGLYCEMIC DRUGS: ICD-10-CM

## 2023-07-29 DIAGNOSIS — Z91.013 ALLERGY TO SEAFOOD: ICD-10-CM

## 2023-07-29 PROCEDURE — 99283 EMERGENCY DEPT VISIT LOW MDM: CPT
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
## 2023-07-29 NOTE — ED ADULT NURSE NOTE - NSFALLUNIVINTERV_ED_ALL_ED
Bed/Stretcher in lowest position, wheels locked, appropriate side rails in place/Call bell, personal items and telephone in reach/Instruct patient to call for assistance before getting out of bed/chair/stretcher/Non-slip footwear applied when patient is off stretcher/[place] to call system/Physically safe environment - no spills, clutter or unnecessary equipment/Purposeful proactive rounding/Room/bathroom lighting operational, light cord in reach

## 2023-07-29 NOTE — ED ADULT NURSE NOTE - PRO INTERPRETER NEED 2
Patient Instructions by Reba Syed CMA at 02/27/18 04:57 PM     Author:  Reba Syed CMA Service:  (none) Author Type:  Certified Medical Assistant     Filed:  02/27/18 04:57 PM Encounter Date:  2/27/2018 Status:  Signed     :  Reba Syed CMA (Certified Medical Assistant)            Additional phones numbers:   OB/Gyn at the [facility] - Phone # is [phone]     Psychiatry at the [facility] - Phone # is [phone]    Family Medicine [facility] Clinic Hours:  Monday - Friday from 8:00 a.m. to 5:00 p.m.  Saturday from 8:00 a.m. to 12:00 p.m.    Additional Educational Resources:  For additional resources regarding your symptoms, diagnosis, or further health information, please visit the Health Resources section on [email or web address] or the Online Health Resources section in [employer].          Revision History        User Key Date/Time User Provider Type Action    > [N/A] 02/27/18 04:57 PM Reba Seyd CMA Certified Medical Assistant Sign            
English
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
## 2023-07-30 VITALS
TEMPERATURE: 98 F | HEART RATE: 99 BPM | RESPIRATION RATE: 18 BRPM | DIASTOLIC BLOOD PRESSURE: 65 MMHG | SYSTOLIC BLOOD PRESSURE: 103 MMHG | OXYGEN SATURATION: 99 %

## 2023-07-30 NOTE — ED PROVIDER NOTE - NS ED ROS FT
Gen: No fever, normal appetite  Resp: No cough or trouble breathing  Cardiovascular: No chest pain or palpitation  Gastroenteric: No nausea/vomiting, diarrhea, constipation  :  No change in urine output; no dysuria  MS: No joint or muscle pain  Skin: No rashes  Neuro: No headache; no abnormal movements  Remainder negative, except as per the HPI

## 2023-07-30 NOTE — ED PROVIDER NOTE - PHYSICAL EXAMINATION
Gen: AOX3, NAD  Head: NCAT  ENT: Airway patent, moist mucous membranes, nasal passageways clear   Cardiac: Normal rate, normal rhythm, no murmurs   Respiratory: Lungs CTA B/L  Gastrointestinal: Abdomen soft, nontender, nondistended, no rebound, no guarding  MSK: No gross abnormalities, FROM of all four extremities, no edema  HEME: Extremities warm, pulses intact and symmetrical in all four extremities  Skin: No rashes, no lesions  Neuro: No gross neurologic deficits  Psych: no SI/ HI
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
## 2023-07-30 NOTE — ED PROVIDER NOTE - PATIENT PORTAL LINK FT
You can access the FollowMyHealth Patient Portal offered by [facility] by registering at the following website: [email or web address]. By joining [employer]’s FollowMyHealth portal, you will also be able to view your health information using other applications (apps) compatible with our system.
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
## 2023-07-30 NOTE — ED ADULT NURSE REASSESSMENT NOTE - NS ED NURSE REASSESS COMMENT FT1
Pt sleeping in stretcher at this time. Pt respirations equal and unlabored. VS stable. Pt awaiting SW in the AM

## 2023-07-30 NOTE — ED ADULT NURSE REASSESSMENT NOTE - NS ED NURSE REASSESS COMMENT FT1
Received report from night RN, patient A&Ox4 has comfortable appearance, resting with eyes closed. Awaiting social work
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
## 2023-08-01 ENCOUNTER — EMERGENCY (EMERGENCY)
Facility: HOSPITAL | Age: 33
LOS: 0 days | Discharge: HOME | End: 2023-08-02
Attending: EMERGENCY MEDICINE
Payer: MEDICAID

## 2023-08-01 VITALS
TEMPERATURE: 98 F | OXYGEN SATURATION: 99 % | HEART RATE: 84 BPM | WEIGHT: 171.96 LBS | DIASTOLIC BLOOD PRESSURE: 83 MMHG | RESPIRATION RATE: 16 BRPM | SYSTOLIC BLOOD PRESSURE: 139 MMHG | HEIGHT: 62 IN
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
## 2023-08-01 DIAGNOSIS — Z86.59 PERSONAL HISTORY OF OTHER MENTAL AND BEHAVIORAL DISORDERS: ICD-10-CM

## 2023-08-01 DIAGNOSIS — J45.909 UNSPECIFIED ASTHMA, UNCOMPLICATED: ICD-10-CM

## 2023-08-01 DIAGNOSIS — Z79.84 LONG TERM (CURRENT) USE OF ORAL HYPOGLYCEMIC DRUGS: ICD-10-CM

## 2023-08-01 DIAGNOSIS — F79 UNSPECIFIED INTELLECTUAL DISABILITIES: ICD-10-CM

## 2023-08-01 DIAGNOSIS — Z91.013 ALLERGY TO SEAFOOD: ICD-10-CM

## 2023-08-01 DIAGNOSIS — F25.0 SCHIZOAFFECTIVE DISORDER, BIPOLAR TYPE: ICD-10-CM

## 2023-08-01 DIAGNOSIS — Z91.018 ALLERGY TO OTHER FOODS: ICD-10-CM

## 2023-08-01 DIAGNOSIS — R06.02 SHORTNESS OF BREATH: ICD-10-CM

## 2023-08-01 DIAGNOSIS — R07.9 CHEST PAIN, UNSPECIFIED: ICD-10-CM

## 2023-08-01 LAB — HCG UR QL: NEGATIVE — SIGNIFICANT CHANGE UP

## 2023-08-01 PROCEDURE — 93010 ELECTROCARDIOGRAM REPORT: CPT

## 2023-08-01 PROCEDURE — 99284 EMERGENCY DEPT VISIT MOD MDM: CPT
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
## 2023-08-01 NOTE — ED ADULT NURSE REASSESSMENT NOTE - NS ED NURSE REASSESS COMMENT FT1
Pt resting comfortably at this time. No s/s of pain noted. Food and drink given for refreshment, urine pregnancy sent and resulted. Arranging pickup for transport back to group home.
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
## 2023-08-01 NOTE — ED PROVIDER NOTE - PATIENT PORTAL LINK FT
You can access the FollowMyHealth Patient Portal offered by [facility] by registering at the following website: [email or web address]. By joining [employer]’s FollowMyHealth portal, you will also be able to view your health information using other applications (apps) compatible with our system.
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
## 2023-08-01 NOTE — ED PROVIDER NOTE - DISPOSITION TYPE
SUBJECTIVE:    Patient ID: Alejo Zafar is a 40 y.o. male.    Chief Complaint: Follow-up (Went over meds verbally// SW)    This is a 40 year old male presenting for follow up. Patient reports of having some small dermatologic concerns that have resolved at this time. He has an appointment with dermatology Dr. Obregon next month. No major concerns at this time from a primary standpoint.        No visits with results within 6 Month(s) from this visit.   Latest known visit with results is:   Clinical Support on 11/16/2022   Component Date Value Ref Range Status    POC Residual Urine Volume 11/16/2022 17  0 - 100 mL Final       Past Medical History:   Diagnosis Date    Hyperlipidemia     Hypertension      Past Surgical History:   Procedure Laterality Date    WISDOM TOOTH EXTRACTION       Family History   Problem Relation Age of Onset    Stroke Mother 55    Hypertension Mother     Heart disease Maternal Grandfather     No Known Problems Father     Breast cancer Sister 38    Breast cancer Paternal Grandmother        Marital Status:   Alcohol History:  reports current alcohol use.  Tobacco History:  reports that he quit smoking about 12 years ago. His smoking use included cigarettes. He started smoking about 27 years ago. He has never used smokeless tobacco.  Drug History:  reports current drug use. Drug: Marijuana.    Review of patient's allergies indicates:  No Known Allergies    Current Outpatient Medications:     omega-3 fatty acids-fish oil 340-1,000 mg Cap, Take 1 capsule by mouth., Disp: , Rfl:     ketoconazole (NIZORAL) 2 % cream, Apply topically once daily., Disp: 60 g, Rfl: 1    tadalafiL (CIALIS) 20 MG Tab, Take 1 tablet (20 mg total) by mouth daily as needed (ED)., Disp: 12 tablet, Rfl: 2    Review of Systems   Constitutional:  Negative for activity change, fatigue, fever and unexpected weight change.   HENT:  Negative for congestion.    Respiratory:  Negative for apnea, cough, chest tightness and  "shortness of breath.    Cardiovascular:  Negative for chest pain and palpitations.   Gastrointestinal:  Negative for abdominal distention and abdominal pain.   Genitourinary:  Negative for difficulty urinating and dysuria.   Musculoskeletal:  Negative for arthralgias and back pain.   Neurological:  Negative for dizziness and weakness.          Objective:      Vitals:    07/31/23 1123   BP: 124/80   Pulse: 68   Weight: 106.1 kg (234 lb)   Height: 5' 10" (1.778 m)     Physical Exam  Constitutional:       General: He is not in acute distress.     Appearance: He is well-developed.   HENT:      Head: Normocephalic and atraumatic.   Eyes:      Pupils: Pupils are equal, round, and reactive to light.   Neck:      Thyroid: No thyromegaly.   Cardiovascular:      Rate and Rhythm: Normal rate and regular rhythm.      Heart sounds: Normal heart sounds.   Pulmonary:      Effort: Pulmonary effort is normal.      Breath sounds: Normal breath sounds.   Abdominal:      General: Bowel sounds are normal. There is no distension.      Palpations: Abdomen is soft.      Tenderness: There is no abdominal tenderness.   Musculoskeletal:         General: Normal range of motion.      Cervical back: Normal range of motion and neck supple.   Skin:     General: Skin is warm and dry.      Findings: No erythema or rash.      Comments: Abdomen RUQ- annular lesion with central clearing.   Neurological:      Mental Status: He is alert and oriented to person, place, and time.      Cranial Nerves: No cranial nerve deficit.           Assessment:       1. Encounter for long-term (current) use of other medications    2. Erectile dysfunction, unspecified erectile dysfunction type    3. Tinea corporis         Plan:       Encounter for long-term (current) use of other medications  Comments:  Labs today for ongoing pt care.   Orders:  -     CBC Without Differential; Future; Expected date: 07/31/2023  -     Comprehensive Metabolic Panel; Future; Expected date: " 07/31/2023  -     Lipid Panel; Future; Expected date: 07/31/2023  -     TSH w/reflex to FT4; Future; Expected date: 07/31/2023  -     Urinalysis, Reflex to Urine Culture Urine, Clean Catch; Future; Expected date: 07/31/2023  -     HIV 1/2 Ag/Ab (4th Gen); Future; Expected date: 07/31/2023  -     Hepatitis C Antibody; Future; Expected date: 07/31/2023    Erectile dysfunction, unspecified erectile dysfunction type  Comments:  refills as needed.  Orders:  -     tadalafiL (CIALIS) 20 MG Tab; Take 1 tablet (20 mg total) by mouth daily as needed (ED).  Dispense: 12 tablet; Refill: 2    Tinea corporis  Comments:  will treat with keto cream applied BID.  Orders:  -     ketoconazole (NIZORAL) 2 % cream; Apply topically once daily.  Dispense: 60 g; Refill: 1      Follow up in about 1 year (around 7/31/2024) for Annual Physical.        7/31/2023 Freddy Arana PA-C       DISCHARGE

## 2023-08-01 NOTE — ED ADULT NURSE NOTE - NSFALLUNIVINTERV_ED_ALL_ED
Bed/Stretcher in lowest position, wheels locked, appropriate side rails in place/Call bell, personal items and telephone in reach/Instruct patient to call for assistance before getting out of bed/chair/stretcher/Non-slip footwear applied when patient is off stretcher/[place] to call system/Physically safe environment - no spills, clutter or unnecessary equipment/Purposeful proactive rounding/Room/bathroom lighting operational, light cord in reach

## 2023-08-01 NOTE — ED ADULT TRIAGE NOTE - NSSEPSISSUSPECTED_ED_A_ED
Provide notified and RN called Neurosurgery. Neurosurgery assessed patient, urgent CTH and EEG ordered.
No

## 2023-08-02 VITALS
SYSTOLIC BLOOD PRESSURE: 140 MMHG | OXYGEN SATURATION: 100 % | DIASTOLIC BLOOD PRESSURE: 78 MMHG | RESPIRATION RATE: 18 BRPM | HEART RATE: 98 BPM | TEMPERATURE: 98 F

## 2023-08-02 VITALS
WEIGHT: 229.94 LBS | DIASTOLIC BLOOD PRESSURE: 67 MMHG | HEIGHT: 62 IN | OXYGEN SATURATION: 100 % | RESPIRATION RATE: 18 BRPM | SYSTOLIC BLOOD PRESSURE: 99 MMHG | TEMPERATURE: 98 F | HEART RATE: 88 BPM

## 2023-08-02 VITALS
RESPIRATION RATE: 18 BRPM | HEART RATE: 74 BPM | SYSTOLIC BLOOD PRESSURE: 110 MMHG | OXYGEN SATURATION: 100 % | DIASTOLIC BLOOD PRESSURE: 68 MMHG

## 2023-08-02 DIAGNOSIS — R10.9 UNSPECIFIED ABDOMINAL PAIN: ICD-10-CM

## 2023-08-02 DIAGNOSIS — Z79.84 LONG TERM (CURRENT) USE OF ORAL HYPOGLYCEMIC DRUGS: ICD-10-CM

## 2023-08-02 DIAGNOSIS — E11.9 TYPE 2 DIABETES MELLITUS WITHOUT COMPLICATIONS: ICD-10-CM

## 2023-08-02 DIAGNOSIS — Z91.013 ALLERGY TO SEAFOOD: ICD-10-CM

## 2023-08-02 DIAGNOSIS — F79 UNSPECIFIED INTELLECTUAL DISABILITIES: ICD-10-CM

## 2023-08-02 DIAGNOSIS — Z91.018 ALLERGY TO OTHER FOODS: ICD-10-CM

## 2023-08-02 DIAGNOSIS — J45.909 UNSPECIFIED ASTHMA, UNCOMPLICATED: ICD-10-CM

## 2023-08-02 DIAGNOSIS — F25.0 SCHIZOAFFECTIVE DISORDER, BIPOLAR TYPE: ICD-10-CM

## 2023-08-02 PROCEDURE — 99284 EMERGENCY DEPT VISIT MOD MDM: CPT

## 2023-08-02 RX ORDER — HALOPERIDOL DECANOATE 100 MG/ML
5 INJECTION INTRAMUSCULAR ONCE
Refills: 0 | Status: COMPLETED | OUTPATIENT
Start: 2023-08-02 | End: 2023-08-02

## 2023-08-02 RX ADMIN — HALOPERIDOL DECANOATE 5 MILLIGRAM(S): 100 INJECTION INTRAMUSCULAR at 19:01

## 2023-08-02 RX ADMIN — Medication 2 MILLIGRAM(S): at 19:01

## 2023-08-02 NOTE — ED ADULT NURSE REASSESSMENT NOTE - NS ED NURSE REASSESS COMMENT FT1
Notified by [facility] transport team that pt's group home is not answering phone, so pickup is on hold; attempted to call group home with no response. Voicemail left and will retry throughout night

## 2023-08-02 NOTE — ED ADULT NURSE REASSESSMENT NOTE - NS ED NURSE REASSESS COMMENT FT1
Pt is sleeping in bed with no acute distress. Chest rise and fall visualized. Repeated attempts to contact group home have gone unanswered. Continuing to call throughout night. VSS as documented. No signs of pain at this time.

## 2023-08-02 NOTE — ED PROVIDER NOTE - PATIENT PORTAL LINK FT
You can access the FollowMyHealth Patient Portal offered by [facility] by registering at the following website: [email or web address]. By joining [employer]’s FollowMyHealth portal, you will also be able to view your health information using other applications (apps) compatible with our system.

## 2023-08-02 NOTE — ED ADULT NURSE REASSESSMENT NOTE - NS ED NURSE REASSESS COMMENT FT1
numerous attempts made to contact group home to set up transport home for patient. No answer by group home. Will attempt again in 15 minutes.

## 2023-08-02 NOTE — ED ADULT NURSE NOTE - NSFALLUNIVINTERV_ED_ALL_ED
Bed/Stretcher in lowest position, wheels locked, appropriate side rails in place/Call bell, personal items and telephone in reach/Instruct patient to call for assistance before getting out of bed/chair/stretcher/Non-slip footwear applied when patient is off stretcher/[place] to call system/Physically safe environment - no spills, clutter or unnecessary equipment/Purposeful proactive rounding/Room/bathroom lighting operational, light cord in reach

## 2023-08-02 NOTE — ED ADULT TRIAGE NOTE - CHIEF COMPLAINT QUOTE
patient c/o of abdominal started denied N/V patient c/o of abdominal started denied N/V, patient very upset stated " I want to go to other hospital " patient trying to leave the triage aria code fly was call , patient brought back place on  31 place 1:1 observation

## 2023-08-02 NOTE — ED ADULT NURSE REASSESSMENT NOTE - NS ED NURSE REASSESS COMMENT FT1
Numerous attempts to reach care home continuing to go unanswered. Will reattempt. Pt in no acute distress at this time, resting in bed.

## 2023-08-02 NOTE — ED PROVIDER NOTE - OBJECTIVE STATEMENT
This patient is a 32 year old woman hx of intellectual disability, schizophrenia, bipolar disorder with frequent ER visits from group home who presents to the ER c/o abdominal pain.  Patient states that she ate a bag of chips and shortly after developed abdominal pain.  Currently in the ER she denies pain and is requesting to eat food.

## 2023-08-02 NOTE — ED ADULT NURSE REASSESSMENT NOTE - NS ED NURSE REASSESS COMMENT FT1
Received report from night RN, patient has comfortable appearance. Resting with eyes closed, seemingly effortless inspiratory expansion and expiratory contraction of the chest cage was noted.  Awaiting discharge to group home.

## 2023-08-02 NOTE — ED ADULT NURSE REASSESSMENT NOTE - NS ED NURSE REASSESS COMMENT FT1
4 more attempts to contact  [facility] have been made. 3 attempts calling group [facility] directly ([phone]) also made. no success. Voicemails have been left.

## 2023-08-02 NOTE — ED ADULT NURSE REASSESSMENT NOTE - NS ED NURSE REASSESS COMMENT FT1
Pt received in [facility] alert and responsive to all stimuli. Pt is restless, attempting to walk out of ER redirected by security and nursing staff. Pt now lying comfortable on stretcher. Denies suicidal and homicidal ideation. Respirations even and unlabored. Pending dispo. TONG Estrada RN

## 2023-08-02 NOTE — ED ADULT NURSE REASSESSMENT NOTE - NS ED NURSE REASSESS COMMENT FT1
5+ more calls made to group home in attempts to arrange transport for patient. Calls repeatedly sent to voicemail. Will try again in 15 minutes

## 2023-08-02 NOTE — ED ADULT NURSE NOTE - OBJECTIVE STATEMENT
patient brought to [facility] by security after code flight was called in the ER waiting room. patient yelling and states she wants to go to a different hospital. AND Elliot and security at bedside. pt unable to be redirected w/ therapeutic communication. unable to obtain further hx as pt is not compliant w/ nursing assessment. evaluated by dr. martin and medication ordered.

## 2023-08-02 NOTE — ED PROVIDER NOTE - CLINICAL SUMMARY MEDICAL DECISION MAKING FREE TEXT BOX
Abdominal pain after eating chips asymptomatic at this time.  Proper diet discussed.  Patient discharged.

## 2023-08-02 NOTE — ED ADULT NURSE REASSESSMENT NOTE - NS ED NURSE REASSESS COMMENT FT1
Got in touch with [facility], informed that patient would be transported home. Group [facility] staff member confirmed that someone would be there to receive pt.

## 2023-08-02 NOTE — ED ADULT NURSE NOTE - CHIEF COMPLAINT QUOTE
patient c/o of abdominal started denied N/V, patient very upset stated " I want to go to other hospital " patient trying to leave the triage aria code fly was call , patient brought back place on  31 place 1:1 observation

## 2023-08-04 NOTE — ED PROVIDER NOTE - SECONDARY DIAGNOSIS.
Retention Suture Text: Retention sutures were placed to support the closure and prevent dehiscence. Delusional ideas

## 2023-08-11 ENCOUNTER — EMERGENCY (EMERGENCY)
Facility: HOSPITAL | Age: 33
LOS: 1 days | Discharge: HOME | End: 2023-08-11
Attending: STUDENT IN AN ORGANIZED HEALTH CARE EDUCATION/TRAINING PROGRAM | Admitting: STUDENT IN AN ORGANIZED HEALTH CARE EDUCATION/TRAINING PROGRAM
Payer: MEDICAID

## 2023-08-11 VITALS
RESPIRATION RATE: 18 BRPM | HEIGHT: 62 IN | DIASTOLIC BLOOD PRESSURE: 59 MMHG | OXYGEN SATURATION: 97 % | SYSTOLIC BLOOD PRESSURE: 126 MMHG | HEART RATE: 72 BPM | TEMPERATURE: 99 F

## 2023-08-11 PROCEDURE — 99283 EMERGENCY DEPT VISIT LOW MDM: CPT

## 2023-08-11 NOTE — ED ADULT NURSE NOTE - CHIEF COMPLAINT QUOTE
Pt coming from [facility], arrives s/p making a bowel movement and now states "my sides are hurting". Says that the pain is not abdominal, just on "sides". Requesting a tuna sandwich. Hx of DM, schizophrenia, developmental delay. fs 83.

## 2023-08-11 NOTE — ED PROVIDER NOTE - OBJECTIVE STATEMENT
Patient is a 32 year old with past medical history of diabetes, asthma, and developmental delay who presents to the ED for evaluation of pain "inside" when she strains to poop. She says the pain is "in my butt" and has occurred once with one bowel movement earlier this evening. She denies any other symptoms, including chest pain, shortness of breath, abdominal pain, headache, fever, chills, abdominal pain, dysuria, hematochezia, and melena.

## 2023-08-11 NOTE — PROVIDER CONTACT NOTE (OTHER) - ASSESSMENT
attempted to reach  at [phone] but received VM.     Transportation arranged with [facility] #[phone]. Writer spoke with Danii who provided trip #[id number] with p/up within the hour.

## 2023-08-11 NOTE — ED ADULT TRIAGE NOTE - [facility] COMA SCALE: EYE OPENING, MLM
(E4) spontaneous Birth Control Pills Counseling: Birth Control Pill Counseling: I discussed with the patient the potential side effects of OCPs including but not limited to increased risk of stroke, heart attack, thrombophlebitis, deep venous thrombosis, hepatic adenomas, breast changes, GI upset, headaches, and depression.  The patient verbalized understanding of the proper use and possible adverse effects of OCPs. All of the patient's questions and concerns were addressed. Odomzo Pregnancy And Lactation Text: This medication is Pregnancy Category X and is absolutely contraindicated during pregnancy. It is unknown if it is excreted in breast milk. Erythromycin Pregnancy And Lactation Text: This medication is Pregnancy Category B and is considered safe during pregnancy. It is also excreted in breast milk. Eucrisa Counseling: Patient may experience a mild burning sensation during topical application. Eucrisa is not approved in children less than 2 years of age. Tremfya Counseling: I discussed with the patient the risks of guselkumab including but not limited to immunosuppression, serious infections, worsening of inflammatory bowel disease and drug reactions.  The patient understands that monitoring is required including a PPD at baseline and must alert us or the primary physician if symptoms of infection or other concerning signs are noted. Protopic Counseling: Patient may experience a mild burning sensation during topical application. Protopic is not approved in children less than 2 years of age. There have been case reports of hematologic and skin malignancies in patients using topical calcineurin inhibitors although causality is questionable. Clofazimine Counseling:  I discussed with the patient the risks of clofazimine including but not limited to skin and eye pigmentation, liver damage, nausea/vomiting, gastrointestinal bleeding and allergy. Hydroquinone Pregnancy And Lactation Text: This medication has not been assigned a Pregnancy Risk Category but animal studies failed to show danger with the topical medication. It is unknown if the medication is excreted in breast milk. Fluconazole Counseling:  Patient counseled regarding adverse effects of fluconazole including but not limited to headache, diarrhea, nausea, upset stomach, liver function test abnormalities, taste disturbance, and stomach pain.  There is a rare possibility of liver failure that can occur when taking fluconazole.  The patient understands that monitoring of LFTs and kidney function test may be required, especially at baseline. The patient verbalized understanding of the proper use and possible adverse effects of fluconazole.  All of the patient's questions and concerns were addressed. Doxycycline Counseling:  Patient counseled regarding possible photosensitivity and increased risk for sunburn.  Patient instructed to avoid sunlight, if possible.  When exposed to sunlight, patients should wear protective clothing, sunglasses, and sunscreen.  The patient was instructed to call the office immediately if the following severe adverse effects occur:  hearing changes, easy bruising/bleeding, severe headache, or vision changes.  The patient verbalized understanding of the proper use and possible adverse effects of doxycycline.  All of the patient's questions and concerns were addressed. Minocycline Counseling: Patient advised regarding possible photosensitivity and discoloration of the teeth, skin, lips, tongue and gums.  Patient instructed to avoid sunlight, if possible.  When exposed to sunlight, patients should wear protective clothing, sunglasses, and sunscreen.  The patient was instructed to call the office immediately if the following severe adverse effects occur:  hearing changes, easy bruising/bleeding, severe headache, or vision changes.  The patient verbalized understanding of the proper use and possible adverse effects of minocycline.  All of the patient's questions and concerns were addressed. Erivedge Counseling- I discussed with the patient the risks of Erivedge including but not limited to nausea, vomiting, diarrhea, constipation, weight loss, changes in the sense of taste, decreased appetite, muscle spasms, and hair loss.  The patient verbalized understanding of the proper use and possible adverse effects of Erivedge.  All of the patient's questions and concerns were addressed. Cimzia Counseling:  I discussed with the patient the risks of Cimzia including but not limited to immunosuppression, allergic reactions and infections.  The patient understands that monitoring is required including a PPD at baseline and must alert us or the primary physician if symptoms of infection or other concerning signs are noted. Acitretin Pregnancy And Lactation Text: This medication is Pregnancy Category X and should not be given to women who are pregnant or may become pregnant in the future. This medication is excreted in breast milk. Tazorac Counseling:  Patient advised that medication is irritating and drying.  Patient may need to apply sparingly and wash off after an hour before eventually leaving it on overnight.  The patient verbalized understanding of the proper use and possible adverse effects of tazorac.  All of the patient's questions and concerns were addressed. Valtrex Counseling: I discussed with the patient the risks of valacyclovir including but not limited to kidney damage, nausea, vomiting and severe allergy.  The patient understands that if the infection seems to be worsening or is not improving, they are to call. 5-Fu Pregnancy And Lactation Text: This medication is Pregnancy Category X and contraindicated in pregnancy and in women who may become pregnant. It is unknown if this medication is excreted in breast milk. Zyclara Counseling:  I discussed with the patient the risks of imiquimod including but not limited to erythema, scaling, itching, weeping, crusting, and pain.  Patient understands that the inflammatory response to imiquimod is variable from person to person and was educated regarded proper titration schedule.  If flu-like symptoms develop, patient knows to discontinue the medication and contact us. Detail Level: Zone Ketoconazole Counseling:   Patient counseled regarding improving absorption with orange juice.  Adverse effects include but are not limited to breast enlargement, headache, diarrhea, nausea, upset stomach, liver function test abnormalities, taste disturbance, and stomach pain.  There is a rare possibility of liver failure that can occur when taking ketoconazole. The patient understands that monitoring of LFTs may be required, especially at baseline. The patient verbalized understanding of the proper use and possible adverse effects of ketoconazole.  All of the patient's questions and concerns were addressed. Gabapentin Counseling: I discussed with the patient the risks of gabapentin including but not limited to dizziness, somnolence, fatigue and ataxia. Benzoyl Peroxide Pregnancy And Lactation Text: This medication is Pregnancy Category C. It is unknown if benzoyl peroxide is excreted in breast milk. Humira Pregnancy And Lactation Text: This medication is Pregnancy Category B and is considered safe during pregnancy. It is unknown if this medication is excreted in breast milk. Albendazole Counseling:  I discussed with the patient the risks of albendazole including but not limited to cytopenia, kidney damage, nausea/vomiting and severe allergy.  The patient understands that this medication is being used in an off-label manner. Quinolones Counseling:  I discussed with the patient the risks of fluoroquinolones including but not limited to GI upset, allergic reaction, drug rash, diarrhea, dizziness, photosensitivity, yeast infections, liver function test abnormalities, tendonitis/tendon rupture. High Dose Vitamin A Pregnancy And Lactation Text: High dose vitamin A therapy is contraindicated during pregnancy and breast feeding. Albendazole Pregnancy And Lactation Text: This medication is Pregnancy Category C and it isn't known if it is safe during pregnancy. It is also excreted in breast milk. Use Enhanced Medication Counseling?: No Rifampin Pregnancy And Lactation Text: This medication is Pregnancy Category C and it isn't know if it is safe during pregnancy. It is also excreted in breast milk and should not be used if you are breast feeding. Protopic Pregnancy And Lactation Text: This medication is Pregnancy Category C. It is unknown if this medication is excreted in breast milk when applied topically. Azithromycin Pregnancy And Lactation Text: This medication is considered safe during pregnancy and is also secreted in breast milk. Azithromycin Counseling:  I discussed with the patient the risks of azithromycin including but not limited to GI upset, allergic reaction, drug rash, diarrhea, and yeast infections. Topical Clindamycin Pregnancy And Lactation Text: This medication is Pregnancy Category B and is considered safe during pregnancy. It is unknown if it is excreted in breast milk. Stelara Counseling:  I discussed with the patient the risks of ustekinumab including but not limited to immunosuppression, malignancy, posterior leukoencephalopathy syndrome, and serious infections.  The patient understands that monitoring is required including a PPD at baseline and must alert us or the primary physician if symptoms of infection or other concerning signs are noted. Isotretinoin Pregnancy And Lactation Text: This medication is Pregnancy Category X and is considered extremely dangerous during pregnancy. It is unknown if it is excreted in breast milk. Tremfya Pregnancy And Lactation Text: The risk during pregnancy and breastfeeding is uncertain with this medication. High Dose Vitamin A Counseling: Side effects reviewed, pt to contact office should one occur. Hydroxyzine Counseling: Patient advised that the medication is sedating and not to drive a car after taking this medication.  Patient informed of potential adverse effects including but not limited to dry mouth, urinary retention, and blurry vision.  The patient verbalized understanding of the proper use and possible adverse effects of hydroxyzine.  All of the patient's questions and concerns were addressed. Clindamycin Pregnancy And Lactation Text: This medication can be used in pregnancy if certain situations. Clindamycin is also present in breast milk. Doxepin Pregnancy And Lactation Text: This medication is Pregnancy Category C and it isn't known if it is safe during pregnancy. It is also excreted in breast milk and breast feeding isn't recommended. Methotrexate Pregnancy And Lactation Text: This medication is Pregnancy Category X and is known to cause fetal harm. This medication is excreted in breast milk. Otezla Counseling: The side effects of Otezla were discussed with the patient, including but not limited to worsening or new depression, weight loss, diarrhea, nausea, upper respiratory tract infection, and headache. Patient instructed to call the office should any adverse effect occur.  The patient verbalized understanding of the proper use and possible adverse effects of Otezla.  All the patient's questions and concerns were addressed. Cephalexin Counseling: I counseled the patient regarding use of cephalexin as an antibiotic for prophylactic and/or therapeutic purposes. Cephalexin (commonly prescribed under brand name Keflex) is a cephalosporin antibiotic which is active against numerous classes of bacteria, including most skin bacteria. Side effects may include nausea, diarrhea, gastrointestinal upset, rash, hives, yeast infections, and in rare cases, hepatitis, kidney disease, seizures, fever, confusion, neurologic symptoms, and others. Patients with severe allergies to penicillin medications are cautioned that there is about a 10% incidence of cross-reactivity with cephalosporins. When possible, patients with penicillin allergies should use alternatives to cephalosporins for antibiotic therapy. Carac Counseling:  I discussed with the patient the risks of Carac including but not limited to erythema, scaling, itching, weeping, crusting, and pain. Imiquimod Counseling:  I discussed with the patient the risks of imiquimod including but not limited to erythema, scaling, itching, weeping, crusting, and pain.  Patient understands that the inflammatory response to imiquimod is variable from person to person and was educated regarded proper titration schedule.  If flu-like symptoms develop, patient knows to discontinue the medication and contact us. Cimetidine Counseling:  I discussed with the patient the risks of Cimetidine including but not limited to gynecomastia, headache, diarrhea, nausea, drowsiness, arrhythmias, pancreatitis, skin rashes, psychosis, bone marrow suppression and kidney toxicity. Cimetidine Pregnancy And Lactation Text: This medication is Pregnancy Category B and is considered safe during pregnancy. It is also excreted in breast milk and breast feeding isn't recommended. Oxybutynin Counseling:  I discussed with the patient the risks of oxybutynin including but not limited to skin rash, drowsiness, dry mouth, difficulty urinating, and blurred vision. Enbrel Counseling:  I discussed with the patient the risks of etanercept including but not limited to myelosuppression, immunosuppression, autoimmune hepatitis, demyelinating diseases, lymphoma, and infections.  The patient understands that monitoring is required including a PPD at baseline and must alert us or the primary physician if symptoms of infection or other concerning signs are noted. Taltz Counseling: I discussed with the patient the risks of ixekizumab including but not limited to immunosuppression, serious infections, worsening of inflammatory bowel disease and drug reactions.  The patient understands that monitoring is required including a PPD at baseline and must alert us or the primary physician if symptoms of infection or other concerning signs are noted. Cyclosporine Counseling:  I discussed with the patient the risks of cyclosporine including but not limited to hypertension, gingival hyperplasia,myelosuppression, immunosuppression, liver damage, kidney damage, neurotoxicity, lymphoma, and serious infections. The patient understands that monitoring is required including baseline blood pressure, CBC, CMP, lipid panel and uric acid, and then 1-2 times monthly CMP and blood pressure. Rituxan Pregnancy And Lactation Text: This medication is Pregnancy Category C and it isn't know if it is safe during pregnancy. It is unknown if this medication is excreted in breast milk but similar antibodies are known to be excreted. Cimzia Pregnancy And Lactation Text: This medication crosses the placenta but can be considered safe in certain situations. Cimzia may be excreted in breast milk. Simponi Counseling:  I discussed with the patient the risks of golimumab including but not limited to myelosuppression, immunosuppression, autoimmune hepatitis, demyelinating diseases, lymphoma, and serious infections.  The patient understands that monitoring is required including a PPD at baseline and must alert us or the primary physician if symptoms of infection or other concerning signs are noted. Fluconazole Pregnancy And Lactation Text: This medication is Pregnancy Category C and it isn't know if it is safe during pregnancy. It is also excreted in breast milk. Hydroquinone Counseling:  Patient advised that medication may result in skin irritation, lightening (hypopigmentation), dryness, and burning.  In the event of skin irritation, the patient was advised to reduce the amount of the drug applied or use it less frequently.  Rarely, spots that are treated with hydroquinone can become darker (pseudoochronosis).  Should this occur, patient instructed to stop medication and call the office. The patient verbalized understanding of the proper use and possible adverse effects of hydroquinone.  All of the patient's questions and concerns were addressed. Spironolactone Pregnancy And Lactation Text: This medication can cause feminization of the male fetus and should be avoided during pregnancy. The active metabolite is also found in breast milk. Cephalexin Pregnancy And Lactation Text: This medication is Pregnancy Category B and considered safe during pregnancy.  It is also excreted in breast milk but can be used safely for shorter doses. Rituxan Counseling:  I discussed with the patient the risks of Rituxan infusions. Side effects can include infusion reactions, severe drug rashes including mucocutaneous reactions, reactivation of latent hepatitis and other infections and rarely progressive multifocal leukoencephalopathy.  All of the patient's questions and concerns were addressed. Bactrim Counseling:  I discussed with the patient the risks of sulfa [facility] including but not limited to GI upset, allergic reaction, drug rash, diarrhea, dizziness, photosensitivity, and yeast infections.  Rarely, more serious reactions can occur including but not limited to aplastic anemia, agranulocytosis, methemoglobinemia, blood dyscrasias, liver or kidney failure, lung infiltrates or desquamative/blistering drug rashes. Azathioprine Pregnancy And Lactation Text: This medication is Pregnancy Category D and isn't considered safe during pregnancy. It is unknown if this medication is excreted in breast milk. Xeljanz Counseling: I discussed with the patient the risks of Xeljanz therapy including increased risk of infection, liver issues, headache, diarrhea, or cold symptoms. Live vaccines should be avoided. They were instructed to call if they have any problems. Birth Control Pills Pregnancy And Lactation Text: This medication should be avoided if pregnant and for the first 30 days post-partum. Hydroxychloroquine Pregnancy And Lactation Text: This medication has been shown to cause fetal harm but it isn't assigned a Pregnancy Risk Category. There are small amounts excreted in breast milk. Clofazimine Pregnancy And Lactation Text: This medication is Pregnancy Category C and isn't considered safe during pregnancy. It is excreted in breast milk. Doxepin Counseling:  Patient advised that the medication is sedating and not to drive a car after taking this medication. Patient informed of potential adverse effects including but not limited to dry mouth, urinary retention, and blurry vision.  The patient verbalized understanding of the proper use and possible adverse effects of doxepin.  All of the patient's questions and concerns were addressed. Nsaids Counseling: NSAID Counseling: I discussed with the patient that NSAIDs should be taken with food. Prolonged use of NSAIDs can result in the development of stomach ulcers.  Patient advised to stop taking NSAIDs if abdominal pain occurs.  The patient verbalized understanding of the proper use and possible adverse effects of NSAIDs.  All of the patient's questions and concerns were addressed. Otezla Pregnancy And Lactation Text: This medication is Pregnancy Category C and it isn't known if it is safe during pregnancy. It is unknown if it is excreted in breast milk. Drysol Counseling:  I discussed with the patient the risks of drysol/aluminum chloride including but not limited to skin rash, itching, irritation, burning. Solaraze Counseling:  I discussed with the patient the risks of Solaraze including but not limited to erythema, scaling, itching, weeping, crusting, and pain. Drysol Pregnancy And Lactation Text: This medication is considered safe during pregnancy and breast feeding. Cyclophosphamide Pregnancy And Lactation Text: This medication is Pregnancy Category D and it isn't considered safe during pregnancy. This medication is excreted in breast milk. Ivermectin Counseling:  Patient instructed to take medication on an empty stomach with a full glass of water.  Patient informed of potential adverse effects including but not limited to nausea, diarrhea, dizziness, itching, and swelling of the extremities or lymph nodes.  The patient verbalized understanding of the proper use and possible adverse effects of ivermectin.  All of the patient's questions and concerns were addressed. Dapsone Counseling: I discussed with the patient the risks of dapsone including but not limited to hemolytic anemia, agranulocytosis, rashes, methemoglobinemia, kidney failure, peripheral neuropathy, headaches, GI upset, and liver toxicity.  Patients who start dapsone require monitoring including baseline LFTs and weekly CBCs for the first month, then every month thereafter.  The patient verbalized understanding of the proper use and possible adverse effects of dapsone.  All of the patient's questions and concerns were addressed. Isotretinoin Counseling: Patient should get monthly blood tests, not donate blood, not drive at night if vision affected, not share medication, and not undergo elective surgery for 6 months after tx completed. Side effects reviewed, pt to contact office should one occur. Cyclosporine Pregnancy And Lactation Text: This medication is Pregnancy Category C and it isn't know if it is safe during pregnancy. This medication is excreted in breast milk. Griseofulvin Counseling:  I discussed with the patient the risks of griseofulvin including but not limited to photosensitivity, cytopenia, liver damage, nausea/vomiting and severe allergy.  The patient understands that this medication is best absorbed when taken with a fatty meal (e.g., ice cream or french fries). Azathioprine Counseling:  I discussed with the patient the risks of azathioprine including but not limited to myelosuppression, immunosuppression, hepatotoxicity, lymphoma, and infections.  The patient understands that monitoring is required including baseline LFTs, Creatinine, possible TPMP genotyping and weekly CBCs for the first month and then every 2 weeks thereafter.  The patient verbalized understanding of the proper use and possible adverse effects of azathioprine.  All of the patient's questions and concerns were addressed. Topical Clindamycin Counseling: Patient counseled that this medication may cause skin irritation or allergic reactions.  In the event of skin irritation, the patient was advised to reduce the amount of the drug applied or use it less frequently.   The patient verbalized understanding of the proper use and possible adverse effects of clindamycin.  All of the patient's questions and concerns were addressed. Acitretin Counseling:  I discussed with the patient the risks of acitretin including but not limited to hair loss, dry lips/skin/eyes, liver damage, hyperlipidemia, depression/suicidal ideation, photosensitivity.  Serious rare side effects can include but are not limited to pancreatitis, pseudotumor cerebri, bony changes, clot formation/stroke/heart attack.  Patient understands that alcohol is contraindicated since it can result in liver toxicity and significantly prolong the elimination of the drug by many years. Minocycline Pregnancy And Lactation Text: This medication is Pregnancy Category D and not consider safe during pregnancy. It is also excreted in breast milk. Benzoyl Peroxide Counseling: Patient counseled that medicine may cause skin irritation and bleach clothing.  In the event of skin irritation, the patient was advised to reduce the amount of the drug applied or use it less frequently.   The patient verbalized understanding of the proper use and possible adverse effects of benzoyl peroxide.  All of the patient's questions and concerns were addressed. Dapsone Pregnancy And Lactation Text: This medication is Pregnancy Category C and is not considered safe during pregnancy or breast feeding. Topical Sulfur Applications Counseling: Topical Sulfur Counseling: Patient counseled that this medication may cause skin irritation or allergic reactions.  In the event of skin irritation, the patient was advised to reduce the amount of the drug applied or use it less frequently.   The patient verbalized understanding of the proper use and possible adverse effects of topical sulfur application.  All of the patient's questions and concerns were addressed. Humira Counseling:  I discussed with the patient the risks of adalimumab including but not limited to myelosuppression, immunosuppression, autoimmune hepatitis, demyelinating diseases, lymphoma, and serious infections.  The patient understands that monitoring is required including a PPD at baseline and must alert us or the primary physician if symptoms of infection or other concerning signs are noted. Dupixent Pregnancy And Lactation Text: This medication likely crosses the placenta but the risk for the fetus is uncertain. This medication is excreted in breast milk. Bexarotene Pregnancy And Lactation Text: This medication is Pregnancy Category X and should not be given to women who are pregnant or may become pregnant. This medication should not be used if you are breast feeding. Tazorac Pregnancy And Lactation Text: This medication is not safe during pregnancy. It is unknown if this medication is excreted in breast milk. Colchicine Counseling:  Patient counseled regarding adverse effects including but not limited to stomach upset (nausea, vomiting, stomach pain, or diarrhea).  Patient instructed to limit alcohol consumption while taking this medication.  Colchicine may reduce blood counts especially with prolonged use.  The patient understands that monitoring of kidney function and blood counts may be required, especially at baseline. The patient verbalized understanding of the proper use and possible adverse effects of colchicine.  All of the patient's questions and concerns were addressed. Cellcept Counseling:  I discussed with the patient the risks of mycophenolate mofetil including but not limited to infection/immunosuppression, GI upset, hypokalemia, hypercholesterolemia, bone marrow suppression, lymphoproliferative disorders, malignancy, GI ulceration/bleed/perforation, colitis, interstitial lung disease, kidney failure, progressive multifocal leukoencephalopathy, and birth defects.  The patient understands that monitoring is required including a baseline creatinine and regular CBC testing. In addition, patient must alert us immediately if symptoms of infection or other concerning signs are noted. Cosentyx Counseling:  I discussed with the patient the risks of Cosentyx including but not limited to worsening of Crohn's disease, immunosuppression, allergic reactions and infections.  The patient understands that monitoring is required including a PPD at baseline and must alert us or the primary physician if symptoms of infection or other concerning signs are noted. Minoxidil Counseling: Minoxidil is a topical medication which can increase blood flow where it is applied. It is uncertain how this medication increases hair growth. Side effects are uncommon and include stinging and allergic reactions. Itraconazole Counseling:  I discussed with the patient the risks of itraconazole including but not limited to liver damage, nausea/vomiting, neuropathy, and severe allergy.  The patient understands that this medication is best absorbed when taken with acidic beverages such as non-diet cola or ginger ale.  The patient understands that monitoring is required including baseline LFTs and repeat LFTs at intervals.  The patient understands that they are to contact us or the primary physician if concerning signs are noted. Clindamycin Counseling: I counseled the patient regarding use of clindamycin as an antibiotic for prophylactic and/or therapeutic purposes. Clindamycin is active against numerous classes of bacteria, including skin bacteria. Side effects may include nausea, diarrhea, gastrointestinal upset, rash, hives, yeast infections, and in rare cases, colitis. Hydroxyzine Pregnancy And Lactation Text: This medication is not safe during pregnancy and should not be taken. It is also excreted in breast milk and breast feeding isn't recommended. Elidel Pregnancy And Lactation Text: This medication is Pregnancy Category C. It is unknown if this medication is excreted in breast milk. Solaraze Pregnancy And Lactation Text: This medication is Pregnancy Category B and is considered safe. There is some data to suggest avoiding during the third trimester. It is unknown if this medication is excreted in breast milk. Methotrexate Counseling:  Patient counseled regarding adverse effects of methotrexate including but not limited to nausea, vomiting, abnormalities in liver function tests. Patients may develop mouth sores, rash, diarrhea, and abnormalities in blood counts. The patient understands that monitoring is required including LFT's and blood counts.  There is a rare possibility of scarring of the liver and lung problems that can occur when taking methotrexate. Persistent nausea, loss of appetite, pale stools, dark urine, cough, and shortness of breath should be reported immediately. Patient advised to discontinue methotrexate treatment at least three months before attempting to become pregnant.  I discussed the need for folate supplements while taking methotrexate.  These supplements can decrease side effects during methotrexate treatment. The patient verbalized understanding of the proper use and possible adverse effects of methotrexate.  All of the patient's questions and concerns were addressed. 5-Fu Counseling: 5-Fluorouracil Counseling:  I discussed with the patient the risks of 5-fluorouracil including but not limited to erythema, scaling, itching, weeping, crusting, and pain. Valtrex Pregnancy And Lactation Text: this medication is Pregnancy Category B and is considered safe during pregnancy. This medication is not directly found in breast milk but it's metabolite acyclovir is present. Siliq Counseling:  I discussed with the patient the risks of Siliq including but not limited to new or worsening depression, suicidal thoughts and behavior, immunosuppression, malignancy, posterior leukoencephalopathy syndrome, and serious infections.  The patient understands that monitoring is required including a PPD at baseline and must alert us or the primary physician if symptoms of infection or other concerning signs are noted. There is also a special program designed to monitor depression which is required with Siliq. Terbinafine Counseling: Patient counseling regarding adverse effects of terbinafine including but not limited to headache, diarrhea, rash, upset stomach, liver function test abnormalities, itching, taste/smell disturbance, nausea, abdominal pain, and flatulence.  There is a rare possibility of liver failure that can occur when taking terbinafine.  The patient understands that a baseline LFT and kidney function test may be required. The patient verbalized understanding of the proper use and possible adverse effects of terbinafine.  All of the patient's questions and concerns were addressed. Spironolactone Counseling: Patient advised regarding risks of diarrhea, abdominal pain, hyperkalemia, birth defects (for female patients), liver toxicity and renal toxicity. The patient may need blood work to monitor liver and kidney function and potassium levels while on therapy. The patient verbalized understanding of the proper use and possible adverse effects of spironolactone.  All of the patient's questions and concerns were addressed. Bactrim Pregnancy And Lactation Text: This medication is Pregnancy Category D and is known to cause fetal risk.  It is also excreted in breast milk. Hydroxychloroquine Counseling:  I discussed with the patient that a baseline ophthalmologic exam is needed at the start of therapy and every year thereafter while on therapy. A CBC may also be warranted for monitoring.  The side effects of this medication were discussed with the patient, including but not limited to agranulocytosis, aplastic anemia, seizures, rashes, retinopathy, and liver toxicity. Patient instructed to call the office should any adverse effect occur.  The patient verbalized understanding of the proper use and possible adverse effects of Plaquenil.  All the patient's questions and concerns were addressed. Metronidazole Pregnancy And Lactation Text: This medication is Pregnancy Category B and considered safe during pregnancy.  It is also excreted in breast milk. Sski Pregnancy And Lactation Text: This medication is Pregnancy Category D and isn't considered safe during pregnancy. It is excreted in breast milk. Nsaids Pregnancy And Lactation Text: These medications are considered safe up to 30 weeks gestation. It is excreted in breast milk. Elidel Counseling: Patient may experience a mild burning sensation during topical application. Elidel is not approved in children less than 2 years of age. There have been case reports of hematologic and skin malignancies in patients using topical calcineurin inhibitors although causality is questionable. Topical Retinoid counseling:  Patient advised to apply a pea-sized amount only at bedtime and wait 30 minutes after washing their face before applying.  If too drying, patient may add a non-comedogenic moisturizer. The patient verbalized understanding of the proper use and possible adverse effects of retinoids.  All of the patient's questions and concerns were addressed. SSKI Counseling:  I discussed with the patient the risks of SSKI including but not limited to thyroid abnormalities, metallic taste, GI upset, fever, headache, acne, arthralgias, paraesthesias, lymphadenopathy, easy bleeding, arrhythmias, and allergic reaction. Glycopyrrolate Pregnancy And Lactation Text: This medication is Pregnancy Category B and is considered safe during pregnancy. It is unknown if it is excreted breast milk. Bexarotene Counseling:  I discussed with the patient the risks of bexarotene including but not limited to hair loss, dry lips/skin/eyes, liver abnormalities, hyperlipidemia, pancreatitis, depression/suicidal ideation, photosensitivity, drug rash/allergic reactions, hypothyroidism, anemia, leukopenia, infection, cataracts, and teratogenicity.  Patient understands that they will need regular blood tests to check lipid profile, liver function tests, white blood cell count, thyroid function tests and pregnancy test if applicable. Doxycycline Pregnancy And Lactation Text: This medication is Pregnancy Category D and not consider safe during pregnancy. It is also excreted in breast milk but is considered safe for shorter treatment courses. Tetracycline Counseling: Patient counseled regarding possible photosensitivity and increased risk for sunburn.  Patient instructed to avoid sunlight, if possible.  When exposed to sunlight, patients should wear protective clothing, sunglasses, and sunscreen.  The patient was instructed to call the office immediately if the following severe adverse effects occur:  hearing changes, easy bruising/bleeding, severe headache, or vision changes.  The patient verbalized understanding of the proper use and possible adverse effects of tetracycline.  All of the patient's questions and concerns were addressed. Patient understands to avoid pregnancy while on therapy due to potential birth defects. Prednisone Counseling:  I discussed with the patient the risks of prolonged use of prednisone including but not limited to weight gain, insomnia, osteoporosis, mood changes, diabetes, susceptibility to infection, glaucoma and high blood pressure.  In cases where prednisone use is prolonged, patients should be monitored with blood pressure checks, serum glucose levels and an eye exam.  Additionally, the patient may need to be placed on GI prophylaxis, PCP prophylaxis, and calcium and vitamin D supplementation and/or a bisphosphonate.  The patient verbalized understanding of the proper use and the possible adverse effects of prednisone.  All of the patient's questions and concerns were addressed. Topical Sulfur Applications Pregnancy And Lactation Text: This medication is Pregnancy Category C and has an unknown safety profile during pregnancy. It is unknown if this topical medication is excreted in breast milk. Xolair Pregnancy And Lactation Text: This medication is Pregnancy Category B and is considered safe during pregnancy. This medication is excreted in breast milk. Dupixent Counseling: I discussed with the patient the risks of dupilumab including but not limited to eye infection and irritation, cold sores, injection site reactions, worsening of asthma, allergic reactions and increased risk of parasitic infection.  Live vaccines should be avoided while taking dupilumab. Dupilumab will also interact with certain medications such as warfarin and cyclosporine. The patient understands that monitoring is required and they must alert us or the primary physician if symptoms of infection or other concerning signs are noted. Metronidazole Counseling:  I discussed with the patient the risks of metronidazole including but not limited to seizures, nausea/vomiting, a metallic taste in the mouth, nausea/vomiting and severe allergy. Ketoconazole Pregnancy And Lactation Text: This medication is Pregnancy Category C and it isn't know if it is safe during pregnancy. It is also excreted in breast milk and breast feeding isn't recommended. Cyclophosphamide Counseling:  I discussed with the patient the risks of cyclophosphamide including but not limited to hair loss, hormonal abnormalities, decreased fertility, abdominal pain, diarrhea, nausea and vomiting, bone marrow suppression and infection. The patient understands that monitoring is required while taking this medication. Griseofulvin Pregnancy And Lactation Text: This medication is Pregnancy Category X and is known to cause serious birth defects. It is unknown if this medication is excreted in breast milk but breast feeding should be avoided. Odomzo Counseling- I discussed with the patient the risks of Odomzo including but not limited to nausea, vomiting, diarrhea, constipation, weight loss, changes in the sense of taste, decreased appetite, muscle spasms, and hair loss.  The patient verbalized understanding of the proper use and possible adverse effects of Odomzo.  All of the patient's questions and concerns were addressed. Erythromycin Counseling:  I discussed with the patient the risks of erythromycin including but not limited to GI upset, allergic reaction, drug rash, diarrhea, increase in liver enzymes, and yeast infections. Thalidomide Counseling: I discussed with the patient the risks of thalidomide including but not limited to birth defects, anxiety, weakness, chest pain, dizziness, cough and severe allergy. Infliximab Counseling:  I discussed with the patient the risks of infliximab including but not limited to myelosuppression, immunosuppression, autoimmune hepatitis, demyelinating diseases, lymphoma, and serious infections.  The patient understands that monitoring is required including a PPD at baseline and must alert us or the primary physician if symptoms of infection or other concerning signs are noted. Glycopyrrolate Counseling:  I discussed with the patient the risks of glycopyrrolate including but not limited to skin rash, drowsiness, dry mouth, difficulty urinating, and blurred vision. Xelchelyz Pregnancy And Lactation Text: This medication is Pregnancy Category D and is not considered safe during pregnancy.  The risk during breast feeding is also uncertain. Xolair Counseling:  Patient informed of potential adverse effects including but not limited to fever, muscle aches, rash and allergic reactions.  The patient verbalized understanding of the proper use and possible adverse effects of Xolair.  All of the patient's questions and concerns were addressed. Rifampin Counseling: I discussed with the patient the risks of rifampin including but not limited to liver damage, kidney damage, red-orange body fluids, nausea/vomiting and severe allergy. Picato Counseling:  I discussed with the patient the risks of Picato including but not limited to erythema, scaling, itching, weeping, crusting, and pain. Arava Counseling:  Patient counseled regarding adverse effects of Arava including but not limited to nausea, vomiting, abnormalities in liver function tests. Patients may develop mouth sores, rash, diarrhea, and abnormalities in blood counts. The patient understands that monitoring is required including LFTs and blood counts.  There is a rare possibility of scarring of the liver and lung problems that can occur when taking methotrexate. Persistent nausea, loss of appetite, pale stools, dark urine, cough, and shortness of breath should be reported immediately. Patient advised to discontinue Arava treatment and consult with a physician prior to attempting conception. The patient will have to undergo a treatment to eliminate Arava from the body prior to conception.

## 2023-08-11 NOTE — PROVIDER CONTACT NOTE (OTHER) - BACKGROUND
Writer spoke with staff member, July, who was informed of pt's discharge. Mode of transportation is AMBULANCE with [facility]. Writer to arrange. Chart address confirmed to be correct. Writer also

## 2023-08-11 NOTE — ED ADULT TRIAGE NOTE - CHIEF COMPLAINT QUOTE
Pt coming from [facility], arrives s/p making a bowel movement and now states "my sides are hurting". Says that the pain is not abdominal, just on "sides". Requesting a tuna sandwich. Hx of DM, schizophrenia, developmental delay Pt coming from [facility], arrives s/p making a bowel movement and now states "my sides are hurting". Says that the pain is not abdominal, just on "sides". Requesting a tuna sandwich. Hx of DM, schizophrenia, developmental delay. fs 83.

## 2023-08-11 NOTE — PROVIDER CONTACT NOTE (OTHER) - SITUATION
Writer informed by provider that pt is cleared for discharge will require SW assistance with transportation back to group home. Writer contacted pt [facility] at [phone].

## 2023-08-11 NOTE — ED PROVIDER NOTE - PATIENT PORTAL LINK FT
You can access the FollowMyHealth Patient Portal offered by [facility] by registering at the following website: [email or web address]. By joining [facility]’s FollowMyHealth portal, you will also be able to view your health information using other applications (apps) compatible with our system.

## 2023-08-11 NOTE — ED PROVIDER NOTE - CLINICAL SUMMARY MEDICAL DECISION MAKING FREE TEXT BOX
Patient is a 32 year old with past medical history of diabetes, asthma, and developmental delay who presents to the ED for evaluation of pain "inside" when she strains to poop. She says the pain is "in my butt" and has occurred once with one bowel movement earlier this evening. She denies any other symptoms, including chest pain, shortness of breath, abdominal pain, headache, fever, chills, abdominal pain, dysuria, hematochezia, and melena. Physical exam and vital signs were unremarkable.

## 2023-08-11 NOTE — ED ADULT NURSE NOTE - OBJECTIVE STATEMENT
Break shift: Pt received to 1b presents with rectal pain s/p having a BM this afternoon. Pt has developmental delay, a&ox4, ambulatory at baseline, skin intact, respirations even and unlabored, abd soft and non-distended, nontender to palpation.
[phone]

## 2023-08-11 NOTE — ED PROVIDER NOTE - NSFOLLOWUPINSTRUCTIONS_ED_ALL_ED_FT
Thank you for visiting our Emergency Department, it has been a pleasure taking part in your healthcare.  Please read and follow all of your discharge instructions. These instructions contain important information regarding your Emergency Department visit and future medical care.     Your discharge diagnosis is: rectal pain  Please take all discharge medications as indicated below:  Take Motrin/Tylenol for pain as needed, please follow instructions on manufacturers label. If you have any questions please consult a pharmacist or your PMD.  Please follow up with your Primary Care Doctor (PMD) within [phone].  Bring and show your PMD all documents and results you were given during your ED visit.  If you do not have a primary care doctor please call [phone] to establish primary care.  A copy of resulted labs, imaging, and findings have been provided to you.   You can also access all of your results through the [facility].  If you have questions about your results, please call the Emergency Department.  During your visit and at time of discharge, you had a detailed discussion with your provider regarding your diagnosis, care management and discharge planning.  Topics that were discussed included but were not limited to: return precautions, follow up visits with existing or new providers, new prescriptions and/or medication changes, wound and/or splint/cast care, incidental laboratory/radiology findings, or other care   aspects specific to your diagnosis and treatment. You have been given the opportunity to have your questions answered. At this time you have been deemed stable and fit for discharge.  Return precautions to the Emergency Department include but are not limited to: unrelenting nausea, vomiting, fever, chills, chest pain, shortness of breath, dizziness, chest or abdominal pain, worsening back pain, syncope, blood in urine or stool, headache that doesn't resolve, numbness or tingling, loss of sensation, loss of motor function, or any other concerning symptoms.    Please bring all ED Documents you were given during your stay to your PMD.   They contain important information for you and your PMD, including incidental lab/radiology findings that your PMD should be aware of.

## 2023-08-12 ENCOUNTER — EMERGENCY (EMERGENCY)
Facility: HOSPITAL | Age: 33
LOS: 1 days | Discharge: HOME | End: 2023-08-12
Attending: EMERGENCY MEDICINE | Admitting: EMERGENCY MEDICINE
Payer: MEDICAID

## 2023-08-12 VITALS
DIASTOLIC BLOOD PRESSURE: 64 MMHG | HEART RATE: 97 BPM | SYSTOLIC BLOOD PRESSURE: 120 MMHG | HEIGHT: 62 IN | RESPIRATION RATE: 16 BRPM | OXYGEN SATURATION: 98 % | TEMPERATURE: 98 F

## 2023-08-12 PROCEDURE — 71045 X-RAY EXAM CHEST 1 VIEW: CPT | Mod: 26

## 2023-08-12 PROCEDURE — 99285 EMERGENCY DEPT VISIT HI MDM: CPT

## 2023-08-12 PROCEDURE — 93010 ELECTROCARDIOGRAM REPORT: CPT

## 2023-08-12 RX ORDER — SODIUM CHLORIDE 9 MG/ML
1000 INJECTION, SOLUTION INTRAVENOUS ONCE
Refills: 0 | Status: COMPLETED | OUTPATIENT
Start: 2023-08-12 | End: 2023-08-12

## 2023-08-12 RX ADMIN — SODIUM CHLORIDE 1000 MILLILITER(S): 9 INJECTION, SOLUTION INTRAVENOUS at 23:57

## 2023-08-12 NOTE — ED ADULT NURSE NOTE - OBJECTIVE STATEMENT
Patient came in with the complaints of heart racing with chest pain and dizziness started after having argument with the staff at [facility]. Patient is placed on cardiac monitor with continues pulse ox. MD at bedside. Nursing care continues

## 2023-08-12 NOTE — ED ADULT TRIAGE NOTE - BANDS:
Allergy; Orbicularis Oris Muscle Flap Text: The defect edges were debeveled with a #15 scalpel blade.  Given that the defect affected the competency of the oral sphincter an orbicularis oris muscle flap was deemed most appropriate to restore this competency and normal muscle function.  Using a sterile surgical marker, an appropriate flap was drawn incorporating the defect. The area thus outlined was incised with a #15 scalpel blade.

## 2023-08-12 NOTE — ED ADULT NURSE NOTE - NS ED NURSE IV DC DT
General:     NAD, well-nourished, well-appearing  Head:     NC/AT, EOMI, oral mucosa moist  Neck:     trachea midline  Lungs:     CTA b/l, no w/r/r  CVS:     S1S2, RRR, no m/g/r  Abd:     +BS, s/nt/nd, no organomegaly  Ext:    2+ radial and pedal pulses, R LE:  laceration repaired with oozing from site. surrounding ecchymoses. nt over leg. from/nt @ hip/knee/ankle. 13-Aug-2023 07:23

## 2023-08-12 NOTE — ED ADULT TRIAGE NOTE - SOURCE OF INFORMATION
Background: With hx of bladder cancer, currently on systemic chemotherapy, brought in by  Granddaughter who reported several days of progressive confusion and weakness and was instructed to take patient to ED by infusion staff today for these symptoms  · Meets sepsis criteria on admission (SIRS + UTI)  · UA suggestive of UTI; cultures pending (possible colonization however)   · 103 1F in ED; afebrile since   · WBC 17 95; ANC WNL;  Lactic acid 2 4; COVID negative   · Lactic since cleared S/P fluid boluses  · WBC trending down appropriately   · S/P cefepime + vanco in ED; continue pending above cultures   · Possible chronic colonization in the setting of archibald catheter and nephrostomy; however in the setting of AMS and meeting SEPSIS criteria will treat as above  · Blood cultures pending   · Fortunately has remained hemodynamically stable Patient

## 2023-08-13 ENCOUNTER — EMERGENCY (EMERGENCY)
Facility: HOSPITAL | Age: 33
LOS: 0 days | Discharge: HOME | End: 2023-08-14
Attending: EMERGENCY MEDICINE
Payer: MEDICAID

## 2023-08-13 VITALS
RESPIRATION RATE: 18 BRPM | HEART RATE: 93 BPM | WEIGHT: 272.05 LBS | SYSTOLIC BLOOD PRESSURE: 122 MMHG | DIASTOLIC BLOOD PRESSURE: 84 MMHG | OXYGEN SATURATION: 98 % | TEMPERATURE: 99 F | HEIGHT: 62 IN

## 2023-08-13 VITALS
OXYGEN SATURATION: 100 % | TEMPERATURE: 98 F | HEART RATE: 80 BPM | DIASTOLIC BLOOD PRESSURE: 86 MMHG | RESPIRATION RATE: 15 BRPM | SYSTOLIC BLOOD PRESSURE: 122 MMHG

## 2023-08-13 DIAGNOSIS — M79.641 PAIN IN RIGHT HAND: ICD-10-CM
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
## 2023-08-13 DIAGNOSIS — F31.9 BIPOLAR DISORDER, UNSPECIFIED: ICD-10-CM

## 2023-08-13 DIAGNOSIS — Z91.018 ALLERGY TO OTHER FOODS: ICD-10-CM

## 2023-08-13 DIAGNOSIS — F79 UNSPECIFIED INTELLECTUAL DISABILITIES: ICD-10-CM

## 2023-08-13 DIAGNOSIS — Z91.013 ALLERGY TO SEAFOOD: ICD-10-CM

## 2023-08-13 DIAGNOSIS — Z79.84 LONG TERM (CURRENT) USE OF ORAL HYPOGLYCEMIC DRUGS: ICD-10-CM

## 2023-08-13 DIAGNOSIS — E11.9 TYPE 2 DIABETES MELLITUS WITHOUT COMPLICATIONS: ICD-10-CM
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
## 2023-08-13 DIAGNOSIS — J45.909 UNSPECIFIED ASTHMA, UNCOMPLICATED: ICD-10-CM

## 2023-08-13 DIAGNOSIS — F25.9 SCHIZOAFFECTIVE DISORDER, UNSPECIFIED: ICD-10-CM

## 2023-08-13 LAB
ALBUMIN SERPL ELPH-MCNC: 3.6 G/DL — SIGNIFICANT CHANGE UP (ref 3.3–5)
ALP SERPL-CCNC: 55 U/L — SIGNIFICANT CHANGE UP (ref 40–120)
ALT FLD-CCNC: 9 U/L — SIGNIFICANT CHANGE UP (ref 4–33)
ANION GAP SERPL CALC-SCNC: 13 MMOL/L — SIGNIFICANT CHANGE UP (ref 7–14)
AST SERPL-CCNC: 17 U/L — SIGNIFICANT CHANGE UP (ref 4–32)
BASOPHILS # BLD AUTO: 0.01 K/UL — SIGNIFICANT CHANGE UP (ref 0–0.2)
BASOPHILS NFR BLD AUTO: 0.1 % — SIGNIFICANT CHANGE UP (ref 0–2)
BILIRUB SERPL-MCNC: <0.2 MG/DL — SIGNIFICANT CHANGE UP (ref 0.2–1.2)
BUN SERPL-MCNC: 12 MG/DL — SIGNIFICANT CHANGE UP (ref 7–23)
CALCIUM SERPL-MCNC: 8.9 MG/DL — SIGNIFICANT CHANGE UP (ref 8.4–10.5)
CHLORIDE SERPL-SCNC: 104 MMOL/L — SIGNIFICANT CHANGE UP (ref 98–107)
CO2 SERPL-SCNC: 23 MMOL/L — SIGNIFICANT CHANGE UP (ref 22–31)
CREAT SERPL-MCNC: 0.82 MG/DL — SIGNIFICANT CHANGE UP (ref 0.5–1.3)
EGFR: 99 ML/MIN/1.73M2 — SIGNIFICANT CHANGE UP
EOSINOPHIL # BLD AUTO: 0.06 K/UL — SIGNIFICANT CHANGE UP (ref 0–0.5)
EOSINOPHIL NFR BLD AUTO: 0.8 % — SIGNIFICANT CHANGE UP (ref 0–6)
GLUCOSE SERPL-MCNC: 110 MG/DL — HIGH (ref 70–99)
HCG SERPL-ACNC: <1 MIU/ML — SIGNIFICANT CHANGE UP
HCT VFR BLD CALC: 35.3 % — SIGNIFICANT CHANGE UP (ref 34.5–45)
HGB BLD-MCNC: 11.5 G/DL — SIGNIFICANT CHANGE UP (ref 11.5–15.5)
IANC: 3.7 K/UL — SIGNIFICANT CHANGE UP (ref 1.8–7.4)
IMM GRANULOCYTES NFR BLD AUTO: 0.4 % — SIGNIFICANT CHANGE UP (ref 0–0.9)
LIDOCAIN IGE QN: 27 U/L — SIGNIFICANT CHANGE UP (ref 7–60)
LYMPHOCYTES # BLD AUTO: 3.16 K/UL — SIGNIFICANT CHANGE UP (ref 1–3.3)
LYMPHOCYTES # BLD AUTO: 42.5 % — SIGNIFICANT CHANGE UP (ref 13–44)
MCHC RBC-ENTMCNC: 30.7 PG — SIGNIFICANT CHANGE UP (ref 27–34)
MCHC RBC-ENTMCNC: 32.6 GM/DL — SIGNIFICANT CHANGE UP (ref 32–36)
MCV RBC AUTO: 94.1 FL — SIGNIFICANT CHANGE UP (ref 80–100)
MONOCYTES # BLD AUTO: 0.47 K/UL — SIGNIFICANT CHANGE UP (ref 0–0.9)
MONOCYTES NFR BLD AUTO: 6.3 % — SIGNIFICANT CHANGE UP (ref 2–14)
NEUTROPHILS # BLD AUTO: 3.7 K/UL — SIGNIFICANT CHANGE UP (ref 1.8–7.4)
NEUTROPHILS NFR BLD AUTO: 49.9 % — SIGNIFICANT CHANGE UP (ref 43–77)
NRBC # BLD: 0 /100 WBCS — SIGNIFICANT CHANGE UP (ref 0–0)
NRBC # FLD: 0 K/UL — SIGNIFICANT CHANGE UP (ref 0–0)
PLATELET # BLD AUTO: 271 K/UL — SIGNIFICANT CHANGE UP (ref 150–400)
POTASSIUM SERPL-MCNC: 4.6 MMOL/L — SIGNIFICANT CHANGE UP (ref 3.5–5.3)
POTASSIUM SERPL-SCNC: 4.6 MMOL/L — SIGNIFICANT CHANGE UP (ref 3.5–5.3)
PROT SERPL-MCNC: 6.6 G/DL — SIGNIFICANT CHANGE UP (ref 6–8.3)
RBC # BLD: 3.75 M/UL — LOW (ref 3.8–5.2)
RBC # FLD: 13.5 % — SIGNIFICANT CHANGE UP (ref 10.3–14.5)
SODIUM SERPL-SCNC: 140 MMOL/L — SIGNIFICANT CHANGE UP (ref 135–145)
TROPONIN T, HIGH SENSITIVITY RESULT: <6 NG/L — SIGNIFICANT CHANGE UP
TSH SERPL-MCNC: 2.7 UIU/ML — SIGNIFICANT CHANGE UP (ref 0.27–4.2)
WBC # BLD: 7.43 K/UL — SIGNIFICANT CHANGE UP (ref 3.8–10.5)
WBC # FLD AUTO: 7.43 K/UL — SIGNIFICANT CHANGE UP (ref 3.8–10.5)

## 2023-08-13 PROCEDURE — 99283 EMERGENCY DEPT VISIT LOW MDM: CPT

## 2023-08-13 NOTE — PROVIDER CONTACT NOTE (OTHER) - BACKGROUND
at [phone] and [phone]. Writer to continue to attempt outreach.     At this time EMS transport via [facility] was scheduled for  at 6am with trip #62A. Writer obtained [facility] invoice

## 2023-08-13 NOTE — ED PROVIDER NOTE - PATIENT PORTAL LINK FT
You can access the FollowMyHealth Patient Portal offered by [facility] by registering at the following website: [email or web address]. By joining [email or web address]’s FollowMyHealth portal, you will also be able to view your health information using other applications (apps) compatible with our system.
9

## 2023-08-13 NOTE — ED PROVIDER NOTE - PROGRESS NOTE DETAILS
Negative Cardiac work-up within normal limits.  And EKG nonischemic.  Patient made aware of her results and to follow-up with cardiology.

## 2023-08-13 NOTE — PROVIDER CONTACT NOTE (OTHER) - SITUATION
Writer informed by provider that pt is cleared for discharge and will require SW assistance with transportation back to group [place]. Writer has made multiple attempts to reach pt [facility] group home staff

## 2023-08-13 NOTE — ED PROVIDER NOTE - CLINICAL SUMMARY MEDICAL DECISION MAKING FREE TEXT BOX
32-year-old female patient past medical history of bipolar and schizophrenia who presents to the emergency department for chest pain during dinnertime.  On exam, patient found in no acute distress, breathing on room air with clear breath sounds.  Pulses symmetric bilaterally.  Will eval with blood work, EKG, chest x-ray.

## 2023-08-13 NOTE — PROVIDER CONTACT NOTE (OTHER) - RECOMMENDATIONS
Writer attempted residence again at 6:18AM leaving voice mail regarding pt's return. Writer has made 4 + attempts over last 2-3 hours. Pt pending discharge.

## 2023-08-13 NOTE — ED PROVIDER NOTE - NSFOLLOWUPINSTRUCTIONS_ED_ALL_ED_FT
You were seen for chest pain today.  Fortunately, no signs of a heart attack at this time.  Please find your results attached to this document.    Follow-up with cardiology outpatient for further management.    Continue taking your home medications.

## 2023-08-13 NOTE — ED PROVIDER NOTE - OBJECTIVE STATEMENT
32-year-old female patient past medical history of schizophrenia and bipolar disease who presents to the ED from group home for chest pain that began after eating tonight.  Patient endorses people were laughing at her at the time.  Patient was not to finish her dinner because of her chest pain and was sent into the ED.  Denies any nausea, vomiting, diarrhea, fevers.  Patient does endorse pain radiates from epigastrium up towards her chest.  Patient with multiple ED visits in the past with similar symptoms.

## 2023-08-13 NOTE — ED PROVIDER NOTE - ATTENDING CONTRIBUTION TO CARE
32F p/w chest pain and palpitations, started after eating.  Pt still having some symptoms but mostly feeling better at this point.  Pt from group home but is unaccompanied.  Frequent ED visitor.  When advised pt should get labs and CXR pt upset and wants to call her auntie but the number doesn't work.  Pt declining labs and CXR at this point.  Pt is well appearing with a normal EKG.  Pt does not appear to understand situation however risk of sedation and prolonged ED stay vs possibility of finding an occult cause of symptoms seems to favor respecting her wishes re: not doing further testing.  Pt is a frequent ED visitor and has been to ED, mainly [facility] ED and has not had much testing.  Last time she had blood tests was in May despite ~15 ED visits in the last month in a half.  Suspect secondary gain for ED visits.  Exam benign.  EKG SR at 94 no shen no std no twi  qtc 425.  If pt declines labs, OK for d/c back to .   VS:  unremarkable    GEN - NAD;   malaise;   A+O x3  Ambulatory in no distress  HEAD - NC/AT     ENT - PEERL, EOMI, mucous membranes    moist , no discharge      NECK: Neck supple, non-tender without lymphadenopathy, no masses, no JVD  PULM - CTA b/l,  symmetric breath sounds  COR -  normal heart sounds    ABD - , ND, NT, soft,  BACK - no CVA tenderness, nontender spine     EXTREMS - no edema, no deformity, warm and well perfused    SKIN - no rash    or bruising      NEUROLOGIC - alert, face symmetric, speech fluent, sensation nl, motor no focal deficit.
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
## 2023-08-13 NOTE — ED ADULT NURSE NOTE - NSFALLUNIVINTERV_ED_ALL_ED
Bed/Stretcher in lowest position, wheels locked, appropriate side rails in place/Call bell, personal items and telephone in reach/Instruct patient to call for assistance before getting out of bed/chair/stretcher/Non-slip footwear applied when patient is off stretcher/[place] to call system/Physically safe environment - no spills, clutter or unnecessary equipment/Purposeful proactive rounding/Room/bathroom lighting operational, light cord in reach

## 2023-08-13 NOTE — ED PROVIDER NOTE - PHYSICAL EXAMINATION
GENERAL: Awake, alert, NAD  HEENT: NC/AT, moist mucous membranes, EOMI  LUNGS: CTAB, no wheezes or crackles   CARDIAC: RRR, no m/r/g  ABDOMEN: Soft, non tender, non distended, no rebound, no guarding  EXT: No edema, no calf tenderness, 2+ PT pulses bilaterally, no deformities.  NEURO: A&Ox3. Moving all extremities.  SKIN: Warm and dry. No rash.  PSYCH: Normal affect.

## 2023-08-13 NOTE — PROVIDER CONTACT NOTE (OTHER) - ASSESSMENT
#[id number] via online portal which was provided to S[facility]IOR [facility]ARE for trip. Verbal huddle occurred with interdisciplinary team.

## 2023-08-13 NOTE — ED PROVIDER NOTE - NS ED MD DISPO DISCHARGE
"Anesthesia Post Evaluation    Patient: Kendy Boogie    Procedure(s) Performed: Procedure(s) (LRB):  ESOPHAGOGASTRODUODENOSCOPY (EGD) (N/A)    Final Anesthesia Type: general  Patient location during evaluation: PACU  Patient participation: Yes- Able to Participate  Level of consciousness: awake and alert  Post-procedure vital signs: reviewed and stable  Pain management: adequate  Airway patency: patent  PONV status at discharge: No PONV  Anesthetic complications: no      Cardiovascular status: blood pressure returned to baseline  Respiratory status: unassisted  Hydration status: euvolemic  Follow-up not needed.        Visit Vitals  BP (!) 157/91   Pulse 76   Temp 36.9 °C (98.4 °F)   Resp 20   Ht 5' 4" (1.626 m)   Wt 45.4 kg (100 lb)   SpO2 99%   Breastfeeding? No   BMI 17.16 kg/m²       Pain/Everton Score: Everton Score: 10 (1/23/2019  9:33 AM)        "
Home

## 2023-08-14 ENCOUNTER — EMERGENCY (EMERGENCY)
Facility: HOSPITAL | Age: 33
LOS: 0 days | Discharge: HOME | End: 2023-08-15
Attending: EMERGENCY MEDICINE
Payer: MEDICAID

## 2023-08-14 VITALS
WEIGHT: 276.9 LBS | HEIGHT: 62 IN | HEART RATE: 99 BPM | RESPIRATION RATE: 18 BRPM | SYSTOLIC BLOOD PRESSURE: 144 MMHG | OXYGEN SATURATION: 97 % | TEMPERATURE: 98 F | DIASTOLIC BLOOD PRESSURE: 90 MMHG

## 2023-08-14 VITALS
HEART RATE: 99 BPM | OXYGEN SATURATION: 98 % | SYSTOLIC BLOOD PRESSURE: 103 MMHG | TEMPERATURE: 98 F | DIASTOLIC BLOOD PRESSURE: 79 MMHG | RESPIRATION RATE: 20 BRPM

## 2023-08-14 DIAGNOSIS — F79 UNSPECIFIED INTELLECTUAL DISABILITIES: ICD-10-CM

## 2023-08-14 DIAGNOSIS — Z79.84 LONG TERM (CURRENT) USE OF ORAL HYPOGLYCEMIC DRUGS: ICD-10-CM

## 2023-08-14 DIAGNOSIS — M79.641 PAIN IN RIGHT HAND: ICD-10-CM

## 2023-08-14 DIAGNOSIS — E11.9 TYPE 2 DIABETES MELLITUS WITHOUT COMPLICATIONS: ICD-10-CM

## 2023-08-14 DIAGNOSIS — F25.9 SCHIZOAFFECTIVE DISORDER, UNSPECIFIED: ICD-10-CM

## 2023-08-14 DIAGNOSIS — F31.9 BIPOLAR DISORDER, UNSPECIFIED: ICD-10-CM

## 2023-08-14 DIAGNOSIS — R30.0 DYSURIA: ICD-10-CM

## 2023-08-14 DIAGNOSIS — Z91.018 ALLERGY TO OTHER FOODS: ICD-10-CM

## 2023-08-14 DIAGNOSIS — Z91.013 ALLERGY TO SEAFOOD: ICD-10-CM

## 2023-08-14 PROCEDURE — 99284 EMERGENCY DEPT VISIT MOD MDM: CPT

## 2023-08-14 NOTE — ED ADULT NURSE REASSESSMENT NOTE - NS ED NURSE REASSESS COMMENT FT1
Patient A&Ox4 with comfortable appearance. Denies pain or any other discomfort. Hemodynamically stable. Discharged, writer contacted group home and spoke to Pamela, as per Pamela, staff is and will be available to receive patient. Confirmed address and phone number on file are correct.

## 2023-08-14 NOTE — ED ADULT NURSE REASSESSMENT NOTE - NS ED NURSE REASSESS COMMENT FT1
Received report from night RN, patient A&Ox4 has comfortable appearance, sleeping, chest rise and fall observed. Awaiting discharge and transportation back to group home.

## 2023-08-14 NOTE — ED ADULT NURSE NOTE - NSFALLHARMRISKINTERV_ED_ALL_ED

## 2023-08-14 NOTE — ED ADULT NURSE NOTE - CHIEF COMPLAINT QUOTE
BIBA for dysuria since this morning, also with right hand pain, seen in ED yesterday for right hand pain.

## 2023-08-14 NOTE — ED PROVIDER NOTE - CONSIDERATION OF ADMISSION OBSERVATION
patient requesting to go back to group home after arrival in the ED. Patient without acute complaint requiring admission Consideration of Admission/Observation

## 2023-08-14 NOTE — ED ADULT NURSE REASSESSMENT NOTE - NS ED NURSE REASSESS COMMENT FT1
Pt alert and oriented x4, laying supine in stretcher, in no acute distress at this time. Awaiting transport back to group home.

## 2023-08-14 NOTE — ED PROVIDER NOTE - PHYSICAL EXAMINATION
General: Well appearing obese female in no acute distress  HEENT: Normocephalic, atraumatic. Moist mucous membranes. Oropharynx clear. No lymphadenopathy.  Eyes: No scleral icterus. EOMI. EMERALD.  Neck:. Soft and supple. Full ROM without pain. No midline tenderness  Cardiac: Regular rate and regular rhythm. No murmurs, rubs, gallops. Peripheral pulses 2+ and symmetric. No LE edema.  Resp: Lungs CTAB. Speaking in full sentences. No wheezes, rales or rhonchi.  Abd: Soft, non-tender, non-distended. No guarding or rebound. No scars, masses, or lesions.  Back: Spine midline and non-tender. No CVA tenderness.    MSK: Full range of motion over b/l hands and wrist, no deficits noted on hand exam. no ttp over R hand  Skin: No rashes, abrasions, or lacerations.  Neuro: AO x 3. Moves all extremities symmetrically. Motor strength and sensation grossly intact.

## 2023-08-14 NOTE — ED PROVIDER NOTE - NSFOLLOWUPINSTRUCTIONS_ED_ALL_ED_FT
You were seen in the ED for hand pain. Return to the ED if you begin to experience, swelling, pain with movement of the hand or the individual fingers. Take ibuprofen or acetaminophen if the pain is present. Follow-up with your primary doctor
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
## 2023-08-14 NOTE — ED PROVIDER NOTE - PATIENT PORTAL LINK FT
You can access the FollowMyHealth Patient Portal offered by [facility] by registering at the following website: [email or web address]. By joining [employer]’s FollowMyHealth portal, you will also be able to view your health information using other applications (apps) compatible with our system.
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
## 2023-08-15 VITALS
OXYGEN SATURATION: 100 % | HEART RATE: 78 BPM | DIASTOLIC BLOOD PRESSURE: 69 MMHG | SYSTOLIC BLOOD PRESSURE: 116 MMHG | TEMPERATURE: 98 F | RESPIRATION RATE: 18 BRPM

## 2023-08-15 LAB
APPEARANCE UR: CLEAR — SIGNIFICANT CHANGE UP
BACTERIA # UR AUTO: ABNORMAL
BILIRUB UR-MCNC: NEGATIVE — SIGNIFICANT CHANGE UP
COLOR SPEC: YELLOW — SIGNIFICANT CHANGE UP
DIFF PNL FLD: NEGATIVE — SIGNIFICANT CHANGE UP
EPI CELLS # UR: SIGNIFICANT CHANGE UP
GLUCOSE UR QL: NEGATIVE MG/DL — SIGNIFICANT CHANGE UP
HCG UR QL: NEGATIVE — SIGNIFICANT CHANGE UP
KETONES UR-MCNC: ABNORMAL
LEUKOCYTE ESTERASE UR-ACNC: NEGATIVE — SIGNIFICANT CHANGE UP
NITRITE UR-MCNC: NEGATIVE — SIGNIFICANT CHANGE UP
PH UR: 8 — SIGNIFICANT CHANGE UP (ref 5–8)
PROT UR-MCNC: 100 MG/DL
RBC CASTS # UR COMP ASSIST: SIGNIFICANT CHANGE UP /HPF (ref 0–4)
SP GR SPEC: 1.01 — SIGNIFICANT CHANGE UP (ref 1.01–1.02)
UROBILINOGEN FLD QL: NEGATIVE MG/DL — SIGNIFICANT CHANGE UP
WBC UR QL: SIGNIFICANT CHANGE UP

## 2023-08-15 NOTE — ED PROVIDER NOTE - PROGRESS NOTE DETAILS
Patient refusing to give additional urine specimens.  She also states that when using the bathroom in the ER she did NOT have burning.

## 2023-08-15 NOTE — ED PROVIDER NOTE - PATIENT PORTAL LINK FT
You can access the FollowMyHealth Patient Portal offered by [facility] by registering at the following website: [email or web address]. By joining [employer]’s FollowMyHealth portal, you will also be able to view your health information using other applications (apps) compatible with our system.
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
## 2023-08-15 NOTE — ED ADULT NURSE REASSESSMENT NOTE - NSFALLUNIVINTERV_ED_ALL_ED
Bed/Stretcher in lowest position, wheels locked, appropriate side rails in place/Call bell, personal items and telephone in reach/Instruct patient to call for assistance before getting out of bed/chair/stretcher/Non-slip footwear applied when patient is off stretcher/[place] to call system/Physically safe environment - no spills, clutter or unnecessary equipment/Purposeful proactive rounding/Room/bathroom lighting operational, light cord in reach

## 2023-08-15 NOTE — ED PROVIDER NOTE - CLINICAL SUMMARY MEDICAL DECISION MAKING FREE TEXT BOX
Dysuria no acute distress no fever r/o UTI will check UA.  Patient c/o hand pain previous visit for hand pain Xray offered but patient declined. Dysuria no acute distress no fever r/o UTI will check UA.  Patient c/o hand pain previous visit for hand pain Xray and pain medication was offered but patient declined.    UA negative for UTI.
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
## 2023-08-16 LAB
C TRACH RRNA SPEC QL NAA+PROBE: SIGNIFICANT CHANGE UP
CULTURE RESULTS: SIGNIFICANT CHANGE UP
N GONORRHOEA RRNA SPEC QL NAA+PROBE: SIGNIFICANT CHANGE UP
SPECIMEN SOURCE: SIGNIFICANT CHANGE UP
SPECIMEN SOURCE: SIGNIFICANT CHANGE UP

## 2023-08-17 ENCOUNTER — EMERGENCY (EMERGENCY)
Facility: HOSPITAL | Age: 33
LOS: 0 days | Discharge: HOME | End: 2023-08-17
Attending: STUDENT IN AN ORGANIZED HEALTH CARE EDUCATION/TRAINING PROGRAM
Payer: MEDICAID

## 2023-08-17 VITALS
RESPIRATION RATE: 16 BRPM | OXYGEN SATURATION: 99 % | DIASTOLIC BLOOD PRESSURE: 82 MMHG | HEART RATE: 86 BPM | SYSTOLIC BLOOD PRESSURE: 131 MMHG

## 2023-08-17 VITALS
OXYGEN SATURATION: 98 % | DIASTOLIC BLOOD PRESSURE: 62 MMHG | SYSTOLIC BLOOD PRESSURE: 117 MMHG | RESPIRATION RATE: 19 BRPM | HEIGHT: 62 IN | WEIGHT: 270.07 LBS | TEMPERATURE: 98 F | HEART RATE: 99 BPM

## 2023-08-17 DIAGNOSIS — J45.909 UNSPECIFIED ASTHMA, UNCOMPLICATED: ICD-10-CM

## 2023-08-17 DIAGNOSIS — E11.9 TYPE 2 DIABETES MELLITUS WITHOUT COMPLICATIONS: ICD-10-CM

## 2023-08-17 DIAGNOSIS — Z20.822 CONTACT WITH AND (SUSPECTED) EXPOSURE TO COVID-19: ICD-10-CM

## 2023-08-17 DIAGNOSIS — R62.50 UNSPECIFIED LACK OF EXPECTED NORMAL PHYSIOLOGICAL DEVELOPMENT IN CHILDHOOD: ICD-10-CM

## 2023-08-17 DIAGNOSIS — Z91.013 ALLERGY TO SEAFOOD: ICD-10-CM

## 2023-08-17 DIAGNOSIS — F25.9 SCHIZOAFFECTIVE DISORDER, UNSPECIFIED: ICD-10-CM

## 2023-08-17 DIAGNOSIS — R53.1 WEAKNESS: ICD-10-CM

## 2023-08-17 DIAGNOSIS — F31.9 BIPOLAR DISORDER, UNSPECIFIED: ICD-10-CM

## 2023-08-17 DIAGNOSIS — Z91.018 ALLERGY TO OTHER FOODS: ICD-10-CM

## 2023-08-17 DIAGNOSIS — Z79.84 LONG TERM (CURRENT) USE OF ORAL HYPOGLYCEMIC DRUGS: ICD-10-CM

## 2023-08-17 DIAGNOSIS — F79 UNSPECIFIED INTELLECTUAL DISABILITIES: ICD-10-CM

## 2023-08-17 LAB
FLUAV AG NPH QL: SIGNIFICANT CHANGE UP
FLUBV AG NPH QL: SIGNIFICANT CHANGE UP
SARS-COV-2 RNA SPEC QL NAA+PROBE: SIGNIFICANT CHANGE UP

## 2023-08-17 PROCEDURE — 99284 EMERGENCY DEPT VISIT MOD MDM: CPT

## 2023-08-17 NOTE — ED PROVIDER NOTE - CLINICAL SUMMARY MEDICAL DECISION MAKING FREE TEXT BOX
pt refusing workup, likely here for malingering, low suspicion for electrolyte abn vs anemia vs ACS. will DC

## 2023-08-17 NOTE — ED PROVIDER NOTE - OBJECTIVE STATEMENT
32 y/ oF w/ pmh of DM, schozaffective, bipolar, asthma p/w weakness x 1 day. pt here often for same complaint.  states she is no longer feeling weak. requesitng transport home

## 2023-08-17 NOTE — ED ADULT NURSE REASSESSMENT NOTE - NS ED NURSE REASSESS COMMENT FT1
Multiple calls placed to group home with no answer. Alternate number in pt chart called, told it was a wrong number. Will continue to attempt to reach [facility]

## 2023-08-17 NOTE — ED PROVIDER NOTE - NSFOLLOWUPINSTRUCTIONS_ED_ALL_ED_FT
Weakness  ImageWeakness is a lack of strength. You may feel weak all over your body (generalized), or you may feel weak in one specific part of your body (focal). There are many potential causes of weakness. Sometimes, the cause of your weakness may not be known. Some causes of weakness can be serious, so it is important to see your doctor.    Follow these instructions at home:  Rest as needed.  Try to get enough sleep. Talk to your doctor about how much sleep you need each night.  Take over-the-counter and prescription medicines only as told by your doctor.  Eat a healthy, well-balanced diet. This includes:    Proteins to build muscles, such as lean meats and fish.  Fresh fruits and vegetables.  Carbohydrates to boost energy, such as whole grains.    Drink enough fluid to keep your pee (urine) clear or pale yellow.  Do strength exercises, such as arm curls and leg raises, for 30 minutes at least 2 days a week or as told by your doctor.  Think about working with a physical therapist or  to help you get stronger.  Keep all follow-up visits as told by your doctor. This is important.  Contact a doctor if:  Your weakness does not get better or it gets worse.  Your weakness affects your ability to:    Think clearly.  Do your normal daily activities.    Get help right away if:  You have sudden weakness.  You have trouble breathing or shortness of breath.  You have problems with your vision.  You have trouble talking or swallowing.  You have trouble standing or walking.  You have chest pain.  You are light-headed.  You pass out (lose consciousness).  This information is not intended to replace advice given to you by your health care provider. Make sure you discuss any questions you have with your health care provider.

## 2023-08-17 NOTE — ED ADULT NURSE NOTE - OBJECTIVE STATEMENT
Received 32 year old female A/Ox4 coming in from a group home complaining of weakness and body aches starting this evening. Pt reports she barely ate today and does not feel well. Pt denies dizziness, headache, N/V.

## 2023-08-17 NOTE — ED ADULT NURSE REASSESSMENT NOTE - NS ED NURSE REASSESS COMMENT FT1
[facility] transport got in contact with group home, patient discharged back with them. Left in wheelchair, NAD noted at time of discharge. Pt baseline mental status and cooperative.

## 2023-08-17 NOTE — ED ADULT TRIAGE NOTE - CHIEF COMPLAINT QUOTE
pt c/o generalized weakness since this  morning. denies chest pain , facial droop or slurred speech. history intellectual disability. lives in a group home.

## 2023-08-20 ENCOUNTER — EMERGENCY (EMERGENCY)
Facility: HOSPITAL | Age: 33
LOS: 0 days | Discharge: HOME | End: 2023-08-21
Attending: STUDENT IN AN ORGANIZED HEALTH CARE EDUCATION/TRAINING PROGRAM
Payer: MEDICAID

## 2023-08-20 VITALS
SYSTOLIC BLOOD PRESSURE: 108 MMHG | WEIGHT: 272.05 LBS | OXYGEN SATURATION: 98 % | TEMPERATURE: 98 F | HEIGHT: 62 IN | HEART RATE: 111 BPM | DIASTOLIC BLOOD PRESSURE: 84 MMHG | RESPIRATION RATE: 18 BRPM

## 2023-08-20 DIAGNOSIS — Z79.84 LONG TERM (CURRENT) USE OF ORAL HYPOGLYCEMIC DRUGS: ICD-10-CM

## 2023-08-20 DIAGNOSIS — F79 UNSPECIFIED INTELLECTUAL DISABILITIES: ICD-10-CM

## 2023-08-20 DIAGNOSIS — G47.33 OBSTRUCTIVE SLEEP APNEA (ADULT) (PEDIATRIC): ICD-10-CM

## 2023-08-20 DIAGNOSIS — Z87.39 PERSONAL HISTORY OF OTHER DISEASES OF THE MUSCULOSKELETAL SYSTEM AND CONNECTIVE TISSUE: ICD-10-CM

## 2023-08-20 DIAGNOSIS — R11.2 NAUSEA WITH VOMITING, UNSPECIFIED: ICD-10-CM

## 2023-08-20 DIAGNOSIS — K21.9 GASTRO-ESOPHAGEAL REFLUX DISEASE WITHOUT ESOPHAGITIS: ICD-10-CM

## 2023-08-20 DIAGNOSIS — Z91.013 ALLERGY TO SEAFOOD: ICD-10-CM

## 2023-08-20 DIAGNOSIS — E11.9 TYPE 2 DIABETES MELLITUS WITHOUT COMPLICATIONS: ICD-10-CM

## 2023-08-20 DIAGNOSIS — R11.10 VOMITING, UNSPECIFIED: ICD-10-CM

## 2023-08-20 DIAGNOSIS — F41.9 ANXIETY DISORDER, UNSPECIFIED: ICD-10-CM

## 2023-08-20 DIAGNOSIS — F25.9 SCHIZOAFFECTIVE DISORDER, UNSPECIFIED: ICD-10-CM

## 2023-08-20 DIAGNOSIS — Z91.018 ALLERGY TO OTHER FOODS: ICD-10-CM

## 2023-08-20 DIAGNOSIS — F31.9 BIPOLAR DISORDER, UNSPECIFIED: ICD-10-CM

## 2023-08-20 DIAGNOSIS — J45.909 UNSPECIFIED ASTHMA, UNCOMPLICATED: ICD-10-CM

## 2023-08-20 PROCEDURE — 99284 EMERGENCY DEPT VISIT MOD MDM: CPT

## 2023-08-20 RX ORDER — MIDAZOLAM HYDROCHLORIDE 1 MG/ML
4 INJECTION, SOLUTION INTRAMUSCULAR; INTRAVENOUS ONCE
Refills: 0 | Status: COMPLETED | OUTPATIENT
Start: 2023-08-20 | End: 2023-08-20

## 2023-08-20 RX ORDER — HALOPERIDOL DECANOATE 100 MG/ML
5 INJECTION INTRAMUSCULAR ONCE
Refills: 0 | Status: DISCONTINUED | OUTPATIENT
Start: 2023-08-20 | End: 2023-08-21

## 2023-08-20 RX ORDER — MIDAZOLAM HYDROCHLORIDE 1 MG/ML
4 INJECTION, SOLUTION INTRAMUSCULAR; INTRAVENOUS ONCE
Refills: 0 | Status: DISCONTINUED | OUTPATIENT
Start: 2023-08-20 | End: 2023-08-20

## 2023-08-20 NOTE — ED ADULT TRIAGE NOTE - CHIEF COMPLAINT QUOTE
pt c/o adnominal pain, nausea, vomiting since the morning. denies diarrhea, fever or chills. Lmp aug 2nd. pt lives in a group home. pt was seen 2 days ago for similar symptoms.
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
## 2023-08-20 NOTE — ED PROVIDER NOTE - NS ED ROS FT
Gen: No fever, normal appetite  Resp: No cough or trouble breathing  Cardiovascular: No chest pain or palpitation  Gastroenteric: see HPI   :  No urinary symptoms   Skin: No rashes  Neuro: No headache; no abnormal movements  Remainder negative, except as per the HPI
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
## 2023-08-20 NOTE — ED ADULT NURSE NOTE - OBJECTIVE STATEMENT
Past Medical History:   Diagnosis Date    Acute pulmonary embolus     De Quervain's tenosynovitis     Deep vein thrombosis     Diabetes mellitus, type 2     Diabetic neuropathy     Fibroid uterus     Hyperlipidemia     Hypertension     Lumbar back pain with radiculopathy affecting lower extremity     Lumbar spondylitis     Vitamin D deficiency        Past Surgical History:   Procedure Laterality Date    BACK SURGERY      Collarbone SX      HIP FRACTURE SURGERY      Left Femur Sx      NECK SURGERY      REPAIR OF MENISCUS OF KNEE         Current Outpatient Medications   Medication Sig    aspirin 81 mg Cap Take 81 mg by mouth once daily.    chlorthalidone (HYGROTEN) 25 MG Tab Take 25 mg by mouth once daily.    empagliflozin (JARDIANCE) 25 mg tablet Take 25 mg by mouth once daily.    ezetimibe (ZETIA) 10 mg tablet Take 10 mg by mouth every morning.    gabapentin (NEURONTIN) 600 MG tablet Take 1 tablet by mouth 3 (three) times daily.    HYDROcodone-acetaminophen (NORCO)  mg per tablet Take 1 tablet by mouth every 4 (four) hours as needed.    levothyroxine (SYNTHROID) 50 MCG tablet Take 1 tablet (50 mcg total) by mouth before breakfast.    lisinopril (PRINIVIL,ZESTRIL) 40 MG tablet Take 40 mg by mouth once daily.    REPATHA PUSHTRONEX 420 mg/3.5 mL Injt inject 3.5 mls sub-q once per month    tizanidine (ZANAFLEX) 4 MG tablet Take 4 mg by mouth 3 (three) times daily as needed.    TRULICITY 1.5 mg/0.5 mL pen injector Inject 0.5 mg into the skin once a week.    aspirin (ECOTRIN) 81 MG EC tablet Take 81 mg by mouth once daily.    atorvastatin (LIPITOR) 80 MG tablet Take 80 mg by mouth once daily.    insulin glargine (LANTUS) 100 unit/mL injection Inject 40 Units into the skin once daily.    saxagliptin (ONGLYZA) 5 mg Tab tablet Take by mouth once daily.     No current facility-administered medications for this visit.       Review of patient's allergies indicates:   Allergen Reactions    Coenzyme q10     Pioglitazone      Buprenorphine Rash     Other reaction(s): burn       Family History   Problem Relation Age of Onset    Cancer Mother     Coronary artery disease Mother     Coronary artery disease Father        Social History     Socioeconomic History    Marital status: Single   Tobacco Use    Smoking status: Former    Smokeless tobacco: Never   Substance and Sexual Activity    Alcohol use: Not Currently    Drug use: Never    Sexual activity: Not Currently       Chief Complaint:   Chief Complaint   Patient presents with    Hip Injury     LT Femur fx on 2018- Pt states she has this pain ongoing since she got a sx done in 2019- Pt wants to know If she can remove a MARGO placed in hip because it causes a lot of pain every day. Pt can't sleep properly due this discomfort.       History of present illness:  57-year-old female presents today for evaluation of left hip pain.  Patient has a history of a femoral shaft fracture treated with intramedullary nail about 3-5 years ago.  She did well initially.  She complains of pain laterally to her left hip.  It is worse with activity.  In worse at night when she lies on that side.  Improved by rest.  Patient has tried anti-inflammatories.  No injections.  No physical therapy.      Review of Systems:    Constitution: Negative for chills, fever, and sweats.  Negative for unexplained weight loss.    HENT:  Negative for headaches and blurry vision.    Cardiovascular:Negative for chest pain or irregular heart beat. Negative for hypertension.    Respiratory:  Negative for cough and shortness of breath.    Gastrointestinal: Negative for abdominal pain, heartburn, melena, nausea, and vomitting.    Genitourinary:  Negative bladder incontinence and dysuria.    Musculoskeletal:  See HPI    Neurological: Negative for numbness.    Psychiatric/Behavioral: Negative for depression.  The patient is not nervous/anxious.      Endocrine: Negative for polyuria    Hematologic/Lymphatic: Negative for bleeding  problem.  Does not bruise/bleed easily.    Skin: Negative for poor would healing and rash      Physical Examination:    Vital Signs:    Vitals:    09/06/22 1341   BP: 129/66   Pulse: 77       Body mass index is 35.02 kg/m².    General: No acute distress, alert and oriented, healthy appearing    HEENT: Head is atraumatic, mucous membranes are moist    Neck: Supples, no JVD    Cardiovascular: Palpable dorsalis pedis and posterior tibial pulses, regular rate and rhythm to those pulses    Lungs: Breathing non-labored    Skin: no rashes appreciated    Neurologic: Can flex and extend knees, ankles, and toes. Sensation is grossly intact    Left hip:  Range of motion left hip without significant pain or discomfort.  Patient does have point tenderness laterally over trochanteric bursa.  Negative Stinchfield.  Negative straight leg raise.    X-rays:  Two views of the left hip taken and reviewed.  Patient's fracture is well healed.  No signs of hardware failure changed position.  Does have some heterotopic bone to proximal hip     Assessment::  Left trochanteric bursitis    Plan:  Is the patient is having some mild prominence of the cephalomedullary screw bleeding trochanteric bursitis versus tendinitis.  Will give her an injection to calm this down.  This is not long-lasting we will get her in physical therapy.  Finally if she continues to have pain we could remove her hardware although we did discuss that this is not a guarantee of improvement of her pain or symptoms.    This note was created using [employer] voice recognition software that occasionally misinterpreted phrases or words.    Consult note is delivered via Epic messaging service.     Pt AOx3, responsive, ambulatory, from group home. pt c/o adnominal pain, nausea, vomiting since the morning. denies diarrhea, fever or chills/ CP/SOB, burning/painful urination. Lmp aug 2nd; denies pregnancy. hx of intellectual disability, developmental delay, DM.

## 2023-08-20 NOTE — ED PROVIDER NOTE - PHYSICAL EXAMINATION
Gen: AOX3, NAD  Head: NCAT  ENT: Airway patent, moist mucous membranes, nasal passageways clear  Cardiac: Normal rate, normal rhythm  Respiratory: Lungs CTA B/L  Gastrointestinal: Abdomen soft, nontender, nondistended, no rebound, no guarding  MSK: No gross abnormalities, FROM of all four extremities, no edema  HEME: Extremities warm and well perfused   Skin: No rashes, no lesions  Neuro: No gross neurologic deficits, normal speech, normal gait  Psych: calm and cooperative, denies SI/ HI

## 2023-08-20 NOTE — ED PROVIDER NOTE - PROGRESS NOTE DETAILS
Miles DO: pt tolerated po, states she feels better and would like to go home, no abd pain, nausea, or vomiting here in the ED. I attempted several times to contact group home and no answer - - unable to safely discharge pt home at this time Pt. reports feeling better after meds, tolerating po intake without issue   pt. agrees to f/u with primary care outpt. asap  pt. understands to return to ED if symptoms worsen; will d/c

## 2023-08-20 NOTE — ED ADULT NURSE NOTE - NSFALLUNIVINTERV_ED_ALL_ED
Bed/Stretcher in lowest position, wheels locked, appropriate side rails in place/Call bell, personal items and telephone in reach/Instruct patient to call for assistance before getting out of bed/chair/stretcher/Non-slip footwear applied when patient is off stretcher/[place] to call system/Physically safe environment - no spills, clutter or unnecessary equipment/Purposeful proactive rounding/Room/bathroom lighting operational, light cord in reach

## 2023-08-20 NOTE — ED PROVIDER NOTE - PATIENT PORTAL LINK FT
You can access the FollowMyHealth Patient Portal offered by [facility] by registering at the following website: [email or web address]. By joining [employer]’s FollowMyHealth portal, you will also be able to view your health information using other applications (apps) compatible with our system.

## 2023-08-21 VITALS
DIASTOLIC BLOOD PRESSURE: 86 MMHG | OXYGEN SATURATION: 98 % | SYSTOLIC BLOOD PRESSURE: 121 MMHG | RESPIRATION RATE: 18 BRPM | TEMPERATURE: 98 F | HEART RATE: 87 BPM

## 2023-08-21 NOTE — ED ADULT NURSE REASSESSMENT NOTE - NS ED NURSE REASSESS COMMENT FT1
Multiple attempts to contact group home gone unanswered. Pt resting in bed in NAD. Continuing to attempt to reach group home.

## 2023-08-21 NOTE — ED ADULT NURSE REASSESSMENT NOTE - NS ED NURSE REASSESS COMMENT FT1
Patient received on stretcher, awake and alert with stable VS. Denies pain or distress. Awaiting transportation back to [facility] home, patient updated on POC.

## 2023-08-21 NOTE — ED ADULT NURSE REASSESSMENT NOTE - NS ED NURSE REASSESS COMMENT FT1
Pt resting quietly in bed. Psych meds continue to be held as patient is quiet and cooperative. NAD or complaints at this time. Awaiting orders for dispo.

## 2023-08-23 ENCOUNTER — EMERGENCY (EMERGENCY)
Facility: HOSPITAL | Age: 33
LOS: 0 days | Discharge: HOME | End: 2023-08-24
Attending: EMERGENCY MEDICINE
Payer: MEDICAID

## 2023-08-23 VITALS
WEIGHT: 278 LBS | HEART RATE: 98 BPM | OXYGEN SATURATION: 99 % | TEMPERATURE: 97 F | HEIGHT: 62 IN | DIASTOLIC BLOOD PRESSURE: 76 MMHG | SYSTOLIC BLOOD PRESSURE: 121 MMHG | RESPIRATION RATE: 18 BRPM

## 2023-08-23 DIAGNOSIS — F41.9 ANXIETY DISORDER, UNSPECIFIED: ICD-10-CM

## 2023-08-23 DIAGNOSIS — Z79.84 LONG TERM (CURRENT) USE OF ORAL HYPOGLYCEMIC DRUGS: ICD-10-CM

## 2023-08-23 DIAGNOSIS — F31.9 BIPOLAR DISORDER, UNSPECIFIED: ICD-10-CM

## 2023-08-23 DIAGNOSIS — F79 UNSPECIFIED INTELLECTUAL DISABILITIES: ICD-10-CM

## 2023-08-23 DIAGNOSIS — R10.9 UNSPECIFIED ABDOMINAL PAIN: ICD-10-CM

## 2023-08-23 DIAGNOSIS — Z91.018 ALLERGY TO OTHER FOODS: ICD-10-CM

## 2023-08-23 DIAGNOSIS — E11.9 TYPE 2 DIABETES MELLITUS WITHOUT COMPLICATIONS: ICD-10-CM

## 2023-08-23 DIAGNOSIS — Z91.013 ALLERGY TO SEAFOOD: ICD-10-CM

## 2023-08-23 DIAGNOSIS — F20.9 SCHIZOPHRENIA, UNSPECIFIED: ICD-10-CM

## 2023-08-23 DIAGNOSIS — N39.0 URINARY TRACT INFECTION, SITE NOT SPECIFIED: ICD-10-CM

## 2023-08-23 DIAGNOSIS — Z87.19 PERSONAL HISTORY OF OTHER DISEASES OF THE DIGESTIVE SYSTEM: ICD-10-CM

## 2023-08-23 LAB
APPEARANCE UR: CLEAR — SIGNIFICANT CHANGE UP
BACTERIA # UR AUTO: ABNORMAL
BILIRUB UR-MCNC: ABNORMAL
COLOR SPEC: YELLOW — SIGNIFICANT CHANGE UP
DIFF PNL FLD: NEGATIVE — SIGNIFICANT CHANGE UP
EPI CELLS # UR: SIGNIFICANT CHANGE UP
GLUCOSE UR QL: NEGATIVE MG/DL — SIGNIFICANT CHANGE UP
HCG UR QL: NEGATIVE — SIGNIFICANT CHANGE UP
KETONES UR-MCNC: ABNORMAL
LEUKOCYTE ESTERASE UR-ACNC: ABNORMAL
NITRITE UR-MCNC: NEGATIVE — SIGNIFICANT CHANGE UP
PH UR: 6 — SIGNIFICANT CHANGE UP (ref 5–8)
PROT UR-MCNC: 15 MG/DL
RBC CASTS # UR COMP ASSIST: NEGATIVE /HPF — SIGNIFICANT CHANGE UP (ref 0–4)
SP GR SPEC: 1.02 — SIGNIFICANT CHANGE UP (ref 1.01–1.02)
UROBILINOGEN FLD QL: 4 MG/DL
WBC UR QL: SIGNIFICANT CHANGE UP

## 2023-08-23 PROCEDURE — 99284 EMERGENCY DEPT VISIT MOD MDM: CPT

## 2023-08-23 RX ORDER — CEFPODOXIME PROXETIL 100 MG
1 TABLET ORAL
Qty: 14 | Refills: 0
Start: 2023-08-23 | End: 2023-08-29

## 2023-08-23 RX ORDER — CEFPODOXIME PROXETIL 100 MG
200 TABLET ORAL ONCE
Refills: 0 | Status: COMPLETED | OUTPATIENT
Start: 2023-08-23 | End: 2023-08-23

## 2023-08-23 RX ADMIN — Medication 200 MILLIGRAM(S): at 23:08

## 2023-08-23 NOTE — ED PROVIDER NOTE - PROGRESS NOTE DETAILS
Results reported to patient--UTI on UA, negative preg  Pt. reports feeling better, tolerating po intake without issue   pt. agrees to f/u with primary care outpt. asap  pt. understands to return to ED if symptoms worsen; will d/c with antbx for uti

## 2023-08-23 NOTE — ED ADULT NURSE NOTE - ED STAT RN HANDOFF DETAILS
Report given to Jeremiah Alamo RN. patient mental status at baseline. no acute or respiratory distress noted. Endorsed all concerns to RN.

## 2023-08-23 NOTE — ED ADULT NURSE NOTE - OBJECTIVE STATEMENT
patient is A&Ox3. Breathing unlabored on RA. patient came from group home. PMH DM, obesity, schizophrenia, bipolar, anxiety, mild intellectual delay JIMMIE, GERD. patient complaining of lower abdominal cramps x4 days. patient reports chronic abdominal cramping. unknown pregnancy. LMP 08/02. patient admits to chronic abd cramps. patient complaining of feeling hungry at this time. patient requesting a sandwich. patient denies any SI/HI/hallucination. Patient denies any sob, difficulty breathing, dizziness, headache, vision changes, cp, palpations, n/v/d, fever, chills, numbness, tingling, urinary symptoms, LE swelling. denies any other symptoms.

## 2023-08-23 NOTE — ED PROVIDER NOTE - PATIENT PORTAL LINK FT
You can access the FollowMyHealth Patient Portal offered by [facility] by registering at the following website: [email or web address]. By joining [employer]’s FollowMyHealth portal, you will also be able to view your health information using other applications (apps) compatible with our system.

## 2023-08-23 NOTE — ED ADULT NURSE NOTE - PATIENT'S PREFERRED PRONOUN
Discussed diagnosis in detail with patient - Hx of ectropion repair RLL in the past by Dr. Stevie Bowman - Slight edema/inflammation noted LL with eyelashes turning inward,- Recommend lubricate with AT as much as possible throughout the day. Samples given today in office -Pt admits to have pulling on his lid to insert eye drops & explained how this caused stretching and the structures roll back inward. Recommend entropion repair , can put in 2 permanent sutures. Discussed RBAs w/ patient and he wishes to proceed. Her/She

## 2023-08-23 NOTE — ED PROVIDER NOTE - CLINICAL SUMMARY MEDICAL DECISION MAKING FREE TEXT BOX
31 yo F with stomach cramps, doubt UTI/pregnancy/hyperglycemia  -finger stick, ua/cx/Upreg  -d/c back to group home if pt. passes PO challenge  -chart review shows many frequent visits with similar complaints; administration requested to review chart and discuss matter with group home

## 2023-08-23 NOTE — ED PROVIDER NOTE - CARE PLAN
1 Principal Discharge DX:	Abdominal cramps   Principal Discharge DX:	Abdominal cramps  Secondary Diagnosis:	Acute UTI

## 2023-08-23 NOTE — ED PROVIDER NOTE - OBJECTIVE STATEMENT
31 yo F with abd cramps, which pt. admits are chronic.  She requests a sandwich to eat in ER as she is currently hungry and thirsty.  Pt. later admits that she doesn't likely the people in her group home so she came to the ER.  No other complaints.  Pt. denies SI/HI/hallucinations.  No other related symptoms.  She denies abuse from group home staff.    ROS: negative for fever, cough, headache, chest pain, shortness of breath, nausea, vomiting, diarrhea, rash, paresthesia, and focal weakness--all other systems reviewed are negative.   PMH: DM, obesity, schizophrenia, bipolar, anxiety, mild intellectual delay JIMMIE, GERD; Meds: See EMR for list; SH: Denies smoking/drinking/drug use

## 2023-08-23 NOTE — ED ADULT NURSE NOTE - NSFALLUNIVINTERV_ED_ALL_ED
Bed/Stretcher in lowest position, wheels locked, appropriate side rails in place/Call bell, personal items and telephone in reach/Instruct patient to call for assistance before getting out of bed/chair/stretcher/Non-slip footwear applied when patient is off stretcher/[place] to call system/Physically safe environment - no spills, clutter or unnecessary equipment/Purposeful proactive rounding/Room/bathroom lighting operational, light cord in reach

## 2023-08-23 NOTE — ED ADULT NURSE REASSESSMENT NOTE - NS ED NURSE REASSESS COMMENT FT1
Talked with pt [facility] group home staff  Yi at [phone]. As per staff pt can go back to [facility]. Azam made aware for the transport.  Pt resting comfortably at this time. No s/s of pain noted.

## 2023-08-23 NOTE — ED ADULT NURSE NOTE - PAIN: BODY LOCATION
abdomen Assistance OOB with selected safe patient handling equipment/Assistance with ambulation/Communicate Fall Risk and Risk Factors to all staff, patient, and family/Discuss with provider need for PT consult/Monitor gait and stability/Reinforce activity limits and safety measures with patient and family/Visual Cue: Yellow wristband/Bed in lowest position, wheels locked, appropriate side rails in place/Call bell, personal items and telephone in reach/Instruct patient to call for assistance before getting out of bed or chair/Non-slip footwear when patient is out of bed/[place] to call system/Physically safe environment - no spills, clutter or unnecessary equipment/Purposeful Proactive Rounding/Room/bathroom lighting operational, light cord in reach

## 2023-08-23 NOTE — ED PROVIDER NOTE - CARE PROVIDER_API CALL
Sonu Lopez  Internal Medicine  [address] [place] [id number]  Phone: [phone]  Fax: [phone]  Follow Up Time: Urgent

## 2023-08-24 NOTE — ED ADULT TRIAGE NOTE - NS ED NURSE AMBULANCES
Patient Assistance    Met with: Patient    Navigator Type: Infusion  Documentation Type: New Patient  Contact Type: Telephone  Navigation Status: New Patient  Status of Patient Insurance Coverage: Patient has active coverage          Additional notes: Called and introduced myself to Mr. Sebastian Edge and went over his health benefits. He has met his deductible but still has $1700 left on his OOP max. I offered to place him on the wait list for any foundation that might open up for assistance and he was very appreciative for the help. I also gave him my contact number for any future billing questions or any other concerns.
[facility]

## 2023-08-25 LAB
CULTURE RESULTS: SIGNIFICANT CHANGE UP
SPECIMEN SOURCE: SIGNIFICANT CHANGE UP

## 2023-08-26 ENCOUNTER — EMERGENCY (EMERGENCY)
Facility: HOSPITAL | Age: 33
LOS: 0 days | Discharge: HOME | End: 2023-08-26
Attending: STUDENT IN AN ORGANIZED HEALTH CARE EDUCATION/TRAINING PROGRAM
Payer: MEDICAID

## 2023-08-26 VITALS
RESPIRATION RATE: 19 BRPM | HEART RATE: 98 BPM | OXYGEN SATURATION: 98 % | TEMPERATURE: 98 F | SYSTOLIC BLOOD PRESSURE: 109 MMHG | DIASTOLIC BLOOD PRESSURE: 80 MMHG

## 2023-08-26 VITALS
HEIGHT: 62 IN | TEMPERATURE: 97 F | SYSTOLIC BLOOD PRESSURE: 112 MMHG | DIASTOLIC BLOOD PRESSURE: 78 MMHG | OXYGEN SATURATION: 98 % | HEART RATE: 113 BPM | WEIGHT: 272.93 LBS | RESPIRATION RATE: 16 BRPM

## 2023-08-26 DIAGNOSIS — F41.9 ANXIETY DISORDER, UNSPECIFIED: ICD-10-CM

## 2023-08-26 DIAGNOSIS — F20.9 SCHIZOPHRENIA, UNSPECIFIED: ICD-10-CM

## 2023-08-26 DIAGNOSIS — F79 UNSPECIFIED INTELLECTUAL DISABILITIES: ICD-10-CM

## 2023-08-26 DIAGNOSIS — E11.9 TYPE 2 DIABETES MELLITUS WITHOUT COMPLICATIONS: ICD-10-CM

## 2023-08-26 DIAGNOSIS — Z86.39 PERSONAL HISTORY OF OTHER ENDOCRINE, NUTRITIONAL AND METABOLIC DISEASE: ICD-10-CM

## 2023-08-26 DIAGNOSIS — Z91.018 ALLERGY TO OTHER FOODS: ICD-10-CM

## 2023-08-26 DIAGNOSIS — Z79.84 LONG TERM (CURRENT) USE OF ORAL HYPOGLYCEMIC DRUGS: ICD-10-CM

## 2023-08-26 DIAGNOSIS — K21.9 GASTRO-ESOPHAGEAL REFLUX DISEASE WITHOUT ESOPHAGITIS: ICD-10-CM

## 2023-08-26 DIAGNOSIS — G47.33 OBSTRUCTIVE SLEEP APNEA (ADULT) (PEDIATRIC): ICD-10-CM

## 2023-08-26 DIAGNOSIS — F31.9 BIPOLAR DISORDER, UNSPECIFIED: ICD-10-CM

## 2023-08-26 DIAGNOSIS — Z91.013 ALLERGY TO SEAFOOD: ICD-10-CM

## 2023-08-26 DIAGNOSIS — R10.9 UNSPECIFIED ABDOMINAL PAIN: ICD-10-CM

## 2023-08-26 DIAGNOSIS — G89.29 OTHER CHRONIC PAIN: ICD-10-CM

## 2023-08-26 PROCEDURE — 99283 EMERGENCY DEPT VISIT LOW MDM: CPT

## 2023-08-26 RX ORDER — ACETAMINOPHEN 500 MG
650 TABLET ORAL ONCE
Refills: 0 | Status: COMPLETED | OUTPATIENT
Start: 2023-08-26 | End: 2023-08-26

## 2023-08-26 RX ADMIN — Medication 650 MILLIGRAM(S): at 12:37

## 2023-08-26 NOTE — ED ADULT TRIAGE NOTE - CHIEF COMPLAINT QUOTE
brought from group home  ([address] [facility] [address]) for cramping and abdominal pain , h/o schizoaffective disorder . pt states she was in the ed for vomiting yesterday

## 2023-08-26 NOTE — ED ADULT NURSE NOTE - CAS EDN DISCHARGE ASSESSMENT
pt A&Ox4, ambulatory with steady gait/Alert and oriented to person, place and time/Patient baseline mental status/Awake/Symptoms improved

## 2023-08-26 NOTE — ED PROVIDER NOTE - CLINICAL SUMMARY MEDICAL DECISION MAKING FREE TEXT BOX
32F pmhx DM, obesity, schizophrenia, bipolar, anxiety, mild intellectual delay, JIMMIE, GERD presenting w/ chronic abd pain.  Vitals stable.  Patient w/ chronic symptoms.  She has no abd tenderness.  Will discharge.

## 2023-08-26 NOTE — ED PROVIDER NOTE - OBJECTIVE STATEMENT
31 y/o F with PMH DM, obesity, schizophrenia, bipolar, anxiety, mild intellectual delay, JIMMIE, GERD with multiple ED visits over past week presenting to the ED c/o abd pain. Patient is known to this provider, has chronic abd pain. She is known to malinger in the ED. States she has no acute symptoms at this time. Denies N/V, fever, chills, or other acute symptoms. States she was able to eat breakfast without difficulty.

## 2023-08-26 NOTE — ED ADULT NURSE NOTE - NS_BH TRG QUESTION1_ED_ALL_ED
[facility] [phone]    [facility] VIDEO VISIT PROGRESS NOTE    CHIEF COMPLAINT  Chief Complaint   Patient presents with   • Video Visit   • Derm Problem       SUBJECTIVE  HISTORY OF PRESENT ILLNESS:   Francia Heredia is a 30 year old female who consents to a two-way Video Visit (V-Visit) for evaluation of a skin concern.      Francia presents with symptoms of laceration to left leg. Symptom onset: today. The patient has not had similar symptoms in the past. Denies any recent antibiotic use. The patient has tried nothing for their current symptoms.  Injury occurred due to interior residence nail.    The patient denies a history of drug resistant skin & soft tissue infections, or abscesses. Denies fever, chills, warmth, redness or swelling to the area of concern.     REVIEW OF SYSTEMS   A review of systems was performed and findings relevant to this complaint are included in the HPI.    ALLERGIES  ALLERGIES:   Allergen Reactions   • Ibuprofen SWELLING   • Aspirin SWELLING   • Naproxen Other (See Comments)     Sensation of throat swelling, tingling of lips.        MEDICATIONS  Current Outpatient Medications   Medication Sig   • valACYclovir (VALTREX) 500 MG tablet Take 1 tablet by mouth daily.     No current facility-administered medications for this visit.        OBJECTIVE  PHYSICAL EXAM:   Information acquired with patient assistance, demonstration, and feedback due to two-way video visit method of visit. Portions of assessment may be difficult to visualize precisely given nature of technology and limitations of assessment with virtual platform.   GENERAL: Awake, alert, oriented and in no acute distress.  SKIN: 3cm laceration, superficial, with minimal serosanguinous drainage. No erythema, induration visible to the area of concern. No self reported warmth or tenderness.   HENT: Normocephalic, atraumatic.   RESPIRATORY:  Breathing effort is normal. Able to speak in full sentences.  No evidence of respiratory distress.  PSYCHIATRIC: The patient was able to demonstrate good judgement and reason without abnormal affect or abnormal behaviors during the examination.       Patient permission granted for posting of image.    ASSESSMENT/PLAN   Laceration of left lower leg, initial encounter      Superficial laceration approximately 5mm deep with no active drainage, minimal serosanguinous drainage previously.  Secondary to nail of interior residence. Last TD 5/6/21, no need for update of tetanus.  Sustained 1 hour ago, denies systemic symptoms.  Reviewed s/sx of infectious etiology and when to seek face to face visit. Recommend soap and water to cleanse and to keep covered. No further questions by end of visit.    FOLLOW-UP   No follow-ups on file.  If the patient has unresolved symptoms, they have been advised to seek face-to-face evaluation with their primary care provider or other in-person venue of care.    If symptoms get worse or if new symptoms occur, the patient should be seen in the Urgent Care, Immediate Care or the Emergency Department.      PATIENT INSTRUCTIONS  Attached in After Visit Summary  The patient verbalizes understanding of the diagnosis and plan of care. There were no further questions or concerns.   They were advised to contact the [employer] RN with any questions at [phone].    CONSENT  Patient consent was obtained for this Video Visit using the [employer].   Clinician Location: [facility] [place] [facility] [place].   Patient is at home, in the [facility] at the time of this visit.    NAV Whittaker  08/26/23  1:50 PM     No

## 2023-08-26 NOTE — ED ADULT NURSE NOTE - NSFALLHARMRISKINTERV_ED_ALL_ED

## 2023-08-26 NOTE — ED PROVIDER NOTE - PATIENT PORTAL LINK FT
You can access the FollowMyHealth Patient Portal offered by [facility] by registering at the following website: [email or web address]. By joining [employer]’s FollowMyHealth portal, you will also be able to view your health information using other applications (apps) compatible with our system.

## 2023-08-26 NOTE — ED ADULT NURSE NOTE - AS SC BRADEN FRICTION
(3) no apparent problem Go for blood tests as directed. Your doctor will do lab tests at regular visits to monitor the effects of this medicine. Please follow up with your doctor and keep your health care provider appointments

## 2023-08-26 NOTE — ED ADULT NURSE NOTE - CHIEF COMPLAINT QUOTE
brought from group home  ([address] [facility] [address]) for cramping and abdominal pain , h/o schizoaffective disorder . pt states she was in the ed for vomiting yesterday

## 2023-08-26 NOTE — ED ADULT NURSE REASSESSMENT NOTE - NS ED NURSE REASSESS COMMENT FT1
Patient d/c but cannot be transported back to [facility] home at this time because no one has picked up after several calls throughout the past few hours.

## 2023-09-05 ENCOUNTER — EMERGENCY (EMERGENCY)
Facility: HOSPITAL | Age: 33
LOS: 1 days | Discharge: HOME | End: 2023-09-05
Attending: EMERGENCY MEDICINE | Admitting: EMERGENCY MEDICINE
Payer: MEDICAID

## 2023-09-05 VITALS
RESPIRATION RATE: 18 BRPM | DIASTOLIC BLOOD PRESSURE: 57 MMHG | TEMPERATURE: 101 F | SYSTOLIC BLOOD PRESSURE: 115 MMHG | HEIGHT: 62 IN | OXYGEN SATURATION: 98 % | HEART RATE: 101 BPM

## 2023-09-05 LAB
APPEARANCE UR: ABNORMAL
BACTERIA # UR AUTO: NEGATIVE /HPF — SIGNIFICANT CHANGE UP
BILIRUB UR-MCNC: NEGATIVE — SIGNIFICANT CHANGE UP
CAST: 0 /LPF — SIGNIFICANT CHANGE UP (ref 0–4)
COLOR SPEC: ABNORMAL
DIFF PNL FLD: ABNORMAL
GLUCOSE UR QL: NEGATIVE MG/DL — SIGNIFICANT CHANGE UP
KETONES UR-MCNC: ABNORMAL MG/DL
LEUKOCYTE ESTERASE UR-ACNC: ABNORMAL
NITRITE UR-MCNC: NEGATIVE — SIGNIFICANT CHANGE UP
PH UR: 8 — SIGNIFICANT CHANGE UP (ref 5–8)
PROT UR-MCNC: 30 MG/DL
RBC CASTS # UR COMP ASSIST: 339 /HPF — HIGH (ref 0–4)
SP GR SPEC: 1.02 — SIGNIFICANT CHANGE UP (ref 1–1.03)
SQUAMOUS # UR AUTO: 9 /HPF — HIGH (ref 0–5)
UROBILINOGEN FLD QL: 1 MG/DL — SIGNIFICANT CHANGE UP (ref 0.2–1)
WBC UR QL: 10 /HPF — HIGH (ref 0–5)

## 2023-09-05 PROCEDURE — 99285 EMERGENCY DEPT VISIT HI MDM: CPT

## 2023-09-05 NOTE — PROVIDER CONTACT NOTE (OTHER) - ACTION/TREATMENT ORDERED:
SW arranged [facility] to transport pt home; ETA 10:00 p.m. trip # [id number].  MAS invoice # [id number].

## 2023-09-05 NOTE — ED PROVIDER NOTE - TEST CONSIDERED BUT NOT PERFORMED
Pt does not require abdominal imaging as she is non-tender, well appearing and would like to eat. No C/O lightheadedness and no H/O anemia, states she only changed her pad once today, do not suspect anemia or acute menorrhagia. Tests Considered But Not Performed

## 2023-09-05 NOTE — ED PROVIDER NOTE - NSFOLLOWUPINSTRUCTIONS_ED_ALL_ED_FT
Advance activity as tolerated.  Continue all previously prescribed medications as directed.  Follow up with your primary care physician in 48-72 hours- bring copies of your results.  Return to the ER for worsening or persistent symptoms, and/or ANY NEW OR CONCERNING SYMPTOMS. THIS INCLUDES BUT IS NOT LIMITED TO FEVER, CHILLS, NIGHTSWEATS, ABDOMINAL PAIN OR FOR ANY OTHER SYMPTOMS THAT CONCERN YOU. If you have issues obtaining follow up, please call: [phone] to obtain a doctor or specialist who takes your insurance in your area.  You may call [phone] to make an appointment with the internal medicine clinic.

## 2023-09-05 NOTE — ED PROVIDER NOTE - PHYSICAL EXAMINATION
ATTENDING PHYSICAL EXAM  GEN - NAD; well appearing; Answers questions appropriately.  Ate dinner tray.  Walking around ER requiring frequent redirection to remain in room.  HEAD - NC/AT; EYES/NOSE - PERRL, EOMI, mucous membranes moist, no discharge; THROAT: Oral cavity and pharynx normal. No inflammation, swelling, exudate, or lesions  NECK: Neck supple, non-tender without lymphadenopathy, no masses, no JVD  PULMONARY - CTA b/l, symmetric breath sounds, no w/r/r  CARDIAC -s1s2, RRR, no M,R,G  ABDOMEN - +NABS, ND, NT, soft, no guarding, no rebound, no masses   BACK - no CVA tenderness, No vertebral or paravertebral tenderness  EXTREMITIES - symmetric pulses, 2+ dp, capillary refill < 2 seconds, no clubbing, no cyanosis, no edema  SKIN - no rash or bruising   NEUROLOGIC - alert, CN 2-12 intact, no focal deficits

## 2023-09-05 NOTE — ED PROVIDER NOTE - PATIENT PORTAL LINK FT
You can access the FollowMyHealth Patient Portal offered by [facility] by registering at the following website: [email or web address]. By joining [employer]’s FollowMyHealth portal, you will also be able to view your health information using other applications (apps) compatible with our system.

## 2023-09-05 NOTE — ED PROVIDER NOTE - PROGRESS NOTE DETAILS
JONATHAN Schmitz: Spoke to gianni with social work, will assist with transport. Pt tolerated a full tray of food in the ED.

## 2023-09-05 NOTE — PROVIDER CONTACT NOTE (OTHER) - ASSESSMENT
ROCIO called residence phone #'s multiple times @ [phone] and [phone] re: d/c and transportation.  No answer.  ROCIO left a message requesting a return phone call.  Per chart review, pt travels via ambulance with supervision.  Will arrange ambulance for pt return to facility.  Discussed with provider, who is aware and in agreement with the plan.

## 2023-09-05 NOTE — ED PROVIDER NOTE - CLINICAL SUMMARY MEDICAL DECISION MAKING FREE TEXT BOX
31 Y/O F PMH DM, Obesity, Schizoaffective, JIMMIE, GERD, Bipolar disorder states she has been having vaginal bleeding and pelvic pain which she states is similar to her usual periods. Pt states she did not eat dinner today because she did not want pizza and came to the ED. Pt has been seen in the ED for similar and has a normal pelvic sonogram from July of this year. Pt has no abdominal tenderness, was febrile at triage but with her vs in the ED exam room she is not febrile. Denies urinary sx, will obtain a urine culture and UA, likely will D/C to her facility.

## 2023-09-05 NOTE — ED PROVIDER NOTE - CONSTITUTIONAL, MLM
normal... Well appearing, awake, alert, oriented to person, place, time/situation and in no apparent distress continually asking to eat.
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
## 2023-09-05 NOTE — ED ADULT TRIAGE NOTE - CHIEF COMPLAINT QUOTE
c/o lower abd cramping  and vaginal bleeding reports due to menstrual period, pt arrives from [facility] group home.  noted pt to be febrile in triage pt endorses no further complaints phx of Dm,  developmental delay, schizophrenia.

## 2023-09-06 LAB
CULTURE RESULTS: SIGNIFICANT CHANGE UP
SPECIMEN SOURCE: SIGNIFICANT CHANGE UP

## 2023-09-10 ENCOUNTER — EMERGENCY (EMERGENCY)
Facility: HOSPITAL | Age: 33
LOS: 0 days | Discharge: HOME | End: 2023-09-11
Attending: STUDENT IN AN ORGANIZED HEALTH CARE EDUCATION/TRAINING PROGRAM
Payer: MEDICAID

## 2023-09-10 VITALS
RESPIRATION RATE: 17 BRPM | DIASTOLIC BLOOD PRESSURE: 78 MMHG | TEMPERATURE: 98 F | HEART RATE: 86 BPM | OXYGEN SATURATION: 97 % | SYSTOLIC BLOOD PRESSURE: 113 MMHG | WEIGHT: 270.07 LBS | HEIGHT: 62 IN

## 2023-09-10 DIAGNOSIS — Z79.84 LONG TERM (CURRENT) USE OF ORAL HYPOGLYCEMIC DRUGS: ICD-10-CM

## 2023-09-10 DIAGNOSIS — Z91.013 ALLERGY TO SEAFOOD: ICD-10-CM

## 2023-09-10 DIAGNOSIS — Z87.19 PERSONAL HISTORY OF OTHER DISEASES OF THE DIGESTIVE SYSTEM: ICD-10-CM

## 2023-09-10 DIAGNOSIS — Z65.9 PROBLEM RELATED TO UNSPECIFIED PSYCHOSOCIAL CIRCUMSTANCES: ICD-10-CM

## 2023-09-10 DIAGNOSIS — F25.9 SCHIZOAFFECTIVE DISORDER, UNSPECIFIED: ICD-10-CM

## 2023-09-10 DIAGNOSIS — E11.9 TYPE 2 DIABETES MELLITUS WITHOUT COMPLICATIONS: ICD-10-CM

## 2023-09-10 DIAGNOSIS — F31.9 BIPOLAR DISORDER, UNSPECIFIED: ICD-10-CM

## 2023-09-10 DIAGNOSIS — R62.50 UNSPECIFIED LACK OF EXPECTED NORMAL PHYSIOLOGICAL DEVELOPMENT IN CHILDHOOD: ICD-10-CM

## 2023-09-10 DIAGNOSIS — F79 UNSPECIFIED INTELLECTUAL DISABILITIES: ICD-10-CM

## 2023-09-10 DIAGNOSIS — Z91.018 ALLERGY TO OTHER FOODS: ICD-10-CM

## 2023-09-10 PROCEDURE — 99284 EMERGENCY DEPT VISIT MOD MDM: CPT

## 2023-09-10 NOTE — ED ADULT NURSE REASSESSMENT NOTE - NS ED NURSE REASSESS COMMENT FT1
Patient walking around hallway of ED, attempting to leave, yelling and cursing. [employer] called. Security and ANM Prasanth redirected patient to bed. Patient educated on importance of staying in room. NAD noted at this time.
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
## 2023-09-10 NOTE — ED ADULT NURSE NOTE - CHPI ED NUR SYMPTOMS NEG
no back pain/no blurred vision/no change in level of consciousness/no chest pain/no chest wall tenderness/no loss of consciousness/no seizure

## 2023-09-10 NOTE — ED ADULT NURSE REASSESSMENT NOTE - NS ED NURSE REASSESS COMMENT FT1
Two attempts have made to contact supervisor Zoila () and the group home directly () with no success. VM left. Awaiting return of call.

## 2023-09-10 NOTE — ED ADULT NURSE REASSESSMENT NOTE - NS ED NURSE REASSESS COMMENT FT1
patient seen roaming in halls. Patient provided with additional food and water and redirected back to room. NAD noted.
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
## 2023-09-10 NOTE — ED ADULT NURSE NOTE - NSFALLUNIVINTERV_ED_ALL_ED
Bed/Stretcher in lowest position, wheels locked, appropriate side rails in place/Call bell, personal items and telephone in reach/Instruct patient to call for assistance before getting out of bed/chair/stretcher/Non-slip footwear applied when patient is off stretcher/[place] to call system/Physically safe environment - no spills, clutter or unnecessary equipment/Purposeful proactive rounding/Room/bathroom lighting operational, light cord in reach

## 2023-09-10 NOTE — ED PROVIDER NOTE - PROGRESS NOTE DETAILS
Sincere: Patient signed out to me pending SW eval.  SW discussed with group home.  Patient known well to me, no obvious trauma, ambulating in nad, tolerated po here.

## 2023-09-10 NOTE — ED PROVIDER NOTE - NSFOLLOWUPINSTRUCTIONS_ED_ALL_ED_FT
Rest, drink plenty of fluids  Advance activity as tolerated  Continue all previously prescribed medications as directed  Follow up with your PMD - bring copies of your results  Return to the ER for severe pain, chest pain, difficulty breathing, or other new or concerning symptoms

## 2023-09-10 NOTE — ED ADULT NURSE REASSESSMENT NOTE - NS ED NURSE REASSESS COMMENT FT1
patient seen on phone states "I am talking to my boyfriend" patient cursing at staff and keeps wandering in hallway. Patient keeps speaking with male staff and male patients in passing stating "Hi booboo" Patient requesting food several times. Patient provided with sandwich, crackers, and juice and escorted back to room several times. NAD noted at this time.
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
## 2023-09-10 NOTE — ED PROVIDER NOTE - PATIENT PORTAL LINK FT
You can access the FollowMyHealth Patient Portal offered by [facility] by registering at the following website: [email or web address]. By joining [facility]’s FollowMyHealth portal, you will also be able to view your health information using other applications (apps) compatible with our system.

## 2023-09-11 ENCOUNTER — EMERGENCY (EMERGENCY)
Facility: HOSPITAL | Age: 33
LOS: 1 days | Discharge: HOME | End: 2023-09-11
Admitting: EMERGENCY MEDICINE
Payer: MEDICAID

## 2023-09-11 VITALS
HEART RATE: 70 BPM | RESPIRATION RATE: 17 BRPM | HEIGHT: 62 IN | SYSTOLIC BLOOD PRESSURE: 119 MMHG | TEMPERATURE: 98 F | DIASTOLIC BLOOD PRESSURE: 61 MMHG | OXYGEN SATURATION: 100 %

## 2023-09-11 VITALS
DIASTOLIC BLOOD PRESSURE: 72 MMHG | OXYGEN SATURATION: 98 % | RESPIRATION RATE: 20 BRPM | SYSTOLIC BLOOD PRESSURE: 121 MMHG | TEMPERATURE: 98 F | HEART RATE: 91 BPM

## 2023-09-11 VITALS
TEMPERATURE: 97 F | RESPIRATION RATE: 15 BRPM | SYSTOLIC BLOOD PRESSURE: 115 MMHG | DIASTOLIC BLOOD PRESSURE: 65 MMHG | OXYGEN SATURATION: 100 % | HEART RATE: 65 BPM

## 2023-09-11 PROCEDURE — 99284 EMERGENCY DEPT VISIT MOD MDM: CPT

## 2023-09-11 RX ORDER — LANOLIN ALCOHOL/MO/W.PET/CERES
10 CREAM (GRAM) TOPICAL AT BEDTIME
Refills: 0 | Status: DISCONTINUED | OUTPATIENT
Start: 2023-09-11 | End: 2023-09-11

## 2023-09-11 RX ORDER — OLANZAPINE 15 MG/1
5 TABLET, FILM COATED ORAL ONCE
Refills: 0 | Status: COMPLETED | OUTPATIENT
Start: 2023-09-11 | End: 2023-09-11

## 2023-09-11 RX ORDER — CLOZAPINE 150 MG/1
50 TABLET, ORALLY DISINTEGRATING ORAL AT BEDTIME
Refills: 0 | Status: DISCONTINUED | OUTPATIENT
Start: 2023-09-11 | End: 2023-09-11

## 2023-09-11 RX ADMIN — OLANZAPINE 5 MILLIGRAM(S): 15 TABLET, FILM COATED ORAL at 12:01

## 2023-09-11 NOTE — ED ADULT NURSE REASSESSMENT NOTE - NS ED NURSE REASSESS COMMENT FT1
patient seen wandering in halls, needs frequent redirection to the room. NAD noted. Awaiting SW in AM

## 2023-09-11 NOTE — ED ADULT TRIAGE NOTE - NS ED TRIAGE AVPU SCALE
Last OV 02/04/2021    Alert-The patient is alert, awake and responds to voice. The patient is oriented to time, place, and person. The triage nurse is able to obtain subjective information.

## 2023-09-11 NOTE — ED BEHAVIORAL HEALTH NOTE - BEHAVIORAL HEALTH NOTE
As per request of provider, writer contacted [facility] [phone] to obtain collateral information. Writer spoke w/ deedee MACK who provided the following information.    Patient is a 63 Yo female domiciled at [facility], hx of developmental disability and schizoaffective disorder, bib EMS activated by the pt. Pamela reports the pt said she wasn’t feeling well. Pamela says the pt had just returned home from the hospital at 2:40PM and was sent to the hospital again at 3:15PM. She says pt has a hx of saying anything to go to the hospital. She says when staff tried to redirect the pt, she attempted to elope. She kept asking staff if she can call EMS to go to the hospital. Pamela says they said if she returned back to the residence she could call EMS. She is unaware of any SI/Hi/AVH. She says pt was not violent but was verbally aggressive. She says pt has been sleeping, eating and showering at baseline. She says pt has been compliant w/ medication. No further safety concerns and pt can return via ambulance. The address is [phone] [place] [address].

## 2023-09-11 NOTE — ED BEHAVIORAL HEALTH NOTE - BEHAVIORAL HEALTH NOTE
Per provider, TEDDY Stovall, patient is cleared and is able to return to their previous residence, [facility]   has spoken to dsp Blessing ([phone]) ,  confirmed that patients mode of transportation is AMBULANCE and that patient travels WITH SUPERVISION of ems . Clinical provider is in agreement with AMBULANCE back to retirement. Verbal huddle regarding coordination of care completed with interdisciplinary team.
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
## 2023-09-11 NOTE — ED ADULT NURSE REASSESSMENT NOTE - NS ED NURSE REASSESS COMMENT FT1
patient needs frequent redirection to room. MD Banda made aware. Patient placed on 1:1 constant observation.
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
## 2023-09-11 NOTE — ED PROVIDER NOTE - PATIENT PORTAL LINK FT
[phone] You can access the FollowMyHealth Patient Portal offered by [facility] by registering at the following website: [email or web address]. By joining [facility]’s FollowMyHealth portal, you will also be able to view your health information using other applications (apps) compatible with our system.

## 2023-09-11 NOTE — ED PROVIDER NOTE - NSFOLLOWUPINSTRUCTIONS_ED_ALL_ED_FT
Impulse control disorders are a group of mental health disorders in which a person is unable to control his or her sudden desire to do something (impulse). People who are unable to control impulses repeatedly act without planning or thinking about consequences. A person with an impulse control disorder may:  Have outbursts of anger and argue with authority figures.Be physically or verbally aggressive toward others.Regularly break rules or laws. This may include harming people, animals, or property.Steal, start fires, [place], or engage in other risky behaviors.Impulse control disorders typically start in the late teenage to early adult years. People with impulse control disorders often have emotional disorders or other forms of mental illness, such as drug abuse or other addiction problems. Over time, impulse disorders may cause problems in relationships and may result in legal problems.  What are the causes?  The cause of these conditions is not known.  What increases the risk?  The following factors may make you more likely to develop this condition:  Experiencing abuse or neglect during childhood.Experiencing physical or emotional trauma during childhood.Having a parent or sibling with an impulse control disorder.Having another mental health condition, such as ADHD (attention deficit hyperactivity disorder) or a substance abuse disorder.What are the signs or symptoms?  Unlike people with other mental health, substance abuse, or general medical conditions, people with impulse control disorders cannot keep from acting on their impulses. They may:  Have trouble controlling emotions, especially anger.Feel like they must act on an impulse when they experience strong emotions or stress.Have specific impulsive behaviors that relieve tension, such as setting a fire or stealing.Have anxiety, tension, or excitement before or during the impulsive behavior.Feel relief or pleasure while acting on the impulse, or after acting on it.Act without regard for the safety of themselves or others.Act without planning ahead.Feel regret or guilt after acting on an impulse.Symptoms of impulse control disorders differ based on the specific disorder.  How is this diagnosed?  These disorders are diagnosed through an assessment by your health care provider. Your health care provider will ask questions about:  Your impulsive behavior.Your moods.Your thoughts.Past and recent life events.Your medical history.Your use of alcohol or drugs, including prescription medicines.Certain medical conditions, other mental illnesses, and certain substances can cause symptoms similar to impulse control disorders. You may be referred to a mental health specialist.  How is this treated?  Impulse control disorders may be treated with a combination of the following treatments:  Cognitive behavioral therapy (CBT). This is a form of talk therapy. It focuses on reducing negative beliefs and thoughts related to impulsive behavior and replacing them with healthier thoughts and behaviors. This may be done individually or in a group setting.Medicines.Family intervention. This is a program that educates family members about your disorder. It teaches healthy communication and problem-solving skills, and it helps family members support you.Follow these instructions at home:  Image   Take over-the-counter and prescription medicines only as told by your health care provider. Check with your health care provider before starting any new prescription or over-the-counter medicines.Keep all follow-up visits as told by your health care providers or therapists. This is important. This includes going to therapy or family intervention as directed.Find a support group to talk with peers about managing stress and impulses.Find healthy ways to recognize emotions and manage stress, such as:  Journaling.Exercise.Deep breathing, yoga, or meditation.Contact a health care provider if:  You are not able to take your medicines as prescribed.Your symptoms get worse.Get help right away if:  You think about hurting yourself or others.If you ever feel like you may hurt yourself or others, or have thoughts about taking your own life, get help right away. You can go to your nearest emergency department or call:   Your local emergency services (911 in the [place] A suicide crisis helpline, such as the National Suicide Prevention Lifeline at 1-999.565.9508. This is open 24 hours a day. Summary  Impulse control disorders are a group of mental health disorders in which a person is unable to control his or her sudden desire to do something (impulse).People with these disorders act impulsively through certain behaviors in order to feel relief from stress or emotional tension.Treatment may include cognitive behavioral therapy (CBT), medicines, family intervention, or a combination of these.This information is not intended to replace advice given to you by your health care provider. Make sure you discuss any questions you have with your health care provider.

## 2023-09-11 NOTE — ED PROVIDER NOTE - RESPIRATORY, MLM
Intubation    Diagnosis: cardiac arrest  Patient location during procedure: ICU  Procedure start time: 10/9/2018 3:05 PM  Timeout: 10/9/2018 3:05 PM  Procedure end time: 10/9/2018 3:10 PM  Staffing  Resident/CRNA: Corey Romero MD  Other anesthesia staff: Carlos Brice MD  Performed: resident/CRNA   Preanesthetic Checklist  Completed: patient identified, site marked, surgical consent, pre-op evaluation, timeout performed, IV checked, risks and benefits discussed, monitors and equipment checked and anesthesia consent given  Intubation  Indication: respiratory failure  Pre-oxygenation. Induction: intravenous rapid sequence, mask ventilation: easy with oral airway.  Intubation: postinduction, laryngoscopy glidescope, Glidescope.  Endotracheal Tube: oral, 8.0 mm ID, cuffed (inflated to minimal occlusive pressure)  Attempts: 1, Grade I - full view of cords  Complicating Factors: anterior larynx  Tube secured at 24 cm at the lips.  Findings post-intubation: bilateral breath sounds, atraumatic / condition of teeth unchanged, positive ETCO2  Position Confirmation: auscultation               Breath sounds clear and equal bilaterally.

## 2023-09-11 NOTE — ED ADULT NURSE NOTE - NSFALLUNIVINTERV_ED_ALL_ED
Bed/Stretcher in lowest position, wheels locked, appropriate side rails in place/Call bell, personal items and telephone in reach/Instruct patient to call for assistance before getting out of bed/chair/stretcher/Non-slip footwear applied when patient is off stretcher/[place] to call system/Physically safe environment - no spills, clutter or unnecessary equipment/Purposeful proactive rounding/Room/bathroom lighting operational, light cord in reach

## 2023-09-11 NOTE — ED PROVIDER NOTE - PROGRESS NOTE DETAILS
NP Berecjayceeky- collateral by yessi, refer to the note. During stay cheerful follows direction no agitation, cleared for discharge, can return back to group home via ems.

## 2023-09-12 ENCOUNTER — EMERGENCY (EMERGENCY)
Facility: HOSPITAL | Age: 33
LOS: 1 days | Discharge: HOME | End: 2023-09-12
Admitting: EMERGENCY MEDICINE
Payer: MEDICAID

## 2023-09-12 VITALS
OXYGEN SATURATION: 100 % | TEMPERATURE: 99 F | RESPIRATION RATE: 16 BRPM | DIASTOLIC BLOOD PRESSURE: 78 MMHG | HEIGHT: 62 IN | HEART RATE: 80 BPM | SYSTOLIC BLOOD PRESSURE: 124 MMHG

## 2023-09-12 PROCEDURE — 99284 EMERGENCY DEPT VISIT MOD MDM: CPT
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
## 2023-09-12 NOTE — ED ADULT TRIAGE NOTE - MODE OF ARRIVAL
Ambulance EMS Xelсветланаz Pregnancy And Lactation Text: This medication is Pregnancy Category D and is not considered safe during pregnancy.  The risk during breast feeding is also uncertain.

## 2023-09-12 NOTE — ED PROVIDER NOTE - PHYSICAL EXAMINATION
CONSTITUTIONAL: Well-appearing; well-nourished; in no apparent distress;  NECK/LYMPH: Supple; non-tender;  CARD: Normal S1, S2; no murmurs, rubs, or gallops noted  RESP: Normal chest excursion with respiration; breath sounds clear and equal bilaterally; no wheezes, rhonchi, or rales noted  ABD/GI: soft, non-distended; non-tender; no palpable organomegaly, no pulsatile mass  EXT/MS: moves all extremities  SKIN: Normal for age and race; warm; dry; good turgor; no apparent lesions or exudate noted  NEURO: Awake, alert, oriented x 3, no gross deficits  PSYCH: Normal mood; appropriate affect

## 2023-09-12 NOTE — ED PROVIDER NOTE - PATIENT PORTAL LINK FT
You can access the FollowMyHealth Patient Portal offered by [facility] by registering at the following website: [email or web address]. By joining [employer]s FollowMyHealth portal, you will also be able to view your health information using other applications (apps) compatible with our system.

## 2023-09-12 NOTE — ED PROVIDER NOTE - NSFOLLOWUPINSTRUCTIONS_ED_ALL_ED_FT
if you develop any chest pain, dizziness, high fevers, weakness, numbness, tingling, vision changes, or any worsening symptoms return to our ED for evaluation.
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
## 2023-09-12 NOTE — ED PROVIDER NOTE - CLINICAL SUMMARY MEDICAL DECISION MAKING FREE TEXT BOX
32yoF PMH of diabetes, asthma past psychiatric history bipolar disorder, developmental delay, intellectual disability, Autism,  bipolar disorder, called 911 after she was promised popeyes and didn't get it. Seen in ED for similar complaint yesterday. Pt states she does not want to eat anything but popeyes. says she is pregnant with 9 babies. admits to some abdominal discomfort that occurred after she didn't get her popeyes. Denies SI/HI/AH/VH. Denies cp, sob, dysuria, sore throat, cough. no abd surgeries.    abd soft ntnd  no concern for acute abdomen  after being here for an hour, pt states "I want to go back", denies any further abd pain. Refused to eat any other food that is not popeyes but remains otherwise cooperable.

## 2023-09-12 NOTE — ED PROVIDER NOTE - OBJECTIVE STATEMENT
32yoF PMH of diabetes, asthma past psychiatric history bipolar disorder, developmental delay, intellectual disability, Autism,  bipolar disorder, called 911 after she was promised popeyes and didn't get it. Seen in ED for similar complaint yesterday. Pt states she does not want to eat anything but popeyes. says she is pregnant with 9 babies. admits to some abdominal discomfort that occurred after she didn't get her popeyes. Denies SI/HI/AH/VH. Denies cp, sob, dysuria, sore throat, cough. no abd surgeries.

## 2023-09-12 NOTE — ED BEHAVIORAL HEALTH NOTE - BEHAVIORAL HEALTH NOTE
As per request of provider, writer contacted [facility] [phone] to obtain collateral information. Writer spoke w/ Joanne MACK who provided the following information.    Patient is a 31 Yo female domiciled at [facility], hx of developmental disability and schizoaffective disorder, bib EMS activated by the pt.  Pt has a history of activating ems.  Joanne reports that the pt said she wasn’t feeling well.   She states that the patient is medication compliant. She states that the patient wanted to buy food outside and then did not buy the food. Patient then came back inside and started with her usual behaviors and then said she was not feeling well. Patient then called ems on her own. She is unaware of any SI/Hi/AVH. She says pt was not violent or aggressive. No further safety concerns and pt can return via ambulance. The address is [address] [place] [address]. case discussed with JONATHAN Neal. As per request of provider, writer contacted [facility] [phone] to obtain collateral information. Writer spoke w/ Joanne MACK who provided the following information.    Patient is a 33 Yo female domiciled at [facility], hx of developmental disability and schizoaffective disorder, bib EMS activated by the pt.  Pt has a history of activating ems.  Joanne reports that the pt said she wasn’t feeling well.   She states that the patient is medication compliant. She states that the patient wanted to buy food outside and then did not buy the food. Patient then came back inside and started with her usual behaviors and then said she was not feeling well. Patient then called ems on her own. She is unaware of any SI/Hi/AVH. She says pt was not violent or aggressive. No further safety concerns and pt can return via ambulance. The address is [address] [place] [address]. case discussed with JONATHAN Neal.      Per provider, TEDDY Stovall, patient is cleared and is able to return to their previous residence, [facility]   has spoken to nilo cruz  ([phone]) ,  confirmed that patients mode of transportation is AMBULANCE and that patient travels WITH SUPERVISION of ems . Clinical provider is in agreement with AMBULANCE back to [facility]. Verbal huddle regarding coordination of care completed with interdisciplinary team. no staff with her. RN to set up ems. As per request of provider, writer contacted [facility] [phone] to obtain collateral information. Writer spoke w/ Joanne MACK who provided the following information.    Patient is a 31 Yo female domiciled at [facility], hx of developmental disability and schizoaffective disorder, bib EMS activated by the pt.  Pt has a history of activating ems.  Joanne reports that the pt said she wasn’t feeling well.   She states that the patient is medication compliant. She states that the patient wanted to buy food outside and then did not buy the food. Patient then came back inside and started with her usual behaviors and then said she was not feeling well. Patient then called ems on her own. She is unaware of any SI/Hi/AVH. She says pt was not violent or aggressive. No further safety concerns and pt can return via ambulance. The address is [address] [place] [address]. case discussed with JONATHAN Neal.      Per provider, JONATHAN Neal,  patient is cleared and is able to return to their previous residence, [facility]   has spoken to nilo cruz  ([phone]) ,  confirmed that patients mode of transportation is AMBULANCE and that patient travels WITH SUPERVISION of ems . Clinical provider is in agreement with AMBULANCE back to [facility]. Verbal huddle regarding coordination of care completed with interdisciplinary team. no staff with her. RN to set up ems. As per request of provider, writer contacted [facility] [phone] to obtain collateral information. Writer spoke w/ Joanne MACK who provided the following information.    Patient is a 33 Yo female domiciled at [facility], hx of developmental disability and schizoaffective disorder, bib EMS activated by the pt.  Pt has a history of activating ems.  Joanne reports that the pt said she wasn’t feeling well.   She states that the patient is medication compliant. She states that the patient wanted to buy food outside and then did not buy the food. Patient then came back inside and started with her usual behaviors and then said she was not feeling well. Patient then called ems on her own. She is unaware of any SI/Hi/AVH. She says pt was not violent or aggressive. No further safety concerns and pt can return via ambulance. The address is [address] [place] [address]. case discussed with JONATHAN Neal.      Per provider, JONATHAN Neal,  patient is cleared and is able to return to their previous residence, [facility]   has spoken to [facility] nancy  ([phone]) ,  confirmed that patients mode of transportation is AMBULANCE and that patient travels WITH SUPERVISION of ems . Clinical provider is in agreement with AMBULANCE back to [facility]. Verbal huddle regarding coordination of care completed with interdisciplinary team. no staff with her. RN to set up ems.    worker met with patient by the request of patient. Patient states that she wants staff to get her [employer] and they do not do this. worker encouraged patient about healthy eating habits.

## 2023-09-12 NOTE — ED PROVIDER NOTE - NSICDXPASTMEDICALHX_GEN_ALL_CORE_FT
Aortic Stenosis   Pulmonary HTN     Patient is identified as having Diastolic (HFpEF) heart failure that is Chronic. CHF is currently controlled. Latest ECHO performed and demonstrates- No results found for this or any previous visit.  . Continue Beta Blocker Furosemide and monitor clinical status closely. Monitor on telemetry. Patient is off CHF pathway.  Monitor strict Is&Os and daily weights.  Place on fluid restriction of 1.5 L. Continue to stress to patient importance of self efficacy and  on diet for CHF. Last BNP reviewed- and noted below No results for input(s): BNP, BNPTRIAGEBLO in the last 168 hours..      -repeat 2d echo    The left ventricle is normal in size with concentric remodeling and normal systolic function.   The estimated ejection fraction is 65%.   Indeterminate left ventricular diastolic function.   There is mild mitral stenosis.   The mean diastolic gradient across the mitral valve is 5 mmHg at a heart rate of 66 bpm.   Normal right ventricular size with normal right ventricular systolic function.   There is mild aortic valve stenosis.   Aortic valve area is 1.28 cm2; peak velocity is 2.33 m/s; mean gradient is 10 mmHg.   Mild-to-moderate aortic regurgitation.   Normal central venous pressure (3 mmHg).   The estimated PA systolic pressure is 35 mmHg.    -hold lasix      PAST MEDICAL HISTORY:  Asthma     Bipolar disorder     Development delay     DM (diabetes mellitus)     Intellectual disability     Schizoaffective disorder     Schizophrenia

## 2023-09-22 ENCOUNTER — EMERGENCY (EMERGENCY)
Facility: HOSPITAL | Age: 33
LOS: 0 days | Discharge: HOME | End: 2023-09-23
Attending: STUDENT IN AN ORGANIZED HEALTH CARE EDUCATION/TRAINING PROGRAM
Payer: MEDICAID

## 2023-09-22 VITALS
OXYGEN SATURATION: 97 % | HEART RATE: 85 BPM | SYSTOLIC BLOOD PRESSURE: 118 MMHG | RESPIRATION RATE: 17 BRPM | DIASTOLIC BLOOD PRESSURE: 70 MMHG | TEMPERATURE: 98 F

## 2023-09-22 VITALS
WEIGHT: 233.03 LBS | DIASTOLIC BLOOD PRESSURE: 77 MMHG | SYSTOLIC BLOOD PRESSURE: 109 MMHG | RESPIRATION RATE: 16 BRPM | TEMPERATURE: 98 F | HEIGHT: 62 IN | HEART RATE: 98 BPM | OXYGEN SATURATION: 96 %

## 2023-09-22 DIAGNOSIS — Z86.59 PERSONAL HISTORY OF OTHER MENTAL AND BEHAVIORAL DISORDERS: ICD-10-CM

## 2023-09-22 DIAGNOSIS — F31.9 BIPOLAR DISORDER, UNSPECIFIED: ICD-10-CM

## 2023-09-22 DIAGNOSIS — Z91.018 ALLERGY TO OTHER FOODS: ICD-10-CM

## 2023-09-22 DIAGNOSIS — F79 UNSPECIFIED INTELLECTUAL DISABILITIES: ICD-10-CM

## 2023-09-22 DIAGNOSIS — R19.7 DIARRHEA, UNSPECIFIED: ICD-10-CM

## 2023-09-22 DIAGNOSIS — J02.9 ACUTE PHARYNGITIS, UNSPECIFIED: ICD-10-CM

## 2023-09-22 DIAGNOSIS — F84.0 AUTISTIC DISORDER: ICD-10-CM

## 2023-09-22 DIAGNOSIS — F25.9 SCHIZOAFFECTIVE DISORDER, UNSPECIFIED: ICD-10-CM

## 2023-09-22 DIAGNOSIS — Z91.013 ALLERGY TO SEAFOOD: ICD-10-CM

## 2023-09-22 DIAGNOSIS — E11.9 TYPE 2 DIABETES MELLITUS WITHOUT COMPLICATIONS: ICD-10-CM

## 2023-09-22 DIAGNOSIS — J45.909 UNSPECIFIED ASTHMA, UNCOMPLICATED: ICD-10-CM

## 2023-09-22 LAB — HCG UR QL: NEGATIVE — SIGNIFICANT CHANGE UP

## 2023-09-22 PROCEDURE — 99284 EMERGENCY DEPT VISIT MOD MDM: CPT

## 2023-09-22 RX ADMIN — Medication 30 MILLILITER(S): at 22:08

## 2023-09-22 NOTE — ED PROVIDER NOTE - NSFOLLOWUPINSTRUCTIONS_ED_ALL_ED_FT
Please follow up with your primary care doctor  Please return to the ER for vomiting, fever, abdominal pain, shortness of breath or other concerning symptoms

## 2023-09-22 NOTE — ED PROVIDER NOTE - CLINICAL SUMMARY MEDICAL DECISION MAKING FREE TEXT BOX
PMH and HPI as above  Patient presents with sore throat and diarrhea with no other complaints  Exam shows well appearing patient sitting in bed while eating sandwich  Will check Upreg   Patient has no other complaints and does not want medication for her sore throat PMH and HPI as above  Patient presents with sore throat and diarrhea with no other complaints  Exam shows well appearing patient sitting in bed while eating sandwich  Will check Upreg   Patient has no other complaints and does not want medication for her sore throat  Patient HCG urine negative  Patient discharged

## 2023-09-22 NOTE — ED PROVIDER NOTE - PATIENT PORTAL LINK FT
You can access the FollowMyHealth Patient Portal offered by [facility] by registering at the following website: [email or web address]. By joining [employer]’s FollowMyHealth portal, you will also be able to view your health information using other applications (apps) compatible with our system.

## 2023-09-22 NOTE — ED ADULT NURSE NOTE - NSFALLUNIVINTERV_ED_ALL_ED
Bed/Stretcher in lowest position, wheels locked, appropriate side rails in place/Call bell, personal items and telephone in reach/Instruct patient to call for assistance before getting out of bed/chair/stretcher/Non-slip footwear applied when patient is off stretcher/[place] to call system/Physically safe environment - no spills, clutter or unnecessary equipment/Purposeful proactive rounding/Room/bathroom lighting operational, light cord in reach

## 2023-09-22 NOTE — ED ADULT TRIAGE NOTE - CHIEF COMPLAINT QUOTE
BIBA,  from group home.  pt c/o abd pain(mid-abd) and diarrhea.    last diarrhea  at around 8:30pm.  denies N/V,  denies SI/HI

## 2023-09-22 NOTE — ED PROVIDER NOTE - OBJECTIVE STATEMENT
32 yoF PMH of diabetes, asthma past psychiatric history bipolar disorder, developmental delay, intellectual disability, Autism,  bipolar disorder, 32 yoF PMH of diabetes, asthma past psychiatric history bipolar disorder, developmental delay, intellectual disability, Autism,  bipolar disorder. Patient is here with CC of sore throat and diarrhea. Patient denies fever, chills, vomiting, blood in stool. Patient is well known to the ED. Patient also endorsing that she is pregnant. Has no other complaints, tolerating PO.

## 2023-09-22 NOTE — ED ADULT NURSE NOTE - OBJECTIVE STATEMENT
BIBA,  from group home.  pt c/o abd pain(mid-abd) and diarrhea.    last diarrhea  at around 8:30pm.  denies N/V,  denies SI/HI
None

## 2023-09-23 ENCOUNTER — EMERGENCY (EMERGENCY)
Facility: HOSPITAL | Age: 33
LOS: 0 days | Discharge: HOME | End: 2023-09-23
Attending: EMERGENCY MEDICINE
Payer: MEDICAID

## 2023-09-23 VITALS
RESPIRATION RATE: 16 BRPM | HEART RATE: 100 BPM | DIASTOLIC BLOOD PRESSURE: 76 MMHG | SYSTOLIC BLOOD PRESSURE: 111 MMHG | OXYGEN SATURATION: 95 %

## 2023-09-23 VITALS
WEIGHT: 272.93 LBS | DIASTOLIC BLOOD PRESSURE: 82 MMHG | TEMPERATURE: 98 F | HEIGHT: 62 IN | HEART RATE: 86 BPM | OXYGEN SATURATION: 98 % | SYSTOLIC BLOOD PRESSURE: 116 MMHG | RESPIRATION RATE: 16 BRPM

## 2023-09-23 DIAGNOSIS — R19.7 DIARRHEA, UNSPECIFIED: ICD-10-CM

## 2023-09-23 DIAGNOSIS — R10.9 UNSPECIFIED ABDOMINAL PAIN: ICD-10-CM

## 2023-09-23 DIAGNOSIS — F79 UNSPECIFIED INTELLECTUAL DISABILITIES: ICD-10-CM

## 2023-09-23 DIAGNOSIS — E11.9 TYPE 2 DIABETES MELLITUS WITHOUT COMPLICATIONS: ICD-10-CM

## 2023-09-23 DIAGNOSIS — R07.0 PAIN IN THROAT: ICD-10-CM

## 2023-09-23 DIAGNOSIS — J45.909 UNSPECIFIED ASTHMA, UNCOMPLICATED: ICD-10-CM

## 2023-09-23 DIAGNOSIS — J02.9 ACUTE PHARYNGITIS, UNSPECIFIED: ICD-10-CM

## 2023-09-23 DIAGNOSIS — Z91.013 ALLERGY TO SEAFOOD: ICD-10-CM

## 2023-09-23 DIAGNOSIS — Z79.84 LONG TERM (CURRENT) USE OF ORAL HYPOGLYCEMIC DRUGS: ICD-10-CM

## 2023-09-23 DIAGNOSIS — F25.9 SCHIZOAFFECTIVE DISORDER, UNSPECIFIED: ICD-10-CM

## 2023-09-23 DIAGNOSIS — Z91.018 ALLERGY TO OTHER FOODS: ICD-10-CM

## 2023-09-23 DIAGNOSIS — F31.9 BIPOLAR DISORDER, UNSPECIFIED: ICD-10-CM

## 2023-09-23 PROCEDURE — 99284 EMERGENCY DEPT VISIT MOD MDM: CPT

## 2023-09-23 RX ORDER — IBUPROFEN 200 MG
600 TABLET ORAL ONCE
Refills: 0 | Status: COMPLETED | OUTPATIENT
Start: 2023-09-23 | End: 2023-09-23

## 2023-09-23 RX ORDER — BENZOCAINE AND MENTHOL 5; 1 G/100ML; G/100ML
1 LIQUID ORAL ONCE
Refills: 0 | Status: COMPLETED | OUTPATIENT
Start: 2023-09-23 | End: 2023-09-23

## 2023-09-23 RX ADMIN — Medication 600 MILLIGRAM(S): at 09:05

## 2023-09-23 RX ADMIN — BENZOCAINE AND MENTHOL 1 LOZENGE: 5; 1 LIQUID ORAL at 09:06

## 2023-09-23 NOTE — ED PROVIDER NOTE - NSFOLLOWUPINSTRUCTIONS_ED_ALL_ED_FT
1) Take tylenol or motrin for pain  2) May take over the counter lozenges: cepacol once every 2 hours when awake as needed for pain for 2days  3) Follow up with your primary care doctor  4) Return to the ER for worsening or concerning symptoms

## 2023-09-23 NOTE — ED PROVIDER NOTE - PATIENT PORTAL LINK FT
You can access the FollowMyHealth Patient Portal offered by [facility] by registering at the following website: [email or web address]. By joining [employer]’s FollowMyHealth portal, you will also be able to view your health information using other applications (apps) compatible with our system.

## 2023-09-23 NOTE — ED ADULT TRIAGE NOTE - CHIEF COMPLAINT QUOTE
abdominal pain and sore throat since last night . pt is from group home .h/o schizophrenia, bipolar, depression, DM, GERD, HDL

## 2023-09-23 NOTE — ED ADULT TRIAGE NOTE - HEIGHT IN FEET
Caller: Estela from [facility]  Lab order   Details of condition: Estela from [facility] calling regarding orders placed. In MD notes it references a swab for yeast but the order if for a fungal culture. Please call to clarify.  [facility] verified? No  Appointment offered? No  Best time to reach patient: Lab  Patient would like a call back at [facility] extension [phone] option 1   (enter if call back number is different from primary phone number)      
5

## 2023-09-23 NOTE — ED PROVIDER NOTE - OBJECTIVE STATEMENT
This patient is a 32 year old woman hx of DM, schizophrenia and intellectual delay from group home c/o throat pain.  Patient has been seen frequently in the ER as recently as last night.  She reports that the throat pain began yesterday and she had 1 episode of non-bloody diarrhea.  Although triage note reports abdominal pain the patient denies abdominal pain and is requesting a breakfast tray.

## 2023-09-23 NOTE — ED PROVIDER NOTE - CLINICAL SUMMARY MEDICAL DECISION MAKING FREE TEXT BOX
Sore throat and reports of diarrhea last bowel movement once over 8 hours ago.  No exudates of swelling in throat.  Patient denies abdominal pain requesting foot to eat abdomen soft non-tender.  Patient given medication for throat pain and lozenge and discharged.

## 2023-09-24 ENCOUNTER — EMERGENCY (EMERGENCY)
Facility: HOSPITAL | Age: 33
LOS: 0 days | Discharge: HOME | End: 2023-09-24
Attending: STUDENT IN AN ORGANIZED HEALTH CARE EDUCATION/TRAINING PROGRAM
Payer: MEDICAID

## 2023-09-24 VITALS
WEIGHT: 272.93 LBS | HEART RATE: 101 BPM | OXYGEN SATURATION: 99 % | SYSTOLIC BLOOD PRESSURE: 118 MMHG | TEMPERATURE: 98 F | HEIGHT: 62 IN | RESPIRATION RATE: 19 BRPM | DIASTOLIC BLOOD PRESSURE: 83 MMHG

## 2023-09-24 VITALS
OXYGEN SATURATION: 95 % | RESPIRATION RATE: 17 BRPM | SYSTOLIC BLOOD PRESSURE: 103 MMHG | HEART RATE: 94 BPM | DIASTOLIC BLOOD PRESSURE: 71 MMHG | TEMPERATURE: 99 F

## 2023-09-24 DIAGNOSIS — F25.9 SCHIZOAFFECTIVE DISORDER, UNSPECIFIED: ICD-10-CM

## 2023-09-24 DIAGNOSIS — R10.9 UNSPECIFIED ABDOMINAL PAIN: ICD-10-CM

## 2023-09-24 DIAGNOSIS — J45.909 UNSPECIFIED ASTHMA, UNCOMPLICATED: ICD-10-CM

## 2023-09-24 DIAGNOSIS — R45.1 RESTLESSNESS AND AGITATION: ICD-10-CM

## 2023-09-24 DIAGNOSIS — Z91.013 ALLERGY TO SEAFOOD: ICD-10-CM

## 2023-09-24 DIAGNOSIS — Z79.84 LONG TERM (CURRENT) USE OF ORAL HYPOGLYCEMIC DRUGS: ICD-10-CM

## 2023-09-24 DIAGNOSIS — Z86.59 PERSONAL HISTORY OF OTHER MENTAL AND BEHAVIORAL DISORDERS: ICD-10-CM

## 2023-09-24 DIAGNOSIS — F31.9 BIPOLAR DISORDER, UNSPECIFIED: ICD-10-CM

## 2023-09-24 DIAGNOSIS — Z91.018 ALLERGY TO OTHER FOODS: ICD-10-CM

## 2023-09-24 DIAGNOSIS — F79 UNSPECIFIED INTELLECTUAL DISABILITIES: ICD-10-CM

## 2023-09-24 DIAGNOSIS — E11.9 TYPE 2 DIABETES MELLITUS WITHOUT COMPLICATIONS: ICD-10-CM

## 2023-09-24 PROCEDURE — 99284 EMERGENCY DEPT VISIT MOD MDM: CPT

## 2023-09-24 RX ORDER — HALOPERIDOL DECANOATE 100 MG/ML
5 INJECTION INTRAMUSCULAR ONCE
Refills: 0 | Status: COMPLETED | OUTPATIENT
Start: 2023-09-24 | End: 2023-09-24

## 2023-09-24 RX ADMIN — Medication 2 MILLIGRAM(S): at 18:20

## 2023-09-24 RX ADMIN — HALOPERIDOL DECANOATE 5 MILLIGRAM(S): 100 INJECTION INTRAMUSCULAR at 18:19

## 2023-09-24 NOTE — ED ADULT NURSE NOTE - NSFALLUNIVINTERV_ED_ALL_ED
Bed/Stretcher in lowest position, wheels locked, appropriate side rails in place/Call bell, personal items and telephone in reach/Instruct patient to call for assistance before getting out of bed/chair/stretcher/Non-slip footwear applied when patient is off stretcher/[place] to call system/Physically safe environment - no spills, clutter or unnecessary equipment/Purposeful proactive rounding/Room/bathroom lighting operational, light cord in reach

## 2023-09-24 NOTE — ED PROVIDER NOTE - CLINICAL SUMMARY MEDICAL DECISION MAKING FREE TEXT BOX
32-year-old female history of schizophrenia, intellectual delay, diabetes, frequent flier to the ER presents from group home for "tummy pain".  Patient currently not endorsing any abdominal pain, requesting food.  States that her boyfriend broke up with her today and that she is feeling sad.  Denies suicidal or homicidal ideation.  Denies chest pain/shortness of breath.  Denies abdominal pain.  Denies trauma.  Patient tolerated p.o. intake here in the emergency room, stable vitals.  Patient became acutely agitated while here in the emergency room, banging on the glass door of her room.  Patient ambulating up and down the halls of the ER and disturbing other patients and staff members.  Patient received 5 mg of Haldol IM and 2 mg of Ativan IM due to acute agitation.  Patient still awake, with stable vitals status post medications patient does appear more calm now, resting comfortably in the bed, airway is protected.  Patient to be discharged back to group home, is back to her baseline now, moving all extremities, speaking in full sentences, no longer agitated. ambulance being arranged for transport for safe disposition. 32-year-old female history of schizophrenia, intellectual delay, diabetes, frequent flier to the ER presents from group home for "tummy pain".  Patient currently not endorsing any abdominal pain, requesting food.  States that her boyfriend broke up with her today and that she is feeling sad.  Denies suicidal or homicidal ideation.  Denies chest pain/shortness of breath.  Denies abdominal pain.  Denies trauma.  Patient tolerated p.o. intake here in the emergency room, stable vitals.  Patient became acutely agitated while here in the emergency room, banging on the glass door of her room.  Patient ambulating up and down the halls of the ER and disturbing other patients and staff members.  Patient received 5 mg of Haldol IM and 2 mg of Ativan IM due to acute agitation and concern for safety for herself and others.  Patient still awake, with stable vitals status post medications patient does appear more calm now, resting comfortably in the bed, airway is protected.  Patient to be discharged back to group home, is back to her baseline now, moving all extremities, speaking in full sentences, no longer agitated. ambulance being arranged for transport for safe disposition.

## 2023-09-24 NOTE — ED ADULT NURSE NOTE - OBJECTIVE STATEMENT
Patient alert and verbally responsive, came  from group home with c/o "tummy pain" and  asking for chocolate. Patient reports "my boyfriend broke up with me." Seen earlier this week for same reason. When asked if she had abd pain pt stated no she wants food same given to her. History of DM, Obesity, Schizophrenia, Anxiety, Intellectual disabilities, PTSD, Apnea, GERD

## 2023-09-24 NOTE — ED ADULT NURSE NOTE - CHIEF COMPLAINT QUOTE
Patient BIBA from group home. "tummy pain." Patient asking for chocolate. Patient reports "my boyfriend broke up with me." History of DM, Obesity, Schizophrenia, Anxiety, Intellectual disabilities, PTSD, Apnea, GERD

## 2023-09-24 NOTE — ED ADULT TRIAGE NOTE - CHIEF COMPLAINT QUOTE
Patient BIBA from group home. "tummy pain." Patient asking for chocolate. Patient reports "my boyfriend broke up with me." History of DM, Obesity, Schizophrenia, Anxiety, Intellectual disabilities, PTSD, Apnea, GERD Patient BIBA from group home. "tummy pain." Patient asking for chocolate. Patient reports "my boyfriend broke up with me." History of DM, Obesity, Schizophrenia, Anxiety, Intellectual disabilities, PTSD, Apnea, GERD

## 2023-09-24 NOTE — ED PROVIDER NOTE - PHYSICAL EXAMINATION
General: Well appearing female in no acute distress  HEENT: Normocephalic, atraumatic. Moist mucous membranes. Oropharynx clear. No lymphadenopathy.  Eyes: No scleral icterus. EOMI. EMERALD.  Neck:. Soft and supple. Full ROM without pain. No midline tenderness  Cardiac: Regular rate and regular rhythm. No murmurs, rubs, gallops. Peripheral pulses 2+ and symmetric. No LE edema.  Resp: Lungs CTAB. Speaking in full sentences. No wheezes, rales or rhonchi.  Abd: Soft, non-tender, non-distended. No guarding or rebound. No scars, masses, or lesions.  Back: Spine midline and non-tender. No CVA tenderness.    Skin: No rashes, abrasions, or lacerations.  Neuro: AO x 3. Moves all extremities symmetrically. Motor strength and sensation grossly intact. steady gait , no ataxia.

## 2023-09-24 NOTE — ED PROVIDER NOTE - NSFOLLOWUPINSTRUCTIONS_ED_ALL_ED_FT
Return to emergency department if symptoms worsen, chest pain, shortness of breath, nausea or vomiting, vision changes.

## 2023-09-24 NOTE — ED PROVIDER NOTE - CARE PLAN
Principal Discharge DX:	Abdominal pain   1 Principal Discharge DX:	Abdominal pain  Secondary Diagnosis:	Restlessness and agitation

## 2023-09-24 NOTE — ED PROVIDER NOTE - OBJECTIVE STATEMENT
32-year-old female history of schizophrenia, intellectual delay, diabetes, frequent flier to the ER presents from group home for "tummy pain".  Patient currently not endorsing any abdominal pain, requesting food.  States that her boyfriend broke up with her today and that she is feeling sad.  Denies suicidal or homicidal ideation.  Denies chest pain/shortness of breath.  Denies abdominal pain.  Denies trauma.

## 2023-09-24 NOTE — ED PROVIDER NOTE - PATIENT PORTAL LINK FT
You can access the FollowMyHealth Patient Portal offered by [facility] by registering at the following website: [email or web address]. By joining [employer]’s FollowMyHealth portal, you will also be able to view your health information using other applications (apps) compatible with our system.

## 2023-09-26 ENCOUNTER — EMERGENCY (EMERGENCY)
Facility: HOSPITAL | Age: 33
LOS: 0 days | Discharge: HOME | End: 2023-09-26
Payer: MEDICAID

## 2023-09-26 VITALS
OXYGEN SATURATION: 98 % | RESPIRATION RATE: 18 BRPM | DIASTOLIC BLOOD PRESSURE: 66 MMHG | SYSTOLIC BLOOD PRESSURE: 101 MMHG | TEMPERATURE: 98 F | HEART RATE: 90 BPM

## 2023-09-26 VITALS
OXYGEN SATURATION: 98 % | WEIGHT: 272.93 LBS | RESPIRATION RATE: 19 BRPM | SYSTOLIC BLOOD PRESSURE: 99 MMHG | HEIGHT: 62 IN | TEMPERATURE: 98 F | DIASTOLIC BLOOD PRESSURE: 61 MMHG | HEART RATE: 107 BPM
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
## 2023-09-26 DIAGNOSIS — F79 UNSPECIFIED INTELLECTUAL DISABILITIES: ICD-10-CM

## 2023-09-26 DIAGNOSIS — Z76.89 PERSONS ENCOUNTERING HEALTH SERVICES IN OTHER SPECIFIED CIRCUMSTANCES: ICD-10-CM

## 2023-09-26 DIAGNOSIS — F20.9 SCHIZOPHRENIA, UNSPECIFIED: ICD-10-CM

## 2023-09-26 DIAGNOSIS — Z91.013 ALLERGY TO SEAFOOD: ICD-10-CM

## 2023-09-26 DIAGNOSIS — T73.0XXA STARVATION, INITIAL ENCOUNTER: ICD-10-CM

## 2023-09-26 DIAGNOSIS — E11.9 TYPE 2 DIABETES MELLITUS WITHOUT COMPLICATIONS: ICD-10-CM

## 2023-09-26 PROCEDURE — 99283 EMERGENCY DEPT VISIT LOW MDM: CPT

## 2023-09-26 NOTE — ED ADULT TRIAGE NOTE - CHIEF COMPLAINT QUOTE
patient BIBA stated " I got upset because I did not want to cook ", patient  upset in triage, c/o of sore throat

## 2023-09-26 NOTE — ED PROVIDER NOTE - PATIENT PORTAL LINK FT
You can access the FollowMyHealth Patient Portal offered by [facility] by registering at the following website: [email or web address]. By joining [employer]’s FollowMyHealth portal, you will also be able to view your health information using other applications (apps) compatible with our system.

## 2023-09-26 NOTE — ED PROVIDER NOTE - CARE PROVIDER_API CALL
No your pmd in 1-3 days,   Phone: (   )    -  Fax: (   )    -  Follow Up Time:     your gi doctor in 1-3 days,   Phone: (   )    -  Fax: (   )    -  Follow Up Time:

## 2023-09-26 NOTE — ED PROVIDER NOTE - CLINICAL SUMMARY MEDICAL DECISION MAKING FREE TEXT BOX
requesting food  no complaint.  Reviewed necessity for follow up. Counseled on red flags and to return for them.  Patient appears well on discharge.

## 2023-09-26 NOTE — ED PROVIDER NOTE - OBJECTIVE STATEMENT
no
31 y/o F with PMH schizophrenia, intellectual delay, diabetes, frequent flier to the ER presents from group home because she "did not want to cook dinner tonight".  no complaints.   no cp, sob, back pain, ab pain, fever.

## 2023-09-26 NOTE — ED PROVIDER NOTE - PROVIDER TOKENS
FREE:[LAST:[your pmd in 1-3 days],PHONE:[(   )    -],FAX:[(   )    -]],FREE:[LAST:[your gi doctor in 1-3 days],PHONE:[(   )    -],FAX:[(   )    -]]

## 2023-09-26 NOTE — ED ADULT NURSE NOTE - OBJECTIVE STATEMENT
32yF A&OX4 presenting to ED with slight throat pain and requesting food. Patient alert and verbally responsive, came  from group home bec she "didn't want to cook". pt reports slight throat pain. History of DM, Obesity, Schizophrenia, Anxiety, Intellectual disabilities, PTSD, Apnea, GERD

## 2023-09-26 NOTE — ED PROVIDER NOTE - PHYSICAL EXAMINATION
PHYSICAL EXAM:    GENERAL: Alert, appears stated age, well appearing, non-toxic  SKIN: Warm, pink and dry.  HEAD: NC, AT, no step offs   EYE: Normal lids/conjunctiva  ENT: Normal hearing, patent oropharynx  NECK: +supple. No meningismus, or JVD  Pulm: Bilateral BS, normal resp effort, no wheezes, stridor, or retractions  CV: RRR, no M/R/G, 2+and = radial pulses  Abd: soft, non-tender, non-distended  Mskel: no erythema, cyanosis, edema. no calf tenderness  Neuro: AAOx3,  5/5 strength throughout. normal gait.

## 2023-10-11 ENCOUNTER — EMERGENCY (EMERGENCY)
Facility: HOSPITAL | Age: 33
LOS: 0 days | Discharge: HOME | End: 2023-10-11
Attending: STUDENT IN AN ORGANIZED HEALTH CARE EDUCATION/TRAINING PROGRAM
Payer: MEDICAID

## 2023-10-11 VITALS
HEIGHT: 62 IN | RESPIRATION RATE: 20 BRPM | OXYGEN SATURATION: 100 % | TEMPERATURE: 99 F | SYSTOLIC BLOOD PRESSURE: 134 MMHG | WEIGHT: 272.93 LBS | HEART RATE: 102 BPM | DIASTOLIC BLOOD PRESSURE: 85 MMHG

## 2023-10-11 VITALS
TEMPERATURE: 98 F | OXYGEN SATURATION: 100 % | HEART RATE: 70 BPM | SYSTOLIC BLOOD PRESSURE: 104 MMHG | RESPIRATION RATE: 18 BRPM | DIASTOLIC BLOOD PRESSURE: 78 MMHG

## 2023-10-11 DIAGNOSIS — Z91.018 ALLERGY TO OTHER FOODS: ICD-10-CM

## 2023-10-11 DIAGNOSIS — R10.30 LOWER ABDOMINAL PAIN, UNSPECIFIED: ICD-10-CM

## 2023-10-11 DIAGNOSIS — F25.9 SCHIZOAFFECTIVE DISORDER, UNSPECIFIED: ICD-10-CM

## 2023-10-11 DIAGNOSIS — Z91.013 ALLERGY TO SEAFOOD: ICD-10-CM

## 2023-10-11 DIAGNOSIS — F79 UNSPECIFIED INTELLECTUAL DISABILITIES: ICD-10-CM

## 2023-10-11 DIAGNOSIS — Z79.84 LONG TERM (CURRENT) USE OF ORAL HYPOGLYCEMIC DRUGS: ICD-10-CM

## 2023-10-11 DIAGNOSIS — E11.9 TYPE 2 DIABETES MELLITUS WITHOUT COMPLICATIONS: ICD-10-CM

## 2023-10-11 DIAGNOSIS — F84.0 AUTISTIC DISORDER: ICD-10-CM

## 2023-10-11 DIAGNOSIS — F31.9 BIPOLAR DISORDER, UNSPECIFIED: ICD-10-CM

## 2023-10-11 DIAGNOSIS — R62.50 UNSPECIFIED LACK OF EXPECTED NORMAL PHYSIOLOGICAL DEVELOPMENT IN CHILDHOOD: ICD-10-CM

## 2023-10-11 LAB — HCG UR QL: NEGATIVE — SIGNIFICANT CHANGE UP

## 2023-10-11 PROCEDURE — 99284 EMERGENCY DEPT VISIT MOD MDM: CPT

## 2023-10-11 RX ORDER — IBUPROFEN 200 MG
600 TABLET ORAL ONCE
Refills: 0 | Status: COMPLETED | OUTPATIENT
Start: 2023-10-11 | End: 2023-10-11

## 2023-10-11 RX ORDER — ONDANSETRON 8 MG/1
8 TABLET, FILM COATED ORAL ONCE
Refills: 0 | Status: COMPLETED | OUTPATIENT
Start: 2023-10-11 | End: 2023-10-11

## 2023-10-11 RX ADMIN — ONDANSETRON 8 MILLIGRAM(S): 8 TABLET, FILM COATED ORAL at 15:48

## 2023-10-11 RX ADMIN — Medication 600 MILLIGRAM(S): at 14:00

## 2023-10-11 RX ADMIN — Medication 600 MILLIGRAM(S): at 12:54

## 2023-10-11 NOTE — ED PROVIDER NOTE - CLINICAL SUMMARY MEDICAL DECISION MAKING FREE TEXT BOX
32M PMH developmental delay, bipolar / schizoaffective, DM BIBEMS from group home w/ lower abd cramping a/w onset menses this AM. Afebrile, VSS. Well appearing, in NAD. Benign abd exam. Plan for Upreg, Motrin. Re-eval. 32M PMH developmental delay, bipolar / schizoaffective, DM BIBEMS from group home w/ lower abd cramping a/w onset menses this AM. Afebrile, VSS. Well appearing, in NAD. Benign abd exam. Plan for Upreg, Motrin. Re-eval.  Pregnancy test negative. 32M PMH developmental delay, bipolar / schizoaffective, DM BIBEMS from group home w/ lower abd cramping a/w onset menses this AM. Afebrile, VSS. Well appearing, in NAD. Benign abd exam. Plan for Upreg, Motrin. Re-eval.  Pregnancy test negative. On re-eval resting comfortably, in NAD. Reports improvement in symptoms w/ ED meds. Stable for d/c to [facility]. Recommend close outpatient PCP f/u. Return signs / symptoms d/w pt. She understands / agrees w/ this plan.

## 2023-10-11 NOTE — ED ADULT TRIAGE NOTE - CHIEF COMPLAINT QUOTE
Sent  by group home for cramps as per pt, requesting breakfast and says shes been arguing with staff lately, pt states has cramps but not abd pain

## 2023-10-11 NOTE — ED PROVIDER NOTE - OBJECTIVE STATEMENT
32M PMH developmental delay, bipolar / schizoaffective, DM BIBEMS from group home w/ lower abd cramping, pt reports onset of menses this AM. Pt did not take any medications for symptom relief PTA. Pt requesting meal. ROS otherwise negative: Denies F/C, h/a, dizziness, CP, palpitations, SOB, cough, flank pain, N/V/D/C, dysuria, hematuria, LE pain / swelling.     PMH as above, PSH none, NKDA, meds as listed.

## 2023-10-11 NOTE — ED PROVIDER NOTE - PATIENT PORTAL LINK FT
You can access the FollowMyHealth Patient Portal offered by [facility] by registering at the following website: [email or web address]. By joining [employer]’s FollowMyHealth portal, you will also be able to view your health information using other applications (apps) compatible with our system.

## 2023-10-11 NOTE — ED ADULT NURSE NOTE - OBJECTIVE STATEMENT
Received 31 y/o female A/Ox4 coming in from a group home c/o abdominal pain and cramping. Pt denies n/v, fever/chills, bowel and urinary dysfunction. LMP: 10/11/23

## 2023-10-13 ENCOUNTER — EMERGENCY (EMERGENCY)
Facility: HOSPITAL | Age: 33
LOS: 0 days | Discharge: HOME | End: 2023-10-13
Attending: EMERGENCY MEDICINE
Payer: MEDICAID

## 2023-10-13 VITALS
SYSTOLIC BLOOD PRESSURE: 108 MMHG | OXYGEN SATURATION: 97 % | DIASTOLIC BLOOD PRESSURE: 72 MMHG | TEMPERATURE: 98 F | RESPIRATION RATE: 18 BRPM | HEART RATE: 71 BPM

## 2023-10-13 VITALS
SYSTOLIC BLOOD PRESSURE: 104 MMHG | WEIGHT: 272.93 LBS | TEMPERATURE: 98 F | DIASTOLIC BLOOD PRESSURE: 67 MMHG | HEIGHT: 62 IN | OXYGEN SATURATION: 100 % | RESPIRATION RATE: 16 BRPM | HEART RATE: 108 BPM

## 2023-10-13 DIAGNOSIS — F25.9 SCHIZOAFFECTIVE DISORDER, UNSPECIFIED: ICD-10-CM

## 2023-10-13 DIAGNOSIS — R10.9 UNSPECIFIED ABDOMINAL PAIN: ICD-10-CM

## 2023-10-13 DIAGNOSIS — R11.0 NAUSEA: ICD-10-CM

## 2023-10-13 DIAGNOSIS — F31.9 BIPOLAR DISORDER, UNSPECIFIED: ICD-10-CM

## 2023-10-13 DIAGNOSIS — R62.50 UNSPECIFIED LACK OF EXPECTED NORMAL PHYSIOLOGICAL DEVELOPMENT IN CHILDHOOD: ICD-10-CM

## 2023-10-13 DIAGNOSIS — J45.909 UNSPECIFIED ASTHMA, UNCOMPLICATED: ICD-10-CM

## 2023-10-13 DIAGNOSIS — E11.9 TYPE 2 DIABETES MELLITUS WITHOUT COMPLICATIONS: ICD-10-CM

## 2023-10-13 DIAGNOSIS — F79 UNSPECIFIED INTELLECTUAL DISABILITIES: ICD-10-CM

## 2023-10-13 DIAGNOSIS — Z79.84 LONG TERM (CURRENT) USE OF ORAL HYPOGLYCEMIC DRUGS: ICD-10-CM

## 2023-10-13 DIAGNOSIS — Z91.018 ALLERGY TO OTHER FOODS: ICD-10-CM

## 2023-10-13 DIAGNOSIS — Z91.013 ALLERGY TO SEAFOOD: ICD-10-CM

## 2023-10-13 PROCEDURE — 99284 EMERGENCY DEPT VISIT MOD MDM: CPT

## 2023-10-13 NOTE — ED PROVIDER NOTE - CLINICAL SUMMARY MEDICAL DECISION MAKING FREE TEXT BOX
33 y/o female with developmental delay, bipolar / schizoaffective, DM BIBEMS from group home here with nausea today. Benign abdomen exam. Pt requesting to eat. Urine pregnancy done 2 days ago and negative. Vs stable.   Food tray given and pt will be stable to be discharged back to [facility]. 33 y/o female with developmental delay, bipolar / schizoaffective, DM BIBEMS from group home here with nausea today. Benign abdomen exam. Pt requesting to eat. Urine pregnancy done 2 days ago and negative. Vs stable.   Food tray given and pt will be stable to be discharged back to group home.  Pt tolerated 2 trays in the ED. NAD. Ambulatory in the ED with steady gait.   Spoke with group home medical coordinator Jarek and states pt seemed fine this morning. 31 y/o female with developmental delay, bipolar / schizoaffective, DM BIBEMS from group home here with nausea today. Benign abdomen exam. Pt requesting to eat. Urine pregnancy done 2 days ago and negative. Vs stable.   Food tray given and pt will be stable to be discharged back to group home.  Pt tolerated 2 trays in the ED. NAD. Ambulatory in the ED with steady gait.   Spoke with group home medical coordinator Jarek and states pt seemed fine this morning   consulted and medical transportation was set up with the patient

## 2023-10-13 NOTE — ED PROVIDER NOTE - OBJECTIVE STATEMENT
31 y/o female with developmental delay, bipolar / schizoaffective, DM BIBEMS from group home for nausea today. Pt seen here multiple times , last time 2 days ago for abdominal cramping. Pt denies any abdominal pain and requesting to eat. Denies fever, chills. Chart reviewed from 2 days ago pt had urine pregnancy done which was negative.

## 2023-10-13 NOTE — ED PROVIDER NOTE - PHYSICAL EXAMINATION
GEN: Awake, alert, interactive, NAD.  HEAD AND NECK: NC/AT. Airway patent. Neck supple.   EYES:  Clear b/l.   ENT: Moist mucus membranes.   CARDIAC: Regular rate, regular rhythm. No evident pedal edema.    RESP/CHEST: Normal respiratory effort with no use of accessory muscles or retractions. Clear throughout on auscultation.  ABD: soft, non-distended, non-tender. No rebound, no guarding.   BACK: No midline spinal TTP. No CVAT.   EXTREMITIES: Moving all extremities with no apparent deformities.   SKIN: Warm, dry, intact normal color. No rash.   NEURO: Alert , no focal deficits.   PSYCH: Appropriate mood and affect.

## 2023-10-13 NOTE — ED ADULT NURSE REASSESSMENT NOTE - NS ED NURSE REASSESS COMMENT FT1
Patient awake and alert, awaiting discharge back to group home. Food trays given and tolerated. Comfort measures provided.

## 2023-10-13 NOTE — ED ADULT NURSE REASSESSMENT NOTE - NS ED NURSE REASSESS COMMENT FT1
Patient examined with JONATHAN Killian for c/o buttock pain. No wound or swelling noted. Patient refusing lidocane patch, motrin or tylenol.

## 2023-10-13 NOTE — ED PROVIDER NOTE - NSFOLLOWUPINSTRUCTIONS_ED_ALL_ED_FT
Rest, drink plenty of fluids.  Advance activity as tolerated.  Continue all previously prescribed medications as directed.  Follow up with your primary care physician in 48-72 hours- bring copies of your results.  Return to the ER for worsening or persistent symptoms, and/or ANY NEW OR CONCERNING SYMPTOMS. If you have issues obtaining follow up, please call: [phone] to obtain a doctor or specialist who takes your insurance in your area.  You may call [phone] to make an appointment with the internal medicine clinic.

## 2023-10-13 NOTE — ED PROVIDER NOTE - NS ED ROS FT
CONSTITUTIONAL: No fever, no chills, no fatigue  EYES: No visual changes  ENT: No ear pain, no sore throat  CARDIOVASCULAR: No chest pain, no palpitations  RESPIRATORY: No cough, no SOB  GI: No abdominal pain,  no vomiting, no constipation, no diarrhea  GENITOURINARY: No dysuria, no frequency, no hematuria  MUSKULOSKELETAL: No backpain, no joint pain, no myalgias  SKIN: No rash  NEURO: No headache    ALL OTHER SYSTEMS NEGATIVE.

## 2023-10-13 NOTE — ED PROVIDER NOTE - PATIENT PORTAL LINK FT
You can access the FollowMyHealth Patient Portal offered by [facility] by registering at the following website: [email or web address]. By joining [employer]’s FollowMyHealth portal, you will also be able to view your health information using other applications (apps) compatible with our system.

## 2023-10-13 NOTE — ED PROVIDER NOTE - BIRTH SEX
Female Graft Cartilage Fenestration Text: The cartilage was fenestrated with a 2mm punch biopsy to help facilitate graft survival and healing.

## 2023-10-13 NOTE — ED ADULT NURSE NOTE - OBJECTIVE STATEMENT
Patient c/o ongoing nausea, without abd pain. Patient requesting breakfast tray, abd soft and non tender.

## 2023-10-15 ENCOUNTER — EMERGENCY (EMERGENCY)
Facility: HOSPITAL | Age: 33
LOS: 0 days | Discharge: HOME | End: 2023-10-15
Attending: STUDENT IN AN ORGANIZED HEALTH CARE EDUCATION/TRAINING PROGRAM
Payer: MEDICAID

## 2023-10-15 VITALS
HEART RATE: 85 BPM | HEIGHT: 62 IN | WEIGHT: 272.93 LBS | RESPIRATION RATE: 18 BRPM | SYSTOLIC BLOOD PRESSURE: 137 MMHG | DIASTOLIC BLOOD PRESSURE: 93 MMHG | OXYGEN SATURATION: 99 % | TEMPERATURE: 98 F

## 2023-10-15 DIAGNOSIS — Z79.84 LONG TERM (CURRENT) USE OF ORAL HYPOGLYCEMIC DRUGS: ICD-10-CM

## 2023-10-15 DIAGNOSIS — Z91.018 ALLERGY TO OTHER FOODS: ICD-10-CM

## 2023-10-15 DIAGNOSIS — M54.50 LOW BACK PAIN, UNSPECIFIED: ICD-10-CM

## 2023-10-15 DIAGNOSIS — Z91.013 ALLERGY TO SEAFOOD: ICD-10-CM

## 2023-10-15 PROCEDURE — 99284 EMERGENCY DEPT VISIT MOD MDM: CPT

## 2023-10-15 RX ORDER — IBUPROFEN 200 MG
600 TABLET ORAL ONCE
Refills: 0 | Status: COMPLETED | OUTPATIENT
Start: 2023-10-15 | End: 2023-10-15

## 2023-10-15 RX ORDER — LIDOCAINE 4 G/100G
1 CREAM TOPICAL ONCE
Refills: 0 | Status: COMPLETED | OUTPATIENT
Start: 2023-10-15 | End: 2023-10-15

## 2023-10-15 RX ADMIN — Medication 600 MILLIGRAM(S): at 17:40

## 2023-10-15 RX ADMIN — Medication 600 MILLIGRAM(S): at 17:14

## 2023-10-15 NOTE — ED PROVIDER NOTE - PHYSICAL EXAMINATION
VITAL SIGNS: I have reviewed nursing notes and confirm.  CONSTITUTIONAL: well-appearing, non-toxic, NAD  SKIN: Warm dry, normal skin turgor  HEAD: NCAT  CARD: RRR, no murmurs, rubs or gallops  RESP: clear to ausculation b/l.  No rales, rhonchi, or wheezing.  ABD: soft, + BS, non-tender, non-distended, no rebound or guarding. No CVA tenderness  EXT: Full ROM, no bony tenderness, no pedal edema, no calf tenderness  NEURO: normal motor. normal sensory. Normal gait.  PSYCH: Cooperative, appropriate.

## 2023-10-15 NOTE — ED PROVIDER NOTE - PATIENT PORTAL LINK FT
You can access the FollowMyHealth Patient Portal offered by [facility] by registering at the following website: [email or web address]. By joining [employer]’s FollowMyHealth portal, you will also be able to view your health information using other applications (apps) compatible with our system.

## 2023-10-15 NOTE — ED PROVIDER NOTE - CLINICAL SUMMARY MEDICAL DECISION MAKING FREE TEXT BOX
32-year-old female well-known to ED staff presenting to the ED with complaints of generalized buttock pain denies any trauma no reported fever chills no nausea vomiting.  Denies any abdominal pain denies any urinary symptoms no flank pain or back pain.  Patient took aspirin with some alleviation pain.  Denies any other complaints.  No bowel bladder incontinence no saddle anesthesia.  Able to ambulate.    buttock pain   no indication for imaging at this time  exam unremarkable  nsaids   patient requesting food   return precautions

## 2023-10-15 NOTE — ED PROVIDER NOTE - OBJECTIVE STATEMENT
32-year-old female well-known to ED staff presenting to the ED with complaints of generalized buttock pain denies any trauma no reported fever chills no nausea vomiting.  Denies any abdominal pain denies any urinary symptoms no flank pain or back pain.  Patient took aspirin with some alleviation pain.  Denies any other complaints.  No bowel bladder incontinence no saddle anesthesia.  Able to ambulate.

## 2023-10-15 NOTE — ED ADULT NURSE NOTE - NSFALLUNIVINTERV_ED_ALL_ED
Bed/Stretcher in lowest position, wheels locked, appropriate side rails in place/Call bell, personal items and telephone in reach/Instruct patient to call for assistance before getting out of bed/chair/stretcher/Non-slip footwear applied when patient is off stretcher/[place] to call system/Physically safe environment - no spills, clutter or unnecessary equipment/Purposeful proactive rounding/Room/bathroom lighting operational, light cord in reach

## 2023-10-19 ENCOUNTER — EMERGENCY (EMERGENCY)
Facility: HOSPITAL | Age: 33
LOS: 1 days | Discharge: HOME | End: 2023-10-19
Attending: EMERGENCY MEDICINE | Admitting: EMERGENCY MEDICINE
Payer: MEDICAID

## 2023-10-19 VITALS
TEMPERATURE: 98 F | RESPIRATION RATE: 17 BRPM | DIASTOLIC BLOOD PRESSURE: 77 MMHG | OXYGEN SATURATION: 99 % | HEART RATE: 88 BPM | SYSTOLIC BLOOD PRESSURE: 126 MMHG | HEIGHT: 62 IN

## 2023-10-19 PROCEDURE — 99284 EMERGENCY DEPT VISIT MOD MDM: CPT

## 2023-10-19 PROCEDURE — 90792 PSYCH DIAG EVAL W/MED SRVCS: CPT
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
## 2023-10-19 NOTE — ED PROVIDER NOTE - NSFOLLOWUPINSTRUCTIONS_ED_ALL_ED_FT
Follow up with your PMD within 48-72 hrs. Show copies of your reports given to you.   Worsening, continued or new concerning symptoms return to the emergency department.    You have been given information necessary to follow up with the  [facility] ([facility]) Crisis center & other outpatient  psychiatric clinics within your community    • [facility] walk in Crisis centre  [address] [place] [place] [address]  [phone] [email or web address]  Hours of operation:  Day	                                        Hours  Sunday                                  Closed  Monday                                9am - 3pm  Tuesday                                9am - 3pm  Wednesday                          9am - 3pm  Thursday                               9am - 3pm  Friday                                    9am - 3pm  Saturday                                Closed
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
## 2023-10-19 NOTE — ED PROVIDER NOTE - PATIENT PORTAL LINK FT
You can access the FollowMyHealth Patient Portal offered by [facility] by registering at the following website: [email or web address]. By joining [employer]’s FollowMyHealth portal, you will also be able to view your health information using other applications (apps) compatible with our system.
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
## 2023-10-19 NOTE — ED PROVIDER NOTE - OBJECTIVE STATEMENT
31 y/o female with developmental delay, bipolar / schizoaffective, DM BIBEMS from group home for agitation and running away from group home. Patient states that she does not like her group home, in particular has an issue with another one of the residents named Crystal and they have gotten into many arguments in the past.  She states that she left the group home and went to a park.  She also tells me that she was in a fight with Crystal and that she does not like Crystal and Crystal is mean to her.  She states that she is not going back to the group home and that if she does she is going to "fuck up Crystal". She reports hearing voices, she says they are saying good things but does not further elaborate on exactly what they are saying.  She denies suicidal ideation.
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
## 2023-10-19 NOTE — ED BEHAVIORAL HEALTH ASSESSMENT NOTE - DESCRIPTION
Patient was irritable when asking about living details of the residence.  Patient did not exhibit any aggression. Patient did not require any PRN medications or any physical restraints.     Vital Signs Last 24 Hrs  T(C): 36.6 (19 Oct 2023 10:02), Max: 36.6 (19 Oct 2023 10:02)  T(F): 97.8 (19 Oct 2023 10:02), Max: 97.8 (19 Oct 2023 10:02)  HR: 88 (19 Oct 2023 10:02) (88 - 88)  BP: 126/77 (19 Oct 2023 10:02) (126/77 - 126/77)  BP(mean): --  RR: 17 (19 Oct 2023 10:02) (17 - 17)  SpO2: 99% (19 Oct 2023 10:02) (99% - 99%)    Parameters below as of 19 Oct 2023 10:02  Patient On (Oxygen Delivery Method): room air As per HPI as per HPI
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
## 2023-10-19 NOTE — ED BEHAVIORAL HEALTH NOTE - BEHAVIORAL HEALTH NOTE
Writer called patient's group home [facility] [address]  spoke to Zaira MACK who reports patient did not have an emergency today.  She reports patient calls 911 everyday and wants to be in the hospital.  She states patient can return via ambulance with supervision to [id number] [place] [address]

## 2023-10-19 NOTE — ED PROVIDER NOTE - PROGRESS NOTE DETAILS
Patient reports she feels safe to go back home. Was told Crystal the staff member is not in today and so she should feel safe to return. Patient reports she loves crystal and would like to get a hug from her. Patient feels safe returning back to her facility. No other acute complaints.

## 2023-10-19 NOTE — ED ADULT NURSE NOTE - NSFALLUNIVINTERV_ED_ALL_ED
Bed/Stretcher in lowest position, wheels locked, appropriate side rails in place/Call bell, personal items and telephone in reach/Instruct patient to call for assistance before getting out of bed/chair/stretcher/Non-slip footwear applied when patient is off stretcher/[place] to call system/Physically safe environment - no spills, clutter or unnecessary equipment/Purposeful proactive rounding/Room/bathroom lighting operational, light cord in reach

## 2023-10-24 NOTE — ED BEHAVIORAL HEALTH ASSESSMENT NOTE - SUBSTANCE USE
None known [facility]:  I discussed with the patient the risks of [facility] including but not limited to erythema, scaling, itching, weeping, crusting, and pain.
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
## 2023-11-24 ENCOUNTER — EMERGENCY (EMERGENCY)
Facility: HOSPITAL | Age: 33
LOS: 0 days | Discharge: HOME | End: 2023-11-24
Attending: STUDENT IN AN ORGANIZED HEALTH CARE EDUCATION/TRAINING PROGRAM
Payer: MEDICAID

## 2023-11-24 VITALS
DIASTOLIC BLOOD PRESSURE: 79 MMHG | HEART RATE: 99 BPM | OXYGEN SATURATION: 98 % | TEMPERATURE: 98 F | HEIGHT: 62 IN | RESPIRATION RATE: 18 BRPM | SYSTOLIC BLOOD PRESSURE: 111 MMHG | WEIGHT: 242.95 LBS

## 2023-11-24 VITALS
TEMPERATURE: 98 F | HEART RATE: 95 BPM | RESPIRATION RATE: 16 BRPM | SYSTOLIC BLOOD PRESSURE: 128 MMHG | OXYGEN SATURATION: 98 % | DIASTOLIC BLOOD PRESSURE: 83 MMHG

## 2023-11-24 DIAGNOSIS — F25.9 SCHIZOAFFECTIVE DISORDER, UNSPECIFIED: ICD-10-CM

## 2023-11-24 DIAGNOSIS — R10.9 UNSPECIFIED ABDOMINAL PAIN: ICD-10-CM

## 2023-11-24 DIAGNOSIS — Z91.013 ALLERGY TO SEAFOOD: ICD-10-CM

## 2023-11-24 DIAGNOSIS — K08.89 OTHER SPECIFIED DISORDERS OF TEETH AND SUPPORTING STRUCTURES: ICD-10-CM

## 2023-11-24 DIAGNOSIS — Z91.018 ALLERGY TO OTHER FOODS: ICD-10-CM

## 2023-11-24 PROCEDURE — 99283 EMERGENCY DEPT VISIT LOW MDM: CPT

## 2023-11-24 RX ORDER — ACETAMINOPHEN 500 MG
975 TABLET ORAL ONCE
Refills: 0 | Status: COMPLETED | OUTPATIENT
Start: 2023-11-24 | End: 2023-11-24

## 2023-11-24 RX ORDER — IBUPROFEN 200 MG
600 TABLET ORAL ONCE
Refills: 0 | Status: COMPLETED | OUTPATIENT
Start: 2023-11-24 | End: 2023-11-24

## 2023-11-24 RX ADMIN — Medication 600 MILLIGRAM(S): at 01:31

## 2023-11-24 RX ADMIN — Medication 975 MILLIGRAM(S): at 02:46

## 2023-11-24 NOTE — ED PROVIDER NOTE - NSFOLLOWUPINSTRUCTIONS_ED_ALL_ED_FT
Dental Pain    Dental pain (toothache) may be caused by many things including tooth decay (cavities or caries), abscess or infection, or trauma. If you were prescribed an antibiotic medicine, finish all of it even if you start to feel better. Rinsing your mouth with salt water or applying ice to the painful area of your face may help with the pain. Follow up with a dentist is important in ensuring good oral health and preventing the worsening of dental disease.    If you need a dentist, please call [phone] to make an appointment with the [facility].     SEEK IMMEDIATE MEDICAL CARE IF YOU HAVE ANY OF THE FOLLOWING SYMPTOMS: unable to open your mouth, trouble breathing or swallowing, fever, or swelling of the face, neck, or jaw.

## 2023-11-24 NOTE — ED ADULT NURSE REASSESSMENT NOTE - NS ED NURSE REASSESS COMMENT FT1
Spoke with Chelsie from [facility], states that there is always someone home since they all live there. Pt to have ambulette set up.

## 2023-11-24 NOTE — ED PROVIDER NOTE - PATIENT PORTAL LINK FT
You can access the FollowMyHealth Patient Portal offered by [facility] by registering at the following website: [email or web address]. By joining [employer]’s FollowMyHealth portal, you will also be able to view your health information using other applications (apps) compatible with our system.

## 2023-11-24 NOTE — ED PROVIDER NOTE - PHYSICAL EXAMINATION
General: No acute distress, mentation at baseline,  well nourished, well developed  HEENT: NCAT, Neck supple without meningismus, PERRL, no conjunctival injection.  no abscess or gingival lesions or tenderness appreciated.  On dental exam  Lungs: CTAB, No wheeze or crackles, No retractions, No increased work of breathing  Heart: S1S2 RRR, No M/R/G, Pules equal Bilaterally in upper and lower extremities distally  Abd: soft, NT/ND, No guarding, No rebound.  No hernias, no palpable masses.  Extrem: FROM in all joints, no gross deformities appreciated, no significant edema noted, No ulcers. Cap refil < 2sec.  Skin: No rash noted, warm dry.  Neuro:  Grossly normal.  No difficulty ambulating. No focal deficits.  Psychiatric: Appropriate mood and affect.

## 2023-11-24 NOTE — ED ADULT TRIAGE NOTE - CHIEF COMPLAINT QUOTE
bibems from [facility] home for toothache on right side since the weekend.  pt was seen at [facility] and was given aspirin.  pt took tylenol pta with no relief.

## 2023-11-24 NOTE — ED PROVIDER NOTE - OBJECTIVE STATEMENT
32-year-old female with past medical Struve schizoaffective and developmental disorder presenting with abdominal pain for weeks.  Patient states pain is in right upper molar.  Has been seen by dentistry in [facility] and told she has no infection.  Patient requesting pain control.  Denies any systemic symptoms such as fever or chills.

## 2023-11-24 NOTE — ED PROVIDER NOTE - CLINICAL SUMMARY MEDICAL DECISION MAKING FREE TEXT BOX
No signs of abscess or severe infection on exam.  Patient is here nightly for different complaints.  Will provide ibuprofen for pain control and have patient see her dentist tomorrow yes

## 2023-11-24 NOTE — ED ADULT NURSE NOTE - CHIEF COMPLAINT QUOTE
bibems from [facility] home for toothache on right side since the weekend.  pt was seen at [facility] and was given aspirin.  pt took tylenol pta with no relief.

## 2023-11-24 NOTE — ED ADULT NURSE NOTE - SUICIDE SCREENING QUESTION 3
Reason For Visit    Patient presents for a consult .   Accompanied By: family member.   :  services not used.       Referred By: Dr. Aristides Garcia      History of Present Illness    Ms Cedeno is a 82-year-old female who presents the office for second opinion regarding abnormal stress test and consideration for coronary angiogram.    Patient has had progressive difficulties with shortness of breath and has a relatively recent diagnosis of asthma. She does follow with both the pulmonologist and allergist. Patient has been on steroids and inhalers. Despite this she continues to have dyspnea with exertion. Patient is active. Patient does not have any chest pain chest pressure or heaviness. Patient does have some congestion and shortness of breath with activity. She also has a cough. Patient states she can walk approximately 2 blocks before she has to take a rest. She denies any ankle swelling. She denies any palpitations presyncope or syncope.    Patient had a stress nuclear study 2018 that showed a small mild distal LAD territory defect that was reversible. She had normal LV size and function. Overall risks were considered low.    Patient had an echocardiogram 2018 showed left ventricle normal with ejection fraction 50-55% valves were normal.    Carotid studies showed less than 50%. 2018    Patient has significant family history of coronary disease.  Mother had a myocardial infarction age 46 and at age 52 but  from a CVA at age 58  Patient's father lived to 90. He also had a history of CVA.  Patient's brother  at age 60 of a myocardial infarction  Another brother had a CABG at age 65    Other risk factors include hypertension and hypercholesterolemia. The patient is on Pravachol she denies any diabetes or smoking.    Past history includes 2 knee replacements and patient has a meningioma that is checked by MRI on an annual basis and has been stable.    Patient denies smoking  alcohol use or illicit drugs.  Patient's   at age 33 of a myocardial infarction    ----------------------------------    Impression    ---------------------------------    1. Exertional dyspnea  Associated with hypoxia and cough and congestion.  Etiology appears to be pulmonary and she has a diagnosis of asthma    2. Mildly abnormal stress nuclear study 2018   showing a small mild distal LAD reversible defect  Overall risk said to be low    Rule out exertional dyspnea as anginal equivalent but appears less likely    3. Significant family history of coronary disease including mother and 2 brothers       Social History   · Alcohol Use (History)   · Denied: History of Drug Use   · Exercising Regularly   · Never smoker   · Denied: History of Secondhand Tobacco Smoke In Home    Family History   1. Family history of Acute Myocardial Infarction   2. Family history of Cancer   3. Family history of hypertension (Z82.49) : Mother   4. Family history of Stroke syndrome : Mother    Allergies   1. Morphine Derivatives    Vitals  Signs   Recorded: 2018 02:49PM   Height: 5 ft 5.5 in  Weight: 183 lb   BMI Calculated: 29.99  BSA Calculated: 1.92  Blood Pressure: 120 / 66  Heart Rate: 68    Review of Systems    Cardiovascular:. as noted in HPI.   Pulmonary: expressed as feeling short of breath and Chronic shortness of breath associated with coughing and exertion, but no chronic cough, no sputum, no hemoptysis and no new dyspnea.   Const: Normal.   Eyes: Normal.   ENT: Normal.   GI: Normal.   Neuro: Normal.   Musc: Normal.   Skin: Normal.   Heme/Lymph: Normal.   Psych: Normal.   Endo: Normal.       Physical Exam    General Appearance   Not in acute distress.    Head   No trauma, normocephalic.    Neck   No elevated JVP.    Eyes   Conjunctivae not injected, no xanthelasma.    Ears, Nose, Throat:   Mucosa pink and moist.    Lungs   Abnormal.  (Mild wheezing, no rhonchi rales at this time)   Cardiovascular   Palpation  of Heart:   PMI not displaced, no lifts, thrills or rub.    Auscultation of Heart:   Regular rhythm, normal S1,S2 without S3. No pathological murmurs.    Examination for Edema/Varicosities:   No peripheral edema.    Examination of Abdomen:   No masses, tenderness or hepatosplenomegaly.    Abdominal Aorta:   Not enlarged.    Musculoskeletal   Normal gait, normal muscle tone.    Neurologic   Oriented to person, place and time. Normal affect.    Inspection of Skin and Subcut Tissue:   No rashes, lesions or ulcers.    Nails   Normal without clubbing or cyanosis.       Assessment   1. Exertional dyspnea (R06.09)   2. Abnormal nuclear stress test (R94.39)   3. Benign essential hypertension (I10)   4. History of hypercholesterolemia (Z86.39)    This patient was previously seen by  who recommended a coronary angiogram.    I discussed the situation with the patient. Specifically discussed the procedure itself as well as the risk factors including access site bleeding, contrast allergy however she has not had any problems with contrast in the past, contrast induced nephropathy, and possible CVA or myocardial infarction.    Patient has not made a decision however she tended to be leaning towards proceeding to a cardiac catheterization.    Patient will stand the same medication for now.    The patient wishes to proceed to coronary angiogram she will contact her cardiologist      End of Visit Meds   1. Aspirin 81 MG Oral Tablet Delayed Release; TAKE 1 TABLET BY MOUTH DAILY;   Therapy: (Recorded:64Pzy3451) to Recorded   2. Atorvastatin Calcium TABS;   Therapy: (Recorded:17Kbh9600) to Recorded   3. Cinnamon TABS;   Therapy: (Recorded:29Kwa5093) to Recorded   4. Co Q 10 CAPS;   Therapy: (Recorded:75Ffs4475) to Recorded   5. Cranberry CAPS;   Therapy: (Recorded:95Eoi8034) to Recorded   6. Fish Oil CAPS;   Therapy: (Recorded:40Wqj7142) to Recorded   7. Flonase SUSP (Fluticasone Propionate);   Therapy: (Recorded:72Ump4864) to  Recorded   8. Micardis HCT 80-12.5 MG Oral Tablet (Telmisartan-HCTZ); TAKE 1 TABLET DAILY;   Therapy: (Recorded:41Xvy2067) to Recorded   9. Multiple Vitamin TABS;   Therapy: (Recorded:81Qng9375) to Recorded   10. ProAir HFA AERS;    Therapy: (Recorded:66Fle9255) to Recorded   11. Probiotic CAPS;    Therapy: (Recorded:54Cqf9128) to Recorded   12. Saline SOLN;    Therapy: (Recorded:26Hpq2396) to Recorded   13. Singulair 10 MG Oral Tablet (Montelukast Sodium); TAKE 1 TABLET AT BEDTIME;    Therapy: (Recorded:65Act6431) to Recorded   14. Turmeric TABS;    Therapy: (Recorded:19Sep2018) to Recorded   15. Vitamin B12 TABS;    Therapy: (Recorded:81Xro3327) to Recorded   16. Vitamin C TABS;    Therapy: (Recorded:63Vrv5822) to Recorded   17. Vitamin D TABS;    Therapy: (Recorded:65Fst9117) to Recorded   18. ZyrTEC Allergy TABS; as needed;    Therapy: (Recorded:60Plr1993) to Recorded    Signatures   Electronically signed by : PREMA WILSON MD; Sep 20 2018  4:04AM CST     No

## 2023-11-24 NOTE — ED ADULT NURSE NOTE - OBJECTIVE STATEMENT
Pt AAOx4. 32 year old female from group home presents to ED with complaint of right sided toothache. Respirations equal and unlabored. No acute distress noted at this time.

## 2023-12-05 ENCOUNTER — EMERGENCY (EMERGENCY)
Facility: HOSPITAL | Age: 33
LOS: 0 days | Discharge: HOME | End: 2023-12-06
Attending: STUDENT IN AN ORGANIZED HEALTH CARE EDUCATION/TRAINING PROGRAM
Payer: MEDICAID

## 2023-12-05 VITALS
OXYGEN SATURATION: 97 % | SYSTOLIC BLOOD PRESSURE: 116 MMHG | DIASTOLIC BLOOD PRESSURE: 83 MMHG | WEIGHT: 272.93 LBS | TEMPERATURE: 99 F | HEART RATE: 105 BPM | RESPIRATION RATE: 18 BRPM | HEIGHT: 62 IN

## 2023-12-05 DIAGNOSIS — E11.9 TYPE 2 DIABETES MELLITUS WITHOUT COMPLICATIONS: ICD-10-CM

## 2023-12-05 DIAGNOSIS — K08.89 OTHER SPECIFIED DISORDERS OF TEETH AND SUPPORTING STRUCTURES: ICD-10-CM

## 2023-12-05 DIAGNOSIS — R11.2 NAUSEA WITH VOMITING, UNSPECIFIED: ICD-10-CM

## 2023-12-05 DIAGNOSIS — Z91.013 ALLERGY TO SEAFOOD: ICD-10-CM

## 2023-12-05 DIAGNOSIS — R10.84 GENERALIZED ABDOMINAL PAIN: ICD-10-CM

## 2023-12-05 DIAGNOSIS — Z86.69 PERSONAL HISTORY OF OTHER DISEASES OF THE NERVOUS SYSTEM AND SENSE ORGANS: ICD-10-CM

## 2023-12-05 DIAGNOSIS — F20.9 SCHIZOPHRENIA, UNSPECIFIED: ICD-10-CM

## 2023-12-05 DIAGNOSIS — K59.00 CONSTIPATION, UNSPECIFIED: ICD-10-CM

## 2023-12-05 DIAGNOSIS — F31.9 BIPOLAR DISORDER, UNSPECIFIED: ICD-10-CM

## 2023-12-05 DIAGNOSIS — J45.909 UNSPECIFIED ASTHMA, UNCOMPLICATED: ICD-10-CM

## 2023-12-05 DIAGNOSIS — Z91.018 ALLERGY TO OTHER FOODS: ICD-10-CM

## 2023-12-05 PROCEDURE — 99284 EMERGENCY DEPT VISIT MOD MDM: CPT

## 2023-12-06 VITALS
DIASTOLIC BLOOD PRESSURE: 77 MMHG | HEART RATE: 96 BPM | RESPIRATION RATE: 16 BRPM | OXYGEN SATURATION: 98 % | SYSTOLIC BLOOD PRESSURE: 108 MMHG | TEMPERATURE: 98 F

## 2023-12-06 RX ORDER — ONDANSETRON 8 MG/1
4 TABLET, FILM COATED ORAL ONCE
Refills: 0 | Status: COMPLETED | OUTPATIENT
Start: 2023-12-06 | End: 2023-12-06

## 2023-12-06 RX ORDER — IBUPROFEN 200 MG
600 TABLET ORAL ONCE
Refills: 0 | Status: COMPLETED | OUTPATIENT
Start: 2023-12-06 | End: 2023-12-06

## 2023-12-06 RX ORDER — ACETAMINOPHEN 500 MG
650 TABLET ORAL ONCE
Refills: 0 | Status: COMPLETED | OUTPATIENT
Start: 2023-12-06 | End: 2023-12-06

## 2023-12-06 RX ADMIN — ONDANSETRON 4 MILLIGRAM(S): 8 TABLET, FILM COATED ORAL at 00:30

## 2023-12-06 RX ADMIN — Medication 600 MILLIGRAM(S): at 00:46

## 2023-12-06 RX ADMIN — Medication 650 MILLIGRAM(S): at 09:15

## 2023-12-06 RX ADMIN — Medication 30 MILLILITER(S): at 09:04

## 2023-12-06 RX ADMIN — Medication 600 MILLIGRAM(S): at 02:05

## 2023-12-06 RX ADMIN — ONDANSETRON 4 MILLIGRAM(S): 8 TABLET, FILM COATED ORAL at 09:19

## 2023-12-06 NOTE — ED PROVIDER NOTE - OBJECTIVE STATEMENT
33F PMH schizophrenia / bipolar, intellectual disability, DM, asthma BIBEMS from group home d/t N/V onset this evening. Pt reports 6 episodes of NBNB emesis s/p eating dinner meal of stuffing 'too quickly.' Pt states no one else w/in home w/ similar symptoms. Pt endorses diffuse non-radiating abd pain and constipation. Denies F/C, h/a, dizziness, CP, SOB, cough, flank pain, diarrhea, dysuria, vaginal bleeding or d/c. Pt drinking cranberry juice & eating tuna sandwich in ED w/o nausea or vomiting.     PMH as above, PSH none, NKDA, meds as listed.

## 2023-12-06 NOTE — ED ADULT NURSE REASSESSMENT NOTE - NS ED NURSE REASSESS COMMENT FT1
Made two attempts to call phone number on file [phone], and an attempt to call [phone] with no answers from either line. Pt remains sleeping, respirations are easy, even and unlabored. No acute distress noted. Safety and fall precautions in place. Made two attempts to call phone number on file [phone], and an attempt to call [phone] with no answers from either line. Pt remains sleeping, respirations are easy, even and unlabored. No acute distress noted. Safety and fall precautions in place.

## 2023-12-06 NOTE — ED ADULT NURSE NOTE - NSFALLUNIVINTERV_ED_ALL_ED
Bed/Stretcher in lowest position, wheels locked, appropriate side rails in place/Call bell, personal items and telephone in reach/Instruct patient to call for assistance before getting out of bed/chair/stretcher/Non-slip footwear applied when patient is off stretcher/[place] to call system/Physically safe environment - no spills, clutter or unnecessary equipment/Purposeful proactive rounding/Room/bathroom lighting operational, light cord in reach Bed/Stretcher in lowest position, wheels locked, appropriate side rails in place/Call bell, personal items and telephone in reach/Instruct patient to call for assistance before getting out of bed/chair/stretcher/Non-slip footwear applied when patient is off stretcher/[place] to call system/Physically safe environment - no spills, clutter or unnecessary equipment/Purposeful proactive rounding/Room/bathroom lighting operational, light cord in reach

## 2023-12-06 NOTE — ED ADULT NURSE NOTE - CHIEF COMPLAINT QUOTE
Patient STEFFEN DESAI from [facility]. States she ate dinner too fast and vomited. C/o abdominal pain and nausea. Patient STEFFEN DESAI from [facility]. States she ate dinner too fast and vomited. C/o abdominal pain and nausea.

## 2023-12-06 NOTE — ED ADULT NURSE NOTE - OBJECTIVE STATEMENT
Covering for primary RN Raciel. Pt is AOx4 c/o nausea/vomiting x2 hours. Reports eating her dinner too fast and says she vomited 2-3 times. C/o continued nausea, denies abd pain. Denies hematemesis. Denies eating any new foods. No active emesis at this time.

## 2023-12-06 NOTE — ED ADULT NURSE REASSESSMENT NOTE - NS ED NURSE REASSESS COMMENT FT1
Made another attempt to call [phone] with no answer. Pt remains asleep. Respirations are easy, even and unlabored. No acute distress noted. Safety and fall precautions are in place. Made another attempt to call [phone] with no answer. Pt remains asleep. Respirations are easy, even and unlabored. No acute distress noted. Safety and fall precautions are in place.

## 2023-12-06 NOTE — ED PROVIDER NOTE - CLINICAL SUMMARY MEDICAL DECISION MAKING FREE TEXT BOX
33F PMH schizophrenia / bipolar, intellectual disability, DM, asthma BIBEMS from group home d/t N/V onset this evening s/p eating dinner. Afebrile, VSS. Well appearing, in NAD. Benign abd exam. Pt well known to this ED, given HPI / VS / PE, consider emergent intra-abd pathology / condition unlikely. Plan for Zofran & PO challenge. Re-eval. 33F PMH schizophrenia / bipolar, intellectual disability, DM, asthma BIBEMS from group home d/t N/V onset this evening s/p eating dinner. Afebrile, VSS. Well appearing, in NAD. Benign abd exam. Pt well known to this ED, given HPI / VS / PE, consider emergent intra-abd pathology / condition unlikely. Plan for Zofran & PO challenge. Re-eval.  On re-eval, resting comfortably. Pt w/o emesis since ED arrival. Stable for d/c to group home. Instructed for close outpatient PCP f/u. Return signs / symptoms d/w pt. She understands / agrees w/ this plan.

## 2023-12-06 NOTE — ED ADULT NURSE REASSESSMENT NOTE - NS ED NURSE REASSESS COMMENT FT1
Made another attempt to call [phone] and [phone]. No answer for either number. Message response system states "Please try to call again." Patient remains asleep and calm. Respirations are easy, even and unlabored on room air. Safety and fall precautions are in place. Made another attempt to call [phone] and [phone]. No answer for either number. Message response system states "Please try to call again." Patient remains asleep and calm. Respirations are easy, even and unlabored on room air. Safety and fall precautions are in place.

## 2023-12-06 NOTE — ED PROVIDER NOTE - PATIENT PORTAL LINK FT
You can access the FollowMyHealth Patient Portal offered by [facility] by registering at the following website: [email or web address]. By joining [employer]’s FollowMyHealth portal, you will also be able to view your health information using other applications (apps) compatible with our system. You can access the FollowMyHealth Patient Portal offered by [facility] by registering at the following website: [email or web address]. By joining [employer]’s FollowMyHealth portal, you will also be able to view your health information using other applications (apps) compatible with our system.

## 2023-12-06 NOTE — ED ADULT NURSE NOTE - ED STAT RN HANDOFF DETAILS
Report endorsed to oncoming RN Rivera. Safety checks compld this shift.  Meds given as ord with no s/s of adverse RXNs. Fall +skin precs in place. Any issues endorsed to oncoming RN for follow up.

## 2023-12-21 ENCOUNTER — EMERGENCY (EMERGENCY)
Facility: HOSPITAL | Age: 33
LOS: 1 days | Discharge: HOME | End: 2023-12-21
Admitting: STUDENT IN AN ORGANIZED HEALTH CARE EDUCATION/TRAINING PROGRAM
Payer: MEDICAID

## 2023-12-21 VITALS
TEMPERATURE: 98 F | DIASTOLIC BLOOD PRESSURE: 79 MMHG | SYSTOLIC BLOOD PRESSURE: 127 MMHG | HEART RATE: 96 BPM | RESPIRATION RATE: 16 BRPM | OXYGEN SATURATION: 99 % | HEIGHT: 62 IN

## 2023-12-21 PROCEDURE — 99284 EMERGENCY DEPT VISIT MOD MDM: CPT

## 2023-12-21 NOTE — ED ADULT NURSE NOTE - CAS DISCH TRANSFER METHOD
Emergency Department Discharge Information for Tila Adorno was seen in the [facility] Emergency Department today for bug bite by Dr. Campo and Dr. Belcher.    We recommend that you   -Use benadryl as needed for itching, this medication will make you sleepy  -Cortisone can also be applied for itching, use twice a day for up to 2 weeks  -Follow up with Pediatrician if not improved in 2-3 days.      For fever or pain, Tila can have:  Acetaminophen (Tylenol) every 4 to 6 hours as needed (up to 5 doses in 24 hours). Her dose is: 5 ml (160 mg) of the infant's or children's liquid               (10.9-16.3 kg/24-35 lb)   Or  Ibuprofen (Advil, Motrin) every 6 hours as needed. Her dose is:   7.5 ml (150 mg) of the children's (not infant's) liquid                                             (15-20 kg/33-44 lb)    If necessary, it is safe to give both Tylenol and ibuprofen, as long as you are careful not to give Tylenol more than every 4 hours or ibuprofen more than every 6 hours.    Note: If your Tylenol came with a dropper marked with 0.4 and 0.8 ml, call us ([phone]) or check with your doctor about the correct dose.     These doses are based on your child?s weight. If you have a prescription for these medicines, the dose may be a little different. Either dose is safe. If you have questions, ask a doctor or pharmacist.     Please return to the ED or contact her primary physician if she becomes much more ill, if her wound is very red, painful, or leaks blood or pus/the stitches come out, or if you have any other concerns.        Medication side effect information:  All medicines may cause side effects. However, most people have no side effects or only have minor side effects.     People can be allergic to any medicine. Signs of an allergic reaction include rash, difficulty breathing or swallowing, wheezing, or unexplained swelling. If she has difficulty breathing or swallowing, call  911 or go right to the Emergency Department. For rash or other concerns, call her doctor.     If you have questions about side effects, please ask our staff. If you have questions about side effects or allergic reactions after you go home, ask your doctor or a pharmacist.     Some possible side effects of the medicines we are recommending for Tila are:     Diphenhydramine  (Benadryl, for allergy or itching)  - Dizziness  - Change in balance  - Feeling sleepy (most people) or hyperactive (a few people)  - Upset stomach or vomiting             Transportation service

## 2023-12-21 NOTE — ED BEHAVIORAL HEALTH NOTE - BEHAVIORAL HEALTH NOTE
Writer contacted [facility] to obtain collateral information ([phone]) to obtain collateral information. Writer left a  requesting a return call. (x3) Writer contacted [facility] to obtain collateral information ([phone]) to obtain collateral information. Writer left a  requesting a return call. (x3) Writer contacted [facility] to obtain collateral information ([phone]) to obtain collateral information. Writer left a  requesting a return call. (x3)    Writer made another attempt to speak with [facility] at [phone]. Writer unable to obtain collateral information at this time. writer left a  requesting a return call. Writer contacted [facility] to obtain collateral information ([phone]) to obtain collateral information. Writer left a  requesting a return call. (x3)    Writer made another attempt to speak with [facility] at [phone]. Writer unable to obtain collateral information at this time. writer left a  requesting a return call.
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
## 2023-12-21 NOTE — ED PROVIDER NOTE - PROGRESS NOTE
EEG TECHNICIAN NOTE      NAME: Norman Wilder DOHERTYB:  1956  MRN:    [id number]  DATE:  01/17/22    ASSESSMENT     Patient assessed for:    [x]Skin Breakdown:   1. [x] Skin WNL   2. [] Breakdown noted    A. RN Notified     B. Leads Repositioned   [x]Lead Placement:   1.[x] All leads in Place   2.[x] Leads need Attention    A. Leads Replaced, Repositioned and Head Wrapped as needed.  [x] System Setup:   1. [x]System running as expected.   2. [] System Rebooted technical support called    3. [] technical support called     [x] Restock Equipment:   1. [x] Restocked    [x] Monitoring    1. [x] Continued    2. [] Discontinued    Summary     Started ceeg, contacted [facility], no skin issues, headwrap on        Jack Kelin       EEG TECHNICIAN       Stable.

## 2023-12-21 NOTE — ED PROVIDER NOTE - PATIENT PORTAL LINK FT
You can access the FollowMyHealth Patient Portal offered by [facility] by registering at the following website: [email or web address]. By joining [employer]’s FollowMyHealth portal, you will also be able to view your health information using other applications (apps) compatible with our system. You can access the FollowMyHealth Patient Portal offered by [facility] by registering at the following website: [email or web address]. By joining [employer]’s FollowMyHealth portal, you will also be able to view your health information using other applications (apps) compatible with our system.

## 2023-12-21 NOTE — ED PROVIDER NOTE - PROGRESS NOTE DETAILS
JONATHAN Burdick- Took sign out from Dr. Peralta. Awaiting SW to obtain collateral from group home. I spoke with Madelyn who has made 2 calls with no success in anyone picking up phone. She will continue to try to reach. JONATHAN Joshi spoke with group home. No safety concerns. Patient currently calm and cooperative. Patient to be discharged.
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
## 2023-12-21 NOTE — ED PROVIDER NOTE - NSFOLLOWUPINSTRUCTIONS_ED_ALL_ED_FT
Follow up with your PMD within 48-72 hrs. Show copies of your reports given to you.   Worsening, continued or new concerning symptoms return to the emergency department.    You have been given information necessary to follow up with the  [facility] ([facility]) Crisis center & other outpatient  psychiatric clinics within your community    • [facility] walk in Crisis centre  [address] [place] [address]  [phone] [email or web address]  Hours of operation:  Day	                                        Hours  Sunday                                  Closed  Monday                                9am - 3pm  Tuesday                                9am - 3pm  Wednesday                          9am - 3pm  Thursday                               9am - 3pm  Friday                                    9am - 3pm  Saturday                                Closed Follow up with your PMD within 48-72 hrs. Show copies of your reports given to you.   Worsening, continued or new concerning symptoms return to the emergency department.    You have been given information necessary to follow up with the  [facility] ([facility]) Crisis center & other outpatient  psychiatric clinics within your community    • [facility] walk in Crisis centre  [address] [place] [address]  [phone] [email or web address]  Hours of operation:  Day	                                        Hours  Sunday                                  Closed  Monday                                9am - 3pm  Tuesday                                9am - 3pm  Wednesday                          9am - 3pm  Thursday                               9am - 3pm  Friday                                    9am - 3pm  Saturday                                Closed

## 2023-12-21 NOTE — ED PROVIDER NOTE - CLINICAL SUMMARY MEDICAL DECISION MAKING FREE TEXT BOX
32 yo F PMH Bipolar, Schizoaffective Disorder, DM, developmental delay, bipolar BIBEMS from group home for agitation. Patient states that she does not like Crystal at the group home and they have gotten into many arguments in the past. She reports hearing voices, she says they are telling her about her pregnancy. She reports 8 babies in her stomach within 9 months.   Ativan  SW collateral  Dispo as per collateral

## 2023-12-22 VITALS
SYSTOLIC BLOOD PRESSURE: 128 MMHG | RESPIRATION RATE: 17 BRPM | OXYGEN SATURATION: 98 % | TEMPERATURE: 98 F | DIASTOLIC BLOOD PRESSURE: 82 MMHG | HEART RATE: 93 BPM

## 2023-12-22 NOTE — ED ADULT NURSE REASSESSMENT NOTE - NS ED NURSE REASSESS COMMENT FT1
RN Rounding Note: 2:50am- Pt observed sleeping, respirations even and non labored. Pt awaiting collateral from group home.

## 2023-12-22 NOTE — ED BEHAVIORAL HEALTH NOTE - BEHAVIORAL HEALTH NOTE
Writer spoke to Adriana   states she doesn't know wny patient was sent to the ER yesterday, but can return via taxi to [phone] [place] [id number] Writer spoke to Adriana   states she doesn't know wny patient was sent to the ER yesterday, but can return via taxi to [phone] [place] [id number] Writer spoke to Adriana   states she doesn't know wny patient was sent to the ER yesterday, but can return via taxi to [address] [place] [address] unaccompanied/unsupervised.  Writer discussed with team, massimo completed. [facility]ll[facility]i was arranged to expedite discharge.  When:22nd Dec 2023 13:45  From:[address] [place] [address]  To:[address] [place] [address]  Instructions:ADULT EMERGENCY ROOM  Name/Room number:MAYANK VILLEGAS  Phone:[phone]  :FAUSTO HOWARD  Plate Number:[id number]  Vehicle:[employer]  Vehicle Reg:[id number]  Trip Distance:8.2MI  Payment Method:On Account  Payment Status:Payment Outstanding  Price:USD 33.18  Service Charge:USD 0.00  Total:USD 33.18 Writer spoke to Adriana   states she doesn't know wny patient was sent to the ER yesterday, but can return via taxi to [address] [place] [address] unaccompanied/unsupervised.  Writer discussed with team, massimo completed. [facility] was arranged to expedite discharge.  When:22nd Dec 2023 13:45  From:[address] [place] [address]  To:[address] [place] [address]  Instructions:ADULT EMERGENCY ROOM  Name/Room number:MAYANK VILLEGAS  Phone:[phone]  :FAUSTO HOWARD  Plate Number:[id number]  Vehicle:[employer]  Vehicle Reg:[id number]  Trip Distance:8.2MI  Payment Method:On Account  Payment Status:Payment Outstanding  Price:USD 33.18  Service Charge:USD 0.00  Total:USD 33.18

## 2023-12-22 NOTE — ED ADULT NURSE REASSESSMENT NOTE - NS ED NURSE REASSESS COMMENT FT1
Pt calm & cooperative denies si/hi/avh presently, pt cleared & d/c by provider transport arranged by [facility].

## 2023-12-22 NOTE — ED ADULT NURSE REASSESSMENT NOTE - NS ED NURSE REASSESS COMMENT FT1
Received report from night RN pt calm denies si/hi/avh presently, pt tolerated breakfast cleared for d/c awaiting group home staff eval on going.

## 2023-12-30 ENCOUNTER — EMERGENCY (EMERGENCY)
Facility: HOSPITAL | Age: 33
LOS: 0 days | Discharge: HOME | End: 2023-12-30
Attending: EMERGENCY MEDICINE
Payer: MEDICAID

## 2023-12-30 VITALS
SYSTOLIC BLOOD PRESSURE: 133 MMHG | TEMPERATURE: 98 F | RESPIRATION RATE: 20 BRPM | DIASTOLIC BLOOD PRESSURE: 91 MMHG | HEART RATE: 122 BPM | OXYGEN SATURATION: 99 %

## 2023-12-30 VITALS
TEMPERATURE: 98 F | OXYGEN SATURATION: 98 % | HEIGHT: 62 IN | RESPIRATION RATE: 18 BRPM | SYSTOLIC BLOOD PRESSURE: 105 MMHG | HEART RATE: 103 BPM | WEIGHT: 276.02 LBS | DIASTOLIC BLOOD PRESSURE: 74 MMHG

## 2023-12-30 DIAGNOSIS — Z91.013 ALLERGY TO SEAFOOD: ICD-10-CM

## 2023-12-30 DIAGNOSIS — E11.9 TYPE 2 DIABETES MELLITUS WITHOUT COMPLICATIONS: ICD-10-CM

## 2023-12-30 DIAGNOSIS — F31.9 BIPOLAR DISORDER, UNSPECIFIED: ICD-10-CM

## 2023-12-30 DIAGNOSIS — F79 UNSPECIFIED INTELLECTUAL DISABILITIES: ICD-10-CM

## 2023-12-30 DIAGNOSIS — R19.5 OTHER FECAL ABNORMALITIES: ICD-10-CM

## 2023-12-30 DIAGNOSIS — Z91.018 ALLERGY TO OTHER FOODS: ICD-10-CM

## 2023-12-30 DIAGNOSIS — K62.89 OTHER SPECIFIED DISEASES OF ANUS AND RECTUM: ICD-10-CM

## 2023-12-30 PROCEDURE — 99283 EMERGENCY DEPT VISIT LOW MDM: CPT

## 2023-12-30 NOTE — ED PROVIDER NOTE - GASTROINTESTINAL, MLM
Abdomen soft, non-tender, no guarding.  No anal fissures or hemorrhoids.  No gross blood on rectal exam.

## 2023-12-30 NOTE — ED ADULT NURSE NOTE - OBJECTIVE STATEMENT
pt states she noted bright red blood in her stool once after forcing to have a BM yesterday. pt c/o of rectal pain at this time. history of constipation, htn and schizophrenia

## 2023-12-30 NOTE — ED PROVIDER NOTE - PROGRESS NOTE DETAILS
Patient states that she does not like living at the group home and does not want to go back.  When told she was being discharged she states that she has nausea.  No episodes of vomiting

## 2023-12-30 NOTE — ED ADULT NURSE NOTE - NSFALLUNIVINTERV_ED_ALL_ED
Bed/Stretcher in lowest position, wheels locked, appropriate side rails in place/Call bell, personal items and telephone in reach/Instruct patient to call for assistance before getting out of bed/chair/stretcher/Non-slip footwear applied when patient is off stretcher/[address] to call system/Physically safe environment - no spills, clutter or unnecessary equipment/Purposeful proactive rounding/Room/bathroom lighting operational, light cord in reach Bed/Stretcher in lowest position, wheels locked, appropriate side rails in place/Call bell, personal items and telephone in reach/Instruct patient to call for assistance before getting out of bed/chair/stretcher/Non-slip footwear applied when patient is off stretcher/[place] to call system/Physically safe environment - no spills, clutter or unnecessary equipment/Purposeful proactive rounding/Room/bathroom lighting operational, light cord in reach

## 2023-12-30 NOTE — ED PROVIDER NOTE - CLINICAL SUMMARY MEDICAL DECISION MAKING FREE TEXT BOX
Outreach attempt was made to schedule an Annual Wellness Visit. This was the first attempt. Contact was made, AWV appointment scheduled. Wednesday 11/3/21, 9:40a w/ Stock.      Intermittent rectal pain and bloody stool episode x1 yesterday no bleeding now.  No abdominal pain.  Patient advised to follow-up with her PMD.

## 2023-12-30 NOTE — ED PROVIDER NOTE - PATIENT PORTAL LINK FT
You can access the FollowMyHealth Patient Portal offered by [facility] by registering at the following website: [email or web address]. By joining [employer]’s FollowMyHealth portal, you will also be able to view your health information using other applications (apps) compatible with our system. You can access the FollowMyHealth Patient Portal offered by [facility] by registering at the following website: [email or web address]. By joining [employer]’s FollowMyHealth portal, you will also be able to view your health information using other applications (apps) compatible with our system.

## 2023-12-30 NOTE — ED PROVIDER NOTE - OBJECTIVE STATEMENT
This patient is a 33 year old woman hx of DM, bipolar disorder, intellectual disability who presents to the ER c/o rectal pain and one episode of blood in stool yesterday.  She denies abdominal pain and vomiting.  Patient she still has occasional rectal pain.  She denies anal trauma and fever.  Patient currently eating immediately upon arrival and placement to the ER.

## 2023-12-30 NOTE — ED ADULT TRIAGE NOTE - CHIEF COMPLAINT QUOTE
pt states she noted bright red blood in her stool after forcing to have a BM yesterday. history of constipation, htn and schizophrenia. also states she is  "8 months pregnant". pt lives in a group home. no aide noted at triage.

## 2024-01-06 ENCOUNTER — EMERGENCY (EMERGENCY)
Facility: HOSPITAL | Age: 34
LOS: 1 days | Discharge: HOME | End: 2024-01-06
Attending: STUDENT IN AN ORGANIZED HEALTH CARE EDUCATION/TRAINING PROGRAM | Admitting: EMERGENCY MEDICINE
Payer: MEDICAID

## 2024-01-06 VITALS
TEMPERATURE: 98 F | RESPIRATION RATE: 18 BRPM | HEART RATE: 88 BPM | DIASTOLIC BLOOD PRESSURE: 86 MMHG | SYSTOLIC BLOOD PRESSURE: 123 MMHG | OXYGEN SATURATION: 100 %

## 2024-01-06 VITALS
DIASTOLIC BLOOD PRESSURE: 80 MMHG | HEART RATE: 102 BPM | SYSTOLIC BLOOD PRESSURE: 117 MMHG | RESPIRATION RATE: 18 BRPM | TEMPERATURE: 98 F | HEIGHT: 62 IN | OXYGEN SATURATION: 96 %

## 2024-01-06 LAB
ALBUMIN SERPL ELPH-MCNC: 3.5 G/DL — SIGNIFICANT CHANGE UP (ref 3.3–5)
ALBUMIN SERPL ELPH-MCNC: 3.5 G/DL — SIGNIFICANT CHANGE UP (ref 3.3–5)
ALP SERPL-CCNC: 51 U/L — SIGNIFICANT CHANGE UP (ref 40–120)
ALP SERPL-CCNC: 51 U/L — SIGNIFICANT CHANGE UP (ref 40–120)
ALT FLD-CCNC: 13 U/L — SIGNIFICANT CHANGE UP (ref 4–33)
ALT FLD-CCNC: 13 U/L — SIGNIFICANT CHANGE UP (ref 4–33)
ANION GAP SERPL CALC-SCNC: 12 MMOL/L — SIGNIFICANT CHANGE UP (ref 7–14)
ANION GAP SERPL CALC-SCNC: 12 MMOL/L — SIGNIFICANT CHANGE UP (ref 7–14)
APPEARANCE UR: CLEAR — SIGNIFICANT CHANGE UP
APPEARANCE UR: CLEAR — SIGNIFICANT CHANGE UP
AST SERPL-CCNC: 23 U/L — SIGNIFICANT CHANGE UP (ref 4–32)
AST SERPL-CCNC: 23 U/L — SIGNIFICANT CHANGE UP (ref 4–32)
B PERT DNA SPEC QL NAA+PROBE: SIGNIFICANT CHANGE UP
B PERT DNA SPEC QL NAA+PROBE: SIGNIFICANT CHANGE UP
B PERT+PARAPERT DNA PNL SPEC NAA+PROBE: SIGNIFICANT CHANGE UP
B PERT+PARAPERT DNA PNL SPEC NAA+PROBE: SIGNIFICANT CHANGE UP
BACTERIA # UR AUTO: ABNORMAL /HPF
BACTERIA # UR AUTO: ABNORMAL /HPF
BASOPHILS # BLD AUTO: 0.02 K/UL — SIGNIFICANT CHANGE UP (ref 0–0.2)
BASOPHILS # BLD AUTO: 0.02 K/UL — SIGNIFICANT CHANGE UP (ref 0–0.2)
BASOPHILS NFR BLD AUTO: 0.3 % — SIGNIFICANT CHANGE UP (ref 0–2)
BASOPHILS NFR BLD AUTO: 0.3 % — SIGNIFICANT CHANGE UP (ref 0–2)
BILIRUB SERPL-MCNC: <0.2 MG/DL — SIGNIFICANT CHANGE UP (ref 0.2–1.2)
BILIRUB SERPL-MCNC: <0.2 MG/DL — SIGNIFICANT CHANGE UP (ref 0.2–1.2)
BILIRUB UR-MCNC: NEGATIVE — SIGNIFICANT CHANGE UP
BILIRUB UR-MCNC: NEGATIVE — SIGNIFICANT CHANGE UP
BORDETELLA PARAPERTUSSIS (RAPRVP): SIGNIFICANT CHANGE UP
BORDETELLA PARAPERTUSSIS (RAPRVP): SIGNIFICANT CHANGE UP
BUN SERPL-MCNC: 10 MG/DL — SIGNIFICANT CHANGE UP (ref 7–23)
BUN SERPL-MCNC: 10 MG/DL — SIGNIFICANT CHANGE UP (ref 7–23)
C PNEUM DNA SPEC QL NAA+PROBE: SIGNIFICANT CHANGE UP
C PNEUM DNA SPEC QL NAA+PROBE: SIGNIFICANT CHANGE UP
CALCIUM SERPL-MCNC: 9 MG/DL — SIGNIFICANT CHANGE UP (ref 8.4–10.5)
CALCIUM SERPL-MCNC: 9 MG/DL — SIGNIFICANT CHANGE UP (ref 8.4–10.5)
CHLORIDE SERPL-SCNC: 105 MMOL/L — SIGNIFICANT CHANGE UP (ref 98–107)
CHLORIDE SERPL-SCNC: 105 MMOL/L — SIGNIFICANT CHANGE UP (ref 98–107)
CO2 SERPL-SCNC: 23 MMOL/L — SIGNIFICANT CHANGE UP (ref 22–31)
CO2 SERPL-SCNC: 23 MMOL/L — SIGNIFICANT CHANGE UP (ref 22–31)
COLOR SPEC: YELLOW — SIGNIFICANT CHANGE UP
COLOR SPEC: YELLOW — SIGNIFICANT CHANGE UP
CREAT SERPL-MCNC: 0.74 MG/DL — SIGNIFICANT CHANGE UP (ref 0.5–1.3)
CREAT SERPL-MCNC: 0.74 MG/DL — SIGNIFICANT CHANGE UP (ref 0.5–1.3)
DIFF PNL FLD: ABNORMAL
DIFF PNL FLD: ABNORMAL
EGFR: 111 ML/MIN/1.73M2 — SIGNIFICANT CHANGE UP
EGFR: 111 ML/MIN/1.73M2 — SIGNIFICANT CHANGE UP
EOSINOPHIL # BLD AUTO: 0.01 K/UL — SIGNIFICANT CHANGE UP (ref 0–0.5)
EOSINOPHIL # BLD AUTO: 0.01 K/UL — SIGNIFICANT CHANGE UP (ref 0–0.5)
EOSINOPHIL NFR BLD AUTO: 0.1 % — SIGNIFICANT CHANGE UP (ref 0–6)
EOSINOPHIL NFR BLD AUTO: 0.1 % — SIGNIFICANT CHANGE UP (ref 0–6)
EPI CELLS # UR: PRESENT
EPI CELLS # UR: PRESENT
FLUAV SUBTYP SPEC NAA+PROBE: SIGNIFICANT CHANGE UP
FLUAV SUBTYP SPEC NAA+PROBE: SIGNIFICANT CHANGE UP
FLUBV RNA SPEC QL NAA+PROBE: SIGNIFICANT CHANGE UP
FLUBV RNA SPEC QL NAA+PROBE: SIGNIFICANT CHANGE UP
GLUCOSE SERPL-MCNC: 72 MG/DL — SIGNIFICANT CHANGE UP (ref 70–99)
GLUCOSE SERPL-MCNC: 72 MG/DL — SIGNIFICANT CHANGE UP (ref 70–99)
GLUCOSE UR QL: NEGATIVE MG/DL — SIGNIFICANT CHANGE UP
GLUCOSE UR QL: NEGATIVE MG/DL — SIGNIFICANT CHANGE UP
HADV DNA SPEC QL NAA+PROBE: SIGNIFICANT CHANGE UP
HADV DNA SPEC QL NAA+PROBE: SIGNIFICANT CHANGE UP
HCOV 229E RNA SPEC QL NAA+PROBE: SIGNIFICANT CHANGE UP
HCOV 229E RNA SPEC QL NAA+PROBE: SIGNIFICANT CHANGE UP
HCOV HKU1 RNA SPEC QL NAA+PROBE: SIGNIFICANT CHANGE UP
HCOV HKU1 RNA SPEC QL NAA+PROBE: SIGNIFICANT CHANGE UP
HCOV NL63 RNA SPEC QL NAA+PROBE: SIGNIFICANT CHANGE UP
HCOV NL63 RNA SPEC QL NAA+PROBE: SIGNIFICANT CHANGE UP
HCOV OC43 RNA SPEC QL NAA+PROBE: SIGNIFICANT CHANGE UP
HCOV OC43 RNA SPEC QL NAA+PROBE: SIGNIFICANT CHANGE UP
HCT VFR BLD CALC: 37.1 % — SIGNIFICANT CHANGE UP (ref 34.5–45)
HCT VFR BLD CALC: 37.1 % — SIGNIFICANT CHANGE UP (ref 34.5–45)
HGB BLD-MCNC: 11.6 G/DL — SIGNIFICANT CHANGE UP (ref 11.5–15.5)
HGB BLD-MCNC: 11.6 G/DL — SIGNIFICANT CHANGE UP (ref 11.5–15.5)
HMPV RNA SPEC QL NAA+PROBE: SIGNIFICANT CHANGE UP
HMPV RNA SPEC QL NAA+PROBE: SIGNIFICANT CHANGE UP
HPIV1 RNA SPEC QL NAA+PROBE: SIGNIFICANT CHANGE UP
HPIV1 RNA SPEC QL NAA+PROBE: SIGNIFICANT CHANGE UP
HPIV2 RNA SPEC QL NAA+PROBE: SIGNIFICANT CHANGE UP
HPIV2 RNA SPEC QL NAA+PROBE: SIGNIFICANT CHANGE UP
HPIV3 RNA SPEC QL NAA+PROBE: SIGNIFICANT CHANGE UP
HPIV3 RNA SPEC QL NAA+PROBE: SIGNIFICANT CHANGE UP
HPIV4 RNA SPEC QL NAA+PROBE: SIGNIFICANT CHANGE UP
HPIV4 RNA SPEC QL NAA+PROBE: SIGNIFICANT CHANGE UP
IANC: 4.8 K/UL — SIGNIFICANT CHANGE UP (ref 1.8–7.4)
IANC: 4.8 K/UL — SIGNIFICANT CHANGE UP (ref 1.8–7.4)
IMM GRANULOCYTES NFR BLD AUTO: 0.3 % — SIGNIFICANT CHANGE UP (ref 0–0.9)
IMM GRANULOCYTES NFR BLD AUTO: 0.3 % — SIGNIFICANT CHANGE UP (ref 0–0.9)
KETONES UR-MCNC: ABNORMAL MG/DL
KETONES UR-MCNC: ABNORMAL MG/DL
LEUKOCYTE ESTERASE UR-ACNC: NEGATIVE — SIGNIFICANT CHANGE UP
LEUKOCYTE ESTERASE UR-ACNC: NEGATIVE — SIGNIFICANT CHANGE UP
LIDOCAIN IGE QN: 21 U/L — SIGNIFICANT CHANGE UP (ref 7–60)
LIDOCAIN IGE QN: 21 U/L — SIGNIFICANT CHANGE UP (ref 7–60)
LYMPHOCYTES # BLD AUTO: 1.77 K/UL — SIGNIFICANT CHANGE UP (ref 1–3.3)
LYMPHOCYTES # BLD AUTO: 1.77 K/UL — SIGNIFICANT CHANGE UP (ref 1–3.3)
LYMPHOCYTES # BLD AUTO: 25.2 % — SIGNIFICANT CHANGE UP (ref 13–44)
LYMPHOCYTES # BLD AUTO: 25.2 % — SIGNIFICANT CHANGE UP (ref 13–44)
M PNEUMO DNA SPEC QL NAA+PROBE: SIGNIFICANT CHANGE UP
M PNEUMO DNA SPEC QL NAA+PROBE: SIGNIFICANT CHANGE UP
MCHC RBC-ENTMCNC: 29.2 PG — SIGNIFICANT CHANGE UP (ref 27–34)
MCHC RBC-ENTMCNC: 29.2 PG — SIGNIFICANT CHANGE UP (ref 27–34)
MCHC RBC-ENTMCNC: 31.3 GM/DL — LOW (ref 32–36)
MCHC RBC-ENTMCNC: 31.3 GM/DL — LOW (ref 32–36)
MCV RBC AUTO: 93.5 FL — SIGNIFICANT CHANGE UP (ref 80–100)
MCV RBC AUTO: 93.5 FL — SIGNIFICANT CHANGE UP (ref 80–100)
MONOCYTES # BLD AUTO: 0.4 K/UL — SIGNIFICANT CHANGE UP (ref 0–0.9)
MONOCYTES # BLD AUTO: 0.4 K/UL — SIGNIFICANT CHANGE UP (ref 0–0.9)
MONOCYTES NFR BLD AUTO: 5.7 % — SIGNIFICANT CHANGE UP (ref 2–14)
MONOCYTES NFR BLD AUTO: 5.7 % — SIGNIFICANT CHANGE UP (ref 2–14)
NEUTROPHILS # BLD AUTO: 4.8 K/UL — SIGNIFICANT CHANGE UP (ref 1.8–7.4)
NEUTROPHILS # BLD AUTO: 4.8 K/UL — SIGNIFICANT CHANGE UP (ref 1.8–7.4)
NEUTROPHILS NFR BLD AUTO: 68.4 % — SIGNIFICANT CHANGE UP (ref 43–77)
NEUTROPHILS NFR BLD AUTO: 68.4 % — SIGNIFICANT CHANGE UP (ref 43–77)
NITRITE UR-MCNC: NEGATIVE — SIGNIFICANT CHANGE UP
NITRITE UR-MCNC: NEGATIVE — SIGNIFICANT CHANGE UP
NRBC # BLD: 0 /100 WBCS — SIGNIFICANT CHANGE UP (ref 0–0)
NRBC # BLD: 0 /100 WBCS — SIGNIFICANT CHANGE UP (ref 0–0)
NRBC # FLD: 0 K/UL — SIGNIFICANT CHANGE UP (ref 0–0)
NRBC # FLD: 0 K/UL — SIGNIFICANT CHANGE UP (ref 0–0)
PH UR: 7 — SIGNIFICANT CHANGE UP (ref 5–8)
PH UR: 7 — SIGNIFICANT CHANGE UP (ref 5–8)
PLATELET # BLD AUTO: 311 K/UL — SIGNIFICANT CHANGE UP (ref 150–400)
PLATELET # BLD AUTO: 311 K/UL — SIGNIFICANT CHANGE UP (ref 150–400)
POTASSIUM SERPL-MCNC: 4.6 MMOL/L — SIGNIFICANT CHANGE UP (ref 3.5–5.3)
POTASSIUM SERPL-MCNC: 4.6 MMOL/L — SIGNIFICANT CHANGE UP (ref 3.5–5.3)
POTASSIUM SERPL-SCNC: 4.6 MMOL/L — SIGNIFICANT CHANGE UP (ref 3.5–5.3)
POTASSIUM SERPL-SCNC: 4.6 MMOL/L — SIGNIFICANT CHANGE UP (ref 3.5–5.3)
PROT SERPL-MCNC: 7 G/DL — SIGNIFICANT CHANGE UP (ref 6–8.3)
PROT SERPL-MCNC: 7 G/DL — SIGNIFICANT CHANGE UP (ref 6–8.3)
PROT UR-MCNC: 30 MG/DL
PROT UR-MCNC: 30 MG/DL
RAPID RVP RESULT: SIGNIFICANT CHANGE UP
RAPID RVP RESULT: SIGNIFICANT CHANGE UP
RBC # BLD: 3.97 M/UL — SIGNIFICANT CHANGE UP (ref 3.8–5.2)
RBC # BLD: 3.97 M/UL — SIGNIFICANT CHANGE UP (ref 3.8–5.2)
RBC # FLD: 14.4 % — SIGNIFICANT CHANGE UP (ref 10.3–14.5)
RBC # FLD: 14.4 % — SIGNIFICANT CHANGE UP (ref 10.3–14.5)
RBC CASTS # UR COMP ASSIST: 2 /HPF — SIGNIFICANT CHANGE UP (ref 0–4)
RBC CASTS # UR COMP ASSIST: 2 /HPF — SIGNIFICANT CHANGE UP (ref 0–4)
RSV RNA SPEC QL NAA+PROBE: SIGNIFICANT CHANGE UP
RSV RNA SPEC QL NAA+PROBE: SIGNIFICANT CHANGE UP
RV+EV RNA SPEC QL NAA+PROBE: SIGNIFICANT CHANGE UP
RV+EV RNA SPEC QL NAA+PROBE: SIGNIFICANT CHANGE UP
SARS-COV-2 RNA SPEC QL NAA+PROBE: SIGNIFICANT CHANGE UP
SARS-COV-2 RNA SPEC QL NAA+PROBE: SIGNIFICANT CHANGE UP
SODIUM SERPL-SCNC: 140 MMOL/L — SIGNIFICANT CHANGE UP (ref 135–145)
SODIUM SERPL-SCNC: 140 MMOL/L — SIGNIFICANT CHANGE UP (ref 135–145)
SP GR SPEC: 1.02 — SIGNIFICANT CHANGE UP (ref 1–1.03)
SP GR SPEC: 1.02 — SIGNIFICANT CHANGE UP (ref 1–1.03)
UROBILINOGEN FLD QL: 0.2 MG/DL — SIGNIFICANT CHANGE UP (ref 0.2–1)
UROBILINOGEN FLD QL: 0.2 MG/DL — SIGNIFICANT CHANGE UP (ref 0.2–1)
WBC # BLD: 7.02 K/UL — SIGNIFICANT CHANGE UP (ref 3.8–10.5)
WBC # BLD: 7.02 K/UL — SIGNIFICANT CHANGE UP (ref 3.8–10.5)
WBC # FLD AUTO: 7.02 K/UL — SIGNIFICANT CHANGE UP (ref 3.8–10.5)
WBC # FLD AUTO: 7.02 K/UL — SIGNIFICANT CHANGE UP (ref 3.8–10.5)
WBC UR QL: 1 /HPF — SIGNIFICANT CHANGE UP (ref 0–5)
WBC UR QL: 1 /HPF — SIGNIFICANT CHANGE UP (ref 0–5)

## 2024-01-06 PROCEDURE — 99284 EMERGENCY DEPT VISIT MOD MDM: CPT

## 2024-01-06 RX ORDER — FAMOTIDINE 10 MG/ML
20 INJECTION INTRAVENOUS ONCE
Refills: 0 | Status: COMPLETED | OUTPATIENT
Start: 2024-01-06 | End: 2024-01-06

## 2024-01-06 RX ADMIN — FAMOTIDINE 20 MILLIGRAM(S): 10 INJECTION INTRAVENOUS at 14:09

## 2024-01-06 RX ADMIN — Medication 30 MILLILITER(S): at 14:09

## 2024-01-06 NOTE — ED ADULT NURSE NOTE - OBJECTIVE STATEMENT
Patient received in intake room 10C. Patient A&Ox3 and ambulatory at baseline. Patient presents to the ED from group home c/o nausea/vomiting. phx schizophrenia, DM, asthma, intellectual disability. Patient states she has been expereincing nausea/vomiting and increased tiredness. Patient denies headache, dizziness, lightheadedness, nausea/vomiting, fever/chills, and pain. Patient offers no complaints at this time. Respirations even and unlabored, no signs/symptoms of acute distress; patient denies dyspnea, shortness of breath, and chest pain. Patient is stable at this time.

## 2024-01-06 NOTE — ED PROVIDER NOTE - PROGRESS NOTE DETAILS
Received patient on signout from  pending urine to results.  Urine has no nitrites no leukoesterase the patient has no complaints at this time patient would like to go home.  Social work states that she is from a group home and needs supervision to go home will arrange for an ambulance.

## 2024-01-06 NOTE — PROVIDER CONTACT NOTE (OTHER) - ASSESSMENT
SW called the residence and spoke with staff member, Ms. Solis.  Per Ms. Solis, there is no staff available to pick her up.  She also reports that pt needs supervision when she travels for psychiatric safety and needs an ambulance to travel back to the residence.  Discussed with MD, who is aware and in agreement with the plan.

## 2024-01-06 NOTE — ED ADULT NURSE NOTE - NSFALLUNIVINTERV_ED_ALL_ED
Bed/Stretcher in lowest position, wheels locked, appropriate side rails in place/Call bell, personal items and telephone in reach/Instruct patient to call for assistance before getting out of bed/chair/stretcher/Non-slip footwear applied when patient is off stretcher/[place] to call system/Physically safe environment - no spills, clutter or unnecessary equipment/Purposeful proactive rounding/Room/bathroom lighting operational, light cord in reach Bed/Stretcher in lowest position, wheels locked, appropriate side rails in place/Call bell, personal items and telephone in reach/Instruct patient to call for assistance before getting out of bed/chair/stretcher/Non-slip footwear applied when patient is off stretcher/[place] to call system/Physically safe environment - no spills, clutter or unnecessary equipment/Purposeful proactive rounding/Room/bathroom lighting operational, light cord in reach

## 2024-01-06 NOTE — ED ADULT NURSE NOTE - NS ED NOTE  TALK SOMEONE YN
"Sleep hygiene:     Get out of bed and go to the living room after about 20 minutes of being in bed awake when you are not feeling like you can fall back asleep quickly and do something relaxing, nothing that will hold your attention or keep you up. Reading or reading something that was boring or put you to sleep in school    Mindfulness:     Mindful eating oranges or apply this to other foods:   https://mindfulnessexercises.[employer]/mindful-eating-oranges/    Mindful walking:  https://www.[employer].org/daily-mindful-walking-practice/     Mindful Showering:  https://[employer]back.[employer]Path/"[employer]/mindfulness-in-the-shower/    Mindfully listening to music:  https://[employer]/mindfulness-with-music/    Imagine that you are listening to this music for the very first time, notice the instruments, patterns, tempo, lyrics, notice things in detail, how it makes you feel, if your mind wanders, bring it back to your breathing or the sound.        Scheduling worry time before bed, set a timer for a couple of minutes and write down all the thoughts that come to mind, do not  them or think too much about them, just write them down. You can do this as well if you wake up over night, what are those thoughts or worries you have, can you do anything about them, if you can create a plan and if they are outside of your control, remind your brain of that, \"hey brain, we cannot do anything about x, y, z\" but we can do something about \"A\" during this time.     Creating a to do list of things you need to get done as well as those you do everyday and fun things will help provide motivation to complete activities we need to do.     Apps for guided meditations and anxiety:     Insight timer - has meditations as well and breathing exercises     Simple habit- allows you to say how much time you have and what mindful meditation you are wanting     ACT - pt on by the [facility] and is open to anyone, you can go into settings and turn off " allowing the vernon to send anonymous information to them about your use of the vernon     Mindfulness - teaches deep breathing and has activities to practice mindfulness in the vernon you can set a goal for yourself      No

## 2024-01-06 NOTE — ED PROVIDER NOTE - PATIENT PORTAL LINK FT
You can access the FollowMyHealth Patient Portal offered by [facility] by registering at the following website: [email or web address]. By joining [employer]’s FollowMyHealth portal, you will also be able to view your health information using other applications (apps) compatible with our system. You can access the FollowMyHealth Patient Portal offered by [facility] by registering at the following website: [email or web address]. By joining [employer]’s FollowMyHealth portal, you will also be able to view your health information using other applications (apps) compatible with our system.

## 2024-01-06 NOTE — ED ADULT TRIAGE NOTE - CHIEF COMPLAINT QUOTE
pt coming from [facility] for N/V this AM, pt called EMS her self, cooperative, in triage. pt coming from [facility] for N/V this AM, pt called EMS her self, cooperative, in triage.

## 2024-01-06 NOTE — ED ADULT NURSE NOTE - CHIEF COMPLAINT QUOTE
pt coming from [facility] for N/V this AM, pt called EMS her self, cooperative, in triage. pt coming from [facility] for N/V this AM, pt called EMS her self, cooperative, in triage.

## 2024-01-06 NOTE — ED PROVIDER NOTE - CLINICAL SUMMARY MEDICAL DECISION MAKING FREE TEXT BOX
Shaun Gates, DO: 32 yo f pmh Bipolar, Schizoaffective Disorder, DM, developmental delay, pw n/v. Patient from group home.  Reports 1 day of symptoms.  Reports feeling malaise, fatigue.  Denies blood in stool, diarrhea, abdominal pain.  Denies cough, congestion.  Denies F/C.  Patient is HDS well-appearing, PE as noted.  Abdomen appears nonsurgical.  Will check labs, treat symptoms, reassess. Shaun Gates, DO: 34 yo f pmh Bipolar, Schizoaffective Disorder, DM, developmental delay, pw n/v. Patient from group home.  Reports 1 day of symptoms.  Reports feeling malaise, fatigue.  Denies blood in stool, diarrhea, abdominal pain.  Denies cough, congestion.  Denies F/C.  Patient is HDS well-appearing, PE as noted.  Abdomen appears nonsurgical.  Will check labs, treat symptoms, reassess.

## 2024-01-06 NOTE — ED ADULT NURSE REASSESSMENT NOTE - NS ED NURSE REASSESS COMMENT FT1
Break RN: pt a&ox4, denying chest pain, sob, n+v, headache, dizziness. Breathing even, unlabored. Pending dispo.

## 2024-01-06 NOTE — PROVIDER CONTACT NOTE (OTHER) - ACTION/TREATMENT ORDERED:
Arranged [facility]; [place] between 10:30 and 11:00 p.m. trip # [id number]. Arranged [facility]; [place] between 10:30 and 11:00 p.m. trip # [id number].

## 2024-01-07 ENCOUNTER — EMERGENCY (EMERGENCY)
Facility: HOSPITAL | Age: 34
LOS: 0 days | Discharge: HOME | End: 2024-01-08
Attending: STUDENT IN AN ORGANIZED HEALTH CARE EDUCATION/TRAINING PROGRAM
Payer: MEDICAID

## 2024-01-07 VITALS
WEIGHT: 276.02 LBS | DIASTOLIC BLOOD PRESSURE: 69 MMHG | SYSTOLIC BLOOD PRESSURE: 96 MMHG | TEMPERATURE: 98 F | HEART RATE: 99 BPM | OXYGEN SATURATION: 97 % | HEIGHT: 62 IN | RESPIRATION RATE: 18 BRPM

## 2024-01-07 DIAGNOSIS — R11.0 NAUSEA: ICD-10-CM

## 2024-01-07 DIAGNOSIS — E11.9 TYPE 2 DIABETES MELLITUS WITHOUT COMPLICATIONS: ICD-10-CM

## 2024-01-07 DIAGNOSIS — F79 UNSPECIFIED INTELLECTUAL DISABILITIES: ICD-10-CM

## 2024-01-07 DIAGNOSIS — J45.909 UNSPECIFIED ASTHMA, UNCOMPLICATED: ICD-10-CM

## 2024-01-07 DIAGNOSIS — R42 DIZZINESS AND GIDDINESS: ICD-10-CM

## 2024-01-07 DIAGNOSIS — F25.9 SCHIZOAFFECTIVE DISORDER, UNSPECIFIED: ICD-10-CM

## 2024-01-07 DIAGNOSIS — F31.9 BIPOLAR DISORDER, UNSPECIFIED: ICD-10-CM

## 2024-01-07 PROCEDURE — 99284 EMERGENCY DEPT VISIT MOD MDM: CPT

## 2024-01-07 NOTE — ED ADULT TRIAGE NOTE - CHIEF COMPLAINT QUOTE
hx of Schizophrenia, intellectual delay, , asthma, bipolar PW nausea and dizziness x 2015 today LMP 01/01

## 2024-01-08 VITALS
SYSTOLIC BLOOD PRESSURE: 99 MMHG | TEMPERATURE: 98 F | DIASTOLIC BLOOD PRESSURE: 68 MMHG | HEART RATE: 95 BPM | RESPIRATION RATE: 19 BRPM | OXYGEN SATURATION: 100 %

## 2024-01-08 LAB
ALBUMIN SERPL ELPH-MCNC: 2.9 G/DL — LOW (ref 3.3–5)
ALBUMIN SERPL ELPH-MCNC: 2.9 G/DL — LOW (ref 3.3–5)
ALP SERPL-CCNC: 52 U/L — SIGNIFICANT CHANGE UP (ref 40–120)
ALP SERPL-CCNC: 52 U/L — SIGNIFICANT CHANGE UP (ref 40–120)
ALT FLD-CCNC: 16 U/L — SIGNIFICANT CHANGE UP (ref 12–78)
ALT FLD-CCNC: 16 U/L — SIGNIFICANT CHANGE UP (ref 12–78)
ANION GAP SERPL CALC-SCNC: 2 MMOL/L — LOW (ref 5–17)
ANION GAP SERPL CALC-SCNC: 2 MMOL/L — LOW (ref 5–17)
APPEARANCE UR: CLEAR — SIGNIFICANT CHANGE UP
APPEARANCE UR: CLEAR — SIGNIFICANT CHANGE UP
AST SERPL-CCNC: 26 U/L — SIGNIFICANT CHANGE UP (ref 15–37)
AST SERPL-CCNC: 26 U/L — SIGNIFICANT CHANGE UP (ref 15–37)
BACTERIA # UR AUTO: NEGATIVE /HPF — SIGNIFICANT CHANGE UP
BACTERIA # UR AUTO: NEGATIVE /HPF — SIGNIFICANT CHANGE UP
BASOPHILS # BLD AUTO: 0.02 K/UL — SIGNIFICANT CHANGE UP (ref 0–0.2)
BASOPHILS # BLD AUTO: 0.02 K/UL — SIGNIFICANT CHANGE UP (ref 0–0.2)
BASOPHILS NFR BLD AUTO: 0.4 % — SIGNIFICANT CHANGE UP (ref 0–2)
BASOPHILS NFR BLD AUTO: 0.4 % — SIGNIFICANT CHANGE UP (ref 0–2)
BILIRUB SERPL-MCNC: 0.3 MG/DL — SIGNIFICANT CHANGE UP (ref 0.2–1.2)
BILIRUB SERPL-MCNC: 0.3 MG/DL — SIGNIFICANT CHANGE UP (ref 0.2–1.2)
BILIRUB UR-MCNC: NEGATIVE — SIGNIFICANT CHANGE UP
BILIRUB UR-MCNC: NEGATIVE — SIGNIFICANT CHANGE UP
BUN SERPL-MCNC: 12 MG/DL — SIGNIFICANT CHANGE UP (ref 7–23)
BUN SERPL-MCNC: 12 MG/DL — SIGNIFICANT CHANGE UP (ref 7–23)
CALCIUM SERPL-MCNC: 8.6 MG/DL — SIGNIFICANT CHANGE UP (ref 8.5–10.1)
CALCIUM SERPL-MCNC: 8.6 MG/DL — SIGNIFICANT CHANGE UP (ref 8.5–10.1)
CHLORIDE SERPL-SCNC: 111 MMOL/L — HIGH (ref 96–108)
CHLORIDE SERPL-SCNC: 111 MMOL/L — HIGH (ref 96–108)
CO2 SERPL-SCNC: 25 MMOL/L — SIGNIFICANT CHANGE UP (ref 22–31)
CO2 SERPL-SCNC: 25 MMOL/L — SIGNIFICANT CHANGE UP (ref 22–31)
COLOR SPEC: YELLOW — SIGNIFICANT CHANGE UP
COLOR SPEC: YELLOW — SIGNIFICANT CHANGE UP
CREAT SERPL-MCNC: 0.73 MG/DL — SIGNIFICANT CHANGE UP (ref 0.5–1.3)
CREAT SERPL-MCNC: 0.73 MG/DL — SIGNIFICANT CHANGE UP (ref 0.5–1.3)
DIFF PNL FLD: NEGATIVE — SIGNIFICANT CHANGE UP
DIFF PNL FLD: NEGATIVE — SIGNIFICANT CHANGE UP
EGFR: 113 ML/MIN/1.73M2 — SIGNIFICANT CHANGE UP
EGFR: 113 ML/MIN/1.73M2 — SIGNIFICANT CHANGE UP
EOSINOPHIL # BLD AUTO: 0.03 K/UL — SIGNIFICANT CHANGE UP (ref 0–0.5)
EOSINOPHIL # BLD AUTO: 0.03 K/UL — SIGNIFICANT CHANGE UP (ref 0–0.5)
EOSINOPHIL NFR BLD AUTO: 0.6 % — SIGNIFICANT CHANGE UP (ref 0–6)
EOSINOPHIL NFR BLD AUTO: 0.6 % — SIGNIFICANT CHANGE UP (ref 0–6)
EPI CELLS # UR: PRESENT
EPI CELLS # UR: PRESENT
GLUCOSE SERPL-MCNC: 86 MG/DL — SIGNIFICANT CHANGE UP (ref 70–99)
GLUCOSE SERPL-MCNC: 86 MG/DL — SIGNIFICANT CHANGE UP (ref 70–99)
GLUCOSE UR QL: NEGATIVE MG/DL — SIGNIFICANT CHANGE UP
GLUCOSE UR QL: NEGATIVE MG/DL — SIGNIFICANT CHANGE UP
HCG SERPL-ACNC: <1 MIU/ML — SIGNIFICANT CHANGE UP
HCG SERPL-ACNC: <1 MIU/ML — SIGNIFICANT CHANGE UP
HCT VFR BLD CALC: 38.2 % — SIGNIFICANT CHANGE UP (ref 34.5–45)
HCT VFR BLD CALC: 38.2 % — SIGNIFICANT CHANGE UP (ref 34.5–45)
HGB BLD-MCNC: 12.2 G/DL — SIGNIFICANT CHANGE UP (ref 11.5–15.5)
HGB BLD-MCNC: 12.2 G/DL — SIGNIFICANT CHANGE UP (ref 11.5–15.5)
IMM GRANULOCYTES NFR BLD AUTO: 0.6 % — SIGNIFICANT CHANGE UP (ref 0–0.9)
IMM GRANULOCYTES NFR BLD AUTO: 0.6 % — SIGNIFICANT CHANGE UP (ref 0–0.9)
KETONES UR-MCNC: ABNORMAL MG/DL
KETONES UR-MCNC: ABNORMAL MG/DL
LEUKOCYTE ESTERASE UR-ACNC: ABNORMAL
LEUKOCYTE ESTERASE UR-ACNC: ABNORMAL
LYMPHOCYTES # BLD AUTO: 2.52 K/UL — SIGNIFICANT CHANGE UP (ref 1–3.3)
LYMPHOCYTES # BLD AUTO: 2.52 K/UL — SIGNIFICANT CHANGE UP (ref 1–3.3)
LYMPHOCYTES # BLD AUTO: 49.8 % — HIGH (ref 13–44)
LYMPHOCYTES # BLD AUTO: 49.8 % — HIGH (ref 13–44)
MCHC RBC-ENTMCNC: 29.8 PG — SIGNIFICANT CHANGE UP (ref 27–34)
MCHC RBC-ENTMCNC: 29.8 PG — SIGNIFICANT CHANGE UP (ref 27–34)
MCHC RBC-ENTMCNC: 31.9 G/DL — LOW (ref 32–36)
MCHC RBC-ENTMCNC: 31.9 G/DL — LOW (ref 32–36)
MCV RBC AUTO: 93.2 FL — SIGNIFICANT CHANGE UP (ref 80–100)
MCV RBC AUTO: 93.2 FL — SIGNIFICANT CHANGE UP (ref 80–100)
MONOCYTES # BLD AUTO: 0.39 K/UL — SIGNIFICANT CHANGE UP (ref 0–0.9)
MONOCYTES # BLD AUTO: 0.39 K/UL — SIGNIFICANT CHANGE UP (ref 0–0.9)
MONOCYTES NFR BLD AUTO: 7.7 % — SIGNIFICANT CHANGE UP (ref 2–14)
MONOCYTES NFR BLD AUTO: 7.7 % — SIGNIFICANT CHANGE UP (ref 2–14)
NEUTROPHILS # BLD AUTO: 2.07 K/UL — SIGNIFICANT CHANGE UP (ref 1.8–7.4)
NEUTROPHILS # BLD AUTO: 2.07 K/UL — SIGNIFICANT CHANGE UP (ref 1.8–7.4)
NEUTROPHILS NFR BLD AUTO: 40.9 % — LOW (ref 43–77)
NEUTROPHILS NFR BLD AUTO: 40.9 % — LOW (ref 43–77)
NITRITE UR-MCNC: NEGATIVE — SIGNIFICANT CHANGE UP
NITRITE UR-MCNC: NEGATIVE — SIGNIFICANT CHANGE UP
NRBC # BLD: 0 /100 WBCS — SIGNIFICANT CHANGE UP (ref 0–0)
NRBC # BLD: 0 /100 WBCS — SIGNIFICANT CHANGE UP (ref 0–0)
PH UR: 7 — SIGNIFICANT CHANGE UP (ref 5–8)
PH UR: 7 — SIGNIFICANT CHANGE UP (ref 5–8)
PLATELET # BLD AUTO: 291 K/UL — SIGNIFICANT CHANGE UP (ref 150–400)
PLATELET # BLD AUTO: 291 K/UL — SIGNIFICANT CHANGE UP (ref 150–400)
POTASSIUM SERPL-MCNC: 5 MMOL/L — SIGNIFICANT CHANGE UP (ref 3.5–5.3)
POTASSIUM SERPL-MCNC: 5 MMOL/L — SIGNIFICANT CHANGE UP (ref 3.5–5.3)
POTASSIUM SERPL-SCNC: 5 MMOL/L — SIGNIFICANT CHANGE UP (ref 3.5–5.3)
POTASSIUM SERPL-SCNC: 5 MMOL/L — SIGNIFICANT CHANGE UP (ref 3.5–5.3)
PROT SERPL-MCNC: 7.1 GM/DL — SIGNIFICANT CHANGE UP (ref 6–8.3)
PROT SERPL-MCNC: 7.1 GM/DL — SIGNIFICANT CHANGE UP (ref 6–8.3)
PROT UR-MCNC: NEGATIVE MG/DL — SIGNIFICANT CHANGE UP
PROT UR-MCNC: NEGATIVE MG/DL — SIGNIFICANT CHANGE UP
RBC # BLD: 4.1 M/UL — SIGNIFICANT CHANGE UP (ref 3.8–5.2)
RBC # BLD: 4.1 M/UL — SIGNIFICANT CHANGE UP (ref 3.8–5.2)
RBC # FLD: 14.3 % — SIGNIFICANT CHANGE UP (ref 10.3–14.5)
RBC # FLD: 14.3 % — SIGNIFICANT CHANGE UP (ref 10.3–14.5)
RBC CASTS # UR COMP ASSIST: SIGNIFICANT CHANGE UP /HPF (ref 0–4)
RBC CASTS # UR COMP ASSIST: SIGNIFICANT CHANGE UP /HPF (ref 0–4)
SODIUM SERPL-SCNC: 138 MMOL/L — SIGNIFICANT CHANGE UP (ref 135–145)
SODIUM SERPL-SCNC: 138 MMOL/L — SIGNIFICANT CHANGE UP (ref 135–145)
SP GR SPEC: 1.03 — SIGNIFICANT CHANGE UP (ref 1–1.03)
SP GR SPEC: 1.03 — SIGNIFICANT CHANGE UP (ref 1–1.03)
UROBILINOGEN FLD QL: 1 MG/DL — SIGNIFICANT CHANGE UP (ref 0.2–1)
UROBILINOGEN FLD QL: 1 MG/DL — SIGNIFICANT CHANGE UP (ref 0.2–1)
WBC # BLD: 5.06 K/UL — SIGNIFICANT CHANGE UP (ref 3.8–10.5)
WBC # BLD: 5.06 K/UL — SIGNIFICANT CHANGE UP (ref 3.8–10.5)
WBC # FLD AUTO: 5.06 K/UL — SIGNIFICANT CHANGE UP (ref 3.8–10.5)
WBC # FLD AUTO: 5.06 K/UL — SIGNIFICANT CHANGE UP (ref 3.8–10.5)
WBC UR QL: SIGNIFICANT CHANGE UP /HPF (ref 0–5)
WBC UR QL: SIGNIFICANT CHANGE UP /HPF (ref 0–5)

## 2024-01-08 PROCEDURE — 93010 ELECTROCARDIOGRAM REPORT: CPT

## 2024-01-08 PROCEDURE — 70450 CT HEAD/BRAIN W/O DYE: CPT | Mod: 26,MA

## 2024-01-08 RX ORDER — SODIUM CHLORIDE 9 MG/ML
1000 INJECTION INTRAMUSCULAR; INTRAVENOUS; SUBCUTANEOUS ONCE
Refills: 0 | Status: COMPLETED | OUTPATIENT
Start: 2024-01-08 | End: 2024-01-08

## 2024-01-08 RX ORDER — ONDANSETRON 8 MG/1
4 TABLET, FILM COATED ORAL ONCE
Refills: 0 | Status: COMPLETED | OUTPATIENT
Start: 2024-01-08 | End: 2024-01-08

## 2024-01-08 RX ORDER — MECLIZINE HCL 12.5 MG
50 TABLET ORAL ONCE
Refills: 0 | Status: COMPLETED | OUTPATIENT
Start: 2024-01-08 | End: 2024-01-08

## 2024-01-08 RX ADMIN — Medication 50 MILLIGRAM(S): at 09:07

## 2024-01-08 RX ADMIN — ONDANSETRON 4 MILLIGRAM(S): 8 TABLET, FILM COATED ORAL at 03:44

## 2024-01-08 RX ADMIN — SODIUM CHLORIDE 1000 MILLILITER(S): 9 INJECTION INTRAMUSCULAR; INTRAVENOUS; SUBCUTANEOUS at 03:42

## 2024-01-08 NOTE — ED PROVIDER NOTE - CARE PROVIDER_API CALL
Awa Villagran  Neurology  [address] [place] [address]  Phone: [phone]  Fax: [phone]  Follow Up Time:    Awa Villagran  Neurology  [address] [place] [address]  Phone: [phone]  Fax: [phone]  Follow Up Time:

## 2024-01-08 NOTE — ED PROVIDER NOTE - PATIENT PORTAL LINK FT
You can access the FollowMyHealth Patient Portal offered by [facility] by registering at the following website: [email or web address]. By joining [employer]’s FollowMyHealth portal, you will also be able to view your health information using other applications (apps) compatible with our system. You can access the FollowMyHealth Patient Portal offered by [facility] by registering at the following website: [email or web address]. By joining [employer]’s FollowMyHealth portal, you will also be able to view your health information using other applications (apps) compatible with our system.

## 2024-01-08 NOTE — ED PROVIDER NOTE - OBJECTIVE STATEMENT
pt c/o nausea and dizziness  no headache, vomiting, chest pain, sob   feels like goingto pass out 33 year old female with h/o schizoaffective disorfer, diabetes, bipolar disorder, asthma and intellectual disability presensts today c/o nausea and dizziness, denies headache, vomiting, chest pain, sob, pt describes feeling  like goingto pass out, on exam pt is well appearing, ambulating at times in the ER in no distress, exam is normal, DDX includes vertigo, dehydrations, uti, plan is to hydrate, medicate with meclizine and zofran, ct head, reassess and dispo

## 2024-01-08 NOTE — ED PROVIDER NOTE - CLINICAL SUMMARY MEDICAL DECISION MAKING FREE TEXT BOX
33 year old female with h/o schizoaffective disorfer, diabetes, bipolar disorder, asthma and intellectual disability presensts today c/o nausea and dizziness, denies headache, vomiting, chest pain, sob, pt describes feeling  like goingto pass out, on exam pt is well appearing, ambulating at times in the ER in no distress, exam is normal, DDX includes vertigo, dehydrations, uti, plan is to hydrate, medicate with meclizine and zofran, ct head, reassess and dispo

## 2024-01-08 NOTE — ED ADULT NURSE NOTE - CADM POA URETHRAL CATHETER
PAST SURGICAL HISTORY:  History of laminectomy     S/P appendectomy     S/P hip hemiarthroplasty      No

## 2024-01-08 NOTE — ED ADULT NURSE REASSESSMENT NOTE - NS ED NURSE REASSESS COMMENT FT1
Pt received from ADRY Mccarty. Pt AOx3, at baseline mental status. Pt currently resting comfortably in stretcher, IV fluids infusing as per provider's orders. Pending transportation arrangement back to [facility] home. Hourly rounding maintained and to continue. Pt received from ADRY Mccarty. Pt AOx3, at baseline mental status. Pt currently resting comfortably in stretcher, IV fluids infusing as per provider's orders. Pending transportation arrangement back to [facility] home. Hourly rounding maintained and to continue.

## 2024-01-10 LAB
-  AMOXICILLIN/CLAVULANIC ACID: SIGNIFICANT CHANGE UP
-  AMPICILLIN/SULBACTAM: SIGNIFICANT CHANGE UP
-  AMPICILLIN: SIGNIFICANT CHANGE UP
-  AZTREONAM: SIGNIFICANT CHANGE UP
-  CEFAZOLIN: SIGNIFICANT CHANGE UP
-  CEFEPIME: SIGNIFICANT CHANGE UP
-  CEFOXITIN: SIGNIFICANT CHANGE UP
-  CEFTRIAXONE: SIGNIFICANT CHANGE UP
-  CEFUROXIME: SIGNIFICANT CHANGE UP
-  CIPROFLOXACIN: SIGNIFICANT CHANGE UP
-  ERTAPENEM: SIGNIFICANT CHANGE UP
-  GENTAMICIN: SIGNIFICANT CHANGE UP
-  LEVOFLOXACIN: SIGNIFICANT CHANGE UP
-  MEROPENEM: SIGNIFICANT CHANGE UP
-  NITROFURANTOIN: SIGNIFICANT CHANGE UP
-  PIPERACILLIN/TAZOBACTAM: SIGNIFICANT CHANGE UP
-  TETRACYCLINE: SIGNIFICANT CHANGE UP
-  TETRACYCLINE: SIGNIFICANT CHANGE UP
-  TOBRAMYCIN: SIGNIFICANT CHANGE UP
-  TRIMETHOPRIM/SULFAMETHOXAZOLE: SIGNIFICANT CHANGE UP
-  VANCOMYCIN: SIGNIFICANT CHANGE UP
-  VANCOMYCIN: SIGNIFICANT CHANGE UP
CULTURE RESULTS: ABNORMAL
METHOD TYPE: SIGNIFICANT CHANGE UP
ORGANISM # SPEC MICROSCOPIC CNT: ABNORMAL
ORGANISM # SPEC MICROSCOPIC CNT: SIGNIFICANT CHANGE UP
ORGANISM # SPEC MICROSCOPIC CNT: SIGNIFICANT CHANGE UP
SPECIMEN SOURCE: SIGNIFICANT CHANGE UP

## 2024-01-11 NOTE — ED ADULT NURSE NOTE - COVID-19 ORDERING FACILITY
**ADVANCED PRACTICE PROVIDER, I HAVE EVALUATED THIS PATIENT**        [facility] [place] EMERGENCY DEPARTMENT  EMERGENCY DEPARTMENT ENCOUNTER      Pt Name: Teodora Hills  MRN:[id number]  Birthdate 1971  Date of evaluation: 1/11/2024  Provider: Álvaro Hutton PA-C      Chief Complaint:    Chief Complaint   Patient presents with    Insect Bite     Reports that she is in a homeless shelter and felt as if she was bit by something to the right thigh.         Nursing Notes, Past Medical Hx, Past Surgical Hx, Social Hx, Allergies, and Family Hx were all reviewed and agreed with or any disagreements were addressed in the HPI.    HISTORY OF PRESENT ILLNESS     History from : Patient    Limitations to history : None    Teodora Hills is a 52 y.o. female who presents with c/o or infection.  Three days ago had noticed a painful, red, bump on right medial thigh.  Hurt more when rubbing.  Since then redness and pain and improved, but described as a bump and feels there might be fluid in it.  Has been in a homeless shelter and not sure if she was bit or if it was something.  Mentions it was never itchy.  Had tried topical benadryl and not sure if it help.    Mentions last fall had and infection on left shin mentions had trials of three [facility] but eventually resolved.  Denies:joint pain, fevers, bowel or bladder symptoms, injury or trauma.     PastMedical/Surgical History:      Diagnosis Date    Abnormal Pap smear of cervix     Asthma     CHF (congestive heart failure) ([facility])     At the age of 37. Cardiology: Dr. Del Rio.    Chronic back pain     COPD (chronic obstructive pulmonary disease) ([facility])     GERD (gastroesophageal reflux disease)     H/O echocardiogram 09/11/2019    EF 55-60, Small pericardial effusion visualized.    H/O echocardiogram 09/19/2020    EF 55-60%, Mild TR,  There is a small (trivial) pericardial effusion , no change from previous echo.    History of nuclear stress test 
LAURY/Carlos Manuel

## 2024-01-13 RX ORDER — CIPROFLOXACIN LACTATE 400MG/40ML
1 VIAL (ML) INTRAVENOUS
Qty: 14 | Refills: 0
Start: 2024-01-13 | End: 2024-01-19

## 2024-01-14 ENCOUNTER — EMERGENCY (EMERGENCY)
Facility: HOSPITAL | Age: 34
LOS: 0 days | Discharge: HOME | End: 2024-01-14
Attending: STUDENT IN AN ORGANIZED HEALTH CARE EDUCATION/TRAINING PROGRAM
Payer: MEDICAID

## 2024-01-14 VITALS
HEIGHT: 62 IN | SYSTOLIC BLOOD PRESSURE: 94 MMHG | TEMPERATURE: 98 F | HEART RATE: 96 BPM | RESPIRATION RATE: 20 BRPM | OXYGEN SATURATION: 98 % | DIASTOLIC BLOOD PRESSURE: 60 MMHG | WEIGHT: 276.02 LBS

## 2024-01-14 VITALS
DIASTOLIC BLOOD PRESSURE: 70 MMHG | OXYGEN SATURATION: 98 % | SYSTOLIC BLOOD PRESSURE: 110 MMHG | RESPIRATION RATE: 18 BRPM | HEART RATE: 90 BPM | TEMPERATURE: 98 F

## 2024-01-14 DIAGNOSIS — R62.50 UNSPECIFIED LACK OF EXPECTED NORMAL PHYSIOLOGICAL DEVELOPMENT IN CHILDHOOD: ICD-10-CM

## 2024-01-14 DIAGNOSIS — F31.9 BIPOLAR DISORDER, UNSPECIFIED: ICD-10-CM

## 2024-01-14 DIAGNOSIS — F25.9 SCHIZOAFFECTIVE DISORDER, UNSPECIFIED: ICD-10-CM

## 2024-01-14 DIAGNOSIS — R10.9 UNSPECIFIED ABDOMINAL PAIN: ICD-10-CM

## 2024-01-14 DIAGNOSIS — Z91.013 ALLERGY TO SEAFOOD: ICD-10-CM

## 2024-01-14 DIAGNOSIS — E11.9 TYPE 2 DIABETES MELLITUS WITHOUT COMPLICATIONS: ICD-10-CM

## 2024-01-14 DIAGNOSIS — Z91.018 ALLERGY TO OTHER FOODS: ICD-10-CM

## 2024-01-14 LAB
ALBUMIN SERPL ELPH-MCNC: 2.7 G/DL — LOW (ref 3.3–5)
ALBUMIN SERPL ELPH-MCNC: 2.7 G/DL — LOW (ref 3.3–5)
ALP SERPL-CCNC: 49 U/L — SIGNIFICANT CHANGE UP (ref 40–120)
ALP SERPL-CCNC: 49 U/L — SIGNIFICANT CHANGE UP (ref 40–120)
ALT FLD-CCNC: 16 U/L — SIGNIFICANT CHANGE UP (ref 12–78)
ALT FLD-CCNC: 16 U/L — SIGNIFICANT CHANGE UP (ref 12–78)
ANION GAP SERPL CALC-SCNC: 5 MMOL/L — SIGNIFICANT CHANGE UP (ref 5–17)
ANION GAP SERPL CALC-SCNC: 5 MMOL/L — SIGNIFICANT CHANGE UP (ref 5–17)
APPEARANCE UR: CLEAR — SIGNIFICANT CHANGE UP
APPEARANCE UR: CLEAR — SIGNIFICANT CHANGE UP
AST SERPL-CCNC: 11 U/L — LOW (ref 15–37)
AST SERPL-CCNC: 11 U/L — LOW (ref 15–37)
BASOPHILS # BLD AUTO: 0.02 K/UL — SIGNIFICANT CHANGE UP (ref 0–0.2)
BASOPHILS # BLD AUTO: 0.02 K/UL — SIGNIFICANT CHANGE UP (ref 0–0.2)
BASOPHILS NFR BLD AUTO: 0.4 % — SIGNIFICANT CHANGE UP (ref 0–2)
BASOPHILS NFR BLD AUTO: 0.4 % — SIGNIFICANT CHANGE UP (ref 0–2)
BILIRUB SERPL-MCNC: 0.3 MG/DL — SIGNIFICANT CHANGE UP (ref 0.2–1.2)
BILIRUB SERPL-MCNC: 0.3 MG/DL — SIGNIFICANT CHANGE UP (ref 0.2–1.2)
BILIRUB UR-MCNC: NEGATIVE — SIGNIFICANT CHANGE UP
BILIRUB UR-MCNC: NEGATIVE — SIGNIFICANT CHANGE UP
BUN SERPL-MCNC: 11 MG/DL — SIGNIFICANT CHANGE UP (ref 7–23)
BUN SERPL-MCNC: 11 MG/DL — SIGNIFICANT CHANGE UP (ref 7–23)
CALCIUM SERPL-MCNC: 8.2 MG/DL — LOW (ref 8.5–10.1)
CALCIUM SERPL-MCNC: 8.2 MG/DL — LOW (ref 8.5–10.1)
CHLORIDE SERPL-SCNC: 111 MMOL/L — HIGH (ref 96–108)
CHLORIDE SERPL-SCNC: 111 MMOL/L — HIGH (ref 96–108)
CO2 SERPL-SCNC: 24 MMOL/L — SIGNIFICANT CHANGE UP (ref 22–31)
CO2 SERPL-SCNC: 24 MMOL/L — SIGNIFICANT CHANGE UP (ref 22–31)
COLOR SPEC: YELLOW — SIGNIFICANT CHANGE UP
COLOR SPEC: YELLOW — SIGNIFICANT CHANGE UP
CREAT SERPL-MCNC: 0.95 MG/DL — SIGNIFICANT CHANGE UP (ref 0.5–1.3)
CREAT SERPL-MCNC: 0.95 MG/DL — SIGNIFICANT CHANGE UP (ref 0.5–1.3)
DIFF PNL FLD: NEGATIVE — SIGNIFICANT CHANGE UP
DIFF PNL FLD: NEGATIVE — SIGNIFICANT CHANGE UP
EGFR: 82 ML/MIN/1.73M2 — SIGNIFICANT CHANGE UP
EGFR: 82 ML/MIN/1.73M2 — SIGNIFICANT CHANGE UP
EOSINOPHIL # BLD AUTO: 0.02 K/UL — SIGNIFICANT CHANGE UP (ref 0–0.5)
EOSINOPHIL # BLD AUTO: 0.02 K/UL — SIGNIFICANT CHANGE UP (ref 0–0.5)
EOSINOPHIL NFR BLD AUTO: 0.4 % — SIGNIFICANT CHANGE UP (ref 0–6)
EOSINOPHIL NFR BLD AUTO: 0.4 % — SIGNIFICANT CHANGE UP (ref 0–6)
GLUCOSE SERPL-MCNC: 84 MG/DL — SIGNIFICANT CHANGE UP (ref 70–99)
GLUCOSE SERPL-MCNC: 84 MG/DL — SIGNIFICANT CHANGE UP (ref 70–99)
GLUCOSE UR QL: NEGATIVE MG/DL — SIGNIFICANT CHANGE UP
GLUCOSE UR QL: NEGATIVE MG/DL — SIGNIFICANT CHANGE UP
HCG SERPL-ACNC: <1 MIU/ML — SIGNIFICANT CHANGE UP
HCG SERPL-ACNC: <1 MIU/ML — SIGNIFICANT CHANGE UP
HCT VFR BLD CALC: 35.6 % — SIGNIFICANT CHANGE UP (ref 34.5–45)
HCT VFR BLD CALC: 35.6 % — SIGNIFICANT CHANGE UP (ref 34.5–45)
HGB BLD-MCNC: 11.3 G/DL — LOW (ref 11.5–15.5)
HGB BLD-MCNC: 11.3 G/DL — LOW (ref 11.5–15.5)
IMM GRANULOCYTES NFR BLD AUTO: 0.4 % — SIGNIFICANT CHANGE UP (ref 0–0.9)
IMM GRANULOCYTES NFR BLD AUTO: 0.4 % — SIGNIFICANT CHANGE UP (ref 0–0.9)
KETONES UR-MCNC: 15 MG/DL
KETONES UR-MCNC: 15 MG/DL
LEUKOCYTE ESTERASE UR-ACNC: NEGATIVE — SIGNIFICANT CHANGE UP
LEUKOCYTE ESTERASE UR-ACNC: NEGATIVE — SIGNIFICANT CHANGE UP
LIDOCAIN IGE QN: 25 U/L — SIGNIFICANT CHANGE UP (ref 13–75)
LIDOCAIN IGE QN: 25 U/L — SIGNIFICANT CHANGE UP (ref 13–75)
LYMPHOCYTES # BLD AUTO: 2.11 K/UL — SIGNIFICANT CHANGE UP (ref 1–3.3)
LYMPHOCYTES # BLD AUTO: 2.11 K/UL — SIGNIFICANT CHANGE UP (ref 1–3.3)
LYMPHOCYTES # BLD AUTO: 41.4 % — SIGNIFICANT CHANGE UP (ref 13–44)
LYMPHOCYTES # BLD AUTO: 41.4 % — SIGNIFICANT CHANGE UP (ref 13–44)
MCHC RBC-ENTMCNC: 29.5 PG — SIGNIFICANT CHANGE UP (ref 27–34)
MCHC RBC-ENTMCNC: 29.5 PG — SIGNIFICANT CHANGE UP (ref 27–34)
MCHC RBC-ENTMCNC: 31.7 G/DL — LOW (ref 32–36)
MCHC RBC-ENTMCNC: 31.7 G/DL — LOW (ref 32–36)
MCV RBC AUTO: 93 FL — SIGNIFICANT CHANGE UP (ref 80–100)
MCV RBC AUTO: 93 FL — SIGNIFICANT CHANGE UP (ref 80–100)
MONOCYTES # BLD AUTO: 0.35 K/UL — SIGNIFICANT CHANGE UP (ref 0–0.9)
MONOCYTES # BLD AUTO: 0.35 K/UL — SIGNIFICANT CHANGE UP (ref 0–0.9)
MONOCYTES NFR BLD AUTO: 6.9 % — SIGNIFICANT CHANGE UP (ref 2–14)
MONOCYTES NFR BLD AUTO: 6.9 % — SIGNIFICANT CHANGE UP (ref 2–14)
NEUTROPHILS # BLD AUTO: 2.58 K/UL — SIGNIFICANT CHANGE UP (ref 1.8–7.4)
NEUTROPHILS # BLD AUTO: 2.58 K/UL — SIGNIFICANT CHANGE UP (ref 1.8–7.4)
NEUTROPHILS NFR BLD AUTO: 50.5 % — SIGNIFICANT CHANGE UP (ref 43–77)
NEUTROPHILS NFR BLD AUTO: 50.5 % — SIGNIFICANT CHANGE UP (ref 43–77)
NITRITE UR-MCNC: NEGATIVE — SIGNIFICANT CHANGE UP
NITRITE UR-MCNC: NEGATIVE — SIGNIFICANT CHANGE UP
NRBC # BLD: 0 /100 WBCS — SIGNIFICANT CHANGE UP (ref 0–0)
NRBC # BLD: 0 /100 WBCS — SIGNIFICANT CHANGE UP (ref 0–0)
PH UR: 5 — SIGNIFICANT CHANGE UP (ref 5–8)
PH UR: 5 — SIGNIFICANT CHANGE UP (ref 5–8)
PLATELET # BLD AUTO: 237 K/UL — SIGNIFICANT CHANGE UP (ref 150–400)
PLATELET # BLD AUTO: 237 K/UL — SIGNIFICANT CHANGE UP (ref 150–400)
POTASSIUM SERPL-MCNC: 4.2 MMOL/L — SIGNIFICANT CHANGE UP (ref 3.5–5.3)
POTASSIUM SERPL-MCNC: 4.2 MMOL/L — SIGNIFICANT CHANGE UP (ref 3.5–5.3)
POTASSIUM SERPL-SCNC: 4.2 MMOL/L — SIGNIFICANT CHANGE UP (ref 3.5–5.3)
POTASSIUM SERPL-SCNC: 4.2 MMOL/L — SIGNIFICANT CHANGE UP (ref 3.5–5.3)
PROT SERPL-MCNC: 6.6 GM/DL — SIGNIFICANT CHANGE UP (ref 6–8.3)
PROT SERPL-MCNC: 6.6 GM/DL — SIGNIFICANT CHANGE UP (ref 6–8.3)
PROT UR-MCNC: 100 MG/DL
PROT UR-MCNC: 100 MG/DL
RBC # BLD: 3.83 M/UL — SIGNIFICANT CHANGE UP (ref 3.8–5.2)
RBC # BLD: 3.83 M/UL — SIGNIFICANT CHANGE UP (ref 3.8–5.2)
RBC # FLD: 14.4 % — SIGNIFICANT CHANGE UP (ref 10.3–14.5)
RBC # FLD: 14.4 % — SIGNIFICANT CHANGE UP (ref 10.3–14.5)
SODIUM SERPL-SCNC: 140 MMOL/L — SIGNIFICANT CHANGE UP (ref 135–145)
SODIUM SERPL-SCNC: 140 MMOL/L — SIGNIFICANT CHANGE UP (ref 135–145)
SP GR SPEC: 1.02 — SIGNIFICANT CHANGE UP (ref 1–1.03)
SP GR SPEC: 1.02 — SIGNIFICANT CHANGE UP (ref 1–1.03)
UROBILINOGEN FLD QL: 0.2 MG/DL — SIGNIFICANT CHANGE UP (ref 0.2–1)
UROBILINOGEN FLD QL: 0.2 MG/DL — SIGNIFICANT CHANGE UP (ref 0.2–1)
WBC # BLD: 5.1 K/UL — SIGNIFICANT CHANGE UP (ref 3.8–10.5)
WBC # BLD: 5.1 K/UL — SIGNIFICANT CHANGE UP (ref 3.8–10.5)
WBC # FLD AUTO: 5.1 K/UL — SIGNIFICANT CHANGE UP (ref 3.8–10.5)
WBC # FLD AUTO: 5.1 K/UL — SIGNIFICANT CHANGE UP (ref 3.8–10.5)

## 2024-01-14 PROCEDURE — 74177 CT ABD & PELVIS W/CONTRAST: CPT | Mod: 26

## 2024-01-14 PROCEDURE — 99285 EMERGENCY DEPT VISIT HI MDM: CPT

## 2024-01-14 RX ORDER — ACETAMINOPHEN 500 MG
975 TABLET ORAL ONCE
Refills: 0 | Status: COMPLETED | OUTPATIENT
Start: 2024-01-14 | End: 2024-01-14

## 2024-01-14 RX ADMIN — Medication 975 MILLIGRAM(S): at 18:11

## 2024-01-14 RX ADMIN — Medication 975 MILLIGRAM(S): at 18:56

## 2024-01-14 NOTE — ED ADULT NURSE NOTE - PATIENT'S PREFERRED PRONOUN
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    ID is consulted for R foot OM  Recently had partial amputation of 2nd digit 11/9, low concern for residual infection, low concern of viability; D/C course of Augmentin x 5 days post-op  Afebrile, no leukocytosis  ESR 99, CRP 38, similar to 11/2022  R foot xray showed erosive changes at the distal aspect of the fifth right metatarsal and the proximal aspect of the fifth proximal phalanx at the MTP joint  S/p bedside I&D, wound Cx pending  BCx pending  MRSA PCR pending    Antibiotics:  Vancomycin 12/26  Cefepime 12/26 -> 12/27  Zosyn 12/27 -->    Right foot wound look infected with surrounding cellulitis  Would treat for SSTIs with Zosyn with anaerobic and pseudomonas coverage (+ pseudomonas in prior culture)  CMV PCR negative 11/19/22    IMPRESSION:  Right foot ulcer  Right foot cellulitis  S/p OLT 6/2020  Hx CMV viremia    RECOMMENDATIONS:  - Continue IV Zosyn 3.375gm q8hrs  - Check CMV PCR (ordered)  - Follow up wound culture and blood culture  - Podiatry follow up  - Trend WBC, fever curve, transaminases, creatinine daily  - Will continue to follow    I will continue to follow. Please feel free to contact me with any further questions.    Eusebio Pérez M.D.  [facility] Division of Infectious Disease  8AM-5PM Monday - Friday: Available on [employer] Teams  After Hours and Holidays (or if no response on Microsoft Teams): Please contact the Infectious Diseases Office at [phone]   Her/She

## 2024-01-14 NOTE — ED ADULT NURSE NOTE - NS ED NURSE DISCH DISPOSITION
"[facility].   Chief Complaint   Patient presents with    Rash     Mother reports that it started on his back this morning, then when she picked him up from school and it was on the front of his neck and resolved on his back.   Pt states that he started taking guanfacine 2 days ago.      Patient awake, alert, playing games on cell phone and active in WR. Skin warm, pink and dry, Respirations regular and unlabored. Red rash to right side of upper neck, blanchable. No itching.   [facility] Advised to notify staff of any changes and or concerns.    BP 94/61   Pulse 100   Temp 36.4 °C (97.6 °F) (Temporal)   Resp 26   Ht 1.23 m (4' 0.43\")   Wt 33 kg (72 lb 12 oz)   SpO2 96%   BMI 21.81 kg/m²     " Discharged

## 2024-01-14 NOTE — ED ADULT NURSE REASSESSMENT NOTE - NS ED NURSE REASSESS COMMENT FT1
RN Gibson called group home and spoke with Renee Lizama, she was made aware that pt is being discharged from hospital back to group [place]. RN Gibson called group home and spoke with Renee Lizama, she was made aware that pt is being discharged from hospital back to group [place].

## 2024-01-14 NOTE — ED ADULT NURSE NOTE - NSFALLUNIVINTERV_ED_ALL_ED
Bed/Stretcher in lowest position, wheels locked, appropriate side rails in place/Call bell, personal items and telephone in reach/Instruct patient to call for assistance before getting out of bed/chair/stretcher/Non-slip footwear applied when patient is off stretcher/[place] to call system/Physically safe environment - no spills, clutter or unnecessary equipment/Purposeful proactive rounding/Room/bathroom lighting operational, light cord in reach Bed/Stretcher in lowest position, wheels locked, appropriate side rails in place/Call bell, personal items and telephone in reach/Instruct patient to call for assistance before getting out of bed/chair/stretcher/Non-slip footwear applied when patient is off stretcher/[place] to call system/Physically safe environment - no spills, clutter or unnecessary equipment/Purposeful proactive rounding/Room/bathroom lighting operational, light cord in reach

## 2024-01-14 NOTE — ED PROVIDER NOTE - OBJECTIVE STATEMENT
Patient is a 33-year-old female with history of  PMH Bipolar, Schizoaffective Disorder, DM, developmental delay who presents to the ED with abdominal pain.  Patient is a poor historian.  States that the abdominal pain started earlier today and describes it as general and everywhere.  Denies being sexually active despite the triage note saying that she thinks she might be having babies.  Denies nausea, vomiting, fevers, chills, dysuria, hematuria, troubles with bowel movements.

## 2024-01-14 NOTE — ED PROVIDER NOTE - PATIENT PORTAL LINK FT
You can access the FollowMyHealth Patient Portal offered by [facility] by registering at the following website: [email or web address]. By joining [employer]’s FollowMyHealth portal, you will also be able to view your health information using other applications (apps) compatible with our system. You can access the FollowMyHealth Patient Portal offered by [facility] by registering at the following website: [email or web address]. By joining [employer]’s FollowMyHealth portal, you will also be able to view your health information using other applications (apps) compatible with our system.

## 2024-01-14 NOTE — ED PROVIDER NOTE - PROGRESS NOTE DETAILS
Rickey PGY3   Bloodwork unactionable. UA not concerning with UTI. No acute findings on CTAP. Discussed results with patient and will discharge back to facility.

## 2024-01-14 NOTE — ED ADULT NURSE NOTE - PATIENT'S GENDER IDENTITY
CARDIOLOGY NEW OFFICE VISIT      HPI: Amalia Dave is a 59 year old male being seen today for evaluation of cardiovascular risk with prior history of chest pressure and palpitations. Also prior history of smoking, quite at age 33 but with 25 pack years.   Started taking low dose atenolol for palpitations 10 years ago and now at 25 mg twice daily. Increased the dose because of the breakthrough palpitations but more recently without symptoms. No prior history of hypertension. He has BP machine at home but not been checking his blood pressure.     Amalia has elliptical machine and rowing machine at home. Last time exercised about 2 months ago and then without symptoms. 12 year history of occasional palpitations/chest discomfort with emotional stress. Metoprolol trial was not successful and went back to atenolol twice daily. At that time chest discomfort improved. Always carries around with him the atenolol.     He is able to walk 6 flight of stairs (FOS) without stopping at normal or fast pace. He did that this morning walking from the [employer] parking.    Denies recent history of chest discomfort, dyspnea, PND (paroxysmal nocturnal dyspnea), orthopnea, pedal edema, lightheadedness and syncope.     The patient's risk factor profile is: (?) HTN, (-) DM, (-) hypercholesterolemia, (+)  prior 25 pack-year tobacco use, (-) fam Hx premature CAD. Mother with palpitations taking atenolol.   The patient has no history of cardiovascular disease (CAD, CHF, arrhythmia, valvular heart disease).  The patient has no Hx of PAD or cerebrovascular disease.  The patient has not undergone prior cardiovascular evaluation and has never had an ECHO, stress study, cardiac catheterization, or EP study.     The 10-year ASCVD risk score ([place] LINDA Jr., et al., 2013) is: 5.6%    Values used to calculate the score:      Age: 59 years      Sex: Male      Is Non- : No      Diabetic: No      Tobacco smoker: No       Systolic Blood Pressure: 123 mmHg      Is BP treated: No      HDL Cholesterol: 43 mg/dL      Total Cholesterol: 159 mg/dL    PAST MEDICAL HISTORY:  No past medical history on file.    CURRENT MEDICATIONS:  Current Outpatient Medications   Medication Sig Dispense Refill     atenolol (TENORMIN) 25 MG tablet Take 25 mg by mouth daily Per pt takes 25mg PO BID       atomoxetine (STRATTERA) 10 MG capsule Take 10 mg by mouth daily Per pt takes 10mg PO QAM         PAST SURGICAL HISTORY:  No past surgical history on file.    ALLERGIES  Patient has no allergy information on record.    FAMILY HX:  No family history on file.    SOCIAL HX:  Social History     Socioeconomic History     Marital status:      Spouse name: None     Number of children: None     Years of education: None     Highest education level: None   Occupational History     None   Social Needs     Financial resource strain: None     Food insecurity:     Worry: None     Inability: None     Transportation needs:     Medical: None     Non-medical: None   Tobacco Use     Smoking status: Former Smoker     Smokeless tobacco: Never Used   Substance and Sexual Activity     Alcohol use: None     Drug use: None     Sexual activity: None   Lifestyle     Physical activity:     Days per week: None     Minutes per session: None     Stress: None   Relationships     Social connections:     Talks on phone: None     Gets together: None     Attends Muslim service: None     Active member of club or organization: None     Attends meetings of clubs or organizations: None     Relationship status: None     Intimate partner violence:     Fear of current or ex partner: None     Emotionally abused: None     Physically abused: None     Forced sexual activity: None   Other Topics Concern     None   Social History Narrative     None     Two children and wife at home. Both from [facility] [place] and came here 1993. 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    ROS:  Constitutional: No  recent fever, chills, or sweats. No significant weight gain/loss.   ENT: No epistaxis.  Allergies/Immunologic: As above.   Respiratory: No hemoptysis.   Cardiovascular: As per HPI.   GI: No hematemesis, melena, or hematochezia.   : No hematuria.   Skin: No petechia or ecchymosis.   Endocrinology: Negative.   Musculoskeletal: No myalgia.    VITAL SIGNS:  /84 (BP Location: Right arm, Patient Position: Chair, Cuff Size: Adult Regular)   Pulse 78   Ht 1.829 m (6')   Wt 95.5 kg (210 lb 9.6 oz)   SpO2 98%   BMI 28.56 kg/m      Body mass index is 28.56 kg/m .  Wt Readings from Last 2 Encounters:   02/27/19 95.5 kg (210 lb 9.6 oz)       PHYSICAL EXAM  Amalia Dave is a 59 year old male in no acute distress.  HEENT: Unremarkable.  Neck: JVP normal.  Carotids bilaterally without bruits.  Lungs: CTA.  Cor: RRR. Normal S1 and S2.  No murmur, rub, or gallop.   Abd: Soft, nontender, nondistended.  NABS.  No pulsatile mass.  Extremities: No C/C/E.  Pulses symmetric in upper and lower extremities.  Neuro: Grossly intact.    LABS    Recent Labs   Lab Test 02/27/19  0821   CHOL 159   HDL 43   LDL 94   TRIG 110   NHDL 116      EKG:  During CT with sinus rhythm     ECHO: Never    STRESS TEST:  Never    CARDIAC CATH:  Never    CT:  Today  IMPRESSIONS:  1.  No coronary calcifications.  2.  The total Agatston calcium score is 0 placing the patient in the  lowest percentile when compared to age and gender matched control  group.  3.  Ascending aorta measures 3.9 cm, which is normal for given age and  Gender.      Study Result     EXAMINATION: RADIOLOGIST CONSULT FOR CARDIOLOGY, 2/27/2019 8:42 AM     TECHNIQUE:  Radiology consultation for cardiac CT.     COMPARISON: None available.     HISTORY: Tobacco use; CAD eval, low CHD risk, Asx for ischemia; At  risk for coronary artery disease     FINDINGS:     The visualized portions of the central tracheobronchial tree are  patent. No focal airspace opacities. No visualized  pneumothorax or  pleural effusion. No suspicious nodules or masses. Calcified  granulomas. Multiple enlarged mediastinal lymph nodes, including 18 mm  subcarinal node (series 4, image 13) and partially visualized 13 mm  prevascular node (series 4, image 1).      The heart size is normal. Mild coronary artery calcifications. No  pericardial effusion. Visualized portions of the thoracic great  vessels are unremarkable.      Limited views of the abdomen is limited. Splenomegaly partly imaged.  No worrisome bony or soft tissue lesions.                                                                      IMPRESSION:   1. Nonspecific mediastinal lymphadenopathy and splenomegaly. Recommend  comparison with prior imaging if available and/or further evaluation  with dedicated chest CT.         ASSESSMENT AND PLAN:    1. Ascending aorta dilatation (H)    2. Palpitations    3. History of prior cigarette smoking    4. Splenomegaly    5. Lymphadenopathy      59 year old male being seen today for evaluation of cardiovascular risk with prior history of chest pressure and palpitations. Also prior history of smoking, quite at age 33 but with 25 pack years.     Started taking low dose atenolol for palpitations 10 years ago and now at 25 mg twice daily. Increased the dose because of the breakthrough palpitations but more recently without symptoms. No prior history of hypertension. He has BP machine at home but not been checking his blood pressure.     Amalia has elliptical machine and rowing machine at home. Last time exercised about 2 months ago and then without symptoms. 12 year history of occasional palpitations/chest discomfort with emotional stress. Metoprolol trial was not successful and went back to atenolol twice daily. At that time chest discomfort improved. Always carries around with him the atenolol.     He is able to walk 6 flight of stairs (FOS) without stopping at normal or fast pace. He did that this morning walking from  the [facility] parking.    Based on non-contrast CT and fasting lipid panel with risk calculator Amalia does not qualify for statin therapy or aspirin. His chest discomfort unlikely to be coronary disease but dilated aorta makes for concern for occult hypertension and might benefit from assessment at [facility] for vascular stiffness and consideration of ACE/ARB in addition to atenolol    Dilated ascending aorta measuring 4.0 cm based on my measurements and plan for CTA in 6-12 months, based on future non-cardiovascular CT scanning.     Incidental adenopathy of the mediastinum with splenomegaly and will refer workup to PCP. 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 Stated that he has prior history of adenopathy     Recommendations:   1. No change in medications  2. CTA of chest with FLASH protocol after 6-12 months  3. Follow BP  4. Consider [facility] for vascular stiffness  5. PCP for adenopathy/splenomegaly    Follow-up: 6-12 months after CTA of the chest    Aris Trevino MD    Division of Cardiology  [facility]    Clinics & Surgery [address] [id number] [phone] Appointments  [phone] Fax  [phone] After hours    Clinic nurse:  Brenton Daley LPN   Nurse Care Coordinator- Heart Care   [phone] option 1, than option 3    Academic Mailing address:  [facility]  Department of Internal Medicine ([facility] [address])  [address] [place] [address]           Female

## 2024-01-14 NOTE — ED PROVIDER NOTE - PHYSICAL EXAMINATION
Shy Ulrich MD  GENERAL: Patient awake alert NAD.  HEENT: NC/AT, Moist mucous membranes, EOMI.  LUNGS: CTAB, no wheezes or crackles.   CARDIAC: RRR, no m/r/g.    ABDOMEN: Soft, +generally tender, ND, No rebound, guarding. No CVA tenderness.   EXT: No edema. No calf tenderness.  MSK: No spinal tenderness, no pain with movement, no deformities.  NEURO: A&Ox3. Moving all extremities.  SKIN: Warm and dry. No rash.

## 2024-01-14 NOTE — ED PROVIDER NOTE - NSFOLLOWUPINSTRUCTIONS_ED_ALL_ED_FT
You were seen in the emergency department for abdominal pain. Your workup in the emergency department includes bloodwork, urine study and CT scan of abdomen and pelvis. You can find the results of all the tests in this discharge packet. Please follow up with your primary care doctor within 48 hours for continuation of care.     Return to the emergency department if you experience any new/concerning/worsening symptoms such as but not limited to: fever (>100.3F), intractable nausea, vomiting, chest pain, shortness of breath, worsening abdominal pain.

## 2024-01-14 NOTE — ED PROVIDER NOTE - CLINICAL SUMMARY MEDICAL DECISION MAKING FREE TEXT BOX
Serg - Patient is a 33-year-old female with history of  PMH Bipolar, Schizoaffective Disorder, DM, developmental delay who presents to the ED with abdominal pain. ddx includes but is not limited to UTI/pyelo, appy, preg, diverticulitis, pancreatitis. will check labs, urine, CT abd. Tylenol for pain.

## 2024-01-15 LAB
CULTURE RESULTS: SIGNIFICANT CHANGE UP
SPECIMEN SOURCE: SIGNIFICANT CHANGE UP

## 2024-01-17 ENCOUNTER — EMERGENCY (EMERGENCY)
Facility: HOSPITAL | Age: 34
LOS: 0 days | Discharge: HOME | End: 2024-01-18
Attending: EMERGENCY MEDICINE
Payer: MEDICAID

## 2024-01-17 VITALS
SYSTOLIC BLOOD PRESSURE: 111 MMHG | HEIGHT: 62 IN | TEMPERATURE: 98 F | RESPIRATION RATE: 18 BRPM | HEART RATE: 104 BPM | WEIGHT: 169.98 LBS | DIASTOLIC BLOOD PRESSURE: 78 MMHG | OXYGEN SATURATION: 100 %

## 2024-01-17 DIAGNOSIS — Z04.89 ENCOUNTER FOR EXAMINATION AND OBSERVATION FOR OTHER SPECIFIED REASONS: ICD-10-CM

## 2024-01-17 DIAGNOSIS — Z91.013 ALLERGY TO SEAFOOD: ICD-10-CM

## 2024-01-17 DIAGNOSIS — R00.0 TACHYCARDIA, UNSPECIFIED: ICD-10-CM

## 2024-01-17 DIAGNOSIS — Z91.018 ALLERGY TO OTHER FOODS: ICD-10-CM

## 2024-01-17 PROCEDURE — 99284 EMERGENCY DEPT VISIT MOD MDM: CPT
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
## 2024-01-18 VITALS
DIASTOLIC BLOOD PRESSURE: 71 MMHG | RESPIRATION RATE: 16 BRPM | OXYGEN SATURATION: 100 % | TEMPERATURE: 98 F | HEART RATE: 86 BPM | SYSTOLIC BLOOD PRESSURE: 108 MMHG

## 2024-01-18 RX ORDER — DIVALPROEX SODIUM 500 MG/1
750 TABLET, DELAYED RELEASE ORAL ONCE
Refills: 0 | Status: COMPLETED | OUTPATIENT
Start: 2024-01-18 | End: 2024-01-18

## 2024-01-18 RX ORDER — ACETAMINOPHEN 500 MG
975 TABLET ORAL ONCE
Refills: 0 | Status: COMPLETED | OUTPATIENT
Start: 2024-01-18 | End: 2024-01-18

## 2024-01-18 RX ORDER — METFORMIN HYDROCHLORIDE 850 MG/1
500 TABLET ORAL ONCE
Refills: 0 | Status: COMPLETED | OUTPATIENT
Start: 2024-01-18 | End: 2024-01-18

## 2024-01-18 RX ORDER — ARIPIPRAZOLE 15 MG/1
5 TABLET ORAL ONCE
Refills: 0 | Status: COMPLETED | OUTPATIENT
Start: 2024-01-18 | End: 2024-01-18

## 2024-01-18 RX ORDER — LANOLIN ALCOHOL/MO/W.PET/CERES
9 CREAM (GRAM) TOPICAL ONCE
Refills: 0 | Status: COMPLETED | OUTPATIENT
Start: 2024-01-18 | End: 2024-01-18

## 2024-01-18 RX ADMIN — Medication 975 MILLIGRAM(S): at 01:00

## 2024-01-18 RX ADMIN — Medication 9 MILLIGRAM(S): at 00:57

## 2024-01-18 RX ADMIN — ARIPIPRAZOLE 5 MILLIGRAM(S): 15 TABLET ORAL at 00:57

## 2024-01-18 RX ADMIN — DIVALPROEX SODIUM 750 MILLIGRAM(S): 500 TABLET, DELAYED RELEASE ORAL at 00:57

## 2024-01-18 RX ADMIN — METFORMIN HYDROCHLORIDE 500 MILLIGRAM(S): 850 TABLET ORAL at 00:57

## 2024-01-18 RX ADMIN — Medication 975 MILLIGRAM(S): at 01:30

## 2024-01-18 NOTE — ED PROVIDER NOTE - CARE PROVIDER_API CALL
Sonu Lopez  Internal Medicine  [address] [place] [address]  Phone: [phone]  Fax: [phone]  Follow Up Time: Urgent

## 2024-01-18 NOTE — ED PROVIDER NOTE - PHYSICAL EXAMINATION
Vitals: tachy at 104, otherwise WNL  Gen: AAOx3, NAD, sitting comfortably in stretcher, non-toxic  Head: ncat, perrla, eomi b/l  Neck: supple, no lymphadenopathy, no midline deviation  Heart: rrr, no m/r/g  Lungs: CTA b/l, no rales/ronchi/wheezes  Abd: soft, nontender, non-distended, no rebound or guarding  Ext: no clubbing/cyanosis/edema  Neuro: sensation and muscle strength intact b/l, steady gait   derm: no bruising/scratches/hematoma to anterior L chest wall/shoulder, no evidence of trauma or bony deformity
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
## 2024-01-18 NOTE — ED PROVIDER NOTE - PATIENT PORTAL LINK FT
You can access the FollowMyHealth Patient Portal offered by [facility] by registering at the following website: [email or web address]. By joining [employer]’s FollowMyHealth portal, you will also be able to view your health information using other applications (apps) compatible with our system.

## 2024-01-18 NOTE — ED ADULT NURSE REASSESSMENT NOTE - NS ED NURSE REASSESS COMMENT FT1
Attempted to contact group home. No answer at this time.
Called  x3, still no answer at this time.
multiple attempts made to contact group home of pt. no answer at this time. primary Rn Stevie informed.
Pt observed lying comfortably in stretcher at this time. Pt reassessed for pain, Pt reports partial relief of symptoms. Pt updated on plan of care at this time.

## 2024-01-18 NOTE — ED PROVIDER NOTE - NSFOLLOWUPCLINICS_GEN_ALL_ED_FT
[facility] Psychiatry  Psychiatry  [address] [place] [address]  Phone: [phone]  Fax:   Follow Up Time: Urgent
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
## 2024-01-20 ENCOUNTER — EMERGENCY (EMERGENCY)
Facility: HOSPITAL | Age: 34
LOS: 0 days | Discharge: HOME | End: 2024-01-21
Attending: EMERGENCY MEDICINE
Payer: MEDICAID

## 2024-01-20 VITALS
HEIGHT: 62 IN | OXYGEN SATURATION: 99 % | WEIGHT: 259.93 LBS | TEMPERATURE: 99 F | RESPIRATION RATE: 18 BRPM | SYSTOLIC BLOOD PRESSURE: 96 MMHG | DIASTOLIC BLOOD PRESSURE: 70 MMHG | HEART RATE: 99 BPM

## 2024-01-20 DIAGNOSIS — Z91.013 ALLERGY TO SEAFOOD: ICD-10-CM

## 2024-01-20 DIAGNOSIS — E11.9 TYPE 2 DIABETES MELLITUS WITHOUT COMPLICATIONS: ICD-10-CM

## 2024-01-20 DIAGNOSIS — R00.2 PALPITATIONS: ICD-10-CM

## 2024-01-20 DIAGNOSIS — F31.9 BIPOLAR DISORDER, UNSPECIFIED: ICD-10-CM

## 2024-01-20 DIAGNOSIS — Z91.018 ALLERGY TO OTHER FOODS: ICD-10-CM

## 2024-01-20 DIAGNOSIS — F79 UNSPECIFIED INTELLECTUAL DISABILITIES: ICD-10-CM

## 2024-01-20 DIAGNOSIS — F25.9 SCHIZOAFFECTIVE DISORDER, UNSPECIFIED: ICD-10-CM

## 2024-01-20 LAB
ALBUMIN SERPL ELPH-MCNC: 3.1 G/DL — LOW (ref 3.3–5)
ALP SERPL-CCNC: 52 U/L — SIGNIFICANT CHANGE UP (ref 40–120)
ALT FLD-CCNC: 16 U/L — SIGNIFICANT CHANGE UP (ref 12–78)
ANION GAP SERPL CALC-SCNC: 5 MMOL/L — SIGNIFICANT CHANGE UP (ref 5–17)
AST SERPL-CCNC: 11 U/L — LOW (ref 15–37)
BASOPHILS # BLD AUTO: 0.02 K/UL — SIGNIFICANT CHANGE UP (ref 0–0.2)
BASOPHILS NFR BLD AUTO: 0.3 % — SIGNIFICANT CHANGE UP (ref 0–2)
BILIRUB SERPL-MCNC: 0.3 MG/DL — SIGNIFICANT CHANGE UP (ref 0.2–1.2)
BUN SERPL-MCNC: 9 MG/DL — SIGNIFICANT CHANGE UP (ref 7–23)
CALCIUM SERPL-MCNC: 8.8 MG/DL — SIGNIFICANT CHANGE UP (ref 8.5–10.1)
CHLORIDE SERPL-SCNC: 109 MMOL/L — HIGH (ref 96–108)
CO2 SERPL-SCNC: 28 MMOL/L — SIGNIFICANT CHANGE UP (ref 22–31)
CREAT SERPL-MCNC: 0.81 MG/DL — SIGNIFICANT CHANGE UP (ref 0.5–1.3)
EGFR: 99 ML/MIN/1.73M2 — SIGNIFICANT CHANGE UP
EOSINOPHIL # BLD AUTO: 0.03 K/UL — SIGNIFICANT CHANGE UP (ref 0–0.5)
EOSINOPHIL NFR BLD AUTO: 0.5 % — SIGNIFICANT CHANGE UP (ref 0–6)
GLUCOSE SERPL-MCNC: 80 MG/DL — SIGNIFICANT CHANGE UP (ref 70–99)
HCG SERPL-ACNC: <1 MIU/ML — SIGNIFICANT CHANGE UP
HCT VFR BLD CALC: 37 % — SIGNIFICANT CHANGE UP (ref 34.5–45)
HGB BLD-MCNC: 12.1 G/DL — SIGNIFICANT CHANGE UP (ref 11.5–15.5)
IMM GRANULOCYTES NFR BLD AUTO: 0.3 % — SIGNIFICANT CHANGE UP (ref 0–0.9)
LYMPHOCYTES # BLD AUTO: 2.63 K/UL — SIGNIFICANT CHANGE UP (ref 1–3.3)
LYMPHOCYTES # BLD AUTO: 44.4 % — HIGH (ref 13–44)
MAGNESIUM SERPL-MCNC: 1.9 MG/DL — SIGNIFICANT CHANGE UP (ref 1.6–2.6)
MCHC RBC-ENTMCNC: 30.4 PG — SIGNIFICANT CHANGE UP (ref 27–34)
MCHC RBC-ENTMCNC: 32.7 G/DL — SIGNIFICANT CHANGE UP (ref 32–36)
MCV RBC AUTO: 93 FL — SIGNIFICANT CHANGE UP (ref 80–100)
MONOCYTES # BLD AUTO: 0.45 K/UL — SIGNIFICANT CHANGE UP (ref 0–0.9)
MONOCYTES NFR BLD AUTO: 7.6 % — SIGNIFICANT CHANGE UP (ref 2–14)
NEUTROPHILS # BLD AUTO: 2.78 K/UL — SIGNIFICANT CHANGE UP (ref 1.8–7.4)
NEUTROPHILS NFR BLD AUTO: 46.9 % — SIGNIFICANT CHANGE UP (ref 43–77)
NRBC # BLD: 0 /100 WBCS — SIGNIFICANT CHANGE UP (ref 0–0)
PHOSPHATE SERPL-MCNC: 3.2 MG/DL — SIGNIFICANT CHANGE UP (ref 2.5–4.5)
PLATELET # BLD AUTO: 258 K/UL — SIGNIFICANT CHANGE UP (ref 150–400)
POTASSIUM SERPL-MCNC: 4.4 MMOL/L — SIGNIFICANT CHANGE UP (ref 3.5–5.3)
POTASSIUM SERPL-SCNC: 4.4 MMOL/L — SIGNIFICANT CHANGE UP (ref 3.5–5.3)
PROT SERPL-MCNC: 6.9 GM/DL — SIGNIFICANT CHANGE UP (ref 6–8.3)
RBC # BLD: 3.98 M/UL — SIGNIFICANT CHANGE UP (ref 3.8–5.2)
RBC # FLD: 14.2 % — SIGNIFICANT CHANGE UP (ref 10.3–14.5)
SODIUM SERPL-SCNC: 142 MMOL/L — SIGNIFICANT CHANGE UP (ref 135–145)
TROPONIN I, HIGH SENSITIVITY RESULT: 3.1 NG/L — SIGNIFICANT CHANGE UP
TSH SERPL-MCNC: 4.09 UIU/ML — HIGH (ref 0.36–3.74)
WBC # BLD: 5.93 K/UL — SIGNIFICANT CHANGE UP (ref 3.8–10.5)
WBC # FLD AUTO: 5.93 K/UL — SIGNIFICANT CHANGE UP (ref 3.8–10.5)

## 2024-01-20 PROCEDURE — 99284 EMERGENCY DEPT VISIT MOD MDM: CPT

## 2024-01-20 PROCEDURE — 93010 ELECTROCARDIOGRAM REPORT: CPT

## 2024-01-20 RX ORDER — ARIPIPRAZOLE 15 MG/1
5 TABLET ORAL ONCE
Refills: 0 | Status: COMPLETED | OUTPATIENT
Start: 2024-01-20 | End: 2024-01-20

## 2024-01-20 RX ORDER — SODIUM CHLORIDE 9 MG/ML
1000 INJECTION INTRAMUSCULAR; INTRAVENOUS; SUBCUTANEOUS ONCE
Refills: 0 | Status: COMPLETED | OUTPATIENT
Start: 2024-01-20 | End: 2024-01-20

## 2024-01-20 RX ADMIN — ARIPIPRAZOLE 5 MILLIGRAM(S): 15 TABLET ORAL at 23:22

## 2024-01-20 RX ADMIN — SODIUM CHLORIDE 1000 MILLILITER(S): 9 INJECTION INTRAMUSCULAR; INTRAVENOUS; SUBCUTANEOUS at 22:32

## 2024-01-20 NOTE — ED ADULT NURSE NOTE - ASSOCIATED SYMPTOMS
Pt presents to ED speaking in clear complete sentences c/o palpations pt states "I have been having palpations, since this morning and I went to [facility] yesterday and they said I have high blood pressure". Pt also states she is having a seizure, pt speaking in clear complete sentences, equal rise and fall of chest wall noted. Pt denies headache, vision changes, dizziness SOB, N/V. Pt currently vitally stable.

## 2024-01-20 NOTE — ED PROVIDER NOTE - PATIENT PORTAL LINK FT
You can access the FollowMyHealth Patient Portal offered by [facility] by registering at the following website: [email or web address]. By joining [employer]’s FollowMyHealth portal, you will also be able to view your health information using other applications (apps) compatible with our system.

## 2024-01-20 NOTE — ED PROVIDER NOTE - OBJECTIVE STATEMENT
33-year-old female with multiple ER visits to ED with schizoaffective and bipolar disorder diabetes and intellectual disability as well as schizophrenia otherwise complaining of palpitations noted.  Patient states that she also had high blood pressure.  Patient otherwise at baseline mental and health status at evaluation at triage.

## 2024-01-20 NOTE — ED ADULT NURSE NOTE - OBJECTIVE STATEMENT
Pt presents to ED speaking in clear complete sentences c/o palpations pt states "I have been having palpations, since this morning and I went to [facility] yesterday and they said I have high blood pressure". Pt also states she is having a seizure, pt speaking in clear complete sentences, equal rise and fall of chest wall noted. Pt denies headache, vision changes, dizziness SOB, N/V. Pt currently vitally stable.

## 2024-01-20 NOTE — ED PROVIDER NOTE - NSFOLLOWUPINSTRUCTIONS_ED_ALL_ED_FT
Heart Palpitations    AMBULATORY CARE:    Heart palpitations are feelings that your heart races, jumps, throbs, or flutters. You may feel extra beats, no beats for a short time, or skipped beats. You may have these feelings in your chest, throat, or neck. They may happen when you are sitting, standing, or lying. Heart palpitations may be frightening, but are usually not caused by a serious problem.    Call 911 or have someone else call for any of the following:    You have any of the following signs of a heart attack:  Squeezing, pressure, or pain in your chest    You may also have any of the following:  Discomfort or pain in your back, neck, jaw, stomach, or arm    Shortness of breath    Nausea or vomiting    Lightheadedness or a sudden cold sweat    You have any of the following signs of a stroke:  Numbness or drooping on one side of your face    Weakness in an arm or leg    Confusion or difficulty speaking    Dizziness, a severe headache, or vision loss    You faint or lose consciousness.  Seek care immediately if:    Your palpitations happen more often or last longer than usual.    You have palpitations and shortness of breath, nausea, sweating, or dizziness.  Contact your healthcare provider if:    You have questions or concerns about your condition or care.    Follow up with your healthcare provider as directed: You may need to follow up with a cardiologist. You may need tests to check for heart problems that cause palpitations. Write down your questions so you remember to ask them during your visits.    Treatment for heart palpitations is usually not needed. Your healthcare provider may stop or change your medicines if they are causing your palpitations. Conditions that cause palpitations, such as an abnormal heartbeat, will be treated.    Keep a record: Write down when your palpitations start and stop, what you were doing when they started, and your symptoms. Keep track of what you ate or drank within a few hours of your palpitations. Include anything that seemed to help your symptoms, such as lying down or holding your breath. This record will help you and your healthcare provider learn what triggers your palpitations. Bring this record with you to your follow up visits.    Help prevent heart palpitations:    Manage stress and anxiety. Find ways to relax such as listening to music, meditating, or doing yoga. Exercise can also help decrease stress and anxiety. Talk to someone you trust about your stress or anxiety. You can also talk to a therapist.    Get plenty of sleep every night. Ask your healthcare provider how much sleep you need each night.    Do not drink caffeine or alcohol. Caffeine and alcohol can make your palpitations worse. Caffeine is found in soda, coffee, tea, chocolate, and drinks that increase your energy.    Do not smoke. Nicotine and other chemicals in cigarettes and cigars may damage your heart and blood vessels. Ask your healthcare provider for information if you currently smoke and need help to quit. E-cigarettes or smokeless tobacco still contain nicotine. Talk to your healthcare provider before you use these products.    Do not use illegal drugs. Talk to your healthcare provider if you use illegal drugs and want help to quit.
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
## 2024-01-21 VITALS
SYSTOLIC BLOOD PRESSURE: 120 MMHG | TEMPERATURE: 98 F | DIASTOLIC BLOOD PRESSURE: 81 MMHG | HEART RATE: 82 BPM | RESPIRATION RATE: 18 BRPM | OXYGEN SATURATION: 98 %

## 2024-01-21 NOTE — ED ADULT NURSE REASSESSMENT NOTE - NS ED NURSE REASSESS COMMENT FT1
Multiple attempts made to call group home since 7AM and no answer. Pt dcd awaiting transportation. Transportation on hold at this time until group home states they are available to received pt. Pt resting comfortably on stretcher, in NAD, respirations appear equal and unlabored. Safety measures maintained.

## 2024-01-21 NOTE — ED ADULT NURSE REASSESSMENT NOTE - NS ED NURSE REASSESS COMMENT FT1
spoke with Daniela group home coordinator made aware patient discharge ready for transport services back to group [place] at this time

## 2024-01-21 NOTE — ED ADULT NURSE REASSESSMENT NOTE - NS ED NURSE REASSESS COMMENT FT1
Pt received in stretcher with eyes closed equal rise and fall of chest wall noted with breathing unlabored. Safety maintained, assessment ongoing.

## 2024-01-21 NOTE — CHART NOTE - NSCHARTNOTEFT_GEN_A_CORE
SW outreached to [facility] ([phone]) in order to see status of pt's p/u from ER. [facility] staff informed pt that they would need to make contact with staff at pt's group home before they can schedule the trip. SW called pt's group home multiple time and left messages but never heard back. ROCIO found a contact number for pt's , Thuy ([phone]), from an old admission and outreached to her about making contact with her staff. Pt's  stated that she would outreach to her staff and have them call [facility] in order to move pt's d/c forward.
SW followed up with pt's group home, [facility] ([phone]), in order to inform group home staff that pt was to be discharged from ER. SW confirmed with pt's group home and scheduled pt to be picked by next available ambulette.

## 2024-01-22 ENCOUNTER — EMERGENCY (EMERGENCY)
Facility: HOSPITAL | Age: 34
LOS: 1 days | Discharge: HOME | End: 2024-01-22
Attending: EMERGENCY MEDICINE | Admitting: EMERGENCY MEDICINE
Payer: MEDICAID

## 2024-01-22 VITALS
TEMPERATURE: 98 F | DIASTOLIC BLOOD PRESSURE: 75 MMHG | RESPIRATION RATE: 18 BRPM | HEIGHT: 62 IN | SYSTOLIC BLOOD PRESSURE: 107 MMHG | HEART RATE: 100 BPM | OXYGEN SATURATION: 100 %

## 2024-01-22 PROCEDURE — 99284 EMERGENCY DEPT VISIT MOD MDM: CPT

## 2024-01-22 RX ORDER — DIPHENHYDRAMINE HCL 50 MG
50 CAPSULE ORAL ONCE
Refills: 0 | Status: COMPLETED | OUTPATIENT
Start: 2024-01-22 | End: 2024-01-22

## 2024-01-22 RX ORDER — HALOPERIDOL DECANOATE 100 MG/ML
5 INJECTION INTRAMUSCULAR ONCE
Refills: 0 | Status: COMPLETED | OUTPATIENT
Start: 2024-01-22 | End: 2024-01-22

## 2024-01-22 RX ADMIN — HALOPERIDOL DECANOATE 5 MILLIGRAM(S): 100 INJECTION INTRAMUSCULAR at 15:56

## 2024-01-22 RX ADMIN — Medication 50 MILLIGRAM(S): at 15:56

## 2024-01-22 RX ADMIN — Medication 2 MILLIGRAM(S): at 15:56

## 2024-01-22 NOTE — ED PROVIDER NOTE - PROGRESS NOTE DETAILS
Sherlyn Solis (PGY1): Patient ran out of room and into triage area, screaming curse words and becoming hostile towards staff and other patients despite attempts at deescalation. required sedation with haldol/ativan. Will monitor

## 2024-01-22 NOTE — ED ADULT NURSE NOTE - CHIEF COMPLAINT QUOTE
Pt reporting to the ED for bilateral arm pain in antecubital area where IV were placed yesterday at [facility]. Reporting from group home pmh of schizoaffective disorder.

## 2024-01-22 NOTE — ED ADULT NURSE REASSESSMENT NOTE - NS ED NURSE REASSESS COMMENT FT1
Pt became agitated, threatening to leave. Pt does not have capacity to leave MD SARA at bedside advised for pt to be brought to  to be medicated. Pt states "I don't feel safe at my group home and you're not helping me". Security at bedside, escorted pt to .

## 2024-01-22 NOTE — ED PROVIDER NOTE - PHYSICAL EXAMINATION
Gen: AAOx3, non-toxic appearing  HEENT: NCAT, normal conjunctiva  Lung: good aeration bilaterally, lungs CTA b/l  CV: regular rate and rhythm. cap refill <2x. peripheral pulses 2+bilaterally   Abd: soft, ND, NT  MSK: no visible deformities  Neuro: No focal deficits  Skin: Intact, no evidence of cellulitis, thrombophlebitis, erythema, swelling  Psych: At the beginning of evaluation was cooperative and then later became acutely agitated yelling at staff and other patients

## 2024-01-22 NOTE — ED PROVIDER NOTE - CLINICAL SUMMARY MEDICAL DECISION MAKING FREE TEXT BOX
Pt states herself she presents to the hospital because she wants to switch group home. No evidence of infection or any medical issue with her arms. Will attempt to contact  for input.
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
## 2024-01-22 NOTE — CHART NOTE - NSCHARTNOTEFT_GEN_A_CORE
was informed by provider that patient is discharged needs transportation home. Patient is from a community group home, [facility] ([phone]).  contacted group home and left a voice message.  also contacted , Thuy ([phone]) and left a voice message. Per chart review, patient need to travel via ambulance for safety. This  signed out to  on next shift to follow up.

## 2024-01-22 NOTE — PROVIDER CONTACT NOTE (OTHER) - ASSESSMENT
ROCIO called Group home phone number and  the Manager numerous times, with no answer. ROCIO arranged [facility] [phone]. Trip# 442A. P/U 7 PM. MAS call Inv# [id number]. Will try calling group home again.
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
## 2024-01-22 NOTE — ED ADULT TRIAGE NOTE - CHIEF COMPLAINT QUOTE
Pt reporting to the ED for bilateral arm pain in antecubital area where IV were placed yesterday at [facility]. Reporting from group home pmh of schizoaffective disorder.

## 2024-01-22 NOTE — ED PROVIDER NOTE - PATIENT PORTAL LINK FT
You can access the FollowMyHealth Patient Portal offered by [facility] by registering at the following website: [email or web address]. By joining [employer]’s FollowMyHealth portal, you will also be able to view your health information using other applications (apps) compatible with our system.

## 2024-01-25 ENCOUNTER — EMERGENCY (EMERGENCY)
Facility: HOSPITAL | Age: 34
LOS: 1 days | Discharge: HOME | End: 2024-01-25
Admitting: EMERGENCY MEDICINE
Payer: MEDICAID

## 2024-01-25 VITALS
DIASTOLIC BLOOD PRESSURE: 80 MMHG | OXYGEN SATURATION: 97 % | HEART RATE: 114 BPM | TEMPERATURE: 98 F | RESPIRATION RATE: 17 BRPM | SYSTOLIC BLOOD PRESSURE: 136 MMHG | HEIGHT: 62 IN

## 2024-01-25 PROCEDURE — 99284 EMERGENCY DEPT VISIT MOD MDM: CPT

## 2024-01-25 NOTE — ED PROVIDER NOTE - PROGRESS NOTE DETAILS
Upon arrival of the ambulance patient became agitated and combative.  She began yelling, screaming and throwing things, stating that she does not wish to go back to the group home and refuses to leave.  Patient was placed in  for observation DONATO: Patient slept most of the night.  Now is awake and alert and walk to the bathroom herself.  Has no complaints.  Patient is amenable to plan of providing Tylenol and Ativan for anxiety and discharging her back to group home with assistance of social work.  Patient has no current complaints otherwise.

## 2024-01-25 NOTE — ED PROVIDER NOTE - PATIENT PORTAL LINK FT
You can access the FollowMyHealth Patient Portal offered by [facility] by registering at the following website: [email or web address]. By joining [employer]’s FollowMyHealth portal, you will also be able to view your health information using other applications (apps) compatible with our system. You can access the FollowMyHealth Patient Portal offered by [facility] by registering at the following website: [email or web address]. By joining [employer]’s FollowMyHealth portal, you will also be able to view your health information using other applications (apps) compatible with our system.

## 2024-01-25 NOTE — PROVIDER CONTACT NOTE (OTHER) - ASSESSMENT
Patient is from a community group home, [facility] ([phone]).  contacted group home, no answer,  also contacted , Thuy ([phone]) and spoke with her. She stated pt calls 911 and complaining of different problems to go to different hospitals. Per Thuy they are in the process of finding another group home for her. she said pt can return back to same group home.  [facility]anged [facility] [phone]. Trip# 911S. P/U 12:30 AM. Pt needs to travel  via ambulance for safety. Patient is from a community group home, [facility] [place] ([phone]).  contacted group home, no answer,  also contacted , Thuy ([phone]) and spoke with her. She stated pt calls 911 and complaining of different problems to go to different hospitals. Per Thuy they are in the process of finding another group home for her. she said pt can return back to same group home.  [facility] [phone]. Trip# 675A. P/U 12:30 AM. Pt needs to travel  via ambulance for safety. [facility] Auth Inv# [id number], given to [facility] for Billing.

## 2024-01-25 NOTE — ED PROVIDER NOTE - MUSCULOSKELETAL, MLM
Right arm: Patient freely ranging all joints of the right upper extremity, no tenderness, swelling, bony deformities appreciated.  No sensory deficits, pulses present.Spine appears normal, range of motion is not limited, no muscle or joint tenderness
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
## 2024-01-25 NOTE — ED ADULT TRIAGE NOTE - CHIEF COMPLAINT QUOTE
Pt presenting from group home for right arm pain. Pt states arm hurts after being arrested yesterday. Pt appears well in triage.

## 2024-01-25 NOTE — ED PROVIDER NOTE - OBJECTIVE STATEMENT
33-year-old female with past medical history of schizophrenia, resident of a group home presenting to the ED with a complaint of right arm pain after the apprehended by police 1 day ago for attempting to run away from group home.  Patient endorsing pain to multiple joints of the right arm. She denies sustaining any other significant injuries.
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
## 2024-01-25 NOTE — ED ADULT NURSE NOTE - CHIEF COMPLAINT QUOTE
Detail Level: Zone Initiate Treatment: fluticasone propionate 0.005 % topical ointment \\nDays Supply: 30\\nSig: Apply twice a day to face for 2 weeks\\n\\nfluocinolone 0.01 % topical cream \\nDays Supply: 30\\nSig: Apply BID to affected area Pt presenting from group home for right arm pain. Pt states arm hurts after being arrested yesterday. Pt appears well in triage.

## 2024-01-26 ENCOUNTER — EMERGENCY (EMERGENCY)
Facility: HOSPITAL | Age: 34
LOS: 0 days | Discharge: HOME | End: 2024-01-27
Attending: EMERGENCY MEDICINE
Payer: MEDICAID

## 2024-01-26 VITALS
DIASTOLIC BLOOD PRESSURE: 66 MMHG | WEIGHT: 259.93 LBS | TEMPERATURE: 99 F | HEART RATE: 90 BPM | SYSTOLIC BLOOD PRESSURE: 101 MMHG | HEIGHT: 62 IN | OXYGEN SATURATION: 98 % | RESPIRATION RATE: 18 BRPM

## 2024-01-26 DIAGNOSIS — Z91.013 ALLERGY TO SEAFOOD: ICD-10-CM

## 2024-01-26 DIAGNOSIS — Z91.018 ALLERGY TO OTHER FOODS: ICD-10-CM

## 2024-01-26 DIAGNOSIS — F20.9 SCHIZOPHRENIA, UNSPECIFIED: ICD-10-CM

## 2024-01-26 DIAGNOSIS — F79 UNSPECIFIED INTELLECTUAL DISABILITIES: ICD-10-CM

## 2024-01-26 DIAGNOSIS — M25.531 PAIN IN RIGHT WRIST: ICD-10-CM

## 2024-01-26 DIAGNOSIS — F31.9 BIPOLAR DISORDER, UNSPECIFIED: ICD-10-CM

## 2024-01-26 DIAGNOSIS — M25.532 PAIN IN LEFT WRIST: ICD-10-CM

## 2024-01-26 DIAGNOSIS — E11.9 TYPE 2 DIABETES MELLITUS WITHOUT COMPLICATIONS: ICD-10-CM

## 2024-01-26 PROCEDURE — 99284 EMERGENCY DEPT VISIT MOD MDM: CPT

## 2024-01-26 RX ORDER — ACETAMINOPHEN 500 MG
650 TABLET ORAL ONCE
Refills: 0 | Status: COMPLETED | OUTPATIENT
Start: 2024-01-26 | End: 2024-01-26

## 2024-01-26 RX ADMIN — Medication 2 MILLIGRAM(S): at 05:18

## 2024-01-26 RX ADMIN — Medication 650 MILLIGRAM(S): at 06:41

## 2024-01-26 RX ADMIN — Medication 650 MILLIGRAM(S): at 05:18

## 2024-01-26 RX ADMIN — Medication 650 MILLIGRAM(S): at 23:59

## 2024-01-26 RX ADMIN — Medication 650 MILLIGRAM(S): at 23:29
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
## 2024-01-26 NOTE — ED ADULT NURSE NOTE - OBJECTIVE STATEMENT
A&Ox4, c/o bilateral wrist pain. As per pt, she was arrested 2 days ago, and states police put handcuffs on her wrists and states both wrists hurt. pt initially endorsed bilateral arm pain but states the wrist is what hurts more. pt seen at [facility] for arm pain yesterday, but pt states they did not do anything there. +ROM, +pulses. pt able to move bilateral arms without difficulty.  In triage note pt stated abd pain, but pt denies abd pain at this time, and pt states she is hungry. pmh: schizophrenia.

## 2024-01-26 NOTE — ED ADULT NURSE NOTE - NSFALLUNIVINTERV_ED_ALL_ED
Bed/Stretcher in lowest position, wheels locked, appropriate side rails in place/Call bell, personal items and telephone in reach/Instruct patient to call for assistance before getting out of bed/chair/stretcher/Non-slip footwear applied when patient is off stretcher/[place] to call system/Physically safe environment - no spills, clutter or unnecessary equipment/Purposeful proactive rounding/Room/bathroom lighting operational, light cord in reach

## 2024-01-26 NOTE — ED ADULT NURSE REASSESSMENT NOTE - NS ED NURSE REASSESS COMMENT FT1
pt noted to become angry, yelling profanities at staff. pt unable to be redirected back into stretcher. security called for assistance. pt ran out of hospital, security and JONATHAN Olson able to redirect pt back into hospital  area. handoff given to  RN Manuel.

## 2024-01-26 NOTE — ED ADULT NURSE NOTE - CHIEF COMPLAINT QUOTE
PMH of schizophrenia  BIBEMS from group home, C/o abdominal pain since yesterday, denies dysuria, hematuria. LMP "February last year"  Denies SI/HI, no distress noted.

## 2024-01-26 NOTE — ED ADULT TRIAGE NOTE - CHIEF COMPLAINT QUOTE
PMH of schizophrenia  BIBEMS from group home, C/o abdominal pain since yesterday, denies dysuria, hematuria. LMP 02/02/23.  Denies SI/HI, no distress noted. PMH of schizophrenia  BIBEMS from group home, C/o abdominal pain since yesterday, denies dysuria, hematuria. LMP "February last year"  Denies SI/HI, no distress noted.

## 2024-01-26 NOTE — ED BEHAVIORAL HEALTH NOTE - BEHAVIORAL HEALTH NOTE
Pt refused d/c moved to  area and held overnight. Pt now again d/c ready with request for transport to be arranged by team. Writer rescheduled trip with [facility] #[phone] with trip #125A provided. ETA is 60 to 90 minutes. MAS invoice #[id number] obtained and provided to [facility] for trip auth. Group home to be notified; no answer at [phone]. Writer will continue outreach. Prior  staff spoke with housing staff earlier shift and they were aware of pt discharge. Verbal huddle occurred with interdisciplinary team. Pt refused d/c moved to  area and held overnight. Pt now again d/c ready with request for transport to be arranged by team. Writer rescheduled trip with [facility] #[phone] with trip #125A provided. ETA is 60 to 90 minutes. MAS invoice #[id number] obtained and provided to [facility] for trip auth. Group home to be notified; no answer at [phone]. Writer will continue outreach. Prior  staff spoke with housing staff earlier shift and they were aware of pt discharge. Verbal huddle occurred with interdisciplinary team.    Spoke with , Thuy ([phone]) who is aware of pt returning.

## 2024-01-27 VITALS
HEART RATE: 80 BPM | DIASTOLIC BLOOD PRESSURE: 70 MMHG | OXYGEN SATURATION: 98 % | RESPIRATION RATE: 18 BRPM | TEMPERATURE: 98 F | SYSTOLIC BLOOD PRESSURE: 112 MMHG

## 2024-01-27 PROCEDURE — 73110 X-RAY EXAM OF WRIST: CPT | Mod: 26,50

## 2024-01-27 NOTE — ED PROVIDER NOTE - PATIENT PORTAL LINK FT
You can access the FollowMyHealth Patient Portal offered by [facility] by registering at the following website: [email or web address]. By joining [employer]’s FollowMyHealth portal, you will also be able to view your health information using other applications (apps) compatible with our system.

## 2024-01-27 NOTE — ED PROVIDER NOTE - NSFOLLOWUPINSTRUCTIONS_ED_ALL_ED_FT
Detail Level: Detailed Quality 431: Preventive Care And Screening: Unhealthy Alcohol Use - Screening: Patient not identified as an unhealthy alcohol user when screened for unhealthy alcohol use using a systematic screening method Quality 226: Preventive Care And Screening: Tobacco Use: Screening And Cessation Intervention: Patient screened for tobacco use and is an ex/non-smoker 1) Take tylenol for pain  2) Follow up with your primary care doctor  3) Return to the ER for worsening or concerning symptoms

## 2024-01-27 NOTE — ED PROVIDER NOTE - CLINICAL SUMMARY MEDICAL DECISION MAKING FREE TEXT BOX
Bilateral wrist pain no deformity will give medication for pain and get Xrays.  Patient not reporting abdominal pain requesting to eat. Bilateral wrist pain no deformity will give medication for pain and get Xrays.  Patient not reporting abdominal pain requesting to eat.    Xray negative.  Supportive care discussed.

## 2024-01-27 NOTE — ED PROVIDER NOTE - OBJECTIVE STATEMENT
This patient is a 33 year old woman hx of intellectual disability, bipolar, schizophrenia and DM from group home presents to the ER c/o bilateral wrist pain.  Patient has frequent visits to the ER most recently yesterday.  She was seen for right arm pain.  Patient states that she was arrested yesterday for attempting to run away from the group home.  She states although she was seen at the hospital for the arm pain it has not improved.  Patient c/o 10/10 pain at the bilateral wrist right greater than left.  Patient states that she feels hunger pain and wants to eat.

## 2024-01-27 NOTE — ED PROVIDER NOTE - PROGRESS NOTE DETAILS
Attending Galilea: pt signed out to me as cleared for discharge, but pending contact w/ group home for discharge. RN Naya Kennedy spoke w/ group home and pt accepted back. transportation arranged to take pt back

## 2024-01-27 NOTE — ED ADULT NURSE REASSESSMENT NOTE - NS ED NURSE REASSESS COMMENT FT1
patient alert and oriented x3, denies pain or discomfort. VS stable, in no acute distress, safety precautions maintained. fall precautions maintained. pt being discharged. group home called and made aware pt is stable to go home, group home accepted.

## 2024-01-28 ENCOUNTER — EMERGENCY (EMERGENCY)
Facility: HOSPITAL | Age: 34
LOS: 1 days | Discharge: HOME | End: 2024-01-28
Admitting: STUDENT IN AN ORGANIZED HEALTH CARE EDUCATION/TRAINING PROGRAM
Payer: MEDICAID

## 2024-01-28 VITALS
RESPIRATION RATE: 18 BRPM | HEIGHT: 62 IN | HEART RATE: 92 BPM | OXYGEN SATURATION: 98 % | SYSTOLIC BLOOD PRESSURE: 120 MMHG | TEMPERATURE: 98 F | DIASTOLIC BLOOD PRESSURE: 87 MMHG

## 2024-01-28 VITALS
HEART RATE: 85 BPM | DIASTOLIC BLOOD PRESSURE: 78 MMHG | RESPIRATION RATE: 16 BRPM | SYSTOLIC BLOOD PRESSURE: 115 MMHG | OXYGEN SATURATION: 99 % | TEMPERATURE: 98 F

## 2024-01-28 PROCEDURE — 99284 EMERGENCY DEPT VISIT MOD MDM: CPT

## 2024-01-28 RX ORDER — ACETAMINOPHEN 500 MG
975 TABLET ORAL ONCE
Refills: 0 | Status: COMPLETED | OUTPATIENT
Start: 2024-01-28 | End: 2024-01-28

## 2024-01-28 RX ORDER — TRAMADOL HYDROCHLORIDE 50 MG/1
25 TABLET ORAL ONCE
Refills: 0 | Status: DISCONTINUED | OUTPATIENT
Start: 2024-01-28 | End: 2024-01-28

## 2024-01-28 RX ORDER — LIDOCAINE 4 G/100G
1 CREAM TOPICAL ONCE
Refills: 0 | Status: COMPLETED | OUTPATIENT
Start: 2024-01-28 | End: 2024-01-28

## 2024-01-28 RX ADMIN — Medication 975 MILLIGRAM(S): at 16:01

## 2024-01-28 RX ADMIN — TRAMADOL HYDROCHLORIDE 25 MILLIGRAM(S): 50 TABLET ORAL at 17:34

## 2024-01-28 RX ADMIN — LIDOCAINE 1 APPLICATION(S): 4 CREAM TOPICAL at 21:05

## 2024-01-28 NOTE — ED PROVIDER NOTE - CPE EDP SKIN NORM
Patient a very nice man with extensive medical history including recent hospitalization as detailed above who is accompanied by his best friend for discussion of [place] for stage T1a right renal mass.  We discussed the options for further evaluation and treatment.  Given significant comorbidities I think active surveillance is a very reasonable approach.  I explained the small but real risk of disease progression/metastatic disease developing.  Radical nephrectomy would not be indicated.  Percutaneous biopsy and/or thermal ablation is relatively contraindicated with cystic masses.  Mass is very amenable to partial nephrectomy but would require him to hold blood thinners and have clearance from his multiple other consultants.  I included article below supporting this approach
normal...

## 2024-01-28 NOTE — ED ADULT NURSE REASSESSMENT NOTE - NS ED NURSE REASSESS COMMENT FT1
Pt cleared by NP for d/c ems [facility]

## 2024-01-28 NOTE — ED ADULT NURSE NOTE - NSFALLUNIVINTERV_ED_ALL_ED
Bed/Stretcher in lowest position, wheels locked, appropriate side rails in place/Call bell, personal items and telephone in reach/Instruct patient to call for assistance before getting out of bed/chair/stretcher/Non-slip footwear applied when patient is off stretcher/Busy to call system/Physically safe environment - no spills, clutter or unnecessary equipment/Purposeful proactive rounding/Room/bathroom lighting operational, light cord in reach

## 2024-01-28 NOTE — ED PROVIDER NOTE - CLINICAL SUMMARY MEDICAL DECISION MAKING FREE TEXT BOX
33 year old woman hx of intellectual disability, bipolar, schizophrenia and DM2 p/w from group home for bilateral wrist pain.  Patient has frequent visits to the ER most recently two days ago. She was seen for right arm pain.  Patient states that she was arrested couple days ago for attempting to run away from the group home.  She states although she was seen  and treated for the arm pain, it has not improved.  Patient is a 10/10 at the bilateral wrist right greater than left. Tylenol, Tramadol, Lidocaine  SW [facility]  Dispo Home

## 2024-01-28 NOTE — ED ADULT TRIAGE NOTE - CHIEF COMPLAINT QUOTE
pt coming from [facility], pt c/o both arm pain.  pt said she was in handcuffs yesterday. Hx:  developmental delay, HIV, schizophrenia

## 2024-01-28 NOTE — ED PROVIDER NOTE - PATIENT PORTAL LINK FT
You can access the FollowMyHealth Patient Portal offered by [facility] by registering at the following website: [email or web address]. By joining [employer]’s FollowMyHealth portal, you will also be able to view your health information using other applications (apps) compatible with our system.

## 2024-01-28 NOTE — ED ADULT NURSE NOTE - CHIEF COMPLAINT QUOTE
pt coming from [facility], pt c/o both arm pain.  pt said she was in handcuffs yesterday. Hx:  developmental delay, HIV, schizophrenia

## 2024-01-28 NOTE — ED PROVIDER NOTE - NSFOLLOWUPINSTRUCTIONS_ED_ALL_ED_FT
Follow up with your PMD within 48-72 hrs. Show copies of your reports given to you.   Worsening, continued or new concerning symptoms return to the emergency department.    You have been given information necessary to follow up with the  [facility] ([facility]) Crisis center & other outpatient  psychiatric clinics within your community    • [facility] walk in Crisis centre  [address] [place] [address]  [phone] [email or web address]  Hours of operation:  Day	                                        Hours  Sunday                                  Closed  Monday                                9am - 3pm  Tuesday                                9am - 3pm  Wednesday                          9am - 3pm  Thursday                               9am - 3pm  Friday                                    9am - 3pm  Saturday                                Closed

## 2024-01-28 NOTE — ED ADULT NURSE NOTE - ED STAT RN HANDOFF DETAILS
Pt discharged by provider with instructions.  Pt denies S/I and H/I.  Pt denies visual or auditory hallucinations or other complaints.  Pt leaving ED on stretcher with EMS back to residence.

## 2024-01-28 NOTE — ED PROVIDER NOTE - OBJECTIVE STATEMENT
33 year old woman hx of intellectual disability, bipolar, schizophrenia and DM2 p/w from group home for bilateral wrist pain.  Patient has frequent visits to the ER most recently two days ago. She was seen for right arm pain.  Patient states that she was arrested couple days ago for attempting to run away from the group home.  She states although she was seen  and treated for the arm pain, it has not improved.  Patient is a 10/10 at the bilateral wrist right greater than left. Denies fever, headache, dizziness, SI/HI/AH/VH, chills, chest pain, shortness of breath, abdominal pain, sick contact or recent travel. Denies alcohol use or other drugs.

## 2024-01-28 NOTE — ED ADULT NURSE NOTE - OBJECTIVE STATEMENT
Received pt in  pt c/o bilateral pain pt denies si/hi presently, safety & comfort measures maintained eval on going.

## 2024-02-01 ENCOUNTER — EMERGENCY (EMERGENCY)
Facility: HOSPITAL | Age: 34
LOS: 0 days | Discharge: HOME | End: 2024-02-02
Attending: STUDENT IN AN ORGANIZED HEALTH CARE EDUCATION/TRAINING PROGRAM
Payer: MEDICAID

## 2024-02-01 VITALS
WEIGHT: 265 LBS | OXYGEN SATURATION: 99 % | DIASTOLIC BLOOD PRESSURE: 88 MMHG | RESPIRATION RATE: 18 BRPM | HEIGHT: 62 IN | HEART RATE: 97 BPM | SYSTOLIC BLOOD PRESSURE: 125 MMHG | TEMPERATURE: 98 F

## 2024-02-01 VITALS
HEART RATE: 90 BPM | DIASTOLIC BLOOD PRESSURE: 67 MMHG | SYSTOLIC BLOOD PRESSURE: 104 MMHG | OXYGEN SATURATION: 100 % | TEMPERATURE: 98 F | RESPIRATION RATE: 19 BRPM

## 2024-02-01 DIAGNOSIS — Z91.013 ALLERGY TO SEAFOOD: ICD-10-CM

## 2024-02-01 DIAGNOSIS — Z91.018 ALLERGY TO OTHER FOODS: ICD-10-CM

## 2024-02-01 DIAGNOSIS — Z79.84 LONG TERM (CURRENT) USE OF ORAL HYPOGLYCEMIC DRUGS: ICD-10-CM

## 2024-02-01 DIAGNOSIS — Z13.89 ENCOUNTER FOR SCREENING FOR OTHER DISORDER: ICD-10-CM

## 2024-02-01 DIAGNOSIS — F79 UNSPECIFIED INTELLECTUAL DISABILITIES: ICD-10-CM

## 2024-02-01 DIAGNOSIS — F20.9 SCHIZOPHRENIA, UNSPECIFIED: ICD-10-CM

## 2024-02-01 DIAGNOSIS — E11.9 TYPE 2 DIABETES MELLITUS WITHOUT COMPLICATIONS: ICD-10-CM

## 2024-02-01 PROCEDURE — 99284 EMERGENCY DEPT VISIT MOD MDM: CPT

## 2024-02-01 RX ORDER — SENNA PLUS 8.6 MG/1
1 TABLET ORAL
Refills: 0 | DISCHARGE

## 2024-02-01 RX ORDER — DIPHENHYDRAMINE HCL 50 MG
50 CAPSULE ORAL ONCE
Refills: 0 | Status: COMPLETED | OUTPATIENT
Start: 2024-02-01 | End: 2024-02-01

## 2024-02-01 RX ORDER — ACETAMINOPHEN 500 MG
650 TABLET ORAL ONCE
Refills: 0 | Status: COMPLETED | OUTPATIENT
Start: 2024-02-01 | End: 2024-02-01

## 2024-02-01 RX ORDER — HALOPERIDOL DECANOATE 100 MG/ML
5 INJECTION INTRAMUSCULAR ONCE
Refills: 0 | Status: COMPLETED | OUTPATIENT
Start: 2024-02-01 | End: 2024-02-01

## 2024-02-01 RX ADMIN — Medication 50 MILLIGRAM(S): at 19:28

## 2024-02-01 RX ADMIN — Medication 650 MILLIGRAM(S): at 23:55

## 2024-02-01 RX ADMIN — HALOPERIDOL DECANOATE 5 MILLIGRAM(S): 100 INJECTION INTRAMUSCULAR at 19:29

## 2024-02-01 RX ADMIN — Medication 2 MILLIGRAM(S): at 19:29

## 2024-02-01 NOTE — ED ADULT NURSE NOTE - HPI (INCLUDE ILLNESS QUALITY, SEVERITY, DURATION, TIMING, CONTEXT, MODIFYING FACTORS, ASSOCIATED SIGNS AND SYMPTOMS)
Patient is developmentally disabled female. Denies HI or SI. Patient got into an argument with Group home staff.

## 2024-02-01 NOTE — ED ADULT NURSE REASSESSMENT NOTE - NS ED NURSE REASSESS COMMENT FT1
Patient ran out of ED  in slippers and light clothing and coat. Patient states she "was trying to go to my Auntie's in the [place]." Patient states she doesn't feel safe at the group home they won't let me do what I want to do." Patient calmed and walked back to ED. Placed on 1:1 observation for elopement . Per Dr. Burns: patient does not need to be undressed: once calm plan to discharge her back to the group home.

## 2024-02-01 NOTE — ED PROVIDER NOTE - PHYSICAL EXAMINATION
GENERAL: Awake, alert, NAD  HEENT: NC/AT, moist mucous membranes  LUNGS: CTAB, no wheezes or crackles   CARDIAC: RRR, no m/r/g  ABDOMEN: Soft, non tender, non distended, no rebound, no guarding  EXT: No edema, no calf tenderness, no deformities.  NEURO:  Moving all extremities.  SKIN: Warm and dry. No rash.  PSYCH: appropriate mood

## 2024-02-01 NOTE — ED PROVIDER NOTE - PROGRESS NOTE DETAILS
Patient combative, eloped from the ED, states she does not want to go back to her group home. Will sedate and place on 1:1. Miles DO: pt signed out to me, initially tried to 'run away' earlier in the ED, pt has been occasionally attempting to walk around, states legs are cramping but refused medication, home med ordered but then pt went to sleep/laying down, stable for dc, no acute complaints

## 2024-02-01 NOTE — ED PROVIDER NOTE - PATIENT PORTAL LINK FT
You can access the FollowMyHealth Patient Portal offered by [facility] by registering at the following website: [email or web address]. By joining [employer]s FollowMyHealth portal, you will also be able to view your health information using other applications (apps) compatible with our system. You can access the FollowMyHealth Patient Portal offered by [facility] by registering at the following website: [email or web address]. By joining [employer]’s FollowMyHealth portal, you will also be able to view your health information using other applications (apps) compatible with our system.

## 2024-02-01 NOTE — ED ADULT NURSE NOTE - CCCP TRG CHIEF CMPLNT
Patient a&ox4 via EMS to ED for chest pain and vomiting  EMS reports patient with hx stent started having stabbing chest pain bout 10 pm last night, took a nitro 1x and went to bed, EMS gave 324 ASA en route and 4 mg zofran for nausea.  Patient reports still left sided chest pain 4/10 at this time.  
Pt states that he would like to see a psychiatrist, has seen one before but needs to see one again for help and medications.       Leena Kendall RN  06/08/23 [id number]
DISCHARGE
Not feeling well

## 2024-02-01 NOTE — ED ADULT NURSE NOTE - NSFALLUNIVINTERV_ED_ALL_ED
Bed/Stretcher in lowest position, wheels locked, appropriate side rails in place/Call bell, personal items and telephone in reach/Instruct patient to call for assistance before getting out of bed/chair/stretcher/Non-slip footwear applied when patient is off stretcher/[place] to call system/Physically safe environment - no spills, clutter or unnecessary equipment/Purposeful proactive rounding/Room/bathroom lighting operational, light cord in reach
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
## 2024-02-01 NOTE — ED PROVIDER NOTE - NS ED MD DISPO DISCHARGE CCDA
"[facility]  Infectious Disease  Consult Note    Patient Name: Bhavna Hancock  MRN: [id number]  Admission Date: 11/24/2020  Hospital Length of Stay: 0 days  Attending Physician: Patrice Iglesias MD  Primary Care Provider: Apurva Abbott MD     Isolation Status: No active isolations    Patient information was obtained from patient, past medical records and ER records.      Inpatient consult to Infectious Diseases  Consult performed by: Travis Crowley PA-C  Consult ordered by: Sury Mckeon MD        Assessment/Plan:     Stiffness of left knee  Pt is a 61yo F w/ pmhx of htn DM, hld, hypothyroidism, who previously sustained a left patellar fracture 9/22 after a fall and underwent ORIF of left patella 10/5.  She developed stiffness and knee as well as wound dehisence w/ drainage of serous fluid.  Started on Bactrim as outpt w/o improvement.  CRP normal.  MRI of her knee was performed showing no fluid collections.        Patient 11/24  underwent excisional debridement and irrigation of the anterior left knee incision.  Per notes no purulence were seen but cables were exposed so were removed.    Per Dr. Iglesias's note "there is at least a communication with the cable and although I washed it with Bactisure and quite well with dilute peroxide and diluted Betadine, I would like to put her on IV [facility] for at least 4-6 weeks... think it would likely be safe to leave the deep screws in the patella ultimately but want to be aggressive with [facility] while she is healing this wound early given the communication."    ID consult for antibiotic recommendations.  Patient currently on vancomycin and ceftriaxone.    Plan  -Continue Vancomycin.  [facility] to dose.  Trough goal 15-20  -Change the Ceftriaxone to Cefepime 2g q8h  -Will follow cxs and tailor abx accordingly  -Anticipate 6 week course of therapy    -Pt abx regimen discussed with primary team  -ID will continue to " "follow.        Thank you for your consult. I will follow-up with patient. Please contact us if you have any additional questions.    Travis Crowley PA-C  Infectious Disease  [facility]    Subjective:     Principal Problem: Closed displaced transverse fracture of left patella    HPI: Pt is a 61yo F w/ pmhx of htn DM, hld, hypothyroidism, who previously sustained a left patellar fracture 9/22 after a fall and underwent ORIF of left patella 10/5.  She developed stiffness and knee as well as wound dehisence w/ drainage of serous fluid.  Started on Bactrim as outpt w/o improvement.  CRP normal.  MRI of her knee was performed showing no fluid collections.        Patient 11/24  underwent excisional debridement and irrigation of the anterior left knee incision.  Per notes no purulence were seen but cables were exposed so were removed.    Per Dr. Iglesias's note "Her CRP and sed rate were normal and she is not any constitutional symptoms.  However, after excising the area of drainage I think it is clear that there is at least a communication with the cable and although I washed it with Bactisure and quite well with dilute peroxide and diluted Betadine, I would like to put her on IV [facility] for at least 4-6 weeks.  I would put her on Bactrim in clinic which may inhibit cultures.  I think it would likely be safe to leave the deep screws in the patella ultimately but want to be aggressive with [facility] while she is healing this wound early given the communication."    ID consult for antibiotic recommendations.  Patient currently on vancomycin and ceftriaxone.       Past Medical History:   Diagnosis Date    Abdominal pain     Diabetes     GERD (gastroesophageal reflux disease)     Hx of colonic polyps     Hyperlipidemia     Hypertension     Nausea and vomiting        Past Surgical History:   Procedure Laterality Date    CRANIOTOMY  2013    DEBRIDEMENT TENNIS ELBOW      hip ascites   "    OPEN REDUCTION AND INTERNAL FIXATION (ORIF) OF FRACTURE OF PATELLA Left 10/5/2020    Procedure: ORIF, FRACTURE, PATELLA - Femoral catheter ok;  Surgeon: Patrice Iglesias MD;  Location: [facility] OR [address] [place] [address];  Service: Orthopedics;  Laterality: Left;    TRIGGER FINGER RELEASE         Review of patient's allergies indicates:   Allergen Reactions    Codeine Nausea And Vomiting       Medications:  Medications Prior to Admission   Medication Sig    estradioL (ESTRACE) 1 MG tablet TK 1 T PO QD    fish oil-omega-3 fatty acids 300-1,000 mg capsule Take 2 g by mouth once daily.    ibuprofen (ADVIL,MOTRIN) 600 MG tablet Take 1 tablet (600 mg total) by mouth every 6 (six) hours as needed for Pain.    insulin glargine (LANTUS) 100 unit/mL injection Inject 35 Units into the skin once daily.     insulin lispro (HUMALOG KWIKPEN INSULIN) 100 unit/mL pen INJECT 14 UNITS UNDER THE SKIN WITH EVERY MEAL    levothyroxine (SYNTHROID) 75 MCG tablet TAKE 1 TABLET BY MOUTH EVERY DAY    lisinopril (PRINIVIL,ZESTRIL) 2.5 MG tablet Take 2.5 mg by mouth once daily.    medroxyPROGESTERone (PROVERA) 2.5 MG tablet TK 1 T PO QD    metformin (GLUCOPHAGE) 500 MG tablet Take 500 mg by mouth daily with breakfast.    pantoprazole (PROTONIX) 40 MG tablet TAKE 1 TABLET(40 MG) BY MOUTH EVERY DAY    RESTASIS 0.05 % ophthalmic emulsion INSTILL ONE DROP IN OU BID    simvastatin (ZOCOR) 40 MG tablet Take 40 mg by mouth every evening.    sulfamethoxazole-trimethoprim 800-160mg (BACTRIM DS) 800-160 mg Tab Take 1 tablet by mouth 2 (two) times daily. for 10 days    trospium (SANCTURA) 20 mg Tab tablet Take 20 mg by mouth once daily.     venlafaxine (EFFEXOR-XR) 150 MG Cp24 Take 225 mg by mouth once daily.     acetaminophen (TYLENOL) 500 MG tablet Take 2 tablets (1,000 mg total) by mouth every 6 (six) hours.    aspirin (ECOTRIN) 81 MG EC tablet Take 1 tablet (81 mg total) by mouth 2 (two) times a day.    blood sugar diagnostic (ONETOUCH ULTRA  BLUE TEST STRIP) Strp Use to test blood sugar 5 times a day    DEXCOM G6 SENSOR Priscilla TEST BS FID    DEXCOM G6 TRANSMITTER Priscilla USE TO CHECK FID    insulin syringe-needle U-100 1/2 mL 30 gauge Syrg USE ONE SYRINGE PER DAY UTD    LOTEMAX SM 0.38 % DrpG INT 1 GTT IN OU BID UTD    oxyCODONE (ROXICODONE) 5 MG immediate release tablet Take 1 tablet (5 mg total) by mouth every 6 (six) hours as needed for Pain. May take 1-2 tablets every 6 hours as needed for pain     Antibiotics (From admission, onward)    Start     Stop Route Frequency Ordered    11/24/20 2230  vancomycin in dextrose 5 % 1 gram/250 mL IVPB 1,000 mg      -- IV Every 12 hours (non-standard times) 11/24/20 1113    11/24/20 1015  cefTRIAXone injection 1 g      -- IV Every 24 hours (non-standard times) 11/24/20 0910    11/24/20 1006  vancomycin - pharmacy to dose  (vancomycin IVPB)      -- IV pharmacy to manage frequency 11/24/20 0910        Antifungals (From admission, onward)    None        Antivirals (From admission, onward)    None           Immunization History   Administered Date(s) Administered    Td - PF (ADULT) 05/28/2016       Family History     Problem Relation (Age of Onset)    Colon cancer Father (77)        Social History     Socioeconomic History    Marital status:      Spouse name: Not on file    Number of children: Not on file    Years of education: Not on file    Highest education level: Not on file   Occupational History    Not on file   Social Needs    Financial resource strain: Not on file    Food insecurity     Worry: Not on file     Inability: Not on file    Transportation needs     Medical: Not on file     Non-medical: Not on file   Tobacco Use    Smoking status: Former Smoker     Quit date: 10/5/2005     Years since quitting: 15.1    Smokeless tobacco: Never Used   Substance and Sexual Activity    Alcohol use: No    Drug use: No    Sexual activity: Not on file   Lifestyle    Physical activity     Days per  week: Not on file     Minutes per session: Not on file    Stress: Not on file   Relationships    Social connections     Talks on phone: Not on file     Gets together: Not on file     Attends Hoahaoism service: Not on file     Active member of club or organization: Not on file     Attends meetings of clubs or organizations: Not on file     Relationship status: Not on file   Other Topics Concern    Not on file   Social History Narrative    Not on file     Review of Systems   Constitutional: Negative for activity change, appetite change, chills and fever.   HENT: Negative for congestion.    Respiratory: Negative for cough and shortness of breath.    Cardiovascular: Negative for chest pain, palpitations and leg swelling.   Gastrointestinal: Negative for abdominal pain, constipation, diarrhea, nausea and vomiting.   Genitourinary: Negative for difficulty urinating, dysuria and hematuria.   Musculoskeletal: Negative for arthralgias and myalgias.   Skin: Negative for color change, rash and wound.   Neurological: Negative for dizziness, weakness, light-headedness and numbness.   Psychiatric/Behavioral: Negative for confusion.   All other systems reviewed and are negative.    Objective:     Vital Signs (Most Recent):  Temp: 97.2 °F (36.2 °C) (11/24/20 0900)  Pulse: 87 (11/24/20 1443)  Resp: 17 (11/24/20 1443)  BP: 135/63 (11/24/20 1100)  SpO2: 95 % (11/24/20 1443) Vital Signs (24h Range):  Temp:  [97.2 °F (36.2 °C)-98.2 °F (36.8 °C)] 97.2 °F (36.2 °C)  Pulse:  [78-99] 87  Resp:  [11-26] 17  SpO2:  [93 %-100 %] 95 %  BP: (132-165)/(60-78) 135/63     Weight: 77.1 kg (170 lb)  Body mass index is 28.29 kg/m².    Estimated Creatinine Clearance: 76.7 mL/min (based on SCr of 0.8 mg/dL).    Physical Exam  Constitutional:       Appearance: Normal appearance.   HENT:      Head: Normocephalic and atraumatic.      Mouth/Throat:      Mouth: Mucous membranes are moist.   Eyes:      Pupils: Pupils are equal, round, and reactive to  light.   Cardiovascular:      Rate and Rhythm: Normal rate and regular rhythm.      Heart sounds: No murmur. No friction rub. No gallop.    Pulmonary:      Effort: Pulmonary effort is normal. No respiratory distress.      Breath sounds: Normal breath sounds. No stridor. No wheezing or rhonchi.   Abdominal:      General: Bowel sounds are normal.      Tenderness: There is no abdominal tenderness. There is no guarding.      Hernia: No hernia is present.   Skin:     General: Skin is warm and dry.      Coloration: Skin is not jaundiced or pale.      Findings: No rash.      Comments: L leg w/ brace in place   Neurological:      Mental Status: She is alert and oriented to person, place, and time.   Psychiatric:         Mood and Affect: Mood normal.         Behavior: Behavior normal.         Significant Labs: All pertinent labs within the past 24 hours have been reviewed.    Significant Imaging: I have reviewed all pertinent imaging results/findings within the past 24 hours.               Patient/Caregiver provided printed discharge information.
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
## 2024-02-01 NOTE — ED PROVIDER NOTE - OBJECTIVE STATEMENT
32 y/o F w/ intellectual disability, schizophrenia, dm, presenting to the ED c/o "not feeling well". Patient well known to this provider and this ED and known to have manipulative behavior. She is requesting food. Denies acute complaints.
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
## 2024-02-01 NOTE — ED PROVIDER NOTE - CLINICAL SUMMARY MEDICAL DECISION MAKING FREE TEXT BOX
34 y/o F w/ intellectual disability, schizophrenia, dm, presenting to the ED c/o "not feeling well".  VItals stable.  She is at baseline mental status.  Requesting food.  Will feed and discharge.

## 2024-02-02 ENCOUNTER — EMERGENCY (EMERGENCY)
Facility: HOSPITAL | Age: 34
LOS: 1 days | Discharge: HOME | End: 2024-02-02
Admitting: EMERGENCY MEDICINE
Payer: MEDICAID

## 2024-02-02 VITALS
TEMPERATURE: 98 F | RESPIRATION RATE: 18 BRPM | SYSTOLIC BLOOD PRESSURE: 130 MMHG | OXYGEN SATURATION: 99 % | HEIGHT: 62 IN | DIASTOLIC BLOOD PRESSURE: 68 MMHG | HEART RATE: 78 BPM

## 2024-02-02 PROCEDURE — 99284 EMERGENCY DEPT VISIT MOD MDM: CPT

## 2024-02-02 RX ORDER — GABAPENTIN 400 MG/1
100 CAPSULE ORAL ONCE
Refills: 0 | Status: DISCONTINUED | OUTPATIENT
Start: 2024-02-02 | End: 2024-02-02

## 2024-02-02 RX ORDER — DIVALPROEX SODIUM 500 MG/1
750 TABLET, DELAYED RELEASE ORAL ONCE
Refills: 0 | Status: DISCONTINUED | OUTPATIENT
Start: 2024-02-02 | End: 2024-02-02

## 2024-02-02 RX ORDER — CLOZAPINE 150 MG/1
50 TABLET, ORALLY DISINTEGRATING ORAL AT BEDTIME
Refills: 0 | Status: DISCONTINUED | OUTPATIENT
Start: 2024-02-02 | End: 2024-02-02

## 2024-02-02 RX ORDER — CHLORPROMAZINE HCL 10 MG
100 TABLET ORAL ONCE
Refills: 0 | Status: COMPLETED | OUTPATIENT
Start: 2024-02-02 | End: 2024-02-02

## 2024-02-02 RX ADMIN — Medication 100 MILLIGRAM(S): at 20:18

## 2024-02-02 NOTE — ED PROVIDER NOTE - PSYCHIATRIC AFFECT
post total right knee replacement    -  Primary Z96.651    Right knee pain, unspecified chronicity     M25.561          As part of continued conservative pain management options the patient was advised to utilize Tylenol or OTC NSAIDS as long as it is not medically contraindicated. Total Time: minutes: 11-20 minutes  Alissa Echavarria was evaluated through a synchronous (real-time) audio encounter. Patient identification was verified at the start of the visit. She (or guardian if applicable) is aware that this is a billable service, which includes applicable co-pays. This visit was conducted with the patient's (and/or legal guardian's) verbal consent. She has not had a related appointment within my department in the past 7 days or scheduled within the next 24 hours. The patient was located at Home: [facility]. The provider was located at [facility] ([address], [address]. Note: not billable if this call serves to triage the patient into an appointment for the relevant concern    - Berkley Dubose, APRN, CNP
Cholelithiases
[facility]

## 2024-02-02 NOTE — ED BEHAVIORAL HEALTH NOTE - BEHAVIORAL HEALTH NOTE
As per TEDDY Tate, pt is cleared for discharge. Pt from [facility] [place] at [address] [facility]. Writer contacted , Thuy, at [phone]. Thuy was informed of pt discharge. Pt okay to return at this time. Pt travels via AMBULANCE with SUPERVISION. Case discussed with nursing. RN to arrange EMS transport at this time. Verbal huddle completed.
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
## 2024-02-02 NOTE — ED PROVIDER NOTE - PATIENT PORTAL LINK FT
You can access the FollowMyHealth Patient Portal offered by [facility] by registering at the following website: [email or web address]. By joining [employer]’s FollowMyHealth portal, you will also be able to view your health information using other applications (apps) compatible with our system.

## 2024-02-02 NOTE — ED PROVIDER NOTE - CLINICAL SUMMARY MEDICAL DECISION MAKING FREE TEXT BOX
32 y/o F hx Bipolar, DM, Asthma  Medical evaluation performed. There is no clinical evidence of intoxication or any acute medical problem requiring immediate intervention.  SW consulted. D/C to Group Home.

## 2024-02-03 ENCOUNTER — EMERGENCY (EMERGENCY)
Facility: HOSPITAL | Age: 34
LOS: 1 days | Discharge: HOME | End: 2024-02-03
Admitting: EMERGENCY MEDICINE
Payer: MEDICAID

## 2024-02-03 VITALS
TEMPERATURE: 98 F | HEIGHT: 62 IN | RESPIRATION RATE: 16 BRPM | HEART RATE: 96 BPM | SYSTOLIC BLOOD PRESSURE: 123 MMHG | OXYGEN SATURATION: 97 % | DIASTOLIC BLOOD PRESSURE: 83 MMHG

## 2024-02-03 PROCEDURE — 99284 EMERGENCY DEPT VISIT MOD MDM: CPT

## 2024-02-03 RX ORDER — CHLORPROMAZINE HCL 10 MG
100 TABLET ORAL ONCE
Refills: 0 | Status: COMPLETED | OUTPATIENT
Start: 2024-02-03 | End: 2024-02-03

## 2024-02-03 RX ORDER — ACETAMINOPHEN 500 MG
650 TABLET ORAL ONCE
Refills: 0 | Status: COMPLETED | OUTPATIENT
Start: 2024-02-03 | End: 2024-02-03

## 2024-02-03 RX ORDER — BACITRACIN ZINC 500 UNIT/G
1 OINTMENT IN PACKET (EA) TOPICAL ONCE
Refills: 0 | Status: COMPLETED | OUTPATIENT
Start: 2024-02-03 | End: 2024-02-03

## 2024-02-03 RX ADMIN — Medication 650 MILLIGRAM(S): at 22:18

## 2024-02-03 RX ADMIN — Medication 1 APPLICATION(S): at 22:18

## 2024-02-03 RX ADMIN — Medication 100 MILLIGRAM(S): at 22:09

## 2024-02-03 NOTE — ED BEHAVIORAL HEALTH NOTE - BEHAVIORAL HEALTH NOTE
As per TEDDY Tate, pt pending d/c clearance and will return to group home via EMS. Pt from [facility]  at [phone]. , Thuy, also can be reached at [phone]. Pt with multiple recent visits to ED. Writer attempted contact to  to discuss pt discharge. Writer received VM with message left. Writer outreached group home directly at [phone]. Writer spoke with staff member, Queen Will, who was informed of pt plan for discharge. Pt to travel via EMS with supervision for safety. Group home address confirmed to be [address] [facility]. Verbal huddle occurred with interdisciplinary team. As per TEDDY Tate, pt pending d/c clearance and will return to group home via EMS. Pt from [facility]  at [phone]. , Thuy, also can be reached at [phone]. Pt with multiple recent visits to ED. Writer attempted contact to  to discuss pt discharge. Writer received VM with message left. Writer outreached group home directly at [phone]. Writer spoke with staff member, Queen Will, who was informed of pt plan for discharge. Pt to travel via EMS with supervision for safety. Group home address confirmed to be [address] [facility]. Verbal huddle occurred with interdisciplinary team.    Pt pending EMS p/up. [facility] having difficulty reaching pt housing staff for pt return around 6AM. Rogelior made multiple calls to above numbers. Writer eventually reached staff member, Nina Ayers, at [phone]. Aware of pt return and requested she remain by phone/answer so could move forward with transport arrangements. Rogelior followed up with Health system EMS and team regarding info.
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
## 2024-02-03 NOTE — ED PROVIDER NOTE - CPE EDP EYES NORM
Patient with chronic atrial fibrillation on anticoagulation with Eliquis.  Eliquis and Xarelto or contraindicated with rifampin which she would take orally for a prosthetic shoulder infection.  He is currently in [facility].  They do not wish to take intravenous [facility], unable given at his current skilled nursing facility.  They do not wish to go, or he specifically does not wish to go to a different facility.    Aspirin would be ineffective in treating patient for stroke prevention with chronic atrial fibrillation.  He could take subcutaneous Lovenox 1 milligram/kilogram subQ b.i.d., alternatively 1.5 milligram/kilogram subQ daily.    Warfarin also appears to have a relative contraindication.    My recommendation would be that the patient transferred to a different skilled nursing facility which would allow intravenous antibiotic therapy not contraindicated by continued apixaban thromboembolic prophylaxis.    Secondly, apixaban could be discontinued and replaced with subcutaneous Lovenox.    Discussed with daughter.  
normal...

## 2024-02-03 NOTE — ED ADULT NURSE NOTE - OBJECTIVE STATEMENT
Pt A&Ox4 ambulatory, PMH Schizophrenia, Depression, Seizure, Asthma presenting to the ED BH c/o erratic behavior. Pt states called 911 because she was requesting medication and did not receive it. As per EMS, states patient broke plate and use it to scratch her face. Pt at this moment, endorsing HA. Pt calm and cooperative, pt wanded, belongings collected, medicated as per orders, Denies SI/HI/ AH/VH, drug or ETOH use. Safety precautions implemented as per protocol, awaiting further MD orders, plan of care ongoing.

## 2024-02-03 NOTE — ED PROVIDER NOTE - PATIENT PORTAL LINK FT
You can access the FollowMyHealth Patient Portal offered by [facility] by registering at the following website: [email or web address]. By joining [employer]’s FollowMyHealth portal, you will also be able to view your health information using other applications (apps) compatible with our system.

## 2024-02-03 NOTE — ED PROVIDER NOTE - PROGRESS NOTE DETAILS
Patient dc back to facility, awaiting transport. On transport/ems arrival patient expressing anxiety regarding return to facility. Per ems, patient often becomes upset on arrival to her facility and requesting anxiolytic. Discussed anxiolytic with patient who is agreeable to taking it. patient calm and cooperative during discusison with no active complaints.

## 2024-02-03 NOTE — ED ADULT NURSE NOTE - NS_BH TRG QUESTION8_ED_ALL_ED
Depression (without Suicidality or Psychosis)/Behavioral Problems (includes ADHD, Oppositionality)
normal...

## 2024-02-03 NOTE — ED ADULT TRIAGE NOTE - CHIEF COMPLAINT QUOTE
Pt called 911 asking for medication from group home.  Pt did not receive med and broke a plate and used it to scratch herself.  Hx Schizophrenia, depression, seizure, asthma, DM 2  fs = 133 by EMS.  Pt states she is 8 months pregnant with 9 babies.  Pt denies S/I or H/I.  Pt states she has a HA and wants medicine for her scratches.  Pt is laughing with EMS and staff and does not appear in distress.
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
## 2024-02-03 NOTE — ED ADULT TRIAGE NOTE - AS PAIN REST
10 (severe pain) Gabapentin Pregnancy And Lactation Text: This medication is Pregnancy Category C and isn't considered safe during pregnancy. It is excreted in breast milk.

## 2024-02-04 VITALS
DIASTOLIC BLOOD PRESSURE: 60 MMHG | OXYGEN SATURATION: 100 % | TEMPERATURE: 98 F | RESPIRATION RATE: 17 BRPM | HEART RATE: 70 BPM | SYSTOLIC BLOOD PRESSURE: 100 MMHG

## 2024-02-04 RX ADMIN — Medication 2 MILLIGRAM(S): at 09:13

## 2024-02-04 NOTE — ED ADULT NURSE REASSESSMENT NOTE - NS ED NURSE REASSESS COMMENT FT1
0830 Received report from night RN pt verbalizing she anxious about going back to group home reassurance provided pt tolerated breakfast eval on going.
Pt appears to be resting comfortably, NAD, respirations are even and unlabored, denies any complaints at this moment. pt pending EMS transport to [facility] home, Safety precautions implemented as per protocol, plan of care ongoing.
Pt appears to be resting comfortably, NAD, respirations are even and unlabored, denies any complaints at this moment, VS noted, awaiting for EMS transport to [facility]. Safety precautions implemented as per protocol, plan of care ongoing.

## 2024-02-07 ENCOUNTER — EMERGENCY (EMERGENCY)
Facility: HOSPITAL | Age: 34
LOS: 0 days | Discharge: HOME | End: 2024-02-08
Attending: EMERGENCY MEDICINE
Payer: MEDICAID

## 2024-02-07 VITALS
HEART RATE: 106 BPM | TEMPERATURE: 98 F | RESPIRATION RATE: 20 BRPM | SYSTOLIC BLOOD PRESSURE: 118 MMHG | WEIGHT: 285.06 LBS | OXYGEN SATURATION: 99 % | DIASTOLIC BLOOD PRESSURE: 80 MMHG | HEIGHT: 62 IN

## 2024-02-07 DIAGNOSIS — R11.2 NAUSEA WITH VOMITING, UNSPECIFIED: ICD-10-CM

## 2024-02-07 DIAGNOSIS — T78.1XXA OTHER ADVERSE FOOD REACTIONS, NOT ELSEWHERE CLASSIFIED, INITIAL ENCOUNTER: ICD-10-CM

## 2024-02-07 DIAGNOSIS — Z91.018 ALLERGY TO OTHER FOODS: ICD-10-CM

## 2024-02-07 DIAGNOSIS — Z91.013 ALLERGY TO SEAFOOD: ICD-10-CM

## 2024-02-07 DIAGNOSIS — R10.9 UNSPECIFIED ABDOMINAL PAIN: ICD-10-CM

## 2024-02-07 DIAGNOSIS — R11.10 VOMITING, UNSPECIFIED: ICD-10-CM

## 2024-02-07 LAB
ALBUMIN SERPL ELPH-MCNC: 2.8 G/DL — LOW (ref 3.3–5)
ALP SERPL-CCNC: 53 U/L — SIGNIFICANT CHANGE UP (ref 40–120)
ALT FLD-CCNC: 17 U/L — SIGNIFICANT CHANGE UP (ref 12–78)
ANION GAP SERPL CALC-SCNC: 7 MMOL/L — SIGNIFICANT CHANGE UP (ref 5–17)
AST SERPL-CCNC: 17 U/L — SIGNIFICANT CHANGE UP (ref 15–37)
BASOPHILS # BLD AUTO: 0.02 K/UL — SIGNIFICANT CHANGE UP (ref 0–0.2)
BASOPHILS NFR BLD AUTO: 0.2 % — SIGNIFICANT CHANGE UP (ref 0–2)
BILIRUB SERPL-MCNC: 0.3 MG/DL — SIGNIFICANT CHANGE UP (ref 0.2–1.2)
BUN SERPL-MCNC: 12 MG/DL — SIGNIFICANT CHANGE UP (ref 7–23)
CALCIUM SERPL-MCNC: 9.3 MG/DL — SIGNIFICANT CHANGE UP (ref 8.5–10.1)
CHLORIDE SERPL-SCNC: 113 MMOL/L — HIGH (ref 96–108)
CO2 SERPL-SCNC: 20 MMOL/L — LOW (ref 22–31)
CREAT SERPL-MCNC: 0.72 MG/DL — SIGNIFICANT CHANGE UP (ref 0.5–1.3)
EGFR: 115 ML/MIN/1.73M2 — SIGNIFICANT CHANGE UP
EOSINOPHIL # BLD AUTO: 0 K/UL — SIGNIFICANT CHANGE UP (ref 0–0.5)
EOSINOPHIL NFR BLD AUTO: 0 % — SIGNIFICANT CHANGE UP (ref 0–6)
GLUCOSE SERPL-MCNC: 102 MG/DL — HIGH (ref 70–99)
HCG SERPL-ACNC: <1 MIU/ML — SIGNIFICANT CHANGE UP
HCT VFR BLD CALC: 35 % — SIGNIFICANT CHANGE UP (ref 34.5–45)
HGB BLD-MCNC: 11.8 G/DL — SIGNIFICANT CHANGE UP (ref 11.5–15.5)
IMM GRANULOCYTES NFR BLD AUTO: 0.2 % — SIGNIFICANT CHANGE UP (ref 0–0.9)
LACTATE SERPL-SCNC: 1.4 MMOL/L — SIGNIFICANT CHANGE UP (ref 0.7–2)
LIDOCAIN IGE QN: 24 U/L — SIGNIFICANT CHANGE UP (ref 13–75)
LYMPHOCYTES # BLD AUTO: 1.1 K/UL — SIGNIFICANT CHANGE UP (ref 1–3.3)
LYMPHOCYTES # BLD AUTO: 10.6 % — LOW (ref 13–44)
MCHC RBC-ENTMCNC: 30.5 PG — SIGNIFICANT CHANGE UP (ref 27–34)
MCHC RBC-ENTMCNC: 33.7 G/DL — SIGNIFICANT CHANGE UP (ref 32–36)
MCV RBC AUTO: 90.4 FL — SIGNIFICANT CHANGE UP (ref 80–100)
MONOCYTES # BLD AUTO: 0.7 K/UL — SIGNIFICANT CHANGE UP (ref 0–0.9)
MONOCYTES NFR BLD AUTO: 6.7 % — SIGNIFICANT CHANGE UP (ref 2–14)
NEUTROPHILS # BLD AUTO: 8.58 K/UL — HIGH (ref 1.8–7.4)
NEUTROPHILS NFR BLD AUTO: 82.3 % — HIGH (ref 43–77)
NRBC # BLD: 0 /100 WBCS — SIGNIFICANT CHANGE UP (ref 0–0)
PLATELET # BLD AUTO: 238 K/UL — SIGNIFICANT CHANGE UP (ref 150–400)
POTASSIUM SERPL-MCNC: 4.7 MMOL/L — SIGNIFICANT CHANGE UP (ref 3.5–5.3)
POTASSIUM SERPL-SCNC: 4.7 MMOL/L — SIGNIFICANT CHANGE UP (ref 3.5–5.3)
PROT SERPL-MCNC: 7 GM/DL — SIGNIFICANT CHANGE UP (ref 6–8.3)
RBC # BLD: 3.87 M/UL — SIGNIFICANT CHANGE UP (ref 3.8–5.2)
RBC # FLD: 14 % — SIGNIFICANT CHANGE UP (ref 10.3–14.5)
SODIUM SERPL-SCNC: 140 MMOL/L — SIGNIFICANT CHANGE UP (ref 135–145)
WBC # BLD: 10.42 K/UL — SIGNIFICANT CHANGE UP (ref 3.8–10.5)
WBC # FLD AUTO: 10.42 K/UL — SIGNIFICANT CHANGE UP (ref 3.8–10.5)

## 2024-02-07 PROCEDURE — 99284 EMERGENCY DEPT VISIT MOD MDM: CPT

## 2024-02-07 RX ORDER — ONDANSETRON 8 MG/1
4 TABLET, FILM COATED ORAL ONCE
Refills: 0 | Status: DISCONTINUED | OUTPATIENT
Start: 2024-02-07 | End: 2024-02-08

## 2024-02-07 RX ORDER — SODIUM CHLORIDE 9 MG/ML
1000 INJECTION INTRAMUSCULAR; INTRAVENOUS; SUBCUTANEOUS ONCE
Refills: 0 | Status: COMPLETED | OUTPATIENT
Start: 2024-02-07 | End: 2024-02-07

## 2024-02-07 RX ORDER — FAMOTIDINE 10 MG/ML
20 INJECTION INTRAVENOUS ONCE
Refills: 0 | Status: DISCONTINUED | OUTPATIENT
Start: 2024-02-07 | End: 2024-02-08

## 2024-02-07 NOTE — ED PROVIDER NOTE - PATIENT PORTAL LINK FT
You can access the FollowMyHealth Patient Portal offered by [facility] by registering at the following website: [email or web address]. By joining [employer]’s FollowMyHealth portal, you will also be able to view your health information using other applications (apps) compatible with our system.

## 2024-02-07 NOTE — ED PROVIDER NOTE - CLINICAL SUMMARY MEDICAL DECISION MAKING FREE TEXT BOX
Abdominal pain and vomiting a few hours after eating Chinese Food emesis noted at bedside abdomen soft, non-distended possibly related to food toxin.  Will check labs, give zofran IVF and Re-eval. Abdominal pain and vomiting a few hours after eating Chinese Food emesis noted at bedside abdomen soft, non-distended possibly related to food toxin.  Will check labs, give zofran IVF and Re-eval.    No hypokalemia or hypoglycemia no critical electrolyte disturbance.  Patient able to tolerate PO at time of discharge.

## 2024-02-07 NOTE — ED ADULT TRIAGE NOTE - CHIEF COMPLAINT QUOTE
hx of asthma, schizophrenia, development delay intellectual disability , DM, PW ABD pain and 3 episode of vomiting x today after eating chinese food stats LMP 02/01

## 2024-02-07 NOTE — ED PROVIDER NOTE - OBJECTIVE STATEMENT
This patient is a 33 year old woman who presents to the ER c/o abdominal pain and vomiting.  She states that she ate Chinese Food from the food court around 12:30/1pm and a few hours latera developed abdominal pain and vomiting.  She denies fever, dysuria, sick contacts, and recent travel.

## 2024-02-07 NOTE — ED PROVIDER NOTE - NSFOLLOWUPINSTRUCTIONS_ED_ALL_ED_FT
1) Drink fluids to stay hydrated  2) Follow up with your primary care doctor  3) Return to the ER for worsening or concerning symptoms

## 2024-02-08 VITALS
HEART RATE: 96 BPM | SYSTOLIC BLOOD PRESSURE: 111 MMHG | DIASTOLIC BLOOD PRESSURE: 76 MMHG | OXYGEN SATURATION: 98 % | RESPIRATION RATE: 16 BRPM | TEMPERATURE: 98 F

## 2024-02-08 LAB
APPEARANCE UR: ABNORMAL
BACTERIA # UR AUTO: ABNORMAL /HPF
BILIRUB UR-MCNC: NEGATIVE — SIGNIFICANT CHANGE UP
COLOR SPEC: YELLOW — SIGNIFICANT CHANGE UP
DIFF PNL FLD: NEGATIVE — SIGNIFICANT CHANGE UP
EPI CELLS # UR: PRESENT
GLUCOSE UR QL: NEGATIVE MG/DL — SIGNIFICANT CHANGE UP
KETONES UR-MCNC: NEGATIVE MG/DL — SIGNIFICANT CHANGE UP
LEUKOCYTE ESTERASE UR-ACNC: NEGATIVE — SIGNIFICANT CHANGE UP
NITRITE UR-MCNC: NEGATIVE — SIGNIFICANT CHANGE UP
PH UR: 6 — SIGNIFICANT CHANGE UP (ref 5–8)
PROT UR-MCNC: 30 MG/DL
RBC CASTS # UR COMP ASSIST: 0 /HPF — SIGNIFICANT CHANGE UP (ref 0–4)
SP GR SPEC: 1.03 — SIGNIFICANT CHANGE UP (ref 1–1.03)
UROBILINOGEN FLD QL: 1 MG/DL — SIGNIFICANT CHANGE UP (ref 0.2–1)
WBC UR QL: SIGNIFICANT CHANGE UP /HPF (ref 0–5)

## 2024-02-08 RX ORDER — ONDANSETRON 8 MG/1
4 TABLET, FILM COATED ORAL ONCE
Refills: 0 | Status: COMPLETED | OUTPATIENT
Start: 2024-02-08 | End: 2024-02-08

## 2024-02-08 RX ADMIN — ONDANSETRON 4 MILLIGRAM(S): 8 TABLET, FILM COATED ORAL at 00:41

## 2024-02-08 NOTE — ED ADULT NURSE REASSESSMENT NOTE - NS ED NURSE REASSESS COMMENT FT1
Called group home and spoke to Jessy Hurst who confirmed understanding that Ms Boyd has been seen and ok for d/c from [facility] ER.  pt aox3 calm, cooperative, no iv access.  pt denies abdominal pain this AM, had 1 episode emesis when she came in and none since then.  Pt was PO challenged , gingerale and tuna sandwich, tolerated well

## 2024-02-10 ENCOUNTER — EMERGENCY (EMERGENCY)
Facility: HOSPITAL | Age: 34
LOS: 0 days | Discharge: HOME | End: 2024-02-10
Attending: EMERGENCY MEDICINE
Payer: MEDICAID

## 2024-02-10 VITALS
HEIGHT: 62 IN | RESPIRATION RATE: 18 BRPM | DIASTOLIC BLOOD PRESSURE: 86 MMHG | HEART RATE: 105 BPM | SYSTOLIC BLOOD PRESSURE: 121 MMHG | WEIGHT: 272.05 LBS | OXYGEN SATURATION: 96 % | TEMPERATURE: 99 F

## 2024-02-10 VITALS
SYSTOLIC BLOOD PRESSURE: 114 MMHG | HEART RATE: 106 BPM | TEMPERATURE: 98 F | DIASTOLIC BLOOD PRESSURE: 79 MMHG | OXYGEN SATURATION: 100 % | RESPIRATION RATE: 20 BRPM

## 2024-02-10 DIAGNOSIS — R11.2 NAUSEA WITH VOMITING, UNSPECIFIED: ICD-10-CM

## 2024-02-10 DIAGNOSIS — Z91.018 ALLERGY TO OTHER FOODS: ICD-10-CM

## 2024-02-10 DIAGNOSIS — R10.9 UNSPECIFIED ABDOMINAL PAIN: ICD-10-CM

## 2024-02-10 DIAGNOSIS — Z91.013 ALLERGY TO SEAFOOD: ICD-10-CM

## 2024-02-10 LAB
ANION GAP SERPL CALC-SCNC: 5 MMOL/L — SIGNIFICANT CHANGE UP (ref 5–17)
BUN SERPL-MCNC: 6 MG/DL — LOW (ref 7–23)
CALCIUM SERPL-MCNC: 8.9 MG/DL — SIGNIFICANT CHANGE UP (ref 8.5–10.1)
CHLORIDE SERPL-SCNC: 110 MMOL/L — HIGH (ref 96–108)
CO2 SERPL-SCNC: 24 MMOL/L — SIGNIFICANT CHANGE UP (ref 22–31)
CREAT SERPL-MCNC: 0.79 MG/DL — SIGNIFICANT CHANGE UP (ref 0.5–1.3)
EGFR: 102 ML/MIN/1.73M2 — SIGNIFICANT CHANGE UP
GLUCOSE SERPL-MCNC: 98 MG/DL — SIGNIFICANT CHANGE UP (ref 70–99)
HCT VFR BLD CALC: 36.2 % — SIGNIFICANT CHANGE UP (ref 34.5–45)
HGB BLD-MCNC: 11.7 G/DL — SIGNIFICANT CHANGE UP (ref 11.5–15.5)
MCHC RBC-ENTMCNC: 29.8 PG — SIGNIFICANT CHANGE UP (ref 27–34)
MCHC RBC-ENTMCNC: 32.3 G/DL — SIGNIFICANT CHANGE UP (ref 32–36)
MCV RBC AUTO: 92.1 FL — SIGNIFICANT CHANGE UP (ref 80–100)
NRBC # BLD: 0 /100 WBCS — SIGNIFICANT CHANGE UP (ref 0–0)
PLATELET # BLD AUTO: 271 K/UL — SIGNIFICANT CHANGE UP (ref 150–400)
POTASSIUM SERPL-MCNC: 4.8 MMOL/L — SIGNIFICANT CHANGE UP (ref 3.5–5.3)
POTASSIUM SERPL-SCNC: 4.8 MMOL/L — SIGNIFICANT CHANGE UP (ref 3.5–5.3)
RBC # BLD: 3.93 M/UL — SIGNIFICANT CHANGE UP (ref 3.8–5.2)
RBC # FLD: 13.5 % — SIGNIFICANT CHANGE UP (ref 10.3–14.5)
SODIUM SERPL-SCNC: 139 MMOL/L — SIGNIFICANT CHANGE UP (ref 135–145)
WBC # BLD: 11.27 K/UL — HIGH (ref 3.8–10.5)
WBC # FLD AUTO: 11.27 K/UL — HIGH (ref 3.8–10.5)

## 2024-02-10 PROCEDURE — 99284 EMERGENCY DEPT VISIT MOD MDM: CPT

## 2024-02-10 RX ORDER — ONDANSETRON 8 MG/1
4 TABLET, FILM COATED ORAL ONCE
Refills: 0 | Status: COMPLETED | OUTPATIENT
Start: 2024-02-10 | End: 2024-02-10

## 2024-02-10 RX ORDER — SODIUM CHLORIDE 9 MG/ML
1000 INJECTION INTRAMUSCULAR; INTRAVENOUS; SUBCUTANEOUS
Refills: 0 | Status: DISCONTINUED | OUTPATIENT
Start: 2024-02-10 | End: 2024-02-10

## 2024-02-10 RX ADMIN — ONDANSETRON 4 MILLIGRAM(S): 8 TABLET, FILM COATED ORAL at 14:16

## 2024-02-10 NOTE — ED ADULT NURSE NOTE - NS ED NURSE LEVEL OF CONSCIOUSNESS AFFECT
pt admitted for HTN urgency from HD today. third enzyme sent for trend. previous results 0.11 and 0.14. pt denies chest pain at this time.
Calm

## 2024-02-10 NOTE — ED PROVIDER NOTE - PHYSICAL EXAMINATION
Torrez:  General: No distress.  Mentation at baseline.   HEENT: WNL  Chest/Lungs: CTAB, No wheeze, No retractions, No increased work of breathing, Normal rate  Heart: S1S2 RRR, No M/R/G, Pules equal Bilaterally in upper and lower extremities distally  Abd: soft, NT/ND, No guarding, No rebound.  No hernias, no palpable masses.  Extrem: FROM in all joints, no significant edema noted, No ulcers.  Cap refil < 2sec.  Skin: No rash noted, warm dry.  Neuro:  Grossly normal.  No difficulty ambulating. No focal deficits.  Psychiatric: No evidence of delusions. No SI/HI.

## 2024-02-10 NOTE — ED PROVIDER NOTE - PATIENT PORTAL LINK FT
You can access the FollowMyHealth Patient Portal offered by [facility] by registering at the following website: [email or web address]. By joining [employer]’s FollowMyHealth portal, you will also be able to view your health information using other applications (apps) compatible with our system.

## 2024-02-10 NOTE — ED ADULT NURSE NOTE - OBJECTIVE STATEMENT
patient alert and oriented x3. patient 32 yo female BIBEMS from group home c/o abd pain and episode of vomiting after eating peanuts.

## 2024-02-10 NOTE — ED PROVIDER NOTE - OBJECTIVE STATEMENT
33 year old well known to the department for abdominal complaints with frequent visits.  she reports nausea and vomiting.

## 2024-02-12 ENCOUNTER — EMERGENCY (EMERGENCY)
Facility: HOSPITAL | Age: 34
LOS: 0 days | Discharge: HOME | End: 2024-02-13
Attending: STUDENT IN AN ORGANIZED HEALTH CARE EDUCATION/TRAINING PROGRAM
Payer: MEDICAID

## 2024-02-12 VITALS
OXYGEN SATURATION: 97 % | SYSTOLIC BLOOD PRESSURE: 115 MMHG | WEIGHT: 244.93 LBS | TEMPERATURE: 97 F | DIASTOLIC BLOOD PRESSURE: 73 MMHG | HEIGHT: 62 IN | RESPIRATION RATE: 18 BRPM | HEART RATE: 99 BPM

## 2024-02-12 DIAGNOSIS — Z91.018 ALLERGY TO OTHER FOODS: ICD-10-CM

## 2024-02-12 DIAGNOSIS — K92.0 HEMATEMESIS: ICD-10-CM

## 2024-02-12 DIAGNOSIS — Z56.0 UNEMPLOYMENT, UNSPECIFIED: ICD-10-CM

## 2024-02-12 DIAGNOSIS — Z91.013 ALLERGY TO SEAFOOD: ICD-10-CM

## 2024-02-12 DIAGNOSIS — R06.02 SHORTNESS OF BREATH: ICD-10-CM

## 2024-02-12 DIAGNOSIS — F25.9 SCHIZOAFFECTIVE DISORDER, UNSPECIFIED: ICD-10-CM

## 2024-02-12 DIAGNOSIS — E11.9 TYPE 2 DIABETES MELLITUS WITHOUT COMPLICATIONS: ICD-10-CM

## 2024-02-12 DIAGNOSIS — Z91.52 PERSONAL HISTORY OF NONSUICIDAL SELF-HARM: ICD-10-CM

## 2024-02-12 DIAGNOSIS — R62.50 UNSPECIFIED LACK OF EXPECTED NORMAL PHYSIOLOGICAL DEVELOPMENT IN CHILDHOOD: ICD-10-CM

## 2024-02-12 DIAGNOSIS — F79 UNSPECIFIED INTELLECTUAL DISABILITIES: ICD-10-CM

## 2024-02-12 DIAGNOSIS — R30.0 DYSURIA: ICD-10-CM

## 2024-02-12 DIAGNOSIS — R19.7 DIARRHEA, UNSPECIFIED: ICD-10-CM

## 2024-02-12 DIAGNOSIS — J45.909 UNSPECIFIED ASTHMA, UNCOMPLICATED: ICD-10-CM

## 2024-02-12 DIAGNOSIS — F31.9 BIPOLAR DISORDER, UNSPECIFIED: ICD-10-CM

## 2024-02-12 DIAGNOSIS — R10.9 UNSPECIFIED ABDOMINAL PAIN: ICD-10-CM

## 2024-02-12 DIAGNOSIS — F43.25 ADJUSTMENT DISORDER WITH MIXED DISTURBANCE OF EMOTIONS AND CONDUCT: ICD-10-CM

## 2024-02-12 DIAGNOSIS — M79.89 OTHER SPECIFIED SOFT TISSUE DISORDERS: ICD-10-CM

## 2024-02-12 LAB
ALBUMIN SERPL ELPH-MCNC: 2.9 G/DL — LOW (ref 3.3–5)
ALP SERPL-CCNC: 60 U/L — SIGNIFICANT CHANGE UP (ref 40–120)
ALT FLD-CCNC: 11 U/L — LOW (ref 12–78)
ANION GAP SERPL CALC-SCNC: 2 MMOL/L — LOW (ref 5–17)
AST SERPL-CCNC: 13 U/L — LOW (ref 15–37)
BASOPHILS # BLD AUTO: 0.01 K/UL — SIGNIFICANT CHANGE UP (ref 0–0.2)
BASOPHILS NFR BLD AUTO: 0.2 % — SIGNIFICANT CHANGE UP (ref 0–2)
BILIRUB SERPL-MCNC: 0.2 MG/DL — SIGNIFICANT CHANGE UP (ref 0.2–1.2)
BUN SERPL-MCNC: 6 MG/DL — LOW (ref 7–23)
CALCIUM SERPL-MCNC: 8.9 MG/DL — SIGNIFICANT CHANGE UP (ref 8.5–10.1)
CHLORIDE SERPL-SCNC: 113 MMOL/L — HIGH (ref 96–108)
CO2 SERPL-SCNC: 25 MMOL/L — SIGNIFICANT CHANGE UP (ref 22–31)
CREAT SERPL-MCNC: 0.78 MG/DL — SIGNIFICANT CHANGE UP (ref 0.5–1.3)
EGFR: 104 ML/MIN/1.73M2 — SIGNIFICANT CHANGE UP
EOSINOPHIL # BLD AUTO: 0.06 K/UL — SIGNIFICANT CHANGE UP (ref 0–0.5)
EOSINOPHIL NFR BLD AUTO: 1 % — SIGNIFICANT CHANGE UP (ref 0–6)
GLUCOSE SERPL-MCNC: 88 MG/DL — SIGNIFICANT CHANGE UP (ref 70–99)
HCG SERPL-ACNC: <1 MIU/ML — SIGNIFICANT CHANGE UP
HCT VFR BLD CALC: 35 % — SIGNIFICANT CHANGE UP (ref 34.5–45)
HGB BLD-MCNC: 11.6 G/DL — SIGNIFICANT CHANGE UP (ref 11.5–15.5)
IMM GRANULOCYTES NFR BLD AUTO: 0.2 % — SIGNIFICANT CHANGE UP (ref 0–0.9)
LIDOCAIN IGE QN: 20 U/L — SIGNIFICANT CHANGE UP (ref 13–75)
LYMPHOCYTES # BLD AUTO: 1.56 K/UL — SIGNIFICANT CHANGE UP (ref 1–3.3)
LYMPHOCYTES # BLD AUTO: 27.2 % — SIGNIFICANT CHANGE UP (ref 13–44)
MCHC RBC-ENTMCNC: 30.8 PG — SIGNIFICANT CHANGE UP (ref 27–34)
MCHC RBC-ENTMCNC: 33.1 G/DL — SIGNIFICANT CHANGE UP (ref 32–36)
MCV RBC AUTO: 92.8 FL — SIGNIFICANT CHANGE UP (ref 80–100)
MONOCYTES # BLD AUTO: 0.35 K/UL — SIGNIFICANT CHANGE UP (ref 0–0.9)
MONOCYTES NFR BLD AUTO: 6.1 % — SIGNIFICANT CHANGE UP (ref 2–14)
NEUTROPHILS # BLD AUTO: 3.74 K/UL — SIGNIFICANT CHANGE UP (ref 1.8–7.4)
NEUTROPHILS NFR BLD AUTO: 65.3 % — SIGNIFICANT CHANGE UP (ref 43–77)
NRBC # BLD: 0 /100 WBCS — SIGNIFICANT CHANGE UP (ref 0–0)
PLATELET # BLD AUTO: 282 K/UL — SIGNIFICANT CHANGE UP (ref 150–400)
POTASSIUM SERPL-MCNC: 4.6 MMOL/L — SIGNIFICANT CHANGE UP (ref 3.5–5.3)
POTASSIUM SERPL-SCNC: 4.6 MMOL/L — SIGNIFICANT CHANGE UP (ref 3.5–5.3)
PROT SERPL-MCNC: 7.2 GM/DL — SIGNIFICANT CHANGE UP (ref 6–8.3)
RBC # BLD: 3.77 M/UL — LOW (ref 3.8–5.2)
RBC # FLD: 13.6 % — SIGNIFICANT CHANGE UP (ref 10.3–14.5)
SODIUM SERPL-SCNC: 140 MMOL/L — SIGNIFICANT CHANGE UP (ref 135–145)
WBC # BLD: 5.73 K/UL — SIGNIFICANT CHANGE UP (ref 3.8–10.5)
WBC # FLD AUTO: 5.73 K/UL — SIGNIFICANT CHANGE UP (ref 3.8–10.5)

## 2024-02-12 PROCEDURE — 99285 EMERGENCY DEPT VISIT HI MDM: CPT

## 2024-02-12 RX ORDER — ONDANSETRON 8 MG/1
4 TABLET, FILM COATED ORAL ONCE
Refills: 0 | Status: COMPLETED | OUTPATIENT
Start: 2024-02-12 | End: 2024-02-12

## 2024-02-12 RX ORDER — KETOROLAC TROMETHAMINE 30 MG/ML
15 SYRINGE (ML) INJECTION ONCE
Refills: 0 | Status: DISCONTINUED | OUTPATIENT
Start: 2024-02-12 | End: 2024-02-12

## 2024-02-12 RX ORDER — SODIUM CHLORIDE 9 MG/ML
1000 INJECTION INTRAMUSCULAR; INTRAVENOUS; SUBCUTANEOUS ONCE
Refills: 0 | Status: COMPLETED | OUTPATIENT
Start: 2024-02-12 | End: 2024-02-12

## 2024-02-12 RX ADMIN — ONDANSETRON 4 MILLIGRAM(S): 8 TABLET, FILM COATED ORAL at 22:02

## 2024-02-12 RX ADMIN — Medication 15 MILLIGRAM(S): at 22:32

## 2024-02-12 RX ADMIN — Medication 15 MILLIGRAM(S): at 22:02

## 2024-02-12 SDOH — ECONOMIC STABILITY - INCOME SECURITY: UNEMPLOYMENT, UNSPECIFIED: Z56.0

## 2024-02-12 NOTE — ED ADULT NURSE NOTE - OBJECTIVE STATEMENT
Pt is a 33y F AOx3 with a pmh of hizophrenia, bipolar, developmental delay, DM, asthma. Pt reports multiple episodes of blood tinged vomiting, diarrhea and nausea for 4 days. Pt also reports sob, and dysuria. Pt denies cp, fever, chills,

## 2024-02-12 NOTE — ED ADULT NURSE NOTE - NSFALLUNIVINTERV_ED_ALL_ED
Bed/Stretcher in lowest position, wheels locked, appropriate side rails in place/Call bell, personal items and telephone in reach/Instruct patient to call for assistance before getting out of bed/chair/stretcher/Non-slip footwear applied when patient is off stretcher/[place] to call system/Physically safe environment - no spills, clutter or unnecessary equipment/Purposeful proactive rounding/Room/bathroom lighting operational, light cord in reach

## 2024-02-12 NOTE — ED PROVIDER NOTE - OBJECTIVE STATEMENT
33F PMH schizophrenia, bipolar, developmental delay, DM, asthma BIBEMS from [facility] d/t abd cramping, nausea, vomiting w/ blood flecks, watery NB diarrhea x4 days. Pt endorses SOB, dysuria and LE swelling. Pt denies F/C, CP, cough, flank pain, constipation, hematuria, vaginal d/c, recent travel, sick contacts, new / spoiled foods. Pt seen in ED 2 days ago w/ similar CC.     PMH as above, PSH none, NKDA, meds as listed, LMP 2/12.

## 2024-02-12 NOTE — ED ADULT NURSE NOTE - ED STAT RN HANDOFF DETAILS
Report given to ADRY Tomlinson for my break coverage; plan of care, pending labs / rads, and issues brought up by pt endorsed to covering RN.

## 2024-02-12 NOTE — ED PROVIDER NOTE - PROGRESS NOTE DETAILS
Results reported to patient--grossly benign, labs unremarkable thus far  Pt. reports feeling better after meds, ua/cx cancelled as pt. denies urinary complaints at this time   pt. agrees to f/u with primary care outpt. asap  pt. understands to return to ED if symptoms worsen; will d/c with copy of results for reference patient agitated and screaming, got up and unable to get her back into stretcher.  unable to verbally de-escalate. security called. medications given -[facility] was called, telepsych eval pending. patient calm and cooperative now, ate breakfast. -[facility] was called, telepsych eval pending. patient calm and cooperative now, ate breakfast. SW called for disposition -[facility]

## 2024-02-12 NOTE — ED PROVIDER NOTE - CLINICAL SUMMARY MEDICAL DECISION MAKING FREE TEXT BOX
33F PMH schizophrenia, bipolar, developmental delay, DM, asthma BIBEMS from  d/t abd cramping, nausea, vomiting w/ blood flecks, watery NB diarrhea x4 days. Afebrile, VSS. Well appearing, in NAD. Benign abd exam. Plan for CBC, CMP, lipase, HCG, UA/C. Give Zofran, Toradol. Re-eval. 33F PMH schizophrenia, bipolar, developmental delay, DM, asthma BIBEMS from  d/t abd cramping, nausea, vomiting w/ blood flecks, watery NB diarrhea x4 days. Afebrile, VSS. Well appearing, in NAD. Benign abd exam. Plan for CBC, CMP, lipase, HCG, UA/C. Give Zofran, Toradol. Re-eval.  Blood work w/o significant abnormalities. On re-eval, pt resting comfortably. Pt care signed out at change of shift, pending UA.

## 2024-02-12 NOTE — ED PROVIDER NOTE - PATIENT PORTAL LINK FT
You can access the FollowMyHealth Patient Portal offered by [facility] by registering at the following website: [email or web address]. By joining [employer]’s FollowMyHealth portal, you will also be able to view your health information using other applications (apps) compatible with our system. You can access the FollowMyHealth Patient Portal offered by [facility] by registering at the following website: [email or web address]. By joining [employer]’s FollowMyHealth portal, you will also be able to view your health information using other applications (apps) compatible with our system.

## 2024-02-12 NOTE — ED PROVIDER NOTE - CARE PROVIDER_API CALL
Sonu Lopez  Internal Medicine  [address] [place] [address]  Phone: [phone]  Fax: [phone]  Follow Up Time: Urgent

## 2024-02-13 VITALS
RESPIRATION RATE: 17 BRPM | TEMPERATURE: 98 F | DIASTOLIC BLOOD PRESSURE: 94 MMHG | WEIGHT: 293 LBS | SYSTOLIC BLOOD PRESSURE: 131 MMHG | HEIGHT: 62 IN | HEART RATE: 117 BPM | OXYGEN SATURATION: 97 %

## 2024-02-13 VITALS
TEMPERATURE: 99 F | HEART RATE: 104 BPM | DIASTOLIC BLOOD PRESSURE: 60 MMHG | SYSTOLIC BLOOD PRESSURE: 106 MMHG | OXYGEN SATURATION: 98 % | RESPIRATION RATE: 17 BRPM

## 2024-02-13 VITALS
RESPIRATION RATE: 18 BRPM | SYSTOLIC BLOOD PRESSURE: 107 MMHG | TEMPERATURE: 98 F | OXYGEN SATURATION: 96 % | DIASTOLIC BLOOD PRESSURE: 73 MMHG | HEART RATE: 93 BPM

## 2024-02-13 DIAGNOSIS — E11.9 TYPE 2 DIABETES MELLITUS WITHOUT COMPLICATIONS: ICD-10-CM

## 2024-02-13 DIAGNOSIS — R10.31 RIGHT LOWER QUADRANT PAIN: ICD-10-CM

## 2024-02-13 DIAGNOSIS — K59.00 CONSTIPATION, UNSPECIFIED: ICD-10-CM

## 2024-02-13 DIAGNOSIS — R10.32 LEFT LOWER QUADRANT PAIN: ICD-10-CM

## 2024-02-13 DIAGNOSIS — R11.2 NAUSEA WITH VOMITING, UNSPECIFIED: ICD-10-CM

## 2024-02-13 DIAGNOSIS — F20.9 SCHIZOPHRENIA, UNSPECIFIED: ICD-10-CM

## 2024-02-13 DIAGNOSIS — R62.50 UNSPECIFIED LACK OF EXPECTED NORMAL PHYSIOLOGICAL DEVELOPMENT IN CHILDHOOD: ICD-10-CM

## 2024-02-13 DIAGNOSIS — Z91.013 ALLERGY TO SEAFOOD: ICD-10-CM

## 2024-02-13 DIAGNOSIS — F43.25 ADJUSTMENT DISORDER WITH MIXED DISTURBANCE OF EMOTIONS AND CONDUCT: ICD-10-CM

## 2024-02-13 DIAGNOSIS — F31.9 BIPOLAR DISORDER, UNSPECIFIED: ICD-10-CM

## 2024-02-13 DIAGNOSIS — J45.909 UNSPECIFIED ASTHMA, UNCOMPLICATED: ICD-10-CM

## 2024-02-13 DIAGNOSIS — Z91.018 ALLERGY TO OTHER FOODS: ICD-10-CM

## 2024-02-13 LAB — ETHANOL SERPL-MCNC: <10 MG/DL — SIGNIFICANT CHANGE UP (ref 0–10)

## 2024-02-13 PROCEDURE — 99283 EMERGENCY DEPT VISIT LOW MDM: CPT

## 2024-02-13 PROCEDURE — 90792 PSYCH DIAG EVAL W/MED SRVCS: CPT | Mod: 95,GC

## 2024-02-13 RX ORDER — DIPHENHYDRAMINE HCL 50 MG
50 CAPSULE ORAL ONCE
Refills: 0 | Status: COMPLETED | OUTPATIENT
Start: 2024-02-13 | End: 2024-02-13

## 2024-02-13 RX ORDER — ACETAMINOPHEN 500 MG
650 TABLET ORAL ONCE
Refills: 0 | Status: COMPLETED | OUTPATIENT
Start: 2024-02-13 | End: 2024-02-13

## 2024-02-13 RX ORDER — HALOPERIDOL DECANOATE 100 MG/ML
5 INJECTION INTRAMUSCULAR ONCE
Refills: 0 | Status: COMPLETED | OUTPATIENT
Start: 2024-02-13 | End: 2024-02-13

## 2024-02-13 RX ADMIN — Medication 50 MILLIGRAM(S): at 07:30

## 2024-02-13 RX ADMIN — Medication 650 MILLIGRAM(S): at 19:10

## 2024-02-13 RX ADMIN — Medication 2 MILLIGRAM(S): at 07:30

## 2024-02-13 RX ADMIN — HALOPERIDOL DECANOATE 5 MILLIGRAM(S): 100 INJECTION INTRAMUSCULAR at 07:30
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
## 2024-02-13 NOTE — ED PROVIDER NOTE - PATIENT PORTAL LINK FT
You can access the FollowMyHealth Patient Portal offered by [facility] by registering at the following website: [email or web address]. By joining [employer]’s FollowMyHealth portal, you will also be able to view your health information using other applications (apps) compatible with our system.

## 2024-02-13 NOTE — ED PROVIDER NOTE - NSFOLLOWUPINSTRUCTIONS_ED_ALL_ED_FT
Patient was seen in the emergency room for abdominal pain - blood work was unremarkable, abdomen nontender, no indication for hospital admission. Patient provided tylenol with improvement and rested comfortably in the emergency room.     Patient may return to group home     She may return at anytime for any worsening symptoms

## 2024-02-13 NOTE — ED PROVIDER NOTE - NS ED ROS FT
CONSTITUTIONAL: No fever, no chills, no fatigue  EYES: No visual changes  ENT: No ear pain, no sore throat  CARDIOVASCULAR: No chest pain, no palpitations  RESPIRATORY: No cough, no SOB  GI: ,no diarrhea  GENITOURINARY: No dysuria, no frequency, no hematuria  MUSKULOSKELETAL: No backpain, no joint pain, no myalgias  SKIN: No rash  NEURO: No headache    ALL OTHER SYSTEMS NEGATIVE.

## 2024-02-13 NOTE — ED BEHAVIORAL HEALTH ASSESSMENT NOTE - NSSUICPROTFACT_PSY_ALL_CORE
Stable housing (group home) and connected with [facility] Stable housing (group home) and connected with [facility], also reports that she wants to see her aunt

## 2024-02-13 NOTE — ED PROVIDER NOTE - CAREGIVER/GUARDIAN DETAILS FREE TEXT FOR MDM ADDL HISTORY OBTAINED FROM QUESTION
Subjective Finding:    Chief compalint: Patient presents with:  Neck Pain  , Pain Scale: 7/10, Intensity: sharp, Duration: 2 months, Radiating: no.    Date of injury:     Activities that the pain restricts:   Home/household/hobbies/social activities: yes.  Work duties: yes.  Sleep: yes.  Makes symptoms better: rest.  Makes symptoms worse: cervical extension and cervical flexion.  Have you seen anyone else for the symptoms? No.  Work related: no.  Automobile related injury: no.    Objective and Assessment:    Posture Analysis:   High shoulder: .  Head tilt: .  High iliac crest: .  Head carriage: forward.  Thoracic Kyphosis: neutral.  Lumbar Lordosis: neutral.    Lumbar Range of Motion: .  Cervical Range of Motion: extension decreased, left lateral flexion decreased and right lateral flexion decreased.  Thoracic Range of Motion: .  Extremity Range of Motion: .    Palpation:   Traps: sharp pain, referred pain: no    Segmental dysfunction pre-treatment and treatment area: C2, C5, C6 and T2.  L5    Assessment post-treatment:  Cervical: ROM increased.  Thoracic: ROM increased.  Lumbar: .    Comments: .      Complicating Factors: .    Procedure(s):  CMT:  38174 Chiropractic manipulative treatment 1-2 regions performed   Cervical: Diversified, See above for level, Supine and Thoracic: Diversified, See above for level, Prone    Modalities:  None performed this visit    Therapeutic procedures:  None    Plan:  Treatment plan: PRN.  Instructed patient: stretch as instructed at visit.  Short term goals: reduce pain.  Long term goals: restore normal function.  Prognosis: good.             
Spoke with patient's agenecy supervisor Thuy [phone] and is aware of the pt. States pt can return back to the group home and she will let the group home know.

## 2024-02-13 NOTE — CHART NOTE - NSCHARTNOTEFT_GEN_A_CORE
ROCIO attempted to contact pt's group home in order to inform them that pt is scheduled to be discharged from ER. ROCIO called emergency contact listed in pt chart and left message with this number encouraging staff at pt's group home to outreach back to ER or SW's work cell asap. Additionally, ROCIO attempted to contact pt's , Thuy ([phone]), and left her messages regarding pt's d/c and provided her with contact numbers to outreach back to the hospital asap.
ROCIO outreached to [facility] main office ([phone]) and left a message regarding ongoing issues that ER staff is having with lack of communication with pt's group home. ROCIO provided [facility] with his work cell phone number as well as ER contact number so that staff from [facility] can outreach in regard to coordinating pt's d/c.
ROCIO was able to make contact with supervisor at pt's agency, Thuy ([phone]). Thuy confirmed that pt would be able to return to [facility] home. ROCIO outreached to [facility] to schedule an ambulance for pt to return home. ROCIO provided information on pt and then transferred EMS to speak with pt's nurse. Pt's nurse and MD are aware of pt's plan to return home.

## 2024-02-13 NOTE — ED PROVIDER NOTE - OBJECTIVE STATEMENT
32 y/o female with schizophrenia, bipolar, developmental delay, DM, asthma BIBEMS from  d/t abd cramping, nausea, vomiting, constipation. Pt was just seen here few hours ago and was discharged back to group home. Pt has history of multiple ed visits with similar complaint in the past.

## 2024-02-13 NOTE — ED BEHAVIORAL HEALTH ASSESSMENT NOTE - NSPRESENTSXS_PSY_ALL_CORE
Severe anxiety, agitation or panic Impulsivity/Severe anxiety, agitation or panic/Refusal or inability to complete safety plan

## 2024-02-13 NOTE — ED ADULT NURSE NOTE - OBJECTIVE STATEMENT
33 year old female A/Ox3 c/o abdominal cramping starting a few hours ago, pt recently seen in ED today for same symptoms, pt denies n/v, fever/chills, steady gait noted, no signs or symptoms of acute distress noted

## 2024-02-13 NOTE — ED ADULT NURSE REASSESSMENT NOTE - NS ED NURSE REASSESS COMMENT FT1
Pt refused Maalox for stomach ache, stating " that doesn't help, it makes me have diarrhea". Pt reports that ginger ale helps with stomach aches. Pt given ginger ale to little relief. Pt states " nothing helps with stomach aches. MD Pereira made aware and plan of care continues.

## 2024-02-13 NOTE — ED ADULT NURSE REASSESSMENT NOTE - NS ED NURSE REASSESS COMMENT FT1
During initial assessment, pt stated "I want to die... They're making fun of me." Dr. Sahni made aware. Witnessed Dr. Sahni assess pt, got agitated and started walking around the ED, while yelling. Refused food, medication, vitals. Pt moved to [facility], security called for assistance, Dr. Sahni ordered Benadryl, Haldol, Ativan to be given IM. Pt started swinging arms and legs, while screaming, and crying and had to be held down by multiple staff members to administer IM med. 1:1 initiated, observer within arms' reach, belongings given to security. During initial assessment, pt stated "I want to die... They're making fun of me." Dr. Sahni made aware. Witnessed Dr. Sahni assess pt, got agitated and started walking around the ED, while yelling. Refused food, medication, vitals. Pt moved to [facility], security called for assistance, Dr. Sahni ordered Benadryl, Haldol, Ativan to be given IM. Pt started swinging arms and legs, while screaming, and crying and had to be held down by multiple staff members to administer IM med. 1:1 initiated, observer within arms' reach, belongings given to security, wanding performed  .

## 2024-02-13 NOTE — ED BEHAVIORAL HEALTH ASSESSMENT NOTE - CURRENT MEDICATION
· 	ARIPiprazole 5 mg oral tablet: 1 tab(s) orally once a day  · 	pantoprazole 40 mg oral delayed release tablet: 1 tab(s) orally once a day (before a meal)  · 	melatonin 10 mg oral tablet: 1 tab(s) orally once a day (at bedtime)  · 	cloZAPine 50 mg oral tablet: 1 tab(s) orally once a day (at bedtime)   · 	cloZAPine 100 mg oral tablet: 1 tab(s) orally 2 times a day   · 	metFORMIN 500 mg oral tablet: 1 tab(s) orally 2 times a day  · 	FLUoxetine 10 mg oral capsule: 3 cap(s) orally once a day  · 	divalproex sodium 250 mg oral delayed release tablet: 3 tab(s) orally 2 times a day  · 	Senna 8.6 mg oral tablet: 1 tab(s) orally once a day Per chart (need to confirm):  ARIPiprazole 5 mg oral tablet: 1 tab(s) orally once a day  · 	pantoprazole 40 mg oral delayed release tablet: 1 tab(s) orally once a day (before a meal)  · 	melatonin 10 mg oral tablet: 1 tab(s) orally once a day (at bedtime)  · 	cloZAPine 50 mg oral tablet: 1 tab(s) orally once a day (at bedtime)   · 	cloZAPine 100 mg oral tablet: 1 tab(s) orally 2 times a day   · 	metFORMIN 500 mg oral tablet: 1 tab(s) orally 2 times a day  · 	FLUoxetine 10 mg oral capsule: 3 cap(s) orally once a day  · 	divalproex sodium 250 mg oral delayed release tablet: 3 tab(s) orally 2 times a day  · 	Senna 8.6 mg oral tablet: 1 tab(s) orally once a day

## 2024-02-13 NOTE — ED ADULT NURSE REASSESSMENT NOTE - NS ED NURSE REASSESS COMMENT FT1
Handoff report received from nurse for shift change. Plan of care reviewed. Pt received resting on stretcher. A&Ox3, speaking coherently, irritable, ambulatory.    Complaining of abd pain; no other complaints at this time.

## 2024-02-13 NOTE — ED BEHAVIORAL HEALTH ASSESSMENT NOTE - PAST PSYCHOTROPIC MEDICATION
Equal and normal pulses (carotid, femoral, dorsalis pedis)
ARIPiprazole 5 mg oral tablet: 1 tab(s) orally once a day  cloZAPine 50 mg oral tablet: 1 tab(s) orally once a day (at bedtime)   cloZAPine 100 mg oral tablet: 1 tab(s) orally 2 times a day  divalproex sodium 250 mg oral delayed release tablet: 3 tab(s) orally 2 times a day

## 2024-02-13 NOTE — ED PROVIDER NOTE - ATTENDING APP SHARED VISIT CONTRIBUTION OF CARE
34 y/o female with schizophrenia, bipolar, developmental delay, DM, asthma BIBEMS from group home for abdominal pain. Patient reports abd cramping pain. Pt denies nausea/vomiting/diarrhea contrary to earlier complaints from ED visit yday into earlier this afternoon when pt was finally discharged. Pt has been tolerating po. Pt denies fevers, chest pain, sob, or SI   on exam pt is well appearing, calm and cooperative, aox3, nad, heart rate normal, normal respirations, abd soft ntnd, no peripheral edema  plan - labs reviewed from ED stay yday, wnl, no significant derangements, pt tolerating po, trial tylenol and reassess .   - on serial reassessments pt comfortable without pain, stable for return to group home, return precautions discussed

## 2024-02-13 NOTE — ED BEHAVIORAL HEALTH ASSESSMENT NOTE - DESCRIPTION
got agitated and started walking around the ED, while yelling. Refused food, medication, vitals. Pt moved to [facility], security called for assistance, Dr. Sahni ordered Benadryl, Haldol, Ativan to be given IM. Pt started swinging arms and legs, while screaming, and crying and had to be held down by multiple staff members to administer IM med. Asthma, developmental delay, intellectual disability, hyperprolactenemia domiciled at group home, unemployed, did not finish high school, was in special education, single, no dependent

## 2024-02-13 NOTE — ED BEHAVIORAL HEALTH ASSESSMENT NOTE - SUMMARY
Ms. Boyd is a 33 year old female, domiciled at group home, unemployed, did not finish high school, was in special education, single, no dependent, past psychiatric history of developmental delay, intellectual disability, and schizoaffective disorder, receiving psychiatric care at [facility], with past psychiatric admissions (Jan 2022 for aggression), no hx of SI/SA/NSSIB/substance use/legal history, medical history of DM and asthma, hx of multiple ED visits in the setting of poor impulse control/ agitation/aggression at group home, who presented to the ED for abdominal pain, nausea, vomiting, and other symptoms. Psychiatry was consulted after patient making provocative statement "I want to die" and then subsequently becoming agitated and yelling. Patient subsequently received Ativan 2mg IM, Haldol 5mg IM, and Benadryl 50mg IM.    Upon evaluation, patient appears to be dysphoric, affect congruent with stated mood, linear thought process, concrete, reality based thought content, but mostly unhappy with the care she is receiving, as patient feels that people give her "evil eyes," and "doing the same thing over and over again." This clinical presentation appears that patient is dissatisfied with the level of attention and care she is receiving at the hospital and stated "I want to die," in the setting of poor impulse control and poor frustration tolerance, secondary to intellectual disability and developmental delay. This presentation appears to be frequent per chart review and collateral obtained from [facility], in which patient acts out due to low frustration tolerance and limited coping mechanism. Patient also noted that she developed the sense of "want[ing] to die" "just now," and denies intent or realistic/logical plan. Although patient is at an elevated risk for self harm, as chart review shows history of self-injurious behavior without physical injury, and multiple ED visits due to agitation and associated aggression, she is not acutely at risk for suicide as she discloses no realistic plan or intent, and has no history of suicide attempt or serious self-inflicted injury. Patient is already connected to [facility] and lives with other individuals at group home, which put her at a relatively safe disposition, as she can be monitored by staff members. Patient will less likely benefit from inpatient psychiatric hospitalization, as her limited impulse control and frustration tolerance, is of chronic nature, and these are better managed at an outpatient basis. At this time, patient does not endorse any HI/AVH/domenico/psychosis, that could pur herself and others in danger. With group home's agreement to plan, patient is deemed safe to discharge at a psychiatric stand point.     RECOMMENDATION:  - Return to group home and continue home medications as prescribed by outpatient provider.

## 2024-02-13 NOTE — ED BEHAVIORAL HEALTH ASSESSMENT NOTE - RISK ASSESSMENT
Although patient is at an elevated risk for self harm, as chart review shows history of self-injurious behavior without physical injury, and multiple ED visits due to agitation and associated aggression in the context of intellectual disability, she is not acutely at risk for suicide as she discloses no realistic plan or intent, and has no history of suicide attempt or serious self-inflicted injury. Protective factors include stable housing (group home), no access to lethal means, currently connected to outpatient psychiatry ([facility]).
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
## 2024-02-13 NOTE — ED PROVIDER NOTE - PHYSICAL EXAMINATION
GEN: Awake, alert, interactive, NAD.  HEAD AND NECK: NC/AT. Airway patent. Neck supple.   EYES:  Clear b/l.   ENT: Moist mucus membranes.   CARDIAC: Regular rate, regular rhythm. No evident pedal edema.    RESP/CHEST: Normal respiratory effort with no use of accessory muscles or retractions. Clear throughout on auscultation.  ABD: soft, non-distended, non-tender. No rebound, no guarding.   BACK: No midline spinal TTP. No CVAT.   EXTREMITIES: Moving all extremities with no apparent deformities.   SKIN: Warm, dry, intact normal color. No rash.   NEURO: AOx3, no focal deficits.   PSYCH: Appropriate mood and affect.

## 2024-02-13 NOTE — ED PROVIDER NOTE - CLINICAL SUMMARY MEDICAL DECISION MAKING FREE TEXT BOX
32 y/o female with schizophrenia, bipolar, developmental delay, DM, asthma BIBEMS from  d/t abd cramping, nausea, vomiting, constipation x 4 days. PE no acute findings. Pt was just discharged few hours ago.   Will treat with tylenol and pt to go back to group home. 32 y/o female with schizophrenia, bipolar, developmental delay, DM, asthma BIBEMS from  d/t abd cramping, nausea, vomiting, constipation x 4 days. PE no acute findings. Pt was just discharged few hours ago.   Will treat with tylenol and pt to go back to group home.    Spoke with patient's agenecy supervisor Thuy [phone] and is aware of the pt. States pt can return back to the group home and she will let the group home know.

## 2024-02-25 ENCOUNTER — EMERGENCY (EMERGENCY)
Facility: HOSPITAL | Age: 34
LOS: 0 days | Discharge: HOME | End: 2024-02-25
Attending: EMERGENCY MEDICINE
Payer: MEDICAID

## 2024-02-25 VITALS
OXYGEN SATURATION: 97 % | DIASTOLIC BLOOD PRESSURE: 82 MMHG | SYSTOLIC BLOOD PRESSURE: 99 MMHG | HEIGHT: 62 IN | WEIGHT: 289.91 LBS | HEART RATE: 117 BPM | RESPIRATION RATE: 18 BRPM | TEMPERATURE: 98 F

## 2024-02-25 VITALS — SYSTOLIC BLOOD PRESSURE: 107 MMHG | DIASTOLIC BLOOD PRESSURE: 75 MMHG

## 2024-02-25 DIAGNOSIS — R11.2 NAUSEA WITH VOMITING, UNSPECIFIED: ICD-10-CM

## 2024-02-25 DIAGNOSIS — E11.9 TYPE 2 DIABETES MELLITUS WITHOUT COMPLICATIONS: ICD-10-CM

## 2024-02-25 DIAGNOSIS — F25.9 SCHIZOAFFECTIVE DISORDER, UNSPECIFIED: ICD-10-CM

## 2024-02-25 DIAGNOSIS — R30.0 DYSURIA: ICD-10-CM

## 2024-02-25 DIAGNOSIS — Z91.018 ALLERGY TO OTHER FOODS: ICD-10-CM

## 2024-02-25 DIAGNOSIS — F31.9 BIPOLAR DISORDER, UNSPECIFIED: ICD-10-CM

## 2024-02-25 DIAGNOSIS — R10.9 UNSPECIFIED ABDOMINAL PAIN: ICD-10-CM

## 2024-02-25 DIAGNOSIS — R62.50 UNSPECIFIED LACK OF EXPECTED NORMAL PHYSIOLOGICAL DEVELOPMENT IN CHILDHOOD: ICD-10-CM

## 2024-02-25 DIAGNOSIS — Z91.013 ALLERGY TO SEAFOOD: ICD-10-CM

## 2024-02-25 DIAGNOSIS — D72.829 ELEVATED WHITE BLOOD CELL COUNT, UNSPECIFIED: ICD-10-CM

## 2024-02-25 DIAGNOSIS — J18.9 PNEUMONIA, UNSPECIFIED ORGANISM: ICD-10-CM

## 2024-02-25 LAB
ALBUMIN SERPL ELPH-MCNC: 3.2 G/DL — LOW (ref 3.3–5)
ALP SERPL-CCNC: 49 U/L — SIGNIFICANT CHANGE UP (ref 40–120)
ALT FLD-CCNC: 11 U/L — LOW (ref 12–78)
ANION GAP SERPL CALC-SCNC: 8 MMOL/L — SIGNIFICANT CHANGE UP (ref 5–17)
APPEARANCE UR: ABNORMAL
AST SERPL-CCNC: 9 U/L — LOW (ref 15–37)
BACTERIA # UR AUTO: ABNORMAL /HPF
BASOPHILS # BLD AUTO: 0.03 K/UL — SIGNIFICANT CHANGE UP (ref 0–0.2)
BASOPHILS NFR BLD AUTO: 0.2 % — SIGNIFICANT CHANGE UP (ref 0–2)
BILIRUB SERPL-MCNC: 0.2 MG/DL — SIGNIFICANT CHANGE UP (ref 0.2–1.2)
BILIRUB UR-MCNC: NEGATIVE — SIGNIFICANT CHANGE UP
BUN SERPL-MCNC: 13 MG/DL — SIGNIFICANT CHANGE UP (ref 7–23)
CALCIUM SERPL-MCNC: 8.8 MG/DL — SIGNIFICANT CHANGE UP (ref 8.5–10.1)
CHLORIDE SERPL-SCNC: 114 MMOL/L — HIGH (ref 96–108)
CO2 SERPL-SCNC: 22 MMOL/L — SIGNIFICANT CHANGE UP (ref 22–31)
COLOR SPEC: SIGNIFICANT CHANGE UP
COMMENT - URINE: SIGNIFICANT CHANGE UP
CREAT SERPL-MCNC: 0.64 MG/DL — SIGNIFICANT CHANGE UP (ref 0.5–1.3)
DIFF PNL FLD: NEGATIVE — SIGNIFICANT CHANGE UP
EGFR: 121 ML/MIN/1.73M2 — SIGNIFICANT CHANGE UP
EOSINOPHIL # BLD AUTO: 0.05 K/UL — SIGNIFICANT CHANGE UP (ref 0–0.5)
EOSINOPHIL NFR BLD AUTO: 0.3 % — SIGNIFICANT CHANGE UP (ref 0–6)
EPI CELLS # UR: PRESENT
GLUCOSE SERPL-MCNC: 101 MG/DL — HIGH (ref 70–99)
GLUCOSE UR QL: NEGATIVE MG/DL — SIGNIFICANT CHANGE UP
HCG SERPL-ACNC: <1 MIU/ML — SIGNIFICANT CHANGE UP
HCT VFR BLD CALC: 38.5 % — SIGNIFICANT CHANGE UP (ref 34.5–45)
HGB BLD-MCNC: 12.5 G/DL — SIGNIFICANT CHANGE UP (ref 11.5–15.5)
IMM GRANULOCYTES NFR BLD AUTO: 0.6 % — SIGNIFICANT CHANGE UP (ref 0–0.9)
KETONES UR-MCNC: 15 MG/DL
LEUKOCYTE ESTERASE UR-ACNC: NEGATIVE — SIGNIFICANT CHANGE UP
LIDOCAIN IGE QN: 32 U/L — SIGNIFICANT CHANGE UP (ref 13–75)
LYMPHOCYTES # BLD AUTO: 1.24 K/UL — SIGNIFICANT CHANGE UP (ref 1–3.3)
LYMPHOCYTES # BLD AUTO: 6.3 % — LOW (ref 13–44)
MCHC RBC-ENTMCNC: 30 PG — SIGNIFICANT CHANGE UP (ref 27–34)
MCHC RBC-ENTMCNC: 32.5 G/DL — SIGNIFICANT CHANGE UP (ref 32–36)
MCV RBC AUTO: 92.5 FL — SIGNIFICANT CHANGE UP (ref 80–100)
MONOCYTES # BLD AUTO: 1.04 K/UL — HIGH (ref 0–0.9)
MONOCYTES NFR BLD AUTO: 5.3 % — SIGNIFICANT CHANGE UP (ref 2–14)
NEUTROPHILS # BLD AUTO: 17.23 K/UL — HIGH (ref 1.8–7.4)
NEUTROPHILS NFR BLD AUTO: 87.3 % — HIGH (ref 43–77)
NITRITE UR-MCNC: NEGATIVE — SIGNIFICANT CHANGE UP
NRBC # BLD: 0 /100 WBCS — SIGNIFICANT CHANGE UP (ref 0–0)
PH UR: 5.5 — SIGNIFICANT CHANGE UP (ref 5–8)
PLATELET # BLD AUTO: 249 K/UL — SIGNIFICANT CHANGE UP (ref 150–400)
POTASSIUM SERPL-MCNC: 3.9 MMOL/L — SIGNIFICANT CHANGE UP (ref 3.5–5.3)
POTASSIUM SERPL-SCNC: 3.9 MMOL/L — SIGNIFICANT CHANGE UP (ref 3.5–5.3)
PROT SERPL-MCNC: 6.9 GM/DL — SIGNIFICANT CHANGE UP (ref 6–8.3)
PROT UR-MCNC: 100 MG/DL
RBC # BLD: 4.16 M/UL — SIGNIFICANT CHANGE UP (ref 3.8–5.2)
RBC # FLD: 14 % — SIGNIFICANT CHANGE UP (ref 10.3–14.5)
RBC CASTS # UR COMP ASSIST: 0 /HPF — SIGNIFICANT CHANGE UP (ref 0–4)
SODIUM SERPL-SCNC: 144 MMOL/L — SIGNIFICANT CHANGE UP (ref 135–145)
SP GR SPEC: >1.03 — HIGH (ref 1–1.03)
UROBILINOGEN FLD QL: 1 MG/DL — SIGNIFICANT CHANGE UP (ref 0.2–1)
WBC # BLD: 19.7 K/UL — HIGH (ref 3.8–10.5)
WBC # FLD AUTO: 19.7 K/UL — HIGH (ref 3.8–10.5)
WBC UR QL: 1 /HPF — SIGNIFICANT CHANGE UP (ref 0–5)

## 2024-02-25 PROCEDURE — 74177 CT ABD & PELVIS W/CONTRAST: CPT | Mod: 26,MC

## 2024-02-25 PROCEDURE — 71045 X-RAY EXAM CHEST 1 VIEW: CPT | Mod: 26

## 2024-02-25 PROCEDURE — 99285 EMERGENCY DEPT VISIT HI MDM: CPT

## 2024-02-25 RX ORDER — ONDANSETRON 8 MG/1
1 TABLET, FILM COATED ORAL
Qty: 6 | Refills: 0
Start: 2024-02-25 | End: 2024-02-27

## 2024-02-25 RX ORDER — ONDANSETRON 8 MG/1
8 TABLET, FILM COATED ORAL ONCE
Refills: 0 | Status: COMPLETED | OUTPATIENT
Start: 2024-02-25 | End: 2024-02-25

## 2024-02-25 RX ADMIN — Medication 1 TABLET(S): at 11:01

## 2024-02-25 RX ADMIN — ONDANSETRON 8 MILLIGRAM(S): 8 TABLET, FILM COATED ORAL at 08:22

## 2024-02-25 NOTE — ED PROVIDER NOTE - NSFOLLOWUPINSTRUCTIONS_ED_ALL_ED_FT
Community-Acquired Pneumonia, Adult  Pneumonia is a lung infection that causes inflammation and the buildup of mucus and fluids in the lungs. This may cause coughing and difficulty breathing. Community-acquired pneumonia is pneumonia that develops in people who are not, and have not recently been, in a hospital or other health care facility.    Usually, pneumonia develops as a result of an illness that is caused by a virus, such as the common cold and the flu (influenza). It can also be caused by bacteria or fungi. While the common cold and influenza can pass from person to person (are contagious), pneumonia itself is not considered contagious.    What are the causes?  The human body, showing how a virus travels from the air to a person's lungs.   This condition may be caused by:  Viruses.  Bacteria.  Fungi.  What increases the risk?  The following factors may make you more likely to develop this condition:  Being over age 65 or having certain medical conditions, such as:  A long-term (chronic) disease, such as: chronic obstructive pulmonary disease (COPD), asthma, heart failure, diabetes, or kidney disease.  A condition that increases the risk of breathing in (aspirating) mucus and other fluids from your mouth and nose.  A weakened body defense system (immune system).  Having had your spleen removed (splenectomy). The spleen is the organ that helps fight germs and infections.  Not cleaning your teeth and gums well (poor dental hygiene).  Using tobacco products.  Traveling to places where germs that cause pneumonia are present or being near certain animals or animal habitats that could have germs that cause pneumonia.  What are the signs or symptoms?  Symptoms of this condition include:  A dry cough or a wet (productive) cough.  A fever, sweating, or chills.  Chest pain, especially when breathing deeply or coughing.  Fast breathing, difficulty breathing, or shortness of breath.  Tiredness (fatigue) and muscle aches.  How is this diagnosed?  An outline of a person's body and an image of an X-ray of the person's chest.   This condition may be diagnosed based on your medical history or a physical exam. You may also have tests, including:  Imaging, such as a chest X-ray or lung ultrasound.  Tests of:  The level of oxygen and other gases in your blood.  Mucus from your lungs (sputum).  Fluid around your lungs (pleural fluid).  Your urine.  How is this treated?  Treatment for this condition depends on many factors, such as the cause of your pneumonia, your medicines, and other medical conditions that you have.    For most adults, pneumonia may be treated at home. In some cases, treatment must happen in a hospital and may include:  Medicines that are given by mouth (orally) or through an IV, including:  Antibiotic medicines, if bacteria caused the pneumonia.  Medicines that kill viruses (antiviral medicines), if a virus caused the pneumonia.  Oxygen therapy.  Severe pneumonia, although rare, may require the following treatments:  Mechanical ventilation.This procedure uses a machine to help you breathe if you cannot breathe well on your own or maintain a safe level of blood oxygen.  Thoracentesis. This procedure removes any buildup of pleural fluid to help with breathing.  Follow these instructions at home:  A comparison of three sample cups showing dark yellow, yellow, and pale yellow urine.  Medicines    Take over-the-counter and prescription medicines only as told by your health care provider.  Take cough medicine only if you have trouble sleeping. Cough medicine can prevent your body from removing mucus from your lungs.  If you were prescribed [facility], take them as told by your health care provider. Do not stop taking the antibiotic even if you start to feel better.  Lifestyle    A sign showing that a person should not drink alcohol.  A sign showing that a person should not smoke.  Do not drink alcohol.  Do not use any products that contain nicotine or tobacco. These products include cigarettes, chewing tobacco, and vaping devices, such as e-cigarettes. If you need help quitting, ask your health care provider.  Eat a healthy diet. This includes plenty of vegetables, fruits, whole grains, low-fat dairy products, and lean protein.  General instructions    Rest a lot and get at least 8 hours of sleep each night.  Sleep in a partly upright position at night. Place a few pillows under your head or sleep in a reclining chair.  Return to your normal activities as told by your health care provider. Ask your health care provider what activities are safe for you.  Drink enough fluid to keep your urine pale yellow. This helps to thin the mucus in your lungs.  If your throat is sore, gargle with a mixture of salt and water 3–4 times a day or as needed. To make salt water, completely dissolve ½–1 tsp (3–6 g) of salt in 1 cup (237 mL) of warm water.  Keep all follow-up visits.  How is this prevented?  You can lower your risk of developing community-acquired pneumonia by:  Getting the pneumonia vaccine. There are different types and schedules of pneumonia vaccines. Ask your health care provider which option is best for you. Consider getting the pneumonia vaccine if:  You are older than 65 years of age.  You are 19–65 years of age and are receiving cancer treatment, have chronic lung disease, or have other medical conditions that affect your immune system. Ask your health care provider if this applies to you.  Getting your influenza vaccine every year. Ask your health care provider which type of vaccine is best for you.  Getting regular dental checkups.  Washing your hands often with soap and water for at least 20 seconds. If soap and water are not available, use hand .  Contact a health care provider if:  You have a fever.  You have trouble sleeping because you cannot control your cough with cough medicine.  Get help right away if:  Your shortness of breath becomes worse.  Your chest pain increases.  Your sickness becomes worse, especially if you are an older adult or have a weak immune system.  You cough up blood.  These symptoms may be an emergency. Get help right away. Call 911.  Do not wait to see if the symptoms will go away.  Do not drive yourself to the hospital.  Summary  Pneumonia is an infection of the lungs.  Community-acquired pneumonia develops in people who have not been in the hospital. It can be caused by bacteria, viruses, or fungi.  This condition may be treated with [facility] or antiviral medicines.  Severe pneumonia may require a hospital stay and treatment to help with breathing.  This information is not intended to replace advice given to you by your health care provider. Make sure you discuss any questions you have with your health care provider.

## 2024-02-25 NOTE — ED PROVIDER NOTE - CLINICAL SUMMARY MEDICAL DECISION MAKING FREE TEXT BOX
Questionable PNA noted - with leukocytosis - will start on Augmentin and otherwise pt tolerating PO intake - will dc with augmentin and zofran and return to group home today. Discussed with group home - agreeable with pt returning to home - no hypoxia, noted, pt feeling well otherwise at time of dc.

## 2024-02-25 NOTE — ED ADULT NURSE NOTE - NSFALLUNIVINTERV_ED_ALL_ED
Bed/Stretcher in lowest position, wheels locked, appropriate side rails in place/Call bell, personal items and telephone in reach/Instruct patient to call for assistance before getting out of bed/chair/stretcher/Non-slip footwear applied when patient is off stretcher/Edgar to call system/Physically safe environment - no spills, clutter or unnecessary equipment/Purposeful proactive rounding/Room/bathroom lighting operational, light cord in reach

## 2024-02-25 NOTE — ED PROVIDER NOTE - OBJECTIVE STATEMENT
33 year old female with PMH of DM II , Schizoaffective disorder, Bipolar, developmental delay otherwise presenting to ED due to abd pain, nausea/vomiting at group home - pt denies any fever/chills. No dizziness complaints but noted some dysuria symptoms recently.

## 2024-02-25 NOTE — ED ADULT NURSE NOTE - PAIN: PRECIPITATING/AGGRAVATING FACTORS
Addended by: Todd Vega on: 10/22/2019 08:22 PM     Modules accepted: Level of Service movement/palpation/positioning

## 2024-02-25 NOTE — ED ADULT NURSE NOTE - OBJECTIVE STATEMENT
patient A+OX4, recently dcd for similar complaints, presents with generalized abdominal pain, when asked to point, she said everywhere. Patient states she has been vomiting for 2 weeks, 10/10 sharp pain. patient also complains of bloody stools and dysuria

## 2024-02-25 NOTE — ED PROVIDER NOTE - PATIENT PORTAL LINK FT
You can access the FollowMyHealth Patient Portal offered by [facility] by registering at the following website: [email or web address]. By joining [facility]’s FollowMyHealth portal, you will also be able to view your health information using other applications (apps) compatible with our system.

## 2024-02-25 NOTE — ED ADULT NURSE NOTE - CHPI ED NUR DURATION
----- Message from Jose Lord MD sent at 8/17/2018 11:35 AM EDT -----  Please let this pt of Dr. Zofia Quiñones know echo was normal. thx    LVM for patient to return call at earliest convenience. 2/week(s)

## 2024-02-27 NOTE — ED ADULT TRIAGE NOTE - HEIGHT IN FEET
5 Patient seen and examined on 2/28/2024 at 9.30am.  Currently, patient is on dialysis. Tolerating HD.     Admitted for elevated indirect bilirubin, and found to have hemolytic anemia.     # ESRD - MWF.  HD today as planned.     # Anemia - multifactorial. Outpatient, she is on Micera 225mcg every 2 weeks (equivalent to ~ 7000 units EPO 3X week)  Will speak to Hematology team for INDER guidance given that patient currently has hemolytic anemia.     # Medication monitoring encounter: medication dose reviewed. Please dose medications to CrCl<10    The rest of the recommendations as per fellow's note.    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 Patient was on JAKAFI, and subsequently  OJJAARA in the recent past. She has not been taking Ojjaara fo some time due to diarrhea. Her spleen has increase further. Would recommend INREBIC as it has shown to be effective in reducing the spleen when fails JAKAFI. Patient some evidence of low grade hemolysis. Please send a Coomb's test. Patient has family h/o jaundice. Would request a hepatology evaluation. Supportive.

## 2024-03-02 ENCOUNTER — EMERGENCY (EMERGENCY)
Facility: HOSPITAL | Age: 34
LOS: 0 days | Discharge: HOME | End: 2024-03-02
Attending: EMERGENCY MEDICINE
Payer: MEDICAID

## 2024-03-02 VITALS
HEART RATE: 108 BPM | OXYGEN SATURATION: 100 % | HEIGHT: 62 IN | TEMPERATURE: 98 F | DIASTOLIC BLOOD PRESSURE: 86 MMHG | SYSTOLIC BLOOD PRESSURE: 119 MMHG | RESPIRATION RATE: 20 BRPM | WEIGHT: 266.1 LBS

## 2024-03-02 DIAGNOSIS — Z01.89 ENCOUNTER FOR OTHER SPECIFIED SPECIAL EXAMINATIONS: ICD-10-CM

## 2024-03-02 DIAGNOSIS — Z91.013 ALLERGY TO SEAFOOD: ICD-10-CM

## 2024-03-02 DIAGNOSIS — F31.9 BIPOLAR DISORDER, UNSPECIFIED: ICD-10-CM

## 2024-03-02 DIAGNOSIS — F25.9 SCHIZOAFFECTIVE DISORDER, UNSPECIFIED: ICD-10-CM

## 2024-03-02 DIAGNOSIS — R62.50 UNSPECIFIED LACK OF EXPECTED NORMAL PHYSIOLOGICAL DEVELOPMENT IN CHILDHOOD: ICD-10-CM

## 2024-03-02 DIAGNOSIS — Z91.018 ALLERGY TO OTHER FOODS: ICD-10-CM

## 2024-03-02 DIAGNOSIS — E11.9 TYPE 2 DIABETES MELLITUS WITHOUT COMPLICATIONS: ICD-10-CM

## 2024-03-02 PROCEDURE — 99283 EMERGENCY DEPT VISIT LOW MDM: CPT

## 2024-03-02 RX ORDER — ACETAMINOPHEN 500 MG
650 TABLET ORAL ONCE
Refills: 0 | Status: COMPLETED | OUTPATIENT
Start: 2024-03-02 | End: 2024-03-02

## 2024-03-02 NOTE — ED PROVIDER NOTE - PATIENT PORTAL LINK FT
You can access the FollowMyHealth Patient Portal offered by [facility] by registering at the following website: [email or web address]. By joining [facility]’s FollowMyHealth portal, you will also be able to view your health information using other applications (apps) compatible with our system.
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
## 2024-03-02 NOTE — ED PROVIDER NOTE - PROGRESS NOTE DETAILS
Pt has been very comfortable walking around in the ED and talking on the phone at the nursing station. pt also ate and tolerated food here. Pt denies harmful thoughts to herself or others or visual/auditory hallucination.  was notified and set of the ambulate to  pt.

## 2024-03-02 NOTE — ED PROVIDER NOTE - NS ED ATTENDING STATEMENT MOD
I have seen and examined this patient and fully participated in the care of this patient as the teaching attending.  The service was shared with the RIO.  I reviewed and verified the documentation.
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
## 2024-03-02 NOTE — ED PROVIDER NOTE - NSFOLLOWUPINSTRUCTIONS_ED_ALL_ED_FT
You were seen today for a medical exam.  You were not found to have any significant injuries requiring imaging on today's assessment.    If you have any severe increase in pain, fever, uncontrollable nausea or vomiting, or inability to tolerate eating and drinking you need to immediately return to the emergency room.

## 2024-03-02 NOTE — ED ADULT NURSE NOTE - NS PRO AD NO ADVANCE DIRECTIVE
[facility]    Acute pain service Inpatient Progress Note    Patient Name: Chase Obrien  :  1944  MRN:  [id number]        Acute Pain  Service Inpatient Progress Note:    Catheter Plan:Catheter removed and tip intact                                             No
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
## 2024-03-02 NOTE — ED PROVIDER NOTE - HIV OFFER
[facility] Hospital Medicine  Progress Note    Patient Name: Nu Lee  MRN: [id number]  Patient Class: IP- Inpatient   Admission Date: 2/20/2018  Length of Stay: 2 days  Attending Physician: Katya Charles MD  Primary Care Provider: Michela Christian MD    [facility] Medicine Team: [facility] J Suzette Mike NP    Subjective:     Principal Problem:Nonrheumatic aortic valve stenosis    HPI:  82 y/o F with a PMH of HTN, HLD DM2, CKD II/III, chronic A fib s/p pacemaker, DVT, CAD s/p CABG, Aortic stenosis s/p prosthetic valve replacement 2009, GERD, and arthritis.  She presented with chest pain to [facility] 2/19/18, described as pressure like sensation, associated with SOB.  No changes on EKG. Troponins mildly elevated and flat (at her baseline). Echo showed Aortic valve area 0.61cm. Dr. Romero with cards was consulted for transfer for TAVR evaluation, and accepted patient. Patient currently without dyspnea, or chest pain.     Hospital Course:  Admitted to hospital medicine for TAVR work up for valve in valve as she is s/p bioprosthetic AVR done at [facility] which has since closed.  TAVR team undergoing evaluation. CTS surgery has turned down for SAVR.  Planning [facility] per Dr. Romero +/- pci, CTA, pft's ordered.     Interval History:  Patient without c/o or questions.     Review of Systems   Constitutional: Negative for appetite change.   Respiratory: Positive for shortness of breath. Negative for cough and wheezing.    Cardiovascular: Negative for chest pain, palpitations and leg swelling.   Gastrointestinal: Negative for abdominal pain, constipation, diarrhea and nausea.   Genitourinary: Negative for difficulty urinating.   Musculoskeletal: Negative for back pain.   Skin: Negative.    Neurological: Negative for dizziness, weakness, light-headedness, numbness and headaches.   Psychiatric/Behavioral: Negative for sleep disturbance. The patient is not nervous/anxious.       Objective:     Vital Signs (Most Recent):  Temp: 97.4 °F (36.3 °C) (02/22/18 0743)  Pulse: 94 (02/22/18 0750)  Resp: 20 (02/22/18 0750)  BP: (!) 105/57 (02/22/18 0743)  SpO2: 97 % (02/22/18 0750) Vital Signs (24h Range):  Temp:  [97.3 °F (36.3 °C)-98.3 °F (36.8 °C)] 97.4 °F (36.3 °C)  Pulse:  [72-97] 94  Resp:  [16-20] 20  SpO2:  [94 %-98 %] 97 %  BP: (104-124)/(53-59) 105/57     Weight: 88 kg (194 lb 0.1 oz)  Body mass index is 35.48 kg/m².    Intake/Output Summary (Last 24 hours) at 02/22/18 1102  Last data filed at 02/22/18 0400   Gross per 24 hour   Intake             1200 ml   Output             1675 ml   Net             -475 ml      Physical Exam   Constitutional: She is oriented to person, place, and time. She appears well-developed and well-nourished. No distress.   HENT:   Head: Normocephalic and atraumatic.   Right Ear: External ear normal.   Left Ear: External ear normal.   Nose: Nose normal.   Mouth/Throat: Oropharynx is clear and moist.   Eyes: Conjunctivae and EOM are normal.   Neck: Normal range of motion. Neck supple. No JVD present.   Cardiovascular: Normal rate, regular rhythm and intact distal pulses.    Murmur (systolic high pitched blowing aortic murmer in the aortic position, III/VI) heard.  No audible murmer appreciated    Pulmonary/Chest: Effort normal and breath sounds normal. No respiratory distress. She has no wheezes. She has no rales.        Abdominal: Soft. Bowel sounds are normal. She exhibits no distension and no mass. There is no tenderness. There is no guarding.   Musculoskeletal: Normal range of motion. She exhibits no edema.   Neurological: She is alert and oriented to person, place, and time. No cranial nerve deficit or sensory deficit. Coordination normal.   Skin: Skin is warm and dry. Capillary refill takes less than 2 seconds. No rash noted. She is not diaphoretic. No erythema.   Psychiatric: She has a normal mood and affect. Her behavior is normal. Judgment and thought  content normal.   Nursing note and vitals reviewed.      Significant Labs: All pertinent labs within the past 24 hours have been reviewed.    Significant Imaging: Status post cardiac surgery. Mild cardiomegaly. No acute process.    Assessment/Plan:      * Nonrheumatic severe aortic valve stenosis s/p prosthetic avr 2009    -  S/p prosthetic valve with severe AS, valve area 0.61, unknown type of valve at present time.  - TAVR team eval underway  - Need to obtain recent LHC/ Echo's  - repeated echo since no documents or CD's upon transfer   - due for angiogram today, loaded with plavix, platelet assay not inhibitory, changed to brilinta for now, awaiting results of LHC  - Due for CTA as well.           H/O prosthetic aortic valve replacement    - researching size/ device  - TAVR team has contacted Dr. Lozano and her primary cardiologist          Essential hypertension    - benazepril, metoprolol, lasix, b/p controlled          Atrial fibrillation    - on apixaban 2.5 bid however only meets one of the three criteria > 80 yrs, CR normal, weight 88kg, post procedure will resume at regular 5mg bid dose  - afib controlled rate, incomplete RBBB          Other hyperlipidemia    - lipid panel, LDL 65.8, , HDL 32, h/o CABG changed to lipitor from simvastatin   - add fish oil for high TG         Hx of CABG    - H/o Cad    - planning LHC in am +/- PCI today          Gastroesophageal reflux disease    - protonix          Ascending aortic aneurysm    - I have no documentation, Hayde the TAVR NP had verbal discussions with her surgeon Dr Ramos and Dr. Lozano stated it was 4.8 cm.  We are getting a CTA and LHC today which should reveal size and shape/ location.           Type 2 diabetes mellitus with renal complication    - CKD II/III  - monitor daily renal panel  - avoid nephrotoxic agents.  - CR baseline 1.0          Pacemaker 2007    - will need to be interrogated as outpatient if not done recently prior to TAVR             VTE Risk Mitigation     None              Suzette Mike NP  Department of Hospital Medicine   [facility]  Spectra:  [phone]  Pager: [phone]     Previously Declined (within the last year)

## 2024-03-02 NOTE — ED ADULT NURSE NOTE - NSFALLUNIVINTERV_ED_ALL_ED
Bed/Stretcher in lowest position, wheels locked, appropriate side rails in place/Call bell, personal items and telephone in reach/Instruct patient to call for assistance before getting out of bed/chair/stretcher/Non-slip footwear applied when patient is off stretcher/[place] to call system/Physically safe environment - no spills, clutter or unnecessary equipment/Purposeful proactive rounding/Room/bathroom lighting operational, light cord in reach
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
## 2024-03-03 ENCOUNTER — EMERGENCY (EMERGENCY)
Facility: HOSPITAL | Age: 34
LOS: 0 days | Discharge: HOME | End: 2024-03-03
Attending: STUDENT IN AN ORGANIZED HEALTH CARE EDUCATION/TRAINING PROGRAM
Payer: MEDICAID

## 2024-03-03 VITALS
HEIGHT: 62 IN | OXYGEN SATURATION: 99 % | TEMPERATURE: 99 F | SYSTOLIC BLOOD PRESSURE: 124 MMHG | DIASTOLIC BLOOD PRESSURE: 90 MMHG | RESPIRATION RATE: 18 BRPM | WEIGHT: 266.1 LBS | HEART RATE: 113 BPM

## 2024-03-03 VITALS
RESPIRATION RATE: 18 BRPM | OXYGEN SATURATION: 98 % | SYSTOLIC BLOOD PRESSURE: 121 MMHG | TEMPERATURE: 98 F | DIASTOLIC BLOOD PRESSURE: 89 MMHG | HEART RATE: 100 BPM
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
## 2024-03-03 DIAGNOSIS — R07.89 OTHER CHEST PAIN: ICD-10-CM
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
## 2024-03-03 DIAGNOSIS — R62.50 UNSPECIFIED LACK OF EXPECTED NORMAL PHYSIOLOGICAL DEVELOPMENT IN CHILDHOOD: ICD-10-CM

## 2024-03-03 DIAGNOSIS — Z91.013 ALLERGY TO SEAFOOD: ICD-10-CM

## 2024-03-03 DIAGNOSIS — F31.9 BIPOLAR DISORDER, UNSPECIFIED: ICD-10-CM

## 2024-03-03 DIAGNOSIS — F25.9 SCHIZOAFFECTIVE DISORDER, UNSPECIFIED: ICD-10-CM

## 2024-03-03 DIAGNOSIS — E11.9 TYPE 2 DIABETES MELLITUS WITHOUT COMPLICATIONS: ICD-10-CM

## 2024-03-03 DIAGNOSIS — N64.4 MASTODYNIA: ICD-10-CM

## 2024-03-03 DIAGNOSIS — Z91.018 ALLERGY TO OTHER FOODS: ICD-10-CM

## 2024-03-03 PROCEDURE — 99283 EMERGENCY DEPT VISIT LOW MDM: CPT

## 2024-03-03 RX ORDER — ACETAMINOPHEN 500 MG
650 TABLET ORAL ONCE
Refills: 0 | Status: COMPLETED | OUTPATIENT
Start: 2024-03-03 | End: 2024-03-03
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
## 2024-03-03 NOTE — ED ADULT NURSE REASSESSMENT NOTE - NS ED NURSE REASSESS COMMENT FT1
Pt has refused V/S at discharge, last v/s WNL. Safety maintained. Pt not in any acute distress at this time.
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
## 2024-03-03 NOTE — ED PROVIDER NOTE - PATIENT PORTAL LINK FT
You can access the FollowMyHealth Patient Portal offered by [facility] by registering at the following website: [email or web address]. By joining [employer]’s FollowMyHealth portal, you will also be able to view your health information using other applications (apps) compatible with our system.

## 2024-03-03 NOTE — ED PROVIDER NOTE - PHYSICAL EXAMINATION
PHYSICAL EXAM:    GENERAL: Alert, appears stated age, well appearing, non-toxic  SKIN: Warm, and dry. MMM.   HEAD: NC, AT, no step offs   EYE: Normal lids/conjunctiva, PERRL, EOMI  ENT: Normal hearing, patent oropharynx  NECK: +supple. No meningismus, or JVD, +Trachea midline. no tenderness/step offs.   Pulm: Bilateral BS, normal resp effort, no wheezes, stridor, or retractions  CV: RRR, no M/R/G, 2+and = radial pulses. +mild chest wall ttp. no rash/bruising.   Abd: soft, non-tender, non-distended  Mskel: no erythema, cyanosis, edema. no calf tenderness  Neuro: AAOx3, no sensory/motor deficits, CN 2-12 intact. No speech slurring, pronator drift, facial asymmetry. normal finger-to-nose b/l. 5/5 strength throughout. normal gait.

## 2024-03-03 NOTE — ED ADULT TRIAGE NOTE - NS ED NURSE BANDS TYPE
Helena Suggs is a 81 year old female who presents for pre-operative evaluation.  Planned surgery: right cataract surgery  Planned date of surgery: 8/6/20  Requesting physician/surgeon: Dr. Lisa Parisi  Previous surgical complications: none  Previous reactions to anesthesia: none    Systems-based patient history:    Patient has noticed some right sided vision changes.  She is seeing Dr. Parisi.  She     Cardiac:  History of DVT: yes (after spinal surgery)  History of abnormal bleeding: no  On ASA, NSAIDs, or blood thinners: asa 81 mg daily    Surgical risk:       High (>5%): emergent operations, aortic and major vascular surgery, prolonged surgeries with high blood loss expected       Intermediate (<5%): carotid endarterectomy, head/neck surgery, intraperitoneal or intrathoracic operations, orthopedic surgeries, prostate surgery       Low (<1%): endoscopy, superficial procedures, cataract surgery, breast surgery    This patient’s surgical procedure is considered- low risk.    Patient's cardiac index:  High-Risk Surgery   Intraperitoneal   Intrathoracic   Suprainguinal vascular  0    History of ischemic heart disease   History of MI   History of positive exercise test   Current chest pain considered due to myocardial ischemia   Use of nitrate therapy   ECG with pathological Q waves  0   History of congestive heart failure   Pulmonary edema, bilateral rales or S3 gallop   Paroxysmal nocturnal dyspnea   CXR showing pulmonary vascular redistribution  0   History of cerebrovascular disease   Prior TIA or stroke  1   Pre-operative treatment with insulin  0   Pre-operative creatinine >2 mg/dL  0   ·No risk factors - 0.4 % (95% CI 0.1-0.8 percent)   ·One risk factor - 1.0 % (95% CI 0.5-1.4 percent)   ·Two risk factors - 6.6 % (95% CI 1.3-3.5 percent)   ·Three or more risk factors - 11% (95% CI 2.8-7.9 percent)  Total:  1.0%     Functional status/ Duke Activity Status Index:    1. Are you able to take care of yourself,  that is, eating, dressing bathing or using the toilet yet? yes 2.75   2. Are you able to walk indoors, such as around the house yet? yes 1.75   3. Are you able to walk a block or 2 on level ground yet? yes 2.75   4. Are you able to climb a flight of stairs or walk up a hill without stopping yet? yes 5.50   5. Are you able to run a short distance yet? no 8.00   6. Are you able to do light work around the house like dusting or washing dishes? yes 2.70   7. Are you able to do light work around the house like vacuuming, sweeping floors, or lifting or moving heavy furniture yet? yes 3.50   8. Are you able to do heavy work around the house like scrubbing floors, or lifting or moving heavy furniture yet? no 8.00   9. Are you able to do yard work like raking leaves, weeding or pushing a power mower yet? no 4.50   10. Are you having sexual relations? no 5.25   11. Are you able to participate in moderate recreational activities like golf, bowling, dancing, doubles tennis, or throwing a baseball or football yet? yes 6.00   12. Are you able to participate in strenuous sports like swimming, singles tennis, football, basketball, or skiing yet? no 7.50   DASI Score       Functional capacity in METs: =  [(DASI x 0.43)+9.6]/3.5  METs = 5.8    Respiratory:  History of BALJINDER: yes  Known COPD/ poorly controlled asthma: no  Patient has more than a 20 pack year smoking history? no    Renal:  History of CKD: mild decrease in kidney function, GFR is 75    Endocrine/ diabetic patients:  Last FBS or A1C: 7.0 (3/4/20)  History of hypothyroidism: no    ROS:  Pertinent items are noted in HPI.  Constitutional:   negative for chills, fevers, fatigue, and weight loss.   Eyes:  See HPI  Ears, nose, mouth, throat, and face:   negative for earaches, nasal congestion, and sore throat.  Respiratory:   negative for cough, dyspnea on exertion, wheezing and shortness of breath.  Cardiovascular:   negative for chest pain, chest pressure/discomfort, dyspnea,  lower extremity edema, orthopnea, and palpitations.   Gastrointestinal:   negative for abdominal pain, change in bowel habits, constipation, diarrhea, nausea and vomiting.  Integument/Breasts:   negative for rash and skin lesion(s).  Musculoskeletal:   negative for arthralgias, muscle weakness, myalgias and stiff joints.  Neurological:   negative for dizziness, headaches, weakness, lightheadedness, facial droop, and slurred speech.  Behavioral/Psychiatric:   negative for anxiety and depression (feeling blue, anhedonia).    We reviewed Helena past medical history, past surgical history, social history, family history, medications, allergies, and habits. I updated the EMR accordingly.    PE:  Visit Vitals  /88   Pulse 64   Temp 97.4 °F (36.3 °C) (Temporal)   Ht 5' 5\" (1.651 m)   Wt 95.1 kg   SpO2 96%   BMI 34.88 kg/m²     GENERAL:  The patient is in no acute distress.  Patient is well groomed and appears stated age.  HEAD:  Normocephalic, atraumatic.  EYES:  Pupils are equal, round and reactive to light and accommodation.  Extraocular movements are intact bilaterally.  Sclerae and conjunctivae are normal bilaterally.  EARS:  Show normal external ear canals.  TMs are normal bilaterally.    NECK:  Supple.  No cervical lymphadenopathy.  No thyromegaly.   CHEST:  Clear to auscultation bilaterally.  No wheezes or rales.  HEART:  S1/S2.  Regular rate and rhythm without murmur.   ABDOMEN:  Soft, nontender, nondistended,  no rebound or guarding.  EXTREMITIES:  No clubbing, cyanosis, or edema.  NEUROLOGIC:  Cranial nerves 2-12 intact.  CRISTIN.  EOMs intact.  Neck supple.    PSYCHIATRIC:  Alert.  Oriented to time, person and place.  Normal thought content, speech, affect, and mood.    Assessment:    1. Cataract of right eye, unspecified cataract type    2. Pre-op testing    3. Preop examination    4. Urinary tract infection without hematuria, site unspecified          Plan:    1. Orders as below:  -Lab orders placed:  CBC,  CMP, UA  -EKG:  Sinus rhythm with premature atrial complexes (7/21/20)  -PFT: n/a  -Chest xray: No acute process  - Renal: recommend electrolyte and creatinine testing (BMP)   -Recommended diabetic regimen for patient michelle-operatively:  Discontinue oral agents on morning of surgery   - This patient’s BMI does not place this patient at an increased surgical risk.    2. Other changes in medication regimen before surgery: stop asa 81 mg daily 5 days prior to surgery  Any additional preoperative workup is as follows: none     3. Patient has a functional capacity of 5.8 METs per the duke Activity Status Index.   Patient's risk of Major Cardiovascular Event (RCRI) is 1.0% for this type of surgery.   Patient does not need cardiologist evaluation prior to surgery.    Patient understands the potential risks of MI, CVA, infection, other cardiovascular or neurological events, or death.  All issues addressed, all questions answered.  The patient wishes to proceed with the surgery.  It is my medical opinion that the patient may proceed with  surgery with an understanding that he or she is at average risk.    Meg Szymanski, DO  Family Medicine    Cc. Dr. Lisa Parisi       Name band;

## 2024-03-04 ENCOUNTER — EMERGENCY (EMERGENCY)
Facility: HOSPITAL | Age: 34
LOS: 0 days | Discharge: HOME | End: 2024-03-04
Attending: STUDENT IN AN ORGANIZED HEALTH CARE EDUCATION/TRAINING PROGRAM
Payer: MEDICAID

## 2024-03-04 VITALS
RESPIRATION RATE: 19 BRPM | TEMPERATURE: 98 F | OXYGEN SATURATION: 100 % | HEART RATE: 98 BPM | DIASTOLIC BLOOD PRESSURE: 82 MMHG | SYSTOLIC BLOOD PRESSURE: 137 MMHG

## 2024-03-04 VITALS — HEIGHT: 62 IN

## 2024-03-04 DIAGNOSIS — J45.909 UNSPECIFIED ASTHMA, UNCOMPLICATED: ICD-10-CM

## 2024-03-04 DIAGNOSIS — Z01.89 ENCOUNTER FOR OTHER SPECIFIED SPECIAL EXAMINATIONS: ICD-10-CM

## 2024-03-04 DIAGNOSIS — R62.50 UNSPECIFIED LACK OF EXPECTED NORMAL PHYSIOLOGICAL DEVELOPMENT IN CHILDHOOD: ICD-10-CM

## 2024-03-04 DIAGNOSIS — F20.9 SCHIZOPHRENIA, UNSPECIFIED: ICD-10-CM

## 2024-03-04 DIAGNOSIS — F31.9 BIPOLAR DISORDER, UNSPECIFIED: ICD-10-CM

## 2024-03-04 PROCEDURE — 99283 EMERGENCY DEPT VISIT LOW MDM: CPT

## 2024-03-04 RX ADMIN — Medication 2 MILLIGRAM(S): at 13:42

## 2024-03-04 NOTE — ED PROVIDER NOTE - PATIENT PORTAL LINK FT
You can access the FollowMyHealth Patient Portal offered by [facility] by registering at the following website: [email or web address]. By joining [employer]’s FollowMyHealth portal, you will also be able to view your health information using other applications (apps) compatible with our system.
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
## 2024-03-04 NOTE — ED PROVIDER NOTE - NSFOLLOWUPINSTRUCTIONS_ED_ALL_ED_FT
.mdm I have discussed the discharge plan with the patient. The patient agrees with the plan, as discussed.  The patient understands Emergency Department diagnosis is a preliminary diagnosis often based on limited information and that the patient must adhere to the follow-up plan as discussed.  The patient understands that if the symptoms worsen or if prescribed medications do not have the desired/planned effect that the patient may return to the Emergency Department at any time for further evaluation and treatment.
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
## 2024-03-04 NOTE — ED ADULT NURSE REASSESSMENT NOTE - NS ED NURSE REASSESS COMMENT FT1
Received report from nightshift RN, pt awake and alert, steady gait noted, received pt fully clothed, pt refusing vitals at this time, even and unlabored respirations noted, no signs or symptoms of acute distress noted.
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
## 2024-03-04 NOTE — ED PROVIDER NOTE - CONSTITUTIONAL, MLM
Limited full extension in R elbow; B ankles limited dorsiflexion/bilateral upper extremity Active ROM was WFL (within functional limits)/bilateral  lower extremity Active ROM was WFL (within functional limits)/deficits as listed below Well appearing, awake, alert, oriented to person, place, time/situation, in bed, quiet, appeared to be crying, in no respiratory distress, refuses any pain medications because she states that she is pregnant normal...

## 2024-03-12 NOTE — ED PROVIDER NOTE - MDM ORDERS SUBMITTED SELECTION
Show Aperture Variable?: Yes
Post-Care Instructions: I reviewed with the patient in detail post-care instructions. Patient is to wear sunprotection, and avoid picking at any of the treated lesions. Pt may apply Vaseline to crusted or scabbing areas.
Duration Of Freeze Thaw-Cycle (Seconds): 0
Render Note In Bullet Format When Appropriate: No
Consent: The patient's consent was obtained including but not limited to risks of crusting, scabbing, blistering, scarring, darker or lighter pigmentary change, recurrence, incomplete removal and infection.
Detail Level: Detailed
Not Applicable

## 2024-03-12 NOTE — ED ADULT NURSE NOTE - SUICIDE SCREENING QUESTION 3
Gen: NAD, resting in bed  HEENT: Normocephalic, atraumatic  CV: Regular rate & regular rhythm  Lungs: CTABL no wheeze  Abdomen: Soft, NTND+ BS present  Ext: Warm, well perfused  Neuro: non focal, awake, CN II-XII intact   Skin: no visible rash, no erythema  Psych: no SI, HI, Hallucination  No

## 2024-03-18 ENCOUNTER — APPOINTMENT (OUTPATIENT)
Age: 34
End: 2024-03-18
Payer: MEDICAID

## 2024-03-18 PROCEDURE — ZZZZZ: CPT

## 2024-03-23 ENCOUNTER — EMERGENCY (EMERGENCY)
Facility: HOSPITAL | Age: 34
LOS: 0 days | Discharge: HOME | End: 2024-03-23
Attending: STUDENT IN AN ORGANIZED HEALTH CARE EDUCATION/TRAINING PROGRAM
Payer: MEDICAID

## 2024-03-23 VITALS
WEIGHT: 293 LBS | SYSTOLIC BLOOD PRESSURE: 115 MMHG | DIASTOLIC BLOOD PRESSURE: 71 MMHG | TEMPERATURE: 98 F | OXYGEN SATURATION: 98 % | HEIGHT: 62 IN | RESPIRATION RATE: 17 BRPM | HEART RATE: 110 BPM
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
## 2024-03-23 DIAGNOSIS — E11.9 TYPE 2 DIABETES MELLITUS WITHOUT COMPLICATIONS: ICD-10-CM

## 2024-03-23 DIAGNOSIS — Z91.018 ALLERGY TO OTHER FOODS: ICD-10-CM

## 2024-03-23 DIAGNOSIS — M79.602 PAIN IN LEFT ARM: ICD-10-CM

## 2024-03-23 DIAGNOSIS — S50.812A ABRASION OF LEFT FOREARM, INITIAL ENCOUNTER: ICD-10-CM

## 2024-03-23 DIAGNOSIS — F31.9 BIPOLAR DISORDER, UNSPECIFIED: ICD-10-CM

## 2024-03-23 DIAGNOSIS — R62.50 UNSPECIFIED LACK OF EXPECTED NORMAL PHYSIOLOGICAL DEVELOPMENT IN CHILDHOOD: ICD-10-CM

## 2024-03-23 DIAGNOSIS — Z91.013 ALLERGY TO SEAFOOD: ICD-10-CM

## 2024-03-23 DIAGNOSIS — F20.9 SCHIZOPHRENIA, UNSPECIFIED: ICD-10-CM
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
## 2024-03-23 DIAGNOSIS — R00.0 TACHYCARDIA, UNSPECIFIED: ICD-10-CM

## 2024-03-23 DIAGNOSIS — J45.909 UNSPECIFIED ASTHMA, UNCOMPLICATED: ICD-10-CM

## 2024-03-23 PROCEDURE — 99284 EMERGENCY DEPT VISIT MOD MDM: CPT

## 2024-03-23 RX ORDER — ACETAMINOPHEN 500 MG
650 TABLET ORAL ONCE
Refills: 0 | Status: COMPLETED | OUTPATIENT
Start: 2024-03-23 | End: 2024-03-23

## 2024-03-23 RX ADMIN — Medication 650 MILLIGRAM(S): at 21:52

## 2024-03-23 RX ADMIN — Medication 650 MILLIGRAM(S): at 21:22

## 2024-03-23 RX ADMIN — Medication 1 TABLET(S): at 21:22

## 2024-03-23 NOTE — ED PROVIDER NOTE - PATIENT PORTAL LINK FT
You can access the FollowMyHealth Patient Portal offered by  by registering at the following website: [email or web address]. By joining [employer]’s FollowMyHealth portal, you will also be able to view your health information using other applications (apps) compatible with our system.
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
## 2024-03-23 NOTE — ED ADULT NURSE REASSESSMENT NOTE - NS ED NURSE REASSESS COMMENT FT1
Spoke with manager at human outreach [facility], will be there to receive pt at home. Spoke with manager  at human outreach [facility], will be there to receive pt at home.
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
## 2024-03-23 NOTE — ED ADULT NURSE NOTE - NSFALLUNIVINTERV_ED_ALL_ED
Bed/Stretcher in lowest position, wheels locked, appropriate side rails in place/Call bell, personal items and telephone in reach/Instruct patient to call for assistance before getting out of bed/chair/stretcher/Non-slip footwear applied when patient is off stretcher/[place] to call system/Physically safe environment - no spills, clutter or unnecessary equipment/Purposeful proactive rounding/Room/bathroom lighting operational, light cord in reach

## 2024-03-23 NOTE — ED PROVIDER NOTE - PHYSICAL EXAMINATION
GENERAL: Awake, alert, NAD  HEENT: NC/AT, moist mucous membranes  LUNGS: CTAB, no wheezes or crackles   CARDIAC: tachycardic, regular rhythm, no m/r/g  ABDOMEN: Soft, non tender, non distended, no rebound, no guarding  BACK: No midline spinal tenderness, no CVA tenderness  EXT: No edema, no deformities.  NEURO: At baseline. Moving all extremities.  SKIN: Warm and dry. scant abrasion visible on L forearm, no open laceration  PSYCH: Normal affect.

## 2024-03-24 VITALS
HEART RATE: 108 BPM | RESPIRATION RATE: 18 BRPM | SYSTOLIC BLOOD PRESSURE: 118 MMHG | TEMPERATURE: 97 F | OXYGEN SATURATION: 99 % | DIASTOLIC BLOOD PRESSURE: 78 MMHG

## 2024-03-26 ENCOUNTER — EMERGENCY (EMERGENCY)
Facility: HOSPITAL | Age: 34
LOS: 0 days | Discharge: HOME | End: 2024-03-26
Attending: EMERGENCY MEDICINE
Payer: MEDICAID

## 2024-03-26 VITALS
TEMPERATURE: 98 F | OXYGEN SATURATION: 100 % | SYSTOLIC BLOOD PRESSURE: 110 MMHG | DIASTOLIC BLOOD PRESSURE: 73 MMHG | RESPIRATION RATE: 17 BRPM | HEART RATE: 94 BPM

## 2024-03-26 VITALS
DIASTOLIC BLOOD PRESSURE: 73 MMHG | WEIGHT: 156.09 LBS | OXYGEN SATURATION: 98 % | SYSTOLIC BLOOD PRESSURE: 104 MMHG | HEART RATE: 99 BPM | HEIGHT: 62 IN | TEMPERATURE: 98 F | RESPIRATION RATE: 19 BRPM

## 2024-03-26 DIAGNOSIS — F79 UNSPECIFIED INTELLECTUAL DISABILITIES: ICD-10-CM

## 2024-03-26 DIAGNOSIS — F20.9 SCHIZOPHRENIA, UNSPECIFIED: ICD-10-CM

## 2024-03-26 DIAGNOSIS — F41.9 ANXIETY DISORDER, UNSPECIFIED: ICD-10-CM

## 2024-03-26 DIAGNOSIS — F31.9 BIPOLAR DISORDER, UNSPECIFIED: ICD-10-CM

## 2024-03-26 DIAGNOSIS — Z91.013 ALLERGY TO SEAFOOD: ICD-10-CM

## 2024-03-26 DIAGNOSIS — Z91.018 ALLERGY TO OTHER FOODS: ICD-10-CM

## 2024-03-26 PROCEDURE — 99284 EMERGENCY DEPT VISIT MOD MDM: CPT

## 2024-03-26 PROCEDURE — 93010 ELECTROCARDIOGRAM REPORT: CPT

## 2024-03-26 NOTE — ED PROVIDER NOTE - PATIENT PORTAL LINK FT
You can access the FollowMyHealth Patient Portal offered by [facility] by registering at the following website: [email or web address]. By joining [employer]’s FollowMyHealth portal, you will also be able to view your health information using other applications (apps) compatible with our system.

## 2024-03-26 NOTE — ED ADULT NURSE NOTE - OBJECTIVE STATEMENT
33 yr old female AOx3. C/o anxiety. Reports concern over heart. Pt is intellectually disabled. PMH of schizophrenia, bipolar disorder, DM, and asthma. Pt ambulates with steady gait. Pt asking for food.
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
## 2024-03-26 NOTE — ED PROVIDER NOTE - CONSTITUTIONAL, MLM
Well appearing, awake, alert, oriented to person, place, time/situation and in no apparent distress. Speaking in clear full sentences no nasal flaring no shoulders retractions no diaphoresis pt is eating and tolerate full dinner plate, pleasant smiling cooperative normal...

## 2024-03-26 NOTE — ED ADULT TRIAGE NOTE - INTERNATIONAL TRAVEL
"Subjective:       Patient ID: Osorio Figueroa is a 7 y.o. male.    Vitals:  height is 4' 0.2" (1.224 m) and weight is 38.2 kg (84 lb 3.5 oz). His temperature is 98.5 °F (36.9 °C). His blood pressure is 115/78 (abnormal) and his pulse is 96. His respiration is 20 and oxygen saturation is 98%.     Chief Complaint: Cough    Pts mother recently tested covid + . Pt has been having a cough / congestion . Pts mother would like for him to be covid tested .  Pts pharmacy has been updated .   Provider note begins below:  He was brought to PCP last week, was given prednisolone for asthma flare up. Mom reports he has been coughing, he is improved since last week. He has breathing treatments at home he has been using PRN. Mom denies any change in activity, eating and drinking well. Mom reports he bumped his left big toe last week and still having some bleeding at times.      Cough  This is a new problem. The current episode started yesterday. The problem has been unchanged. The problem occurs every few minutes. The cough is wet sounding. Associated symptoms include nasal congestion. Pertinent negatives include no chest pain, chills, ear congestion, ear pain, exercise intolerance, fever, headaches, heartburn, hemoptysis, myalgias, postnasal drip, rash, rhinorrhea, sore throat, shortness of breath, sweats, weight loss or wheezing. Nothing aggravates the symptoms. The treatment provided no relief. His past medical history is significant for asthma. There is no history of environmental allergies or pneumonia.       Constitution: Negative for chills and fever.   HENT: Negative for ear pain, postnasal drip and sore throat.    Cardiovascular: Negative for chest pain.   Respiratory: Positive for cough and asthma. Negative for chest tightness, sputum production, bloody sputum, COPD, shortness of breath, stridor and wheezing.    Gastrointestinal: Negative for heartburn.   Musculoskeletal: Negative for muscle ache.   Skin: Negative for rash. "   Allergic/Immunologic: Positive for asthma. Negative for environmental allergies.   Neurological: Negative for headaches.       Objective:      Physical Exam   Pulmonary/Chest: No stridor.   Nursing Notes and Triage Vitals reviewed by me.     Gen: AxOx4, NAD, appears stated age, appears well, non toxic, not ill appearing, mom and sibling present  Eye: EOMI, no scleral icterus, no periorbital edema or ecchymosis  Head: NCAT, no lesions  ENT: neck supple, no stridor, no masses  CVS: warm and well perfused, RRR, S1S2  PULM:  CTA, no rhonchi, normal work of breathing and effort, speaking in full sentences without distress, no wheezing  ABD: non surgical, soft, nondistended  Ext: no rash, no deformities  Neuro: ANGELO, gait intact  SKIN:abrasion to left great toe, ttp, no surrounding erythema, 2+ guerrero DP        Assessment:       1. Viral URI with cough    2. Cough with exposure to COVID-19 virus    3. Abrasion        Results for orders placed or performed in visit on 02/09/22   POCT COVID-19 Rapid Screening   Result Value Ref Range    POC Rapid COVID Negative Negative     Acceptable Yes        Plan:       cv neg, possibly false neg, +close cv exposures, rts note provided, eval by pcp last week and given prednisolone. Has not needed to increase SABS, compliant with flovent.  Vss, non toxic appearing, advised on cleaning wound with soap and water, and wearing closed toe shoes.    Discussed results/diagnosis/plan with pt and mom in clinic. Strict precautions given to pt and mom to monitor for worsening signs and symptoms. Advised to follow up with PCP or specialist.    Explained side effects of medications prescribed with patient and informed him/her to discontinue use if he/she has any side effects and to inform UC or PCP if this occurs. All questions answered. Strict ED verses clinic return precautions stressed and given in depth. Advised if symptoms worsens of fail to improve he/she should go to the Emergency  Room. Discharge and follow-up instructions given verbally/printed with the patient who expressed understanding and willingness to comply with my recommendations. Patient voiced understanding and in agreement with current treatment plan. Patient exits the exam room in no acute distress. Conversant and engaged during discharge discussion, verbalized understanding.      Viral URI with cough    Cough with exposure to COVID-19 virus  -     POCT COVID-19 Rapid Screening    Abrasion  -     mupirocin (BACTROBAN) 2 % ointment; Apply topically 3 (three) times daily. for 7 days  Dispense: 2 g; Refill: 0              Additional MDM:     Heart Failure Score:   COPD = No    Patient Instructions   General Discharge Instructions   PLEASE READ YOUR DISCHARGE INSTRUCTIONS ENTIRELY AS IT CONTAINS IMPORTANT INFORMATION.  If you were prescribed a narcotic or controlled medication, do not drive or operate heavy equipment or machinery while taking these medications.  If you were prescribed [facility], please take them to completion.  You must understand that you've received an Urgent Care treatment only and that you may be released before all your medical problems are known or treated. You, the patient, will arrange for follow up care as instructed.    OVER THE COUNTER RECOMMENDATIONS/SUGGESTIONS.    Make sure to stay well hydrated.    Use Nasal Saline to mechanically move any post nasal drip from your eustachian tube or from the back of your throat.    Use warm salt water gargles to ease your throat pain. Warm salt water gargles as needed for sore throat- 1/2 tsp salt to 1 cup warm water, gargle as desired.    Use an antihistamine such as Claritin, Zyrtec or Allegra to dry you out.    Use pseudoephedrine (behind the counter) to decongest. Pseudoephedrine 30 mg up to 240 mg /day. It can raise your blood pressure and give you palpitations.    Use mucinex (guaifenesin) to break up mucous up to 2400mg/day to loosen any mucous.    The  mucinex DM pill has a cough suppressant that can be sedating. It can be used at night to stop the tickle at the back of your throat.    You can use Mucinex D (it has guaifenesin and a high dose of pseudoephedrine) in the mornings to help decongest.    Use Afrin in each nare for no longer than 3 days, as it is addictive. It can also dry out your mucous membranes and cause elevated blood pressure. This is especially useful if you are flying.    Use Flonase 1-2 sprays/nostril per day. It is a local acting steroid nasal spray, if you develop a bloody nose, stop using the medication immediately.    Sometimes Nyquil at night is beneficial to help you get some rest, however it is sedating and it does have an antihistamine, and tylenol.    Honey is a natural cough suppressant that can be used.    Tylenol up to 4,000 mg a day is safe for short periods and can be used for body aches, pain, and fever. However in high doses and prolonged use it can cause liver irritation.    Ibuprofen is a non-steroidal anti-inflammatory that can be used for body aches, pain, and fever.However it can also cause stomach irritation if over used.     Follow up with your PCP or specialty clinic as instructed in the next 2-3 days if not improved or as needed. You can call [phone] to schedule an appointment with appropriate provider.      If you condition worsens, we recommend that you receive another evaluation at the emergency room immediately or contact your primary medical clinic's after hours call service to discuss your concerns.      Please return here or go to the Emergency Department for any concerns or worsening condition.   You can also call [phone] to schedule an appointment with the appropriate provider.    Please return here or go to the Emergency Department for any concerns or worsening of condition.    Thank you for choosing [facility]!    Our goal in the Urgent Care is to always provide outstanding medical  "care. You may receive a survey by mail or e-mail in the next week regarding your experience today. We would greatly appreciate you completing and returning the survey. Your feedback provides us with a way to recognize our staff who provide very good care, and it helps us learn how to improve when your experience was below our aspiration of excellence.      We appreciate you trusting us with your medical care. We hope you feel better soon. We will be happy to take care of you for all of your future medical needs.    Sincerely,    PRAVEEN Sosa    Patient Education       Cough in Children   The Basics   Written by the doctors and editors at [facility]   What is a cough? -- A cough is an important reflex that helps clear out the body's airways (the branching tubes that carry air within the lungs). Coughing also helps keep people from breathing things into the airways and lungs that could cause problems (figure 1).  It is normal for children to cough once in a while. But sometimes, a cough is a symptom of an illness or other condition.  A cough is called "dry" if it doesn't bring up mucus, and "wet" if it does. The sound of a child's cough can be different depending on if it is wet or dry. Some coughs are mild, but others are severe. A severe cough can make it hard to breathe.  What causes a cough? -- In children, possible causes of a cough include:  · Infections of the airways or lungs - Often, a cough is related to the common cold. Other infections, including coronavirus 2019 (COVID-19), can also cause a cough.   · Having an object stuck in an airway  · Asthma - This is a lung condition that can make it hard to breathe.  · Other lung problems, including conditions that some children are born with  · Coughing out of habit - This type of cough usually goes away when a child is sleeping.  Should the child see a doctor or nurse? -- Call the doctor or nurse if you live in an area where people have COVID-19. They will " "ask you questions about the childs symptoms and whether they might be at risk. The doctor or nurse can also tell you if the child should get tested for the virus.   You should also see a doctor or nurse right away if the child:  · Is younger than 4 months old  · Is having trouble breathing, has noisy breathing, or is breathing very fast (figure 2)  · Gets a cough after they choked on food or another object, even if they choked on the object days or weeks ago  · Is coughing up blood, or yellow or green mucus  · Refuses to drink anything for a long time  · Has a fever and is not acting like themself  · Is coughing so hard that they vomit  · Has had the cough for more than 2 weeks and is not getting any better  Will the child need tests? -- Maybe. The doctor or nurse will ask questions about the child's symptoms and examine them. They might do tests, depending on the child's age and other symptoms. There are different tests doctors can do to see what's causing a cough. The most common include:  · A chest X-ray  · Tests to check for an infection - For example, the doctor can use a cotton swab to take a sample from the inside of the child's nose or throat. Then they will do lab tests on the sample.  · Breathing tests - Breathing tests involve breathing hard into a tube. These tests show how the lungs are working. Most children 6 years old and older are able to do breathing tests.  · Bronchoscopy - This is a procedure in which a doctor uses a thin tube with a camera on the end (called a "bronchoscope") to look inside the child's airways. If the doctor finds an object stuck in the airway, they can remove it during this procedure.  Is there anything I can do to help the cough? -- Yes. If the cough is from a cold, croup, or another infection, you can:  · Have the child drink lots of fluids  · Use a humidifier in the child's sleeping area  If the cough is from a cold or croup, you can also try running hot water in the shower " "to make steam. Sit in the bathroom with the child while they breathe in the steam.  There are certain things you should not do:  · Do not give over-the-counter cough and cold medicines to children, especially if they are younger than 6 years old. Cough and cold medicines are not likely to help, and they can cause serious problems in young children.  · Do not give aspirin to children younger than 18 years old. Aspirin can cause a life-threatening condition called Reye syndrome in young people.  How is a cough treated? -- Treatment depends on the cause of the child's cough. For example:  · Some infections are treated with antibiotic medicines. If an infection is caused by bacteria, doctors can treat it with [facility]. Antibiotics do not work on infections caused by a virus, such as the common cold.  · Asthma is treated with medicines that a child usually breathes into their lungs.  · If a child has an object stuck in their airway, the doctor can do bronchoscopy to look for it and remove it.  Doctors do not usually give children medicines that "suppress" or quiet a cough. These medicines don't usually work well, and they can have serious side effects in children.  All topics are updated as new evidence becomes available and our peer review process is complete.  This topic retrieved from [employer] on: Sep 21, 2021.  Topic 40356 Version 11.0  Release: 29.4.2 - C29.263  © 2021 UpToDate, Inc. and/or its affiliates. All rights reserved.  figure 1: Lungs in a healthy child     This is what the lungs of a healthy child look like. They sit on the left and right sides of the upper chest, inside the ribcage. When a child takes a breath, air comes in through the nose and mouth, goes down the throat, and then goes into the main airway leading to the lungs called the "trachea." The trachea branches into the left and right bronchus, which carry air to each lung.  Graphic 29131 Version 8.0    figure 2: Retractions     When a child " "is having trouble breathing, the skin and muscles between the child's ribs or below the child's ribcage look like they are caving in. The medical term for this is "retractions."  Graphic 30995 Version 3.0    Consumer Information Use and Disclaimer   This information is not specific medical advice and does not replace information you receive from your health care provider. This is only a brief summary of general information. It does NOT include all information about conditions, illnesses, injuries, tests, procedures, treatments, therapies, discharge instructions or life-style choices that may apply to you. You must talk with your health care provider for complete information about your health and treatment options. This information should not be used to decide whether or not to accept your health care provider's advice, instructions or recommendations. Only your health care provider has the knowledge and training to provide advice that is right for you. The use of this information is governed by the [employer] End User License Agreement, available at [email or web address]://[email or web address]/solutions/[email or web address]/about/karina.The use of [employer] content is governed by the [employer] Terms of Use. ©2021 UpToDate, Inc. All rights reserved.  Copyright   © 2021 UpToDate, Inc. and/or its affiliates. All rights reserved.  Patient Education       Viral Upper Respiratory Infection Discharge Instructions, Child   About this topic   Your child has a viral upper respiratory infection. It is also called a URI or cold. The cough, sneezing, runny or stuffy nose, and sore throat that may be part of a cold are most often caused by a virus. This means [facility] wont help. Children are more likely to have a fever with a cold than an adult. Colds are easy to spread from person to person. Most of the time, your childs cold will get better in a week or two.         What care is needed at home?   Ask the doctor what you need to do when you go home. " Make sure you ask questions if you do not understand what the doctor says.  · Do not smoke or vape around your child or allow them to be in smoke-filled places.  · Sit with your child in the bathroom while there is a hot shower running. The steam can help soothe the cough.  · Older children can use hard candy or a lollipop to soothe sore throat and cough. Children older than 1 year can take a teaspoon (5 mL) of honey.  · To help your child feel better:  ? Offer your child lots of liquids.  ? Use a cool mist humidifier to avoid breathing dry air.  ? Use saline nose drops to relieve stuffiness.  ? Older children may gargle with salt water a few times each day to help soothe the throat. Mix 1/2 teaspoon (2.5 grams) salt with a cup (240 mL) of warm water.  · Do not give your child over-the-counter cold or cough medicines or throat sprays, especially if they are under 6 years old. These medicines dont help and can harm your child.  · Wash your hands and your childs hands often. This will help keep others healthy.  What follow-up care is needed?   The doctor may ask you to make visits to the office to check on your child's progress. Be sure to keep these visits.  What drugs may be needed?   Follow your doctor's instructions about your child's drugs. The doctor may order drugs to:  · Help a stuffy nose  · Lower fever  · Help with pain  · Fight an infection  · Clear mucus in the nose (saline drops)  · Build up your child's immune system (vitamin C and zinc)  Always talk to your doctor before you give your child any drugs. This includes over-the-counter (OTC) drugs and herbal supplements.  Children younger than 18 should not take aspirin. This can lead to a very bad health problem.  Will physical activity be limited?   Your child's physical activities will be limited until your child gets well. Encourage your child to rest. Have your child lie on the couch or bed. Give your child quiet activities like reading books or  watching TV or a movie.  What problems could happen?   A cold may lead to:  · Bronchitis  · Ear infection  · Sinus infection  · Lung infection  A cold may also cause the signs of asthma in children with asthma.  What can be done to prevent this health problem?   · Wash your hands often with soap and water for at least 20 seconds, especially after coughing or sneezing. Alcohol-based hand sanitizers also work to kill the virus.  · Teach your child to:  ? Cover the mouth and nose with tissue when coughing or sneezing. Your child can also cough into the elbow.  ? Throw away tissues in the trash.  ? Wash hands after touching used tissues, coughing, or sneezing.  · Do not let your child share things with sick people. Make sure your child does not share toys, pacifiers, towels, food, drinks, or knives and forks with others while sick.  · Keep your child away from crowded places. Keep your child away from people with colds.  · Have your child get a flu shot each year.  · Keep your child at home until the fever is gone and your child feels better. This will help to stop spreading the cold to others.  When do I need to call the doctor?   Seek emergency help if:  · Your child has so much trouble breathing that they can only say one or two words at a time.  · Your child needs to sit upright at all times to be able to breathe or cannot lie down.  · Your child has trouble eating or drinking.  · You cant wake your child up.  · Your child has so much trouble breathing they cannot talk in a full sentence.  · Your child has trouble breathing when they lie down or sit still.  · Your child has little energy or is very sleepy.  · Your child stops drinking or is drinking very little.  When do I need to call the doctor:  · Your child has a fever of 100.4°F (38°C) or higher and is not acting like themselves.  · Your child has a fever for more than 3 days.  · Your child has a cold and is younger than 4 months old.  · Your childs cough  lasts for more than 2 weeks.  · Your childs runny or stuffy nose lasts longer than 10 days.  · Your child has ear pain, is pulling on their ears, or shows other signs of an ear infection.  Teach Back: Helping You Understand   The Teach Back Method helps you understand the information we are giving you. After you talk with the staff, tell them in your own words what you learned. This helps to make sure the staff has described each thing clearly. It also helps to explain things that may have been confusing. Before going home, make sure you can do these:  · I can tell you about my child's condition.  · I can tell you what may help ease my child's signs.  · I can tell you what I will do if my child is very weak and hard to wake up or has trouble breathing.  Where can I learn more?   KidsHealth  http://kidshealth.org/parent/infections/common/cold.html   NHS  https://www.nhs.uk/conditions/respiratory-tract-infection/   Last Reviewed Date   2021-06-22  Consumer Information Use and Disclaimer   This information is not specific medical advice and does not replace information you receive from your health care provider. This is only a brief summary of general information. It does NOT include all information about conditions, illnesses, injuries, tests, procedures, treatments, therapies, discharge instructions or life-style choices that may apply to you. You must talk with your health care provider for complete information about your health and treatment options. This information should not be used to decide whether or not to accept your health care providers advice, instructions or recommendations. Only your health care provider has the knowledge and training to provide advice that is right for you.  Copyright   Copyright © 2021 UpToDate, Inc. and its affiliates and/or licensors. All rights reserved.           No

## 2024-03-26 NOTE — ED ADULT NURSE NOTE - NSFALLUNIVINTERV_ED_ALL_ED
Bed/Stretcher in lowest position, wheels locked, appropriate side rails in place/Call bell, personal items and telephone in reach/Instruct patient to call for assistance before getting out of bed/chair/stretcher/Non-slip footwear applied when patient is off stretcher/[place] to call system/Physically safe environment - no spills, clutter or unnecessary equipment/Purposeful proactive rounding/Room/bathroom lighting operational, light cord in reach

## 2024-03-26 NOTE — ED PROVIDER NOTE - PROGRESS NOTE DETAILS
staff from the group home was called and notified pt wants to go back and he just said ok then hung up the phone.

## 2024-03-26 NOTE — ED ADULT TRIAGE NOTE - NSNEURBEHEXAMMETH_ED_P_ED
Simulate experience on healthcare personnel or family member/Verbal instruction step by step during exam
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
## 2024-03-27 ENCOUNTER — EMERGENCY (EMERGENCY)
Facility: HOSPITAL | Age: 34
LOS: 1 days | Discharge: HOME | End: 2024-03-27
Attending: EMERGENCY MEDICINE | Admitting: EMERGENCY MEDICINE
Payer: MEDICAID

## 2024-03-27 ENCOUNTER — EMERGENCY (EMERGENCY)
Facility: HOSPITAL | Age: 34
LOS: 0 days | Discharge: HOME | End: 2024-03-28
Attending: EMERGENCY MEDICINE
Payer: MEDICAID

## 2024-03-27 VITALS
SYSTOLIC BLOOD PRESSURE: 102 MMHG | DIASTOLIC BLOOD PRESSURE: 74 MMHG | RESPIRATION RATE: 18 BRPM | OXYGEN SATURATION: 100 % | TEMPERATURE: 98 F | HEART RATE: 97 BPM | HEIGHT: 62 IN

## 2024-03-27 VITALS
HEART RATE: 98 BPM | TEMPERATURE: 98 F | OXYGEN SATURATION: 100 % | HEIGHT: 62 IN | SYSTOLIC BLOOD PRESSURE: 97 MMHG | WEIGHT: 266.1 LBS | DIASTOLIC BLOOD PRESSURE: 71 MMHG | RESPIRATION RATE: 17 BRPM

## 2024-03-27 DIAGNOSIS — R00.2 PALPITATIONS: ICD-10-CM

## 2024-03-27 DIAGNOSIS — Z91.018 ALLERGY TO OTHER FOODS: ICD-10-CM

## 2024-03-27 DIAGNOSIS — R45.1 RESTLESSNESS AND AGITATION: ICD-10-CM

## 2024-03-27 DIAGNOSIS — Z91.013 ALLERGY TO SEAFOOD: ICD-10-CM

## 2024-03-27 PROCEDURE — L9997: CPT

## 2024-03-27 PROCEDURE — 99285 EMERGENCY DEPT VISIT HI MDM: CPT
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
## 2024-03-27 NOTE — CHART NOTE - NSCHARTNOTEFT_GEN_A_CORE
SW alerted by MD Chau that pt is cleared to return to prior residence, [facility] group home ([address], [place]). SW contacted residence (p:[phone]) and spoke with Lissa. medical coordinator who confirmed pt is able to return and mode of transport is ambulance.     ROCIO arranged S ambulance through [facility] portal (Inv# [id number]). Trip assigned to [facility] for ASAP pickup. Non-emergent in paper chart.     No other SW needs identified at this time.
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
## 2024-03-27 NOTE — ED PROVIDER NOTE - PATIENT PORTAL LINK FT
You can access the FollowMyHealth Patient Portal offered by [facility] by registering at the following website: [email or web address]. By joining [employer]’s FollowMyHealth portal, you will also be able to view your health information using other applications (apps) compatible with our system.
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
## 2024-03-27 NOTE — ED ADULT TRIAGE NOTE - TEMPERATURE IN CELSIUS (DEGREES C)
Writer contacted patient to discuss surgery scheduling. Surgical date and time discussed. Patient aware in order to proceed with surgery patient must complete following requirements.    Patient was informed to NPO for 8 hrs prior to surgery and to make arrangements so that patient has someone over the age of 18 years to provide transportation after the surgery. Surgical instructions will be sent by mail and to live well. No further questions at this time.    36.9

## 2024-03-27 NOTE — ED ADULT NURSE NOTE - NSFALLRISKINTERV_ED_ALL_ED

## 2024-03-27 NOTE — ED ADULT TRIAGE NOTE - IDEAL BODY WEIGHT(KG)
[FreeTextEntry1] : Yosi comes today for clarification of his  overall cardiovascular risk. He  was advised to undergo a complete cardiac evaluation. He denies chest pains shortness of breath or loss of consciousnes.\par \par addendum 6/28/22\par  Yosi got a stone another stone in kidney.  just has  one now. it  didn break  it up entirely. cardiac status is stable\par \par  50
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
## 2024-03-28 ENCOUNTER — EMERGENCY (EMERGENCY)
Facility: HOSPITAL | Age: 34
LOS: 0 days | Discharge: HOME | End: 2024-03-29
Attending: STUDENT IN AN ORGANIZED HEALTH CARE EDUCATION/TRAINING PROGRAM
Payer: MEDICAID

## 2024-03-28 VITALS
HEART RATE: 99 BPM | TEMPERATURE: 98 F | SYSTOLIC BLOOD PRESSURE: 116 MMHG | DIASTOLIC BLOOD PRESSURE: 75 MMHG | OXYGEN SATURATION: 99 % | RESPIRATION RATE: 19 BRPM

## 2024-03-28 VITALS
SYSTOLIC BLOOD PRESSURE: 128 MMHG | DIASTOLIC BLOOD PRESSURE: 70 MMHG | HEART RATE: 100 BPM | TEMPERATURE: 98 F | OXYGEN SATURATION: 98 % | RESPIRATION RATE: 18 BRPM | HEIGHT: 62 IN

## 2024-03-28 DIAGNOSIS — Z00.8 ENCOUNTER FOR OTHER GENERAL EXAMINATION: ICD-10-CM

## 2024-03-28 DIAGNOSIS — Z91.018 ALLERGY TO OTHER FOODS: ICD-10-CM

## 2024-03-28 DIAGNOSIS — F31.9 BIPOLAR DISORDER, UNSPECIFIED: ICD-10-CM

## 2024-03-28 DIAGNOSIS — Z91.013 ALLERGY TO SEAFOOD: ICD-10-CM

## 2024-03-28 DIAGNOSIS — F20.9 SCHIZOPHRENIA, UNSPECIFIED: ICD-10-CM

## 2024-03-28 DIAGNOSIS — F79 UNSPECIFIED INTELLECTUAL DISABILITIES: ICD-10-CM

## 2024-03-28 PROCEDURE — 99283 EMERGENCY DEPT VISIT LOW MDM: CPT

## 2024-03-28 PROCEDURE — 93010 ELECTROCARDIOGRAM REPORT: CPT

## 2024-03-28 NOTE — ED ADULT NURSE NOTE - CHPI ED NUR SYMPTOMS NEG
Pt denies any complaints and asking for food/no chills/no decreased eating/drinking/no dizziness/no fever/no nausea/no pain/no tingling/no vomiting/no weakness
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
## 2024-03-28 NOTE — ED ADULT NURSE REASSESSMENT NOTE - NS ED NURSE REASSESS COMMENT FT1
Received report from night RN, patient sleeping, chest rise and fall observed, no s/s of distress, continues to be on 1:1 for risk of elopement. Awaiting social work
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
## 2024-03-28 NOTE — ED PROVIDER NOTE - NSFOLLOWUPINSTRUCTIONS_ED_ALL_ED_FT
followup with primary care doctor in next 7 days.    return to emergency department if symptoms worsen, nausea or vomiting, fever or chills, chest pain, shortness of breath, abdominal pain.

## 2024-03-28 NOTE — ED PROVIDER NOTE - CARE PROVIDER_API CALL
Sonu Lopez  Internal Medicine  [address] [place] [address]  Phone: [phone]  Fax: [phone]  Follow Up Time: 7-10 Days

## 2024-03-28 NOTE — ED ADULT NURSE NOTE - NSFALLUNIVINTERV_ED_ALL_ED
Bed/Stretcher in lowest position, wheels locked, appropriate side rails in place/Call bell, personal items and telephone in reach/Instruct patient to call for assistance before getting out of bed/chair/stretcher/Non-slip footwear applied when patient is off stretcher/[place] to call system/Physically safe environment - no spills, clutter or unnecessary equipment/Purposeful proactive rounding/Room/bathroom lighting operational, light cord in reach

## 2024-03-28 NOTE — ED PROVIDER NOTE - PRO INTERPRETER NEED 2
----- Message from Lizzie Fang sent at 3/5/2018 10:06 AM CST -----  Contact: patient  Calling to schedule a colonoscopy. Please call patient @ [phone]. Thanks, michael  
I did earlier.  
Please put in orders for colonoscopy  
Please schedule pt for a colonoscopy.  
English

## 2024-03-28 NOTE — ED PROVIDER NOTE - PHYSICAL EXAMINATION
General: Well appearing female in no acute distress  HEENT: Normocephalic, atraumatic. Moist mucous membranes. Oropharynx clear. No lymphadenopathy.  Eyes: No scleral icterus. EOMI. EMERALD.  Neck:. Soft and supple. Full ROM without pain. No midline tenderness  Cardiac: Regular rate and regular rhythm. No murmurs, rubs, gallops. Peripheral pulses 2+ and symmetric. No LE edema.  Resp: Lungs CTAB. Speaking in full sentences. No wheezes, rales or rhonchi.  Abd: Soft, non-tender, non-distended. No guarding or rebound. No scars, masses, or lesions.  Back: Spine midline and non-tender. No CVA tenderness.    Skin: No rashes, abrasions, or lacerations.  Neuro: AO x 3. Moves all extremities symmetrically. Motor strength and sensation grossly intact.

## 2024-03-28 NOTE — ED PROVIDER NOTE - PROGRESS NOTE DETAILS
Attempts made ro reach supervisors at [facility] no responses.  No one answers the phone. Attending note (Chris): patient endorsed to me at signout; medically cleared, is awaiting social work arrival this AM for assistance with living situation and can then be discharged. Attending note (Chris): case discussed with social work; concerns addressed, is cleared for discharge back to her group home; awaiting ambulette for transport.
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
## 2024-03-28 NOTE — ED PROVIDER NOTE - PATIENT PORTAL LINK FT
You can access the FollowMyHealth Patient Portal offered by [facility] by registering at the following website: [email or web address]. By joining [employer]’s FollowMyHealth portal, you will also be able to view your health information using other applications (apps) compatible with our system.
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
## 2024-03-28 NOTE — ED ADULT NURSE NOTE - NSFALLUNIVINTERV_ED_ALL_ED
Bed/Stretcher in lowest position, wheels locked, appropriate side rails in place/Call bell, personal items and telephone in reach/Instruct patient to call for assistance before getting out of bed/chair/stretcher/Non-slip footwear applied when patient is off stretcher/[place] to call system/Physically safe environment - no spills, clutter or unnecessary equipment/Purposeful proactive rounding/Room/bathroom lighting operational, light cord in reach

## 2024-03-28 NOTE — ED PROVIDER NOTE - NSFOLLOWUPINSTRUCTIONS_ED_ALL_ED_FT
You were evaluated in the Emergency Department for an evaluation.  You were evaluated and examined by a physician, and you had ECG.      Based on your evaluation: There are no signs of emergency conditions requiring admission to the hospital on today's workup.  Based on the evaluation, a presumptive diagnosis was made, however, further evaluation may be required by your primary care physician or a specialist for a more definitive diagnosis.  Therefore, please follow-up as directed or return to the Emergency Department if your symptoms change or worsen.    We recommend that you:  1. See your primary care physician within the next 72 hours for follow up.  Bring a copy of your discharge paperwork (including any test results) to your doctor.  2. continue your home medications as prescribed.      *** Return immediately if you have any other new/concerning symptoms. ***

## 2024-03-28 NOTE — ED PROVIDER NOTE - PATIENT PORTAL LINK FT
You can access the FollowMyHealth Patient Portal offered by [facility] by registering at the following website: [email or web address]. By joining [employer]’s FollowMyHealth portal, you will also be able to view your health information using other applications (apps) compatible with our system.
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
## 2024-03-29 VITALS
DIASTOLIC BLOOD PRESSURE: 80 MMHG | OXYGEN SATURATION: 96 % | TEMPERATURE: 98 F | HEART RATE: 96 BPM | RESPIRATION RATE: 18 BRPM | SYSTOLIC BLOOD PRESSURE: 115 MMHG

## 2024-03-29 NOTE — ED ADULT NURSE REASSESSMENT NOTE - NS ED NURSE REASSESS COMMENT FT1
Pt at baseline . Pt reports no acute distress at this time. Called Nina Blanc @ [phone] at the group home and they are able to accept the pt at the group home.
Received report from nightshift RN, pt A/Ox3, pt resting comfortably in bed, even and unlabored respirations noted, pt pending p/u to group home, pt denies any c/o pain

## 2024-03-30 ENCOUNTER — EMERGENCY (EMERGENCY)
Facility: HOSPITAL | Age: 34
LOS: 0 days | Discharge: HOME | End: 2024-03-30
Attending: STUDENT IN AN ORGANIZED HEALTH CARE EDUCATION/TRAINING PROGRAM
Payer: MEDICAID

## 2024-03-30 VITALS
HEIGHT: 62 IN | WEIGHT: 266.1 LBS | SYSTOLIC BLOOD PRESSURE: 124 MMHG | HEART RATE: 106 BPM | TEMPERATURE: 98 F | DIASTOLIC BLOOD PRESSURE: 89 MMHG | RESPIRATION RATE: 18 BRPM | OXYGEN SATURATION: 99 %

## 2024-03-30 VITALS
SYSTOLIC BLOOD PRESSURE: 93 MMHG | RESPIRATION RATE: 18 BRPM | DIASTOLIC BLOOD PRESSURE: 66 MMHG | HEART RATE: 113 BPM | TEMPERATURE: 98 F | OXYGEN SATURATION: 98 %

## 2024-03-30 DIAGNOSIS — F31.9 BIPOLAR DISORDER, UNSPECIFIED: ICD-10-CM

## 2024-03-30 DIAGNOSIS — Z91.018 ALLERGY TO OTHER FOODS: ICD-10-CM

## 2024-03-30 DIAGNOSIS — M79.601 PAIN IN RIGHT ARM: ICD-10-CM

## 2024-03-30 DIAGNOSIS — M79.602 PAIN IN LEFT ARM: ICD-10-CM

## 2024-03-30 DIAGNOSIS — F79 UNSPECIFIED INTELLECTUAL DISABILITIES: ICD-10-CM

## 2024-03-30 DIAGNOSIS — J45.909 UNSPECIFIED ASTHMA, UNCOMPLICATED: ICD-10-CM

## 2024-03-30 DIAGNOSIS — Z91.013 ALLERGY TO SEAFOOD: ICD-10-CM

## 2024-03-30 DIAGNOSIS — E11.9 TYPE 2 DIABETES MELLITUS WITHOUT COMPLICATIONS: ICD-10-CM

## 2024-03-30 DIAGNOSIS — F20.9 SCHIZOPHRENIA, UNSPECIFIED: ICD-10-CM

## 2024-03-30 PROCEDURE — 99283 EMERGENCY DEPT VISIT LOW MDM: CPT

## 2024-03-30 RX ORDER — ACETAMINOPHEN 500 MG
650 TABLET ORAL ONCE
Refills: 0 | Status: COMPLETED | OUTPATIENT
Start: 2024-03-30 | End: 2024-03-30

## 2024-03-30 RX ADMIN — Medication 650 MILLIGRAM(S): at 11:33

## 2024-03-30 NOTE — ED PROVIDER NOTE - PHYSICAL EXAMINATION
GEN: Awake, alert, interactive, NAD. Eating breakfast  HEAD AND NECK: NC/AT. Airway patent. Neck supple.   EYES:  Clear b/l.  ENT: Moist mucus membranes.   CARDIAC: Regular rate, regular rhythm. No evident pedal edema.    RESP/CHEST: Normal respiratory effort with no use of accessory muscles or retractions. Clear throughout on auscultation.  ABD: soft, non-distended, non-tender. No rebound, no guarding.   BACK: No midline spinal TTP. No CVAT.   EXTREMITIES: Moving all extremities with no apparent deformities.   SKIN: Warm, dry, intact normal color. No rash.   NEURO: Alert, CN II-XII grossly intact, no focal deficits.   PSYCH: Appropriate mood and affect.

## 2024-03-30 NOTE — ED ADULT NURSE NOTE - NS ED NOTE ABUSE SUSPICION NEGLECT YN
ROOM # [id number] pt with two pt identifiers(name and ). Reviewed record in preparation for visit and have obtained necessary documentation. Chief Complaint   [facility] with   [facility].     figure out what I need to be on for my diabetes,needs antigungal med      Trey Dove preferred language for health care discussion is english/other. Is the patient using any DME equipment during OV? NO    Trey Dove is due for:  Health Maintenance Due   Topic    Pneumococcal 19-64 Medium Risk (1 of 1 - PPSV23)    EYE EXAM RETINAL OR DILATED Q1     MICROALBUMIN Q1     LIPID PANEL Q1     FOOT EXAM Q1      Health Maintenance reviewed and discussed per provider  Please order/place referral if appropriate. Advance Directive:  1. Do you have an advance directive in place? Patient Reply: NO    2. If not, would you like material regarding how to put one in place? NO    Coordination of Care:  1. Have you been to the ER, urgent care clinic since your last visit? Hospitalized since your last visit? NO    2. Have you seen or consulted any other health care providers outside of the [address] [place] [address] since your last visit? Include any pap smears or colon screening. NO    Patient is accompanied by self I have received verbal consent from Trey Dove to discuss any/all medical information while they are present in the room.     Learning Assessment:  Learning Assessment 3/21/2016 2014   PRIMARY LEARNER Patient Patient   HIGHEST LEVEL OF EDUCATION - PRIMARY LEARNER  GRADUATED HIGH SCHOOL OR GED -   BARRIERS PRIMARY LEARNER NONE NONE   CO-LEARNER CAREGIVER No No   PRIMARY LANGUAGE ENGLISH ENGLISH   LEARNER PREFERENCE PRIMARY LISTENING OTHER (COMMENT)     READING -   ANSWERED BY patient patient   RELATIONSHIP SELF SELF     Depression Screening:  PHQ over the last two weeks 2018   Little interest or pleasure in doing things Not at all Not at all Not at all   Feeling down, depressed or hopeless Not at all Not at all Not at all   Total Score PHQ 2 0 0 0     Abuse Screening:  Abuse Screening Questionnaire 7/22/2014   Do you ever feel afraid of your partner? N   Are you in a relationship with someone who physically or mentally threatens you? N   Is it safe for you to go home? Y     Fall Risk  No flowsheet data found. No

## 2024-03-30 NOTE — ED ADULT NURSE NOTE - SUICIDE SCREENING QUESTION 2
124 ml injected throughout the case. 40 mL total wasted during the case. 164 mL total used in the case.  Patient refused
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
## 2024-03-30 NOTE — ED PROVIDER NOTE - PATIENT PORTAL LINK FT
You can access the FollowMyHealth Patient Portal offered by [facility] by registering at the following website: [email or web address]. By joining [employer]’s FollowMyHealth portal, you will also be able to view your health information using other applications (apps) compatible with our system.

## 2024-03-30 NOTE — ED ADULT NURSE NOTE - ISOLATION TYPE:
Appliance had a leak @ 3 o'clock lateral to left hip.  Brown liquid stool leaked.  Upsetting to patient and making her apprehensive about being in public.  Stoma adhesive paste applied in a linear fashion to fill a fold in her skin there.  Encouraged patient to hold the wafer in place to add adherence.  Will reinforce teaching tomorrow.  Possible try another more flexible appliance without the flange.     None
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
## 2024-03-30 NOTE — ED PROVIDER NOTE - ATTENDING APP SHARED VISIT CONTRIBUTION OF CARE
Attending Galilea: I performed a history and physical exam of the patient and discussed their management with the RIO. I have reviewed the RIO note and agree with the documented findings and plan of care, except as noted. This was a shared visit with an RIO. I reviewed and verified the documentation and independently performed my own history/exam/medical decision making. My medical decision making and observations are found above. Please refer to any progress notes for updates on clinical course. My notes supersedes the above RIO note in case of discrepancy
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
## 2024-03-31 ENCOUNTER — EMERGENCY (EMERGENCY)
Facility: HOSPITAL | Age: 34
LOS: 0 days | Discharge: HOME | End: 2024-04-01
Attending: STUDENT IN AN ORGANIZED HEALTH CARE EDUCATION/TRAINING PROGRAM
Payer: MEDICAID

## 2024-03-31 VITALS
SYSTOLIC BLOOD PRESSURE: 103 MMHG | HEART RATE: 100 BPM | DIASTOLIC BLOOD PRESSURE: 70 MMHG | TEMPERATURE: 98 F | OXYGEN SATURATION: 96 % | RESPIRATION RATE: 18 BRPM | HEIGHT: 62 IN | WEIGHT: 266.1 LBS

## 2024-03-31 DIAGNOSIS — F20.9 SCHIZOPHRENIA, UNSPECIFIED: ICD-10-CM

## 2024-03-31 DIAGNOSIS — F79 UNSPECIFIED INTELLECTUAL DISABILITIES: ICD-10-CM

## 2024-03-31 DIAGNOSIS — R10.9 UNSPECIFIED ABDOMINAL PAIN: ICD-10-CM

## 2024-03-31 DIAGNOSIS — Z91.018 ALLERGY TO OTHER FOODS: ICD-10-CM

## 2024-03-31 DIAGNOSIS — Z91.013 ALLERGY TO SEAFOOD: ICD-10-CM

## 2024-03-31 DIAGNOSIS — F31.9 BIPOLAR DISORDER, UNSPECIFIED: ICD-10-CM

## 2024-03-31 PROCEDURE — 99283 EMERGENCY DEPT VISIT LOW MDM: CPT

## 2024-03-31 NOTE — ED ADULT TRIAGE NOTE - HEIGHT IN INCHES
[de-identified] : 46yo female who is being referred by Dr. Stein for thyroid nodule increased in size. Reports knowing of having thyroid nodules since 2018. Has not had any biopsies.  Denies pain, dysphagia, dysphonia and or dyspnea  1/30/2024 sonogram showing right lobe nodule measuring 1.2cm, previously measured 8mm. Left lobe 8mm nodule, previously measured 7mm. No abnormal lymph nodes in the central or lateral compartment.  2/3/2024 TSH wnr, not on thyroid med. Mother has a hx of  thyroid cancer. No bleeding disorders.  Denies cardiac or lung issues. Non smoker, never. Occasional alcohol intake. 
2
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
## 2024-04-01 ENCOUNTER — EMERGENCY (EMERGENCY)
Facility: HOSPITAL | Age: 34
LOS: 1 days | Discharge: HOME | End: 2024-04-01
Admitting: EMERGENCY MEDICINE
Payer: MEDICAID

## 2024-04-01 VITALS
RESPIRATION RATE: 18 BRPM | TEMPERATURE: 98 F | HEART RATE: 97 BPM | DIASTOLIC BLOOD PRESSURE: 88 MMHG | SYSTOLIC BLOOD PRESSURE: 125 MMHG | OXYGEN SATURATION: 96 % | HEIGHT: 62 IN

## 2024-04-01 VITALS
HEART RATE: 102 BPM | DIASTOLIC BLOOD PRESSURE: 82 MMHG | SYSTOLIC BLOOD PRESSURE: 116 MMHG | RESPIRATION RATE: 18 BRPM | OXYGEN SATURATION: 100 % | TEMPERATURE: 98 F

## 2024-04-01 VITALS
TEMPERATURE: 98 F | SYSTOLIC BLOOD PRESSURE: 120 MMHG | OXYGEN SATURATION: 100 % | DIASTOLIC BLOOD PRESSURE: 80 MMHG | RESPIRATION RATE: 20 BRPM | HEART RATE: 94 BPM

## 2024-04-01 PROCEDURE — 99284 EMERGENCY DEPT VISIT MOD MDM: CPT
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
## 2024-04-01 NOTE — ED PROVIDER NOTE - PATIENT PORTAL LINK FT
You can access the FollowMyHealth Patient Portal offered by [facility] by registering at the following website: [email or web address]. By joining [employer]’s FollowMyHealth portal, you will also be able to view your health information using other applications (apps) compatible with our system.

## 2024-04-01 NOTE — ED ADULT NURSE NOTE - CHIEF COMPLAINT QUOTE
coming from [facility],  called 911 saying pt acting erratic. pt resides at a group home, # is [phone]. pt has been running away from group home, says "I do not like it there". denies S/I, H/I. + hallucinations. pt reports hearing voices, telling to "kill self", says "they have cameras". denies alcohol/drug use. past medical history bipolar, schizophrenia, diabetes type 2, developmental delay. FS 87.

## 2024-04-01 NOTE — ED ADULT NURSE REASSESSMENT NOTE - NS ED NURSE REASSESS COMMENT FT1
Break RN note- Patient ambulatory in low acuity, complaining about her group home. Safety maintained.

## 2024-04-01 NOTE — ED ADULT TRIAGE NOTE - CHIEF COMPLAINT QUOTE
coming from [facility],  called 911 saying pt acting erratic. pt resides at a group home, # is [phone]. pt has been running away from group home, says "I do not like it there". denies S/I, H/I. + hallucinations. pt reports hearing voices, telling to "kill self", says "they have cameras". denies alcohol/drug use. past medical history bipolar, schizophrenia, diabetes type 2, developmental delay. FS 87.

## 2024-04-01 NOTE — ED PROVIDER NOTE - PATIENT PORTAL LINK FT
You can access the FollowMyHealth Patient Portal offered by [facility] by registering at the following website: [email or web address]. By joining [email or web address]’s FollowMyHealth portal, you will also be able to view your health information using other applications (apps) compatible with our system.

## 2024-04-01 NOTE — ED ADULT NURSE REASSESSMENT NOTE - NS ED NURSE REASSESS COMMENT FT1
pt is now awake, AOX3, walking around ED. pt states "Im hungry can I have some food". pt provided with sandwich and juice. NADN. Dr. Post aware.

## 2024-04-01 NOTE — ED PROVIDER NOTE - NS_EDPROVIDERDISPOUSERTYPE_ED_A_ED
It was a pleasure seeing you today, please reach out to our office directly if you have any concerns or questions about your heart.    Dr. Nunes would like you to return to the clinic in: 12 months     Please call us if you would like to be seen sooner for any reason  Please call our office with questions or concerns    Following items have been ordered:     Lipid Panel today after your office visit     Testing: Ultrasound Studies:US Aorta Screening    Central scheduling will call you to schedule diagnostic testings, please allow 3 business days  We will call you with results    Dr. Nunes has made the following changes to your medications:  STOP taking amlodipine (NORVASC) 5 mg          
I have personally evaluated and examined the patient. The Attending was available to me as a supervising provider if needed.

## 2024-04-01 NOTE — ED PROVIDER NOTE - PROGRESS NOTE DETAILS
NP Bereczky- On reeval, patient is calm, alert and oriented, at her baseline ,and denies SI, HI, hallucinations, no acute s/s of acute domenico or florid psychosis, no s/s of intoxication. Pt did not test limits and maintained appropriate boundaries, while in BH.
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
## 2024-04-01 NOTE — ED PROVIDER NOTE - NSFOLLOWUPINSTRUCTIONS_ED_ALL_ED_FT
Follow up with your primary care physician and psychiatrist in 48-72 hours.  You may also see the psychiatrist at [facility]:    [address] [place] [address]  Phone: [phone]    [facility] Information:    -Walk-in hours: Monday to Friday, 9am to 3pm   -Almost all walk-in patients will be able to see a psych prescriber the same day   -Scheduled, non-urgent, evening remote/virtual appointments are available on a limited basis. Call our  to inquire about these: [phone]. A crisis center clinician screens these requests in the late afternoon and if appropriate it takes a few days to set-up.   -Visits take about 2 to 4 hours total   -Mornings are the best time for patients to arrive    For Telehealth options try:  [employer]: [email or web address] (to access psychiatrist or therapist)  [employer]: [employer] (to access psychiatrist or therapist)  Better Help: [email or web address] (Largest online therapy group)      SEEK IMMEDIATE MEDICAL CARE IF YOU HAVE ANY OF THE FOLLOWING SYMPTOMS: thoughts about hurting or killing yourself, thoughts about hurting or killing somebody else, hallucinations or any other worsening or persistent symptoms OR ANY NEW OR CONCERNING SYMPTOMS.

## 2024-04-01 NOTE — ED BEHAVIORAL HEALTH NOTE - BEHAVIORAL HEALTH NOTE
As per request of provider, writer contacted [facility] residence [address] [place] [address]  [phone] and spoke w/ MARTINA chávez.     Pt is a 32 yo female domiciled at residence hx of intellectual disability, bib ems.      Reason for ed visit: DSP unaware what happened earlier but says pt frequency visits hospital.    Symptoms/hx:  last night pt was okay. she says pt had dinner and then had complaints of stomach cramps.  She reports pt frequently visits hospital and sometimes it is 3x a day. She says Pt will call with medical complaints to go the hospital. She says pt is sleeping , eating and showering at baseline. Pt has a hx of elopement.    Baseline: at baseline she visits the hospital and does elope from the residence.    Stressors: unknown.    Drug/alcohol use: none reported.      Medical problems: on pt’s face sheet which was sent with the pt.    Medication: compliant w/ medication when home    Treatment team: no day program currently.     Family hx: unknown.     Violence/aggression: not violent or aggressive currently. no access to firearms or weapons.    Dispo: pt can return via ambulance when cleared for discharge. Pt’s address is [address] [place] [address]. As per request of provider, writer contacted [address]  [phone] and spoke w/ MARTINA chávez.     Pt is a 34 yo female domiciled at residence hx of intellectual disability, bib ems.      Reason for ed visit: DSP unaware what happened earlier but says pt frequency visits hospital.    Symptoms/hx:  last night pt was okay. she says pt had dinner and then had complaints of stomach cramps.  She reports pt frequently visits hospital and sometimes it is 3x a day. She says Pt will call with medical complaints to go the hospital. She says pt is sleeping , eating and showering at baseline. Pt has a hx of elopement.    Baseline: at baseline she visits the hospital and does elope from the residence.    Stressors: unknown.    Drug/alcohol use: none reported.      Medical problems: on pt’s face sheet which was sent with the pt.    Medication: compliant w/ medication when home    Treatment team: no day program currently.     Family hx: unknown.     Violence/aggression: not violent or aggressive currently. no access to firearms or weapons.    Dispo: pt can return via ambulance when cleared for discharge. Pt’s address is [address] [place] [address].    Per provider, TEDDY eastman, patient is cleared and is able to return to their previous residence, [facility].  has spoken to MARTINA CHÁVEZ,  confirmed that patients mode of transportation is , AMBULANCE and that patient travels WITH SUPERVISION. Clinical provider is in agreement with  AMBULANCE back to [facility]. Verbal huddle regarding coordination of care completed with interdisciplinary team.   Transportation coordinated via nursing.    pt's address is [address] [place] [address].

## 2024-04-01 NOTE — ED ADULT TRIAGE NOTE - NS ED NURSE AMBULANCES
[facility] Chonodrocutaneous Helical Advancement Flap Text: The defect edges were debeveled with a #15 scalpel blade.  Given the location of the defect and the proximity to free margins a chondrocutaneous helical advancement flap was deemed most appropriate.  Using a sterile surgical marker, the appropriate advancement flap was drawn incorporating the defect and placing the expected incisions within the relaxed skin tension lines where possible.    The area thus outlined was incised deep to adipose tissue with a #15 scalpel blade.  The skin margins were undermined to an appropriate distance in all directions utilizing iris scissors.

## 2024-04-01 NOTE — ED PROVIDER NOTE - PHYSICAL EXAMINATION
General appearance: Nontoxic appearing, conversant, afebrile    Eyes: anicteric sclerae, LEXX, EOMI   HENT: Atraumatic; oropharynx clear, MMM and no ulcerations, no pharyngeal erythema or exudate   Neck: Trachea midline; Full range of motion, supple   Pulm: CTA bl, normal respiratory effort and no intercostal retractions, normal work of breathing   CV: RRR, No murmurs, rubs, or gallops   Abdomen: Soft, non-tender, non-distended; no guarding or rebound   Extremities: No peripheral edema, no gross deformities, FROM x4   Skin: Dry, normal temperature, turgor and texture; no rash   Psych: Appropriate affect, cooperative

## 2024-04-01 NOTE — ED ADULT NURSE REASSESSMENT NOTE - NS ED NURSE REASSESS COMMENT FT1
Covering primary RN. Pt asleep in stretcher in NAD, equal rise and fall of chest noted. Unable to get in contact with group home at this time. Plan to retry number, pt to be held in ED until safe dispo can be confirmed with .

## 2024-04-02 ENCOUNTER — EMERGENCY (EMERGENCY)
Facility: HOSPITAL | Age: 34
LOS: 0 days | Discharge: HOME | End: 2024-04-02
Attending: STUDENT IN AN ORGANIZED HEALTH CARE EDUCATION/TRAINING PROGRAM
Payer: MEDICAID

## 2024-04-02 VITALS
SYSTOLIC BLOOD PRESSURE: 108 MMHG | TEMPERATURE: 98 F | RESPIRATION RATE: 14 BRPM | HEIGHT: 62 IN | HEART RATE: 95 BPM | OXYGEN SATURATION: 100 % | DIASTOLIC BLOOD PRESSURE: 70 MMHG | WEIGHT: 266.1 LBS

## 2024-04-02 VITALS
SYSTOLIC BLOOD PRESSURE: 129 MMHG | OXYGEN SATURATION: 100 % | RESPIRATION RATE: 18 BRPM | DIASTOLIC BLOOD PRESSURE: 90 MMHG | HEART RATE: 99 BPM | TEMPERATURE: 97 F
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
## 2024-04-02 DIAGNOSIS — S09.90XA UNSPECIFIED INJURY OF HEAD, INITIAL ENCOUNTER: ICD-10-CM

## 2024-04-02 DIAGNOSIS — Z91.018 ALLERGY TO OTHER FOODS: ICD-10-CM

## 2024-04-02 DIAGNOSIS — R51.9 HEADACHE, UNSPECIFIED: ICD-10-CM

## 2024-04-02 DIAGNOSIS — F31.9 BIPOLAR DISORDER, UNSPECIFIED: ICD-10-CM

## 2024-04-02 DIAGNOSIS — Z91.013 ALLERGY TO SEAFOOD: ICD-10-CM

## 2024-04-02 DIAGNOSIS — E11.9 TYPE 2 DIABETES MELLITUS WITHOUT COMPLICATIONS: ICD-10-CM

## 2024-04-02 DIAGNOSIS — F79 UNSPECIFIED INTELLECTUAL DISABILITIES: ICD-10-CM

## 2024-04-02 PROCEDURE — 99284 EMERGENCY DEPT VISIT MOD MDM: CPT

## 2024-04-02 RX ORDER — ACETAMINOPHEN 500 MG
650 TABLET ORAL ONCE
Refills: 0 | Status: COMPLETED | OUTPATIENT
Start: 2024-04-02 | End: 2024-04-02

## 2024-04-02 NOTE — ED ADULT NURSE NOTE - NURSING MUSC STRENGTH
Spoke w/patient. Patient stated she could not afford the cost of new rx verapamil 300mg. She prefers to continue taking verapamil 240mg and will discuss other treatments options at her office visit if md agrees.   hand grasp, leg strength strong and equal bilaterally

## 2024-04-02 NOTE — ED ADULT NURSE NOTE - NSFALLUNIVINTERV_ED_ALL_ED
Bed/Stretcher in lowest position, wheels locked, appropriate side rails in place/Call bell, personal items and telephone in reach/Instruct patient to call for assistance before getting out of bed/chair/stretcher/Non-slip footwear applied when patient is off stretcher/[place] to call system/Physically safe environment - no spills, clutter or unnecessary equipment/Purposeful proactive rounding/Room/bathroom lighting operational, light cord in reach
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
## 2024-04-02 NOTE — ED PROVIDER NOTE - NSFOLLOWUPINSTRUCTIONS_ED_ALL_ED_FT
Rest, drink plenty of fluids.  Advance activity as tolerated.  Continue all previously prescribed medications as directed.  Follow up with your primary care physician in 48-72 hours- bring copies of your results.  Return to the ER for worsening or persistent symptoms, and/or ANY NEW OR CONCERNING SYMPTOMS. If you have issues obtaining follow up, please call: [phone] to obtain a doctor or specialist who takes your insurance in your area.  You may call [phone] to make an appointment with the internal medicine clinic.
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
## 2024-04-02 NOTE — ED PROVIDER NOTE - PHYSICAL EXAMINATION
GEN: Awake, alert, interactive, NAD. Walking around , requesitng for food.   HEAD AND NECK: NC/AT. Airway patent. Neck supple.   EYES:  Clear b/l. EOMI. PERRL.   ENT: Moist mucus membranes. no facial tenderness. No edema, no bruising, no crepitus.   CARDIAC: Regular rate, regular rhythm. No evident pedal edema.    RESP/CHEST: Normal respiratory effort with no use of accessory muscles or retractions. Clear throughout on auscultation.  ABD: soft, non-distended, non-tender. No rebound, no guarding.   BACK: No midline spinal TTP. No CVAT.   EXTREMITIES: Moving all extremities with no apparent deformities.   SKIN: Warm, dry, intact normal color. No rash.   NEURO: AOx3, CN II-XII grossly intact, no focal deficits. Ambulatory with steady gait.   PSYCH: Appropriate mood and affect.

## 2024-04-02 NOTE — ED PROVIDER NOTE - PATIENT PORTAL LINK FT
You can access the FollowMyHealth Patient Portal offered by [facility] by registering at the following website: [email or web address]. By joining [employer]’s FollowMyHealth portal, you will also be able to view your health information using other applications (apps) compatible with our system.

## 2024-04-03 ENCOUNTER — EMERGENCY (EMERGENCY)
Facility: HOSPITAL | Age: 34
LOS: 0 days | Discharge: HOME | End: 2024-04-03
Attending: STUDENT IN AN ORGANIZED HEALTH CARE EDUCATION/TRAINING PROGRAM
Payer: MEDICAID

## 2024-04-03 VITALS
RESPIRATION RATE: 18 BRPM | WEIGHT: 266.1 LBS | TEMPERATURE: 98 F | DIASTOLIC BLOOD PRESSURE: 89 MMHG | SYSTOLIC BLOOD PRESSURE: 134 MMHG | HEIGHT: 62 IN | OXYGEN SATURATION: 100 % | HEART RATE: 97 BPM

## 2024-04-03 VITALS
OXYGEN SATURATION: 100 % | SYSTOLIC BLOOD PRESSURE: 125 MMHG | DIASTOLIC BLOOD PRESSURE: 81 MMHG | TEMPERATURE: 98 F | HEART RATE: 94 BPM | RESPIRATION RATE: 18 BRPM

## 2024-04-03 DIAGNOSIS — F79 UNSPECIFIED INTELLECTUAL DISABILITIES: ICD-10-CM

## 2024-04-03 DIAGNOSIS — M79.602 PAIN IN LEFT ARM: ICD-10-CM

## 2024-04-03 DIAGNOSIS — F31.9 BIPOLAR DISORDER, UNSPECIFIED: ICD-10-CM

## 2024-04-03 DIAGNOSIS — Z91.018 ALLERGY TO OTHER FOODS: ICD-10-CM

## 2024-04-03 DIAGNOSIS — R62.50 UNSPECIFIED LACK OF EXPECTED NORMAL PHYSIOLOGICAL DEVELOPMENT IN CHILDHOOD: ICD-10-CM

## 2024-04-03 DIAGNOSIS — Z91.013 ALLERGY TO SEAFOOD: ICD-10-CM

## 2024-04-03 PROCEDURE — 99283 EMERGENCY DEPT VISIT LOW MDM: CPT

## 2024-04-03 RX ORDER — ACETAMINOPHEN 500 MG
975 TABLET ORAL ONCE
Refills: 0 | Status: COMPLETED | OUTPATIENT
Start: 2024-04-03 | End: 2024-04-03

## 2024-04-03 RX ORDER — IBUPROFEN 200 MG
600 TABLET ORAL ONCE
Refills: 0 | Status: COMPLETED | OUTPATIENT
Start: 2024-04-03 | End: 2024-04-03

## 2024-04-03 RX ADMIN — Medication 600 MILLIGRAM(S): at 18:27

## 2024-04-03 RX ADMIN — Medication 975 MILLIGRAM(S): at 20:00

## 2024-04-03 NOTE — ED ADULT TRIAGE NOTE - CHIEF COMPLAINT QUOTE
as per patient " yesterday at [facility] received a IM injections in my left arm, and now I have  a lot of pain"

## 2024-04-03 NOTE — ED PROVIDER NOTE - OBJECTIVE STATEMENT
32 y/o F hx of intellectual disability, developmental delay, bipolar presents for arm pain. states she received IM injection at [facility] earlier today. denies other complaints. requesting food. denies nausea/vomiting. denies fever/chills. denies chest pain/sob. denies abdominal pain.

## 2024-04-03 NOTE — ED PROVIDER NOTE - CARE PROVIDER_API CALL
Sonu Lopez  Internal Medicine  [address] [place] [address]  Phone: [phone]  Fax: [phone]  Follow Up Time: 7-10 Days

## 2024-04-03 NOTE — ED PROVIDER NOTE - CARE PROVIDERS DIRECT ADDRESSES
,[email or web address]

## 2024-04-03 NOTE — ED ADULT NURSE NOTE - OBJECTIVE STATEMENT
as per patient " yesterday at [facility] received a IM injections in my left arm, and now I have  a lot of pain"    patient alert and oriented x4, came in for arm pain. pt states yesterday she was at [facility] yesterday patient alert and oriented x4, came in for arm pain. pt states yesterday she was at [facility] where she got an IM injection in her right arm which now is causing lots of pain. upon assessment right arm no redness, swelling, inflammation. pt OOB independent with steady gait. pt is seen here frequently interacting with staff, with history of schizophrenia, asthma, developmental delay, bipolar. pt from group home.

## 2024-04-03 NOTE — ED ADULT NURSE NOTE - NSFALLRISKINTERV_ED_ALL_ED
Communicate fall risk and risk factors to all staff, patient, and family/Provide visual cue: yellow wristband, yellow gown, etc/Reinforce activity limits and safety measures with patient and family/Use of alarms - bed, stretcher, chair and/or video monitoring/Call bell, personal items and telephone in reach/Instruct patient to call for assistance before getting out of bed/chair/stretcher/Non-slip footwear applied when patient is off stretcher/[place] to call system/Physically safe environment - no spills, clutter or unnecessary equipment/Purposeful Proactive Rounding/Room/bathroom lighting operational, light cord in reach

## 2024-04-03 NOTE — ED PROVIDER NOTE - NSFOLLOWUPINSTRUCTIONS_ED_ALL_ED_FT
Return to emergency department if symptoms worsen, worsening swelling, pain, numbness/tingling.     followup with primary care doctor in next 7 days.

## 2024-04-03 NOTE — ED PROVIDER NOTE - CLINICAL SUMMARY MEDICAL DECISION MAKING FREE TEXT BOX
32 y/o F hx of intellectual disability, developmental delay, bipolar presents for arm pain. states she received IM injection at [facility] earlier today. denies other complaints. requesting food. denies nausea/vomiting. denies fever/chills. denies chest pain/sob. denies abdominal pain.   full ROm of the LUe, no erythema, no crepitus over L lateral deltoid where she received the IM injection.   will give pain meds and reassess. radial pulse 2+, compartments of the LUE are soft. 34 y/o F hx of intellectual disability, developmental delay, bipolar presents for arm pain. states she received IM injection at [facility] earlier today. denies other complaints. requesting food. denies nausea/vomiting. denies fever/chills. denies chest pain/sob. denies abdominal pain.   full ROm of the LUe, no erythema, no crepitus over L lateral deltoid where she received the IM injection.   will give pain meds and reassess. radial pulse 2+, compartments of the LUE are soft.    patient resting comfortably, spoke to vandana [phone], who is aware patient will be returning to facility. all questions answered.

## 2024-04-03 NOTE — ED PROVIDER NOTE - PATIENT PORTAL LINK FT
You can access the FollowMyHealth Patient Portal offered by [facility] by registering at the following website: [email or web address]. By joining [employer]’s FollowMyHealth portal, you will also be able to view your health information using other applications (apps) compatible with our system.

## 2024-04-03 NOTE — ED ADULT NURSE NOTE - CHIEF COMPLAINT QUOTE
as per patient " yesterday at [facility] received a IM injections in my left arm, and now I have  a lot of pain"

## 2024-04-05 ENCOUNTER — EMERGENCY (EMERGENCY)
Facility: HOSPITAL | Age: 34
LOS: 1 days | Discharge: HOME | End: 2024-04-05
Admitting: EMERGENCY MEDICINE
Payer: MEDICAID

## 2024-04-05 VITALS
SYSTOLIC BLOOD PRESSURE: 116 MMHG | DIASTOLIC BLOOD PRESSURE: 60 MMHG | OXYGEN SATURATION: 100 % | HEART RATE: 94 BPM | RESPIRATION RATE: 16 BRPM | HEIGHT: 62 IN | TEMPERATURE: 98 F

## 2024-04-05 PROCEDURE — 99284 EMERGENCY DEPT VISIT MOD MDM: CPT

## 2024-04-05 RX ORDER — CHLORPROMAZINE HCL 10 MG
100 TABLET ORAL ONCE
Refills: 0 | Status: COMPLETED | OUTPATIENT
Start: 2024-04-05 | End: 2024-04-05

## 2024-04-05 RX ADMIN — Medication 100 MILLIGRAM(S): at 23:04

## 2024-04-05 NOTE — ED PROVIDER NOTE - PATIENT PORTAL LINK FT
You can access the FollowMyHealth Patient Portal offered by [facility] by registering at the following website: [email or web address]. By joining [employer]’s FollowMyHealth portal, you will also be able to view your health information using other applications (apps) compatible with our system.
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
## 2024-04-05 NOTE — ED BEHAVIORAL HEALTH NOTE - BEHAVIORAL HEALTH NOTE
SW outreached to pt's group home, [facility] ([phone]) but was unable to leave voicemail. Writer will continue to f/u. SW outreached to pt's group home, [facility] ([phone]) . Writer left a  requesting a return call. ROCIO outreached to pt's group home, [facility] ([phone]) . Writer left a  requesting a return call.    as per request of provider, writer contacted [facility] residence [address]  [phone] and spoke w/ MARTINA chávez.     Pt is a 34 yo female domiciled at residence hx of intellectual disability, bib ems.    Reason for ed visit: pt went to phone to call her aunt. aunt didn't  so she call 911.     Symptoms/hx: pt did not endorse any SI/Hi/AVH.  She reports pt frequently visits hospital often.  She says pt is sleeping , eating and showering at baseline. Pt has a hx of elopement.    Baseline: at baseline she visits the hospital and does elope from the residence.    Stressors: unknown.    Drug/alcohol use: none reported.      Medical problems: she said it listed on pt's face sheet.    Medication: compliant w/ medication when home.    Treatment team: no day program currently.     Family hx: unknown.     Violence/aggression: not violent or aggressive currently. no access to firearms or weapons.    Dispo: pt can return via ambulance when cleared for discharge. Pt’s address is [address] [place] [address]. ROCIO outreached to pt's group home, [facility] ([phone]) . Writer left a  requesting a return call.    as per request of provider, writer contacted [address]  [phone] and spoke w/ MARTINA chávez.     Pt is a 34 yo female domiciled at residence hx of intellectual disability, bib ems.    Reason for ed visit: pt went to phone to call her aunt. aunt didn't  so she call 911.     Symptoms/hx: pt did not endorse any SI/Hi/AVH.  She reports pt frequently visits hospital often.  She says pt is sleeping , eating and showering at baseline. Pt has a hx of elopement.    Baseline: at baseline she visits the hospital and does elope from the residence.    Stressors: unknown.    Drug/alcohol use: none reported.      Medical problems: she said it listed on pt's face sheet.    Medication: compliant w/ medication when home.    Treatment team: no day program currently.     Family hx: unknown.     Violence/aggression: not violent or aggressive currently. no access to firearms or weapons.    Dispo: pt can return via ambulance when cleared for discharge. Pt’s address is [address] [place] [address].    Per provider, TEDDY Tate, patient is cleared and is able to return to their previous residence, [facility].  has spoken to MARTINA CHÁVEZ,  confirmed that patients mode of transportation is , AMBULANCE and that patient travels WITH SUPERVISION. Clinical provider is in agreement with  AMBULANCE back to custodial. Verbal huddle regarding coordination of care completed with interdisciplinary team.   Transportation coordinated via nursing.    pt's address is [address] [place] [address]. ROCIO outreached to pt's group home, [facility] ([phone]) . Writer left a  requesting a return call.    as per request of provider, writer contacted [facility] [address] [phone] and spoke w/ MARTINA chávez.     Pt is a 32 yo female domiciled at residence hx of intellectual disability, bib ems.    Reason for ed visit: pt went to phone to call her aunt. aunt didn't  so she call 911.     Symptoms/hx: pt did not endorse any SI/Hi/AVH.  She reports pt frequently visits hospital often.  She says pt is sleeping , eating and showering at baseline. Pt has a hx of elopement.    Baseline: at baseline she visits the hospital and does elope from the residence.    Stressors: unknown.    Drug/alcohol use: none reported.      Medical problems: she said it listed on pt's face sheet.    Medication: compliant w/ medication when home.    Treatment team: no day program currently.     Family hx: unknown.     Violence/aggression: not violent or aggressive currently. no access to firearms or weapons.    Dispo: pt can return via ambulance when cleared for discharge. Pt’s address is [address] [place] [address].    Per provider, TEDDY Tate, patient is cleared and is able to return to their previous residence, [facility].  has spoken to MARTINA CHÁVEZ,  confirmed that patients mode of transportation is , AMBULANCE and that patient travels WITH SUPERVISION. Clinical provider is in agreement with  AMBULANCE back to intermediate. Verbal huddle regarding coordination of care completed with interdisciplinary team.   Transportation coordinated via nursing.    pt's address is [address] [place] [address].

## 2024-04-05 NOTE — ED ADULT TRIAGE NOTE - PATIENT ON (OXYGEN DELIVERY METHOD)
2021         RE:  :  MRN: Janeth Sanchez  1939  [id number]     Dear Dr. Andre,    Your patient, Janeth Sanchez, returned for oculoplastic follow up. My assessment and plan are below.      Chief Complaint(s) and History of Present Illness(es)     Post Op (Ophthalmology) Right Eye     Laterality: right eye              Comments     S/P Entropion repair, right lower lid, 2021. Patient no longer   using erythromycin ointment. Notes some redness at the incision sight   still, but acknowledged this was probably normal due to how recent the   procedure was. Patient has been happy with the results and has no concerns   for today.       ---  Agree with above.   Doing well postop right lower eyelid entropion repair. Happy with outcome. Eye is very comfortable.   - EES anthony or Vaseline to incision qhs  - continue warm packs until bruising resolves.  - can use ice if any trouble with edema.     Return to clinic as needed. Normally sees Dr. Andre at [facility].   Again, thank you for allowing me to participate in the care of your patient.      Sincerely,    Columba Gonzáles MD    Oculoplastic and Orbital Surgery   Department of Ophthalmology and Visual Neurosciences  [facility]                CC: Chin Andre MD  [facility]  [address]  [facility] [address]  Via In Basket      
room air

## 2024-04-05 NOTE — ED PROVIDER NOTE - PROGRESS NOTE DETAILS
Dr. Patel: Patient was discharged prior to shift change.  Transport has been set up with EMS back to patient's group home.  However group home not answering phone numbers hence patient could not be transported back.  Will try again in the a.m. Patient in no distress and vitals remained stable.
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
## 2024-04-05 NOTE — ED PROVIDER NOTE - CLINICAL SUMMARY MEDICAL DECISION MAKING FREE TEXT BOX
Medical evaluation performed. There is no clinical evidence of intoxication or any acute medical problem requiring immediate intervention. SW consulted. D/C to Group  Home via EMS

## 2024-04-06 VITALS
DIASTOLIC BLOOD PRESSURE: 67 MMHG | OXYGEN SATURATION: 100 % | RESPIRATION RATE: 16 BRPM | TEMPERATURE: 98 F | SYSTOLIC BLOOD PRESSURE: 103 MMHG | HEART RATE: 102 BPM
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
## 2024-04-06 NOTE — PROVIDER CONTACT NOTE (OTHER) - SITUATION
Notified by RN that patient has been discharged for several hours but SW was unable to get in touch with group home ([phone]).
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
## 2024-04-06 NOTE — PROVIDER CONTACT NOTE (OTHER) - BACKGROUND
received handoff [facility] EMS transport is on standby until able to communicate with group home.

## 2024-04-06 NOTE — ED BEHAVIORAL HEALTH NOTE - BEHAVIORAL HEALTH NOTE
The patient is cleared for discharge back to [facility]. SW called ([phone]) and spoke with Wilton who confirmed the patient should return to [address] [place], [place] with supervision. RN arranged for transport via [facility].   Verbal huddle regarding coordination of care with interdisciplinary team completed.

## 2024-04-06 NOTE — ED ADULT NURSE REASSESSMENT NOTE - NS ED NURSE REASSESS COMMENT FT1
RN Rounding Note 1:15am- Pt received +benefit from medication; pt now sleeping; awaiting SW to contact pts group home to notify of pending return. none

## 2024-04-06 NOTE — ED ADULT NURSE REASSESSMENT NOTE - NS ED NURSE REASSESS COMMENT FT1
EMS called to notify that they were able to reach [facility] group home, SW to confirm with group home.

## 2024-04-06 NOTE — PROVIDER CONTACT NOTE (OTHER) - ASSESSMENT
made multiple attempts to contact group [facility] at [phone], and was unable to reach anyone. Discharge pending transportation until able to communicate with group [facility] residence.

## 2024-04-10 ENCOUNTER — EMERGENCY (EMERGENCY)
Facility: HOSPITAL | Age: 34
LOS: 1 days | Discharge: NURSING, REHAB OR LONG-TERM CARE | End: 2024-04-10
Attending: EMERGENCY MEDICINE
Payer: MEDICAID

## 2024-04-10 ENCOUNTER — EMERGENCY (EMERGENCY)
Facility: HOSPITAL | Age: 34
LOS: 0 days | Discharge: HOME | End: 2024-04-10
Attending: STUDENT IN AN ORGANIZED HEALTH CARE EDUCATION/TRAINING PROGRAM
Payer: MEDICAID

## 2024-04-10 VITALS
DIASTOLIC BLOOD PRESSURE: 80 MMHG | TEMPERATURE: 98 F | HEART RATE: 107 BPM | RESPIRATION RATE: 18 BRPM | SYSTOLIC BLOOD PRESSURE: 116 MMHG | OXYGEN SATURATION: 100 %

## 2024-04-10 VITALS
RESPIRATION RATE: 16 BRPM | HEART RATE: 95 BPM | TEMPERATURE: 98 F | SYSTOLIC BLOOD PRESSURE: 120 MMHG | HEIGHT: 62 IN | DIASTOLIC BLOOD PRESSURE: 87 MMHG | WEIGHT: 220.02 LBS | OXYGEN SATURATION: 100 %

## 2024-04-10 VITALS
TEMPERATURE: 98 F | HEIGHT: 62 IN | WEIGHT: 265 LBS | HEART RATE: 109 BPM | DIASTOLIC BLOOD PRESSURE: 67 MMHG | SYSTOLIC BLOOD PRESSURE: 93 MMHG | RESPIRATION RATE: 18 BRPM | OXYGEN SATURATION: 99 %

## 2024-04-10 DIAGNOSIS — M79.662 PAIN IN LEFT LOWER LEG: ICD-10-CM

## 2024-04-10 DIAGNOSIS — F31.9 BIPOLAR DISORDER, UNSPECIFIED: ICD-10-CM

## 2024-04-10 DIAGNOSIS — F79 UNSPECIFIED INTELLECTUAL DISABILITIES: ICD-10-CM

## 2024-04-10 DIAGNOSIS — Z91.018 ALLERGY TO OTHER FOODS: ICD-10-CM

## 2024-04-10 DIAGNOSIS — Z91.013 ALLERGY TO SEAFOOD: ICD-10-CM

## 2024-04-10 PROCEDURE — 99283 EMERGENCY DEPT VISIT LOW MDM: CPT

## 2024-04-10 PROCEDURE — 99284 EMERGENCY DEPT VISIT MOD MDM: CPT

## 2024-04-10 RX ORDER — IBUPROFEN 200 MG
400 TABLET ORAL ONCE
Refills: 0 | Status: COMPLETED | OUTPATIENT
Start: 2024-04-10 | End: 2024-04-10

## 2024-04-10 RX ADMIN — Medication 400 MILLIGRAM(S): at 09:15

## 2024-04-10 RX ADMIN — Medication 400 MILLIGRAM(S): at 08:15

## 2024-04-10 NOTE — ED PROVIDER NOTE - BIRTH SEX
Case Management Discharge Note      Final Note: DC home    Provided Post Acute Provider List?: N/A  Provided Post Acute Provider Quality & Resource List?: N/A    Selected Continued Care - Discharged on 7/10/2021 Admission date: 7/8/2021 - Discharge disposition: Home or Self Care    Destination    No services have been selected for the patient.              Durable Medical Equipment    No services have been selected for the patient.              Dialysis/Infusion    No services have been selected for the patient.              Home Medical Care    No services have been selected for the patient.              Therapy    No services have been selected for the patient.              Community Resources    No services have been selected for the patient.              Community & DME    No services have been selected for the patient.                       Final Discharge Disposition Code: 01 - home or self-care   Female

## 2024-04-10 NOTE — ED ADULT TRIAGE NOTE - CHIEF COMPLAINT QUOTE
stared too hard into the light, now having eye pain. extensive psych history stared too hard into the light, now having eye pain. extensive psych history. denies SI/ HI

## 2024-04-10 NOTE — ED ADULT NURSE NOTE - OBJECTIVE STATEMENT
patient, recently dcd from [facility], came in complaining of lower leg pain stating, "I want motrin and breakfast."

## 2024-04-10 NOTE — ED PROVIDER NOTE - NSFOLLOWUPINSTRUCTIONS_ED_ALL_ED_FT
Eloise was seen in the ED for leg pain.    Her exam was normal.    She received motrin for pain.    She can follow up with her primary doctor.

## 2024-04-10 NOTE — ED PROVIDER NOTE - PROGRESS NOTE DETAILS
Gregory Fajardo MD PGY1: Patient reassessed, stable. UR contacted group home confirming patient resides there. Non-emergent transport set up. Patient to be discharged.

## 2024-04-10 NOTE — ED ADULT TRIAGE NOTE - CHIEF COMPLAINT QUOTE
pt c/o upper right leg pain and right side buttocks pain since this morning. states she slept on the right side of her body last night. history of  lives in a group home.

## 2024-04-10 NOTE — ED PROVIDER NOTE - PATIENT PORTAL LINK FT
You can access the FollowMyHealth Patient Portal offered by [facility] by registering at the following website: [email or web address]. By joining [employer]’s FollowMyHealth portal, you will also be able to view your health information using other applications (apps) compatible with our system.

## 2024-04-10 NOTE — ED PROVIDER NOTE - PHYSICAL EXAMINATION
GENERAL: Awake, alert, NAD  HEENT: NC/AT, moist mucous membranes  LUNGS: CTAB, no wheezes or crackles   CARDIAC: RRR, no m/r/g  ABDOMEN: Soft, non tender, non distended, no rebound, no guarding  EXT: No edema, no calf tenderness, 2+ DP pulses bilaterally, no deformities.  NEURO: A&Ox3. Moving all extremities.  SKIN: Warm and dry. No rash.  PSYCH: Normal affect.

## 2024-04-10 NOTE — ED PROVIDER NOTE - CLINICAL SUMMARY MEDICAL DECISION MAKING FREE TEXT BOX
Gregory Fajardo MD, PGY1: Patient medical screening exam performed and unremarkable. No evidence of seizure. Patient neurologically intact, normal gait. UCG negative. Hemodynamically stable. Afebrile without systemic signs of infectious process. No clinical evidence of urgent medical condition. Denies SI, HI, AVH. No evidence of clinical intoxication or acute medical condition. Will plan for DC to group home pending transport.

## 2024-04-10 NOTE — ED PROVIDER NOTE - OBJECTIVE STATEMENT
34 y/o F hx of intellectual disability, developmental delay, bipolar presents to the ED c/o L leg pain. Patient well known to this provider and ER for multiple recent visits. States she woke up with some R leg pain. No fall or trauma. Patient is requesting motrin and breakfast. No fever, chills, leg swelling, numbness/tingling, or other acute symptoms.

## 2024-04-10 NOTE — ED PROVIDER NOTE - PHYSICAL EXAMINATION
CONSTITUTIONAL: NAD  SKIN: Warm dry, normal skin turgor  HEENT: NCAT, oropharynx clear without lateral tongue biting  EYES: EOMI, PERRLA, no scleral icterus, conjunctiva pink  NECK: Supple; non tender. Full ROM.  CARD: RRR  RESP: No respiratory distress  ABD: non-distended  MSK: Full ROM, no leg swelling  Neuro: AxO3, no focal deficits, normal gait  PSYCH: Cooperative

## 2024-04-10 NOTE — ED PROVIDER NOTE - CLINICAL SUMMARY MEDICAL DECISION MAKING FREE TEXT BOX
34 y/o F hx of intellectual disability, developmental delay, bipolar presents to the ED c/o L leg pain.  Vitals stable.  Patient is well appearing in NAD.  At baseline mental status.  Moving all extremities. No swelling or tenderness.  Requesting food.  Will treat w/ motrin.  Discharge.

## 2024-04-10 NOTE — ED PROVIDER NOTE - PATIENT PORTAL LINK FT
You can access the FollowMyHealth Patient Portal offered by [facility] by registering at the following website: [email or web address]. By joining [employer]’s FollowMyHealth portal, you will also be able to view your health information using other applications (apps) compatible with our system.

## 2024-04-10 NOTE — ED PROVIDER NOTE - NSFOLLOWUPINSTRUCTIONS_ED_ALL_ED_FT
You were seen in the ER for medical evaluation for self-reported seizures. Your medical exam showed no evidence of seizure like activity or trauma. You were stable under our observation.    Follow up with your primary physician in 1-2 days. If needed call [phone] to find a primary care physician or call  [phone] to schedule an appointment with the general medicine.    1. TAKE ALL MEDICATIONS AS DIRECTED.    2. FOR PAIN OR FEVER YOU CAN TAKE IBUPROFEN (MOTRIN, ADVIL) OR ACETAMINOPHEN (TYLENOL) AS NEEDED, AS DIRECTED ON PACKAGING.  3. FOLLOW UP WITH YOUR PRIMARY DOCTOR WITHIN 5 DAYS AS DIRECTED.  4. IF YOU HAD LABS OR IMAGING DONE, YOU WERE GIVEN COPIES OF ALL LABS AND/OR IMAGING RESULTS FROM YOUR ER VISIT--PLEASE TAKE THEM WITH YOU TO YOUR FOLLOW UP APPOINTMENTS.  5. RETURN TO THE ER FOR ANY WORSENING SYMPTOMS OR CONCERNS..

## 2024-04-10 NOTE — ED PROVIDER NOTE - ATTENDING CONTRIBUTION TO CARE
I performed a history and physical exam of the patient and discussed their management with the resident. I reviewed the resident's note and agree with the documented findings and plan of care.  Shonna Mejia MD

## 2024-04-10 NOTE — ED PROVIDER NOTE - OBJECTIVE STATEMENT
33-year-old female history of bipolar on Abilify, developmental delay presenting to the emergency department brought in by EMS for self endorsed complaints of "5 seizures" which she describes as staring at her well-known her window.  Patient reports she was completely aware of the whole time, no incontinence or tongue biting.  Never had change in mental status or full body shaking.  Also reporting that she is pregnant with "9 babies" at "8 months" gestation.  Presenting for medical evaluation denies fever/chills, chest pain, shortness of breath, abdominal pain, nausea/vomiting/diarrhea. Denies SI, HI, AVH.

## 2024-04-11 ENCOUNTER — EMERGENCY (EMERGENCY)
Facility: HOSPITAL | Age: 34
LOS: 1 days | Discharge: OTHER INSTITUTION | End: 2024-04-11
Attending: EMERGENCY MEDICINE | Admitting: EMERGENCY MEDICINE
Payer: MEDICAID

## 2024-04-11 VITALS
OXYGEN SATURATION: 99 % | DIASTOLIC BLOOD PRESSURE: 72 MMHG | RESPIRATION RATE: 18 BRPM | HEIGHT: 62 IN | SYSTOLIC BLOOD PRESSURE: 114 MMHG | TEMPERATURE: 98 F | HEART RATE: 103 BPM

## 2024-04-11 VITALS
DIASTOLIC BLOOD PRESSURE: 81 MMHG | SYSTOLIC BLOOD PRESSURE: 129 MMHG | HEART RATE: 82 BPM | OXYGEN SATURATION: 99 % | RESPIRATION RATE: 17 BRPM | TEMPERATURE: 98 F

## 2024-04-11 VITALS
TEMPERATURE: 99 F | SYSTOLIC BLOOD PRESSURE: 137 MMHG | HEART RATE: 99 BPM | RESPIRATION RATE: 18 BRPM | DIASTOLIC BLOOD PRESSURE: 81 MMHG | OXYGEN SATURATION: 100 %

## 2024-04-11 PROCEDURE — 99283 EMERGENCY DEPT VISIT LOW MDM: CPT

## 2024-04-11 NOTE — ED PROVIDER NOTE - PROGRESS NOTE DETAILS
Saint Tunde, DO (PGY1): spoke with group [facility]. Patient was complaining of not having had food yesterday and was experiencing abdominal discomfort as a result. Patient requested that 911 be called, stating that she was not being fed. Saint Tunde, DO (PGY1): patient had food. Social work contacted. Will come see patient and set up nonemergent transport back to [facility]

## 2024-04-11 NOTE — CHART NOTE - NSCHARTNOTEFT_GEN_A_CORE
was informed by provider that patient is discharged and needs an ambulance for discharge home. Provider also stated patient reported group home staff is not providing her with food to eat.      Patient resides in [facility], [address] [facility].  spoke to , Tiffany ([phone]), and she reported patient had breakfast in the home this morning. She stated patient likes the hospital because she (patient) can get the food she wants.      Social work met with patient at bedside. She denied eating breakfast at home this morning and stated the staff offered food but wasn’t food she wanted. She asked  to be discharged back to the home after she’s had lunch.      contacted [facility] ([phone]) and scheduled ambulance. RN was provided with the phone number to [facility] to call and answer safety questions. Provider was informed of above. No further social work intervention needed.

## 2024-04-11 NOTE — ED ADULT NURSE NOTE - CHIEF COMPLAINT QUOTE
form [facility] group home # [phone] for feeling of nausea and hunger, reports not adarsh fed at group home. Phx of DM, Schizophrenia, Pt is calm and cooperative in triage, denies S/I, H/I, A/V/H.

## 2024-04-11 NOTE — ED ADULT NURSE NOTE - OBJECTIVE STATEMENT
Pt received to room 6. Pt is A&Ox3, ambulatory, Hx of DM, asthma, bipolar disorder, schizophrenia, intellectual disability. Pt presents to ED c/o hunger. Pt reports she is not being fed at group home. no complaints of chest pain, SOB, headache, dizziness, abdominal pain, n/v/d, urinary symptoms, fevers/chills, numbness/tingling verbalized. breathing is even and unlabored. VS as noted. plan of care ongoing. stretcher set in lowest position, call bell within reach, safety maintained.

## 2024-04-11 NOTE — ED ADULT TRIAGE NOTE - CHIEF COMPLAINT QUOTE
form [facility] group home # [phone] for feeling of nausea and hunger, reports not adarsh fed at group home. Phx of DM, Schizophrenia, Pt is calm and cooperative in triage, denies S/I, H/I, A/V/H.

## 2024-04-11 NOTE — ED ADULT NURSE NOTE - NS ED NURSE LEVEL OF CONSCIOUSNESS SPEECH
Patient was at the park, playing on monkey bars. He missed the bar he was trying to reach for and fell on the lower bar Speaking Coherently

## 2024-04-11 NOTE — ED PROVIDER NOTE - CLINICAL SUMMARY MEDICAL DECISION MAKING FREE TEXT BOX
33-year-old female, history of bipolar disorder, intellectual delay, diabetes, presents the ED today for hunger.  Patient states that she has not been given food at her group home and would like to be fed.  She is currently denying any symptoms including nausea (despite triage note), vomiting, abdominal pain, urinary symptoms. Patient vitals are stable.  On exam, patient is lying on stretcher in no acute distress, requesting food.  Normal heart sounds, lungs are clear to auscultation bilaterally.  Abdomen is soft and nontender. Will give good and contact social work. Patient to be d/c'ed back to group home.

## 2024-04-11 NOTE — ED ADULT NURSE NOTE - OBJECTIVE STATEMENT
pt is 33y F, pmhx bipolar disorder on Abilify, developmental delays, BIBEMS from group home for unwitnessed seizures, pt states she was "staring at window for 5mins", pt denies LOC or head strike or any injuries, pt A&Ox2 person and place, speech rapid, and pt proclaims that she is "pregnant with 9 babies and getting  in [place] soon", pt updated on plan of care, given food, comfort and safety secured

## 2024-04-11 NOTE — ED PROVIDER NOTE - OBJECTIVE STATEMENT
33-year-old female, history of bipolar disorder, intellectual delay, diabetes, presents the ED today for hunger.  Patient states that she has not been given food at her group home and would like to be fed.  She is currently denying any symptoms including nausea (despite triage note), vomiting, abdominal pain, urinary symptoms. On arrival, /68, RR 17, HR 95, temp 98.4, SpO2 100% on RA.

## 2024-04-11 NOTE — ED PROVIDER NOTE - NSFOLLOWUPINSTRUCTIONS_ED_ALL_ED_FT
You were seen in the emergency department for hunger. We gave you some food, after which you felt better.    PLEASE RETURN TO THE EMERGENCY DEPARTMENT if you experience worsening of your symptoms or any of the following: fever, uncontrollable headache, loss of consciousness, chest pain, difficulty breathing, uncontrollable nausea/vomiting, weakness/numbness/tingling.    We hope you feel better! Thank you for trusting us with your care!    Thank you for trusting us with your care!

## 2024-04-11 NOTE — ED PROVIDER NOTE - ATTENDING CONTRIBUTION TO CARE
Attending Statement: I have personally seen and examined this patient. I have fully participated in the care of this patient. I have reviewed all pertinent clinical information, including history physical exam, plan and the Resident's note and agree except as noted  33-year-old female history of intellectual delay, bipolar disorder resides at a group home presented requesting to eat.  States "I wanted breakfast with ginger ale" denies any abdominal pain denies any vomiting any urinary complaints.  No fever no chills.  No SI.  Vital signs noted patient ambulatory walking in and out of room 6 asking for food.  Nontoxic-appearing.  ANO x 3.  Abdomen is soft nontender nondistended no CVA tenderness. steady gait  Plan fingerstick, p.o. trial and social work contact with mom for collateral

## 2024-04-11 NOTE — ED PROVIDER NOTE - PATIENT PORTAL LINK FT
You can access the FollowMyHealth Patient Portal offered by [facility] by registering at the following website: [email or web address]. By joining [facility] [employer]’s FollowMyHealth portal, you will also be able to view your health information using other applications (apps) compatible with our system.

## 2024-04-13 ENCOUNTER — EMERGENCY (EMERGENCY)
Facility: HOSPITAL | Age: 34
LOS: 1 days | Discharge: HOME | End: 2024-04-13
Admitting: STUDENT IN AN ORGANIZED HEALTH CARE EDUCATION/TRAINING PROGRAM
Payer: MEDICAID

## 2024-04-13 VITALS
HEART RATE: 94 BPM | DIASTOLIC BLOOD PRESSURE: 68 MMHG | OXYGEN SATURATION: 99 % | RESPIRATION RATE: 15 BRPM | TEMPERATURE: 98 F | SYSTOLIC BLOOD PRESSURE: 119 MMHG | HEIGHT: 62 IN

## 2024-04-13 PROCEDURE — 99284 EMERGENCY DEPT VISIT MOD MDM: CPT

## 2024-04-13 RX ORDER — ACETAMINOPHEN 500 MG
975 TABLET ORAL ONCE
Refills: 0 | Status: COMPLETED | OUTPATIENT
Start: 2024-04-13 | End: 2024-04-13

## 2024-04-13 RX ORDER — IBUPROFEN 200 MG
600 TABLET ORAL ONCE
Refills: 0 | Status: COMPLETED | OUTPATIENT
Start: 2024-04-13 | End: 2024-04-13

## 2024-04-13 RX ADMIN — Medication 975 MILLIGRAM(S): at 22:11

## 2024-04-13 RX ADMIN — Medication 600 MILLIGRAM(S): at 22:11

## 2024-04-13 NOTE — ED ADULT NURSE NOTE - DRUG PRE-SCREENING (DAST -1)
HISTORY OF PRESENT ILLNESS  Odelia Najjar is a 82 y.o. female. She returns for follow-up with a history of pulsatile tinnitus and subsequent discovery of a left neck mass currently undergoing evaluation with head neck surgery. Also with a history of acquired hypothyroidism, hypertension, chronic obstructive pulmonary disease, memory difficulty-suspected Alzheimer's type dementia and chronic anxiety  The head and neck surgery consultant has recommended waiting a month and repeating the MRI to evaluate the status of the mass. The patient's family remain concerned about her memory loss which seems consistent with late onset Alzheimer's type dementia. Mr#: [id number]      Patient Active Problem List   Diagnosis Code    Essential hypertension I10    Acquired hypothyroidism E03.9         Current Outpatient Medications:     aspirin 81 mg chewable tablet, Take 81 mg by mouth., Disp: , Rfl:     hydrALAZINE (APRESOLINE) 25 mg tablet, Take 25 mg by mouth., Disp: , Rfl:     LORazepam (ATIVAN) 0.5 mg tablet, Take 0.5 mg by mouth., Disp: , Rfl:     FLUoxetine (PROZAC) 20 mg capsule, Take 20 mg by mouth., Disp: , Rfl:     losartan (COZAAR) 100 mg tablet, Take 100 mg by mouth., Disp: , Rfl:     levothyroxine (SYNTHROID) 75 mcg tablet, Take 75 mcg by mouth., Disp: , Rfl:     furosemide (LASIX) 20 mg tablet, Take 20 mg by mouth., Disp: , Rfl:     loratadine (CLARITIN) 10 mg tablet, Take 10 mg by mouth., Disp: , Rfl:     dilTIAZem CD (CARTIA XT) 240 mg ER capsule, Take 240 mg by mouth., Disp: , Rfl:      Allergies   Allergen Reactions    Pcn [Penicillins] Rash       Review of Systems   Constitutional: Negative for weight loss. HENT:        Persistent pulsatile tinnitus on the left   Eyes: Negative for blurred vision. Respiratory: Negative for shortness of breath. Cardiovascular: Negative for chest pain and palpitations. Gastrointestinal: Negative for abdominal pain, nausea and vomiting.    Neurological: Negative for speech change, focal weakness and headaches. Psychiatric/Behavioral: Positive for memory loss. Negative for depression. Visit Vitals  /76 (BP 1 Location: Right arm, BP Patient Position: Sitting, BP Cuff Size: Adult)   Pulse 60   Temp 97.5 °F (36.4 °C) (Temporal)   Resp 15   Ht 5' 2\" (1.575 m)   Wt 156 lb 9.6 oz (71 kg)   SpO2 98%   BMI 28.64 kg/m²       Physical Exam  Vitals signs and nursing note reviewed. Constitutional:       General: She is not in acute distress. Appearance: She is well-developed. She is not ill-appearing. HENT:      Head: Normocephalic. Neck:      Musculoskeletal: Neck supple. Cardiovascular:      Rate and Rhythm: Normal rate. Pulmonary:      Effort: Pulmonary effort is normal.   Skin:     General: Skin is warm and dry. Neurological:      Mental Status: She is alert and oriented to person, place, and time. Psychiatric:         Behavior: Behavior normal.         ASSESSMENT and PLAN    ICD-10-CM ICD-9-CM    1. Neck mass  R22.1 784.2 CBC WITH AUTOMATED DIFF   2. Pulsatile tinnitus of left ear  H93. A2 388.30 CBC WITH AUTOMATED DIFF   3. Essential hypertension  M48 797.4 METABOLIC PANEL, COMPREHENSIVE      LIPID PANEL   4. Acquired hypothyroidism  E03.9 244.9 TSH 3RD GENERATION      T4, FREE   5. Memory problem  R41.3 780.93    6. Chronic anxiety  F41.9 300.00    7. Chronic obstructive pulmonary disease, unspecified COPD type ([facility] [address]  J44.9 496    8.  Elevated vitamin B12 level  R74.8 790.99 VITAMIN B12   Health maintenance:  COVID-19 immunization recommended and completed  Consider osteoporosis screening via bone density scan  Consider Shingrix immunization to reduce risk of shingles available at the pharmacy    Family is interested in neurology evaluation of the patient's memory issues  They agree to check with her insurance carrier and call back with on plan neurology provider so that a referral can be generated  She will follow up with [facility] head and neck surgery in about a month to include a repeat MRI of her neck mass. Return for lab appointment followed by [facility] wellness appointment in January and sooner with any problems      Doni Stover MD      PLEASE NOTE:   This document has been produced using voice recognition software. Unrecognized errors in transcription may be present. Statement Selected

## 2024-04-13 NOTE — ED ADULT NURSE NOTE - OBJECTIVE STATEMENT
pt received in intake rm 9. pt presents from group home with c/o "rectum pain for 1 month". pt denies all other complaints. PMHx of schizophrenia, Bipolar. pt RR are even and unlabored. pt ambulating. Medicated as ordered. Safety maintained.

## 2024-04-13 NOTE — ED PROVIDER NOTE - PATIENT PORTAL LINK FT
You can access the FollowMyHealth Patient Portal offered by [facility] by registering at the following website: [email or web address]. By joining [employer]’s FollowMyHealth portal, you will also be able to view your health information using other applications (apps) compatible with our system.

## 2024-04-13 NOTE — CHART NOTE - NSCHARTNOTEFT_GEN_A_CORE
SW consulted in ED for discharge back to group home setting this evening. Patient is a "The patient is a 33y Female complaining of rectal pain. pmh bipolar, intellectual delay, dm from group home c/o low back/buttock pain."  Per PA, patient medically cleared to return back to group home setting tonight, requested SW to assist with dc from ED.    LMSW reviewed chart patient resides in Group Home: [facility] located at [address] [place], [place]. Contact for Group Home ([phone]) found in chart, LMSW called x4 to reach at staff member, messages left. LMSW also attempted to reach emergency contact: Naty Boyd [phone] to inquire about additional numbers for group home, message left.   Patient with recent ED Admission/ED Discharge on 4/11/24. LMSW reviewed chart, patient will require BLS Ambulance through [facility] -[phone] for transportation back to group home residence. Patient with Medicaid.     Efforts and dispo planning discussed with PA. LMSW to continue to with reaching out to group home for discharge from ED. SW consulted in ED for discharge back to group home setting this evening. Patient is a "The patient is a 33y Female complaining of rectal pain. pmh bipolar, intellectual delay, dm from group home c/o low back/buttock pain."  Per ACP-PA, patient medically cleared to return back to group home setting tonight, requested SW to assist with dc from ED.    LMSW reviewed chart patient resides in Group Home: [facility] located at [address] [place], [place]. Contact for Group Home ([phone]) found in chart, LMSW called x4 to reach at staff member, messages left. LMSW also attempted to reach emergency contact: Naty Boyd [phone] to inquire about additional numbers for group home, message left.   Patient with recent ED Admission/ED Discharge on 4/11/24. LMSW reviewed chart, patient will require BLS Ambulance through [facility] -[phone] for transportation back to group home residence. Patient with Medicaid.     Efforts and dispo planning discussed with ACP-PA. LMSW to continue to with reaching out to group home for discharge from ED. SW consulted in ED for discharge back to group home setting this evening. Patient is a "33y Female complaining of rectal pain. pmh bipolar, intellectual delay, dm from group home c/o low back/buttock pain."  Per ACP-PA, patient medically cleared to return back to group home setting tonight, requested SW to assist with dc from ED.    LMSW reviewed chart, patient resides in Group Home: [facility] located at [address] [place], [place]. Contact for Group Home ([phone]) found in chart, LMSW called x4 to reach a staff member, messages left. LMSW also attempted to reach emergency contact: Naty Boyd [phone] to inquire about additional numbers for group home, message left.   Patient with recent ED Admission/ED Discharge on 4/11/24. LMSW reviewed chart, patient will require BLS Ambulance through [facility] -[phone] for transportation back to group home residence. Patient with Medicaid.     Efforts and dispo planning discussed with ACP-PA. LMSW to continue to with reaching out to group home for discharge from ED.

## 2024-04-13 NOTE — ED ADULT TRIAGE NOTE - CHIEF COMPLAINT QUOTE
presents from [facility] group home. C/O rectal pain x1 month. No complaints of chest pain, headache, nausea, dizziness, vomiting  SOB, fever, chills verbalized. Phx Schizophrenia

## 2024-04-13 NOTE — ED PROVIDER NOTE - DISPOSITION TYPE
9:34 AM    Reason for Call: OVERBOOK    Patient is having the following symptoms: Med check (for Celexa) for ongoing  days.    The patient is requesting an appointment for ASAP (pt will be out soon/wondering if she needs to be seen as it has been awhile since seen for this issue) with Dr Herron.    Was an appointment offered for this call? Yes    Preferred method for responding to this message: Telephone Call    If we cannot reach you directly, may we leave a detailed response at the number you provided? Yes    Can this message wait until your PCP/provider returns, if unavailable today? Yes    Jenny Li    
I spoke with pt she has an appt on 2/3 but will be out of medication.  Advised pt to call pharmacy and request a refill and keep appt.  
DISCHARGE
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
## 2024-04-13 NOTE — ED PROVIDER NOTE - CLINICAL SUMMARY MEDICAL DECISION MAKING FREE TEXT BOX
34 y/o female pmh bipolar, intellectual delay, dm c/o low back pain. Pt is known to [facility] staff and has frequent visits for behavioral issues. pt is well appearing, nad, afebrile, no signs of trauma on exam, no midline spinal tenderness, no bruising, no neuro deficits. Pt able to ambulate on her own. Pt was speaking on the phone before initial eval with no problem or complaints of pain at that time. Pt is stable for dc back to group home.

## 2024-04-14 VITALS
TEMPERATURE: 98 F | HEART RATE: 100 BPM | OXYGEN SATURATION: 100 % | SYSTOLIC BLOOD PRESSURE: 118 MMHG | RESPIRATION RATE: 16 BRPM | DIASTOLIC BLOOD PRESSURE: 78 MMHG

## 2024-04-18 ENCOUNTER — EMERGENCY (EMERGENCY)
Facility: HOSPITAL | Age: 34
LOS: 0 days | Discharge: HOME | End: 2024-04-19
Attending: STUDENT IN AN ORGANIZED HEALTH CARE EDUCATION/TRAINING PROGRAM
Payer: MEDICAID

## 2024-04-18 VITALS
OXYGEN SATURATION: 98 % | HEIGHT: 62 IN | RESPIRATION RATE: 17 BRPM | SYSTOLIC BLOOD PRESSURE: 137 MMHG | HEART RATE: 90 BPM | WEIGHT: 266.1 LBS | DIASTOLIC BLOOD PRESSURE: 90 MMHG | TEMPERATURE: 97 F

## 2024-04-18 DIAGNOSIS — Z91.018 ALLERGY TO OTHER FOODS: ICD-10-CM

## 2024-04-18 DIAGNOSIS — N94.6 DYSMENORRHEA, UNSPECIFIED: ICD-10-CM

## 2024-04-18 DIAGNOSIS — F79 UNSPECIFIED INTELLECTUAL DISABILITIES: ICD-10-CM

## 2024-04-18 DIAGNOSIS — Z91.013 ALLERGY TO SEAFOOD: ICD-10-CM

## 2024-04-18 PROCEDURE — 99283 EMERGENCY DEPT VISIT LOW MDM: CPT

## 2024-04-18 RX ORDER — IBUPROFEN 200 MG
600 TABLET ORAL ONCE
Refills: 0 | Status: COMPLETED | OUTPATIENT
Start: 2024-04-18 | End: 2024-04-18

## 2024-04-18 RX ORDER — ACETAMINOPHEN 500 MG
650 TABLET ORAL ONCE
Refills: 0 | Status: COMPLETED | OUTPATIENT
Start: 2024-04-18 | End: 2024-04-18

## 2024-04-18 RX ADMIN — Medication 650 MILLIGRAM(S): at 23:31

## 2024-04-18 RX ADMIN — Medication 600 MILLIGRAM(S): at 23:31

## 2024-04-18 NOTE — ED PROVIDER NOTE - CLINICAL SUMMARY MEDICAL DECISION MAKING FREE TEXT BOX
pt here nightly requesting ibuprofen and tylenol. here for menstrual cramps. hx of intellectual disability coming in from group home. normal exam, hemodynamically stable, will give meds and DC back to group home

## 2024-04-18 NOTE — ED PROVIDER NOTE - PATIENT PORTAL LINK FT
You can access the FollowMyHealth Patient Portal offered by [facility] by registering at the following website: [email or web address]. By joining [employer]’s FollowMyHealth portal, you will also be able to view your health information using other applications (apps) compatible with our system.

## 2024-04-18 NOTE — ED ADULT NURSE NOTE - NSFALLUNIVINTERV_ED_ALL_ED
Bed/Stretcher in lowest position, wheels locked, appropriate side rails in place/Call bell, personal items and telephone in reach/Instruct patient to call for assistance before getting out of bed/chair/stretcher/Non-slip footwear applied when patient is off stretcher/[place] to call system/Physically safe environment - no spills, clutter or unnecessary equipment/Purposeful proactive rounding/Room/bathroom lighting operational, light cord in reach

## 2024-04-18 NOTE — ED ADULT NURSE NOTE - LOCATION
[FreeTextEntry1] : Lab work reviewed.\par #1) 64 year old female with history of T2N0M0 (2.2cm), Stage IIA RIGHT breast triple negative invasive ductal carcinoma, s/p adjuvant TC chemotherapy and RT-2011.\par \par 4/25/2022 - S/P bilateral mastectomies with left axillary sentinel node biopsy on 4/25/22. Pathology revealed:\par 1) RIGHT Breast: focal ADH\par 2) LEFT Breast: Invasive poorly differentiated ductal carcinoma (1.8 cm), DCIS, 2 benign left axillary lymph nodes (one of which was a sentinel LN).\par  ER 0%; MN 0%; HER 2 + by FISH.\par Completed Taxol portion of treatment regimen 8/31/22.\par Clinically stable for Jakelalitha today, to which patient consents.\par Last echocardiogram 12/2022-LVEF 66%.\par \par #2) H/O leukopenia-clinically secondary to prior chemotherapy. Continue to monitor CBC with differential.\par \par Patient was given the opportunity to ask questions.  Her questions have been answered to her apparent satisfaction at this time.  She expressed her understanding and willingness to comply with recommended follow-up.\par \par  pelvic region

## 2024-04-19 VITALS
SYSTOLIC BLOOD PRESSURE: 100 MMHG | TEMPERATURE: 98 F | DIASTOLIC BLOOD PRESSURE: 76 MMHG | RESPIRATION RATE: 18 BRPM | OXYGEN SATURATION: 100 % | HEART RATE: 101 BPM

## 2024-04-19 VITALS
HEIGHT: 62 IN | WEIGHT: 184.97 LBS | HEART RATE: 89 BPM | OXYGEN SATURATION: 98 % | SYSTOLIC BLOOD PRESSURE: 108 MMHG | RESPIRATION RATE: 18 BRPM | TEMPERATURE: 98 F | DIASTOLIC BLOOD PRESSURE: 80 MMHG

## 2024-04-19 VITALS
TEMPERATURE: 98 F | HEART RATE: 80 BPM | RESPIRATION RATE: 18 BRPM | OXYGEN SATURATION: 100 % | SYSTOLIC BLOOD PRESSURE: 116 MMHG | DIASTOLIC BLOOD PRESSURE: 72 MMHG

## 2024-04-19 DIAGNOSIS — R10.9 UNSPECIFIED ABDOMINAL PAIN: ICD-10-CM

## 2024-04-19 DIAGNOSIS — Z91.013 ALLERGY TO SEAFOOD: ICD-10-CM

## 2024-04-19 DIAGNOSIS — F81.9 DEVELOPMENTAL DISORDER OF SCHOLASTIC SKILLS, UNSPECIFIED: ICD-10-CM

## 2024-04-19 DIAGNOSIS — Z91.018 ALLERGY TO OTHER FOODS: ICD-10-CM

## 2024-04-19 DIAGNOSIS — E11.9 TYPE 2 DIABETES MELLITUS WITHOUT COMPLICATIONS: ICD-10-CM

## 2024-04-19 DIAGNOSIS — F31.9 BIPOLAR DISORDER, UNSPECIFIED: ICD-10-CM

## 2024-04-19 LAB
ALBUMIN SERPL ELPH-MCNC: 3 G/DL — LOW (ref 3.3–5)
ALP SERPL-CCNC: 52 U/L — SIGNIFICANT CHANGE UP (ref 40–120)
ALT FLD-CCNC: 11 U/L — LOW (ref 12–78)
ANION GAP SERPL CALC-SCNC: 7 MMOL/L — SIGNIFICANT CHANGE UP (ref 5–17)
AST SERPL-CCNC: 23 U/L — SIGNIFICANT CHANGE UP (ref 15–37)
BASOPHILS # BLD AUTO: 0.02 K/UL — SIGNIFICANT CHANGE UP (ref 0–0.2)
BASOPHILS NFR BLD AUTO: 0.4 % — SIGNIFICANT CHANGE UP (ref 0–2)
BILIRUB SERPL-MCNC: 0.2 MG/DL — SIGNIFICANT CHANGE UP (ref 0.2–1.2)
BUN SERPL-MCNC: 15 MG/DL — SIGNIFICANT CHANGE UP (ref 7–23)
CALCIUM SERPL-MCNC: 8.9 MG/DL — SIGNIFICANT CHANGE UP (ref 8.5–10.1)
CHLORIDE SERPL-SCNC: 110 MMOL/L — HIGH (ref 96–108)
CO2 SERPL-SCNC: 23 MMOL/L — SIGNIFICANT CHANGE UP (ref 22–31)
CREAT SERPL-MCNC: 0.86 MG/DL — SIGNIFICANT CHANGE UP (ref 0.5–1.3)
EGFR: 93 ML/MIN/1.73M2 — SIGNIFICANT CHANGE UP
EOSINOPHIL # BLD AUTO: 0.02 K/UL — SIGNIFICANT CHANGE UP (ref 0–0.5)
EOSINOPHIL NFR BLD AUTO: 0.4 % — SIGNIFICANT CHANGE UP (ref 0–6)
ETHANOL SERPL-MCNC: <10 MG/DL — SIGNIFICANT CHANGE UP (ref 0–10)
GLUCOSE SERPL-MCNC: 84 MG/DL — SIGNIFICANT CHANGE UP (ref 70–99)
HCG SERPL-ACNC: <1 MIU/ML — SIGNIFICANT CHANGE UP
HCT VFR BLD CALC: 35 % — SIGNIFICANT CHANGE UP (ref 34.5–45)
HGB BLD-MCNC: 11.1 G/DL — LOW (ref 11.5–15.5)
IMM GRANULOCYTES NFR BLD AUTO: 0.4 % — SIGNIFICANT CHANGE UP (ref 0–0.9)
LIDOCAIN IGE QN: 38 U/L — SIGNIFICANT CHANGE UP (ref 13–75)
LYMPHOCYTES # BLD AUTO: 2.12 K/UL — SIGNIFICANT CHANGE UP (ref 1–3.3)
LYMPHOCYTES # BLD AUTO: 40.5 % — SIGNIFICANT CHANGE UP (ref 13–44)
MCHC RBC-ENTMCNC: 29.8 PG — SIGNIFICANT CHANGE UP (ref 27–34)
MCHC RBC-ENTMCNC: 31.7 G/DL — LOW (ref 32–36)
MCV RBC AUTO: 94.1 FL — SIGNIFICANT CHANGE UP (ref 80–100)
MONOCYTES # BLD AUTO: 0.35 K/UL — SIGNIFICANT CHANGE UP (ref 0–0.9)
MONOCYTES NFR BLD AUTO: 6.7 % — SIGNIFICANT CHANGE UP (ref 2–14)
NEUTROPHILS # BLD AUTO: 2.7 K/UL — SIGNIFICANT CHANGE UP (ref 1.8–7.4)
NEUTROPHILS NFR BLD AUTO: 51.6 % — SIGNIFICANT CHANGE UP (ref 43–77)
NRBC # BLD: 0 /100 WBCS — SIGNIFICANT CHANGE UP (ref 0–0)
PLATELET # BLD AUTO: 251 K/UL — SIGNIFICANT CHANGE UP (ref 150–400)
POTASSIUM SERPL-MCNC: 5.1 MMOL/L — SIGNIFICANT CHANGE UP (ref 3.5–5.3)
POTASSIUM SERPL-SCNC: 5.1 MMOL/L — SIGNIFICANT CHANGE UP (ref 3.5–5.3)
PROT SERPL-MCNC: 7 GM/DL — SIGNIFICANT CHANGE UP (ref 6–8.3)
RBC # BLD: 3.72 M/UL — LOW (ref 3.8–5.2)
RBC # FLD: 14.1 % — SIGNIFICANT CHANGE UP (ref 10.3–14.5)
SODIUM SERPL-SCNC: 140 MMOL/L — SIGNIFICANT CHANGE UP (ref 135–145)
WBC # BLD: 5.23 K/UL — SIGNIFICANT CHANGE UP (ref 3.8–10.5)
WBC # FLD AUTO: 5.23 K/UL — SIGNIFICANT CHANGE UP (ref 3.8–10.5)

## 2024-04-19 PROCEDURE — 99284 EMERGENCY DEPT VISIT MOD MDM: CPT

## 2024-04-19 RX ORDER — IBUPROFEN 200 MG
200 TABLET ORAL ONCE
Refills: 0 | Status: COMPLETED | OUTPATIENT
Start: 2024-04-19 | End: 2024-04-19

## 2024-04-19 RX ORDER — MIDAZOLAM HYDROCHLORIDE 1 MG/ML
4 INJECTION, SOLUTION INTRAMUSCULAR; INTRAVENOUS ONCE
Refills: 0 | Status: DISCONTINUED | OUTPATIENT
Start: 2024-04-19 | End: 2024-04-19

## 2024-04-19 RX ORDER — IBUPROFEN 200 MG
600 TABLET ORAL ONCE
Refills: 0 | Status: COMPLETED | OUTPATIENT
Start: 2024-04-19 | End: 2024-04-19

## 2024-04-19 RX ORDER — ACETAMINOPHEN 500 MG
975 TABLET ORAL ONCE
Refills: 0 | Status: COMPLETED | OUTPATIENT
Start: 2024-04-19 | End: 2024-04-19

## 2024-04-19 RX ORDER — HALOPERIDOL DECANOATE 100 MG/ML
5 INJECTION INTRAMUSCULAR ONCE
Refills: 0 | Status: COMPLETED | OUTPATIENT
Start: 2024-04-19 | End: 2024-04-19

## 2024-04-19 RX ADMIN — Medication 200 MILLIGRAM(S): at 10:22

## 2024-04-19 RX ADMIN — Medication 975 MILLIGRAM(S): at 05:35

## 2024-04-19 RX ADMIN — Medication 600 MILLIGRAM(S): at 00:15

## 2024-04-19 RX ADMIN — Medication 600 MILLIGRAM(S): at 06:34

## 2024-04-19 RX ADMIN — Medication 975 MILLIGRAM(S): at 06:30

## 2024-04-19 RX ADMIN — Medication 650 MILLIGRAM(S): at 00:15

## 2024-04-19 RX ADMIN — Medication 200 MILLIGRAM(S): at 11:22

## 2024-04-19 RX ADMIN — Medication 600 MILLIGRAM(S): at 06:30

## 2024-04-19 RX ADMIN — HALOPERIDOL DECANOATE 5 MILLIGRAM(S): 100 INJECTION INTRAMUSCULAR at 01:13

## 2024-04-19 RX ADMIN — MIDAZOLAM HYDROCHLORIDE 4 MILLIGRAM(S): 1 INJECTION, SOLUTION INTRAMUSCULAR; INTRAVENOUS at 01:13

## 2024-04-19 NOTE — ED ADULT NURSE REASSESSMENT NOTE - NS ED NURSE REASSESS COMMENT FT1
0100 pt walking around refusing to go back to stretcher, screaming that she still has cramps and wants medication.  pt was medicated as per md order and stated improvement in pain level post 45 min.  pt was asked to sit at her bed and stop yelling to which she refused a second time.

## 2024-04-19 NOTE — ED PROVIDER NOTE - OBJECTIVE STATEMENT
33F pmhx intellectual delay, bipolar d/o, diabetes (per chart), who presents from [facility] for abdominal pain - pt seen in the emergency room overnight for similar complaint - due to agitation and harrassing staff, pt was sedated overnight. Patient returns for same complaint - states that abd pain never improved, describes it as cramping. Denies nausea, vomiting, diarrhea, constipiation, fevers. Contrary to triage report, pt denies hallucinations. I spoke w/ [facility] staff Chelsie - [phone]- reports pt took her usual meds and showered but stated that she 'didn't feel well' and wanted to come back to the hospital. Patient states pain duration x 10 days

## 2024-04-19 NOTE — ED ADULT NURSE REASSESSMENT NOTE - NS ED NURSE REASSESS COMMENT FT1
spoke to group home spoke to Anastasiya, confirmed with transport team pt is ready for d/c back to facility.

## 2024-04-19 NOTE — ED ADULT NURSE REASSESSMENT NOTE - NS ED NURSE REASSESS COMMENT FT1
alice phone calls made to the group home phone number listed in patient record.  pt asking to go back to group home but due to poor communications we are unable to facilitate the transfer at this time, we will continue to call to send her back.  pt is aware and involved in plan

## 2024-04-19 NOTE — ED ADULT NURSE NOTE - CHIEF COMPLAINT QUOTE
Patient [facility] from [facility] for auditory hallucination, patient complaining of vaginal spotting and pain to abdomen.

## 2024-04-19 NOTE — ED ADULT NURSE NOTE - OBJECTIVE STATEMENT
33F presenting to the ED c/o abdominal pain for about 10 days, pt was seen last in the emergency room overnight for similar complaint; was sedated overnight for harassing staff/agitation; reporting cramping abdominal pain never subsided, pt states she is pregnant but attributes spotting vaginal bleed to being on her menstrual period; pmhx intellectual delay, bipolar d/o, diabetes (per chart), denies nausea, vomiting, diarrhea, constipation, fevers, denies hallucinations.

## 2024-04-19 NOTE — ED PROVIDER NOTE - NSFOLLOWUPINSTRUCTIONS_ED_ALL_ED_FT
You were seen today       Acute Abdominal Pain    WHAT YOU NEED TO KNOW:    The cause of your abdominal pain may not be found. If a cause is found, treatment will depend on what the cause is.     DISCHARGE INSTRUCTIONS:    Return to the emergency department if:     You vomit blood or cannot stop vomiting.      You have blood in your bowel movement or it looks like tar.       You have bleeding from your rectum.       Your abdomen is larger than usual, more painful, and hard.       You have severe pain in your abdomen.       You stop passing gas and having bowel movements.       You feel weak, dizzy, or faint.    Contact your healthcare provider if:     You have a fever.      You have new signs and symptoms.      Your symptoms do not get better with treatment.       You have questions or concerns about your condition or care.    Medicines may be given to decrease pain, treat an infection, and manage your symptoms. Take your medicine as directed. Call your healthcare provider if you think your medicine is not helping or if you have side effects. Tell him if you are allergic to any medicine. Keep a list of the medicines, vitamins, and herbs you take. Include the amounts, and when and why you take them. Bring the list or the pill bottles to follow-up visits. Carry your medicine list with you in case of an emergency.    Manage your symptoms:     Apply heat on your abdomen for 20 to 30 minutes every 2 hours for as many days as directed. Heat helps decrease pain and muscle spasms.       Manage your stress. Stress may cause abdominal pain. Your healthcare provider may recommend relaxation techniques and deep breathing exercises to help decrease your stress. Your healthcare provider may recommend you talk to someone about your stress or anxiety, such as a counselor or a trusted friend. Get plenty of sleep and exercise regularly.       Limit or do not drink alcohol. Alcohol can make your abdominal pain worse. Ask your healthcare provider if it is safe for you to drink alcohol. Also ask how much is safe for you to drink.       Do not smoke. Nicotine and other chemicals in cigarettes can damage your esophagus and stomach. Ask your healthcare provider for information if you currently smoke and need help to quit. E-cigarettes or smokeless tobacco still contain nicotine. Talk to your healthcare provider before you use these products.     Make changes to the food you eat as directed: Do not eat foods that cause abdominal pain or other symptoms. Eat small meals more often.     Eat more high-fiber foods if you are constipated. High-fiber foods include fruits, vegetables, whole-grain foods, and legumes.       Do not eat foods that cause gas if you have bloating. Examples include broccoli, cabbage, and cauliflower. Do not drink soda or carbonated drinks, because these may also cause gas.       Do not eat foods or drinks that contain sorbitol or fructose if you have diarrhea and bloating. Some examples are fruit juices, candy, jelly, and sugar-free gum.       Do not eat high-fat foods, such as fried foods, cheeseburgers, hot dogs, and desserts.      Limit or do not drink caffeine. Caffeine may make symptoms, such as heart burn or nausea, worse.       Drink plenty of liquids to prevent dehydration from diarrhea or vomiting. Ask your healthcare provider how much liquid to drink each day and which liquids are best for you.     Follow up with your healthcare provider as directed: Write down your questions so you remember to ask them during your visits.

## 2024-04-19 NOTE — ED PROVIDER NOTE - CLINICAL SUMMARY MEDICAL DECISION MAKING FREE TEXT BOX
33F pmhx intellectual delay, bipolar d/o, diabetes (per chart), who presents from [facility] for abdominal pain - pt seen in the emergency room overnight for similar complaint - due to agitation and harrassing staff, pt was sedated overnight. Patient returns for same complaint - states that abd pain never improved, describes it as cramping. Denies nausea, vomiting, diarrhea, constipiation, fevers. Contrary to triage report, pt denies hallucinations. I spoke w/ [facility] staff Chelsie - [phone]- reports pt took her usual meds and showered but stated that she 'didn't feel well' and wanted to come back to the hospital. Patient states pain duration x 10 days   - abd nontender on exam, pt appears comfortable, tolerated meal tray - will check labs rule out metabolic derangements, rule out pregnancy, eval LFTs, symptomatic support, symptoms not suggestive of sbo, exam not suggestive of appendicitis, pt calm and cooperative, no SI, no hallucinations, per group home pt is at baseline behavior

## 2024-04-19 NOTE — ED PROVIDER NOTE - PATIENT PORTAL LINK FT
You can access the FollowMyHealth Patient Portal offered by [facility] by registering at the following website: [email or web address]. By joining [employer]’s FollowMyHealth portal, you will also be able to view your health information using other applications (apps) compatible with our system.

## 2024-04-19 NOTE — ED PROVIDER NOTE - PHYSICAL EXAMINATION
Gen: aox3, nad,   Head: NCAT  ENT: Airway patent, moist mucous membranes, nasal passageways clear,   Cardiac: Normal rate, normal rhythm,   Respiratory: Lungs CTA B/L  Gastrointestinal: Abdomen soft, nontender, nondistended, no rebound, no guarding  MSK: No gross abnormalities, FROM of all four extremities, no edema  HEME: Extremities warm and well perfused   Skin: No rashes, no lesions  Neuro: No gross neurologic deficits

## 2024-04-19 NOTE — ED PROVIDER NOTE - PROGRESS NOTE DETAILS
Miles DO: pt has been calm and cooperative, she feels better, explained results, pt feels ready to go home

## 2024-04-20 ENCOUNTER — EMERGENCY (EMERGENCY)
Facility: HOSPITAL | Age: 34
LOS: 0 days | Discharge: HOME | End: 2024-04-21
Attending: STUDENT IN AN ORGANIZED HEALTH CARE EDUCATION/TRAINING PROGRAM
Payer: MEDICAID

## 2024-04-20 VITALS
HEIGHT: 62 IN | WEIGHT: 266.1 LBS | SYSTOLIC BLOOD PRESSURE: 121 MMHG | HEART RATE: 87 BPM | DIASTOLIC BLOOD PRESSURE: 86 MMHG | TEMPERATURE: 98 F | RESPIRATION RATE: 16 BRPM | OXYGEN SATURATION: 98 %

## 2024-04-20 DIAGNOSIS — F31.9 BIPOLAR DISORDER, UNSPECIFIED: ICD-10-CM

## 2024-04-20 DIAGNOSIS — K08.89 OTHER SPECIFIED DISORDERS OF TEETH AND SUPPORTING STRUCTURES: ICD-10-CM

## 2024-04-20 DIAGNOSIS — Z91.013 ALLERGY TO SEAFOOD: ICD-10-CM

## 2024-04-20 DIAGNOSIS — Z91.018 ALLERGY TO OTHER FOODS: ICD-10-CM

## 2024-04-20 DIAGNOSIS — E11.9 TYPE 2 DIABETES MELLITUS WITHOUT COMPLICATIONS: ICD-10-CM

## 2024-04-20 DIAGNOSIS — F79 UNSPECIFIED INTELLECTUAL DISABILITIES: ICD-10-CM

## 2024-04-20 PROCEDURE — 99283 EMERGENCY DEPT VISIT LOW MDM: CPT

## 2024-04-20 RX ORDER — IBUPROFEN 200 MG
600 TABLET ORAL ONCE
Refills: 0 | Status: COMPLETED | OUTPATIENT
Start: 2024-04-20 | End: 2024-04-20

## 2024-04-20 RX ORDER — HALOPERIDOL DECANOATE 100 MG/ML
5 INJECTION INTRAMUSCULAR ONCE
Refills: 0 | Status: COMPLETED | OUTPATIENT
Start: 2024-04-20 | End: 2024-04-20

## 2024-04-20 RX ADMIN — HALOPERIDOL DECANOATE 5 MILLIGRAM(S): 100 INJECTION INTRAMUSCULAR at 19:51

## 2024-04-20 RX ADMIN — Medication 2 MILLIGRAM(S): at 19:51

## 2024-04-20 NOTE — ED PROVIDER NOTE - CLINICAL SUMMARY MEDICAL DECISION MAKING FREE TEXT BOX
Presents in her usual state of health, initially combative with staff, calmed down following sedation, normal exam. Will observe and dc back to group home

## 2024-04-20 NOTE — ED PROVIDER NOTE - OBJECTIVE STATEMENT
33F pmhx intellectual delay, bipolar d/o, diabetes (per chart), known to our ED presents reporting she has toothache. Denies trauma. States she wants "Ibuprofen and Hot tray of food". No f/c/n/v.

## 2024-04-20 NOTE — ED PROVIDER NOTE - ATTENDING APP SHARED VISIT CONTRIBUTION OF CARE
female w/ intellectual delay, bipolar d/o, diabetes (per chart) presenting to the ED for tooth pain. pt states that she has had pain for the past 7 days. patient agitated and aggressive towards hosptial staff and not redirectable via verbal deescalation. Pt requiring sedation. pain control sent. Pt alert w/o evidence of obvious infection. +dental caries

## 2024-04-20 NOTE — ED ADULT NURSE NOTE - CCCP TRG CHIEF CMPLNT
[toothache

## 2024-04-20 NOTE — ED PROVIDER NOTE - PATIENT PORTAL LINK FT
You can access the FollowMyHealth Patient Portal offered by [facility] by registering at the following website: [email or web address]. By joining [employer]’s FollowMyHealth portal, you will also be able to view your health information using other applications (apps) compatible with our system.

## 2024-04-21 ENCOUNTER — EMERGENCY (EMERGENCY)
Facility: HOSPITAL | Age: 34
LOS: 0 days | Discharge: HOME | End: 2024-04-22
Attending: STUDENT IN AN ORGANIZED HEALTH CARE EDUCATION/TRAINING PROGRAM
Payer: MEDICAID

## 2024-04-21 VITALS
RESPIRATION RATE: 16 BRPM | HEIGHT: 62 IN | DIASTOLIC BLOOD PRESSURE: 80 MMHG | OXYGEN SATURATION: 97 % | TEMPERATURE: 97 F | HEART RATE: 97 BPM | WEIGHT: 255.96 LBS | SYSTOLIC BLOOD PRESSURE: 119 MMHG

## 2024-04-21 VITALS
RESPIRATION RATE: 19 BRPM | SYSTOLIC BLOOD PRESSURE: 120 MMHG | OXYGEN SATURATION: 98 % | TEMPERATURE: 99 F | DIASTOLIC BLOOD PRESSURE: 75 MMHG | HEART RATE: 98 BPM

## 2024-04-21 VITALS
HEART RATE: 120 BPM | OXYGEN SATURATION: 100 % | SYSTOLIC BLOOD PRESSURE: 115 MMHG | TEMPERATURE: 98 F | RESPIRATION RATE: 18 BRPM | DIASTOLIC BLOOD PRESSURE: 70 MMHG

## 2024-04-21 VITALS — WEIGHT: 199.96 LBS | HEIGHT: 62 IN

## 2024-04-21 DIAGNOSIS — Z91.013 ALLERGY TO SEAFOOD: ICD-10-CM

## 2024-04-21 DIAGNOSIS — F31.9 BIPOLAR DISORDER, UNSPECIFIED: ICD-10-CM

## 2024-04-21 DIAGNOSIS — K08.89 OTHER SPECIFIED DISORDERS OF TEETH AND SUPPORTING STRUCTURES: ICD-10-CM

## 2024-04-21 DIAGNOSIS — R45.1 RESTLESSNESS AND AGITATION: ICD-10-CM

## 2024-04-21 DIAGNOSIS — F79 UNSPECIFIED INTELLECTUAL DISABILITIES: ICD-10-CM

## 2024-04-21 DIAGNOSIS — Z91.018 ALLERGY TO OTHER FOODS: ICD-10-CM

## 2024-04-21 DIAGNOSIS — E11.9 TYPE 2 DIABETES MELLITUS WITHOUT COMPLICATIONS: ICD-10-CM

## 2024-04-21 DIAGNOSIS — F20.9 SCHIZOPHRENIA, UNSPECIFIED: ICD-10-CM

## 2024-04-21 PROCEDURE — L9997: CPT

## 2024-04-21 PROCEDURE — 99284 EMERGENCY DEPT VISIT MOD MDM: CPT

## 2024-04-21 RX ORDER — ACETAMINOPHEN 500 MG
650 TABLET ORAL ONCE
Refills: 0 | Status: COMPLETED | OUTPATIENT
Start: 2024-04-21 | End: 2024-04-21

## 2024-04-21 RX ORDER — MIDAZOLAM HYDROCHLORIDE 1 MG/ML
6 INJECTION, SOLUTION INTRAMUSCULAR; INTRAVENOUS ONCE
Refills: 0 | Status: DISCONTINUED | OUTPATIENT
Start: 2024-04-21 | End: 2024-04-21

## 2024-04-21 RX ORDER — IBUPROFEN 200 MG
400 TABLET ORAL ONCE
Refills: 0 | Status: COMPLETED | OUTPATIENT
Start: 2024-04-21 | End: 2024-04-21

## 2024-04-21 RX ORDER — BENZOCAINE 10 %
1 GEL (GRAM) MUCOUS MEMBRANE ONCE
Refills: 0 | Status: COMPLETED | OUTPATIENT
Start: 2024-04-21 | End: 2024-04-21

## 2024-04-21 RX ORDER — IBUPROFEN 200 MG
600 TABLET ORAL ONCE
Refills: 0 | Status: COMPLETED | OUTPATIENT
Start: 2024-04-21 | End: 2024-04-21

## 2024-04-21 RX ORDER — MIDAZOLAM HYDROCHLORIDE 1 MG/ML
4 INJECTION, SOLUTION INTRAMUSCULAR; INTRAVENOUS ONCE
Refills: 0 | Status: DISCONTINUED | OUTPATIENT
Start: 2024-04-21 | End: 2024-04-21

## 2024-04-21 RX ORDER — HALOPERIDOL DECANOATE 100 MG/ML
5 INJECTION INTRAMUSCULAR ONCE
Refills: 0 | Status: COMPLETED | OUTPATIENT
Start: 2024-04-21 | End: 2024-04-21

## 2024-04-21 RX ADMIN — MIDAZOLAM HYDROCHLORIDE 4 MILLIGRAM(S): 1 INJECTION, SOLUTION INTRAMUSCULAR; INTRAVENOUS at 20:00

## 2024-04-21 RX ADMIN — Medication 600 MILLIGRAM(S): at 03:36

## 2024-04-21 RX ADMIN — Medication 1 SPRAY(S): at 04:59

## 2024-04-21 RX ADMIN — HALOPERIDOL DECANOATE 5 MILLIGRAM(S): 100 INJECTION INTRAMUSCULAR at 20:00

## 2024-04-21 RX ADMIN — Medication 650 MILLIGRAM(S): at 06:21

## 2024-04-21 RX ADMIN — Medication 400 MILLIGRAM(S): at 12:00

## 2024-04-21 RX ADMIN — Medication 400 MILLIGRAM(S): at 14:31

## 2024-04-21 RX ADMIN — Medication 600 MILLIGRAM(S): at 04:15

## 2024-04-21 NOTE — ED ADULT TRIAGE NOTE - CHIEF COMPLAINT QUOTE
pt loud, no cooperative from group home, trying to leave ,pt found around the premises [employer] called was brought to ER for evaluation.

## 2024-04-21 NOTE — ED PROVIDER NOTE - CLINICAL SUMMARY MEDICAL DECISION MAKING FREE TEXT BOX
34 y/o F with PMH intellectual delay, bipolar d/o, diabetes, presenting to the ED w/ toothache.  She is well appearing in NAD.  Frequent ED visits w/ malingering  Will dose pain medication, feed, transport back to [facility]

## 2024-04-21 NOTE — ED ADULT NURSE NOTE - NSFALLRISKINTERV_ED_ALL_ED

## 2024-04-21 NOTE — ED PROVIDER NOTE - PATIENT PORTAL LINK FT
You can access the FollowMyHealth Patient Portal offered by [facility] by registering at the following website: [email or web address]. By joining [employer]’s FollowMyHealth portal, you will also be able to view your health information using other applications (apps) compatible with our system.

## 2024-04-21 NOTE — ED PROVIDER NOTE - PHYSICAL EXAMINATION
GENERAL: Awake, alert, NAD  HEENT: NC/AT, moist mucous membranes  LUNGS: CTAB, no wheezes or crackles   CARDIAC: RRR, no m/r/g  ABDOMEN: Soft, non tender, non distended, no rebound, no guarding  EXT: No edema, no calf tenderness, no deformities.  NEURO: At baseline. Moving all extremities.  SKIN: Warm and dry. No rash.  PSYCH: Normal affect.

## 2024-04-21 NOTE — ED ADULT NURSE NOTE - OBJECTIVE STATEMENT
Covering for primary RN received 33 year old female A/Ox3 c/o tooth ache, pt discharged from ED x1 hour, pt came back due to tooth ache, pt denies any c/o fever/chills, n/v, urinary and bowel problems, no noted signs of swelling and pt denies any c/o difficulty swallowing or eating

## 2024-04-21 NOTE — ED PROVIDER NOTE - OBJECTIVE STATEMENT
32 y/o F with PMH intellectual delay, bipolar d/o, diabetes, presenting to the ED w/ toothache. Patient was discharged this morning for same symptoms but came back because she did not want to be at group home. Kanchan offered pain medication for her tooth but states she has taken medication before and does not want "stupid medication". She is requesting lunch.

## 2024-04-21 NOTE — ED ADULT NURSE REASSESSMENT NOTE - NS ED NURSE REASSESS COMMENT FT1
0540 transport for patient back to group home arranged.  Lesley at facility confirmed someone to accept patient.

## 2024-04-21 NOTE — ED ADULT NURSE NOTE - OBJECTIVE STATEMENT
pt loud, no cooperative from group home, trying to leave ,pt found around the premises [employer] called was brought to ER for evaluation. Per triage note. Pt at time of assessment loud and uncooperative. Pt anxious. Pt given sedation medications per MD orders.

## 2024-04-22 ENCOUNTER — EMERGENCY (EMERGENCY)
Facility: HOSPITAL | Age: 34
LOS: 0 days | Discharge: HOME | End: 2024-04-22
Payer: MEDICAID

## 2024-04-22 VITALS
RESPIRATION RATE: 20 BRPM | OXYGEN SATURATION: 99 % | DIASTOLIC BLOOD PRESSURE: 80 MMHG | SYSTOLIC BLOOD PRESSURE: 121 MMHG | TEMPERATURE: 98 F | HEART RATE: 108 BPM

## 2024-04-22 VITALS
DIASTOLIC BLOOD PRESSURE: 78 MMHG | RESPIRATION RATE: 16 BRPM | SYSTOLIC BLOOD PRESSURE: 128 MMHG | TEMPERATURE: 98 F | OXYGEN SATURATION: 98 % | HEART RATE: 98 BPM

## 2024-04-22 VITALS
RESPIRATION RATE: 19 BRPM | DIASTOLIC BLOOD PRESSURE: 85 MMHG | HEIGHT: 62 IN | WEIGHT: 250 LBS | TEMPERATURE: 98 F | SYSTOLIC BLOOD PRESSURE: 135 MMHG | HEART RATE: 99 BPM | OXYGEN SATURATION: 96 %

## 2024-04-22 DIAGNOSIS — E11.9 TYPE 2 DIABETES MELLITUS WITHOUT COMPLICATIONS: ICD-10-CM

## 2024-04-22 DIAGNOSIS — F25.9 SCHIZOAFFECTIVE DISORDER, UNSPECIFIED: ICD-10-CM

## 2024-04-22 DIAGNOSIS — Z91.013 ALLERGY TO SEAFOOD: ICD-10-CM

## 2024-04-22 DIAGNOSIS — K02.9 DENTAL CARIES, UNSPECIFIED: ICD-10-CM

## 2024-04-22 DIAGNOSIS — Z91.018 ALLERGY TO OTHER FOODS: ICD-10-CM

## 2024-04-22 DIAGNOSIS — F79 UNSPECIFIED INTELLECTUAL DISABILITIES: ICD-10-CM

## 2024-04-22 DIAGNOSIS — K08.89 OTHER SPECIFIED DISORDERS OF TEETH AND SUPPORTING STRUCTURES: ICD-10-CM

## 2024-04-22 DIAGNOSIS — F31.9 BIPOLAR DISORDER, UNSPECIFIED: ICD-10-CM

## 2024-04-22 PROCEDURE — 99283 EMERGENCY DEPT VISIT LOW MDM: CPT

## 2024-04-22 NOTE — ED PROVIDER NOTE - OBJECTIVE STATEMENT
33y Female with past medical history of intellectual disability, bipolar, schizoaffective disorder, DM presents to the ER for toothache. Patient reports all over tooth pain x 3 days, left ER earlier today for dentist appointment but states they would not see her, appointment is 5/1/24. Denies fever, chills, difficulty swallowing, neck pain/swelling, chest pain, SOB, difficulty breathing, abdominal pain, n/v/d or urinary complaints. Denies pain meds, states meds do not work. Hungry, requesting lunch.

## 2024-04-22 NOTE — ED ADULT NURSE NOTE - CHIEF COMPLAINT
[FreeTextEntry1] : Documented by Omar Graff acting as a scribe for Dr. Camara 01/11/19.\par 
The patient is a 33y Female complaining of toothache.

## 2024-04-22 NOTE — ED ADULT NURSE REASSESSMENT NOTE - NS ED NURSE REASSESS COMMENT FT1
Called group home multiple times, last time called at 6:40AM today. Nobody picked up. Pt ambulates with steady gait, alert and awake. Pt in NAD at this time

## 2024-04-22 NOTE — ED PROVIDER NOTE - CLINICAL SUMMARY MEDICAL DECISION MAKING FREE TEXT BOX
patient with history of intellectual disabilityschizophrenia presenting with agitation.  Patient was just discharged here for toothache, seen here nightly for different complaints.  Patient coming in from group home.  Upon discharge, patient became uncooperative at group home became agitated and screaming.  Was brought back here.  Patient has no complaints at this time, however patient became verbally and physically aggressive towards staff and required sedation.  Placed on monitor.    Date: Patient sedatives have now worn off, ANO x 4 unsteady gait, no physical symptoms at this time.  Will discharge back to nursing home

## 2024-04-22 NOTE — ED ADULT NURSE NOTE - NSFALLUNIVINTERV_ED_ALL_ED
Bed/Stretcher in lowest position, wheels locked, appropriate side rails in place/Call bell, personal items and telephone in reach/Instruct patient to call for assistance before getting out of bed/chair/stretcher/Non-slip footwear applied when patient is off stretcher/[place] to call system/Physically safe environment - no spills, clutter or unnecessary equipment/Purposeful proactive rounding/Room/bathroom lighting operational, light cord in reach

## 2024-04-22 NOTE — ED PROVIDER NOTE - PATIENT PORTAL LINK FT
You can access the FollowMyHealth Patient Portal offered by [facility] by registering at the following website: [email or web address]. By joining [employer]’s FollowMyHealth portal, you will also be able to view your health information using other applications (apps) compatible with our system.

## 2024-04-22 NOTE — ED PROVIDER NOTE - CLINICAL SUMMARY MEDICAL DECISION MAKING FREE TEXT BOX
33y Female with past medical history of intellectual disability, bipolar, schizoaffective disorder, DM presents to the ER for toothache. Patient reports all over tooth pain x 3 days, left ER earlier today for dentist appointment but states they would not see her, appointment is 5/1/24. Denies fever, chills, difficulty swallowing, neck pain/swelling. Denies pain meds, states meds do not work. Hungry, requesting lunch. Vital signs stable, bilateral upper and lower cavities, no dental abscess or fractured teeth, airway patent, food ordered per patient request and transportation to be set up. No indication for labs, imaging or [facility] at this time. Will dc .

## 2024-04-22 NOTE — ED ADULT TRIAGE NOTE - HEART RATE (BEATS/MIN)
Pt presents to ER c/o RUQ pain and vomiting. Onset of symptoms began last night after eating dinner. Pt reports he had an episode of vomiting that looked like it had "about a cup full of blood". Denies blood thinners. AO x 3 oriented to baseline, normal breathing pattern with no difficulty 99

## 2024-04-22 NOTE — ED PROVIDER NOTE - PHYSICAL EXAMINATION
General: pt agitated, screaming at staff and other patients, physically and verbalyy aggressive  HEENT: NCAT, Neck supple without meningismus, PERRL, no conjunctival injection  Lungs: CTAB, No wheeze or crackles, No retractions, No increased work of breathing  Heart: S1S2 RRR, No M/R/G, Pules equal Bilaterally in upper and lower extremities distally  Abd: soft, NT/ND, No guarding, No rebound.  No hernias, no palpable masses.  Extrem: FROM in all joints, no gross deformities appreciated, no significant edema noted, No ulcers. Cap refil < 2sec.  Skin: No rash noted, warm dry.  Neuro:  Grossly normal.

## 2024-04-22 NOTE — ED PROVIDER NOTE - NSFOLLOWUPINSTRUCTIONS_ED_ALL_ED_FT
Today you were seen in the ER for toothache.     Take Motrin 600 mg every 8 hours as needed for moderate pain or fevers -- take with food.    Take Tylenol 650mg (Two 325 mg pills) every 4-6 hours as needed for pain or fevers.    Dental Pain    Dental pain (toothache) may be caused by many things including tooth decay (cavities or caries), abscess or infection, or trauma. If you were prescribed an antibiotic medicine, finish all of it even if you start to feel better. Rinsing your mouth with salt water or applying ice to the painful area of your face may help with the pain. Follow up with a dentist is important in ensuring good oral health and preventing the worsening of dental disease.    SEEK IMMEDIATE MEDICAL CARE IF YOU HAVE ANY OF THE FOLLOWING SYMPTOMS: unable to open your mouth, trouble breathing or swallowing, fever, or swelling of the face, neck, or jaw.    Advance activity as tolerated.      Continue all previously prescribed medications as directed unless otherwise instructed.      Follow up with your primary care physician and dentist in 48-72 hours- bring copies of your results.

## 2024-04-22 NOTE — ED PROVIDER NOTE - PATIENT PORTAL LINK FT
You can access the FollowMyHealth Patient Portal offered by [facility] by registering at the following website: [email or web address]. By joining [employer]’s FollowMyHealth portal, you will also be able to view your health information using other applications (apps) compatible with our system.

## 2024-04-22 NOTE — ED ADULT NURSE NOTE - BIRTH SEX
Toney is a 60 year old who is being evaluated via a billable telephone visit.      What phone number would you like to be contacted at? [phone]  How would you like to obtain your AVS? MyChart    ASSESSMENT:   1. Maxillary sinusitis, unspecified chronicity  Patient has completed 1 week of antibiotic therapy.  Improved.  Mostly congestion issue without a lot of pain in the sinus area and no fevers ever with the symptoms.  Given course of Augmentin, would defer additional [facility] at this time.  Patient to start consistently using a steroid nasal spray at bedtime over the next 10 days time.  Saline nasal rinse in the morning as needed.  If symptoms persisting, would then consider imaging of the sinuses      PLAN:   No need for additional [facility] at this time  Either retry Fluticasone/Flonase or get  OTC Nasacort  (both steroid nasal sprays but Nasacort doesn't have lilac scent) and use 2 spray each nostril daily at bedtime to reduce nasal congestion. Use daily for least the next 10 days  Saline nasal rinse daily in the morning for the next couple days to wash out the sinuses  If sinus issues persist in another 2 weeks despite the consistent use of a steroid nasal spray, then contact your primary provider to consider CT scan of the sinuses      Phone call contact time  Call Started at 11:22am  Call Ended at 11:38 am  Total minutes: 16 min    (Chart documentation was completed, in part, with [employer] voice-recognition software. Even though reviewed, some grammatical, spelling, and word errors may remain.)    Jackson Perez MD  Internal Medicine Department  [facility]   Toney is a 60 year old who presents for the following health issues     HPI     RESPIRATORY SYMPTOMS      Duration:  1 month    Description  nasal congestion and wheezing    Severity: Worse at night    Accompanying signs and symptoms: None    History (predisposing factors):  Did My Chart Visit on  "01/15/2022    Precipitating or alleviating factors: None    Therapies tried and outcome:  oral decongestant, Augmentin      Had E visit on 1/15/2022 with other provider.  Note reviewed.  4-week history of sinus symptoms including congestion and mild pressure.   Symptoms present prior to traveling to [place].  No change in symptoms when in [place] versus when [place].  Symptoms better with Sudafed.  Patient only used a steroid nasal spray for a day.  Felt his congestion worsened after that 1 treatment so did not use it further.  Was started a week ago on Augmentin and finishing up course tomorrow.  Has not had any fevers or chills before or after [facility].  Patient states he is \"2/3 better\" since starting [facility]         Additional ROS:   Constitutional, HEENT, Cardiovascular, Pulmonary, GI and , Neuro, MSK and Psych review of systems/symptoms are otherwise negative or unchanged from previous, except as noted above.           Objective:  Any reported vitals from today were reviewed in chart. See nursing documentation for details    RESP: No cough, no audible wheezing, able to talk in full sentences  GEN: Normal affect. Normal though content/speech  Remainder of exam unable to be completed due to telephone visits       " Female

## 2024-04-28 ENCOUNTER — EMERGENCY (EMERGENCY)
Facility: HOSPITAL | Age: 34
LOS: 0 days | Discharge: HOME | End: 2024-04-29
Attending: STUDENT IN AN ORGANIZED HEALTH CARE EDUCATION/TRAINING PROGRAM
Payer: MEDICAID

## 2024-04-28 VITALS
OXYGEN SATURATION: 99 % | DIASTOLIC BLOOD PRESSURE: 56 MMHG | RESPIRATION RATE: 18 BRPM | HEIGHT: 62 IN | HEART RATE: 111 BPM | SYSTOLIC BLOOD PRESSURE: 100 MMHG | WEIGHT: 266.1 LBS | TEMPERATURE: 98 F

## 2024-04-28 DIAGNOSIS — F81.9 DEVELOPMENTAL DISORDER OF SCHOLASTIC SKILLS, UNSPECIFIED: ICD-10-CM

## 2024-04-28 DIAGNOSIS — F20.9 SCHIZOPHRENIA, UNSPECIFIED: ICD-10-CM

## 2024-04-28 DIAGNOSIS — R11.0 NAUSEA: ICD-10-CM

## 2024-04-28 DIAGNOSIS — Z91.018 ALLERGY TO OTHER FOODS: ICD-10-CM

## 2024-04-28 DIAGNOSIS — F31.9 BIPOLAR DISORDER, UNSPECIFIED: ICD-10-CM

## 2024-04-28 DIAGNOSIS — R45.1 RESTLESSNESS AND AGITATION: ICD-10-CM

## 2024-04-28 DIAGNOSIS — Z91.013 ALLERGY TO SEAFOOD: ICD-10-CM

## 2024-04-28 PROCEDURE — 99291 CRITICAL CARE FIRST HOUR: CPT

## 2024-04-28 RX ORDER — HALOPERIDOL DECANOATE 100 MG/ML
5 INJECTION INTRAMUSCULAR ONCE
Refills: 0 | Status: COMPLETED | OUTPATIENT
Start: 2024-04-28 | End: 2024-04-28

## 2024-04-28 RX ORDER — OLANZAPINE 15 MG/1
10 TABLET, FILM COATED ORAL ONCE
Refills: 0 | Status: COMPLETED | OUTPATIENT
Start: 2024-04-28 | End: 2024-04-28

## 2024-04-28 RX ADMIN — HALOPERIDOL DECANOATE 5 MILLIGRAM(S): 100 INJECTION INTRAMUSCULAR at 18:42

## 2024-04-28 RX ADMIN — Medication 2 MILLIGRAM(S): at 18:42

## 2024-04-28 RX ADMIN — OLANZAPINE 10 MILLIGRAM(S): 15 TABLET, FILM COATED ORAL at 18:29

## 2024-04-28 NOTE — ED PROVIDER NOTE - DISPOSITION TYPE
03/22/18 0700   General Information   Type of Visit Initial OP Ortho PT Evaluation   Start of Care Date 03/22/18   Referring Physician Dr. Philip   Patient/Family Goals Statement return of pain free use of R shoulder   Orders Evaluate and Treat   Date of Order 03/12/18   General Information Comments PMH: non significant   Body Part(s)   Body Part(s) Shoulder   Presentation and Etiology   Pertinent history of current problem (include personal factors and/or comorbidities that impact the POC) Patient reports that he has return shoulder pain. Patient was symptom free and then backed off his exercises. Aggravated with deer season. Performed stretches and pain decreased. Patient reports R hand dominant. Overhead reaching and upper lifting.    Impairments A. Pain;D. Decreased ROM;F. Decreased strength and endurance   Functional Limitations perform activities of daily living;perform required work activities;perform desired leisure / sports activities   Symptom Location R anterolateral shoulder and postereolateral   How/Where did it occur With repetition/overuse;From insidious onset   Chronicity Recurrent   Pain rating (0-10 point scale) Best (/10);Worst (/10)   Best (/10) 2   Worst (/10) 8   Pain quality A. Sharp;C. Aching   Frequency of pain/symptoms A. Constant   Pain/symptoms exacerbated by C. Lifting;D. Carrying;G. Certain positions;H. Overhead reach   Pain/symptoms eased by C. Rest;E. Changing positions   Progression of symptoms since onset: Unchanged   Current / Previous Interventions   Diagnostic Tests: MRI   MRI Results Results   MRI results Progressive partial thichness tearing of supraspinatus, undersurface irregularity of distal acromion, moderate hypertrophy of AC joint   Fall Risk Screen   Have you fallen 2 or more times in the past year? No   Have you fallen and had an injury in the past year? No   Is patient a fall risk? No   Shoulder Objective Findings   Side (if bilateral, select both right and left)  Right   Integumentary  Inflammation present over biceps tendon   Posture Mild to moderate forward rounded shoulders   Cervical Screen (ROM, quadrant) Full motion   Thoracic Mobility Screen Full motion   Palpation Tenderness and inflammation over bicep tendon. Non-tender to suprspinatus and infraspinatus.    Right Shoulder Flexion AROM 152 (compared to 160 L)   Right Shoulder Flexion PROM 90%   Right Shoulder Abduction AROM 152 (compared to 170 L)   Right Shoulder Abduction PROM 90%   Right Shoulder ER AROM 55 (compared to 55 L)   Right Shoulder ER PROM 100%   Right Shoulder IR AROM T10 (compared to T7)   Right Shoulder IR PROM 80% limited by pain   Right Shoulder Flexion Strength 5   Right Shoulder Abduction Strength 5   Right Shoulder ER Strength 4   Right Shoulder IR Strength 4+   Planned Therapy Interventions   Planned Therapy Interventions ROM;strengthening;stretching;manual therapy   Planned Therapy Interventions Comment Ther ex, manual techniques, HEP development   Planned Modality Interventions   Planned Modality Interventions Comments as needed   Clinical Impression   Criteria for Skilled Therapeutic Interventions Met yes, treatment indicated   PT Diagnosis Patient presents with R shoulder pain and limited mobility. Patient would benefit from skilled PT services to restore mobility, improve strength and return to full functional use of dominant UE.    Influenced by the following impairments Pain, limited motion, weakness   Functional limitations due to impairments Pain with overhead lifting, reaching, dressing, sleeping, work tasks, childcare activities, recreational activities   Clinical Presentation Stable/Uncomplicated   Clinical Presentation Rationale clinical judgement   Clinical Decision Making (Complexity) Low complexity   Therapy Frequency 1 time/week   Predicted Duration of Therapy Intervention (days/wks) 6 weeks   Risk & Benefits of therapy have been explained Yes   Patient, Family & other staff in  agreement with plan of care Yes   Clinical Impression Comments Patient presents with R shoulder impingement with small loss of mobility, weakness of ER and pain. Patient would benefit from PT services to address symptoms and restore full functional use.    Education Assessment   Barriers to Learning No barriers   ORTHO GOALS   PT Ortho Eval Goals 1;2;3;4   Ortho Goal 1   Goal Identifier STG 1   Goal Description Patient will be independent and compliant with HEP.   Target Date 04/05/18   Ortho Goal 2   Goal Identifier LTG 1   Goal Description Patient will display full and painfree motion compared to L allowing him to perform dressing and overhead lifting and reaching tasks without limitation from pain.    Target Date 05/03/18   Ortho Goal 3   Goal Identifier LTG 2   Goal Description Patient will be able to sleep throughout the night consistently without limitation from R shoulder pain.    Target Date 05/03/18   Ortho Goal 4   Goal Identifier LTG3   Goal Description Patient will be able to return to prior activity level with full performance of recreational activities and essential work dutieis with full strength, motion and good tolerance.    Target Date 05/03/18   Total Evaluation Time   Total Evaluation Time 17   I certify the need for these services furnished under this plan of treatment and while under my care. (Physician co-signature of this document indicates review and certification of the therapy plan).       DISCHARGE

## 2024-04-28 NOTE — ED PROVIDER NOTE - PATIENT PORTAL LINK FT
You can access the FollowMyHealth Patient Portal offered by Health system by registering at the following website: [email or web address]. By joining [employer]’s FollowMyHealth portal, you will also be able to view your health information using other applications (apps) compatible with our system.

## 2024-04-28 NOTE — ED ADULT NURSE REASSESSMENT NOTE - NS ED NURSE REASSESS COMMENT FT1
Patient still uncooperative, shouting and screaming, walking out of treatment area into other patients area despite redirections. Code gray activated again at 1819 to 1828, DR. Post in attendant. Zyprexa 10mgs tablets given. Patient offered more food but refused, wants to use phone to call and to leave via bus. Security on standby, safety maintained.

## 2024-04-28 NOTE — ED ADULT NURSE REASSESSMENT NOTE - NS ED NURSE REASSESS COMMENT FT1
Patient again agitated, verbally and physically abusive to staff, DR. Post aware, daniel ram activated a third time at 18:30. Medicated with ativan 2mgs im and Haldol 5mgs im right deltoid at 1840. Constant observation initiated and patient now in her room with staff on watch. Safety maintained.

## 2024-04-28 NOTE — ED PROVIDER NOTE - CLINICAL SUMMARY MEDICAL DECISION MAKING FREE TEXT BOX
34yo female with pmh intellectual delay, schizophrenia, bipolar, presenting for examination.  Patient seen here frequently.  Initially endorsing nausea, no vomiting.  States she had verbal altercation in group home so called 911.  Patient was leaving her bed so asked to return.  Patient continued to become more agitated after several attempts at calm redirection and trying to run out to the waiting room to get on the bus.  Took oral meds and again tried to walk out and leave and became physically aggressive with staff requiring IM sedation.  Exam here unremarkable.  Frequently here with similar issues and agitation that seems similar to prior behavior.  Will monitor and likely dc when awake and calm.

## 2024-04-28 NOTE — ED ADULT TRIAGE NOTE - CHIEF COMPLAINT QUOTE
BIBA for nausea and vomiting after arguing the group home staff today. history of schizophrenia. BIBA for nausea after arguing with the group home staff today. history of schizophrenia.

## 2024-04-28 NOTE — ED PROVIDER NOTE - PROGRESS NOTE DETAILS
Patient at this time calm, sleeping in room. JONATHAN Killian: Pt reassessed and sleeping, calm. Vs stable. no events over night, pt. sleeping comfortably, will sign out to day team, awaiting sober reeval Patient with no reported events overnight, rested.  Patient at this time ambulatory unassisted with steady gait.  Ate a meal here.  Called to arrange transport back to [facility]

## 2024-04-28 NOTE — ED ADULT NURSE REASSESSMENT NOTE - NS ED NURSE REASSESS COMMENT FT1
Patient in treatment area agitated and argumentative, refusing to cooperate with staffs directions. Code gray activated at 1741 and ended at 1754. Enhance observation initiated.

## 2024-04-28 NOTE — ED ADULT NURSE REASSESSMENT NOTE - NS ED NURSE REASSESS COMMENT FT1
Code grey called #3 due to attempting to leave ED. Screaming "fuck you .....you are trying to kill my baby......I hate this place...I'm tired of people messing with me". Security caught her in triage. Doctor ,ANM, nurses and multiple ED staff attempted to redirect patient without success. Finally escorted to room and hitting everything along her path. Attempted to slam door. Medicated as ordered Right IM injection. CO initiated and Security on standby

## 2024-04-28 NOTE — ED PROVIDER NOTE - CRITICAL CARE ATTENDING CONTRIBUTION TO CARE
Seen with JONATHAN Killian    Upon my evaluation, this patient had a high probability of imminent or life-threatening deterioration due to acute agitation requiring chemical sedation, which required my direct attention, intervention, and personal management.  The patient has a  medical condition that impairs one or more vital organ systems.  Frequent personal assessment and adjustment of medical interventions was performed.      I have personally provided 31 minutes of critical care time exclusive of time spent on separately billable procedures. Time includes review of laboratory data, radiology results, discussion with consultants, patient and family; monitoring for potential decompensation, as well as time spent retrieving data and reviewing the chart and documenting the visit. Interventions were performed as documented above.

## 2024-04-28 NOTE — ED PROVIDER NOTE - IV ALTEPLASE DOOR HIDDEN
show Staging Info: By selecting yes to the question above you will include information on AJCC 8 tumor staging in your Mohs note. Information on tumor staging will be automatically added for SCCs on the head and neck. AJCC 8 includes tumor size, tumor depth, perineural involvement and bone invasion.

## 2024-04-28 NOTE — ED ADULT NURSE REASSESSMENT NOTE - NS ED NURSE REASSESS COMMENT FT1
Began walking around because not happy with accomodation in ER. Wanted her private room, blankets, food and demanding a constant observation. Began screaming "fuck you .....call security then .....don't fucking touch me I am pregnant ....Fuck you all" Alfonso angel called in triage and she attempted to leave facility. [facility] security stopped her and was guided with multiple staff members to [facility]. Food provided and she was happy. Awaiting to be seen. Escalated to ANM,

## 2024-04-28 NOTE — ED PROVIDER NOTE - PHYSICAL EXAMINATION
General appearance: Agitated, yelling, aggressive   Eyes: anicteric sclerae, LEXX, EOMI   HENT: Atraumatic; oropharynx clear, MMM and no ulcerations, no pharyngeal erythema or exudate   Neck: Trachea midline; Full range of motion, supple   Pulm: CTA bl, normal respiratory effort and no intercostal retractions, normal work of breathing   CV: RRR, No murmurs, rubs, or gallops   Abdomen: Soft, non-tender, non-distended; no guarding or rebound   Extremities: No peripheral edema, no gross deformities, FROM x4, 5/5 MS x4, gross sensation intact    Skin: Dry, normal temperature, turgor and texture; no rash   Psych: Appropriate affect, cooperative
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
## 2024-04-29 ENCOUNTER — EMERGENCY (EMERGENCY)
Facility: HOSPITAL | Age: 34
LOS: 0 days | Discharge: HOME | End: 2024-04-30
Attending: STUDENT IN AN ORGANIZED HEALTH CARE EDUCATION/TRAINING PROGRAM
Payer: MEDICAID

## 2024-04-29 VITALS
RESPIRATION RATE: 18 BRPM | SYSTOLIC BLOOD PRESSURE: 102 MMHG | OXYGEN SATURATION: 100 % | HEART RATE: 95 BPM | TEMPERATURE: 98 F | DIASTOLIC BLOOD PRESSURE: 75 MMHG

## 2024-04-29 VITALS
HEART RATE: 81 BPM | DIASTOLIC BLOOD PRESSURE: 69 MMHG | TEMPERATURE: 98 F | RESPIRATION RATE: 19 BRPM | SYSTOLIC BLOOD PRESSURE: 97 MMHG | OXYGEN SATURATION: 100 %

## 2024-04-29 VITALS
OXYGEN SATURATION: 97 % | HEART RATE: 96 BPM | TEMPERATURE: 99 F | DIASTOLIC BLOOD PRESSURE: 64 MMHG | SYSTOLIC BLOOD PRESSURE: 97 MMHG | RESPIRATION RATE: 16 BRPM | HEIGHT: 62 IN

## 2024-04-29 DIAGNOSIS — Z91.018 ALLERGY TO OTHER FOODS: ICD-10-CM

## 2024-04-29 DIAGNOSIS — F81.9 DEVELOPMENTAL DISORDER OF SCHOLASTIC SKILLS, UNSPECIFIED: ICD-10-CM

## 2024-04-29 DIAGNOSIS — F41.9 ANXIETY DISORDER, UNSPECIFIED: ICD-10-CM

## 2024-04-29 DIAGNOSIS — F31.9 BIPOLAR DISORDER, UNSPECIFIED: ICD-10-CM

## 2024-04-29 DIAGNOSIS — R45.1 RESTLESSNESS AND AGITATION: ICD-10-CM

## 2024-04-29 DIAGNOSIS — F20.9 SCHIZOPHRENIA, UNSPECIFIED: ICD-10-CM

## 2024-04-29 DIAGNOSIS — Z91.013 ALLERGY TO SEAFOOD: ICD-10-CM

## 2024-04-29 PROCEDURE — 99284 EMERGENCY DEPT VISIT MOD MDM: CPT

## 2024-04-29 RX ORDER — HALOPERIDOL DECANOATE 100 MG/ML
10 INJECTION INTRAMUSCULAR ONCE
Refills: 0 | Status: COMPLETED | OUTPATIENT
Start: 2024-04-29 | End: 2024-04-29

## 2024-04-29 RX ORDER — OLANZAPINE 15 MG/1
10 TABLET, FILM COATED ORAL ONCE
Refills: 0 | Status: COMPLETED | OUTPATIENT
Start: 2024-04-29 | End: 2024-04-29

## 2024-04-29 RX ORDER — KETAMINE HYDROCHLORIDE 100 MG/ML
500 INJECTION INTRAMUSCULAR; INTRAVENOUS ONCE
Refills: 0 | Status: DISCONTINUED | OUTPATIENT
Start: 2024-04-29 | End: 2024-04-29

## 2024-04-29 RX ADMIN — HALOPERIDOL DECANOATE 10 MILLIGRAM(S): 100 INJECTION INTRAMUSCULAR at 17:26

## 2024-04-29 RX ADMIN — Medication 2 MILLIGRAM(S): at 17:26

## 2024-04-29 RX ADMIN — OLANZAPINE 10 MILLIGRAM(S): 15 TABLET, FILM COATED ORAL at 18:13

## 2024-04-29 NOTE — ED ADULT NURSE REASSESSMENT NOTE - NS ED NURSE REASSESS COMMENT FT1
received report from Sara RIDER. patient currently on 1:1, safety maintained. respirations even and unlabored, no acute distress noted at this time. 1:1 continues.

## 2024-04-29 NOTE — ED ADULT NURSE REASSESSMENT NOTE - NS ED NURSE REASSESS COMMENT FT1
Pt presently resting in stretcher. ED tech at bedside for pt to remain in enhanced supervision as per Dr. Ching at this time. Pt presently pending [facility] pick-up for transport back to group home. Primary RN spoke with transfer center K[facility]. Transfer center waiting on phone confirmation from group [place] at this time. LILLI Back and Prasanth called Transfer center to provide phone numbers for Transfer center to get in contact with group home. Pt remains medically discharged at this time, no s/s acute distress noted presently.
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
## 2024-04-29 NOTE — ED ADULT NURSE REASSESSMENT NOTE - NS ED NURSE REASSESS COMMENT FT1
Pt received from ADRY Lackey. Code flight called upon receiving pt. Pt presently agitated, pt states "I want to go to [facility]". LILLI Lara and Prasanth and security officers communicating with patient. Multiple attempts made to re-orient patient back to stretcher. Pt placed on enhanced supervision after returning back to stretcher. Pt pending discharge at this time as per Dr. Ching.

## 2024-04-29 NOTE — ED ADULT NURSE REASSESSMENT NOTE - NS ED NURSE REASSESS COMMENT FT1
spoke with group home staff Chelsie. as per Chelsie, there will be someone home to receive patient. transportation to be set up by social work.

## 2024-04-29 NOTE — ED PROVIDER NOTE - PHYSICAL EXAMINATION
VITAL SIGNS: I have reviewed nursing notes and confirm.  CONSTITUTIONAL: well-appearing, non-toxic, NAD  SKIN: Warm dry, normal skin turgor  HEAD: NCAT  CARD: RRR, no murmurs, rubs or gallops  RESP: clear to ausculation b/l.  No rales, rhonchi, or wheezing.  ABD: soft, + BS, non-tender, non-distended, no rebound or guarding. No CVA tenderness  EXT: Full ROM, no bony tenderness, no pedal edema, no calf tenderness  NEURO: normal motor. normal sensory. l. Normal gait.  PSYCH: anxious, agitated
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
## 2024-04-29 NOTE — ED PROVIDER NOTE - PATIENT PORTAL LINK FT
You can access the FollowMyHealth Patient Portal offered by [facility] by registering at the following website: [email or web address]. By joining [employer]’s FollowMyHealth portal, you will also be able to view your health information using other applications (apps) compatible with our system.

## 2024-04-29 NOTE — ED ADULT TRIAGE NOTE - SPO2 (%)
97 Metronidazole Counseling:  I discussed with the patient the risks of metronidazole including but not limited to seizures, nausea/vomiting, a metallic taste in the mouth, nausea/vomiting and severe allergy.

## 2024-04-29 NOTE — ED ADULT NURSE REASSESSMENT NOTE - NS ED NURSE REASSESS COMMENT FT1
Patient accompanied by EMS, without stretcher, states that she had a seizure and then ran away, code flight called [id number], EMS left prior to triage
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
## 2024-04-29 NOTE — ED ADULT NURSE REASSESSMENT NOTE - NS ED NURSE REASSESS COMMENT FT1
Pt presently in stretcher with enhanced observation by ED tech. Report given to transfer center at this time for ambulette transport home. Primary RN called group [facility], spoke with group home employee Renee MEYER Group home employee able to confirm staff will be present to receive patient at [facility].
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
## 2024-04-30 NOTE — ED ADULT NURSE REASSESSMENT NOTE - NS ED NURSE REASSESS COMMENT FT1
Primary RN contacted Transfer center to update behavioral health questions. As per Young at Transfer center, pt to be picked up by [facility] in 60-90 mins, but call volume is high and may take longer to arrive. Pt remained on enhanced supervision, ED tech at bedside and with patient at all times.

## 2024-04-30 NOTE — ED ADULT NURSE REASSESSMENT NOTE - NS ED NURSE REASSESS COMMENT FT1
[facility] paramedics at bedside to transport patient back to group home. Pt presently refused discharge VS, Dr. Carroll informed.

## 2024-05-06 NOTE — ED PROVIDER NOTE - NS ED MD DISPO DISCHARGE CCDA
Patient/Caregiver provided printed discharge information.
<-- Click to add NO significant Past Surgical History

## 2024-05-07 ENCOUNTER — EMERGENCY (EMERGENCY)
Facility: HOSPITAL | Age: 34
LOS: 1 days | Discharge: HOME | End: 2024-05-07
Admitting: STUDENT IN AN ORGANIZED HEALTH CARE EDUCATION/TRAINING PROGRAM
Payer: MEDICAID

## 2024-05-07 VITALS
OXYGEN SATURATION: 99 % | SYSTOLIC BLOOD PRESSURE: 115 MMHG | HEIGHT: 62 IN | TEMPERATURE: 98 F | RESPIRATION RATE: 16 BRPM | HEART RATE: 96 BPM | DIASTOLIC BLOOD PRESSURE: 81 MMHG

## 2024-05-07 PROCEDURE — 99284 EMERGENCY DEPT VISIT MOD MDM: CPT

## 2024-05-07 NOTE — ED PROVIDER NOTE - PROGRESS NOTE DETAILS
TEDDY Beltran- good behaviour control during stay, she received 2 food trays and multiple snack. She is cleared for discharge.

## 2024-05-07 NOTE — ED ADULT NURSE REASSESSMENT NOTE - NS ED NURSE REASSESS COMMENT FT1
Pt remains awake, a&ox4, calm and cooperative upon approach with even unlabored respirations observed. Pt denies current S/I H/I I/P. MC for DC to group home via EMS at this time. Vitals as stated.

## 2024-05-07 NOTE — ED PROVIDER NOTE - PATIENT PORTAL LINK FT
You can access the FollowMyHealth Patient Portal offered by [facility] by registering at the following website: [email or web address]. By joining [employer]’s FollowMyHealth portal, you will also be able to view your health information using other applications (apps) compatible with our system.

## 2024-05-07 NOTE — ED ADULT NURSE NOTE - OBJECTIVE STATEMENT
Break RN note- Patient arrives to the  ED from the [facility]. Patient reports she is eight month pregnant with nine children , isn't due until next year. Patient reports she feels her children do not feel well and it is because she herself is hungry. Denies HI/SI/AV/VH. Patient calm and cooperative. Belongings secured. Safety maintained.

## 2024-05-07 NOTE — ED PROVIDER NOTE - CLINICAL SUMMARY MEDICAL DECISION MAKING FREE TEXT BOX
This is a 33 yr old F, pmh  DM, intellectual disability , schizophrenia, bipolar, with increased anxiety. She arrived from her residency after calling 911 on herself, because she believed her " kids need help they are hungry in her stomach" Pt well know to provider and  staff members. Chronic ideation to being pregnant with 9 kids. She said today she is pregnant with 8 kids and she will have the ninth next year. Stated today went to the [employer] [place] bought some food went to the park and after decided she will call 911. Upon arrival she states she just wants to eat and return to her residency. No physical complaints at this time.   Collateral by sw, refer to the note.

## 2024-05-07 NOTE — ED ADULT NURSE NOTE - CHIEF COMPLAINT QUOTE
Pt presents to ED from [facility] states that she is pregnant with 9 children for the past several months, was outside in the sun and states she believes her children are not feeling well, denies SI/HI, no hallucinations, calm and cooperative.   [facility] number: [id number]

## 2024-05-07 NOTE — ED BEHAVIORAL HEALTH NOTE - BEHAVIORAL HEALTH NOTE
was informed by provider that to obtain collateral.     Patient resides in [facility], [address] [facility].  spoke to , Tiffany ([phone]) dsp for collateral.    Tiffany reports that the patient called on herself. Tiffany states that the patient claimed that she had stomach cramps and the babies in her stomach are not feeling well. Patient chronically thinks she has 9 babies and that she is 8 months pregnant. She states that the patient is linked to a psychiatrist (unable to provide number) and is medication compliant. She states that the patient was last in the hospital on Saturday night. Patient is not using any drugs or alcohol. Patient is not violent or aggressive. Patient is not presenting with any SI/HI. Patient does not have a hx of seizures and has no related seizures. She states that the medication list was not provided. Case discussed with TEDDY Stovall.  was informed by provider that to obtain collateral.     Patient resides in [facility], [address] [facility].  spoke to , Tiffany ([phone]) [facility] for collateral.    Tiffany reports that the patient called on herself. Tiffany states that the patient claimed that she had stomach cramps and the babies in her stomach are not feeling well. Patient chronically thinks she has 9 babies and that she is 8 months pregnant. She states that the patient is linked to a psychiatrist (unable to provide number) and is medication compliant. She states that the patient was last in the hospital on Saturday night. Patient is not using any drugs or alcohol. Patient is not violent or aggressive. Patient is not presenting with any SI/HI. Patient does not have a hx of seizures and has no related seizures. She states that the medication list was not provided. Case discussed with TEDDY Stovall.    Per provider, TEDDY Stovall , patient is cleared and is able to return to their previous residence, [facility] .  has spoken TO [facility] Social Angelito Cornell confirmed that patients mode of transportation is AMBULANCE and that patient travels INDEPENDENTLY. Clinical provider is in agreement with AMBULANCE back to [facility]. Verbal huddle regarding coordination of care completed with interdisciplinary team.

## 2024-05-07 NOTE — ED PROVIDER NOTE - OBJECTIVE STATEMENT
This is a 33 yr old F, pmh  intellectual disability , schizophrenia, bipolar, with anxi This is a 33 yr old F, pmh  DM, intellectual disability , schizophrenia, bipolar, with increased anxiety. She arrived from her residency after calling 911 on herself, because she believed her " kids need help they are hungry in her stomach" Pt well know to provider and  staff members. Chronic ideation to being pregnant with 9 kids. She said today she is pregnant with 8 kids and she will have the ninth next year. Stated today went to the [employer] bought some food went to the park and after decided she will call 911. Upon arrival she states she just wants to eat and return to her residency. No physical complaints at this time.

## 2024-05-07 NOTE — ED ADULT TRIAGE NOTE - CHIEF COMPLAINT QUOTE
Pt presents to ED from [facility] states that she is pregnant with 9 children for the past several months, was outside in the sun and states she believes her children are not feeling well, denies SI/HI, no hallucinations, calm and cooperative.   [facility] number: [id number]

## 2024-05-08 ENCOUNTER — EMERGENCY (EMERGENCY)
Facility: HOSPITAL | Age: 34
LOS: 0 days | Discharge: HOME | End: 2024-05-08
Attending: STUDENT IN AN ORGANIZED HEALTH CARE EDUCATION/TRAINING PROGRAM
Payer: MEDICAID

## 2024-05-08 VITALS
OXYGEN SATURATION: 100 % | SYSTOLIC BLOOD PRESSURE: 121 MMHG | TEMPERATURE: 98 F | RESPIRATION RATE: 16 BRPM | WEIGHT: 266.1 LBS | HEART RATE: 99 BPM | HEIGHT: 62 IN | DIASTOLIC BLOOD PRESSURE: 78 MMHG

## 2024-05-08 VITALS
HEART RATE: 89 BPM | RESPIRATION RATE: 18 BRPM | SYSTOLIC BLOOD PRESSURE: 131 MMHG | DIASTOLIC BLOOD PRESSURE: 83 MMHG | OXYGEN SATURATION: 99 %

## 2024-05-08 DIAGNOSIS — F79 UNSPECIFIED INTELLECTUAL DISABILITIES: ICD-10-CM

## 2024-05-08 DIAGNOSIS — R07.89 OTHER CHEST PAIN: ICD-10-CM

## 2024-05-08 DIAGNOSIS — F31.9 BIPOLAR DISORDER, UNSPECIFIED: ICD-10-CM

## 2024-05-08 DIAGNOSIS — Z91.018 ALLERGY TO OTHER FOODS: ICD-10-CM

## 2024-05-08 DIAGNOSIS — Z91.013 ALLERGY TO SEAFOOD: ICD-10-CM

## 2024-05-08 DIAGNOSIS — F20.9 SCHIZOPHRENIA, UNSPECIFIED: ICD-10-CM

## 2024-05-08 DIAGNOSIS — E11.9 TYPE 2 DIABETES MELLITUS WITHOUT COMPLICATIONS: ICD-10-CM

## 2024-05-08 LAB
ANION GAP SERPL CALC-SCNC: 6 MMOL/L — SIGNIFICANT CHANGE UP (ref 5–17)
BUN SERPL-MCNC: 12 MG/DL — SIGNIFICANT CHANGE UP (ref 7–23)
CALCIUM SERPL-MCNC: 8.9 MG/DL — SIGNIFICANT CHANGE UP (ref 8.5–10.1)
CHLORIDE SERPL-SCNC: 110 MMOL/L — HIGH (ref 96–108)
CO2 SERPL-SCNC: 23 MMOL/L — SIGNIFICANT CHANGE UP (ref 22–31)
CREAT SERPL-MCNC: 0.67 MG/DL — SIGNIFICANT CHANGE UP (ref 0.5–1.3)
EGFR: 120 ML/MIN/1.73M2 — SIGNIFICANT CHANGE UP
GLUCOSE SERPL-MCNC: 124 MG/DL — HIGH (ref 70–99)
HCG SERPL-ACNC: <1 MIU/ML — SIGNIFICANT CHANGE UP
HCT VFR BLD CALC: 35.6 % — SIGNIFICANT CHANGE UP (ref 34.5–45)
HGB BLD-MCNC: 11.6 G/DL — SIGNIFICANT CHANGE UP (ref 11.5–15.5)
MCHC RBC-ENTMCNC: 30.1 PG — SIGNIFICANT CHANGE UP (ref 27–34)
MCHC RBC-ENTMCNC: 32.6 G/DL — SIGNIFICANT CHANGE UP (ref 32–36)
MCV RBC AUTO: 92.2 FL — SIGNIFICANT CHANGE UP (ref 80–100)
NRBC # BLD: 0 /100 WBCS — SIGNIFICANT CHANGE UP (ref 0–0)
PLATELET # BLD AUTO: 242 K/UL — SIGNIFICANT CHANGE UP (ref 150–400)
POTASSIUM SERPL-MCNC: 4.1 MMOL/L — SIGNIFICANT CHANGE UP (ref 3.5–5.3)
POTASSIUM SERPL-SCNC: 4.1 MMOL/L — SIGNIFICANT CHANGE UP (ref 3.5–5.3)
RBC # BLD: 3.86 M/UL — SIGNIFICANT CHANGE UP (ref 3.8–5.2)
RBC # FLD: 14.3 % — SIGNIFICANT CHANGE UP (ref 10.3–14.5)
SODIUM SERPL-SCNC: 139 MMOL/L — SIGNIFICANT CHANGE UP (ref 135–145)
TROPONIN I, HIGH SENSITIVITY RESULT: <3 NG/L — SIGNIFICANT CHANGE UP
WBC # BLD: 6.17 K/UL — SIGNIFICANT CHANGE UP (ref 3.8–10.5)
WBC # FLD AUTO: 6.17 K/UL — SIGNIFICANT CHANGE UP (ref 3.8–10.5)

## 2024-05-08 PROCEDURE — 99285 EMERGENCY DEPT VISIT HI MDM: CPT

## 2024-05-08 PROCEDURE — 93010 ELECTROCARDIOGRAM REPORT: CPT
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
## 2024-05-08 NOTE — ED PROVIDER NOTE - PHYSICAL EXAMINATION
Gen: aox3, nad   Head: NCAT  ENT: Airway patent, moist mucous membranes, nasal passageways clear  Cardiac: Normal rate, normal rhythm  Respiratory: Lungs CTA B/L  Gastrointestinal: Abdomen soft, nontender, nondistended, no rebound, no guarding  MSK: No gross abnormalities, FROM of all four extremities, no edema  HEME: Extremities warm and well perfused   Skin: No rashes, no lesions  Neuro: No gross neurologic deficits, normal speech, no gait abnormality  Psych: No SI, initially tearful but mood improved while in ED, calm and cooperative

## 2024-05-08 NOTE — ED PROVIDER NOTE - PROGRESS NOTE DETAILS
Miles DO: pt feels better, pt is requesting discharge, she has been calm and cooperative during ED stay, will arrange for DC , no active chest pain during ED stay

## 2024-05-08 NOTE — ED PROVIDER NOTE - PATIENT PORTAL LINK FT
You can access the FollowMyHealth Patient Portal offered by [facility] by registering at the following website: [email or web address]. By joining [employer]’s FollowMyHealth portal, you will also be able to view your health information using other applications (apps) compatible with our system.

## 2024-05-08 NOTE — ED ADULT NURSE NOTE - OBJECTIVE STATEMENT
Patient came from placed for EKG pt with chest pain and tightness denies N/V/D dizziness or SOB at present time EMS administered  po Asprin 324mg. Seen in ED previously for same Patient came from placed for EKG pt with chest pain and tightness denies N/V/D dizziness or SOB at present time EMS administered  po Asprin 324mg. Seen in ED previously for same reason.
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
## 2024-05-08 NOTE — ED PROVIDER NOTE - CLINICAL SUMMARY MEDICAL DECISION MAKING FREE TEXT BOX
33 yr old F, pmh  DM, intellectual disability , schizophrenia, bipolar, with complaint of chest pain described as tightness that occurred while arguing with staff member   - suspected stress reaction, low suspicion for ACS- pt is poor historian so will check troponin and ekg, re-eval, lungs clear, no indication for cxr , no SOB , PERC 0

## 2024-05-13 ENCOUNTER — APPOINTMENT (OUTPATIENT)
Age: 34
End: 2024-05-13
Payer: MEDICAID

## 2024-05-13 PROCEDURE — ZZZZZ: CPT

## 2024-05-16 ENCOUNTER — EMERGENCY (EMERGENCY)
Facility: HOSPITAL | Age: 34
LOS: 0 days | Discharge: HOME | End: 2024-05-16
Attending: EMERGENCY MEDICINE
Payer: MEDICAID

## 2024-05-16 VITALS
TEMPERATURE: 98 F | SYSTOLIC BLOOD PRESSURE: 105 MMHG | RESPIRATION RATE: 18 BRPM | OXYGEN SATURATION: 99 % | WEIGHT: 270.95 LBS | HEIGHT: 62 IN | HEART RATE: 88 BPM | DIASTOLIC BLOOD PRESSURE: 60 MMHG

## 2024-05-16 VITALS
SYSTOLIC BLOOD PRESSURE: 118 MMHG | OXYGEN SATURATION: 100 % | DIASTOLIC BLOOD PRESSURE: 95 MMHG | RESPIRATION RATE: 19 BRPM | HEART RATE: 99 BPM | TEMPERATURE: 98 F

## 2024-05-16 DIAGNOSIS — Z91.018 ALLERGY TO OTHER FOODS: ICD-10-CM

## 2024-05-16 DIAGNOSIS — R10.9 UNSPECIFIED ABDOMINAL PAIN: ICD-10-CM

## 2024-05-16 DIAGNOSIS — F79 UNSPECIFIED INTELLECTUAL DISABILITIES: ICD-10-CM

## 2024-05-16 DIAGNOSIS — R07.89 OTHER CHEST PAIN: ICD-10-CM

## 2024-05-16 DIAGNOSIS — E11.9 TYPE 2 DIABETES MELLITUS WITHOUT COMPLICATIONS: ICD-10-CM

## 2024-05-16 DIAGNOSIS — F20.9 SCHIZOPHRENIA, UNSPECIFIED: ICD-10-CM

## 2024-05-16 DIAGNOSIS — Z91.013 ALLERGY TO SEAFOOD: ICD-10-CM

## 2024-05-16 DIAGNOSIS — F31.9 BIPOLAR DISORDER, UNSPECIFIED: ICD-10-CM

## 2024-05-16 PROCEDURE — 99284 EMERGENCY DEPT VISIT MOD MDM: CPT

## 2024-05-16 PROCEDURE — 93010 ELECTROCARDIOGRAM REPORT: CPT

## 2024-05-16 RX ORDER — FAMOTIDINE 10 MG/ML
20 INJECTION INTRAVENOUS ONCE
Refills: 0 | Status: COMPLETED | OUTPATIENT
Start: 2024-05-16 | End: 2024-05-16

## 2024-05-16 RX ORDER — ACETAMINOPHEN 500 MG
650 TABLET ORAL ONCE
Refills: 0 | Status: COMPLETED | OUTPATIENT
Start: 2024-05-16 | End: 2024-05-16

## 2024-05-16 RX ADMIN — Medication 30 MILLILITER(S): at 19:58

## 2024-05-16 RX ADMIN — Medication 650 MILLIGRAM(S): at 21:40

## 2024-05-16 RX ADMIN — FAMOTIDINE 20 MILLIGRAM(S): 10 INJECTION INTRAVENOUS at 19:59

## 2024-05-16 NOTE — ED PROVIDER NOTE - NSFOLLOWUPINSTRUCTIONS_ED_ALL_ED_FT
Rest, drink plenty of fluids.  Advance activity as tolerated.  Continue all previously prescribed medications as directed.  Follow up with your primary care physician in 48-72 hours- bring copies of your results.  Return to the ER for worsening or persistent symptoms, and/or ANY NEW OR CONCERNING SYMPTOMS. If you have issues obtaining follow up, please call: [phone] to obtain a doctor or specialist who takes your insurance in your area.  You may call [phone] to make an appointment with the internal medicine clinic.

## 2024-05-16 NOTE — ED PROVIDER NOTE - PATIENT PORTAL LINK FT
You can access the FollowMyHealth Patient Portal offered by [facility] by registering at the following website: [email or web address]. By joining [employer]’s FollowMyHealth portal, you will also be able to view your health information using other applications (apps) compatible with our system.

## 2024-05-16 NOTE — ED ADULT TRIAGE NOTE - HEIGHT IN INCHES
H&P dated 2/13/19 by Dr. Tapan Yeung was reviewed. No changes to be made. Patient seen in preoperative area with his wife at the bedside.  We discussed the surgical plan for today and again discussed risks, benefits, alternatives, and post operative expec 2

## 2024-05-16 NOTE — ED PROVIDER NOTE - NS ED ROS FT
CONSTITUTIONAL: No fever, no chills, no fatigue  EYES: No visual changes  ENT: No ear pain, no sore throat  CARDIOVASCULAR: no palpitations  RESPIRATORY: No cough, no SOB  GI:  no nausea, no vomiting, no constipation, no diarrhea  GENITOURINARY: No dysuria, no frequency, no hematuria  MUSKULOSKELETAL: No backpain, no joint pain, no myalgias  SKIN: No rash  NEURO: No headache    ALL OTHER SYSTEMS NEGATIVE.

## 2024-05-16 NOTE — ED PROVIDER NOTE - OBJECTIVE STATEMENT
34 y/o female with  DM, intellectual disability , schizophrenia, bipolar , multiple visits in the ED for similar complaint from group home here c/o abdominal pain and chest pain. Pt reports states pain started after eating chicken at the group home. Pt states she might have eaten too much sugar. Pt denies nausea, vomiting, fever. Pt asking to go home.

## 2024-05-16 NOTE — ED ADULT TRIAGE NOTE - CHIEF COMPLAINT QUOTE
pt c/o diffuse abdominal pain and chest pain since this morning. denies n/v/d. history of schizophrenia, dm, and htn. .

## 2024-05-16 NOTE — ED PROVIDER NOTE - ATTENDING APP SHARED VISIT CONTRIBUTION OF CARE
33-year-old female history of schizoaffective disorder DM intellectual disability currently resides in group home and who presents frequently to the ED (well-known to me), presents now complaining of chest and abdominal pain that started today after eating chicken at her group home.  States she was given the wrong drink which had too much sugar in it along with her chicken and then began feeling some sharp pain (indicates epigastrium).  Per review of EMR multiple similar complaints.  On exam patient well-appearing in no acute distress normal heart and lung sounds on auscultation abdomen is soft nontender nondistended.  She is afebrile hemodynamically stable.  Pain improved with Pepcid/Maalox although that began a feeling of some tightness which resolved with Tylenol.  Tolerating p.o. is hemodynamically stable at this time no indication for laboratory evaluation or imaging.  Likely has some element of gastritis or reflux versus underlying psychosocial pathology.  Patient stable for discharge, ED staff communicated with her group home and excepted patient back and patient was put in for transport by EMS back to her facility. no

## 2024-05-16 NOTE — ED ADULT TRIAGE NOTE - TEMPERATURE IN FAHRENHEIT (DEGREES F)
You can access the FollowMyHealth Patient Portal offered by Health system by registering at the following website: [email or web address]. By joining [employer]’s FollowMyHealth portal, you will also be able to view your health information using other applications (apps) compatible with our system.
98

## 2024-05-16 NOTE — ED PROVIDER NOTE - CLINICAL SUMMARY MEDICAL DECISION MAKING FREE TEXT BOX
32 y/o  DM, intellectual disability , schizophrenia, bipolar disorder history multiple ed visits to the ED  from group home here with abdominal pain and chest pain today. Vs stable.   EKG reviewed and non  ischemic. Pt well appearing, abdomen soft nontender.   Possible GERD or gastritis. Low suspicion for acute tray , appy given pt without tenderness.   Will treat with pepcid and maalox. amparo reviewed pt was seen here 5 days ago for chest pain and had lab work which was unremarkable.   Low suspicion for ACS.   Pt stable to be discharged back to care home.

## 2024-05-17 ENCOUNTER — EMERGENCY (EMERGENCY)
Facility: HOSPITAL | Age: 34
LOS: 1 days | Discharge: HOME | End: 2024-05-17
Admitting: STUDENT IN AN ORGANIZED HEALTH CARE EDUCATION/TRAINING PROGRAM
Payer: MEDICAID

## 2024-05-17 VITALS
HEART RATE: 60 BPM | HEIGHT: 62 IN | OXYGEN SATURATION: 97 % | DIASTOLIC BLOOD PRESSURE: 65 MMHG | TEMPERATURE: 98 F | SYSTOLIC BLOOD PRESSURE: 124 MMHG | RESPIRATION RATE: 16 BRPM

## 2024-05-17 PROCEDURE — 99284 EMERGENCY DEPT VISIT MOD MDM: CPT

## 2024-05-17 RX ORDER — CLOZAPINE 150 MG/1
50 TABLET, ORALLY DISINTEGRATING ORAL ONCE
Refills: 0 | Status: DISCONTINUED | OUTPATIENT
Start: 2024-05-17 | End: 2024-05-17

## 2024-05-17 RX ORDER — ARIPIPRAZOLE 15 MG/1
5 TABLET ORAL ONCE
Refills: 0 | Status: COMPLETED | OUTPATIENT
Start: 2024-05-17 | End: 2024-05-17

## 2024-05-17 RX ORDER — FLUOXETINE HCL 10 MG
10 CAPSULE ORAL ONCE
Refills: 0 | Status: COMPLETED | OUTPATIENT
Start: 2024-05-17 | End: 2024-05-17

## 2024-05-17 RX ADMIN — ARIPIPRAZOLE 5 MILLIGRAM(S): 15 TABLET ORAL at 22:56

## 2024-05-17 RX ADMIN — Medication 10 MILLIGRAM(S): at 22:56

## 2024-05-17 NOTE — ED ADULT TRIAGE NOTE - MEANS OF ARRIVAL
ambulatory
Margaret GOODSON- 30 y/o F p/w rt sided lower abd pain since yesterday which radiates to rt flank associated with multiple episodes of vomiting and nausea, no fever, chills. Pt had lmp on monnday, no outside food, diarrhea or constipation but has excruciating pain and lies on left side to relieve the pain    pt is alert, well appearing female, s1s2 normal reg, b/l clear breathe sounds,  abd soft rlq tenderness with mc burnys sign+, normal bowel sounds, neuro exam aox3, cn 2-12 intact, skin warm, dry, mod turgor    ddx: appendicitis, kidney stone , pyelo

## 2024-05-17 NOTE — ED PROVIDER NOTE - OBJECTIVE STATEMENT
33 year old female with PMH of schizophrenia, developmental delay, DM presents to the ED from group home for psychiatric evaluation. Pt states that she is pregnant with multiple babies and they are all having seizures in her belly. Pt further adds that she was upset at the group home staff for not getting requested grilled cheese. Pt denies any other complaints.

## 2024-05-17 NOTE — ED PROVIDER NOTE - NSFOLLOWUPINSTRUCTIONS_ED_ALL_ED_FT
Follow up with your PMD within 1-2 days or you can call our clinic at [phone] for an appointment  Take all of your other medications as previously prescribed.  Worsening, continued or ANY new concerning symptoms return to the Emergency Department.

## 2024-05-17 NOTE — ED PROVIDER NOTE - PATIENT PORTAL LINK FT
You can access the FollowMyHealth Patient Portal offered by [facility] by registering at the following website: [email or web address]. By joining [employer]’s FollowMyHealth portal, you will also be able to view your health information using other applications (apps) compatible with our system.

## 2024-05-17 NOTE — ED ADULT NURSE NOTE - OBJECTIVE STATEMENT
"APPEARANCE: Pt is awake and alert.   RESPIRATORY: Respirations are even and unlabored. Airway is patent. Pt denies SOB but c/o inability to "take a deep breath"   SKIN: Skin is warm, dry, intact and appropriately colored for ethnicity.   NEURO: Pt is moving all extremities without difficulty. Sensation is intact. Speech is clear and appropriate. Facial movement is symmetrical.   CARDIAC: Peripheral pulses are intact and palpable. Cap refill is <3 seconds.     Bed is in low, locked position with side rails upx2. Call light is in reach.    " Pt received to  accompanied by EMS from group home residence. Pt states she called 911 because she has a "stuffy nose", believes she is pregnant with multiple babies and that one of them "had a seizure in her belly" and also that she was upset at group home staff because "they wouldn't make her grilled cheese and give her snacks". Pt denies SI and HI; belongings secured for safety. Pt awaiting SW.

## 2024-05-17 NOTE — ED PROVIDER NOTE - CLINICAL SUMMARY MEDICAL DECISION MAKING FREE TEXT BOX
33 year old female with PMH of schizophrenia, developmental delay, DM presents to the ED from group home for psychiatric evaluation. Pt states that she is pregnant with multiple babies and they are all having seizures in her belly. This delusion seems to be chronic. No acute psychiatric concerns. Pt is cooperative in , pleasant, happy with a sandwich, no medical concerns at this time. Plan to observe, obtain collateral from group home, likely dc bach to group home via EMS.

## 2024-05-17 NOTE — ED BEHAVIORAL HEALTH NOTE - BEHAVIORAL HEALTH NOTE
Patient resides in [facility] group home, [address] [facility].  spoke to lise ([phone]) nilo for collateral.    Pt is a 34 yo female domiciled  at residence, hx of developmental delay, bib ems. Staff reports pt seemed at baseline earlier. Pt went out with peers earlier and presented well. No si or hi reported, no Avh reported. Sleeping eating and showering at baseline.  No drug or alcohol use. Pt has been compliant w/ medication and took night medication.  no safety concerns reported and pt can return via ambulance.    Per provider, JONATHAN Son , patient is cleared and is able to return to their previous residence, [facility] .  has spoken TO dsp lise  confirmed that patients mode of transportation is AMBULANCE and that patient travels w/ supervision. Clinical provider is in agreement with AMBULANCE back to snf. Verbal huddle regarding coordination of care completed with interdisciplinary team.  Pt’s address is [facility] [facility]. Transportation coordinated via nursing.
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
## 2024-05-19 ENCOUNTER — EMERGENCY (EMERGENCY)
Facility: HOSPITAL | Age: 34
LOS: 1 days | Discharge: HOME | End: 2024-05-19
Attending: STUDENT IN AN ORGANIZED HEALTH CARE EDUCATION/TRAINING PROGRAM | Admitting: STUDENT IN AN ORGANIZED HEALTH CARE EDUCATION/TRAINING PROGRAM
Payer: MEDICAID

## 2024-05-19 VITALS
RESPIRATION RATE: 18 BRPM | OXYGEN SATURATION: 100 % | HEIGHT: 62 IN | SYSTOLIC BLOOD PRESSURE: 126 MMHG | TEMPERATURE: 98 F | HEART RATE: 108 BPM | DIASTOLIC BLOOD PRESSURE: 89 MMHG

## 2024-05-19 VITALS
SYSTOLIC BLOOD PRESSURE: 112 MMHG | TEMPERATURE: 98 F | HEART RATE: 99 BPM | DIASTOLIC BLOOD PRESSURE: 82 MMHG | RESPIRATION RATE: 18 BRPM | OXYGEN SATURATION: 100 %

## 2024-05-19 LAB
ALBUMIN SERPL ELPH-MCNC: 3.5 G/DL — SIGNIFICANT CHANGE UP (ref 3.3–5)
ALP SERPL-CCNC: 64 U/L — SIGNIFICANT CHANGE UP (ref 40–120)
ALT FLD-CCNC: 10 U/L — SIGNIFICANT CHANGE UP (ref 4–33)
ANION GAP SERPL CALC-SCNC: 15 MMOL/L — HIGH (ref 7–14)
APPEARANCE UR: ABNORMAL
AST SERPL-CCNC: 27 U/L — SIGNIFICANT CHANGE UP (ref 4–32)
BACTERIA # UR AUTO: ABNORMAL /HPF
BASE EXCESS BLDV CALC-SCNC: -1.3 MMOL/L — SIGNIFICANT CHANGE UP (ref -2–3)
BASOPHILS # BLD AUTO: 0.05 K/UL — SIGNIFICANT CHANGE UP (ref 0–0.2)
BASOPHILS NFR BLD AUTO: 0.7 % — SIGNIFICANT CHANGE UP (ref 0–2)
BILIRUB SERPL-MCNC: 0.2 MG/DL — SIGNIFICANT CHANGE UP (ref 0.2–1.2)
BILIRUB UR-MCNC: NEGATIVE — SIGNIFICANT CHANGE UP
BUN SERPL-MCNC: 9 MG/DL — SIGNIFICANT CHANGE UP (ref 7–23)
CA-I SERPL-SCNC: 1.27 MMOL/L — SIGNIFICANT CHANGE UP (ref 1.15–1.33)
CALCIUM SERPL-MCNC: 9 MG/DL — SIGNIFICANT CHANGE UP (ref 8.4–10.5)
CAST: 0 /LPF — SIGNIFICANT CHANGE UP (ref 0–4)
CHLORIDE BLDV-SCNC: 107 MMOL/L — SIGNIFICANT CHANGE UP (ref 96–108)
CHLORIDE SERPL-SCNC: 107 MMOL/L — SIGNIFICANT CHANGE UP (ref 98–107)
CO2 BLDV-SCNC: 25.6 MMOL/L — SIGNIFICANT CHANGE UP (ref 22–26)
CO2 SERPL-SCNC: 15 MMOL/L — LOW (ref 22–31)
COLOR SPEC: SIGNIFICANT CHANGE UP
CREAT SERPL-MCNC: 0.65 MG/DL — SIGNIFICANT CHANGE UP (ref 0.5–1.3)
DIFF PNL FLD: NEGATIVE — SIGNIFICANT CHANGE UP
EGFR: 121 ML/MIN/1.73M2 — SIGNIFICANT CHANGE UP
EOSINOPHIL # BLD AUTO: 0.16 K/UL — SIGNIFICANT CHANGE UP (ref 0–0.5)
EOSINOPHIL NFR BLD AUTO: 2.1 % — SIGNIFICANT CHANGE UP (ref 0–6)
FLUAV AG NPH QL: SIGNIFICANT CHANGE UP
FLUBV AG NPH QL: SIGNIFICANT CHANGE UP
GAS PNL BLDV: 136 MMOL/L — SIGNIFICANT CHANGE UP (ref 136–145)
GAS PNL BLDV: SIGNIFICANT CHANGE UP
GAS PNL BLDV: SIGNIFICANT CHANGE UP
GLUCOSE BLDV-MCNC: 82 MG/DL — SIGNIFICANT CHANGE UP (ref 70–99)
GLUCOSE SERPL-MCNC: 76 MG/DL — SIGNIFICANT CHANGE UP (ref 70–99)
GLUCOSE UR QL: NEGATIVE MG/DL — SIGNIFICANT CHANGE UP
HCG SERPL-ACNC: <1 MIU/ML — SIGNIFICANT CHANGE UP
HCO3 BLDV-SCNC: 24 MMOL/L — SIGNIFICANT CHANGE UP (ref 22–29)
HCT VFR BLD CALC: 38.2 % — SIGNIFICANT CHANGE UP (ref 34.5–45)
HCT VFR BLDA CALC: 40 % — SIGNIFICANT CHANGE UP (ref 34.5–46.5)
HGB BLD CALC-MCNC: 13.2 G/DL — SIGNIFICANT CHANGE UP (ref 11.7–16.1)
HGB BLD-MCNC: 12.8 G/DL — SIGNIFICANT CHANGE UP (ref 11.5–15.5)
IANC: 5.01 K/UL — SIGNIFICANT CHANGE UP (ref 1.8–7.4)
IMM GRANULOCYTES NFR BLD AUTO: 0.4 % — SIGNIFICANT CHANGE UP (ref 0–0.9)
KETONES UR-MCNC: 15 MG/DL
LACTATE BLDV-MCNC: 1 MMOL/L — SIGNIFICANT CHANGE UP (ref 0.5–2)
LEUKOCYTE ESTERASE UR-ACNC: NEGATIVE — SIGNIFICANT CHANGE UP
LYMPHOCYTES # BLD AUTO: 1.71 K/UL — SIGNIFICANT CHANGE UP (ref 1–3.3)
LYMPHOCYTES # BLD AUTO: 22.3 % — SIGNIFICANT CHANGE UP (ref 13–44)
MCHC RBC-ENTMCNC: 30.4 PG — SIGNIFICANT CHANGE UP (ref 27–34)
MCHC RBC-ENTMCNC: 33.5 GM/DL — SIGNIFICANT CHANGE UP (ref 32–36)
MCV RBC AUTO: 90.7 FL — SIGNIFICANT CHANGE UP (ref 80–100)
MONOCYTES # BLD AUTO: 0.72 K/UL — SIGNIFICANT CHANGE UP (ref 0–0.9)
MONOCYTES NFR BLD AUTO: 9.4 % — SIGNIFICANT CHANGE UP (ref 2–14)
NEUTROPHILS # BLD AUTO: 5.01 K/UL — SIGNIFICANT CHANGE UP (ref 1.8–7.4)
NEUTROPHILS NFR BLD AUTO: 65.1 % — SIGNIFICANT CHANGE UP (ref 43–77)
NITRITE UR-MCNC: NEGATIVE — SIGNIFICANT CHANGE UP
NRBC # BLD: 0 /100 WBCS — SIGNIFICANT CHANGE UP (ref 0–0)
NRBC # FLD: 0 K/UL — SIGNIFICANT CHANGE UP (ref 0–0)
PCO2 BLDV: 43 MMHG — SIGNIFICANT CHANGE UP (ref 39–52)
PH BLDV: 7.36 — SIGNIFICANT CHANGE UP (ref 7.32–7.43)
PH UR: 6 — SIGNIFICANT CHANGE UP (ref 5–8)
PLATELET # BLD AUTO: 216 K/UL — SIGNIFICANT CHANGE UP (ref 150–400)
PO2 BLDV: 53 MMHG — HIGH (ref 25–45)
POTASSIUM BLDV-SCNC: 4.2 MMOL/L — SIGNIFICANT CHANGE UP (ref 3.5–5.1)
POTASSIUM SERPL-MCNC: 5.6 MMOL/L — HIGH (ref 3.5–5.3)
POTASSIUM SERPL-SCNC: 5.6 MMOL/L — HIGH (ref 3.5–5.3)
PROT SERPL-MCNC: 7.6 G/DL — SIGNIFICANT CHANGE UP (ref 6–8.3)
PROT UR-MCNC: SIGNIFICANT CHANGE UP MG/DL
RBC # BLD: 4.21 M/UL — SIGNIFICANT CHANGE UP (ref 3.8–5.2)
RBC # FLD: 14 % — SIGNIFICANT CHANGE UP (ref 10.3–14.5)
RBC CASTS # UR COMP ASSIST: 1 /HPF — SIGNIFICANT CHANGE UP (ref 0–4)
REVIEW: SIGNIFICANT CHANGE UP
RSV RNA NPH QL NAA+NON-PROBE: SIGNIFICANT CHANGE UP
SAO2 % BLDV: 84.8 % — SIGNIFICANT CHANGE UP (ref 67–88)
SARS-COV-2 RNA SPEC QL NAA+PROBE: SIGNIFICANT CHANGE UP
SODIUM SERPL-SCNC: 137 MMOL/L — SIGNIFICANT CHANGE UP (ref 135–145)
SP GR SPEC: 1.03 — HIGH (ref 1–1.03)
SQUAMOUS # UR AUTO: 34 /HPF — HIGH (ref 0–5)
UROBILINOGEN FLD QL: 1 MG/DL — SIGNIFICANT CHANGE UP (ref 0.2–1)
WBC # BLD: 7.68 K/UL — SIGNIFICANT CHANGE UP (ref 3.8–10.5)
WBC # FLD AUTO: 7.68 K/UL — SIGNIFICANT CHANGE UP (ref 3.8–10.5)
WBC UR QL: 1 /HPF — SIGNIFICANT CHANGE UP (ref 0–5)

## 2024-05-19 PROCEDURE — 93010 ELECTROCARDIOGRAM REPORT: CPT

## 2024-05-19 PROCEDURE — 71046 X-RAY EXAM CHEST 2 VIEWS: CPT | Mod: 26

## 2024-05-19 PROCEDURE — 99285 EMERGENCY DEPT VISIT HI MDM: CPT

## 2024-05-19 RX ORDER — AZITHROMYCIN 500 MG/1
1 TABLET, FILM COATED ORAL
Qty: 1 | Refills: 0
Start: 2024-05-19 | End: 2024-05-23

## 2024-05-19 RX ORDER — SODIUM ZIRCONIUM CYCLOSILICATE 10 G/10G
10 POWDER, FOR SUSPENSION ORAL ONCE
Refills: 0 | Status: COMPLETED | OUTPATIENT
Start: 2024-05-19 | End: 2024-05-19

## 2024-05-19 RX ORDER — ALBUTEROL 90 UG/1
1 AEROSOL, METERED ORAL ONCE
Refills: 0 | Status: COMPLETED | OUTPATIENT
Start: 2024-05-19 | End: 2024-05-19

## 2024-05-19 RX ADMIN — SODIUM ZIRCONIUM CYCLOSILICATE 10 GRAM(S): 10 POWDER, FOR SUSPENSION ORAL at 11:50

## 2024-05-19 RX ADMIN — ALBUTEROL 1 PUFF(S): 90 AEROSOL, METERED ORAL at 11:50

## 2024-05-19 NOTE — ED PROVIDER NOTE - PATIENT PORTAL LINK FT
You can access the FollowMyHealth Patient Portal offered by [facility] by registering at the following website: [email or web address]. By joining [employer]’s FollowMyHealth portal, you will also be able to view your health information using other applications (apps) compatible with our system.

## 2024-05-19 NOTE — ED PROVIDER NOTE - NSFOLLOWUPINSTRUCTIONS_ED_ALL_ED_FT
You were evaluated in the Emergency Department today for your congestion, cough and fevers. Your evaluation suggests that your symptoms may be due to pneumonia.     Please take your prescribed antibiotic as per pharmacy instructions.  Your lab work also showed a high potassium level, please follow-up with your primary care doctor for repeat testing.    Please schedule an appointment for follow up with your primary care physician this week.    Return to the Emergency Department if you experience worsening cough, fever 100.4 ° F or greater not controlled by Tylenol or Ibuprofen, recurrent vomiting, chest pain, shortness of breath, or any other concerning symptoms.

## 2024-05-19 NOTE — ED ADULT TRIAGE NOTE - CHIEF COMPLAINT QUOTE
Pt presents to ED ambulatory from group home with c/o sore throat and cough. Pt tested positive for the Flu yesterday, wanted to go to urgent care "but staff was laughing at me so I called 911". Pt presents to ED ambulatory from group home with c/o sore throat and cough. Pt tested positive for the Flu yesterday, wanted to go to urgent care "but staff was laughing at me so I called 911". MD called for psych eval advised pt to be evaluated medically. Pt denies suicidal ideation, homicidal ideation, auditory or visual hallucinations.

## 2024-05-19 NOTE — ED PROVIDER NOTE - ATTENDING CONTRIBUTION TO CARE
32 yo F hx asthma, schizoaffective with bipolar disorder, developmental delay, presenting for evaluation of cough/URI symptoms x 3 weeks. No fevers/chills. Pt seen one day ago in ED with delusions of pregnancy. Pt called EMS for flu like symptoms, but endorsed pregnancy to triage. Pt denies SI, HI, auditory or visual hallucinations. She appears calm/cooperative and not a danger to self or others, no concern for acute psychiatric disturbance today. Pt is upset because she feels group home is not taking her URI symptoms seriously. She is well appearing, no acute distress, non-toxic appearing, afebrile.    VITALS: reviewed  GEN: NAD, A & O x 4  HEAD/EYES: NCAT, EOMI, anicteric sclerae,   ENT: mucus membranes moist, oropharynx WNL, trachea midline,  RESP: lungs CTA with equal breath sounds bilaterally, chest wall nontender and atraumatic  CV: heart with reg rhythm S1, S2, distal pulses intact and symmetric bilaterally  ABDOMEN:  soft, nondistended, nontender, no palpable masses  : no CVAT  MSK: extremities atraumatic and nontender, no edema, no asymmetry.  SKIN: warm, dry, no rash, no bruising, no cyanosis. color appropriate for ethnicity  NEURO: alert, mentating appropriately, no facial asymmetry.   PSYCH: Affect appropriate    Pt is afebrile, does not appear to be acutely ill. Suspect lingering bronchitis s/p URI. Plan for flu/covid swab, cxr, pregnancy test, urinalysis, tx albuterol inhaler and reassess.

## 2024-05-19 NOTE — ED PROVIDER NOTE - PHYSICAL EXAMINATION
Physical Exam:  Gen: NAD, non-toxic appearing  Head: NCAT  HEENT: no pharyngeal erythema or exudates, nasal congestoin, normal conjunctiva, trachea midline, moist mucous membranes  Lung: CTAB, no respiratory distress, no wheezes/rhonchi/rales B/L, speaking in full sentences  CV: RRR, no murmurs, rubs or gallops  Abd: Soft, NT, ND, no guarding, rigidity, rebound tenderness  MSK: No visible deformities, moves all four extremities   Neuro: No focal motor deficits  Skin: Warm, well perfused, no visible rashes, no leg swelling  Psych: appropriate affect and mood Physical Exam:  Gen: NAD, non-toxic appearing  Head: NCAT  HEENT: no pharyngeal erythema or exudates, nasal congestoin, normal conjunctiva, trachea midline, moist mucous membranes  Lung: CTAB, no respiratory distress, no wheezes/rhonchi/rales B/L, speaking in full sentences  CV: RRR, no murmurs, rubs or gallops  Abd: Soft, NT, ND, no guarding, rigidity, rebound tenderness  MSK: No visible deformities, moves all four extremities   Neuro: No focal motor deficits  Skin: Warm, well perfused, no visible rashes, no leg swelling  Psych: No HI, SI

## 2024-05-19 NOTE — ED PROVIDER NOTE - OBJECTIVE STATEMENT
Patient is a 33-year-old female with a history of schizophrenia, and developmental delay who presents from a group home due to cough, and nasal congestion for the past 3 weeks.  Patient reports that she came into the ED today, because the group home doctor at her because they were not taking her symptoms seriously.  She denies any fevers, chills, chest pain, shortness of breath.  Does report productive cough.  Denies that anyone else in the group home is sick.  Also endorses dysuria for the past 3 weeks.  Also endorses that she is pregnant, went to the ED on May 8 because she reports she had 1 pregnancy, hCG at that time was negative. Patient is a 33-year-old female with a history of schizophrenia, and developmental delay who presents from a group home due to cough, and nasal congestion for the past 3 weeks.  Patient reports that she came into the ED today, because the group home doctor at her because they were not taking her symptoms seriously.  She denies any fevers, chills, chest pain, shortness of breath.  Does report productive cough.  Denies that anyone else in the group home is sick.  Also endorses dysuria for the past 3 weeks.  Also endorses that she is pregnant, went to the ED on May 8 because she reports she had 1 pregnancy, hCG at that time was negative. Never tested for covid or flu.

## 2024-05-19 NOTE — ED PROVIDER NOTE - CLINICAL SUMMARY MEDICAL DECISION MAKING FREE TEXT BOX
Patient is a 33-year-old female with a history of schizophrenia, and developmental delay who presents from a group home due to cough, and nasal congestion for the past 3 weeks. History of nasal congestion cough is most consistent with viral URI.  Plan for chest x-ray 2 view due to 3 weeks of symptoms along with increased risk of pneumonia/TB in group home setting.  Plan for COVID and flu test.  Low suspicion of pregnancy as patient recently presented to the ED with similar complaints, plan for hCG.  Plan for CBC, CMP, VBG, due to tachycardia and concern for underlying infection.  Likely dispo home back to group home.

## 2024-05-19 NOTE — ED ADULT NURSE NOTE - CHIEF COMPLAINT QUOTE
Pt presents to ED ambulatory from group home with c/o sore throat and cough. Pt tested positive for the Flu yesterday, wanted to go to urgent care "but staff was laughing at me so I called 911". MD called for psych eval advised pt to be evaluated medically. Pt denies suicidal ideation, homicidal ideation, auditory or visual hallucinations.

## 2024-05-19 NOTE — ED ADULT NURSE NOTE - OBJECTIVE STATEMENT
Pt received to rm 22   , awake and alert, A&OX4, ambulatory. C/o of flu- like symptoms. pt coming form group home. denies anyone being sick at the group home. pt denies any current fever or chills. pt states she vomited twice yesterday and  has had burning upon urination.  pt denies any current thoughts  of SI/HI. Respirations even and unlabored. Denies any current CP, SOB, N/V, HA, dizziness, palpitations, blurry vision. Bed in lowest position, call bell within reach. Safety maintained.

## 2024-05-19 NOTE — PROVIDER CONTACT NOTE (OTHER) - ASSESSMENT
SW informed by physician that pt is cleared for discharge and transport is needed for pt to return to group home via ambulance. SW contacted group home #[phone] spoke with staff Anastasiya who stated that pt mode of travel is ambulance w/ supervision and that staff will be at residence to accept pt upon arrival. Address confirmed: [address] [place] [address]. Clinical provider is in agreement with  AMBULANCE back to correction. Verbal huddle regarding coordination of care completed with interdisciplinary team. SW contacted [facility] spoke with Sindy to arrange transport; Invoice#[id number]. Vendor assigned is [facility]  spoke with Jessica confirmed transport via ambulance; Trip# [id number] 4pm.

## 2024-05-20 LAB
CULTURE RESULTS: SIGNIFICANT CHANGE UP
SPECIMEN SOURCE: SIGNIFICANT CHANGE UP

## 2024-05-20 NOTE — ED PROVIDER NOTE - CPE EDP SKIN NORM
[FreeTextEntry1] : GENERAL: alert, cooperative, in NAD SKIN: The skin is intact, warm, pink and dry over the area examined. EYES: Normal conjunctiva, normal eyelids and pupils were equal and round ENT: Normal ears, normal nose and normal lips CARDIOVASCULAR: Brisk capillary refill, but no peripheral edema RESPIRATORY: The patient is in no apparent distress. Theyre taking full deep breaths without use of accessory muscles or evidence of audible wheezes or stridor, without the use of a stethoscope. Normal respiratory effort ABDOMEN: Not examined  Right Knee Exam: No bony deformities, signs of trauma, or erythema noted. No visible effusion, muscle atrophy or asymmetry. Symmetric thigh and calf muscle bulk. No tenderness in bony prominences or soft tissue. No MCL, LCL, patellar tendon, or quadriceps tendon tenderness. Full active and passive range of motion of the knee without discomfort. No knee joint laxity palpable. Knee joint is stable with varus and valgus stress. Negative Lachmann test, negative anterior and posterior drawer with solid end point. Negative [facility] test. Intact extensor mechanism Strength is 5/5 with knee flexion/extension.  Right Foot Exam: There is no sign of bony deformity No plantar ecchymoses. No other tenderness with palpation of bony prominence or soft tissue. No tenderness to palpation over the Lisfranc joint. Full active and passive range of motion of the foot with laterally based discomfort. Toes are warm, pink, and moving freely. Muscle strength is 5/5 with EHL/FHL  Sensation intact to light touch. 2+ DP pulses palpated. Brisk capillary refill in all toes.  Gait: patient ambulated to the exam room with CAM boot
normal...

## 2024-05-24 NOTE — ED PROVIDER NOTE - PSYCHIATRIC, MLM
Never Alert and oriented to person, place, time/situation. normal mood and affect. no apparent risk to self or others.
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
## 2024-05-31 ENCOUNTER — EMERGENCY (EMERGENCY)
Facility: HOSPITAL | Age: 34
LOS: 0 days | Discharge: HOME | End: 2024-05-31
Attending: STUDENT IN AN ORGANIZED HEALTH CARE EDUCATION/TRAINING PROGRAM
Payer: MEDICAID

## 2024-05-31 VITALS
TEMPERATURE: 98 F | OXYGEN SATURATION: 99 % | SYSTOLIC BLOOD PRESSURE: 126 MMHG | DIASTOLIC BLOOD PRESSURE: 84 MMHG | RESPIRATION RATE: 16 BRPM | HEART RATE: 75 BPM

## 2024-05-31 VITALS
TEMPERATURE: 98 F | RESPIRATION RATE: 18 BRPM | HEART RATE: 67 BPM | DIASTOLIC BLOOD PRESSURE: 91 MMHG | WEIGHT: 266.1 LBS | SYSTOLIC BLOOD PRESSURE: 120 MMHG | OXYGEN SATURATION: 98 % | HEIGHT: 62 IN

## 2024-05-31 DIAGNOSIS — M79.602 PAIN IN LEFT ARM: ICD-10-CM

## 2024-05-31 DIAGNOSIS — F20.9 SCHIZOPHRENIA, UNSPECIFIED: ICD-10-CM

## 2024-05-31 DIAGNOSIS — Z79.84 LONG TERM (CURRENT) USE OF ORAL HYPOGLYCEMIC DRUGS: ICD-10-CM

## 2024-05-31 DIAGNOSIS — E11.9 TYPE 2 DIABETES MELLITUS WITHOUT COMPLICATIONS: ICD-10-CM

## 2024-05-31 DIAGNOSIS — M79.603 PAIN IN ARM, UNSPECIFIED: ICD-10-CM

## 2024-05-31 DIAGNOSIS — R62.50 UNSPECIFIED LACK OF EXPECTED NORMAL PHYSIOLOGICAL DEVELOPMENT IN CHILDHOOD: ICD-10-CM

## 2024-05-31 DIAGNOSIS — M79.601 PAIN IN RIGHT ARM: ICD-10-CM

## 2024-05-31 PROCEDURE — 99283 EMERGENCY DEPT VISIT LOW MDM: CPT

## 2024-05-31 RX ORDER — ACETAMINOPHEN 500 MG
650 TABLET ORAL ONCE
Refills: 0 | Status: COMPLETED | OUTPATIENT
Start: 2024-05-31 | End: 2024-05-31

## 2024-05-31 RX ADMIN — Medication 650 MILLIGRAM(S): at 11:55

## 2024-05-31 RX ADMIN — Medication 650 MILLIGRAM(S): at 12:55

## 2024-05-31 NOTE — ED PROVIDER NOTE - OBJECTIVE STATEMENT
34 y/o F with PMH of schizophrenia, developmental delay, DM presents to the ED from group home c/o arm pain. She denies fall or trauma. States she is hungry because she did not eat breakfast. She is well known to this provider and the ED for malingering behavior.

## 2024-05-31 NOTE — ED PROVIDER NOTE - CLINICAL SUMMARY MEDICAL DECISION MAKING FREE TEXT BOX
34 y/o F with PMH of schizophrenia, developmental delay, DM presents to the ED from group home c/o arm pain.  Vitals stable.  She is well appearing in NAD.  At baseline mental status.  Will feed.  Tylenol for pain.  To be discharged.
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
## 2024-05-31 NOTE — ED PROVIDER NOTE - PATIENT PORTAL LINK FT
You can access the FollowMyHealth Patient Portal offered by [facility] by registering at the following website: [email or web address]. By joining [employer]’s FollowMyHealth portal, you will also be able to view your health information using other applications (apps) compatible with our system.

## 2024-05-31 NOTE — ED ADULT NURSE NOTE - CHIEF COMPLAINT QUOTE
BIBEMS from group home c/o b/l arm pain. Left [facility] this morning. Pt requesting double serving of breakfast. PMH Schizophrenia.

## 2024-05-31 NOTE — ED ADULT TRIAGE NOTE - CHIEF COMPLAINT QUOTE
BIBEMS from group home c/o b/l arm pain. Left [facility] this morning. Pt requesting double serving of breakfast. PMH Schizophrenia.

## 2024-05-31 NOTE — ED PROVIDER NOTE - NSFOLLOWUPINSTRUCTIONS_ED_ALL_ED_FT
Abhisupriya was seen in the ED for arm pain.    She received tylenol.    Follow up with her primary care doctor.

## 2024-06-05 ENCOUNTER — EMERGENCY (EMERGENCY)
Facility: HOSPITAL | Age: 34
LOS: 1 days | Discharge: HOME | End: 2024-06-05
Admitting: EMERGENCY MEDICINE
Payer: MEDICAID

## 2024-06-05 VITALS
HEART RATE: 100 BPM | TEMPERATURE: 98 F | OXYGEN SATURATION: 99 % | DIASTOLIC BLOOD PRESSURE: 65 MMHG | HEIGHT: 62 IN | SYSTOLIC BLOOD PRESSURE: 100 MMHG | RESPIRATION RATE: 16 BRPM

## 2024-06-05 VITALS
DIASTOLIC BLOOD PRESSURE: 82 MMHG | OXYGEN SATURATION: 100 % | SYSTOLIC BLOOD PRESSURE: 128 MMHG | HEART RATE: 105 BPM | TEMPERATURE: 98 F | RESPIRATION RATE: 17 BRPM

## 2024-06-05 PROCEDURE — 99284 EMERGENCY DEPT VISIT MOD MDM: CPT

## 2024-06-05 RX ORDER — CLONAZEPAM 1 MG
1 TABLET ORAL ONCE
Refills: 0 | Status: DISCONTINUED | OUTPATIENT
Start: 2024-06-05 | End: 2024-06-05

## 2024-06-05 RX ORDER — CLOZAPINE 150 MG/1
100 TABLET, ORALLY DISINTEGRATING ORAL ONCE
Refills: 0 | Status: DISCONTINUED | OUTPATIENT
Start: 2024-06-05 | End: 2024-06-09

## 2024-06-05 RX ORDER — DIVALPROEX SODIUM 500 MG/1
750 TABLET, DELAYED RELEASE ORAL ONCE
Refills: 0 | Status: COMPLETED | OUTPATIENT
Start: 2024-06-05 | End: 2024-06-05

## 2024-06-05 RX ORDER — CLOZAPINE 150 MG/1
50 TABLET, ORALLY DISINTEGRATING ORAL ONCE
Refills: 0 | Status: COMPLETED | OUTPATIENT
Start: 2024-06-05 | End: 2024-06-05

## 2024-06-05 RX ADMIN — Medication 1 MILLIGRAM(S): at 22:12

## 2024-06-05 RX ADMIN — CLOZAPINE 50 MILLIGRAM(S): 150 TABLET, ORALLY DISINTEGRATING ORAL at 22:11

## 2024-06-05 RX ADMIN — DIVALPROEX SODIUM 750 MILLIGRAM(S): 500 TABLET, DELAYED RELEASE ORAL at 22:12
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
## 2024-06-05 NOTE — ED PROVIDER NOTE - PATIENT PORTAL LINK FT
You can access the FollowMyHealth Patient Portal offered by [facility] by registering at the following website: [email or web address]. By joining [employer]’s FollowMyHealth portal, you will also be able to view your health information using other applications (apps) compatible with our system.

## 2024-06-05 NOTE — ED PROVIDER NOTE - NSICDXPASTMEDICALHX_GEN_ALL_CORE_FT
PAST MEDICAL HISTORY:  Asthma     Bipolar disorder     Development delay     DM (diabetes mellitus)     Intellectual disability     Schizoaffective disorder
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
## 2024-06-05 NOTE — ED PROVIDER NOTE - PHYSICAL EXAMINATION
CONSTITUTIONAL: Well-appearing obese female in no apparent distress. Non-toxic appearing.   NEURO: Alert & oriented. Gait steady without assistance. Sensory and motor functions are grossly intact.  PSYCH: Irritable mood. Thought processes intact.   CARD: Regular rate and rhythm, no murmurs  RESP: No accessory muscle use; breath sounds clear and equal bilaterally; no wheezes, rhonchi, or rales     ABD: Soft; non-distended; non-tender. No guarding or rebound.   MUSCULOSKELETAL/EXTREMITIES: FROM in all four extremities.  SKIN: Warm; dry; no apparent lesions or exudate

## 2024-06-05 NOTE — ED ADULT NURSE NOTE - MODE OF DISCHARGE
Zyclara Pregnancy And Lactation Text: This medication is Pregnancy Category C. It is unknown if this medication is excreted in breast milk. Ambulatory

## 2024-06-06 ENCOUNTER — EMERGENCY (EMERGENCY)
Facility: HOSPITAL | Age: 34
LOS: 0 days | Discharge: HOME | End: 2024-06-07
Attending: STUDENT IN AN ORGANIZED HEALTH CARE EDUCATION/TRAINING PROGRAM
Payer: MEDICAID

## 2024-06-06 VITALS
HEART RATE: 96 BPM | SYSTOLIC BLOOD PRESSURE: 110 MMHG | TEMPERATURE: 98 F | HEIGHT: 62 IN | DIASTOLIC BLOOD PRESSURE: 80 MMHG | OXYGEN SATURATION: 99 % | RESPIRATION RATE: 19 BRPM | WEIGHT: 270.95 LBS

## 2024-06-06 DIAGNOSIS — F31.9 BIPOLAR DISORDER, UNSPECIFIED: ICD-10-CM

## 2024-06-06 DIAGNOSIS — E11.9 TYPE 2 DIABETES MELLITUS WITHOUT COMPLICATIONS: ICD-10-CM

## 2024-06-06 DIAGNOSIS — M79.18 MYALGIA, OTHER SITE: ICD-10-CM
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
## 2024-06-06 DIAGNOSIS — R62.50 UNSPECIFIED LACK OF EXPECTED NORMAL PHYSIOLOGICAL DEVELOPMENT IN CHILDHOOD: ICD-10-CM
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
## 2024-06-06 PROCEDURE — 99284 EMERGENCY DEPT VISIT MOD MDM: CPT

## 2024-06-06 RX ORDER — IBUPROFEN 200 MG
400 TABLET ORAL ONCE
Refills: 0 | Status: COMPLETED | OUTPATIENT
Start: 2024-06-06 | End: 2024-06-06

## 2024-06-06 RX ORDER — HALOPERIDOL DECANOATE 100 MG/ML
5 INJECTION INTRAMUSCULAR ONCE
Refills: 0 | Status: COMPLETED | OUTPATIENT
Start: 2024-06-06 | End: 2024-06-06

## 2024-06-06 RX ORDER — DIPHENHYDRAMINE HCL 50 MG
50 CAPSULE ORAL ONCE
Refills: 0 | Status: COMPLETED | OUTPATIENT
Start: 2024-06-06 | End: 2024-06-06

## 2024-06-06 RX ORDER — ACETAMINOPHEN 500 MG
650 TABLET ORAL ONCE
Refills: 0 | Status: COMPLETED | OUTPATIENT
Start: 2024-06-06 | End: 2024-06-06

## 2024-06-06 RX ORDER — LIDOCAINE 4 G/100G
1 CREAM TOPICAL ONCE
Refills: 0 | Status: COMPLETED | OUTPATIENT
Start: 2024-06-06 | End: 2024-06-06

## 2024-06-06 RX ORDER — MIDAZOLAM HYDROCHLORIDE 1 MG/ML
4 INJECTION, SOLUTION INTRAMUSCULAR; INTRAVENOUS ONCE
Refills: 0 | Status: DISCONTINUED | OUTPATIENT
Start: 2024-06-06 | End: 2024-06-06

## 2024-06-06 RX ADMIN — Medication 650 MILLIGRAM(S): at 17:35

## 2024-06-06 RX ADMIN — Medication 400 MILLIGRAM(S): at 17:35

## 2024-06-06 RX ADMIN — Medication 50 MILLIGRAM(S): at 18:15

## 2024-06-06 RX ADMIN — MIDAZOLAM HYDROCHLORIDE 4 MILLIGRAM(S): 1 INJECTION, SOLUTION INTRAMUSCULAR; INTRAVENOUS at 18:30

## 2024-06-06 RX ADMIN — LIDOCAINE 1 APPLICATION(S): 4 CREAM TOPICAL at 17:47

## 2024-06-06 RX ADMIN — LIDOCAINE 1 PATCH: 4 CREAM TOPICAL at 17:35

## 2024-06-06 RX ADMIN — HALOPERIDOL DECANOATE 5 MILLIGRAM(S): 100 INJECTION INTRAMUSCULAR at 18:15
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
## 2024-06-06 NOTE — ED PROVIDER NOTE - PHYSICAL EXAMINATION
Gen: AOX3, NAD  Head: NCAT  ENT: Airway patent, moist mucous membranes, nasal passageways clear  Cardiac: Normal rate, rhythm   Respiratory: normal respirations and normal work of breathing   Gastrointestinal: Abdomen nondistended, central adiposity present , abd nontender, pt refusing rectal exam   MSK: No gross abnormalities, FROM of all four extremities, no midline c-t-l spine ttp, no sacral ttp or reproducible ttp   Skin: No rashes, no lesions  Neuro: No gross neurologic deficits, normal speech, no gait abnormality

## 2024-06-06 NOTE — ED ADULT NURSE NOTE - NSFALLUNIVINTERV_ED_ALL_ED
Bed/Stretcher in lowest position, wheels locked, appropriate side rails in place/Call bell, personal items and telephone in reach/Instruct patient to call for assistance before getting out of bed/chair/stretcher/Non-slip footwear applied when patient is off stretcher/[place] to call system/Physically safe environment - no spills, clutter or unnecessary equipment/Purposeful proactive rounding/Room/bathroom lighting operational, light cord in reach

## 2024-06-06 NOTE — ED PROVIDER NOTE - PROGRESS NOTE DETAILS
Miles DO: pt calm throughout rest of ED stay, awake and alert and conversant, no further episodes of agitation or complaints
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
## 2024-06-06 NOTE — ED PROVIDER NOTE - PATIENT PORTAL LINK FT
You can access the FollowMyHealth Patient Portal offered by [facility] by registering at the following website: [email or web address]. By joining [employer]’s FollowMyHealth portal, you will also be able to view your health information using other applications (apps) compatible with our system.

## 2024-06-06 NOTE — ED ADULT NURSE REASSESSMENT NOTE - NS ED NURSE REASSESS COMMENT FT1
pt complains of back/ buttocks pain. pain medications given, pt requests RN help with lidocaine cream. ED Tech Irena at bedside while RN placing cream on gluteal area. pt then rubbed in cream.

## 2024-06-06 NOTE — ED ADULT NURSE REASSESSMENT NOTE - NS ED NURSE REASSESS COMMENT FT1
Patient attempted to leave, redirected to phone, therapeutic communication utilized and redirection provided, patient was on phone with her father at triage, Patient ran out through triage door, Code flight was called, staff and security outside, patient was medicated with versed and benadryl, escorted to stretcher, rolled back inside. placed in SH30. Patient was kicking and screaming in stretcher, redirection and therapeutic communication utilized without success. Dr. Miles at bedside, 5mg Haldol IM administered. Patient is currently in stretcher, calm.  1 to 1 ordered and initiated

## 2024-06-06 NOTE — ED PROVIDER NOTE - NSFOLLOWUPINSTRUCTIONS_ED_ALL_ED_FT
You were seen today for low buttock pain - we gave you symptomatic support with tylenol and advil    follow up with primary care physician, return for any worsening symptoms

## 2024-06-07 VITALS
HEART RATE: 100 BPM | SYSTOLIC BLOOD PRESSURE: 110 MMHG | RESPIRATION RATE: 18 BRPM | OXYGEN SATURATION: 100 % | DIASTOLIC BLOOD PRESSURE: 77 MMHG | TEMPERATURE: 98 F

## 2024-06-07 RX ORDER — IBUPROFEN 200 MG
600 TABLET ORAL ONCE
Refills: 0 | Status: COMPLETED | OUTPATIENT
Start: 2024-06-07 | End: 2024-06-07

## 2024-06-07 RX ADMIN — Medication 600 MILLIGRAM(S): at 03:57
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
## 2024-06-10 ENCOUNTER — EMERGENCY (EMERGENCY)
Facility: HOSPITAL | Age: 34
LOS: 0 days | Discharge: HOME | End: 2024-06-11
Attending: STUDENT IN AN ORGANIZED HEALTH CARE EDUCATION/TRAINING PROGRAM
Payer: MEDICAID

## 2024-06-10 VITALS
SYSTOLIC BLOOD PRESSURE: 109 MMHG | WEIGHT: 272.05 LBS | TEMPERATURE: 98 F | HEART RATE: 85 BPM | HEIGHT: 62 IN | OXYGEN SATURATION: 100 % | RESPIRATION RATE: 14 BRPM | DIASTOLIC BLOOD PRESSURE: 76 MMHG

## 2024-06-10 DIAGNOSIS — R10.9 UNSPECIFIED ABDOMINAL PAIN: ICD-10-CM

## 2024-06-10 DIAGNOSIS — Z91.013 ALLERGY TO SEAFOOD: ICD-10-CM

## 2024-06-10 DIAGNOSIS — J45.909 UNSPECIFIED ASTHMA, UNCOMPLICATED: ICD-10-CM

## 2024-06-10 DIAGNOSIS — R11.0 NAUSEA: ICD-10-CM

## 2024-06-10 DIAGNOSIS — Z91.018 ALLERGY TO OTHER FOODS: ICD-10-CM

## 2024-06-10 DIAGNOSIS — E11.9 TYPE 2 DIABETES MELLITUS WITHOUT COMPLICATIONS: ICD-10-CM

## 2024-06-10 DIAGNOSIS — R45.1 RESTLESSNESS AND AGITATION: ICD-10-CM

## 2024-06-10 DIAGNOSIS — F25.0 SCHIZOAFFECTIVE DISORDER, BIPOLAR TYPE: ICD-10-CM

## 2024-06-10 PROCEDURE — 99284 EMERGENCY DEPT VISIT MOD MDM: CPT

## 2024-06-10 RX ORDER — ACETAMINOPHEN 500 MG
650 TABLET ORAL ONCE
Refills: 0 | Status: COMPLETED | OUTPATIENT
Start: 2024-06-10 | End: 2024-06-10

## 2024-06-10 RX ADMIN — Medication 650 MILLIGRAM(S): at 16:10
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
## 2024-06-10 NOTE — ED PROVIDER NOTE - PATIENT PORTAL LINK FT
You can access the FollowMyHealth Patient Portal offered by [facility] by registering at the following website: [email or web address]. By joining "[employer]"’s FollowMyHealth portal, you will also be able to view your health information using other applications (apps) compatible with our system.
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
## 2024-06-10 NOTE — ED PROVIDER NOTE - PROGRESS NOTE DETAILS
pt pending ride back, however pts group home is not answering, pt becoming agitated, sedated with haldol and ativan

## 2024-06-11 VITALS
HEART RATE: 90 BPM | OXYGEN SATURATION: 99 % | SYSTOLIC BLOOD PRESSURE: 126 MMHG | DIASTOLIC BLOOD PRESSURE: 63 MMHG | RESPIRATION RATE: 18 BRPM | TEMPERATURE: 98 F

## 2024-06-11 RX ORDER — HALOPERIDOL DECANOATE 100 MG/ML
5 INJECTION INTRAMUSCULAR ONCE
Refills: 0 | Status: COMPLETED | OUTPATIENT
Start: 2024-06-11 | End: 2024-06-11

## 2024-06-11 RX ADMIN — Medication 2 MILLIGRAM(S): at 01:08

## 2024-06-11 RX ADMIN — HALOPERIDOL DECANOATE 5 MILLIGRAM(S): 100 INJECTION INTRAMUSCULAR at 01:08

## 2024-06-11 NOTE — ED ADULT NURSE REASSESSMENT NOTE - NS ED NURSE REASSESS COMMENT FT1
Pt sleeping, respirations spontaneous and unlabored. Pt on cardiac monitoring. Pt in NAD at this time

## 2024-06-11 NOTE — ED ADULT NURSE REASSESSMENT NOTE - NS ED NURSE REASSESS COMMENT FT1
RN attempted to contact pt group home for discharge multiple times with no answer. Prasanth LILLI aware. Pt in NAD at this time.

## 2024-06-11 NOTE — ED ADULT NURSE REASSESSMENT NOTE - NS ED NURSE REASSESS COMMENT FT1
RN talked to Cordelia from [facility], they are ready for patient to come back. Message left for social work

## 2024-06-13 ENCOUNTER — EMERGENCY (EMERGENCY)
Facility: HOSPITAL | Age: 34
LOS: 1 days | Discharge: HOME | End: 2024-06-13
Admitting: EMERGENCY MEDICINE
Payer: MEDICAID

## 2024-06-13 VITALS
OXYGEN SATURATION: 99 % | HEIGHT: 62 IN | TEMPERATURE: 98 F | SYSTOLIC BLOOD PRESSURE: 128 MMHG | RESPIRATION RATE: 18 BRPM | DIASTOLIC BLOOD PRESSURE: 75 MMHG | HEART RATE: 78 BPM

## 2024-06-13 PROCEDURE — 99285 EMERGENCY DEPT VISIT HI MDM: CPT

## 2024-06-13 RX ORDER — DIPHENHYDRAMINE HCL 50 MG
50 CAPSULE ORAL ONCE
Refills: 0 | Status: COMPLETED | OUTPATIENT
Start: 2024-06-13 | End: 2024-06-13

## 2024-06-13 RX ORDER — HALOPERIDOL DECANOATE 100 MG/ML
5 INJECTION INTRAMUSCULAR ONCE
Refills: 0 | Status: COMPLETED | OUTPATIENT
Start: 2024-06-13 | End: 2024-06-13

## 2024-06-13 RX ORDER — DIPHENHYDRAMINE HCL 50 MG
50 CAPSULE ORAL ONCE
Refills: 0 | Status: DISCONTINUED | OUTPATIENT
Start: 2024-06-13 | End: 2024-06-13

## 2024-06-13 RX ADMIN — Medication 50 MILLIGRAM(S): at 15:24

## 2024-06-13 RX ADMIN — HALOPERIDOL DECANOATE 5 MILLIGRAM(S): 100 INJECTION INTRAMUSCULAR at 15:36

## 2024-06-13 RX ADMIN — Medication 2 MILLIGRAM(S): at 15:24
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
## 2024-06-13 NOTE — ED PROVIDER NOTE - PATIENT PORTAL LINK FT
You can access the FollowMyHealth Patient Portal offered by [facility] by registering at the following website: [email or web address]. By joining [employer]’s FollowMyHealth portal, you will also be able to view your health information using other applications (apps) compatible with our system.

## 2024-06-13 NOTE — ED PROVIDER NOTE - CLINICAL SUMMARY MEDICAL DECISION MAKING FREE TEXT BOX
33yoF with PMH of bipolar d/o, developmental delay, DM, [facility] states that she ran away from the group home in which EMS was called. Pt was agitated and screaming upon arrival. uncooperative with history. states she thought she had a doctor appointment today. admits to auditory hallucinations telling her to kill herself. Denies visual hallucinations. Denies SI. states she wants to kill the police for handcuffing her. Pt endorses dysuria.     will check UA to r/o UTI  will medicate for agitation 2/2 labile/agitated behavior and for safety of patient/others in

## 2024-06-13 NOTE — ED ADULT NURSE NOTE - NSFALLUNIVINTERV_ED_ALL_ED
Bed/Stretcher in lowest position, wheels locked, appropriate side rails in place/Call bell, personal items and telephone in reach/Instruct patient to call for assistance before getting out of bed/chair/stretcher/Non-slip footwear applied when patient is off stretcher/[place] to call system/Physically safe environment - no spills, clutter or unnecessary equipment/Purposeful proactive rounding/Room/bathroom lighting operational, light cord in reach

## 2024-06-13 NOTE — ED BEHAVIORAL HEALTH NOTE - BEHAVIORAL HEALTH NOTE
Patient resides in [facility] group home, [address] [facility].  spoke to  Karoline  ([phone]).    Pt is a 34 yo female domiciled at residence, hx of developmental delay, bib ems. Karoline states that she just became the supervisor at the residence a month ago. She states that she is unclear what took place today because the staff in the morning left for the day. She states that the patient left out of the home and walked to the [facility]. She states that the staff went after her and she is unclear if the staff called or if the patient used someone’s phone to call. Pt has been medication compliant. No si or hi reported, no Avh reported. Sleeping eating and showering at baseline.  No drug or alcohol use. Pt has been compliant w/ medication. Pt is usually in and out of the hospital for behaviors. Patient usually calls the ambulance on herself at times. Pt sees her psychiatrist once a month. Case discussed with JONATHAN Neal.

## 2024-06-13 NOTE — ED ADULT TRIAGE NOTE - CHIEF COMPLAINT QUOTE
Patient brought to ER by EMS from [facility] after she had been fighting with staff after she tried to run away by the [facility].

## 2024-06-13 NOTE — ED PROVIDER NOTE - NSFOLLOWUPINSTRUCTIONS_ED_ALL_ED_FT
You were seen and evaluated by [facility] staff; There is no clinical evidence of intoxication, or any acute medical problem requiring immediate intervention. At present time patient is not a harm to self or others and can be safely discharged.   Continue your home medications  Follow up with your psychiatrist or the [facility] psychiatric Crisis center walk in clinic between the hours of 9AM - 3PM or may make an appointment online. See attached paperwork.  Return to ED for for any new or worsening symptoms

## 2024-06-13 NOTE — ED PROVIDER NOTE - PHYSICAL EXAMINATION
alert, awake  respirations unlabored  no murmurs  screaming uncontrollably, uncooperative, agitated  moving all extremities, ambulatory with steady gait

## 2024-06-13 NOTE — ED PROVIDER NOTE - OBJECTIVE STATEMENT
33yoF with PMH of bipolar d/o, developmental delay, DM, presenting from [facility] for agitation. EMS states that she ran away from the group home in which EMS was called. Pt was agitated and screaming upon arrival. uncooperative with history. states she thought she had a doctor appointment today. admits to auditory hallucinations telling her to kill herself. Denies visual hallucinations. Denies SI. states she wants to kill the police for handcuffing her. Pt endorses dysuria. pt otherwise denies fevers, n/v/d/c, abd pain or cp.

## 2024-06-13 NOTE — ED PROVIDER NOTE - PROGRESS NOTE DETAILS
pt reassessed, calm, cooperative, staff member here, pt would like to go back to . pt reassessed, calm, cooperative, staff member here, pt would like to go back to . denies any dysuria anymore. states she never had painful urination. will renu. CARLOZ wnl

## 2024-06-13 NOTE — ED ADULT NURSE NOTE - CHIEF COMPLAINT QUOTE
Patient brought to ER by EMS from [facility] after she had been fighting with staff after she tried to run away by the [facility].

## 2024-06-17 ENCOUNTER — EMERGENCY (EMERGENCY)
Facility: HOSPITAL | Age: 34
LOS: 0 days | Discharge: HOME | End: 2024-06-18
Attending: EMERGENCY MEDICINE
Payer: MEDICAID

## 2024-06-17 VITALS
OXYGEN SATURATION: 99 % | DIASTOLIC BLOOD PRESSURE: 66 MMHG | WEIGHT: 227.08 LBS | TEMPERATURE: 98 F | SYSTOLIC BLOOD PRESSURE: 104 MMHG | HEART RATE: 88 BPM | RESPIRATION RATE: 18 BRPM | HEIGHT: 62 IN

## 2024-06-17 DIAGNOSIS — R62.50 UNSPECIFIED LACK OF EXPECTED NORMAL PHYSIOLOGICAL DEVELOPMENT IN CHILDHOOD: ICD-10-CM

## 2024-06-17 DIAGNOSIS — Z91.018 ALLERGY TO OTHER FOODS: ICD-10-CM

## 2024-06-17 DIAGNOSIS — R11.0 NAUSEA: ICD-10-CM

## 2024-06-17 DIAGNOSIS — E11.9 TYPE 2 DIABETES MELLITUS WITHOUT COMPLICATIONS: ICD-10-CM

## 2024-06-17 DIAGNOSIS — O99.343 OTHER MENTAL DISORDERS COMPLICATING PREGNANCY, THIRD TRIMESTER: ICD-10-CM

## 2024-06-17 DIAGNOSIS — Z3A.32 32 WEEKS GESTATION OF PREGNANCY: ICD-10-CM

## 2024-06-17 DIAGNOSIS — Z91.013 ALLERGY TO SEAFOOD: ICD-10-CM

## 2024-06-17 DIAGNOSIS — F69 UNSPECIFIED DISORDER OF ADULT PERSONALITY AND BEHAVIOR: ICD-10-CM

## 2024-06-17 DIAGNOSIS — O99.283 ENDOCRINE, NUTRITIONAL AND METABOLIC DISEASES COMPLICATING PREGNANCY, THIRD TRIMESTER: ICD-10-CM

## 2024-06-17 DIAGNOSIS — F31.9 BIPOLAR DISORDER, UNSPECIFIED: ICD-10-CM

## 2024-06-17 PROCEDURE — 99284 EMERGENCY DEPT VISIT MOD MDM: CPT

## 2024-06-17 NOTE — ED ADULT TRIAGE NOTE - BEFAST ARM NUMBNESS
See ultrasound report for details of ultrasound evaluation, counseling,  and recommendations.      No

## 2024-06-17 NOTE — ED ADULT TRIAGE NOTE - GLASGOW COMA SCALE: BEST VERBAL RESPONSE, MLM
Progress NOTE  Date of Service: 2022  Cristian Nicholew Pae ) MRN: [id number] [facility]: [id number]     Physical Exam  DOL: 80 infant  [facility] Sam Kaye MD   Date/Time: 2022 14:42 (V5) oriented

## 2024-06-18 VITALS
DIASTOLIC BLOOD PRESSURE: 73 MMHG | TEMPERATURE: 98 F | OXYGEN SATURATION: 99 % | SYSTOLIC BLOOD PRESSURE: 123 MMHG | HEART RATE: 99 BPM | RESPIRATION RATE: 17 BRPM

## 2024-06-18 NOTE — ED PROVIDER NOTE - PATIENT PORTAL LINK FT
You can access the FollowMyHealth Patient Portal offered by [facility] by registering at the following website: [email or web address]. By joining [employer]’s FollowMyHealth portal, you will also be able to view your health information using other applications (apps) compatible with our system.

## 2024-06-18 NOTE — ED PROVIDER NOTE - PHYSICAL EXAMINATION
Vitals: WNL  Gen: AAOx3, NAD, sitting comfortably in stretcher, non-toxic, calm at this time, responding appropriately to questions   Head: ncat, perrla, eomi b/l  Neck: supple, no lymphadenopathy, no midline deviation  Heart: rrr, no m/r/g  Lungs: CTA b/l, no rales/ronchi/wheezes  Abd: soft, nontender, non-distended, no rebound or guarding  Ext: no clubbing/cyanosis/edema  Neuro: sensation and muscle strength intact b/l, steady gait

## 2024-06-18 NOTE — ED ADULT NURSE NOTE - NSFALLRSKASSESSTYPE_ED_ALL_ED
2024    Zuhair Lima MD  [address] [place] [address]    Patient: Zoraida Schultz   YOB: 1969   Date of Visit: 2024     Encounter Diagnosis     ICD-10-CM    1. Chronic pain of right knee  M25.561     G89.29       2. Chronic pain of left knee  M25.562     G89.29       3. Chronic bilateral low back pain without sciatica  M54.50     G89.29           Dear Dr. Lima:    Thank you for your recent referral of Zoraida Schultz. Please review the attached evaluation summary from Zoraida's recent visit.     Please verify that you agree with the plan of care by signing the attached order.     If you have any questions or concerns, please do not hesitate to call.     I sincerely appreciate the opportunity to share in the care of one of your patients and hope to have another opportunity to work with you in the near future.       Sincerely,    Roxann Hagen, PT      Referring Provider:      I certify that I have read the below Plan of Care and certify the need for these services furnished under this plan of treatment while under my care.                    Zuhair Lima MD  [address] [place] [address]  Via Fax: [phone]          PT Re-Evaluation  and PT Discharge    Today's date: 2024  Patient name: Zoraida Schultz  : 1969  MRN: [id number]  Referring provider: Roxann Hagen, PT  Dx:   Encounter Diagnosis     ICD-10-CM    1. Chronic pain of right knee  M25.561     G89.29       2. Chronic pain of left knee  M25.562     G89.29       3. Chronic bilateral low back pain without sciatica  M54.50     G89.29           Start Time: 1615  Stop Time: 1650  Total time in clinic (min): 35 minutes    Assessment    Assessment details: Patient is a 54 yo female presenting to her physical therapy re-evaluation with symptoms consistent with (B) knee pain R > L. Since starting physical therapy the patient has improved with strength, activity tolerance and  "pain levels. Patient is able to negotiate steps with less pain, however she continues to have pain with fast walking. Patient also notes some pain with kneeling. Patient feels (I) with her HEP and ADLs. Due to noted improvements, the patient will be D/C from PT with a HEP. PT and patient agree with POC.           Goals  STG: In four weeks the patient will:    1. Be (I) with her HEP. (MET)  2. Increase hip and knee strength to 4+/5 MMT score to assist c ADLs. (MET)  3. Improve patellar mobility to assist c steps. (In progress)      LTG: In six weeks, the patient will:    1. Increase FOTO score to 67 to demonstrate improvements in symptoms and function. (MET)  2. Demonstrate full R knee AROM without pain. (MET)  3. Amb 1-2 miles without pain. (MET)  4. Increase hip and knee strength to 5/5 MMT score to assist c prolonged activities. (In progress)  5. Amb 4 miles with < 2/10 pain (MET)  6. Negotiate a flight of steps without pain.  (MET -90%)      Plan  Therapy options: D/C from PT due to improvements.    Frequency: 1x week  Duration in weeks: 1  Plan of Care beginning date: 6/10/2024  Plan of Care expiration date: 2024  Treatment plan discussed with: patient        Subjective Evaluation    History of Present Illness  Mechanism of injury: Patient noted an improved with steps since starting physical therapy. Patient noted in general she feels stronger. Patient noted a 50% improvement since starting PT. Patient noted that she avoids walking fast at times due to instability and pain. Patient is having stem cell procedure on her back in 24. Patient noted that she is now having pain in both knees at times. Patient feels (I) with her HEP and ADLs.     Patient Goals  Patient goal: \"to walk 4 miles without pain.\" (MET) \"to negotiate steps without pain.\" (90% MET)  Pain  Current pain ratin  At best pain ratin  At worst pain rating: 3  Location: R > L knee; R quad  Quality: dull ache and knife-like  Relieving " "factors: ice and rest (ice 3x/day; tried ibuprophen, however is not taking currently)  Aggravating factors: walking, standing and stair climbing (improving)  Progression: improved    Social Support  Steps to enter house: yes  Stairs in house: yes   Lives in: multiple-level home  Lives with: spouse and adult children    Employment status: not working  Treatments  Previous treatment: injection treatment        Objective     Postural Observations  Seated posture: fair  Standing posture: fair      Tenderness   Left Knee   Tenderness in the pes anserinus.     Right Knee   Tenderness in the MCL (distal), MCL (proximal), medial joint line and pes anserinus.     Mobility   Patellar Mobility:   Left Knee   WFL: superior and inferior.   Hypomobile: left medial and left lateral    Right Knee   Hypomobile: medial, lateral, superior and inferior   Tibiofemoral Mobility:   Left knee   Tibiofemoral tendons within functional limits include the anterior  Right knee Hypomobile in the anterior tibiofemoral tendon(s).     Strength/Myotome Testing     Left Hip   Planes of Motion   Flexion: 4+  Extension: 4+  Abduction: 4+  Adduction: 4+    Right Hip   Planes of Motion   Flexion: 4+  Extension: 4+  Abduction: 4+  Adduction: 4+    Left Knee   Flexion: 4+  Extension: 4+    Right Knee   Flexion: 4+  Extension: 4+  Quadriceps contraction: fair    Left Ankle/Foot   Dorsiflexion: 4+  Plantar flexion: 4+    Right Ankle/Foot   Dorsiflexion: 4+  Plantar flexion: 4+               Precautions: none    Manuals 4/29 5/2 5/6 5/9 5/13 5/15 5/22 6/3 6/12 6/18   Patellar mobs nv            RE          MW                             Neuro Re-Ed             HEP edu MW MW MW MW MW MW MW MW MW MW   Clams X20 ea X20 ea X20 ea X20 ea   X20 ea      Sidelying hip abd X20 ea X20 ea X20 ea X20 ea   X20 ea      bridge nv x20 hold          Glute set   X20  5\" hold          Forward and lateral step ups     6\"  X20 ea (B) 8\"  X20 ea (B) 8\"  X20 ea (B) 8\"  X20 ea (B) " "8\"  X20 ea (B)    Shoulder extensions (palm up and down), rows, ER   Green COB  X20 ea Green COB  X20 ea Green COB  X20 ea Green COB  X20 ea   Green COB  X20 ea Green COB  X20 ea   Pall - off Press     Green COB  X20 ea single        DB + TAC nv            DB + TAC + marching nv            DB + TAC + fallouts nv            Ther Ex             X-ride nv 5'/5' 5'/5' 5'/5' 5'/5' 5'/5' 5'/5' 5'/5' 5'/5' 5'/5'   Calf stretch         3x30\"    Hip add nv X20  5\" hold X20  5\" hold X20  5\" hold         Hamstring curls nv            Leg press nv   Plate 7  DL  55#  X30  SL  35#  X30 ea Plate 7  DL  55#  X30  SL  35#  X30 ea Plate 7  DL  65#  X30  SL  45#  X30 ea Plate 7  DL  65#  X30  SL  45#  X30 ea Plate 7  DL  75#  X30  SL  45#  X30 ea Plate 7  DL  75#  X30  SL  45#  X30 ea Plate 7  DL  75#  X30  SL  45#  X30 ea   TKE nv            3 way hip nv    Green foam  X20 ea (B) Green foam  X20 ea (B) Green foam  X20 ea (B) Green foam  X20 ea (B)     SLR  X20 ea X20 ea X20 ea   X20 ea      Butterfly stretch  3x30\" 3x30\" 3x30\"   3x30\"       Piriformis stretch  3x30\" ea 3x30\" ea 3x30\"ea   3x30\" ea 3x30\" ea     Ther Activity                                       Gait Training                                       Modalities                                                         " Initial (On Arrival)

## 2024-06-18 NOTE — ED PROVIDER NOTE - CLINICAL SUMMARY MEDICAL DECISION MAKING FREE TEXT BOX
32 yo F bibems from group home for behavior problem, no SI/HI/hallucinations, normal behavior thus far in ED  -check ua/cx/preg for "pregnancy"  -1:1 obs as pt. is a notorious flight risk from prior visits  -d/c in AM back to group home, no indication for labs/imaging/psych consult at this time

## 2024-06-18 NOTE — ED PROVIDER NOTE - PROGRESS NOTE DETAILS
pt. refused to provide urine during stay, now she admits that she's not in fact pregnanct  Pt. reports feeling better after sleeping and eating in ER  pt. agrees to f/u with primary care outpt. asap   pt. understands to return to ED if symptoms worsen; will d/c
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
## 2024-07-02 NOTE — ED ADULT TRIAGE NOTE - RESPIRATORY RATE (BREATHS/MIN)
--start taking a calcium supplement! Your goal is 1200mg daily (diet+supplement)   -Can be VIACTIV CHEW- 1-2 daily!  --repeat labs in 6-8 weeks on new calcium  --we will send this note to your PCP with our recommendations , OK to start Prolia shots from our side if you get good calcium     A Guide to Calcium-Rich Foods  We all know that milk is a great source of calcium, but you may be surprised by all the different foods you can work into your diet to reach your daily recommended amount of calcium. Use the guide below to get ideas of additional calcium-rich foods to add to your weekly shopping list.  Produce Serving Size Estimated Calcium*   Radha greens, frozen 8 oz 360 mg   Broccoli rodolfo 8 oz 200 mg   Kale, frozen 8 oz 180 mg   Soy Beans, green, boiled 8 oz 175 mg   Bok Liza, cooked, boiled 8 oz 160 mg   Figs, dried 2 figs 65 mg   Broccoli, fresh, cooked 8 oz 60 mg   Oranges 1 whole 55 mg   Seafood Serving Size Estimated Calcium*   Sardines, canned with bones 3 oz 325 mg   Woodbine, canned with bones 3 oz 180 mg   Shrimp, canned 3 oz 125 mg   Dairy Serving Size Estimated Calcium*   Ricotta, part-skim 4 oz 335 mg   Yogurt, plain, low-fat 6 oz 310 mg   Milk, skim, low-fat, whole 8 oz 300 mg   Yogurt with fruit, low-fat 6 oz 260 mg   Mozzarella, part-skim 1 oz 210 mg   Cheddar 1 oz 205 mg   Yogurt, Greek 6 oz 200 mg   American Cheese 1 oz 195 mg   Feta Cheese 4 oz 140 mg   Cottage Cheese, 2% 4 oz 105 mg   Frozen yogurt, vanilla 8 oz 105 mg   Ice Cream, vanilla 8 oz 85 mg   Parmesan 1 tbsp 55 mg   Fortified Food Serving Size Estimated Calcium*   Wilson milk, rice milk or soy milk, fortified 8 oz 300 mg   Orange juice and other fruit juices, fortified 8 oz 300 mg   Tofu, prepared with calcium 4 oz 205 mg   Waffle, frozen, fortified 2 pieces 200 mg   Oatmeal, fortified 1 packet 140 mg   English muffin, fortified 1 muffin 100 mg   Cereal, fortified 8 oz 100-1,000 mg   Other Serving Size Estimated Calcium*   Mac &  cheese, frozen 1 package 325 mg   Pizza, cheese, frozen 1 serving 115 mg   Pudding, chocolate, prepared with 2% milk 4 oz 160 mg   Beans, baked, canned 4 oz 160 mg   *The calcium content listed for most foods is estimated and can vary due to multiple factors. Check the food label to determine how much calcium is in a particular product.     17

## 2024-07-06 ENCOUNTER — EMERGENCY (EMERGENCY)
Facility: HOSPITAL | Age: 34
LOS: 0 days | Discharge: HOME | End: 2024-07-07
Attending: STUDENT IN AN ORGANIZED HEALTH CARE EDUCATION/TRAINING PROGRAM
Payer: MEDICAID

## 2024-07-06 VITALS
HEART RATE: 97 BPM | RESPIRATION RATE: 18 BRPM | WEIGHT: 251.11 LBS | TEMPERATURE: 98 F | SYSTOLIC BLOOD PRESSURE: 109 MMHG | HEIGHT: 62 IN | DIASTOLIC BLOOD PRESSURE: 65 MMHG | OXYGEN SATURATION: 98 %

## 2024-07-06 DIAGNOSIS — Z91.013 ALLERGY TO SEAFOOD: ICD-10-CM

## 2024-07-06 DIAGNOSIS — F31.9 BIPOLAR DISORDER, UNSPECIFIED: ICD-10-CM

## 2024-07-06 DIAGNOSIS — Z91.018 ALLERGY TO OTHER FOODS: ICD-10-CM

## 2024-07-06 DIAGNOSIS — R10.9 UNSPECIFIED ABDOMINAL PAIN: ICD-10-CM

## 2024-07-06 PROCEDURE — 99284 EMERGENCY DEPT VISIT MOD MDM: CPT

## 2024-07-07 VITALS
RESPIRATION RATE: 18 BRPM | OXYGEN SATURATION: 96 % | TEMPERATURE: 97 F | SYSTOLIC BLOOD PRESSURE: 101 MMHG | HEART RATE: 89 BPM | DIASTOLIC BLOOD PRESSURE: 70 MMHG

## 2024-07-07 LAB
ALBUMIN SERPL ELPH-MCNC: 3.2 G/DL — LOW (ref 3.3–5)
ALP SERPL-CCNC: 51 U/L — SIGNIFICANT CHANGE UP (ref 40–120)
ALT FLD-CCNC: 12 U/L — SIGNIFICANT CHANGE UP (ref 12–78)
ANION GAP SERPL CALC-SCNC: 5 MMOL/L — SIGNIFICANT CHANGE UP (ref 5–17)
AST SERPL-CCNC: 11 U/L — LOW (ref 15–37)
BASOPHILS # BLD AUTO: 0.02 K/UL — SIGNIFICANT CHANGE UP (ref 0–0.2)
BASOPHILS NFR BLD AUTO: 0.3 % — SIGNIFICANT CHANGE UP (ref 0–2)
BILIRUB SERPL-MCNC: 0.2 MG/DL — SIGNIFICANT CHANGE UP (ref 0.2–1.2)
BUN SERPL-MCNC: 10 MG/DL — SIGNIFICANT CHANGE UP (ref 7–23)
CALCIUM SERPL-MCNC: 9.4 MG/DL — SIGNIFICANT CHANGE UP (ref 8.5–10.1)
CHLORIDE SERPL-SCNC: 110 MMOL/L — HIGH (ref 96–108)
CO2 SERPL-SCNC: 26 MMOL/L — SIGNIFICANT CHANGE UP (ref 22–31)
CREAT SERPL-MCNC: 0.79 MG/DL — SIGNIFICANT CHANGE UP (ref 0.5–1.3)
EGFR: 102 ML/MIN/1.73M2 — SIGNIFICANT CHANGE UP
EGFR: 102 ML/MIN/1.73M2 — SIGNIFICANT CHANGE UP
EOSINOPHIL # BLD AUTO: 0.03 K/UL — SIGNIFICANT CHANGE UP (ref 0–0.5)
EOSINOPHIL NFR BLD AUTO: 0.5 % — SIGNIFICANT CHANGE UP (ref 0–6)
GLUCOSE SERPL-MCNC: 97 MG/DL — SIGNIFICANT CHANGE UP (ref 70–99)
HCG SERPL-ACNC: <1 MIU/ML — SIGNIFICANT CHANGE UP
HCT VFR BLD CALC: 35.6 % — SIGNIFICANT CHANGE UP (ref 34.5–45)
HGB BLD-MCNC: 11.6 G/DL — SIGNIFICANT CHANGE UP (ref 11.5–15.5)
IMM GRANULOCYTES NFR BLD AUTO: 0.5 % — SIGNIFICANT CHANGE UP (ref 0–0.9)
LIDOCAIN IGE QN: 30 U/L — SIGNIFICANT CHANGE UP (ref 13–75)
LYMPHOCYTES # BLD AUTO: 2.61 K/UL — SIGNIFICANT CHANGE UP (ref 1–3.3)
LYMPHOCYTES # BLD AUTO: 44.4 % — HIGH (ref 13–44)
MCHC RBC-ENTMCNC: 30.2 PG — SIGNIFICANT CHANGE UP (ref 27–34)
MCHC RBC-ENTMCNC: 32.6 G/DL — SIGNIFICANT CHANGE UP (ref 32–36)
MCV RBC AUTO: 92.7 FL — SIGNIFICANT CHANGE UP (ref 80–100)
MONOCYTES # BLD AUTO: 0.52 K/UL — SIGNIFICANT CHANGE UP (ref 0–0.9)
MONOCYTES NFR BLD AUTO: 8.8 % — SIGNIFICANT CHANGE UP (ref 2–14)
NEUTROPHILS # BLD AUTO: 2.67 K/UL — SIGNIFICANT CHANGE UP (ref 1.8–7.4)
NEUTROPHILS NFR BLD AUTO: 45.5 % — SIGNIFICANT CHANGE UP (ref 43–77)
NRBC # BLD: 0 /100 WBCS — SIGNIFICANT CHANGE UP (ref 0–0)
NRBC BLD-RTO: 0 /100 WBCS — SIGNIFICANT CHANGE UP (ref 0–0)
PLATELET # BLD AUTO: 271 K/UL — SIGNIFICANT CHANGE UP (ref 150–400)
POTASSIUM SERPL-MCNC: 4.2 MMOL/L — SIGNIFICANT CHANGE UP (ref 3.5–5.3)
POTASSIUM SERPL-SCNC: 4.2 MMOL/L — SIGNIFICANT CHANGE UP (ref 3.5–5.3)
PROT SERPL-MCNC: 7 GM/DL — SIGNIFICANT CHANGE UP (ref 6–8.3)
RBC # BLD: 3.84 M/UL — SIGNIFICANT CHANGE UP (ref 3.8–5.2)
RBC # FLD: 14.2 % — SIGNIFICANT CHANGE UP (ref 10.3–14.5)
SODIUM SERPL-SCNC: 141 MMOL/L — SIGNIFICANT CHANGE UP (ref 135–145)
WBC # BLD: 5.88 K/UL — SIGNIFICANT CHANGE UP (ref 3.8–10.5)
WBC # FLD AUTO: 5.88 K/UL — SIGNIFICANT CHANGE UP (ref 3.8–10.5)

## 2024-07-07 RX ORDER — ACETAMINOPHEN 500 MG/5ML
650 LIQUID (ML) ORAL ONCE
Refills: 0 | Status: COMPLETED | OUTPATIENT
Start: 2024-07-07 | End: 2024-07-07

## 2024-07-07 RX ADMIN — Medication 650 MILLIGRAM(S): at 04:56

## 2024-07-07 RX ADMIN — Medication 650 MILLIGRAM(S): at 07:25

## 2024-07-08 ENCOUNTER — EMERGENCY (EMERGENCY)
Facility: HOSPITAL | Age: 34
LOS: 0 days | Discharge: HOME | End: 2024-07-09
Attending: EMERGENCY MEDICINE
Payer: MEDICAID

## 2024-07-08 VITALS
RESPIRATION RATE: 17 BRPM | TEMPERATURE: 98 F | HEART RATE: 108 BPM | OXYGEN SATURATION: 100 % | DIASTOLIC BLOOD PRESSURE: 79 MMHG | HEIGHT: 62 IN | WEIGHT: 272.05 LBS | SYSTOLIC BLOOD PRESSURE: 111 MMHG

## 2024-07-08 DIAGNOSIS — R07.89 OTHER CHEST PAIN: ICD-10-CM

## 2024-07-08 DIAGNOSIS — F31.9 BIPOLAR DISORDER, UNSPECIFIED: ICD-10-CM

## 2024-07-08 DIAGNOSIS — Z91.018 ALLERGY TO OTHER FOODS: ICD-10-CM

## 2024-07-08 DIAGNOSIS — R62.50 UNSPECIFIED LACK OF EXPECTED NORMAL PHYSIOLOGICAL DEVELOPMENT IN CHILDHOOD: ICD-10-CM

## 2024-07-08 DIAGNOSIS — R45.1 RESTLESSNESS AND AGITATION: ICD-10-CM

## 2024-07-08 DIAGNOSIS — F20.9 SCHIZOPHRENIA, UNSPECIFIED: ICD-10-CM

## 2024-07-08 DIAGNOSIS — R10.9 UNSPECIFIED ABDOMINAL PAIN: ICD-10-CM

## 2024-07-08 DIAGNOSIS — Z91.013 ALLERGY TO SEAFOOD: ICD-10-CM

## 2024-07-08 PROCEDURE — 99284 EMERGENCY DEPT VISIT MOD MDM: CPT

## 2024-07-08 PROCEDURE — 71045 X-RAY EXAM CHEST 1 VIEW: CPT | Mod: 26

## 2024-07-08 RX ORDER — DIPHENHYDRAMINE HCL 12.5MG/5ML
50 ELIXIR ORAL ONCE
Refills: 0 | Status: DISCONTINUED | OUTPATIENT
Start: 2024-07-08 | End: 2024-07-08

## 2024-07-08 RX ORDER — CLOZAPINE 100 MG/1
50 TABLET ORAL ONCE
Refills: 0 | Status: COMPLETED | OUTPATIENT
Start: 2024-07-08 | End: 2024-07-08

## 2024-07-08 RX ORDER — ARIPIPRAZOLE 15 MG/1
5 TABLET ORAL ONCE
Refills: 0 | Status: COMPLETED | OUTPATIENT
Start: 2024-07-08 | End: 2024-07-08

## 2024-07-08 RX ORDER — LORAZEPAM 0.5 MG
2 TABLET ORAL ONCE
Refills: 0 | Status: DISCONTINUED | OUTPATIENT
Start: 2024-07-08 | End: 2024-07-08

## 2024-07-08 RX ORDER — HALOPERIDOL DECANOATE 100 MG/ML
5 VIAL (ML) INTRAMUSCULAR ONCE
Refills: 0 | Status: DISCONTINUED | OUTPATIENT
Start: 2024-07-08 | End: 2024-07-08

## 2024-07-08 RX ORDER — ACETAMINOPHEN 325 MG
975 TABLET ORAL ONCE
Refills: 0 | Status: COMPLETED | OUTPATIENT
Start: 2024-07-08 | End: 2024-07-08

## 2024-07-08 RX ADMIN — CLOZAPINE 50 MILLIGRAM(S): 100 TABLET ORAL at 23:07

## 2024-07-08 RX ADMIN — ARIPIPRAZOLE 5 MILLIGRAM(S): 15 TABLET ORAL at 22:14

## 2024-07-08 RX ADMIN — Medication 975 MILLIGRAM(S): at 22:14

## 2024-07-08 NOTE — ED BEHAVIORAL HEALTH ASSESSMENT NOTE - NSBHMSEHYG_PSY_A_CORE
Diagnostic Evaluation Consultation  Crisis Assessment    Patient Name: Bruce Arriaga  Age:  25 year old  Legal Sex: male  Gender Identity: male  Pronouns: he/him   Race: Data Unavailable  Ethnicity: Data Unavailable  Language: English      Patient was assessed: In person   Crisis Assessment Start Date: 07/08/24  Crisis Assessment Start Time: 1300  Crisis Assessment Stop Time: 1320  Patient location: [facility] EMERGENCY DEPT                             [id number]    Referral Data and Chief Complaint  Bruce Arriaga presents to the ED with family/friends. Patient is presenting to the ED for the following concerns: Anxiety, Depression, Significant behavioral change, Worsening psychosocial stress.   Factors that make the mental health crisis life threatening or complex are:  Pt presented to the ED due to new onset of anxiety and panic attack like sx. Pt endorsed several recent stressors (recent break up/ transition at work) and Pt was brought into the ED by his ex-girlfriend due to a panic attack earlier today.      Informed Consent and Assessment Methods  Explained the crisis assessment process, including applicable information disclosures and limits to confidentiality, assessed understanding of the process, and obtained consent to proceed with the assessment.  Assessment methods included conducting a formal interview with patient, review of medical records, collaboration with medical staff, and obtaining relevant collateral information from family and community providers when available.  : done     Patient response to interventions: acceptance expressed, verbalizes understanding  Coping skills were attempted to reduce the crisis:  Pt has been receptive to ED interventions. Pt is help seeking     History of the Crisis   Pt presented  with a somewhat anxious affect. Pts mood is congruent with this presentation. Pt was oriented x4. Pt was cooperative and engaged during assessment. Pt does not have a  "significant psychiatric hx. Pt has does not have a  hx of past IP MH admissions. Pt currently has no outpatient providers but is open to referrals. Pt currently presents to the ED due to a new onset of anxiety and panic attacks. Pt endorsed over the last couple of days he has been more anxious and has been having trouble with sleep. Pt endorsed some somatic sx, chest tightness, shortness of breath, feeling shaky. Pt endorsed several stressors, with a recent breakup with his girlfriend about 2 weeks ago. Pt also endorsed a change in location at his work. Pt also endorsed just in general feeling more anxious and \"stressed out.\"  Pt did not endorse any SI/SIB/HI or AH/VH. Pt endorsed that he socially drinks alcohol and he uses marijuana about \"4-5\" times a week. Pt endorsed willingness to engage in outpatient supports, therapy and psychiatry referral.    Brief Psychosocial History  Family:  Single, Children no  Support System:  Friend, Parent(s)  Employment Status:  employed full-time  Source of Income:  salary/wages  Financial Environmental Concerns:  none  Current Hobbies:  television/movies/videos, social media/computer activities, family functions, group/social activities  Barriers in Personal Life:  mental health concerns    Significant Clinical History  Current Anxiety Symptoms:  anxious, racing thoughts, excessive worry, shortness of breath or racing heart, panic attack  Current Depression/Trauma:  sadness  Current Somatic Symptoms:  anxious, racing thoughts, somatic symptoms (abdominal pain, headache, tension), shortness of breath or racing heart, excessive worry  Current Psychosis/Thought Disturbance:   (no issues reported)  Current Eating Symptoms:  loss of appetite  Chemical Use History:  Alcohol: Social  Last Use:: 07/07/24  Benzodiazepines: None  Opiates: None  Cocaine: None  Marijuana: Daily  Last Use:: 07/07/24  Other Use: None   Past diagnosis:  No known past diagnosis  Family history:  No known history " "of mental health or chemical health concerns  Past treatment:  No known formal treatment attempts  Details of most recent treatment:     Other relevant history:          Collateral Information  Is there collateral information: Yes     Collateral information name, relationship, phone number:  Elder (ex- girlfriend) #[phone]    What happened today: Writer talked to Pts ex girlfriend. She endorsed that she dropped off Pt today and he called her and was very distressed. Pt was hyperventilating and \"really scared.\" They both decided to have Pt come to the ED     What is different about patient's functioning: Pts girlfriend endorsed that for the past couple of weeks she doesn't know exactly how he has been as they have not talked much, but she endorsed \"I have never seen him like this, it did look like a panic attack.\" Does not have any hx of anxiety or depression. Pt has never endorsed SI/HI/SIB or AH/VH.     Concern about alcohol/drug use:  no     What do you think the patient needs:  therapy, possible medications.     Has patient made comments about wanting to kill themselves/others: no    If d/c is recommended, can they take part in safety/aftercare planning:  yes    Additional collateral information:  n/a     Risk Assessment  Page Suicide Severity Rating Scale Full Clinical Version:  Suicidal Ideation  Q1 Wish to be Dead (Lifetime): No  Q2 Non-Specific Active Suicidal Thoughts (Lifetime): No  Q6 Suicide Behavior (Lifetime): no     Suicidal Behavior (Lifetime)  Actual Attempt (Lifetime): No  Has subject engaged in non-suicidal self-injurious behavior? (Lifetime): No  Interrupted Attempts (Lifetime): No  Aborted or Self-Interrupted Attempt (Lifetime): No  Preparatory Acts or Behavior (Lifetime): No    Page Suicide Severity Rating Scale Recent:   Suicidal Ideation (Recent)  Q1 Wished to be Dead (Past Month): no  Q2 Suicidal Thoughts (Past Month): no  Level of Risk per Screen: no risks indicated      "     Environmental or Psychosocial Events: other life stressors, ongoing abuse of substances, challenging interpersonal relationships, loss of a relationship due to divorce/separation  Protective Factors: Protective Factors: strong bond to family unit, community support, or employment, responsibilities and duties to others, including pets and children, sense of importance of health and wellness, help seeking, able to access care without barriers, lives in a responsibly safe and stable environment    Does the patient have thoughts of harming others? Feels Like Hurting Others: no  Previous Attempt to Hurt Others: no  Is the patient engaging in sexually inappropriate behavior?: no    Is the patient engaging in sexually inappropriate behavior?  no        Mental Status Exam   Affect: Appropriate  Appearance: Appropriate  Attention Span/Concentration: Attentive  Eye Contact: Engaged    Fund of Knowledge: Appropriate   Language /Speech Content: Fluent  Language /Speech Volume: Normal  Language /Speech Rate/Productions: Normal  Recent Memory: Intact  Remote Memory: Intact  Mood: Depressed, Anxious  Orientation to Person: Yes   Orientation to Place: Yes  Orientation to Time of Day: Yes  Orientation to Date: Yes     Situation (Do they understand why they are here?): Yes  Psychomotor Behavior: Normal  Thought Content: Clear  Thought Form: Intact     Medication  Psychotropic medications:   Medication Orders - Psychiatric (From admission, onward)      None             Current Care Team  No care team member to display    Diagnosis  Patient Active Problem List   Diagnosis Code    Anxiety F41.9       Primary Problem This Admission  Active Hospital Problems    Anxiety        Clinical Summary and Substantiation of Recommendations   Pt is a 24 y/o male without a significant psychiatric hx. At this time [facility] admission is not being recommended due to Pt not endorsing any SI/SIB/HI or AH/VH. Pt was able to fully safety plan with writer.  During current assessment Pt does not present with any modifiable risk factors that are likely to be mitigated in a hospital setting. Further, current sx and presentation are unlikely to be changed or alleviated by medication management or IP  therapeutic interventions during a brief hospital stay. Pt does appear to be at higher risk of death by suicide accidental or intentional due to mental health hx and substance use hx.  At this time IP MH admission does not appear to be the most therapeutically beneficial intervention/ level of care for Pt. Pt appears to be able to use and motivated to engage in supportive mental health/ community resources. Pt was referred for outpatient therapy and psychiatry follow up.      Patient coping skills attempted to reduce the crisis:  Pt has been receptive to ED interventions. Pt is help seeking    Disposition  Recommended disposition: Individual Therapy, Medication Management        Reviewed case and recommendations with attending provider. Attending Name:      Attending concurs with disposition: not able to consult with provider prior to not completion.      Patient and/or validated legal guardian concurs with disposition:   yes       Final disposition:  discharge    Legal status on admission: Voluntary/Patient has signed consent for treatment    Assessment Details   Total duration spent with the patient: 20 min     CPT code(s) utilized: Non-Billable    LATOYA Guevara, Psychotherapist  [facility] - Triage & Transition Services  Callback: [phone]          [place]

## 2024-07-09 VITALS
DIASTOLIC BLOOD PRESSURE: 71 MMHG | TEMPERATURE: 98 F | OXYGEN SATURATION: 96 % | RESPIRATION RATE: 19 BRPM | SYSTOLIC BLOOD PRESSURE: 104 MMHG | HEART RATE: 76 BPM

## 2024-07-09 RX ORDER — MIDAZOLAM HYDROCHLORIDE 1 MG/ML
4 INJECTION INTRAMUSCULAR; INTRAVENOUS ONCE
Refills: 0 | Status: DISCONTINUED | OUTPATIENT
Start: 2024-07-09 | End: 2024-07-09

## 2024-07-09 RX ORDER — HALOPERIDOL DECANOATE 100 MG/ML
5 VIAL (ML) INTRAMUSCULAR ONCE
Refills: 0 | Status: COMPLETED | OUTPATIENT
Start: 2024-07-09 | End: 2024-07-09

## 2024-07-09 RX ADMIN — Medication 5 MILLIGRAM(S): at 04:33

## 2024-07-09 RX ADMIN — MIDAZOLAM HYDROCHLORIDE 4 MILLIGRAM(S): 1 INJECTION INTRAMUSCULAR; INTRAVENOUS at 05:20

## 2024-07-13 ENCOUNTER — EMERGENCY (EMERGENCY)
Facility: HOSPITAL | Age: 34
LOS: 1 days | Discharge: HOME | End: 2024-07-13
Attending: EMERGENCY MEDICINE | Admitting: EMERGENCY MEDICINE
Payer: MEDICAID

## 2024-07-13 VITALS
HEIGHT: 62 IN | SYSTOLIC BLOOD PRESSURE: 117 MMHG | TEMPERATURE: 98 F | RESPIRATION RATE: 16 BRPM | HEART RATE: 99 BPM | OXYGEN SATURATION: 97 % | DIASTOLIC BLOOD PRESSURE: 79 MMHG

## 2024-07-13 VITALS
SYSTOLIC BLOOD PRESSURE: 134 MMHG | OXYGEN SATURATION: 100 % | RESPIRATION RATE: 16 BRPM | HEART RATE: 104 BPM | DIASTOLIC BLOOD PRESSURE: 85 MMHG

## 2024-07-13 PROCEDURE — 99283 EMERGENCY DEPT VISIT LOW MDM: CPT

## 2024-07-13 PROCEDURE — 93010 ELECTROCARDIOGRAM REPORT: CPT

## 2024-07-17 NOTE — ED PROVIDER NOTE - NSFOLLOWUPINSTRUCTIONS_ED_ALL_ED_FT
HUB TO RELAY    Your recent metabolic panel returned with normal kidney function, good liver enzymes, normal electrolytes, and mild blood sugar elevation at 107 in the prediabetes range.  Your A1c did return normal with no concerns for diabetes.     Your lipid panel did return with elevation in your cholesterol.  It has improved a little compared to past labs but does remain significantly elevated.  I would like for you to consider medications for cholesterol to help with heart protection and stroke risk reduction.  I did place a consult request as we discussed for Dr. Walters so you can go over some noninvasive testing options regarding your cholesterol elevation and get his recommendation regarding treatment.     Your thyroid function blood work returned normal.     Your PSA for prostate cancer screening returned normal.     Your blood count returned normal with no anemia or evidence for infection.     Please let me know if you have any questions and you will receive a call with your appointment to see cardiology as soon as this is scheduled   Rest, drink plenty of fluids  Advance activity as tolerated  Continue all previously prescribed medications as directed  Follow up with your PMD - bring copies of your results  Return to the ER for chest pain, difficulty breathing, or other new or concerning symptoms

## 2024-07-21 ENCOUNTER — EMERGENCY (EMERGENCY)
Facility: HOSPITAL | Age: 34
LOS: 1 days | Discharge: HOME | End: 2024-07-21
Attending: EMERGENCY MEDICINE | Admitting: EMERGENCY MEDICINE
Payer: MEDICAID

## 2024-07-21 ENCOUNTER — EMERGENCY (EMERGENCY)
Facility: HOSPITAL | Age: 34
LOS: 0 days | Discharge: HOME | End: 2024-07-22
Attending: STUDENT IN AN ORGANIZED HEALTH CARE EDUCATION/TRAINING PROGRAM
Payer: MEDICAID

## 2024-07-21 VITALS
HEART RATE: 80 BPM | TEMPERATURE: 98 F | RESPIRATION RATE: 16 BRPM | DIASTOLIC BLOOD PRESSURE: 60 MMHG | SYSTOLIC BLOOD PRESSURE: 130 MMHG | OXYGEN SATURATION: 100 %

## 2024-07-21 VITALS
HEIGHT: 62 IN | RESPIRATION RATE: 16 BRPM | SYSTOLIC BLOOD PRESSURE: 140 MMHG | TEMPERATURE: 98 F | WEIGHT: 253.97 LBS | HEART RATE: 96 BPM | OXYGEN SATURATION: 98 % | DIASTOLIC BLOOD PRESSURE: 81 MMHG

## 2024-07-21 VITALS
WEIGHT: 253.97 LBS | OXYGEN SATURATION: 98 % | RESPIRATION RATE: 17 BRPM | TEMPERATURE: 98 F | HEART RATE: 97 BPM | SYSTOLIC BLOOD PRESSURE: 104 MMHG | DIASTOLIC BLOOD PRESSURE: 71 MMHG | HEIGHT: 62 IN

## 2024-07-21 DIAGNOSIS — N94.6 DYSMENORRHEA, UNSPECIFIED: ICD-10-CM

## 2024-07-21 DIAGNOSIS — Z91.013 ALLERGY TO SEAFOOD: ICD-10-CM

## 2024-07-21 DIAGNOSIS — Z91.018 ALLERGY TO OTHER FOODS: ICD-10-CM

## 2024-07-21 PROCEDURE — 99284 EMERGENCY DEPT VISIT MOD MDM: CPT

## 2024-07-21 PROCEDURE — 99283 EMERGENCY DEPT VISIT LOW MDM: CPT

## 2024-07-21 RX ORDER — KETOROLAC TROMETHAMINE 30 MG/ML
60 INJECTION, SOLUTION INTRAMUSCULAR ONCE
Refills: 0 | Status: DISCONTINUED | OUTPATIENT
Start: 2024-07-21 | End: 2024-07-21

## 2024-07-21 RX ORDER — ACETAMINOPHEN 325 MG
650 TABLET ORAL ONCE
Refills: 0 | Status: COMPLETED | OUTPATIENT
Start: 2024-07-21 | End: 2024-07-21

## 2024-07-21 RX ADMIN — Medication 650 MILLIGRAM(S): at 10:03

## 2024-07-21 RX ADMIN — KETOROLAC TROMETHAMINE 60 MILLIGRAM(S): 30 INJECTION, SOLUTION INTRAMUSCULAR at 23:36

## 2024-07-21 NOTE — ED PROVIDER NOTE - PATIENT PORTAL LINK FT
You can access the FollowMyHealth Patient Portal offered by [facility] by registering at the following website: [email or web address]. By joining [employer]’s FollowMyHealth portal, you will also be able to view your health information using other applications (apps) compatible with our system.

## 2024-07-21 NOTE — PROVIDER CONTACT NOTE (OTHER) - ASSESSMENT
Medical team informed that pt is medically cleared for discharge back to her group home. Writer contacted  pt's residence, [facility] ([facility] group home spoke with supervisor -Karoline  ([phone]. supervisor -Karoline informed that “ we do not enough staff today and requested to send the pt via ambulance and there is a staff at [facility]. Address -  [facility] [facility]. Writer  arranged transportation via - [facility] online portal invoice # [id number]. Writer contacted   [facility] spoke with Larry 12:00 pm. trip #[id number].  Non Emergent ambulance form has been signed by MD and attached to the pts envelope for EMS.  Medical team and pt was notified  time and in agreement with this intervention and plan.  Verbal huddle complete. No further SW intervention needed for this visit.
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
## 2024-07-21 NOTE — ED ADULT NURSE NOTE - OBJECTIVE STATEMENT
Patient came in with the complaints of abdominal pain started a week ago. Patient denies nausea/vomiting/diarrhea. No other complaints. Medication given as ordered. Patient tolerated well. Nursing care continues

## 2024-07-21 NOTE — ED PROVIDER NOTE - CLINICAL SUMMARY MEDICAL DECISION MAKING FREE TEXT BOX
The pt is here for abdominal cramping related to her menstrual cycle so will give tylenol but will not perform labs or imaging since the pt was requesting to eat breakfast and is well appearing with normal vitals.

## 2024-07-21 NOTE — ED ADULT TRIAGE NOTE - CHIEF COMPLAINT QUOTE
from [facility] home.  c/o lower abd cramping since menstrual period started 1 wk  ago. seen at [facility] for vomiting  yesterday. denies n,v today

## 2024-07-21 NOTE — ED PROVIDER NOTE - PHYSICAL EXAMINATION
VITAL SIGNS: I have reviewed nursing notes and confirm.  CONSTITUTIONAL: well-appearing, non-toxic, NAD  SKIN: Warm dry, normal skin turgor  HEAD: NCAT  CARD: RRR, no murmurs, rubs or gallops  RESP: clear to ausculation b/l.  No rales, rhonchi, or wheezing.  ABD: soft, + BS, non-tender, non-distended, no rebound or guarding. No CVA tenderness  NEURO: normal motor. normal sensory. . Normal gait.  PSYCH: Cooperative, appropriate.

## 2024-07-21 NOTE — ED PROVIDER NOTE - CLINICAL SUMMARY MEDICAL DECISION MAKING FREE TEXT BOX
33-year-old female well-known to ED staff presented to ED with reported menstrual cramps patient requesting hot packs for pain.  Patient denies any nausea vomiting or diarrhea denies any other complaints.  Menses started today.    Menses related abdominal cramps, improved after anti-inflammatory, hot packs, return precautions discussed verbalized understanding agreeable discharge plan.

## 2024-07-21 NOTE — ED PROVIDER NOTE - PATIENT PORTAL LINK FT
You can access the FollowMyHealth Patient Portal offered by [facility] by registering at the following website: [email or web address]. By joining [employer]’s FollowMyHealth portal, you will also be able to view your health information using other applications (apps) compatible with our system.

## 2024-07-21 NOTE — ED ADULT TRIAGE NOTE - BIRTH SEX
Detail Level: Detailed
Quality 431: Preventive Care And Screening: Unhealthy Alcohol Use - Screening: Patient screened for unhealthy alcohol use using a single question and scores less than 2 times per year
Quality 130: Documentation Of Current Medications In The Medical Record: Current Medications Documented
Quality 226: Preventive Care And Screening: Tobacco Use: Screening And Cessation Intervention: Patient screened for tobacco use and is an ex/non-smoker
Female

## 2024-07-21 NOTE — ED PROVIDER NOTE - OBJECTIVE STATEMENT
33-year-old female well-known to ED staff presented to ED with reported menstrual cramps patient requesting hot packs for pain.  Patient denies any nausea vomiting or diarrhea denies any other complaints.  Menses started today.

## 2024-07-21 NOTE — ED ADULT NURSE NOTE - CHIEF COMPLAINT QUOTE
from [facility] home.  c/o lower abd cramping since menstrual period started 1 wk  ago. seen at [facility] for vomiting  yesterday. denies n,v today

## 2024-07-22 VITALS
HEART RATE: 77 BPM | DIASTOLIC BLOOD PRESSURE: 52 MMHG | RESPIRATION RATE: 15 BRPM | SYSTOLIC BLOOD PRESSURE: 93 MMHG | TEMPERATURE: 98 F | OXYGEN SATURATION: 100 %

## 2024-07-22 NOTE — ED ADULT NURSE NOTE - OBJECTIVE STATEMENT
Pt is a 34yo Female AAOx2 NKDA pmh complex care pw abdominal cramping 3/10. Pt states she is menstruating. Pt respirations equal and unlabored bilaterally. Pt updated on plan of care.

## 2024-07-22 NOTE — ED ADULT NURSE NOTE - NSFALLUNIVINTERV_ED_ALL_ED
Bed/Stretcher in lowest position, wheels locked, appropriate side rails in place/Call bell, personal items and telephone in reach/Instruct patient to call for assistance before getting out of bed/chair/stretcher/Non-slip footwear applied when patient is off stretcher/[place] to call system/Physically safe environment - no spills, clutter or unnecessary equipment/Purposeful proactive rounding/Room/bathroom lighting operational, light cord in reach

## 2024-07-25 ENCOUNTER — EMERGENCY (EMERGENCY)
Facility: HOSPITAL | Age: 34
LOS: 0 days | Discharge: HOME | End: 2024-07-25
Payer: MEDICAID

## 2024-07-25 VITALS
SYSTOLIC BLOOD PRESSURE: 128 MMHG | HEART RATE: 108 BPM | RESPIRATION RATE: 20 BRPM | OXYGEN SATURATION: 100 % | DIASTOLIC BLOOD PRESSURE: 94 MMHG | TEMPERATURE: 98 F

## 2024-07-25 VITALS
RESPIRATION RATE: 20 BRPM | SYSTOLIC BLOOD PRESSURE: 103 MMHG | HEIGHT: 62 IN | DIASTOLIC BLOOD PRESSURE: 78 MMHG | WEIGHT: 250 LBS | OXYGEN SATURATION: 99 % | TEMPERATURE: 98 F | HEART RATE: 100 BPM

## 2024-07-25 DIAGNOSIS — F20.9 SCHIZOPHRENIA, UNSPECIFIED: ICD-10-CM

## 2024-07-25 DIAGNOSIS — M79.604 PAIN IN RIGHT LEG: ICD-10-CM

## 2024-07-25 DIAGNOSIS — S80.921A UNSPECIFIED SUPERFICIAL INJURY OF RIGHT LOWER LEG, INITIAL ENCOUNTER: ICD-10-CM

## 2024-07-25 DIAGNOSIS — Z91.013 ALLERGY TO SEAFOOD: ICD-10-CM
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
## 2024-07-25 DIAGNOSIS — R62.50 UNSPECIFIED LACK OF EXPECTED NORMAL PHYSIOLOGICAL DEVELOPMENT IN CHILDHOOD: ICD-10-CM

## 2024-07-25 DIAGNOSIS — F31.9 BIPOLAR DISORDER, UNSPECIFIED: ICD-10-CM

## 2024-07-25 DIAGNOSIS — Z91.018 ALLERGY TO OTHER FOODS: ICD-10-CM

## 2024-07-25 PROCEDURE — 99283 EMERGENCY DEPT VISIT LOW MDM: CPT

## 2024-07-25 RX ORDER — ACETAMINOPHEN 325 MG
650 TABLET ORAL ONCE
Refills: 0 | Status: COMPLETED | OUTPATIENT
Start: 2024-07-25 | End: 2024-07-25

## 2024-07-25 RX ADMIN — Medication 650 MILLIGRAM(S): at 10:12
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
## 2024-07-25 NOTE — ED PROVIDER NOTE - NSFOLLOWUPINSTRUCTIONS_ED_ALL_ED_FT
Rest, drink plenty of fluids.  Advance activity as tolerated.  Continue all previously prescribed medications as directed.  Follow up with your primary care physician in 48-72 hours- bring copies of your results.  Return to the ER for worsening or persistent symptoms, and/or ANY NEW OR CONCERNING SYMPTOMS. If you have issues obtaining follow up, please call: [phone] ([phone] to obtain a doctor or specialist who takes your insurance in your area.  You may call [phone] to make an appointment with the internal medicine clinic.

## 2024-07-25 NOTE — ED PROVIDER NOTE - NSTIMEPROVIDERCAREINITIATE_GEN_ER
Patient is aware of the surgery being scheduled on 8/10/18 at 12noon.  She is aware that she will need to arrive at 10am. She is also aware of her post op appt on 8/22 at 9:15am 25-Jul-2024 09:31
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
## 2024-07-25 NOTE — ED PROVIDER NOTE - PATIENT PORTAL LINK FT
You can access the FollowMyHealth Patient Portal offered by [facility] by registering at the following website: [email or web address]. By joining [employer]’s FollowMyHealth portal, you will also be able to view your health information using other applications (apps) compatible with our system.

## 2024-07-25 NOTE — ED PROVIDER NOTE - PHYSICAL EXAMINATION
GEN: Awake, alert, interactive, NAD.  HEAD AND NECK: NC/AT. Airway patent. Neck supple.   EYES:  Clear b/l.   ENT: Moist mucus membranes.   CARDIAC: Regular rate, regular rhythm. No evident pedal edema.    RESP/CHEST: Normal respiratory effort with no use of accessory muscles or retractions. Clear throughout on auscultation.  ABD: soft, non-distended, non-tender. No rebound, no guarding.   BACK: No midline spinal TTP. No CVAT.   EXTREMITIES: RLE : No deformity, (+)  FROM of the right hip, knee, ankle, (+) pulses intact. NO edema.  Moving all extremities with no apparent deformities.   SKIN: Warm, dry, intact normal color. No rash.   NEURO: Alert,  no focal deficits.   PSYCH: Appropriate mood and affect.

## 2024-08-02 ENCOUNTER — EMERGENCY (EMERGENCY)
Facility: HOSPITAL | Age: 34
LOS: 0 days | Discharge: HOME | End: 2024-08-03
Attending: STUDENT IN AN ORGANIZED HEALTH CARE EDUCATION/TRAINING PROGRAM
Payer: MEDICAID

## 2024-08-02 VITALS
HEART RATE: 105 BPM | WEIGHT: 253.97 LBS | RESPIRATION RATE: 16 BRPM | SYSTOLIC BLOOD PRESSURE: 114 MMHG | OXYGEN SATURATION: 97 % | DIASTOLIC BLOOD PRESSURE: 77 MMHG | TEMPERATURE: 98 F | HEIGHT: 62 IN

## 2024-08-02 DIAGNOSIS — Z91.018 ALLERGY TO OTHER FOODS: ICD-10-CM

## 2024-08-02 DIAGNOSIS — F20.9 SCHIZOPHRENIA, UNSPECIFIED: ICD-10-CM

## 2024-08-02 DIAGNOSIS — Z91.013 ALLERGY TO SEAFOOD: ICD-10-CM

## 2024-08-02 DIAGNOSIS — R52 PAIN, UNSPECIFIED: ICD-10-CM

## 2024-08-02 PROCEDURE — 99283 EMERGENCY DEPT VISIT LOW MDM: CPT
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
## 2024-08-02 NOTE — ED ADULT NURSE NOTE - NSFALLRSKHARMRISK_ED_ALL_ED
----- Message from Dang Patterson MD sent at 8/2/2024 11:25 AM CDT -----       1.   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  See  urology  for opinion          
RN phoned pt to relay imaging results and PCP recommendations. Pt name and  verified.  Pt informed of unchanged mild hydronephrosis and referral to urologist. Pt declines offer to see specialist at this time due to earliest available appointment being 5 months away. RN offered to change referral to a different provider with a sooner appointment. Pt declined at this time stating \"if its not broke don't fix it\" . Pt is informed to contact the clinic with any questions or concerns.  Pt verbalizes understanding.  All concerns were address during this encounter.     
no

## 2024-08-03 VITALS
DIASTOLIC BLOOD PRESSURE: 91 MMHG | TEMPERATURE: 98 F | OXYGEN SATURATION: 100 % | RESPIRATION RATE: 16 BRPM | HEART RATE: 101 BPM | SYSTOLIC BLOOD PRESSURE: 110 MMHG

## 2024-08-03 RX ORDER — ACETAMINOPHEN 325 MG
650 TABLET ORAL ONCE
Refills: 0 | Status: COMPLETED | OUTPATIENT
Start: 2024-08-03 | End: 2024-08-03

## 2024-08-03 RX ADMIN — Medication 600 MILLIGRAM(S): at 00:42

## 2024-08-03 RX ADMIN — Medication 650 MILLIGRAM(S): at 00:42

## 2024-08-03 NOTE — ED ADULT NURSE NOTE - OBJECTIVE STATEMENT
Pt is a 33y F AOx3 with a pmh of schizophrenia. Pt BIBA for generalized body aches for 1 day. Pt denies n/v/d, sob, dizziness or cp.

## 2024-08-03 NOTE — ED PROVIDER NOTE - PATIENT PORTAL LINK FT
You can access the FollowMyHealth Patient Portal offered by [facility] by registering at the following website: [email or web address]. By joining [employer]’s FollowMyHealth portal, you will also be able to view your health information using other applications (apps) compatible with our system.

## 2024-08-03 NOTE — ED PROVIDER NOTE - CLINICAL SUMMARY MEDICAL DECISION MAKING FREE TEXT BOX
hx of schizophrenia p/w body aches x 1 day. pt is coming from group home, here often for same complaint. requesting tylenol ibuprofen. will DC back to group home

## 2024-08-03 NOTE — ED PROVIDER NOTE - NSFOLLOWUPINSTRUCTIONS_ED_ALL_ED_FT
In clinic device check.  Please see link for full device report.  Patient was informed of results and next follow up during today's visit.   Chronic Pain    Chronic pain is a complex condition and the Emergency Department is not the ideal place to manage this condition. Prescription painkillers must be written by your primary care provider or a pain management physician. Avoid activities or triggers that exacerbate your pain.    SEEK IMMEDIATE MEDICAL CARE IF YOU HAVE ANY OF THE FOLLOWING SYMPTOMS: severe chest pain, fainting, vomiting blood, dark or bloody stools, or pain different from your chronic pain.

## 2024-08-03 NOTE — ED ADULT NURSE NOTE - NSFALLUNIVINTERV_ED_ALL_ED
Bed/Stretcher in lowest position, wheels locked, appropriate side rails in place/Call bell, personal items and telephone in reach/Instruct patient to call for assistance before getting out of bed/chair/stretcher/Non-slip footwear applied when patient is off stretcher/[place] to call system/Physically safe environment - no spills, clutter or unnecessary equipment/Purposeful proactive rounding/Room/bathroom lighting operational, light cord in reach

## 2024-08-07 NOTE — ED ADULT NURSE NOTE - DRUG PRE-SCREENING (DAST -1)
RHEUMATOLOGY FOLLOW UP VISIT    2024  Patient Name: Shirin Waite  : 1963  Medical Record: [id number]    CHIEF COMPLAINT:   Chief Complaint   Patient presents with   • Follow-up     CMD, Positive GETACHEW,  Body stiffness and pain.         HISTORY OF PRESENT ILLNESS  Shirin Waite is a 61 year old female who is being seen for follow up evaluation of Connective tissue disease    Has not done DEXA scan yet    Joints involved: Hands, low back  AM stiffness: Minimal  Flares: Occasional  Medications used:  Mg daily, last eye exam was 2024, prednisone 5 mg daily  Relieving factors: Rest  Worsening factors: Activity  Associated symptoms: No  Difficulty with ADLs: Sometimes  Pain level today: Variable    Denies skin rash, oral or nasal ulcer, fever, chills, nausea, diarrhea    She was previously diagnosed with lipoma of the right arm.  It has not become painful and she would like a referral to surgery    Past Medical History:   Diagnosis Date   • Colon polyp    • Depression    • Hyperthyroidism    • Malignant neoplasm of right breast  (CMD)     had mastectomy and radiation   • Memory loss    • Onychomycosis    • Rheumatoid arthritis  (CMD)    • Sjogren's syndrome  (CMD)      Past Surgical History:   Procedure Laterality Date   • Esophagogastroduodenoscopy (egd)  2022   • Mastectomy Right    • Tubal ligation      postpartum     Social History     Socioeconomic History   • Marital status: /Civil Union     Spouse name: Not on file   • Number of children: Not on file   • Years of education: Not on file   • Highest education level: Not on file   Occupational History   • Not on file   Tobacco Use   • Smoking status: Never   • Smokeless tobacco: Never   Vaping Use   • Vaping status: never used   Substance and Sexual Activity   • Alcohol use: No   • Drug use: Yes     Types: Prescription Drugs   • Sexual activity: Not on file   Other Topics Concern   • Not on file   Social  History Narrative   • Not on file     Social Determinants of Health     Financial Resource Strain: Low Risk  (7/6/2021)    Received from [facility]    Overall Financial Resource Strain (CARDIA)    • Difficulty of Paying Living Expenses: Not hard at all   Food Insecurity: No Food Insecurity (11/9/2021)    Received from [facility]    Food Insecurity    • Within the past 30 days, I worried whether my food would run out before I got money to buy more. / En los últimos 30 días, me preocupó que la comida se podía acabar antes de tener dinero para compr...: Never true / Nunca    • Within the past 30 days, the food that I bought just didn't last, and I didn't have money to get more. / En los últimos 30 días, La comida que compré no rindió lo suficiente, y no tenía dinero para...: Never true / Nunca   Transportation Needs: Not on file   Physical Activity: Not on file   Stress: Not on file   Social Connections: Not on file   Interpersonal Safety: Unknown (10/25/2022)    Received from [facility]    Intimate Partner Violence    • Feels Safe at Home: Not on file     Family History   Problem Relation Age of Onset   • Cancer, Esophageal Father    • Cancer Sister         breast   • Cancer Maternal Aunt         breast   • Cancer, Colon Neg Hx    • Cancer, Liver Neg Hx    • Cancer, Rectal Neg Hx    • Cancer, Stomach Neg Hx            MEDICATIONS  Current Outpatient Medications   Medication Sig Dispense Refill   • hydroxychloroquine (PLAQUENIL) 200 MG tablet TAKE 1 TABLET BY MOUTH IN THE MORNING AND IN THE EVENING 180 tablet 0   • predniSONE (DELTASONE) 5 MG tablet TAKE 1 TABLET BY MOUTH DAILY 90 tablet 0   • methIMAzole (TAPAZOLE) 5 MG tablet Take 1 tablet by mouth daily. 90 tablet 0   • gabapentin (NEURONTIN) 300 MG capsule Take 1 capsule by mouth in the morning and 1 capsule at noon and 1 capsule in the evening. 90 capsule 5   • traMADol (ULTRAM) 50 MG tablet 4 times  daily.     • Calcium Carb-Cholecalciferol 250-3.125 MG-MCG Tab Take 1 tablet by mouth.     • Paxlovid, 300/100, 20 x 150 MG & 10 x 100MG Tablet Therapy Pack  (Patient not taking: Reported on 8/7/2024)     • traMADol (ULTRAM) 50 MG tablet Take 1 tablet by mouth every 8 hours as needed for Pain. 90 tablet 0   • Melatonin 5 MG Cap Take 5 mg by mouth as needed.      • omeprazole (PrilOSEC) 20 MG capsule Take 20 mg by mouth daily.     • acetaminophen (TYLENOL) 500 MG tablet Take 500 mg by mouth every 4 hours as needed.      • sodium chloride (OCEAN) 0.65 % nasal spray Spray 1 spray in each nostril as needed.      • B Complex Vitamins (VITAMIN B COMPLEX PO)      • Calcium Citrate-Vitamin D (CALCIUM + D PO) 1 tablet daily.      • Loratadine (CLARITIN PO) Take 10 mg by mouth as needed.      • DICLOFENAC SODIUM EX as needed.      • folic acid (FOLATE) 1 MG tablet Take 1 mg by mouth as needed.  (Patient not taking: Reported on 8/7/2024)       No current facility-administered medications for this visit.       ALLERGIES  ALLERGIES:   Allergen Reactions   • Ciprofloxacin HIVES, DIARRHEA and Other (See Comments)   • Latex RASH         Review of Systems   Musculoskeletal:  Positive for arthralgias.   Neurological:  Positive for numbness.          PHYSICAL EXAM   Vitals:    08/07/24 0909   BP: 124/80   Pulse: 80   Temp: 98.1 °F (36.7 °C)   TempSrc: Temporal   Weight: 81.6 kg (180 lb)   Height: 5' 8\" (1.727 m)       General appearance: Pt appears well developed, well nourished with attention to grooming  Skin: No rash. No induration.  Callus noted on left sole  Eyes: Conjunctiva and lids moist and without inflammation, no icterus noted  ENT: no ulcers, no erythema  Respiratory: Clear to auscultation, no wheezes or rhonchi, normal respiratory effort   Cardiovascular: Regular rate and rhythm, no murmurs rubs or gallops    Musculoskeletal:    No synovitis noted in the peripheral joints of upper or lower extremity.  She has correctable  swan-neck deformity of bilateral fifth DIP joint.  There is bony hypertrophy of DIP joints.  There is swelling of the right upper arm about 4 x 3 cm in size consistent with lipoma, positive slip sign     Neuro: A&Ox4. no gross motor or sensory defects  Psychiatric: Mood and affect are appropriate        LABS:  Component      Latest Ref Rn 12/28/2023  10:31 AM   Sodium      135 - 145 mmol/L 143    Potassium      3.4 - 5.1 mmol/L 4.1    Chloride      97 - 110 mmol/L 108    CO2      21 - 32 mmol/L 30    ANION GAP      7 - 19 mmol/L 9    Glucose      70 - 99 mg/dL 65 (L)    BUN      6 - 20 mg/dL 12    Creatinine      0.51 - 0.95 mg/dL 0.76    Glomerular Filtration Rate      >=60  >90    BUN/CREATININE RATIO      7 - 25  16    CALCIUM      8.4 - 10.2 mg/dL 9.3    TOTAL BILIRUBIN      0.2 - 1.0 mg/dL 0.7    AST/SGOT      <=37 Units/L 15    ALT/SGPT      <64 Units/L 20    ALK PHOSPHATASE      45 - 117 Units/L 80    Albumin      3.6 - 5.1 g/dL 4.1    TOTAL PROTEIN      6.4 - 8.2 g/dL 7.3    GLOBULIN      2.0 - 4.0 g/dL 3.2    A/G Ratio, Serum      1.0 - 2.4  1.3    COLOR Straw    CLARITY Clear    GLUCOSE(URINE)      Negative mg/dL Negative    BILIRUBIN      Negative  Negative    KETONES      Negative mg/dL Negative    SPECIFIC GRAVITY      1.005 - 1.030  1.009    BLOOD      Negative  Negative    pH      5.0 - 7.0  7.0    PROTEIN(URINE)      Negative mg/dL Negative    UROBILINOGEN      0.2, 1.0 mg/dL 0.2    NITRITE      Negative  Negative    LEUKOCYTE ESTERASE      Negative  Negative    WBC      4.2 - 11.0 K/mcL 3.8 (L)    RBC      4.00 - 5.20 mil/mcL 3.69 (L)    HGB      12.0 - 15.5 g/dL 11.7 (L)    HCT      36.0 - 46.5 % 36.5    MCV      78.0 - 100.0 fl 98.9    MCH      26.0 - 34.0 pg 31.7    MCHC      32.0 - 36.5 g/dL 32.1    PLT      140 - 450 K/mcL 202    RDW-CV      11.0 - 15.0 % 12.6    RDW-SD      39.0 - 50.0 fL 45.8    NRBC      <=0 /100 WBC 0    PROTEIN, URINE (TOTAL)      <12 mg/dL 8    CREATININE, URINE  (TOTAL)      mg/dL 37.80    PROTEIN/CREATININE RATIO      <=199 mgPR/gCR 212 (H)    ESR      0 - 20 mm/hr 9    DOUBLE STRANDED DNA ANTIBODY      <=9 IUnits/mL <1    COMPLEMENT C3      90 - 180 mg/dL 100    COMPLEMENT C4      10.0 - 40.0 mg/dL 26.7    C-REACTIVE PROTEIN      <=1.0 mg/dL <0.3       Legend:  (L) Low  (H) High    IMAGING:  X-rays March 2022  Knees-mild/moderate degenerative arthropathy         Assessment and plan  #Sjogren's syndrome [positive GETACHEW,Positive SSA]  #positive RNP  #Neuropathy  #History of hyperthyroidism on methimazole  #Long-term hydroxychloroquine use  #Long-term use of steroids  #Lipoma      RECOMMENDATIONS  -Continue hydroxychloroquine 400 mg daily  -Continue prednisone 5 Mg daily  -General Surgery referral provided for evaluation and treatment of lipoma of the right upper extremity  -Check disease activity labs  -Check CK and vitamin D      RTC -6 months    The patient indicates understanding of these issues and agrees with the plan.  All of patients questions were answered    Katie Burkett MD, [facility]      This note was created using the Dragon voice recognition system. Errors in content may be related to improper recognition of the system. Effort to review and correct the note has been made but irregularities may still be present.   Statement Selected

## 2024-08-11 ENCOUNTER — EMERGENCY (EMERGENCY)
Facility: HOSPITAL | Age: 34
LOS: 0 days | Discharge: HOME | End: 2024-08-11
Attending: STUDENT IN AN ORGANIZED HEALTH CARE EDUCATION/TRAINING PROGRAM
Payer: MEDICAID

## 2024-08-11 VITALS
HEIGHT: 62 IN | HEART RATE: 70 BPM | TEMPERATURE: 98 F | OXYGEN SATURATION: 100 % | SYSTOLIC BLOOD PRESSURE: 130 MMHG | DIASTOLIC BLOOD PRESSURE: 80 MMHG | RESPIRATION RATE: 20 BRPM | WEIGHT: 248.02 LBS

## 2024-08-11 VITALS
SYSTOLIC BLOOD PRESSURE: 142 MMHG | HEART RATE: 100 BPM | RESPIRATION RATE: 19 BRPM | TEMPERATURE: 98 F | DIASTOLIC BLOOD PRESSURE: 82 MMHG | OXYGEN SATURATION: 100 %

## 2024-08-11 DIAGNOSIS — Z91.013 ALLERGY TO SEAFOOD: ICD-10-CM

## 2024-08-11 DIAGNOSIS — Z91.018 ALLERGY TO OTHER FOODS: ICD-10-CM

## 2024-08-11 DIAGNOSIS — M79.661 PAIN IN RIGHT LOWER LEG: ICD-10-CM

## 2024-08-11 DIAGNOSIS — R10.2 PELVIC AND PERINEAL PAIN: ICD-10-CM

## 2024-08-11 DIAGNOSIS — M25.551 PAIN IN RIGHT HIP: ICD-10-CM

## 2024-08-11 DIAGNOSIS — R62.50 UNSPECIFIED LACK OF EXPECTED NORMAL PHYSIOLOGICAL DEVELOPMENT IN CHILDHOOD: ICD-10-CM

## 2024-08-11 DIAGNOSIS — F31.9 BIPOLAR DISORDER, UNSPECIFIED: ICD-10-CM

## 2024-08-11 LAB — HCG UR QL: NEGATIVE — SIGNIFICANT CHANGE UP

## 2024-08-11 PROCEDURE — 73502 X-RAY EXAM HIP UNI 2-3 VIEWS: CPT | Mod: 26,RT

## 2024-08-11 PROCEDURE — 73610 X-RAY EXAM OF ANKLE: CPT | Mod: 26,RT

## 2024-08-11 PROCEDURE — 99284 EMERGENCY DEPT VISIT MOD MDM: CPT

## 2024-08-11 RX ORDER — IBUPROFEN 200 MG
600 TABLET ORAL ONCE
Refills: 0 | Status: COMPLETED | OUTPATIENT
Start: 2024-08-11 | End: 2024-08-11

## 2024-08-11 RX ORDER — ACETAMINOPHEN 500 MG
650 TABLET ORAL ONCE
Refills: 0 | Status: COMPLETED | OUTPATIENT
Start: 2024-08-11 | End: 2024-08-11

## 2024-08-11 RX ORDER — LIDOCAINE 5% 5 G/100G
1 CREAM TOPICAL ONCE
Refills: 0 | Status: COMPLETED | OUTPATIENT
Start: 2024-08-11 | End: 2024-08-11

## 2024-08-11 RX ADMIN — Medication 600 MILLIGRAM(S): at 08:59

## 2024-08-11 RX ADMIN — Medication 650 MILLIGRAM(S): at 09:59

## 2024-08-11 RX ADMIN — Medication 650 MILLIGRAM(S): at 08:59

## 2024-08-11 RX ADMIN — LIDOCAINE 5% 1 PATCH: 5 CREAM TOPICAL at 08:59

## 2024-08-11 RX ADMIN — Medication 600 MILLIGRAM(S): at 09:59

## 2024-08-11 NOTE — ED PROVIDER NOTE - PATIENT PORTAL LINK FT
You can access the FollowMyHealth Patient Portal offered by [facility] by registering at the following website: [email or web address]. By joining [employer]’s FollowMyHealth portal, you will also be able to view your health information using other applications (apps) compatible with our system.

## 2024-08-11 NOTE — ED PROVIDER NOTE - CLINICAL SUMMARY MEDICAL DECISION MAKING FREE TEXT BOX
33F pmhx bipolar d/o developmental delay, pw right hip and leg pain -states 'twisted' right leg and has had pain since then but did not fall down. Denies other injuries  Initially stated she cannot walk due to pain  Then seen to be walking around ED speaking with staff in no discomfort  xray right hip and right ankle as pt is poor historian - rule out occult fracture  - no fracture on prelim xray  - pain controlled with nsaids/ acetaminophen  - pt requesting dc, stable for dc

## 2024-08-11 NOTE — ED ADULT NURSE NOTE - NS ED NURSE RECORD ANOTHER HT AND WT
How Severe Is Your Acne?: moderate Is This A New Presentation, Or A Follow-Up?: Follow Up Acne Additional Comments (Use Complete Sentences): Patient would like to start Accutane again Yes

## 2024-08-11 NOTE — ED ADULT NURSE NOTE - NSFALLUNIVINTERV_ED_ALL_ED
Bed/Stretcher in lowest position, wheels locked, appropriate side rails in place/Call bell, personal items and telephone in reach/Instruct patient to call for assistance before getting out of bed/chair/stretcher/Non-slip footwear applied when patient is off stretcher/[place] to call system/Physically safe environment - no spills, clutter or unnecessary equipment/Purposeful proactive rounding/Room/bathroom lighting operational, light cord in reach

## 2024-08-11 NOTE — ED ADULT TRIAGE NOTE - AS HEIGHT TYPE
stated I have personally performed a face to face diagnostic evaluation on this patient. I have reviewed the ACP note and agree with the history, exam and plan of care, except as noted.

## 2024-08-11 NOTE — ED PROVIDER NOTE - NSFOLLOWUPINSTRUCTIONS_ED_ALL_ED_FT
You were seen today for right hip pain and lower leg pain - xray of the right hip and ankle did not show any fractures    You may have muscle strain     Continue ibuprofen 600mg every 8 hours as needed or tylenol 650mg every 6 hours as needed for pain relief.     Return for any worsening symptoms    Follow up with primary care physician

## 2024-08-11 NOTE — ED PROVIDER NOTE - PHYSICAL EXAMINATION
Gen: aox3, nad,   Head: NCAT  ENT: Airway patent, moist mucous membranes, nasal passageways clear   Cardiac: Normal rate, normal rhythm   Respiratory: Lungs CTA B/L  Gastrointestinal: Abdomen soft, nontender, nondistended, no rebound, no guarding  MSK: No gross abnormalities, normal PROM R hip, limited AROM R hip due to pain, no deformity or swelling, + TTP R hip, pelvis and, , no edema  HEME: Extremities warm, pulses intact and symmetrical in all four extremities  Skin: No rashes, no lesions  Neuro: No gross neurologic deficits

## 2024-08-11 NOTE — ED PROVIDER NOTE - CROS ED ROS STATEMENT
Pt discharged from hospital on 4-12-22.     Noted on HRS that he has not transmitted today; called pt and left VM to please send HRS transmissions today.     Routing to Alexandra ARGUELLES to Follow-up.     Alicia Almonte RN     all other ROS negative except as per HPI

## 2024-08-17 ENCOUNTER — EMERGENCY (EMERGENCY)
Facility: HOSPITAL | Age: 34
LOS: 0 days | Discharge: HOME | End: 2024-08-17
Attending: EMERGENCY MEDICINE
Payer: MEDICAID

## 2024-08-17 VITALS
OXYGEN SATURATION: 98 % | RESPIRATION RATE: 19 BRPM | HEART RATE: 100 BPM | SYSTOLIC BLOOD PRESSURE: 107 MMHG | WEIGHT: 251.11 LBS | HEIGHT: 62 IN | DIASTOLIC BLOOD PRESSURE: 73 MMHG | TEMPERATURE: 98 F

## 2024-08-17 VITALS
SYSTOLIC BLOOD PRESSURE: 119 MMHG | DIASTOLIC BLOOD PRESSURE: 83 MMHG | TEMPERATURE: 98 F | OXYGEN SATURATION: 100 % | HEART RATE: 102 BPM | RESPIRATION RATE: 18 BRPM

## 2024-08-17 DIAGNOSIS — R10.9 UNSPECIFIED ABDOMINAL PAIN: ICD-10-CM

## 2024-08-17 DIAGNOSIS — Z91.013 ALLERGY TO SEAFOOD: ICD-10-CM

## 2024-08-17 DIAGNOSIS — Z79.84 LONG TERM (CURRENT) USE OF ORAL HYPOGLYCEMIC DRUGS: ICD-10-CM

## 2024-08-17 DIAGNOSIS — Z91.018 ALLERGY TO OTHER FOODS: ICD-10-CM

## 2024-08-17 PROCEDURE — 99283 EMERGENCY DEPT VISIT LOW MDM: CPT

## 2024-08-17 RX ORDER — ACETAMINOPHEN 500 MG/5ML
650 LIQUID (ML) ORAL ONCE
Refills: 0 | Status: COMPLETED | OUTPATIENT
Start: 2024-08-17 | End: 2024-08-17

## 2024-08-18 ENCOUNTER — EMERGENCY (EMERGENCY)
Facility: HOSPITAL | Age: 34
LOS: 1 days | Discharge: HOME | End: 2024-08-18
Attending: EMERGENCY MEDICINE | Admitting: EMERGENCY MEDICINE
Payer: MEDICAID

## 2024-08-18 VITALS
TEMPERATURE: 98 F | OXYGEN SATURATION: 99 % | RESPIRATION RATE: 18 BRPM | DIASTOLIC BLOOD PRESSURE: 63 MMHG | HEART RATE: 99 BPM | HEIGHT: 62 IN | SYSTOLIC BLOOD PRESSURE: 98 MMHG

## 2024-08-18 PROCEDURE — 99284 EMERGENCY DEPT VISIT MOD MDM: CPT
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
## 2024-08-19 VITALS
RESPIRATION RATE: 18 BRPM | SYSTOLIC BLOOD PRESSURE: 119 MMHG | DIASTOLIC BLOOD PRESSURE: 85 MMHG | OXYGEN SATURATION: 100 % | HEART RATE: 98 BPM | TEMPERATURE: 98 F

## 2024-08-19 RX ORDER — ACETAMINOPHEN 500 MG
1000 TABLET ORAL ONCE
Refills: 0 | Status: DISCONTINUED | OUTPATIENT
Start: 2024-08-19 | End: 2024-08-19

## 2024-08-19 RX ORDER — IBUPROFEN 200 MG
400 TABLET ORAL ONCE
Refills: 0 | Status: COMPLETED | OUTPATIENT
Start: 2024-08-19 | End: 2024-08-19

## 2024-08-19 RX ORDER — LORAZEPAM 1 MG/1
2 TABLET ORAL ONCE
Refills: 0 | Status: DISCONTINUED | OUTPATIENT
Start: 2024-08-19 | End: 2024-08-19

## 2024-08-19 RX ORDER — ACETAMINOPHEN 500 MG
975 TABLET ORAL ONCE
Refills: 0 | Status: COMPLETED | OUTPATIENT
Start: 2024-08-19 | End: 2024-08-19

## 2024-08-19 RX ADMIN — Medication 975 MILLIGRAM(S): at 00:54

## 2024-08-19 RX ADMIN — Medication 400 MILLIGRAM(S): at 03:56

## 2024-08-19 RX ADMIN — LORAZEPAM 2 MILLIGRAM(S): 1 TABLET ORAL at 03:59

## 2024-08-19 NOTE — ED PROVIDER NOTE - NSICDXNOPASTSURGICALHX_GEN_ALL_ED
Subjective   Anmu Lawson is a 57 y.o. female. Patient here today with complaints of Hand Pain (LEFT )  Patient here today with  complaining of nail problem, pain, history of ray nods disease and was seen a few weeks ago for same problems.  Was given Keflex, Diflucan and states symptoms have not resolved.  She reports history of nail fungus in the past which did resolve with these medicines previously.  She also at that point was given Neurontin for pain, 300 mg nightly at that time which caused her too much sedation and she could not tolerate however it did control her pain.  She is wondering if she can have a lower dose of this since it did help previously.  PCP Dr. Fenton.  Patient was seeing Dr. Melvin also but has not followed up with him in the last several months according to patient.    Vitals:    03/19/19 1315   BP: 116/70   Pulse: 90   Temp: 98 °F (36.7 °C)     Past Medical History:   Diagnosis Date   • History of mammogram 12/05/2014   • Joint pain    • Painful swelling of joint    • Raynaud's disease      History of Present Illness     The following portions of the patient's history were reviewed and updated as appropriate: allergies, current medications, past family history, past medical history, past social history, past surgical history and problem list.    Review of Systems   Constitutional: Negative.    HENT: Negative.    Eyes: Negative.    Respiratory: Negative.    Cardiovascular: Negative.    Gastrointestinal: Negative.    Endocrine: Negative.    Genitourinary: Negative.    Musculoskeletal: Negative.    Skin: Positive for color change and wound.   Allergic/Immunologic: Negative.    Neurological: Negative.    Hematological: Negative.    Psychiatric/Behavioral: Negative.        Objective   Physical Exam   Constitutional: She is oriented to person, place, and time. She appears well-developed and well-nourished. No distress.   HENT:   Head: Normocephalic and atraumatic.   Cardiovascular:  Normal rate, regular rhythm and normal heart sounds. Exam reveals no gallop and no friction rub.   No murmur heard.  Pulmonary/Chest: Effort normal and breath sounds normal. No stridor. No respiratory distress. She has no wheezes. She has no rales.   Neurological: She is alert and oriented to person, place, and time.   Skin: Skin is warm and dry. She is not diaphoretic. No erythema. No pallor.   Left ring finger distally with erythema from DIP to distal finger, warmth to touch, nail distally also pulling away from nail bed and thickened, complaining of pain to same area, also complaining of pain with mild erythema to left distal and medial pinky.  Radial pulse is palpable   Psychiatric: She has a normal mood and affect. Her behavior is normal.   Nursing note and vitals reviewed.      Assessment/Plan   Anum was seen today for hand pain.    Diagnoses and all orders for this visit:    Cellulitis of finger of left hand  -     Comprehensive metabolic panel; Future  -     CBC & Differential; Future  -     Skin / Nail Biopsy  -     Uric acid; Future  -     Rheumatoid factor; Future  -     C-reactive protein; Future  -     [place]; Future  -     Fungus Culture - Tissue, Hand, Digit Left; Future    Raynaud's disease without gangrene  -     Comprehensive metabolic panel; Future  -     CBC & Differential; Future  -     Skin / Nail Biopsy  -     Uric acid; Future  -     Rheumatoid factor; Future  -     C-reactive protein; Future  -     [place]; Future    Nail fungus    Other orders  -     sulfamethoxazole-trimethoprim (BACTRIM DS) 800-160 MG per tablet; Take 1 tablet by mouth 2 (Two) Times a Day for 7 days.  -     gabapentin (NEURONTIN) 100 MG capsule; Take 1 capsule by mouth 2 (Two) Times a Day.    Jimmy obtained and reviewed   Neurontin given 100 mg twice daily as above, faxed to pharmacy of her choice   I will obtain above labs and she will be informed of results via phone   She is given Bactrim DS for mild infection as above    Should symptoms persist or worsen she will return here for follow-up otherwise I will see her back in 2 weeks for recheck   If symptoms should persist she may need referral back to Dr. Melvin/dermatology and patient is aware   They are aware and are in agreement to this plan   all questions and concerns are addressed with understanding noted.   SHELBY query complete. Treatment plan to include limited course of prescribe controlled substance. Risks including addiction, benefits, and alternatives presented to patient.        <-- Click to add NO significant Past Surgical History

## 2024-08-19 NOTE — PROVIDER CONTACT NOTE (OTHER) - ASSESSMENT
ROCIO informed by ED staff that pt is medically stable for return to [facility] [address] [place] [address] today. ROCIO spoke with Sharri at [facility] [phone] who confirmed pt's return with supervision.  No additional information required from ROCIO for pt's return. Ambulance arranged for transfer back to [facility].

## 2024-08-19 NOTE — ED PROVIDER NOTE - ED STEMI HIDDEN
[facility]        NAME: Jefferson Chaudhary is a 13 y o  female  : 2012    MRN: [id number]  DATE: 2019  TIME: 1:13 PM    Assessment and Plan   Acute conjunctivitis of both eyes, unspecified acute conjunctivitis type [H10 33]  1  Acute conjunctivitis of both eyes, unspecified acute conjunctivitis type  erythromycin (ILOTYCIN) ophthalmic ointment    DISCONTINUED: erythromycin (ILOTYCIN) ophthalmic ointment   2  Acute upper respiratory infection           Patient Instructions     Patient Instructions   Apply moist heat 3 times daily for 10 minutes as needed  Erythromycin eye ointment 3 times daily for 7 days as directed  Over-the-counter herbal medication, [place], as needed as package directs for cough and cold symptoms  Increase clear liquids  Go to the ER with any increased swelling or redness surrounding the eyes  Follow up with her primary care provider if there is no improvement over the next 2-3 days    [employer] software was used to dictate this note  It may contain errors with dictating incorrect words/spelling  Please contact provider directly for any questions  Follow up with PCP in 3-5 days  Proceed to  ER if symptoms worsen  Chief Complaint     Chief Complaint   Patient presents with    Eye Pain     Mom states eyes are red and itchy with yelowish drainage x 1 day         History of Present Illness       Patient comes in today with mom and dad for evaluation of bilateral red eyes with crusting upon awakening this morning  Mom states that she has had a cough and nasal congestion over the past week  Denies any fevers or chills  No current treatment for her respiratory symptoms  Mom states that she did put drops in her eyes this morning but did not specify on the type of medication  She does not have any chronic medical problems  She does have a tube in 1 ear, the other 2 fell out  On occasion she has been pulling at the ear lobes but denies any ear pain or throat pain   She does go to   Review of Systems   Review of Systems   Constitutional: Negative for chills and fever  HENT: Positive for congestion  Negative for ear pain and sore throat  Eyes: Positive for discharge  Respiratory: Positive for cough  Current Medications       Current Outpatient Medications:     erythromycin (ILOTYCIN) ophthalmic ointment, Administer 0 5 inches to both eyes every 8 (eight) hours, Disp: 3 5 g, Rfl: 0    Current Allergies     Allergies as of 03/09/2019    (No Known Allergies)            The following portions of the patient's history were reviewed and updated as appropriate: allergies, current medications, past family history, past medical history, past social history, past surgical history and problem list      History reviewed  No pertinent past medical history  Past Surgical History:   Procedure Laterality Date    ADENOIDECTOMY         Family History   Problem Relation Age of Onset    No Known Problems Mother     No Known Problems Father          Medications have been verified  Objective   BP (!) 94/51 (BP Location: Right arm, Patient Position: Sitting, Cuff Size: Child)   Pulse 88   Temp 98 2 °F (36 8 °C) (Tympanic)   Resp (!) 18   Ht 3' 5" (1 041 m)   Wt 20 kg (44 lb)   SpO2 98%   BMI 18 40 kg/m²        Physical Exam     Physical Exam   Constitutional: She appears well-developed and well-nourished  She is active  HENT:   Head: Atraumatic  Right Ear: Tympanic membrane normal    Left Ear: Tympanic membrane normal    Mouth/Throat: Mucous membranes are moist  Oropharynx is clear  Eyes:   Bilateral eyes:  No external erythema, she does have crusting bilaterally, bilateral conjunctival erythema  No external swelling  Cardiovascular: Normal rate, regular rhythm, S1 normal and S2 normal    No murmur heard  Pulmonary/Chest: Effort normal  There is normal air entry  No respiratory distress  She has no wheezes  She has no rhonchi  She has no rales  Neurological: She is alert  hide

## 2024-08-19 NOTE — ED PROVIDER NOTE - NSFOLLOWUPINSTRUCTIONS_ED_ALL_ED_FT
It was a pleasure caring for you today!    You were seen in the ER today for arm pain .    For pain, please take acetaminophen 650 mg every 6 hours for pain. Additionally, you can also take ibuprofen 400 mg every 6-8 hours for pain.     You can also make an appointment with psychiatry using the information provided.    Return to the ER for any worsening symptoms or concerns, including chest pain, shortness of breath, lightheadedness, weakness, or any other concerns. It was a pleasure caring for you today!    You were seen in the ER today for arm pain .    For pain, please take acetaminophen 650 mg every 6 hours for pain. Additionally, you can also take ibuprofen 400 mg every 6-8 hours for pain.     You can also make an appointment with psychiatry using the information provided.  Please also speak to your  about changing group homes.     Return to the ER for any worsening symptoms or concerns, including chest pain, shortness of breath, lightheadedness, weakness, or any other concerns.
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
## 2024-08-19 NOTE — ED ADULT NURSE NOTE - NS ED NURSE RECORD ANOTHER HT AND WT
Yes Federico Busch ()  Internal Medicine  [address] [place] [address]  Phone: [phone]  Fax: [phone]  Follow Up Time: 1-3 Days    Clint Ruiz)  Urology  [address] [facility] [facility], [address]  [place] [address]  Phone: [phone]  Fax: [phone]  Follow Up Time: 1-3 Days

## 2024-08-19 NOTE — ED PROVIDER NOTE - PATIENT PORTAL LINK FT
You can access the FollowMyHealth Patient Portal offered by [facility] by registering at the following website: [email or web address]. By joining [employer]’s FollowMyHealth portal, you will also be able to view your health information using other applications (apps) compatible with our system.

## 2024-08-19 NOTE — ED PROVIDER NOTE - NSFOLLOWUPCLINICS_GEN_ALL_ED_FT
[facility]  Psychiatry  [address] [place] [address]  Phone: [phone]  Fax:

## 2024-08-19 NOTE — ED PROVIDER NOTE - CLINICAL SUMMARY MEDICAL DECISION MAKING FREE TEXT BOX
33-year-old female past medical history of schizoaffective disorder, bipolar, diabetes, asthma presenting with  b/l UE pain.    Differential diagnosis includes but not limited to arm pain, schizoaffective disorder.  Will give tylenol for pain. Pt frequent flyer for same concern, will reassess, give oral trial, ambulate, d/c home. Give psych f/u instructions.
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
## 2024-08-26 NOTE — ED ADULT TRIAGE NOTE - NS_BH TRG QUESTION2_ED_ALL_ED
PRIMARY CARE PROVIDER: Carl Wadr Jr., MD  REFERRING PROVIDER: No ref. provider found    Chief Complaint   Patient presents with    Suspicious Skin Lesion     Bcc to left of forehead        Subjective   History of Present Illness:  Estefany Garrido is a  54 y.o. female who presents for surgical consultation regarding a biopsy-proven basal cell carcinoma of the left forehead at hairline.  Prior to the biopsy, the lesion had the following characteristics:    Quality: raised, itching, tender at times  Severity: mild  Duration: years  Timing: constant  Modifying Factors: none  Associated Signs & Symptoms: It is not painful, bleeding, or enlarging    No prior skin cancers.     Derm: [facility]      Review of Systems:  Review of Systems   Constitutional:  Negative for chills, fatigue, fever and unexpected weight change.   HENT:  Negative for facial swelling.    Respiratory:  Negative for cough, chest tightness and shortness of breath.    Cardiovascular:  Negative for chest pain.   Musculoskeletal:  Negative for neck pain.   Skin:  Negative for color change.   Neurological:  Negative for facial asymmetry.   Hematological:  Negative for adenopathy. Does not bruise/bleed easily.       Past History:  Past Medical History:   Diagnosis Date    Emphysema of lung     Emphysema of lung     Follow-up exam     after surgery    GERD (gastroesophageal reflux disease)     LGSIL (low grade squamous intraepithelial dysplasia)     Pelvic pain     Subserous leiomyoma of uterus     Tobacco use      Past Surgical History:   Procedure Laterality Date    AUGMENTATION MAMMAPLASTY      BREAST AUGMENTATION      x2    BREAST LUMPECTOMY Left     benign     SECTION      COLONOSCOPY W/ POLYPECTOMY      benign    ENDOMETRIAL ABLATION      uterine ablation    TOTAL LAPAROSCOPIC HYSTERECTOMY SALPINGO OOPHORECTOMY Bilateral 2016    TUBAL ABDOMINAL LIGATION       Family History   Problem Relation Age of Onset    Crohn's disease  Mother     Other Mother         heart valve replacement    Coronary artery disease Mother     Colon cancer Father     Breast cancer Sister     No Known Problems Brother     Breast cancer Paternal Aunt     Breast cancer Paternal Grandmother     No Known Problems Son     No Known Problems Daughter     Emphysema Paternal Grandfather     COPD Maternal Grandmother     Pneumonia Maternal Grandfather     No Known Problems Son     No Known Problems Maternal Aunt     BRCA 1/2 Neg Hx     Endometrial cancer Neg Hx     Ovarian cancer Neg Hx     Uterine cancer Neg Hx      Social History     Tobacco Use    Smoking status: Some Days     Current packs/day: 2.00     Types: Cigarettes    Smokeless tobacco: Never   Substance Use Topics    Alcohol use: Yes     Comment: occasional    Drug use: No     Allergies:  Codeine, Penicillins, and Glue [wound dressing adhesive]    Current Outpatient Medications:     propranolol (INDERAL) 60 MG tablet, Take 1 tablet by mouth 2 (Two) Times a Day., Disp: , Rfl:     Vonoprazan Fumarate (Voquezna) 20 MG tablet, Take 1 tablet by mouth Daily., Disp: , Rfl:     dicyclomine (BENTYL) 10 MG capsule, Take 10 mg by mouth 4 (Four) Times a Day Before Meals & at Bedtime. (Patient not taking: Reported on 8/26/2024), Disp: , Rfl:     pantoprazole (PROTONIX) 40 MG EC tablet, Take  by mouth. (Patient not taking: Reported on 8/26/2024), Disp: , Rfl:     sucralfate (CARAFATE) 1 GM/10ML suspension, Take  by mouth. (Patient not taking: Reported on 8/26/2024), Disp: , Rfl:     Objective     Vital Signs:  Temp:  [98 °F (36.7 °C)] 98 °F (36.7 °C)  Heart Rate:  [76] 76  BP: (125)/(62) 125/62    Physical Exam:  Physical Exam  Vitals reviewed.   Constitutional:       General: She is not in acute distress.     Appearance: She is well-developed.   HENT:      Head: Normocephalic and atraumatic.        Right Ear: External ear normal.      Left Ear: External ear normal.      Mouth/Throat:      Lips: Pink.   Eyes:      General:  Lids are normal.   Pulmonary:      Effort: Pulmonary effort is normal. No respiratory distress.      Breath sounds: No stridor.   Musculoskeletal:      Cervical back: Neck supple.   Neurological:      Mental Status: She is alert and oriented to person, place, and time.   Psychiatric:         Mood and Affect: Mood normal.         Speech: Speech normal.         Behavior: Behavior normal. Behavior is cooperative.                 Data Review:  I have personally reviewed the pathology report demonstrating basal cell carcinoma of the left superior forehead:          Assessment   1. Basal cell carcinoma of left forehead    2. Tobacco abuse disorder        Plan     I have offered excision of the basal cell carcinoma of the left forehead at hairline with A-T flap closure in the office under local anesthesia.    Discussion of skin lesion. Discussed risks, benefits, alternatives, and possible complications of excision of the skin lesion with reconstruction utilizing local tissue rearrangement, full-thickness skin grafting, or local interpolated flaps. Risks include, but are not limited too: bleeding, infection, hematoma, recurrence, need for additional procedures, flap failure, cosmetic deformity. Patient understands risks and would like to proceed with surgery.     Dr. Shook has also seen and examined this patient agrees with the treatment plan.    My findings and recommendations were discussed and questions were answered.     Nilam Ramirez, APRN  08/26/24  10:37 CDT   No

## 2024-09-06 ENCOUNTER — EMERGENCY (EMERGENCY)
Facility: HOSPITAL | Age: 34
LOS: 1 days | Discharge: HOME | End: 2024-09-06
Admitting: STUDENT IN AN ORGANIZED HEALTH CARE EDUCATION/TRAINING PROGRAM
Payer: MEDICAID

## 2024-09-06 VITALS
TEMPERATURE: 98 F | HEIGHT: 62 IN | RESPIRATION RATE: 18 BRPM | OXYGEN SATURATION: 98 % | DIASTOLIC BLOOD PRESSURE: 64 MMHG | SYSTOLIC BLOOD PRESSURE: 100 MMHG | HEART RATE: 98 BPM

## 2024-09-06 PROCEDURE — 99284 EMERGENCY DEPT VISIT MOD MDM: CPT
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
## 2024-09-06 NOTE — ED BEHAVIORAL HEALTH NOTE - BEHAVIORAL HEALTH NOTE
SW called 's group home, [facility], [phone].  Call rang and went to voicemail.  SW left a message requesting a return phone call. ROCIO called pt's group home, [facility], [phone].  Call rang and went to [employer].  SW left a message requesting a return phone call.    ROCIO called , Simi, at [phone], for collateral information.  Per Dora, she was not present when pt was sent to the ER.  She reports that she was told by staff about pt’s incident.  She states that pt will react if something does not go her way; this is her baseline and normal reaction for her.  Per Simi, she is generally compliant with her medications except for when she is in the hospital/ER as she misses dosages sometimes.    Per Dora, if pt is discharged, she will need supervision via BLS ambulance.  She also reports that staff is always present in the residence and pt can be discharged at any time if she is discharged back to the residence. ROCIO called pt's group home, [facility], [phone].  Call rang and went to [employer].  SW left a message requesting a return phone call.    ROCIO called , Simi, at [phone], for collateral information.  Per Dora, she was not present when pt was sent to the ER.  She reports that she was told by staff about pt’s incident.  She states that pt will react if something does not go her way; this is her baseline and normal reaction for her.  Per Simi, she is generally compliant with her medications except for when she is in the hospital/ER as she misses dosages sometimes.    Per Dora, if pt is discharged, she will need supervision via BLS ambulance.  She also reports that staff is always present in the residence and pt can be discharged at any time if she is discharged back to the residence.    Called [facility] again; staff answered; stated they were not present when she was sent to the ER.  She had no additional information at this time. ROCIO called pt's group home, [facility], [phone].  Call rang and went to [employer].  SW left a message requesting a return phone call.    ROCIO called , Dora, at [phone], for collateral information.  Per Dora, she was not present when pt was sent to the ER.  She reports that she was told by staff about pt’s incident.  She states that pt will react if something does not go her way; this is her baseline and normal reaction for her.  Per Simi, she is generally compliant with her medications except for when she is in the hospital/ER as she misses dosages sometimes.    Per Dora, if pt is discharged, she will need supervision via [facility] ambulance.  She also reports that staff is always present in the residence and pt can be discharged at any time if she is discharged back to the residence.    Called residence again; staff answered; stated they were not present when she was sent to the ER.  She had no additional information at this time.    Update-informed by PA that pt will be discharged; will return to group [place] residence.  ROCIO called  Dora to inform her of pt's d/c.  She reports pt can travel back via ambulance.  Address confirmed as address on [phone] [facility].  Discussed with provider, who is aware and in agreement with the plan.  RN to arrange ambulance transport.  Verbal huddle complete.

## 2024-09-06 NOTE — ED ADULT TRIAGE NOTE - PAIN RATING/NUMBER SCALE (0-10): ACTIVITY
Subjective:     Patient ID: Kirk Livingston is a 69 y.o. male.    History of Present Illness    The patient was seen back in the office for follow-up of essential tremor.  No change over the past year.  No side effects from the medication.  History was also taken from the patient's wife.  No new problems.  No trouble getting around.  He is on primidone 250 mg at night.  The following portions of the patient's history were reviewed and updated as appropriate: allergies, current medications, past family history, past medical history, past social history, past surgical history and problem list.      Current Outpatient Medications:   •  amLODIPine (NORVASC) 10 MG tablet, TAKE ONE TABLET BY MOUTH DAILY, Disp: 90 tablet, Rfl: 1  •  aspirin 325 MG tablet, Take 1 tablet by mouth daily., Disp: , Rfl:   •  atorvastatin (LIPITOR) 10 MG tablet, TAKE ONE TABLET BY MOUTH DAILY, Disp: 90 tablet, Rfl: 1  •  Cholecalciferol (VITAMIN D3) 5000 UNITS capsule capsule, Take 5,000 Units by mouth As Needed., Disp: , Rfl:   •  Coenzyme Q10 (COQ10) 100 MG capsule, Take 1 capsule by mouth 2 (two) times a day., Disp: , Rfl:   •  lisinopril (PRINIVIL,ZESTRIL) 20 MG tablet, TAKE ONE TABLET BY MOUTH DAILY, Disp: 90 tablet, Rfl: 0  •  Omega-3 Fatty Acids (FISH OIL) 1000 MG capsule delayed-release, Take 1 capsule by mouth As Needed., Disp: , Rfl:   •  primidone (MYSOLINE) 250 MG tablet, Take 1 tablet by mouth Daily., Disp: 90 tablet, Rfl: 3    Review of Systems   Respiratory: Positive for shortness of breath and wheezing. Negative for cough.    Cardiovascular: Negative for chest pain, palpitations and leg swelling.   Gastrointestinal: Positive for nausea (acid reflux). Negative for constipation, diarrhea and vomiting.   Endocrine: Negative for cold intolerance, heat intolerance and polydipsia.   Allergic/Immunologic: Negative for environmental allergies, food allergies and immunocompromised state.   Neurological: Positive for tremors. Negative for  dizziness, seizures, syncope, facial asymmetry, speech difficulty, weakness, light-headedness, numbness and headaches.   Psychiatric/Behavioral: Negative for agitation, behavioral problems, confusion, decreased concentration, dysphoric mood, hallucinations, self-injury, sleep disturbance and suicidal ideas. The patient is not nervous/anxious and is not hyperactive.           I have reviewed ROS completed by medical assistant.     Objective:    Neurologic Exam  Mental status examination was appropriate.  Funduscopy, visual fields, eye movements and pupillary reflexes were normal.  No facial weakness was noted.  Gait was normal.  No pattern of focal weakness was noted.  Minimal tremor with hands outstretched.  Physical Exam    Assessment/Plan:     Kirk was seen today for neurologic problem.    Diagnoses and all orders for this visit:    Benign essential tremor    Other orders  -     primidone (MYSOLINE) 250 MG tablet; Take 1 tablet by mouth Daily.         Prescription drug management - meds as above    Follow-up in the office in one year. Thank you for allowing me to share in the care of this patient.  Raji Matos M.D.         0 (no pain/absence of nonverbal indicators of pain)
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
## 2024-09-06 NOTE — ED PROVIDER NOTE - PHYSICAL EXAMINATION
alert, oriented  calm, cooperative  flat affect, normal mood  respirations unlabored  moving all extremities  ambulatory  no distress

## 2024-09-06 NOTE — ED PROVIDER NOTE - AVIAN FLU WORK
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 No new bruises. No blood in stool or urine. No fevers, chills, nausea, vomiting or abdominal pain. She does report right sided jaw pain, that started last night. She notes that it is worse when chewing hard food. No clicking or radiation of the pain. No loss of hearing. No bleeding in her mouth. Reassured will continue to monitor closely.     HEME/ONC HISTORY:   This is 55-YO F with PMHx significant for, but not limited to, OA, migraine who presented to ER for abnormal labs.  Pt. was recently complaining of ecchymosis all over her body, mainly in the left upper extremity.  He also reported feeling weak for about a week. followed up with her PCP who ordered labs which is noted for pancytopenia including severe thrombocytopenia of 19 K, with blast concerning for leukemia, received a call to go to the ER.  Pt. reported feeling weak but overall no other complaint, denies fever, chills, N/V/abdominal pain/CP/SOB or any other complain or issue.  In ER noted afebrile, hemodynamically stable.  In ER noted having leukopenia of 2.8 K, HGB 10.2, platelet 21 K. hematology/oncology consulted in ER and requested labs which was ordered.     Last BMBx 6/29/2024:               ** PRELIMINARY DIAGNOSIS **     Bone marrow, unilateral biopsy, aspirate clot, aspirate smears, touch prep smear, and peripheral blood smear:   -Acute promyelocytic leukemia with PML-KILLIAN.  -Final diagnosis pending karyotype analysis and myeloid malignancy sequencing panel.    CHEMO REGIMEN:  Arsenic and tretinoin  Oral Chemotherapy:  Yes  Is patient on oral chemotherapy?  YES, Discussed oral chemotherapy adherence and side effects with patient.  Patient is taking medication as prescribed.    REVIEW OF SYSTEMS:  Patient denies fever/chills,  shortness of breath/cough, nausea/vomiting, diarrhea/constipation, neuropathy and pain.  Positive for easy bruising.  Weakness.    Past Medical History:   Diagnosis Date    Diverticular disease of colon     Genital warts     resolved    Glaucoma suspect     Menorrhagia     controlled with birth control pills    Motion sickness     NO HX OF     CA, HTN, DM, CAD, CVA, DVT, liver disease, migraine    OCD (obsessive compulsive disorder)     Ocular migraine     KRISSY on CPAP     PAST MEDICAL HISTORY of chlamydia 2000    PONV (postoperative nausea and vomiting)     Tinnitus        Past Surgical History:   Procedure Laterality Date    Colonoscopy diagnostic  06/10/2022    repeat 10yrs    Destruc,vulva lesion(s),simple      Hysteroscopy endometrial ablation      Incise finger tendon sheath      bilaterally    Lap colectomy prt w/an/colproc  2023    Robotic sigmoidectomy (Diverticulitis, Dr. Casas)    Laparoscopy, cholecystectomy  1996    Cholecystectomy, laparoscopic    Remove tonsils/adenoids,<11 y/o  1986    Tonsillectomy w Adenoids       ALLERGIES:  Ciprofloxacin and Metronidazole    HOME MEDICATIONS:  Medications Prior to Admission   Medication Sig Dispense Refill    [] dexAMETHasone (DECADRON) 4 MG tablet Take 10 tablets (40mg) by mouth daily for 3 days. Begin taking on 2024. 30 tablet 0    cyclobenzaprine (FLEXERIL) 5 MG tablet Take 1 tablet by mouth 3 times daily as needed for Muscle spasms. (Patient not taking: Reported on 2024) 30 tablet 0    escitalopram (LEXAPRO) 20 MG tablet Take 1 tablet by mouth nightly. 90 tablet 1    fenofibrate (TRICOR) 145 MG tablet Take 1 tablet by mouth daily. (Patient taking differently: Take 145 mg by mouth nightly.) 90 tablet 1    rosuvastatin (CRESTOR) 5 MG tablet Take 1 tablet by mouth daily. (Patient taking differently: Take 5 mg by mouth nightly.) 90 tablet 1    buPROPion XL (WELLBUTRIN XL) 150 MG 24 hr tablet TAKE 1 TABLET BY MOUTH  DAILY (Patient taking differently: Take 150 mg by mouth every morning.) 90 tablet 3    ondansetron (Zofran ODT) 4 MG disintegrating tablet Place 1 tablet onto the tongue every 6 hours. (Patient not taking: Reported on 6/29/2024) 20 tablet 1    Melatonin 1 MG Cap Take by mouth nightly. Taking one tablet by mouth at night taking 1.5 mg 30 capsule     aspirin-acetaminophen-caffeine (EXCEDRIN MIGRAINE) 250-250-65 MG per tablet Take 2 tablets by mouth every 6 hours as needed for Pain.      Daily Multiple Vitamins TABS Take 1 tablet by mouth daily. On hold pre/post surgery         CODE STATUS:  Full    Social History     Socioeconomic History    Marital status:      Spouse name: Not on file    Number of children: 3    Years of education: Not on file    Highest education level: Not on file   Occupational History    Occupation: medical record coding     Comment: [employer]   Tobacco Use    Smoking status: Never     Passive exposure: Never    Smokeless tobacco: Never   Vaping Use    Vaping status: never used   Substance and Sexual Activity    Alcohol use: Not Currently     Comment: rarely    Drug use: No    Sexual activity: Yes     Partners: Male     Birth control/protection: Pill   Other Topics Concern    Not on file   Social History Narrative    Feels safe in relationship with boyfriend     Social Determinants of Health     Financial Resource Strain: Low Risk  (6/29/2024)    Financial Resource Strain     Unable to Get: None   Food Insecurity: Low Risk  (6/29/2024)    Food Insecurity     Worried about Food: Never true     Food is Gone: Never true   Transportation Needs: Not At Risk (6/29/2024)    Transportation Needs     Lack of Reliable Transportation: No   Physical Activity: Not on file   Stress: Not on file   Social Connections: Low Risk  (6/29/2024)    Social Connections     Social Connectivity: 5 or more times a week   Interpersonal Safety: Low Risk  (6/28/2024)    Interpersonal Safety     How often physically hurt:  No Never     How often insulted or talked down to: Never     How often threatened with harm: Never     How often scream or curse at: Never       Family History   Problem Relation Age of Onset    Depression Mother     Hyperlipidemia Mother     Hypertension Mother     NEGATIVE FAMILY HX OF Mother         neg for breast, ov, or uterine ca    Glaucoma Mother     Hyperlipidemia Father     Hypertension Father     Diabetes Father     Macular degeneration Paternal Grandmother        Lines:  PICC line    Weights:  AlloBMT Admit Weight:  Weight    07/05/24 0637 07/07/24 1259 07/08/24 0544 07/09/24 0550   Weight: 108 kg (238 lb) 107.8 kg (237 lb 10.5 oz) 106.8 kg (235 lb 6.4 oz) 105.8 kg (233 lb 4 oz)        Vitals:  Vitals:    07/09/24 0550   BP: 107/64   Pulse: 77   Resp: 18   Temp: 98.4 °F (36.9 °C)     ECOG   ECOG Performance Status        Physical Examination:  GENERAL: Alert, is in no apparent distress and is well developed and well nourished.  LYMPH NODES: No cervical adenopathy, no supraclavicular adenopathy, no axillary adenopathy  SKIN: Normal color, no skin rashes, no atypical appearing skin lesions , positive multiple bruises.  HEENT:  Unremarkable.  No mucositis  CHEST: Respiratory effort is not labored.  LUNGS: Clear to auscultation bilaterally  HEART: RRR, no murmurs  ABDOMEN: Abdomen is soft, normal active bowel sounds, nontender to palpation  NEUROLOGIC: No focal deficits  EXTREMITIES: No edema in the upper/lower extremities bilaterally         LABORATORY STUDIES:  WBC (K/mcL)   Date Value   07/09/2024 27.8 (H)     RBC (mil/mcL)   Date Value   07/09/2024 2.29 (L)     HCT (%)   Date Value   07/09/2024 22.8 (L)     HGB (g/dL)   Date Value   07/09/2024 7.3 (L)     PLT (K/mcL)   Date Value   07/09/2024 54 (L)        Recent Labs   Lab 07/09/24  0030   Glucose 144*   Sodium 138   Potassium 4.5   Chloride 105   BUN 34*   Creatinine 1.00*   Calcium 8.8   Albumin 3.3*   GOT/AST 38*   Alkaline Phosphatase 88   GPT 50    Anion Gap 8   Globulin 2.7   A/G Ratio 1.2       CLINICAL IMPRESSION & PLAN:  Acute promyelocytic leukemia:  Suspicious smear.  Confirmed by fish for PML KILLIAN.  Low risk.  Bone marrow biopsy was performed.  Results showed acute promyelocytic leukemia with PML-KILLIAN.  Patient started on tretinoin on 06/29/2024 along with prophylactic dexamethasone.  I discussed diagnosis acute myeloid leukemia, acute promyelocytic leukemia in particular and went over the treatment.  Patient understood and consents.      7/9/24: C1D9 of ATRA and Arsenic. Hydrea started on 7/3/24 for cytoreduction. Monitor DIC closely     Cardiovascular: Echocardiogram ordered.  Normal ejection fraction    Pulmonary: No issues    Neuro: No issues    Fluid/electrolytes/nutrition/volume status  - Protein calorie malnutrition: [x] None  [] Mild (serum albumin 3.1-3.4)  [] Moderate (serum albumin 2.4-3.0)  [] Severe (serum albumin <2.4)  -Check uric acid daily.  Watch for tumor lysis syndrome.  -- Maintain K > 4 and Mg > 2    ID:  Prophylactic [facility].    Hematology  - In a malignant hematology setting, pancytopenia is expected, though cannot determine if due to chemotherapy, disease, or both.  - Transfuse 1 RCM for Hgb <7.0.  Transfuse 1 unit of platelets for platelet count <10.  All blood products should be irradiated.  -coagulopathy: Hypofibrinogenemia.  Transfuse cryoprecipitate to keep fibrinogen over 150.  Transfuse platelets to keep them over 50,000.  -- Continue to check fibrinogen and CBC every 8 hours.  Fibrinogen improved after cryoprecipitate.  221 AM of 7/1/2024  -- Transfuse 2 pack (10 Units) of cryoprecipitate for fibrinogen < 150  -DIC labs every 8 hours     GI: No concerns      Renal  -- Cr 1.14, baseline ~0.9  - improving 1.11 on 7/3  -- UA, urine protein, creatinine and sodium within normal limits  -- Continue to monitor closely  -- Consider nephrology consult  -- Held IVF no 7/2 due to weight gain and lower extremity edema  --  Continue to maintain K > 4 and Mg > 2     Hepatic  -- LFTs with mild elevation of AST - 54 on 7/3; ALT and alk phos within normal limits  -Monitor closely for differentiation syndrome. LFTs improved    Prophylaxis  - DVT:  None  - GI:  Protonix  - PCP:  - Anti-viral:  Acyclovir  - Anti-fungal:  Posaconazole  - Anti-bacterial:  Levofloxacin    Plan:   -- C1D9 ATRA and Arsenic   -- Continue hydrea to 2g BID started on 7/8/2024 continue for cytoreduction. Was started on hydrea 7/3/2024. Received extra dose on 7/7/2024  -- Started SSI while on steroids due to hyperglycemia  -- DIC labs every 8 hours, transfuse accordingly   -- EKGs weekly  -- If no improvement in jaw pain, will obtain imaging      The patient indicated understanding of the diagnosis and agreed with the plan of care. The patient is encouraged to call between now and next visit with any problems, questions or concerns that arise.    Sara Lopes PA-C  [phone]  Malignant Hematology    Attending Staff  I have personally examined the patient; reviewed clinical history, medications, pertinent lab and radiographic studies. The patient's care was discussed as noted.   I have independently verified the findings in this note and agree with the plan of care.    GENERAL: Alert, is in no apparent distress and is well developed and well nourished.  LYMPH NODES: No cervical adenopathy, no supraclavicular adenopathy, no axillary adenopathy  SKIN: Normal color, no skin rashes, no atypical appearing skin lesions and no bruises.  HEENT:  Unremarkable.  No mucositis  CHEST: Respiratory effort is not labored.  LUNGS: Clear to auscultation bilaterally  HEART: RRR, no murmurs  ABDOMEN: Abdomen is soft, normal active bowel sounds, nontender to palpation  NEUROLOGIC: No focal deficits  EXTREMITIES: No edema in the upper/lower extremities bilaterally      Pedro Marinelli MD

## 2024-09-06 NOTE — ED PROVIDER NOTE - PATIENT PORTAL LINK FT
You can access the FollowMyHealth Patient Portal offered by [facility] by registering at the following website: [email or web address]. By joining [facility]’s FollowMyHealth portal, you will also be able to view your health information using other applications (apps) compatible with our system.

## 2024-09-06 NOTE — ED PROVIDER NOTE - NSFOLLOWUPINSTRUCTIONS_ED_ALL_ED_FT
You were seen and evaluated by the [facility] staff; There is no clinical evidence of intoxication, or any acute medical problem requiring immediate intervention. At present time patient is not a harm to self or others and can be safely discharged.   Continue your home medications  Follow up with your psychiatrist or the [facility] psychiatric Crisis center walk in clinic between the hours of 9AM - 3PM or may make an appointment online. See attached paperwork.  Return to ED for for any new or worsening symptoms
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
## 2024-09-07 VITALS
SYSTOLIC BLOOD PRESSURE: 117 MMHG | DIASTOLIC BLOOD PRESSURE: 80 MMHG | HEART RATE: 90 BPM | OXYGEN SATURATION: 100 % | TEMPERATURE: 97 F | RESPIRATION RATE: 18 BRPM

## 2024-09-16 ENCOUNTER — EMERGENCY (EMERGENCY)
Facility: HOSPITAL | Age: 34
LOS: 0 days | Discharge: HOME | End: 2024-09-17
Attending: STUDENT IN AN ORGANIZED HEALTH CARE EDUCATION/TRAINING PROGRAM
Payer: MEDICAID

## 2024-09-16 VITALS
OXYGEN SATURATION: 100 % | SYSTOLIC BLOOD PRESSURE: 120 MMHG | TEMPERATURE: 98 F | HEIGHT: 62 IN | DIASTOLIC BLOOD PRESSURE: 68 MMHG | WEIGHT: 212.08 LBS | HEART RATE: 105 BPM | RESPIRATION RATE: 18 BRPM

## 2024-09-16 DIAGNOSIS — R11.0 NAUSEA: ICD-10-CM

## 2024-09-16 DIAGNOSIS — K62.89 OTHER SPECIFIED DISEASES OF ANUS AND RECTUM: ICD-10-CM

## 2024-09-16 DIAGNOSIS — F25.9 SCHIZOAFFECTIVE DISORDER, UNSPECIFIED: ICD-10-CM

## 2024-09-16 DIAGNOSIS — Z91.010 ALLERGY TO PEANUTS: ICD-10-CM

## 2024-09-16 DIAGNOSIS — F79 UNSPECIFIED INTELLECTUAL DISABILITIES: ICD-10-CM

## 2024-09-16 DIAGNOSIS — M54.50 LOW BACK PAIN, UNSPECIFIED: ICD-10-CM

## 2024-09-16 DIAGNOSIS — Z91.018 ALLERGY TO OTHER FOODS: ICD-10-CM

## 2024-09-16 PROCEDURE — 99283 EMERGENCY DEPT VISIT LOW MDM: CPT

## 2024-09-16 RX ORDER — ACETAMINOPHEN 325 MG/1
975 TABLET ORAL ONCE
Refills: 0 | Status: COMPLETED | OUTPATIENT
Start: 2024-09-16 | End: 2024-09-16

## 2024-09-16 RX ADMIN — ACETAMINOPHEN 975 MILLIGRAM(S): 325 TABLET ORAL at 23:19

## 2024-09-16 NOTE — ED PROVIDER NOTE - PHYSICAL EXAMINATION
General: Well appearing female in no acute distress  HEENT: Normocephalic, atraumatic. Moist mucous membranes. Oropharynx clear. No lymphadenopathy.  Eyes: No scleral icterus. EOMI. EMERALD.  Neck:. Soft and supple. Full ROM without pain. No midline tenderness  Cardiac: Regular rate and regular rhythm. No murmurs, rubs, gallops. Peripheral pulses 2+ and symmetric. No LE edema.  Resp: Lungs CTAB. Speaking in full sentences. No wheezes, rales or rhonchi.  Abd: Soft, non-tender, non-distended. No guarding or rebound. No scars, masses, or lesions.  Back: Spine midline and non-tender. No CVA tenderness.    Skin: No rashes, abrasions, or lacerations.  Neuro: AO x 3. Moves all extremities symmetrically. Motor strength and sensation grossly intact.  exam chaperoned by ADRY reid , no erythema/no rectal discharge, no external hemorrhoids.

## 2024-09-16 NOTE — ED PROVIDER NOTE - CLINICAL SUMMARY MEDICAL DECISION MAKING FREE TEXT BOX
32 y/o  F hx of intellectual disability, schizoaffective brought in for multiple medical complaints. endorsing rectal pain from wiping too hard per patient. states that it is better when she stands up. denies bloody bowel movements. requesting to be discharged to go back to program as she needs to be back by the morning time. denies trauma/falls. Denies fever/chills. Denies rectal discharge. 32 y/o  F hx of intellectual disability, schizoaffective brought in for multiple medical complaints. endorsing rectal pain from wiping too hard per patient. states that it is better when she stands up. denies bloody bowel movements. requesting to be discharged to go back to program as she needs to be back by the morning time. denies trauma/falls. Denies fever/chills. Denies rectal discharge. denies rectal bleeding.   exam chaperoned by ADRY reid , no erythema/no rectal discharge, no external hemorrhoids.  benign abdominal exam  check hcg for preg  no actionable findings  no anal fissures, no hemorrhoids  pain meds  patient requesting discharge now and return to group home. Conversation had with patient regarding results of testing. Patient agrees with plan for discharge at this time. Patient agrees to comply with follow up with PCP. Return to ED precautions and discharge instructions given to patient.

## 2024-09-16 NOTE — ED ADULT NURSE NOTE - OBJECTIVE STATEMENT
Pt  BIBA from group home reporting "I almost threw up today. My rectum hurts. I am pregnant with 18 twins. I am having contractions" Patient moderately agitated. Oriented to self and place only. Patient reports 10/10 generalized buttock pain. Pt denies any trauma to the affected area. Reports  LMP: 3/1/24 "I am over 6 months pregnant." PMH: Psychiatric disorders( Bipolar, schizoaffective), Developmental delay, Obesity, DM, Asthma. Pt denies any trauma/falls. Pt denies any fever/chills. Pt in NAD at this time. Pt noted with even, unlabored respirations. Pt denies any SI/HI at this time.

## 2024-09-16 NOTE — ED PROVIDER NOTE - NSFOLLOWUPINSTRUCTIONS_ED_ALL_ED_FT
followup with primary care/gastroenterologist in next 7 days    return if symptoms worsen, rectal bleeding, chest pain, shortness of breath, abdomina/pelvic pain.

## 2024-09-16 NOTE — ED ADULT TRIAGE NOTE - CHIEF COMPLAINT QUOTE
Patient BIBA from group home: reports "I almost threw up today. My rectum hurts. I am pregnant with 18 twins. I am having contractions" Patient moderately agitated. Oriented to self and place only. Patient reports 10/10 pain. Reports  LMP: 3/1/24 "I am over 6 months pregnant."  PMH: Psychiatric disorders( Bipolar, schizoaffective), Developmental delay, Obesity, DM, Asthma

## 2024-09-16 NOTE — ED PROVIDER NOTE - CARE PROVIDER_API CALL
Emmanuel Menon  Gastroenterology  [address], [address]  [place] [address]  Phone: [phone]  Fax: [phone]  Follow Up Time: 7-10 Days

## 2024-09-16 NOTE — ED PROVIDER NOTE - OBJECTIVE STATEMENT
34 y/o  F hx of intellectual disability, schizoaffective brought in for multiple medical complaints. endorsing rectal pain from wiping too hard per patient. states that it is better when she stands up. denies bloody bowel movements. requesting to be discharged to go back to program as she needs to be back by the morning time. denies trauma/falls. Denies fever/chills. Denies rectal discharge. endorsing generalized buttock pain.

## 2024-09-16 NOTE — ED PROVIDER NOTE - PATIENT PORTAL LINK FT
You can access the FollowMyHealth Patient Portal offered by [facility] by registering at the following website: [email or web address]. By joining [facility]’s FollowMyHealth portal, you will also be able to view your health information using other applications (apps) compatible with our system.

## 2024-09-17 VITALS
RESPIRATION RATE: 20 BRPM | SYSTOLIC BLOOD PRESSURE: 107 MMHG | DIASTOLIC BLOOD PRESSURE: 61 MMHG | HEART RATE: 104 BPM | TEMPERATURE: 98 F | OXYGEN SATURATION: 100 %

## 2024-09-17 LAB — HCG UR QL: NEGATIVE — SIGNIFICANT CHANGE UP

## 2024-09-17 NOTE — ED ADULT NURSE NOTE - PRIMARY CARE PROVIDER
"Oncology Navigation   Intake  Cancer Type: Other  Initial Nurse Navigator Contact: 09/17/24     Treatment  Current Status: Active       Medical Oncologist: Noah  Chemotherapy: Planned        DOCEtaxel Q3W            Support Systems: Spouse/significant other; Children; Family members  Barriers of Care: Barriers to Care "Assessment completed-no barriers noted"     Acuity      Follow Up  9/19/24 (first treatment)     Met with patient and sister following education visit today. Patient states that he is experiencing some anxiety about possible treatment side effects. He thinks he will feel better when he knows how he will feel after treatment. He denies depression. Provided emotional support. Patient has a good support system with his wife, children and sister. He has reliable transportation. He has no questions or concerns today. He is aware of how to reach navigation/education.   " none

## 2024-09-20 ENCOUNTER — EMERGENCY (EMERGENCY)
Facility: HOSPITAL | Age: 34
LOS: 0 days | Discharge: HOME | End: 2024-09-21
Attending: STUDENT IN AN ORGANIZED HEALTH CARE EDUCATION/TRAINING PROGRAM
Payer: MEDICAID

## 2024-09-20 VITALS
RESPIRATION RATE: 16 BRPM | HEIGHT: 62 IN | OXYGEN SATURATION: 100 % | SYSTOLIC BLOOD PRESSURE: 110 MMHG | TEMPERATURE: 98 F | WEIGHT: 212.08 LBS | HEART RATE: 97 BPM | DIASTOLIC BLOOD PRESSURE: 77 MMHG

## 2024-09-20 DIAGNOSIS — Z91.018 ALLERGY TO OTHER FOODS: ICD-10-CM

## 2024-09-20 DIAGNOSIS — F31.9 BIPOLAR DISORDER, UNSPECIFIED: ICD-10-CM

## 2024-09-20 DIAGNOSIS — R07.89 OTHER CHEST PAIN: ICD-10-CM

## 2024-09-20 DIAGNOSIS — Z91.010 ALLERGY TO PEANUTS: ICD-10-CM

## 2024-09-20 DIAGNOSIS — F25.9 SCHIZOAFFECTIVE DISORDER, UNSPECIFIED: ICD-10-CM

## 2024-09-20 PROCEDURE — 99284 EMERGENCY DEPT VISIT MOD MDM: CPT
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
## 2024-09-21 VITALS
DIASTOLIC BLOOD PRESSURE: 76 MMHG | TEMPERATURE: 98 F | RESPIRATION RATE: 16 BRPM | OXYGEN SATURATION: 99 % | HEART RATE: 80 BPM | SYSTOLIC BLOOD PRESSURE: 125 MMHG

## 2024-09-21 PROCEDURE — 93010 ELECTROCARDIOGRAM REPORT: CPT

## 2024-09-21 NOTE — ED PROVIDER NOTE - PATIENT PORTAL LINK FT
You can access the FollowMyHealth Patient Portal offered by [facility] by registering at the following website: [email or web address]. By joining [employer]’s FollowMyHealth portal, you will also be able to view your health information using other applications (apps) compatible with our system.

## 2024-09-21 NOTE — ED PROVIDER NOTE - NSFOLLOWUPINSTRUCTIONS_ED_ALL_ED_FT
followup with primary care in next 1-3 days.  return immediately if symptoms return     Chest Pain    Chest pain can be caused by many different conditions which may or may not be dangerous. Causes include heartburn, lung infections, heart attack, blood clot in lungs, skin infections, strain or damage to muscle, cartilage, or bones, etc. In addition to a history and physical examination, an electrocardiogram (ECG) or other lab tests may have been performed to determine the cause of your chest pain. Follow up with your primary care provider or with a cardiologist as instructed.     SEEK IMMEDIATE MEDICAL CARE IF YOU HAVE ANY OF THE FOLLOWING SYMPTOMS: worsening chest pain, coughing up blood, unexplained back/neck/jaw pain, severe abdominal pain, dizziness or lightheadedness, fainting, shortness of breath, sweaty or clammy skin, vomiting, or racing heart beat. These symptoms may represent a serious problem that is an emergency. Do not wait to see if the symptoms will go away. Get medical help right away. Call 911 and do not drive yourself to the hospital.

## 2024-09-21 NOTE — ED PROVIDER NOTE - OBJECTIVE STATEMENT
32 y/o F hx of schizoaffective, bipolar , presents for chest discomfort. states she had brief episode after she ate but now is feeling better. requesting food and discharge. denies sob. denies abdominal pain. denies chest pain now. denies fever/chills.

## 2024-09-21 NOTE — ED PROVIDER NOTE - CLINICAL SUMMARY MEDICAL DECISION MAKING FREE TEXT BOX
34 y/o F hx of schizoaffective, bipolar , presents for chest discomfort. states she had brief episode after she ate but now is feeling better. requesting food and discharge. denies sob. denies abdominal pain. denies chest pain now. denies fever/chills.   screening ekg ot be done  stable vitals  no active chest pain, tolerated PO intake here in ER w/ no discomfort. likely gerd, transient chest pain after eating before that self resolved.   benign exam.   screening EKG non-ischemic, no stemi. she is requesting discharge.

## 2024-09-21 NOTE — ED PROVIDER NOTE - QRS
Problem: Falls - Risk of:  Goal: Will remain free from falls  Description  Will remain free from falls  5/3/2019 0023 by Claudia Merino RN  Outcome: Met This Shift  5/2/2019 2211 by Suzan Abernathy RN  Outcome: Met This Shift     Problem: Falls - Risk of:  Goal: Absence of physical injury  Description  Absence of physical injury  5/3/2019 0023 by Claudia Merino RN  Outcome: Met This Shift  5/2/2019 2211 by Suzan Abernathy RN  Outcome: Met This Shift     Problem: Risk for Impaired Skin Integrity  Goal: Tissue integrity - skin and mucous membranes  Description  Structural intactness and normal physiological function of skin and  mucous membranes.   5/3/2019 0023 by Claudia Merino RN  Outcome: Met This Shift  5/2/2019 2211 by Suzan Abernathy RN  Outcome: Met This Shift     Problem: Discharge Planning:  Goal: Participates in care planning  Description  Participates in care planning  5/3/2019 0023 by Claudia Merino RN  Outcome: Ongoing  5/2/2019 2211 by Suzan Abernathy RN  Outcome: Not Met This Shift     Problem: Discharge Planning:  Goal: Discharged to appropriate level of care  Description  Discharged to appropriate level of care  5/3/2019 0023 by Claudia Merino RN  Outcome: Ongoing  5/2/2019 2211 by Suzan Abernathy RN  Outcome: Not Met This Shift 86

## 2024-09-22 ENCOUNTER — EMERGENCY (EMERGENCY)
Facility: HOSPITAL | Age: 34
LOS: 0 days | Discharge: HOME | End: 2024-09-23
Attending: STUDENT IN AN ORGANIZED HEALTH CARE EDUCATION/TRAINING PROGRAM
Payer: MEDICAID

## 2024-09-22 VITALS
WEIGHT: 212.08 LBS | HEIGHT: 62 IN | RESPIRATION RATE: 17 BRPM | DIASTOLIC BLOOD PRESSURE: 62 MMHG | TEMPERATURE: 98 F | SYSTOLIC BLOOD PRESSURE: 94 MMHG | OXYGEN SATURATION: 98 % | HEART RATE: 103 BPM
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
## 2024-09-22 DIAGNOSIS — R07.81 PLEURODYNIA: ICD-10-CM

## 2024-09-22 DIAGNOSIS — F25.9 SCHIZOAFFECTIVE DISORDER, UNSPECIFIED: ICD-10-CM

## 2024-09-22 DIAGNOSIS — Z91.010 ALLERGY TO PEANUTS: ICD-10-CM

## 2024-09-22 DIAGNOSIS — R07.9 CHEST PAIN, UNSPECIFIED: ICD-10-CM

## 2024-09-22 DIAGNOSIS — Z91.018 ALLERGY TO OTHER FOODS: ICD-10-CM
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
## 2024-09-22 PROCEDURE — 99284 EMERGENCY DEPT VISIT MOD MDM: CPT

## 2024-09-22 RX ORDER — LIDOCAINE/BENZALKONIUM/ALCOHOL
1 SOLUTION, NON-ORAL TOPICAL ONCE
Refills: 0 | Status: COMPLETED | OUTPATIENT
Start: 2024-09-22 | End: 2024-09-22

## 2024-09-22 RX ORDER — IBUPROFEN 600 MG
600 TABLET ORAL ONCE
Refills: 0 | Status: COMPLETED | OUTPATIENT
Start: 2024-09-22 | End: 2024-09-22

## 2024-09-22 RX ORDER — ACETAMINOPHEN 325 MG/1
650 TABLET ORAL ONCE
Refills: 0 | Status: COMPLETED | OUTPATIENT
Start: 2024-09-22 | End: 2024-09-22
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
## 2024-09-22 NOTE — ED PROVIDER NOTE - PATIENT PORTAL LINK FT
You can access the FollowMyHealth Patient Portal offered by [facility] by registering at the following website: [email or web address]. By joining [employer]’s FollowMyHealth portal, you will also be able to view your health information using other applications (apps) compatible with our system.
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
## 2024-09-22 NOTE — ED PROVIDER NOTE - PHYSICAL EXAMINATION
General: No acute distress, mentation at baseline,  well nourished, well developed  HEENT: NCAT, Neck supple without meningismus, PERRL, no conjunctival injection  Lungs: CTAB, No wheeze or crackles, No retractions, No increased work of breathing. mild TTp along R anterior ribs  Heart: S1S2 RRR, No M/R/G, Pules equal Bilaterally in upper and lower extremities distally  Abd: soft, NT/ND, No guarding, No rebound.  No hernias, no palpable masses.  Extrem: FROM in all joints, no gross deformities appreciated, no significant edema noted, No ulcers. Cap refil < 2sec.  Skin: No rash noted, warm dry.  Neuro:  Grossly normal.  No difficulty ambulating. No focal deficits.  Psychiatric: Appropriate mood and affect.

## 2024-09-23 VITALS
DIASTOLIC BLOOD PRESSURE: 76 MMHG | TEMPERATURE: 98 F | SYSTOLIC BLOOD PRESSURE: 118 MMHG | RESPIRATION RATE: 18 BRPM | HEART RATE: 77 BPM

## 2024-09-23 PROCEDURE — 71045 X-RAY EXAM CHEST 1 VIEW: CPT | Mod: 26

## 2024-09-23 RX ORDER — ACETAMINOPHEN 325 MG/1
650 TABLET ORAL ONCE
Refills: 0 | Status: COMPLETED | OUTPATIENT
Start: 2024-09-23 | End: 2024-09-23

## 2024-09-23 RX ADMIN — ACETAMINOPHEN 650 MILLIGRAM(S): 325 TABLET ORAL at 10:08
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
## 2024-09-23 NOTE — CHART NOTE - NSCHARTNOTEFT_GEN_A_CORE
Pt still refusing to return to group home. SW did contact supervisor at pt's group home, Nuno ([phone]), in regard to pt's claim that agency is supposed to be working on pt being transferred to a new group home. Pt's supervisor states that there are no plans currently for pt to be transferred to a new group home.
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
## 2024-09-23 NOTE — ED ADULT NURSE REASSESSMENT NOTE - NS ED NURSE REASSESS COMMENT FT1
pt report received and denies any suicidal ideation or homicidal ideation, [facility] called to transfer pt back to group home. awaiting transportation set up by  Michael

## 2024-09-23 NOTE — ED ADULT NURSE REASSESSMENT NOTE - NS ED NURSE REASSESS COMMENT FT1
received from out going rn . pt is AOX4. states she wont go back to the group home. sw called for other arrangements. no other complaints.

## 2024-09-23 NOTE — ED ADULT NURSE REASSESSMENT NOTE - NS ED NURSE REASSESS COMMENT FT1
Group home contacted, spoke with Valerie states that they will be home to receive the pt when she arrives at home.

## 2024-09-23 NOTE — CHART NOTE - NSCHARTNOTEFT_GEN_A_CORE
Pt still refusing to speak with SW or agree to return to group home. Pt still refusing to speak with SW or agree to return to group home. SW made MD Torrez aware of pt's lack of cooperation. Pt still refusing to speak with SW or agree to return to group home. ROCIO made MD Torrez aware of pt's lack of cooperation and was giving ok to set up transportation for pt to return to group home. ROCIO contacted [facility] by phone and transferred dispatcher to ER to speak with pt's nurse and schedule trip.

## 2024-09-29 NOTE — ED PROVIDER NOTE - TEMPLATE, MLM
From: Ann Magallanes  To: India Hopper  Sent: 9/28/2024 2:28 PM CDT  Subject: Prior Authorization     Hi Dr. Hopper,     For the zepbound medication, the pharmacy needed a prior authorization, are you able to send that in?     Thank you!  Ann   VIEW ALL

## 2024-10-04 NOTE — ED ADULT TRIAGE NOTE - NS_BH TRG QUESTION1_ED_ALL_ED
Calls stating she would like her minora measured. She feels pelvic floor therapy is definitely changing her structure. She feels her right labia is larger than her left. Reviewed her notes since February and this has been noted on each exam. If she feels this is an issue she should finish therapy and then follow up with Dr El about any possibility of labial reduction. Agrees with plan. No appointment scheduled at this time.  
No
Other

## 2024-10-16 NOTE — ED ADULT NURSE NOTE - PAIN: PRESENCE, MLM
Jessica Oliver was seen and treated in our emergency department on 10/16/2024.    No restrictions            Diagnosis:     Jessica  may return to work on return date.    She may return on this date: 10/18/2024    Please excuse Jessica from work as she was seen in the ER for an acute illness     If you have any questions or concerns, please don't hesitate to call.      Joanne Ricci, DO    ______________________________           _______________          _______________  Hospital Representative                              Date                                Time
complains of pain/discomfort

## 2024-10-26 NOTE — ED PROVIDER NOTE - NS ED MD DISPO DISCHARGE CCDA
"  Assessment & Plan     (H65.03) Non-recurrent acute serous otitis media of both ears  (primary encounter diagnosis)    Comment: Otitis media, also effusion.  Right ear does look worse than left.  Both ears with effusion based on history.  Vital signs are stable.  Overall seems well.    Plan: amoxicillin (AMOXIL) 875 MG tablet    Plan to treat with amoxicillin twice a day for 1 week.  Discussed tools to help with the effusion.  Follow-up with PCP if symptoms persist.  Return to rapid clinic or ER if symptoms worsen or change.  She is comfortable and agreeable with this plan.    Thompson Banerjee is a 71 year old, presenting for the following health issues:  Ear Problem (Bilateral Ear Pain/Left Ear x 3-4 Days/Right Ear- Started today )    HPI    Patient presents today with concerns of ear pain.  She notes the left ear started hurting about a week ago.  The right ear yesterday into today.  She notes that she did flush the ears earlier this week with no change of symptoms.  She has also had a \"head cold\" with nasal congestion and sinus pressure.  She notes some sharp and persistent pain in the left ear.  The right ear is more of a feeling of plugged.  No notable fevers.      Review of Systems  Constitutional, HEENT, cardiovascular, pulmonary, gi and gu systems are negative, except as otherwise noted.        Objective    /84 (BP Location: Left arm, Patient Position: Chair, Cuff Size: Adult Large)   Pulse 66   Temp 98.3  F (36.8  C) (Temporal)   Resp 16   Ht 1.676 m (5' 6\")   Wt 119 kg (262 lb 6.4 oz)   SpO2 98%   Breastfeeding No   BMI 42.35 kg/m    Body mass index is 42.35 kg/m .    Physical Exam   GENERAL: alert and no distress  EYES: Eyes grossly normal to inspection, PERRL and conjunctivae and sclerae normal  HENT: ear canals clear, left TM slightly bulging, dulled, right TM with slight erythema over the bony landmarks, dulled, no bulging, nose and mouth without ulcers or lesions  NECK: no adenopathy, " no asymmetry, masses, or scars  RESP: lungs clear to auscultation - no rales, rhonchi or wheezes  CV: regular rate and rhythm, normal S1 S2  MS: no gross musculoskeletal defects noted, no edema        Signed Electronically by: Kandy Miranda PA-C     Patient/Caregiver provided printed discharge information.

## 2024-10-29 NOTE — ED PROVIDER NOTE - IV ALTEPLASE EXCL REL HIDDEN
show
[de-identified] : 85 year old F with PMH HTN and HLD presents for annual and follow up of chronic conditions. Pt denies CP/SOB, fever/chills, n/v/d/c.

## 2024-11-30 NOTE — ED PROVIDER NOTE - CPE EDP ENMT NORM
Over-the-counter Tylenol 500 mg every 4 hours as needed for pain.  Use the diazepam which is a muscle relaxer for pain not relieved by the Tylenol.  
- - -

## 2024-12-16 NOTE — ED ADULT NURSE NOTE - NS PRO PASSIVE SMOKE EXP
[facility] provides services at a reduced cost to those who are determined to be eligible through [facility]’s financial assistance program. Information regarding [facility]’s financial assistance program can be found by going to [email or web address] or by calling [phone].
No

## 2025-01-09 NOTE — ED ADULT NURSE NOTE - BRAND OF FIRST COVID-19 BOOSTER
"Level of Care: Partial Hospitalization Program         Progress Note    Patient Name: Bella Smith  Date: 01/08/25         Service Type: Individual      Session Start Time: 12:42 PM Session End Time: 1:35 PM     Session Length: 53 MINS      Track: PHP    DATA    Current Stressors / Issues:  Briefly discussed some peer group talk yesterday in the school room that created an in appropriate racial problem, to get Bella's account of what happened.   He experiences continued intrusive suicidal thoughts with no specific plan. Goal is \"To feel comfortable in school or in social interactions, learn new coping skills, get over bad habits, develop good habits in my mental space and my actions\" PT recently expressed being concerned with moderate anxiety around their discharge date, and is opening up about previously allowing their intrusive suicidal thoughts to play out in fantasy, with the realized misguided intent that \"if it plays out, I could be satisfied so I wouldn't actually carry anything out\". He acknowledges this was not a good idea. Provided PT with a journal to document his current thoughts, PT is willing to try. Conducting either formal or verbal suicidal assessments daily, PT is experiencing minimal, passive suicidal thoughts today with no specific plan. Recently reviewed his Safety Plan and most recent [place] scored as \"moderate\". PT had his family meeting with both parents yesterday, discussed recent psychiatric evaluation results, subsequent diagnoses, and next steps. Today recapped a little with PT, then talked about his desire to return to his school setting, saying he \"knows people there and the adults are nice\".   Today discussed PT's sustained grief and shock over not being informed ahead of sudden discharge from hospital that they would not be able to say goodbye in person to a staff they had received a great deal of support from. Helped PT process some of that.     Treatment Objective(s) " "Addressed in This Session:  Area of Treatment Focus: Decrease in avoidance , Interpersonal functioning , and Increase in skill usage (DBT/CBT)         Goal Status: Active    Problem Description: Anxiety/Social Anxiety, / \"I'm a big ruminator; the quality of my work in school, I can't get started\"   Patient Strength Based Identified Goal (in their words): \"get over bad habits, develop good habits in my mental space and my actions\"    Measurable Goal: PT will learn to identify useless thoughts related to anxiety and practice CBT and DBT tools to use for when anxiety-related thoughts are identified. Measured by PT report, parent report, and clinical observation by PHP staff.   Goal Start Date: 12/12/24                                                 Goal Target Date: 01/09/25   Review Date: 01/02/25                                                   New Target Date: 01/21/25  Progress: Bella is describing school in this program as non-challenging, with \"low-key\" atmosphere. He describes his mental health school issues being related to sensory issues and with bullying since at least the 5th grade. PT has been advocating for himself.   Review/Discharge Date: TBD       Progress:  n/a                             Intervention Strategies: Staff will assist in identifying and applying coping skills, assist in identifying and problem solving barriers, provide education, assist with establishing community resources to strengthen support network, promote informed decisions, provide therapeutic assessment and safety planning as needed, assist with psychiatric advance directive planning, engage patients in treatment process, utilize motivational interviewing techniques to promote change, and provide trauma informed interventions.         Area of Treatment Focus: Increase in pro-social activities , Decrease in avoidance , Family engagement , Interpersonal functioning , and Increase in skill usage (DBT/CBT)         Goal Status: " "Active    Problem Description: Thought distortions,  placing too much significance in negative stimuli, lack of self-esteem, all contributors to depression. \"Flooded\" with negativity.  PT: \"Distortions\"; \"worry about being a burden\";  negative thoughts about myself\"   Patient Strength Based Identified Goal (in their words): \"Slowing down my brain, get out of my head\"   Measurable Goal: PT will learn, practice, and identify useless thoughts related to depression and practice CBT and DBT tools to use targeted for depressive symptoms that lead to suicidal thinking.   Goal Start Date: 12/12/24                                                Goal Target Date: 01/09/25   Review Date: 01/03/25                                                   New Target Date: 01/21/25  Progress: n/a   Review/Discharge Date: TBD       Progress:  PT is wanting to talk about his trauma in general, and more related to school. PT has deep trauma, exacerbated by sensory issues, combined with gender identity.  He is currently weighing different interventions to aid in relief of anxiety symptoms and facilitating simple goals. He is engaged in treatment daily.                            Intervention Strategies: Staff will assist in identifying and applying coping skills, assist in identifying and problem solving barriers, provide education, provide therapeutic assessment and safety planning as needed, assist with psychiatric advance directive planning, engage patients in treatment process, utilize motivational interviewing techniques to promote change, and provide trauma informed interventions.       Progress on Treatment Objective(s) / Homework:  No improvement - PREPARATION (Decided to change - considering how); Intervened by negotiating a change plan and determining options / strategies for behavior change, identifying triggers, exploring social supports, and working towards setting a date to begin behavior change    Therapeutic " Interventions/Treatment Strategies:  Support, Feedback, Limit/Boundaries, Safety Assessments, Structured Activity, Problem Solving, Clarification, Education, Motivational Enhancement Therapy, and Cognitive Behavioral Therapy    Response to Treatment Strategies:  Accepted Feedback, Gave Feedback, Listened, Attentive, Accepted Support, and Alert    Changes in Health Issues:   None reported    Chemical Use Review:   Substance Use: No substance use concerns reported / identified    ASSESSMENT:    Current Emotional / Mental Status (status of significant symptoms):  Risk status (Self / Other harm or suicidal ideation)  Patient has had a history of suicidal ideation: wanting to die and suicide attempts: 1-averted, shower and plugging the tub to drown, called 911  Patient denies current fears or concerns for personal safety.  Patient denies current or recent suicidal ideation or behaviors.  Patient denies current or recent homicidal ideation or behaviors.  Patient denies current or recent self injurious behavior or ideation.  Patient denies other safety concerns.  A safety and risk management plan has been developed including: Patient consented to co-developed safety plan.  A safety and risk management plan was completed.  Patient agreed to use safety plan should any safety concerns arise.  A copy was given to the patient.      Appearance:   Appropriate   Eye Contact:   Fair   Psychomotor Behavior: Normal  Restless   Attitude:   Cooperative  Attentive  Orientation:   All  Speech   Rate / Production: Normal/ Responsive Pressured  Slow  Stutters Emotional Normal    Volume:  Normal   Mood:    Anxious  Elevated  Sad  Grieving  Affect:    Appropriate  Tearful  Thought Content:  Clear   Thought Form:  Coherent  Logical   Insight:    Fair     Assessments completed:  The following assessments were completed by patient for this visit:  PHQ9:       6/26/2024     7:00 PM 9/10/2024    12:39 PM 10/16/2024     9:24 PM 11/25/2024     2:38  PM 12/10/2024     8:00 PM 12/23/2024     8:00 PM 12/30/2024    10:00 AM   PHQ-9 SCORE   PHQ-9 Total Score     17 10 14   PHQ-A Total Score 6 5 10 13        GAD7:       6/26/2024     7:00 PM 9/10/2024    12:44 PM 10/16/2024     9:22 PM 10/16/2024     9:24 PM 11/25/2024     1:29 PM 12/10/2024     1:19 PM 12/30/2024     9:54 AM   DANIELLE-7 SCORE   Total Score  2 (minimal anxiety) 9 (mild anxiety) 9 (mild anxiety) 13 (moderate anxiety) 16 (severe anxiety) 10 (moderate anxiety)   Total Score 4 2 9 9 13  16  10        Patient-reported     PROMIS Pediatric Scale v1.0 -Global Health 7+2:   Promis Ped Scale V1.0-Global Health 7+2    12/30/2024  9:55 AM CST - Filed by Patient 12/23/2024  9:02 AM CST - Filed by Patient 12/10/2024  1:20 PM CST - Filed by Patient   In general, would you say your health is: Good Very Good Excellent   In general, would you say your quality of life is: Very Good Very Good Very Good   In general, how would you rate your physical health? Very Good Very Good Excellent   In general, how would you rate your mental health, including your mood and your ability to think? Poor Poor Poor   How often do you feel really sad? Often Often Sometimes   How often do you have fun with friends? Often Often Often   How often do your parents listen to your ideas? Often Often Often   In the past 7 days   I got tired easily. Often Sometimes Sometimes   I had trouble sleeping when I had pain. Never Never Never   PROMIS Ped Global Health 7 T-Score (range: 10 - 90) 42 (good) 45 (good) 52 (good)   PROMIS Ped Global Fatigue T-Score (range: 10 - 90) 59 (moderate) 53 (mild) 53 (mild)   PROMIS Ped Pain Interference T-Score (range: 10 - 90) 43 (within normal limits) 43 (within normal limits) 43 (within normal limits)       PROMIS Parent Proxy Scale V1.0 Global Health 7+2:   Promis Parent Proxy Scale V1.0-Global Health 7+2    1/7/2025  9:16 AM CST - Filed by Allie Jain,  12/19/2024 11:09 PM CST - Filed by Allie Jain,  10/16/2024   8:07 AM CDT - Filed by Clarisa Smith (Proxy)   In general, would you say your child's health is: Very Good Very Good Very Good   In general, would you say your child's quality of life is: Very Good Very Good Very Good   In general, how would you rate your child's physical health? Very Good Very Good Very Good   In general, how would you rate your child's mental health, including mood and ability to think? Fair Poor Poor   How often does your child feel really sad? Often Often Sometimes   How often does your child have fun with friends? Sometimes Sometimes Sometimes   How often does your child feel that you listen to his or her ideas? Often Often Often   In the past 7 days   My child got tired easily. Sometimes Sometimes Often   My child had trouble sleeping when he/she had pain. Almost Never Almost Never Never   PROMIS Parent Proxy Global Health T-Score (range: 10 - 90) 43 (fair) 43 (fair) 43 (fair)   PROMIS Parent Proxy Global Fatigue Item  T-Score (range: 10 - 90) 56 (moderate) 56 (moderate) 63 (moderate)   PROMIS Parent Proxy Pain Interference T-Score (range: 10 - 90) 53 (mild) 53 (mild) 43 (within normal limits)       [place] Suicide Severity Rating Scale (Short Version)      12/13/2024    11:00 AM 12/13/2024     2:10 PM 12/16/2024     1:00 PM 12/23/2024     9:02 AM 12/27/2024     2:00 PM 12/30/2024     9:55 AM 1/2/2025    12:01 PM   [place] Suicide Severity Rating (Short Version)   1. Wish to be Dead (Since Last Contact) Y Y Y Y Y Y Y   2. Non-Specific Active Suicidal Thoughts (Since Last Contact) Y Y Y Y Y Y N   3. Active Suicidal Ideation with any Methods (Not Plan) Without Intent to Act (Since Last Contact) Y Y N Y Y Y    4. Active Suicidal Ideation with Some Intent to Act, Without Specific Plan (Since Last Contact) N N N Y N Y    5. Active Suicidal Ideation with Specific Plan and Intent (Since Last Contact) N N N N N N    Most Severe Ideation Rating (Since Last Contact)   1  1     Calculated C-SSRS  Risk Score (Since Last Contact) Moderate Risk High Risk  Low Risk High Risk  Moderate Risk High Risk  Low Risk        Patient-reported        Diagnoses:  Major Depressive Disorder, recurrent, severe (296.33), (F33.2) without psychosis  Generalized Anxiety Disorder (300.02), (F41.1)  Autism Spectrum Disorder (299.00), (F84.0)  Medications: No changes.   Laboratory/Imaging: none further; want to confirm why the positive amphetamine in [place] on hospital admission  Consults: see EMR for full psych testing results; no other consults at this time.  Condition of this Diagnoses are: worsening prior to recent hospitalization, now some improvement     Patient will be treated in therapeutic milieu with appropriate individual and group therapies as described.     Secondary psychiatric diagnoses of concern this admission:   1. Rule out Gender Dysphoria     Medical diagnoses to be addressed this admission:  no current medical concerns    Plan: (Homework, other):  Continue to support Bella in his grief and confusion, prepare him  emotionally for discharge from [facility]; meet with school to set up IEP and plan for returning, discuss long term day program with parents, continue to monitor Bella's overall emotional well-being.  2. Patient has a current master individualized treatment plan.  See Epic treatment plan for more information.                                               Patient has reviewed and agreed to the above plan.      Allie Jain, TH January 8, 2025               [employer]

## 2025-01-10 NOTE — ED ADULT NURSE NOTE - NS ED NURSE LEVEL OF CONSCIOUSNESS AFFECT
Leonel Yu is a 57 y.o. male on day 2 of admission presenting with Acute on chronic heart failure, unspecified heart failure type.    Subjective   No acute events overnight. Still have baseline SOB, better than yesterday. Able to sleep last night without significant respiratory distress. No chest pain/palpitations. No fever, N/V, or diarrhea. No abdominal pain.      11.00 AM: he was found lethargic and slow to verbal response. Had soft BPs of 80-90s/60s. Awake when verbally aroused when coming to see him. No chest pain/SOB/palpitations.    Objective     Last Recorded Vitals  BP 97/72   Pulse 74   Temp 35.9 °C (96.6 °F) (Temporal)   Resp 17   Wt 69.3 kg (152 lb 12.5 oz)   SpO2 94%   Intake/Output last 3 Shifts:    Intake/Output Summary (Last 24 hours) at 1/10/2025 0817  Last data filed at 1/10/2025 0600  Gross per 24 hour   Intake 1431.33 ml   Output 3525 ml   Net -2093.67 ml       Admission Weight  Weight: 74.8 kg (165 lb) (01/08/25 1227)    Physical Exam  Constitutional:       General: He is not in acute distress.     Appearance: He is not ill-appearing or toxic-appearing.   HENT:      Head: Normocephalic.      Mouth/Throat:      Mouth: Mucous membranes are moist.      Comments: Poor dentition  Eyes:      General: No scleral icterus.     Extraocular Movements: Extraocular movements intact.      Conjunctiva/sclera: Conjunctivae normal.      Pupils: Pupils are equal, round, and reactive to light.   Cardiovascular:      Rate and Rhythm: Normal rate and regular rhythm.      Pulses: Normal pulses.      Heart sounds: No murmur heard.     No gallop.   Pulmonary:      Effort: Pulmonary effort is normal. No respiratory distress.      Breath sounds: No wheezing.   Abdominal:      General: Abdomen is flat. Bowel sounds are normal.      Palpations: Abdomen is soft.   Musculoskeletal:         General: Normal range of motion.      Cervical back: Normal range of motion and neck supple.     Minimal edema on  extremities  Skin:     General: Skin is warm and dry.      Capillary Refill: Capillary refill takes less than 2 seconds.   Neurological:      General: No focal deficit present.      Mental Status: He is alert and oriented to person, place, and time.      Cranial Nerves: No cranial nerve deficit.      Sensory: No sensory deficit.   Psychiatric:         Mood and Affect: Mood normal.     Relevant Results  Results for orders placed or performed during the hospital encounter of 01/08/25 (from the past 24 hours)   Troponin I, High Sensitivity   Result Value Ref Range    Troponin I, High Sensitivity (CMC) 87 (H) 0 - 53 ng/L   Electrocardiogram, 12-lead PRN ACS symptoms   Result Value Ref Range    Ventricular Rate 67 BPM    Atrial Rate 67 BPM    AK Interval 176 ms    QRS Duration 112 ms    QT Interval 468 ms    QTC Calculation(Bazett) 494 ms    P Axis 41 degrees    R Axis -31 degrees    T Axis 106 degrees    QRS Count 11 beats    Q Onset 207 ms    P Onset 119 ms    P Offset 175 ms    T Offset 441 ms    QTC Fredericia 485 ms   POCT GLUCOSE   Result Value Ref Range    POCT Glucose 250 (H) 74 - 99 mg/dL   Renal function panel   Result Value Ref Range    Glucose 224 (H) 74 - 99 mg/dL    Sodium 136 136 - 145 mmol/L    Potassium 4.2 3.5 - 5.3 mmol/L    Chloride 95 (L) 98 - 107 mmol/L    Bicarbonate 24 21 - 32 mmol/L    Anion Gap 21 (H) 10 - 20 mmol/L    Urea Nitrogen 30 (H) 6 - 23 mg/dL    Creatinine 1.69 (H) 0.50 - 1.30 mg/dL    eGFR 47 (L) >60 mL/min/1.73m*2    Calcium 10.2 8.6 - 10.6 mg/dL    Phosphorus 3.3 2.5 - 4.9 mg/dL    Albumin 3.8 3.4 - 5.0 g/dL   POCT GLUCOSE   Result Value Ref Range    POCT Glucose 335 (H) 74 - 99 mg/dL   POCT GLUCOSE   Result Value Ref Range    POCT Glucose 424 (H) 74 - 99 mg/dL   POCT GLUCOSE   Result Value Ref Range    POCT Glucose 163 (H) 74 - 99 mg/dL   CBC and Auto Differential   Result Value Ref Range    WBC 15.1 (H) 4.4 - 11.3 x10*3/uL    nRBC 0.0 0.0 - 0.0 /100 WBCs    RBC 6.42 (H) 4.50 -  5.90 x10*6/uL    Hemoglobin 20.2 (H) 13.5 - 17.5 g/dL    Hematocrit 55.5 (H) 41.0 - 52.0 %    MCV 86 80 - 100 fL    MCH 31.5 26.0 - 34.0 pg    MCHC 36.4 (H) 32.0 - 36.0 g/dL    RDW 15.5 (H) 11.5 - 14.5 %    Platelets 249 150 - 450 x10*3/uL    Neutrophils % 90.9 40.0 - 80.0 %    Immature Granulocytes %, Automated 0.3 0.0 - 0.9 %    Lymphocytes % 4.6 13.0 - 44.0 %    Monocytes % 3.7 2.0 - 10.0 %    Eosinophils % 0.1 0.0 - 6.0 %    Basophils % 0.4 0.0 - 2.0 %    Neutrophils Absolute 13.68 (H) 1.20 - 7.70 x10*3/uL    Immature Granulocytes Absolute, Automated 0.05 0.00 - 0.70 x10*3/uL    Lymphocytes Absolute 0.70 (L) 1.20 - 4.80 x10*3/uL    Monocytes Absolute 0.56 0.10 - 1.00 x10*3/uL    Eosinophils Absolute 0.02 0.00 - 0.70 x10*3/uL    Basophils Absolute 0.06 0.00 - 0.10 x10*3/uL   Renal function panel   Result Value Ref Range    Glucose 145 (H) 74 - 99 mg/dL    Sodium 134 (L) 136 - 145 mmol/L    Potassium 5.1 3.5 - 5.3 mmol/L    Chloride 91 (L) 98 - 107 mmol/L    Bicarbonate 28 21 - 32 mmol/L    Anion Gap 20 10 - 20 mmol/L    Urea Nitrogen 38 (H) 6 - 23 mg/dL    Creatinine 1.97 (H) 0.50 - 1.30 mg/dL    eGFR 39 (L) >60 mL/min/1.73m*2    Calcium 9.6 8.6 - 10.6 mg/dL    Phosphorus 4.7 2.5 - 4.9 mg/dL    Albumin 4.1 3.4 - 5.0 g/dL   Magnesium   Result Value Ref Range    Magnesium 2.46 (H) 1.60 - 2.40 mg/dL   Coagulation Screen   Result Value Ref Range    Protime      INR      aPTT     POCT GLUCOSE   Result Value Ref Range    POCT Glucose 309 (H) 74 - 99 mg/dL       Imaging results  01/10/25 : 69.3 kg (152 lb 12.5 oz)    Image Results  Electrocardiogram, 12-lead PRN ACS symptoms  Normal sinus rhythm  Left axis deviation  Left ventricular hypertrophy with repolarization abnormality  Inferior infarct (cited on or before 08-JAN-2025)  Abnormal ECG  When compared with ECG of 09-JAN-2025 10:49,  Premature ventricular complexes are no longer Present  Confirmed by Sergio Lux ([id number]) on 1/9/2025 6:34:30 PM  US liver with  doppler  Narrative: Interpreted By:  Mundo Alva and Stephens Katherine   STUDY:  US LIVER WITH DOPPLER;  1/9/2025 3:36 pm      INDICATION:  Signs/Symptoms:ruq pain.          COMPARISON:  Renal ultrasound 10/17/2024      ACCESSION NUMBER(S):  [id number]      ORDERING CLINICIAN:  PATY FORREST      TECHNIQUE:  Multiple images of the right upper quadrant were obtained.  Gray  scale, color Doppler and spectral Doppler waveform analysis was  performed.  This examination was interpreted at [facility].      FINDINGS:  LIVER:  The liver measures 16.1 cm and is grossly unremarkable and free of  any focal lesions.      GALLBLADDER:  The gallbladder is nondistended, and demonstrates no evidence of  gallstones, wall thickening or surrounding fluid. The gallbladder  wall thickness is 0.2. Sonographic John's sign is negative.      BILIARY SYSTEM:  No evidence of intra or extrahepatic biliary dilatation is  identified; the common bile duct measures 2.8 mm.      DOPPLER EVALUATION:      HEPATIC ARTERIES:  Hepatic artery and its right branch RI's are estimated at 0.7 and  0.87 respectively. The left hepatic artery was suboptimally  visualized.      PORTAL VEIN:  Portal vein is patent and measures 1.0 cm . There is normal  respiratory variation. Portal vein velocities are calculated as  follows: main portal vein 46.9 cm/s, antegrade flow; left portal vein  branch 19.5 cm/s, antegrade flow; right portal vein anterior branch  20.7 cm/s, antegrade flow; right portal vein posterior branch 16.3  cm/s, antegrade flow. The splenic vein is also patent.      HEPATIC VEIN:  The right, middle and left hepatic veins are patent and demonstrate  biphasic, monophasic, and monophasic antegrade flow respectively. IVC  appears also patent.      PANCREAS:  The pancreas is poorly visualized due to overlying bowel gas.      RIGHT KIDNEY:  The right kidney measures 9.5 cm in length. No  hydronephrosis or  renal calculi are seen.      SPLEEN:  Poor visualization of the spleen. Within these limitations the spleen  measures 8.1 cm and is grossly unremarkable.      PERITONEUM AND RETROPERITONEUM:  No free fluid is identified.      Impression: Unremarkable ultrasound of the liver including Doppler interrogation  within the limitations described above.      I personally reviewed the images/study and I agree with Debby Hernandez DO's (radiology resident) findings as stated. This study  was interpreted at [place], [place].      MACRO:  None      Signed by: Mundo Alva 1/9/2025 6:19 PM  Dictation workstation:   [id number]  Vascular US lower extremity venous duplex bilateral  Narrative: Interpreted By:  Mundo Alva,  and Darron Mejía   STUDY:  [facility] US LOWER EXTREMITY VENOUS DUPLEX BILATERAL;  1/9/2025 5:34 am      INDICATION:  Signs/Symptoms:leg edema, pain, eval for clot.      ,R60.0 Localized edema      COMPARISON:  None.      ACCESSION NUMBER(S):  [id number]      ORDERING CLINICIAN:  PATY FORREST      TECHNIQUE:  Vascular ultrasound of the bilateral lower extremities was performed.  Real-time compression views as well as Gray scale, color Doppler and  spectral Doppler waveform analysis was performed.      FINDINGS:  Evaluation of the visualized portions of the bilateral common femoral  vein, proximal, mid, and distal femoral vein, and popliteal vein were  performed.  Evaluation of the visualized portions of the  posterior  tibial and peroneal veins were also performed.      Limitations:  None.      The evaluated veins demonstrate normal compressibility. There is  intact venous flow demonstrating normal respiratory variability and  normal augmentation of flow with calf compression. Therefore, there  is no ultrasonographic evidence for deep vein thrombosis within the  evaluated veins.      Respiratory variation and augmentation to calf pressure was  noted.      Lobular fluid collection within the left popliteal fossa measures 3.5  x 1.9 x 0.8 cm.      Impression: 1.  No sonographic evidence for deep vein thrombosis within the  evaluated veins of the bilateral lower extremity.  2. Suspected left Baker's cyst.      I personally reviewed the images/study and I agree with the findings  as stated by Dr. Alfonzo Rob. This study was interpreted at  [facility], [place], [place].      MACRO:  None      Signed by: Mundo Alva 1/9/2025 6:14 PM  Dictation workstation:   [id number]  ECG 12 lead  Normal sinus rhythm  Possible Left atrial enlargement  Left axis deviation  Left ventricular hypertrophy with repolarization abnormality  Inferior infarct (cited on or before 08-JAN-2025)  Prolonged QT  Abnormal ECG  When compared with ECG of 08-JAN-2025 17:25,  Premature ventricular complexes are no longer Present  Confirmed by James Shah ([id number]) on 1/9/2025 12:30:20 PM  ECG 12 lead  Sinus rhythm with 1st degree AV block  Left ventricular hypertrophy with QRS widening ( Sokolow-Nieves , Pound product , Romhilt-Dominguez )  Possible Inferior infarct (cited on or before 16-OCT-2024)  Anterolateral injury pattern  Prolonged QT  ** ** ACUTE MI / STEMI ** **  Abnormal ECG  When compared with ECG of 09-NOV-2024 08:40,  Significant changes have occurred  Confirmed by James Shah ([id number]) on 1/9/2025 8:51:22 AM  Electrocardiogram, 12-lead PRN ACS symptoms  Poor data quality, interpretation may be adversely affected  Sinus rhythm with occasional Premature ventricular complexes  Left ventricular hypertrophy with repolarization abnormality  Inferior infarct , age undetermined  Prolonged QT  Abnormal ECG  When compared with ECG of 08-JAN-2025 13:08,  Previous ECG has undetermined rhythm, needs review  Inferior infarct is now Present  ST no longer elevated in Inferior leads  T wave inversion more evident in Lateral leads    Assessment/Plan    57 y.o. male with  past medical history of mixed ischemic/nonischemic cardiomyopathy, HFrEF EF 10 to 15% 10/2024, prior VF/Vts/p [employer] single-chamber ICD 3/2024, HTN, HLD who was admitted for acute decompensated heart failure iso DKA 2/2 poor medication adherence. Required BiPAP initially but weaned off with the help of IV diuresis. Taken off insulin gtt as well. Transferred to cardiology floor on 1/10.      Update 1/10/2025  - we attempted starting hydralazine low-dose (12.5 mg tid) to reduce afterload but SBP ran low (80-90s) and patient appears poorly perfused during the the visit  - given leukocytosis and elevated lactate, suspected sepsis in combination of low output     -- start empiric Zosyn/Vanc.  - IV fluid  mL load today  - continue O2 support  - Will consider diuresis as needed; goal to keep euvolemia before discharge    #Acute on chronic decompensated HFrEF (EF 10-15%)  #Mixed ischemic and nonischemic cardiomyopathy   #Type 2 MI  #Hx of VT/VF s/p ICD   #HLD  - BNP >5,000, troponin 80s>60s  - Patient reports not taking all of his medications consistently, also use cocaine  - takes torsemide 20 PRN daily at home.   - dry weight approx 70 kg. Admit weight 72.6 kg. Net negative 7 L this admission  Plan:   - continue amiodarone 200mg daily   - stopped bumex gtt. Got bumex 4 mg BID on 1/9.   - discuss home diuretic regimen  -GDMT: spironolactone 12.5 mg, jardiance 10 mg. Entresto held.  - continue home atorva 80mg daily   - continue aspirin 81mg daily   - device interrogated, no shocks or arrhythmia      #CKD3a  ::unclear true baseline, seems most of his creatinine levels have been drawn when in [facility]  - seems to be somewhere in the low ones, but unclear  Plan:  Hold home entresto     #T2DM (A1c 9.9 10/2024)  #Hyperglycemia  #Mild DKA - resolved  ::on admit, pH 7.32, glucose >700, lactate 3.2, + beta hydroxy butyrate but anion gap only mildly elevated  -off insulin drip.   -takes 10u glargine at bedtime    Plan:  Resume home regimen. Also added 5u lispro TID + SSI     #Secondary polycythemia   - Patient baseline Hb ~17-19  - erythropoietin elevated 11/2024  - likely 2/2 crack cocaine and smoking and COPD      F: caution, EF 10-15%  E: K>4, Mg>2  N: NPO while on NIPPV  Access/Tubes: PIV  Antimicrobials: None  DVT prophylaxis: heparin subq  GI prophylaxis: N/A  Bowel Reg: Miralax    Oxygen: nasal cannula 2L/min  Code status: Full code   NOK: Roommate Elizabet Santana [phone]    Divya Gray MD  PGY-1, Internal Medicine/Medical Genetics    I saw and evaluated the patient. I personally obtained the key and critical portions of the history and physical exam or was physically present for key and critical portions performed by the resident/fellow. I reviewed the resident/fellow's documentation and discussed the patient with the resident/fellow. I agree with the resident/fellow's medical decision making as documented in the note.    Otto Merino MD     Calm

## 2025-01-16 NOTE — ED PROVIDER NOTE - WORK/EXCUSE FORM DATE
Called patient. No answer. Message left for return call to [phone].        Lizeth Bradford LPN sent to [facility] Staff  Caller: Unspecified (Today,  8:27 AM)  Call patient - needs post-hospital phone call within 2 business days and hospital follow up visit scheduled within 7-14 days.   Chief Complaint   Patient presents with   • Establish Care       HISTORY OF PRESENT ILLNESS;  This is a 61 year old male here as a new patient to establish care.  He was hospitalized a week ago with an acute inferior wall MI and underwent stenting and aspiration thrombectomy for 100% occlusion of the RCA.  He has not had any chest pain since that time period he does have some tiredness with activity.  He states he probably been having some intermittent chest pain and lower leg swelling for a while before he went in.  His swelling in his ankles has resolved.  He will be seeing his cardiologist this Thursday and starting cardiac rehab next week.  He has been diabetic for about 10 years and states has not seen his previous doctor is retired in about 5 years.  He is doing medication but when off of it since he had controlled with diet and exercise.  He has started checking his blood sugars a couple times a day and in the low 100s now.  In hospitals A1 c was high at 10.3% though.  He was on insulin but was not sent home with any insulin medicine or pills with diabetes.  He does have some neuropathy in his feet from diabetes and has not seen a podiatrist in a few years and also has not seen an eye doctor in a few years.  He might be interested in getting a continues glucose monitor system.  He states he has been in [place] for a few years working but that got eliminated and was helping take care of his father who had leukemia and diabetes but then came back here where he is originally from period        REVIEW OF SYSTEMS: As in HPI (History of Present Illness), otherwise negative.    ALLERGIES:  ALLERGIES:   Allergen Reactions   • Clindamycin RASH     4/20/2017       MEDICINES:  Current Outpatient Medications   Medication Sig   • clopidogrel (PLAVIX) 75 MG tablet Take 8 tablets by mouth at 5:00pm on 5/24. Then take 1 tablet by mouth daily starting 5/25   • metoPROLOL tartrate (LOPRESSOR) 25 MG tablet Take one-HALF tablet  by mouth every 12 hours.   • aspirin 81 MG chewable tablet Chew 1 tablet by mouth daily. Do not start before May 25, 2021.   • lisinopril (ZESTRIL) 2.5 MG tablet Take 1 tablet by mouth daily.   • atorvastatin (LIPITOR) 40 MG tablet Take 1 tablet by mouth nightly.   • Continuous Blood Gluc Sensor (FreeStyle Cara 14 Day Sensor) Misc 1 each every 14 days.   • Continuous Blood Gluc  (FreeStyle Cara 14 Day [place]) Device 1 each continuous.     No current facility-administered medications for this visit.       Patient Active Problem List   Diagnosis   • Type 2 diabetes mellitus with diabetic neuropathy (CMS/HCC)   • Other and unspecified hyperlipidemia   • Diabetic polyneuropathy (CMS/HCC)   • Obesity (BMI 30-39.9)   • Vitamin D deficiency   • Tobacco abuse   • Microhematuria   • Coronary artery disease involving native coronary artery of native heart without angina pectoris     Past Medical History:   Diagnosis Date   • Coronary artery disease    • Diabetic polyneuropathy (CMS/HCC)    • Insomnia    • Microhematuria    • Other and unspecified hyperlipidemia    • Tobacco abuse    • Type 2 diabetes mellitus, uncontrolled (CMS/HCC)    • Unspecified essential hypertension      Past Surgical History:   Procedure Laterality Date   • Cardiac catherization     • Colonoscopy diagnostic  2012   • Coronary angiogram/possible ptca - cv  2021    PCI of 100% RCA LAMBERTO 0 Flow with a 3.0 mm x 33mm MIGUELINA Xience Maria Isabel MIGUELINA with restoration of LAMBERTO 3 Flow   • Vascular surgery       Social History     Tobacco Use   • Smoking status: Former Smoker     Packs/day: 1.00     Years: 25.00     Pack years: 25.00     Types: Cigarettes     Quit date: 2014     Years since quittin.3   • Smokeless tobacco: Never Used   • Tobacco comment: the patient quit in January.   Substance Use Topics   • Alcohol use: Yes     Alcohol/week: 4.0 standard drinks     Types: 4 Cans of beer per week     Comment: once a month    • Drug use:  No     Family History   Problem Relation Age of Onset   • Diabetes Mother    • Diabetes Father    • Cancer Daughter 9        brain tumor            PHYSICAL EXAMINATION:  Vitals:    06/01/21 1516   BP: 118/62   BP Location: LUE - Left upper extremity   Patient Position: Sitting   Cuff Size: Regular   Pulse: 90   SpO2: 96%   Weight: 95.3 kg   Height: 5' 8\" (1.727 m)     GENERAL APPEARANCE: No acute distress.  EYES: Sclerae clear, EOMI (extraocular movements intact).  EARS:  Bilateral tympanic membranes and external canals are normal.  NECK: Supple, no adenopathy.  LUNGS: Clear to auscultation bilaterally; no crackles, rhonchi or wheezing.  CARDIOVASCULAR: Regular rate and rhythm.  No murmur, rub, or gallop.  ABDOMEN: Soft, nontender, non-distended, with normoactive bowel sounds. No masses.  EXTREMITIES: No edema.  NEUROLOGIC: Non-focal.  Mood is calm-appearing and appropriate.    ASSESSMENT AND PLAN:  Type 2 diabetes mellitus with diabetic neuropathy, without long-term current use of insulin (CMS/[facility])  (primary encounter diagnosis)    Plan: SERVICE TO PODIATRY, Continuous Blood Gluc         Sensor (FreeStyle Cara 14 Day Sensor) Misc,         Continuous Blood Gluc  (FreeStyle Cara        14 Day [place]) Device        Should continue checking blood sugars daily and regularly.  Will keep a log of it and come back in 1 month for recheck.  Will re-evaluate if needs medication or not    Diabetic eye exam (CMS/[facility])  Plan: SERVICE TO OPHTHALMOLOGY            Other hyperlipidemia  Plan:  On statin now per    Coronary artery disease involving native coronary artery of native heart without angina pectoris  Plan:  Status post stenting and thrombectomy.  Continue on same medications and risk factor modification.  Continue follow-up cardiology and cardiac rehab as scheduled.               19-Jul-2023

## 2025-01-18 NOTE — ED ADULT NURSE NOTE - WAS YOUR LAST COVID-19 VACCINE GREATER THAN OR EQUAL TO TWO MONTHS AGO?
Clear liquid diet.  Advance as tolerated or return to the emergency department for worsening pain, fever or vomiting.  Zofran for nausea.  
Yes

## 2025-01-29 NOTE — ED ADULT NURSE NOTE - NS ED NURSE DISCH DISPOSITION
Radiology Discharge Summary      Admit date: 1/29/2025  7:31 AM  Discharge date: January 29, 2025    Instructions Given to patient: YesVerbal    Diet: Regular    Activity:NO Restrictions    Medications on discharge (List): Refer to Discharge Medication List    Hospital Course: Following informed consent and time out  the patient received moderate sedation Fentanyl 100 mcg and Versed 4 mg IV and Dr. Sigala and TAJ Lobato monitored the patient for BP, pulse and pulse oximetry from 0920 until 1000.      Pt brought to Ct and following prep and local anesthesia a bone marrow entered at p[ost left iliac crest and bone marrow aspiration and core biopsy performed with good specimens obtained. Pressure held for 3 minutes and bandage placed.     Description of Condition on Discharge: stable    Discharge Disposition: Home    Discharge Diagnosis: anemia    Patient to Follow up with Skyler Sanchez and Kush     Discharged

## 2025-02-02 ENCOUNTER — EMERGENCY (EMERGENCY)
Facility: HOSPITAL | Age: 35
LOS: 1 days | Discharge: HOME | End: 2025-02-02
Attending: EMERGENCY MEDICINE | Admitting: EMERGENCY MEDICINE
Payer: MEDICAID

## 2025-02-02 VITALS
SYSTOLIC BLOOD PRESSURE: 120 MMHG | OXYGEN SATURATION: 100 % | RESPIRATION RATE: 16 BRPM | HEART RATE: 85 BPM | TEMPERATURE: 98 F | WEIGHT: 248.02 LBS | HEIGHT: 62 IN | DIASTOLIC BLOOD PRESSURE: 81 MMHG

## 2025-02-02 PROCEDURE — 99284 EMERGENCY DEPT VISIT MOD MDM: CPT

## 2025-02-02 PROCEDURE — 71046 X-RAY EXAM CHEST 2 VIEWS: CPT | Mod: 26

## 2025-02-02 NOTE — ED PROVIDER NOTE - PHYSICAL EXAMINATION
GEN - NAD; well appearing; A+O x3   HEAD - NC/AT   EYES- PERRL, EOMI  ENT: Airway patent, mmm, Oral cavity and pharynx normal. No inflammation, swelling, exudate, or lesions.  +rhinorrhea and nasal congestion.  NECK: Neck supple  PULMONARY - CTA b/l, symmetric breath sounds, unlabored, no wheezing  CARDIAC -s1s2, RRR, no M,G,R  ABDOMEN - +BS, ND, NT, soft, no guarding, no rebound, no masses   BACK - no CVA tenderness, Normal  spine   EXTREMITIES - FROM, no deformity  SKIN - no rash or bruising   NEUROLOGIC - alert, speech clear, no focal deficits  PSYCH -calm, cooperative

## 2025-02-02 NOTE — ED PROVIDER NOTE - CLINICAL SUMMARY MEDICAL DECISION MAKING FREE TEXT BOX
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      This lesion will need evaluation by neurosurgery for definitive discussion of treatment, and I will contact the patient and her family tomorrow for discussion of the results in more detail and will place referral for neurosurgery appointment ASAP.    Sri De León MD  [facility]    
rhinorrhea/congestion/cough x 1 week,  h/o schizoaffective dx resides at  and here with group home rep.   Tolerating po without difficulty.  Exam with nasal congestion noted, otherwise benign.  Has paperwork with her requesting cxr.  will check viral swab, cxr. Patient agreeable.

## 2025-02-02 NOTE — ED PROVIDER NOTE - PROGRESS NOTE DETAILS
majeste- pt reassessed , in no respiratory distress, vs not actionable. cxr clear will dc w pcp fu and return precautions

## 2025-02-02 NOTE — ED PROVIDER NOTE - PATIENT PORTAL LINK FT
You can access the FollowMyHealth Patient Portal offered by [facility] by registering at the following website: [email or web address]. By joining [facility]’s FollowMyHealth portal, you will also be able to view your health information using other applications (apps) compatible with our system.

## 2025-02-02 NOTE — ED ADULT NURSE NOTE - OBJECTIVE STATEMENT
Pt is A&OX4. Pt is ambulatory independently at baseline. Past HX of GERD, mild obesity, asthma, DM2. Pt presents to the ED for congestion. Pt primary care doctor requesting for a chest X-ray. Pt c/o diarrhea and chills. Pt denies SOB, N/V, headache, or any close sick contact. Heart sounds S1 and S2 heard upon auscultation. +2 pulses palpated in all extremities. Capillary refill less than 3 seconds. Breathing even and unlabored. Clear breath sounds. Neuro status in tact + facial symmetry, PERRLA, strength equal in all extremities, sensory in tact. Skin warm dry and in tact. Color normal for race. No limb restrictions. No pedal edema. Abdomen soft, non tender, non distended. Normoactive bowel sounds heard in all four quadrants. No urinary symptoms. Performed Flu viral panel. X-ray completed.  Waiting for results.

## 2025-02-02 NOTE — ED PROVIDER NOTE - OBJECTIVE STATEMENT
33 y/o f presenting to ed from  for nasal congestion, rhinorrhea, cough. Reports it started 1 week ago, has not been improving, had covid test at her  which was negative. Was sent in for cxr to r/o patricia. Denies any fever, vomiting, abd pain, cp, sob, diarrhea, dysuria.  H/o schizoaffective d/o, resides at , here with tori from group home.

## 2025-02-09 NOTE — ED ADULT TRIAGE NOTE - NS ED TRIAGE AVPU SCALE
Alert-The patient is alert, awake and responds to voice. The patient is oriented to time, place, and person. The triage nurse is able to obtain subjective information. You can access the FollowMyHealth Patient Portal offered by [facility] by registering at the following website: [email or web address]. By joining [facility]’s FollowMyHealth portal, you will also be able to view your health information using other applications (apps) compatible with our system.

## 2025-02-10 NOTE — ED PROVIDER NOTE - TEMPLATE
"Subjective   Patient ID: Grace Kessler is a 57 y.o. female who presents for Annual Exam and Hypertension.    HPI    Patient presents today for annual exam and HTN follow up.  She does eat a low sodium diet. She does exercise. She does not check her BP at home. Last eye exam was 10/2024. Last dental exam was 10 yrs. Is fasting for BW.     Pt refuse colonoscope and cologuard at this time.  Risk reviewed    Taking current medications which were reviewed.  Problem list discussed.    Overall doing well.  Eating okay.  Staying active.    Has no other new problem /question.      ROS  Constitutional- No activity change. No appetite change.  Eyes- Denies vision changes.  Respiratory- No shortness of breath.  Cardiovascular- No palpitations. No chest pain.  GI- No nausea or vomiting. No diarrhea or constipation. Denies abdominal pain.  Musculoskeletal- Denies joint swelling.  Extremities- No edema.  Neurological- Denies headaches. Denies dizziness.  Skin- No rashes.  Psychiatric/Behavioral- Denies significant anxiety, or depressed mood.     Objective     /68 (BP Location: Left arm, Patient Position: Sitting, BP Cuff Size: Adult long)   Pulse 85   Temp 36.4 °C (97.6 °F) (Temporal)   Resp 16   Ht 1.549 m (5' 1\")   Wt 70.3 kg (155 lb)   SpO2 95%   BMI 29.29 kg/m²     Allergies   Allergen Reactions    Sulfa (Sulfonamide Antibiotics) Swelling       Constitutional-- Well-nourished.  No distress  Head- unremarkable.  Eyes- PERRL.  Conjunctiva normal.  Nose- Normal.  No rhinorrhea noted.  Throat- Oropharynx is clear and moist.  Neck- Supple with no thyromegaly.  No significant cervical adenopathy noted.  Pulmonary/Chest- Breath sounds normal with normal effort.  No wheezing.  Heart- Regular rate and rhythm.  No murmur.  Abdomen- Soft and non-tender.  No masses noted.  Musculoskeletal- Normal ROM.  No significant joint swelling  Extremities- No edema.   Neurological- Alert.  No noted deficits.  Skin- Warm.  No " rashes.  Psychiatric/Behavioral- Mood and affect normal.  Behavior normal.     Assessment/Plan   1. Routine general medical examination at a health care facility  Lipid Panel    Lipid Panel      2. Hypertension, unspecified type  CBC and Auto Differential    Comprehensive Metabolic Panel    benazepriL-hydrochlorothiazide (Lotensin HCT) 20-12.5 mg tablet    CBC and Auto Differential    Comprehensive Metabolic Panel      3. BMI 29.0-29.9,adult        4. Hypercholesterolemia  Lipid Panel    Lipid Panel      5. Encounter for screening mammogram for malignant neoplasm of breast  BI mammo bilateral screening tomosynthesis             Long talk. Treatment options reviewed.    Reviewed most recent lab work with patient. Advised patient to remain up to date on routine maintenance and health screening.     Hypertension controlled.  Continue medication.      Discussed importance of natural sources of nutrition.  Avoid sugar. Advised patient to consume vegetables, salads, fruits, nuts, and proteins such as fish and chicken.  Discussed portion control.      Discussed the importance of routine stretching and exercise.     Complete blood work as discussed. Advised patient to remain properly hydrated.     Continue and take your medications as prescribed.    Health Maintenance issues discussed.    Importance of healthy diet and regular exercise regimen discussed.    We will contact you with any test results ordered. If you do not hear from us, please contact.    Follow-up as instructed or sooner if any problems or symptoms do not resolve as expected.        Scribe Attestation  By signing my name below, IEsha Scribe   attest that this documentation has been prepared under the direction and in the presence of Hema Brumfield MD.     Psych/Behavioral

## 2025-02-20 NOTE — ED BEHAVIORAL HEALTH ASSESSMENT NOTE - BEHAVIOR
Agree with above. Not a micu candidate at this time. Close follow up of Na. Reconsult as needed. Cooperative

## 2025-02-23 NOTE — BH INPATIENT PSYCHIATRY ASSESSMENT NOTE - MEDICAL RECORD REVIEWED
Subjective     Chula Uribe is a 50 y.o. female who presents with Leg Pain (Started having pain for the past 2 weeks )            This is a new problem.  The patient presents to clinic complaining of pain to her left anterior hip x 2 weeks.  The patient believes she may have stepped wrong and tweaked the muscles of her left anterior hip.  The patient states initially she was experiencing intermittent pain with certain movements.  The patient states over time this pain has become persistent.  The patient reports increased pain with certain movements and certain positions.  The patient states she is also experiencing pain while sitting and while trying to sleep.  The patient reports no associated swelling, bruising, or redness.  She also reports no palpable lumps/masses to the left groin.  The patient denies swelling of the left lower extremity.  She reports no associated abdominal pain.  No back pain.  No urinary symptoms.  No fever.  No nausea/vomiting.  The patient is taken OTC ibuprofen for her current symptoms.  She was also applied heat to the affected area.      Leg Pain  Pertinent negatives include no fever, nausea or vomiting.     PMH:  has a past medical history of Anxiety.  MEDS:   Current Outpatient Medications:     ondansetron (ZOFRAN) 4 MG Tab tablet, Take 1 Tablet by mouth every four hours as needed for Nausea/Vomiting., Disp: 20 Tablet, Rfl: 0    traZODone (DESYREL) 50 MG Tab, Take 50 mg by mouth every day. (Patient not taking: Reported on 5/20/2024), Disp: , Rfl:     hydrOXYzine pamoate (VISTARIL) 50 MG Cap, TAKE 1 CAPSULE BY MOUTH TWICE DAILY AS NEEDED FOR ANXIETY (Patient not taking: Reported on 5/20/2024), Disp: , Rfl:   ALLERGIES: No Known Allergies  SURGHX: No past surgical history on file.  SOCHX:  reports that she has never smoked. She has never used smokeless tobacco. She reports current alcohol use of about 3.0 oz of alcohol per week. She reports current drug use.  FH: Family history was  "reviewed, no pertinent findings to report      Review of Systems   Constitutional:  Negative for fever.   Eyes:  Negative for discharge and redness.   Gastrointestinal:  Negative for nausea and vomiting.   Genitourinary:  Negative for dysuria, frequency and urgency.   Musculoskeletal:  Positive for joint pain.              Objective     /78 (BP Location: Left arm, Patient Position: Sitting)   Pulse 80   Temp 36.6 °C (97.8 °F) (Temporal)   Resp 12   Ht 1.6 m (5' 3\")   Wt 101 kg (222 lb 8 oz)   SpO2 98%   BMI 39.41 kg/m²      Physical Exam  Constitutional:       General: She is not in acute distress.     Appearance: Normal appearance. She is well-developed. She is not ill-appearing.   HENT:      Head: Normocephalic and atraumatic.      Right Ear: External ear normal.      Left Ear: External ear normal.      Nose: Nose normal.   Eyes:      Extraocular Movements: Extraocular movements intact.      Conjunctiva/sclera: Conjunctivae normal.   Cardiovascular:      Rate and Rhythm: Normal rate.   Pulmonary:      Effort: Pulmonary effort is normal.   Abdominal:      Palpations: Abdomen is soft.   Musculoskeletal:      Cervical back: Normal range of motion and neck supple.      Comments:   Left Anterior Hip/Groin:  A localized area of tenderness is present to the left anterior hip overlying the proximal hip flexors with slight tenderness extending to the left inguinal region and left suprapubic region.  No palpable lumps/masses/hernias.  No edema.  No ecchymosis.  No overlying erythema.  No increased warmth.  No additional tenderness.  No bony tenderness.  No obvious deformity.  Decreased ROM -the patient averages limited range of motion of the left hip secondary to pain  Neurovascular intact distally  Strength 5/5  Normal gait   Skin:     General: Skin is warm and dry.   Neurological:      Mental Status: She is alert and oriented to person, place, and time.                  Progress:  I offered to order the " patient additional testing, including ultrasound imaging to further evaluate her current symptoms and rule a possible intra-abdominal/pelvic etiology.  The patient declined ultrasound imaging at this time, and elects to treat her symptoms as a muscle strain.                Assessment & Plan  Strain of flexor muscle of left hip, initial encounter    Orders:    ibuprofen (MOTRIN) 800 MG Tab; Take 1 Tablet by mouth every 8 hours as needed for Moderate Pain or Inflammation.    cyclobenzaprine (FLEXERIL) 5 mg tablet; Take 1-2 Tablets by mouth at bedtime as needed for Moderate Pain.  --Advised the patient to only take this medication at night, as it may cause drowsiness. Instructed the patient not to take the medication while at work, driving, or operating machinery.       Differential diagnoses, supportive care, and indications for immediate follow-up discussed with patient.   Instructed to return to clinic or nearest emergency department for any change in condition, further concerns, or worsening of symptoms.    OTC Tylenol for pain/discomfort   Apply ice and or heat to the affected area for symptomatic relief  Gentle range of motion stretches/exercises as discussed in clinic  Monitor for worsening signs and or symptoms  Weight-bearing as tolerated  Follow-up with PCP   Return to clinic or go tot he ED if symptoms worsen or fail to improve, or if the patient should develop worsening/increasing pain/tenderness, swelling, bruising, redness or warmth to the affected area, decreased ROM, numbness, tingling or weakness, difficulty walking, fever/chills, and/or any concerning symptoms.       Discussed plan with the patient, and she agrees to the above.     I personally reviewed prior external notes and test results pertinent to today's visit.  I have independently reviewed and interpreted all diagnostics ordered during this urgent care visit.     Please note that this dictation was created using voice recognition software. I  have made every reasonable attempt to correct obvious errors, but I expect that there may be errors of grammar and possibly content that I did not discover before finalizing the note.     This note was electronically signed by Allison Wing PA-C         Yes

## 2025-03-12 NOTE — ED ADULT TRIAGE NOTE - SOURCE OF INFORMATION
Called patient and informed her labs no longer needed. Patient verbalized understanding. Lab appointment cancelled.   No labs needed per RN   Patient scheduled for lab appointment on 03/12 ordered by Johnna Cortez. Orders are missing. Please enter or extend lab orders prior to the appointment noted above. If lab order is no longer needed, please remove the order from Epic    Thank you     Patient Yes

## 2025-03-12 NOTE — ED PROVIDER NOTE - COVID-19  TEST TYPE
Sera with [facility] called to inform that she will be faxing information over in regards to Peer to Peer   MOLECULAR PCR

## 2025-03-13 NOTE — ED ADULT NURSE NOTE - NSSUHOSCREENINGYN_ED_ALL_ED
He states he was to call you today to let you  know how he is feeling.   He states prednisone worked great while he was on it.  Symptoms are almost back to where he was before taking the prednisone.  He states he just has not felt well last few days,  he has complaints of diarrhea.  He can be reached at [phone].    
I tried calling Mr. Avila but he did not answer  I left a message stating that I will send a prescription to his [facility] pharmacy on [address] for prednisone 5 mg daily on an ongoing basis until we can get him on dupixent given that he does well when getting a prednisone burst but off of prednisone he redevelops dyspnea on exertion that is impairing his quality of life.  He has a follow-up with me April 10, 2025 at 2:20 PM for reevaluation  
Yes - the patient is able to be screened

## 2025-03-23 NOTE — ED ADULT NURSE NOTE - SKIN CAPILLARY REFILL
[FreeTextEntry1] :  By signing my name below, I, Saji Tay, attest that this document has been prepared under the direction and in the presence of Dr. Andres.  I, Lynn Andres MD, personally performed the services described in this documentation. All medical record entries made by the scribe were at my discretion and in my presence. I have reviewed the chart and discharge instructions (if applicable) and agree that the record reflects my personal permanence and is accurate and complete.  
[FreeTextEntry1] :  By signing my name below, I, Saji Tay, attest that this document has been prepared under the direction and in the presence of Dr. Andres.  I, Lynn Andres MD, personally performed the services described in this documentation. All medical record entries made by the scribe were at my discretion and in my presence. I have reviewed the chart and discharge instructions (if applicable) and agree that the record reflects my personal permanence and is accurate and complete.  
2 seconds or less
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
## 2025-04-07 NOTE — ED ADULT NURSE NOTE - ISOLATION TYPE:
ED Admission Info  Verbal Handoff Required: No    Admitting Dx:   1. Right hip pain    2. Lumbar back pain    3. ESR raised    4. H/O laminectomy        Admitting Status: Inpatient   Critical Care: No  Stroke: No  Cardiac Drip: No  Dialysis: N/A    Mental Status: Alert and oriented  Is a sitter required? no   C-SSRS:  1. Have you wished you were dead or wished you could go to sleep and not wake up? (past month): No (04/06/25 2117)  2. Have you actually had any thoughts of killing yourself? (past month): No (04/06/25 2117)  6. Have you ever done anything, started to do anything, or prepared to do anything to end your life? (lifetime): No (04/06/25 2117)Suicide Evaluation: Negative Screen - White (04/06/25 2117)    Mobility (independent or requires assistance): Up with 1 Assist    Tele: yes  Current Rhythm: NSR    Respiratory (O2 LPM, BiPap,Vent): RA    Isolation: No active isolations    Temp:  [97.2 °F (36.2 °C)] 97.2 °F (36.2 °C)  Heart Rate:  [] 84  Resp:  [18-20] 20  SpO2:  [97 %-100 %] 97 %  BP: (109-133)/(67-77) 109/67     Critical Lab Results:   No data recorded    Safety:     Fall Risk      
None

## 2025-04-09 NOTE — ED ADULT TRIAGE NOTE - SOURCE OF INFORMATION
Physical Therapy Daily Treatment Note  [facility] [place]   [address] [place] [address]  P: [phone] F: [phone]    Patient: Megha Alcantara   : 1993  Referring practitioner: Daniel Gupta MD  Date of Initial Visit: Type: THERAPY  Noted: 3/12/2025  Today's Date: 2025  Patient seen for 6 sessions       Visit Diagnoses:    ICD-10-CM ICD-9-CM   1. Right wrist pain  M25.531 719.43   2. Status post de Quervain release surgery  Z98.890 V45.89   3. Synovitis and tenosynovitis of wrist  M65.939 727.00         Subjective:  Megha Alcantara reports:     Pt states She is doing better today and had less pain and soreness since previous session. Overall symptoms seem to be a little better with ExRx and pt is pleased with physical therapy progress so far.    Pt reported Pros - wrist is feeling and moving better, strength still good, stretching 2x day  Pt reported Cons - having more pain in thumb    Objective   R radial deviation    See Exercise, Manual, and Modality Logs for complete treatment.       Assessment:  Pt demonstrates improvement with R wrist and hand strength, ROM, endurance and functional activity tolerance based on subjective report and observation during exercise this date.        Plan:   Continue to monitor and progress ROM / strengthening / stabilization / functional activity as tolerated        Timed Codes:  Manual Therapy -    15   mins  87709;  Therapeutic Exercise: -   15   mins  18987;     Neuromuscular Mala -   0   mins  41470;    Therapeutic Activity -    15   mins  06684;     Ultrasound -                     0   mins  02184  Iontophoresis -                 0   mins  41665  _____________________________________  Timed Treatment -    45   mins      Untimed Codes:  Electrical Stimulation -   0  mins  36934 ([id number])  Traction -                0  mins  05956    Total Treatment Time:  45  mins       Hugh Ndiaye PT  [place] License  #: [id number]    Physical Therapist       Patient/EMS

## 2025-04-10 NOTE — ED PROVIDER NOTE - CONDITION AT DISCHARGE:
This problem is chronic, likely secondary to tendinous sprain and tears in common extensor tendons effecting different parts of the arm-- now moreso shoulder. There is an associated musculoskeletal component with somatic dysfunction in the above noted regions.  OMT was performed in order to improve overall biomechanical function and lessen symptoms.  The treatment was tolerated well without adverse events.  Recommendations include orthopedic follow up, continuing movement as tolerated and return in a month for reassessment.   Improved

## 2025-04-15 NOTE — ED ADULT TRIAGE NOTE - LOCATION:
LCV: 2023    History: 26 year old male presents for TBSE.    PE: Total body skin exam significant for (note: patient kept underwear on for exam): small (~5mm) uniformly light brown, symmetric, well demarcated macule located on the left cheek; pink scar located on the chest     A/P:  Benign nevus  - lesion appears benign today    Skin scar  - benign    Screening exam for skin cancer  - total body skin exam performed today  - recommend daily use of sunscreen     RTC: as needed     Electronically Signed by: Rodney Steele MD, 4/15/2025  
Right arm;

## 2025-04-23 NOTE — ED PROVIDER NOTE - IV ALTEPLASE INCLUSION HIDDEN
Vascular Referral Intake    Appointment note (to be pasted into appt note. Also add where additional info is located ie: outside images pushed to PACS, in Epic, sent to HIM, etc):   Referred by Carmelo Patel MD for AAA <4.0cm    Specialty: Vascular Surgery    Specific Provider if Necessary:  Kathy Leonardo NP    Visit Type: New    Time Frame: Next Available    Testing/Imaging Needed Before Consult: None    Javi or bed needed: No    *Schedulers: Please send welcome letter to patient after appointment(s) scheduled*     show

## 2025-04-23 NOTE — ED ADULT TRIAGE NOTE - NS ED NURSE AMBULANCES
OPERATIVE REPORT    DATE OF PROCEDURE: 4/22/2025  ?  PRE-OPERATIVE DIAGNOSIS: Severe Symptomatic Aortic Stenosis  POST-OPERATIVE DIAGNOSIS: Same  ?  PROCEDURES PERFORMED:  1. Placement of 16  F sheath in aorta percutaneously with pre-close sutures  2. Aortic Root Angiogram  3. Placement of Central Venous line and temporary transvenous RV pacing lead  4. Trans-catheter Aortic Valve replacement utilizing 29 mm Gabe 3 Tian THV    CO-SURGEONS :   MD Padmaja Black, DO  ?  ASSISTANTS : Nathan NEFF  SURGICAL TASKS PERFORMED BY ASSISTANT: Under the Surgeon’s direction, the assistant has facilitated the operation by performing duties that included but were not limited to: exposure of the operative field, opening and closing skin, dissecting tissue, removing tissue, implanting devices, obtaining hemostasis, and/ or otherwise altering tissues.     ANESTHESIA: General Endotracheal    INDICATION FOR PROCEDURE:    Patient has been evaluated by cardiology and cardiothoracic surgery independently for symptoms related to severe aortic stenosis. After subsequent discussion and assessment by our multidisciplinary team in the Heart Valve Clinic, patient has been prepared for TAVR (trans-catheter aortic valve replacement). The risks, benefits and alternatives of the procedure were explained to the patient and family, and all questions answered. The risks of TAVR include, but are not limited to, bleeding, death, strokes, injury to blood vessels requiring emergency intervention or surgery, heart attack or valve embolization requiring emergency intervention or surgery; acute kidney injury, loss of limb; etc. The patient understands the risks and benefits of the above stated procedure and has given informed consent.    PROCEDURE:    Patient was brought to the Operating Room and placed supine on the OR table. IV access was obtained and patient induced with general anesthesia. Parts were prepared and draped in the  usual sterile manner. A trans-esophageal echocardiogram probe was inserted and images were taken at baseline and throughout the duration of the procedure.  On the contralateral side, the common femoral artery and vein were accessed percutaneously with 6F sheaths being placed. Next, under fluoroscopic guidance, a 6F pigtail catheter was advanced via the common femoral artery along a J-guidewire into the proximal ascending aorta. The wire was removed, the catheter was flushed, and hemodynamic measurements were obtained. Under fluoroscopic guidance, a subclavian line was placed through which a transvenous temporary pacemaker was advanced into the apex of the right ventricle. Baseline pacemaker function was then ensured and catheter secured. An aortic root angiogram was performed via the pigtail catheter in the proximal aorta. The ipsilateral side was approached with a percutaneous approach with preclose procedure to allow placement of a 8F sheath using a standard J-wire. Intravenous heparin bolus was then given to acheive an ACT greater than 250 seconds.    The J-wire was switched over to a stiff Lundiquist wire over which the 16  F Arguello eSheath was placed in the descending thoracic aorta. Next, an AL1 catheter was advanced and the wire was exchanged out for a 0.035 straight-tipped wire, which was advanced and then used to cross the aortic valve. The catheter was slowly advanced into the body of the left ventricle and the wire was removed. An extra-stiff, pre-contoured Amplatz wire was then advanced thru the catheter and into the body of the left ventricle without difficulty. Next, a 29 mm ARGUELLO balloon was advanced over the extra-stiff Amplatz wire and placed at the level of the aortic valve. Rapid ventricular pacing was initiated and balloon aortic valvuloplasty was performed. The balloon catheter was then removed from the body over the Amplatz wire without difficulty.    Next, an ARGUELLO S3 29 mm valve was  advanced over the Amplatz wire and subsequently into the thoracic aorta. Under fluoroscopic guidance, this device was advanced over the aortic arch, into the proximal aortic root and positioned at the level of the aortic valve. The THV sheath was withdrawn to the proximal aorta, a test angiogram was done to confirm final placement of the THV, rapid ventricular pacing was initiated and the valve was deployed. Post deployment KUMAR revealed no significant perivalvular leak, and good positioning of the new trans-catheter heart valve.    The delivery system was then removed from the body over a stiff wire. The femoral sheath site was closed with 2 perclose devices. Angiographic confirmation of hemostasis was ensured. The percutaneous arterial access site was closed with a perclose device. The venous access sheath was pulled with manual pressure to obtain hemostasis when the ACT <170.    Patient transferred in a stable condition to the recovery area.    COMPLICATIONS: none      Padmaja Groves, DO  CV Surgery       [facility]

## 2025-04-30 ENCOUNTER — EMERGENCY (EMERGENCY)
Facility: HOSPITAL | Age: 35
LOS: 0 days | Discharge: HOME | End: 2025-04-30
Attending: STUDENT IN AN ORGANIZED HEALTH CARE EDUCATION/TRAINING PROGRAM
Payer: MEDICAID

## 2025-04-30 VITALS
TEMPERATURE: 99 F | SYSTOLIC BLOOD PRESSURE: 109 MMHG | RESPIRATION RATE: 18 BRPM | HEART RATE: 97 BPM | DIASTOLIC BLOOD PRESSURE: 76 MMHG | OXYGEN SATURATION: 99 %

## 2025-04-30 VITALS
TEMPERATURE: 98 F | WEIGHT: 293 LBS | SYSTOLIC BLOOD PRESSURE: 122 MMHG | OXYGEN SATURATION: 97 % | DIASTOLIC BLOOD PRESSURE: 81 MMHG | HEART RATE: 104 BPM | HEIGHT: 62 IN | RESPIRATION RATE: 20 BRPM

## 2025-04-30 PROCEDURE — 99284 EMERGENCY DEPT VISIT MOD MDM: CPT

## 2025-04-30 PROCEDURE — 93010 ELECTROCARDIOGRAM REPORT: CPT

## 2025-04-30 RX ORDER — DOCUSATE SODIUM 100 MG
1 CAPSULE ORAL
Qty: 20 | Refills: 0
Start: 2025-04-30 | End: 2025-05-09

## 2025-04-30 RX ORDER — MAGNESIUM HYDROXIDE 400 MG/5ML
30 SUSPENSION ORAL ONCE
Refills: 0 | Status: COMPLETED | OUTPATIENT
Start: 2025-04-30 | End: 2025-04-30

## 2025-04-30 RX ADMIN — MAGNESIUM HYDROXIDE 30 MILLILITER(S): 400 SUSPENSION ORAL at 20:24

## 2025-04-30 NOTE — ED ADULT TRIAGE NOTE - CHIEF COMPLAINT QUOTE
Abd pain and forcing her Bowel movements and today sees a little bit of blood, presents to ED saying hello to everyone,  Staff with patient  LMP 3/3

## 2025-04-30 NOTE — ED ADULT NURSE NOTE - NSFALLUNIVINTERV_ED_ALL_ED
Bed/Stretcher in lowest position, wheels locked, appropriate side rails in place/Call bell, personal items and telephone in reach/Instruct patient to call for assistance before getting out of bed/chair/stretcher/Non-slip footwear applied when patient is off stretcher/[place] to call system/Physically safe environment - no spills, clutter or unnecessary equipment/Purposeful proactive rounding/Room/bathroom lighting operational, light cord in reach

## 2025-04-30 NOTE — ED PROVIDER NOTE - PROGRESS NOTE DETAILS
per nurse pt did go to t he bathroom, pt is with an employee from the residence, discharged on colace

## 2025-04-30 NOTE — ED PROVIDER NOTE - OBJECTIVE STATEMENT
comes in with constipation x 2 days  pushing, some blood as a result , also c/o chest burning   denies sob, palpitations, flu symptoms, travel, leg swelling or calf pains see mdm

## 2025-04-30 NOTE — ED PROVIDER NOTE - IV ALTEPLASE EXCL ABS HIDDEN
Clinic Rooming Note - Follow-up  Orthopedic Surgery  Hip and Knee Replacement  Hip Arthroscopy        Working Diagnosis/Prior Surgeries:  Right hip arthroscopy for labral repair, femoroplasty, and trochanteric bursectomy 12/14/23       Updates since prior visit (see prior Plan):    Here for Left knee pain , onset after lost balance but no fall about 6 weeks ago. Did have some swelling but now less. Continues with pain and instability.  Was seen by PCP, Dr. Louis and MRI on 12/16/23  and X ray done, tear of the medial meniscus and Severe medial and patellofemoral compartment chondromalacia     No history of cortisone injection in knee  Not currently in PT         show

## 2025-04-30 NOTE — ED ADULT NURSE REASSESSMENT NOTE - NS ED NURSE REASSESS COMMENT FT1
Pt aide cannot get private transport at this time, will be setting up ambulance for pt  due to psychiatric history for transport home.

## 2025-04-30 NOTE — ED ADULT NURSE REASSESSMENT NOTE - NS ED NURSE REASSESS COMMENT FT1
Pt returning home with guardian from group home ([facility]). Safe to discharge at this time.

## 2025-04-30 NOTE — ED PROVIDER NOTE - PATIENT PORTAL LINK FT
You can access the FollowMyHealth Patient Portal offered by [facility] by registering at the following website: [email or web address]. By joining [facility]’s FollowMyHealth portal, you will also be able to view your health information using other applications (apps) compatible with our system.

## 2025-04-30 NOTE — ED ADULT NURSE REASSESSMENT NOTE - NS ED NURSE REASSESS COMMENT FT1
pt greeted me with a smile. pt is from [facility] home, home care provider at bedside. pt stated she had a BM yesterday but her buttocks hurt after forcing BM out.

## 2025-04-30 NOTE — ED PROVIDER NOTE - CLINICAL SUMMARY MEDICAL DECISION MAKING FREE TEXT BOX
34 year old female 34-year-old female with history of asthma, bipolar disorder, developmental delay, diabetes, skipped affective disorder presents today accompanied with bleeding employee from her group home complaining of constipation.  Patient states that she was straining to go, at 1 point soft small spots of blood, patient also reports chest burning sensation.  She denies shortness of breath, palpitation, flulike symptoms, recent travel, leg swelling or calf pains.  On exam patient is awake alert and oriented x 3, calm and cooperative, nontoxic/non-lethargic.  Her exam is benign.  Plan EKG to rule out any cardiac event although chest burning likely GERD and milk of magnesia for constipation.  Patient will likely be discharged

## 2025-04-30 NOTE — ED ADULT TRIAGE NOTE - INTERNATIONAL TRAVEL
Reach out to patient's listed number and informed the wife that the provider was waiting on a return call with the freq for the Clonidine 1 mg so she can refill medication. She informed me that patient daughter is with him at this time and that we need to reach out to her because she is the one the refill his pill box. The number for granddaughter (Caren) [phone]. Left message on answering machine to return call to clinic.   No

## 2025-05-01 ENCOUNTER — EMERGENCY (EMERGENCY)
Facility: HOSPITAL | Age: 35
LOS: 0 days | Discharge: HOME | End: 2025-05-01
Attending: EMERGENCY MEDICINE
Payer: MEDICAID

## 2025-05-01 VITALS
OXYGEN SATURATION: 98 % | WEIGHT: 293 LBS | HEIGHT: 62 IN | SYSTOLIC BLOOD PRESSURE: 100 MMHG | RESPIRATION RATE: 18 BRPM | HEART RATE: 90 BPM | TEMPERATURE: 98 F | DIASTOLIC BLOOD PRESSURE: 79 MMHG

## 2025-05-01 VITALS
TEMPERATURE: 98 F | SYSTOLIC BLOOD PRESSURE: 109 MMHG | DIASTOLIC BLOOD PRESSURE: 76 MMHG | OXYGEN SATURATION: 97 % | HEART RATE: 93 BPM | RESPIRATION RATE: 18 BRPM

## 2025-05-01 DIAGNOSIS — Z91.013 ALLERGY TO SEAFOOD: ICD-10-CM

## 2025-05-01 DIAGNOSIS — Z91.018 ALLERGY TO OTHER FOODS: ICD-10-CM

## 2025-05-01 DIAGNOSIS — Z91.010 ALLERGY TO PEANUTS: ICD-10-CM

## 2025-05-01 DIAGNOSIS — Z01.89 ENCOUNTER FOR OTHER SPECIFIED SPECIAL EXAMINATIONS: ICD-10-CM

## 2025-05-01 PROCEDURE — 99283 EMERGENCY DEPT VISIT LOW MDM: CPT

## 2025-05-01 NOTE — ED ADULT NURSE NOTE - NSFALLRISKINTERV_ED_ALL_ED

## 2025-05-01 NOTE — ED ADULT NURSE REASSESSMENT NOTE - NS ED NURSE REASSESS COMMENT FT1
Spoke to  Pamela. [facility] made  aware that pt is ready to return to group [place], Pamela stated they are ready for pt to return.

## 2025-05-01 NOTE — ED PROVIDER NOTE - PROGRESS NOTE DETAILS
pt. agrees to f/u with primary care outpt.  pt. understands to return to ED if symptoms worsen; will d/c   calm and cooperative throughout ER visit

## 2025-05-01 NOTE — ED PROVIDER NOTE - CARE PROVIDER_API CALL
Sonu Lopez  Internal Medicine  [address] [place] [address]  Phone: [phone]  Fax: [phone]  Follow Up Time: Urgent

## 2025-05-01 NOTE — ED ADULT TRIAGE NOTE - CHIEF COMPLAINT QUOTE
pt was discharge from this ER to group home , pt refused to stay in the group home treating to allison from her group home

## 2025-05-01 NOTE — ED PROVIDER NOTE - PATIENT PORTAL LINK FT
You can access the FollowMyHealth Patient Portal offered by [facility] by registering at the following website: [email or web address]. By joining [facility]’s FollowMyHealth portal, you will also be able to view your health information using other applications (apps) compatible with our system.

## 2025-05-01 NOTE — ED PROVIDER NOTE - CLINICAL SUMMARY MEDICAL DECISION MAKING FREE TEXT BOX
33 yo F with no complaints, normal behavior in ER thus far  -SW in AM for eval, and placement back to group home safely

## 2025-05-01 NOTE — ED PROVIDER NOTE - OBJECTIVE STATEMENT
35 yo F presents for wellness check after refusing to stay at group home, recently discharged from here.  Pt. currently has no complaints, just states she doesn't like people at the group home.   ROS: negative for fever, cough, headache, chest pain, shortness of breath, abd pain, nausea, vomiting, diarrhea, rash, paresthesia, and weakness--all other systems reviewed are negative.   PMH: asthma, bipolar, develop delay, DM, intellectual disability, schizoaffective; Meds: Denies; SH: Denies smoking/drinking/drug use

## 2025-05-01 NOTE — ED ADULT NURSE REASSESSMENT NOTE - NS ED NURSE REASSESS COMMENT FT1
Spoke to pt's Emma Nicole states he will visit pt at group home after his 1pm appointment. Pt requesting to see emma. Also left message for pt's Aunt Mariya. Spoke too group home staff Lizbeth who is in agreement for pt to return to group home.

## 2025-05-01 NOTE — ED ADULT NURSE NOTE - OBJECTIVE STATEMENT
Pt AOx3, BIBEMS. Pt states "I don't like the people at my group home so I wanted to come back to the hospital". Pt denies pain, denies n/v/diarrhea/fever/chills/SOB. PMH: asthma, bipolar, develop delay, DM, intellectual disability, schizoaffective.

## 2025-05-05 NOTE — ED PROVIDER NOTE - DISPOSITION TYPE
A balloon catheter was inserted. Supply used: (CATHETER BLN MNRL EMERGE 3MM 15MM 144CM 2 LUM ULTRA LOWPRFL). LAD DISCHARGE

## 2025-05-13 NOTE — ED ADULT TRIAGE NOTE - PATIENT ON (OXYGEN DELIVERY METHOD)
Detail Level: Detailed Add 08798 Cpt? (Important Note: In 2017 The Use Of 59432 Is Being Tracked By Cms To Determine Future Global Period Reimbursement For Global Periods): yes Quality 355: Unplanned Reoperation Within The 30 Day Postoperative Period: No return to the operating room for a surgical procedure, for complications of the principal operative procedure, within 30 days of the principal operative procedure Quality 357: Surgical Site Infection (Ssi): No surgical site infection Suture/Staple Removal Completed By (Optional): Alexandrea GROSS Additional Comments: Post suture removal/discharge instructions given.\\nAdvised patient to refrain one more week before leg workouts and to ease in carefully. room air

## 2025-05-26 NOTE — ED ADULT TRIAGE NOTE - AS HEIGHT TYPE
stated assumed care of pt from triage, pt AAOX4, resp. even and unlabored on RA, pt NSR on CM, pt c/o palpitations that started last night and progressively got worse into this morning, hx of mitral valve prolapse, neg. CP/SOB/HA/dizziness/vision changes/abd pain/N/V/D, labs drawn and sent per MD orders, pt eating in room and appears calm and comfortable at this time, safety maintained.

## 2025-06-03 NOTE — ED BEHAVIORAL HEALTH ASSESSMENT NOTE - PREPARATORY ACTS:
The patient is Stable - Low risk of patient condition declining or worsening    Shift Goals  Clinical Goals: Safety, IV [facility], wound care, pain control,  Patient Goals: Pain control, walk, wound care  Family Goals: Updates    Progress made toward(s) clinical / shift goals:        Problem: Knowledge Deficit - Standard  Goal: Patient and family/care givers will demonstrate understanding of plan of care, disease process/condition, diagnostic tests and medications  Outcome: Progressing     Problem: Hemodynamics  Goal: Patient's hemodynamics, fluid balance and neurologic status will be stable or improve  Outcome: Progressing     Problem: Fluid Volume  Goal: Fluid volume balance will be maintained  Outcome: Progressing     Problem: Respiratory  Goal: Patient will achieve/maintain optimum respiratory ventilation and gas exchange  Outcome: Progressing       Patient is not progressing towards the following goals:       None known

## 2025-06-09 NOTE — ED PROVIDER NOTE - NS ED MD DISPO DISCHARGE CCDA
lorenzo silverman accompanied their family member to the emergency department on 6/9/2025. They may return to work on 06/09/2025.      If you have any questions or concerns, please don't hesitate to call.      dr viveros/ronald sarabia RN Patient/Caregiver provided printed discharge information.

## 2025-06-30 NOTE — ED ADULT TRIAGE NOTE - WEIGHT IN KG
Medication(s) Requested: Adderall  Last office visit: 1/31/25 and has follow up on 7/31/25  Last refill: 5/30/25 #30  Is the patient due for refill of this medication(s): Yes  PDMP review: Criteria met. Prescription printed for Physician/JENNIFER signature.      Sore throat since yesterday, this am feels her tonsils are swollen and she has pus. 112

## 2025-07-22 ENCOUNTER — APPOINTMENT (OUTPATIENT)
Age: 35
End: 2025-07-22

## 2025-07-24 NOTE — ED PROVIDER NOTE - TOBACCO USE
oriented to person, place and time , normal sensation , short and long term memory intact
How Severe Is Your Skin Discoloration?: moderate
Additional History: No treatment
Never smoker

## 2025-07-31 NOTE — ED ADULT NURSE NOTE - NSFALLUNIVINTERV_ED_ALL_ED
Called left voicemail about missed appt on 07/31/2025   Bed/Stretcher in lowest position, wheels locked, appropriate side rails in place/Call bell, personal items and telephone in reach/Instruct patient to call for assistance before getting out of bed/chair/stretcher/Non-slip footwear applied when patient is off stretcher/[place] to call system/Physically safe environment - no spills, clutter or unnecessary equipment/Purposeful proactive rounding/Room/bathroom lighting operational, light cord in reach

## 2025-08-06 ENCOUNTER — EMERGENCY (EMERGENCY)
Facility: HOSPITAL | Age: 35
LOS: 0 days | Discharge: HOME | End: 2025-08-07
Attending: EMERGENCY MEDICINE
Payer: MEDICAID

## 2025-08-06 VITALS
TEMPERATURE: 98 F | HEART RATE: 96 BPM | SYSTOLIC BLOOD PRESSURE: 105 MMHG | DIASTOLIC BLOOD PRESSURE: 75 MMHG | HEIGHT: 62 IN | OXYGEN SATURATION: 100 % | WEIGHT: 263.01 LBS | RESPIRATION RATE: 16 BRPM

## 2025-08-06 DIAGNOSIS — R10.13 EPIGASTRIC PAIN: ICD-10-CM

## 2025-08-06 DIAGNOSIS — Z91.010 ALLERGY TO PEANUTS: ICD-10-CM

## 2025-08-06 DIAGNOSIS — Z91.018 ALLERGY TO OTHER FOODS: ICD-10-CM

## 2025-08-06 PROCEDURE — 99284 EMERGENCY DEPT VISIT MOD MDM: CPT

## 2025-08-07 VITALS
HEART RATE: 84 BPM | RESPIRATION RATE: 14 BRPM | TEMPERATURE: 98 F | SYSTOLIC BLOOD PRESSURE: 125 MMHG | OXYGEN SATURATION: 100 % | DIASTOLIC BLOOD PRESSURE: 72 MMHG

## 2025-08-07 LAB
ALBUMIN SERPL ELPH-MCNC: 2.8 G/DL — LOW (ref 3.3–5)
ALP SERPL-CCNC: 48 U/L — SIGNIFICANT CHANGE UP (ref 40–120)
ALT FLD-CCNC: 24 U/L — SIGNIFICANT CHANGE UP (ref 12–78)
ANION GAP SERPL CALC-SCNC: 5 MMOL/L — SIGNIFICANT CHANGE UP (ref 5–17)
AST SERPL-CCNC: 38 U/L — HIGH (ref 15–37)
BASOPHILS # BLD AUTO: 0.01 K/UL — SIGNIFICANT CHANGE UP (ref 0–0.2)
BASOPHILS NFR BLD AUTO: 0.2 % — SIGNIFICANT CHANGE UP (ref 0–2)
BILIRUB SERPL-MCNC: 0.4 MG/DL — SIGNIFICANT CHANGE UP (ref 0.2–1.2)
BUN SERPL-MCNC: 13 MG/DL — SIGNIFICANT CHANGE UP (ref 7–23)
CALCIUM SERPL-MCNC: 9 MG/DL — SIGNIFICANT CHANGE UP (ref 8.5–10.1)
CHLORIDE SERPL-SCNC: 114 MMOL/L — HIGH (ref 96–108)
CO2 SERPL-SCNC: 21 MMOL/L — LOW (ref 22–31)
CREAT SERPL-MCNC: 0.78 MG/DL — SIGNIFICANT CHANGE UP (ref 0.5–1.3)
EGFR: 103 ML/MIN/1.73M2 — SIGNIFICANT CHANGE UP
EGFR: 103 ML/MIN/1.73M2 — SIGNIFICANT CHANGE UP
EOSINOPHIL # BLD AUTO: 0.03 K/UL — SIGNIFICANT CHANGE UP (ref 0–0.5)
EOSINOPHIL NFR BLD AUTO: 0.5 % — SIGNIFICANT CHANGE UP (ref 0–6)
GLUCOSE SERPL-MCNC: 101 MG/DL — HIGH (ref 70–99)
HCG SERPL-ACNC: <1 MIU/ML — SIGNIFICANT CHANGE UP
HCT VFR BLD CALC: 38.4 % — SIGNIFICANT CHANGE UP (ref 34.5–45)
HGB BLD-MCNC: 12.6 G/DL — SIGNIFICANT CHANGE UP (ref 11.5–15.5)
IMM GRANULOCYTES NFR BLD AUTO: 0.5 % — SIGNIFICANT CHANGE UP (ref 0–0.9)
LIDOCAIN IGE QN: 27 U/L — SIGNIFICANT CHANGE UP (ref 13–75)
LYMPHOCYTES # BLD AUTO: 2.86 K/UL — SIGNIFICANT CHANGE UP (ref 1–3.3)
LYMPHOCYTES # BLD AUTO: 44.4 % — HIGH (ref 13–44)
MANUAL SMEAR VERIFICATION: SIGNIFICANT CHANGE UP
MCHC RBC-ENTMCNC: 31.6 PG — SIGNIFICANT CHANGE UP (ref 27–34)
MCHC RBC-ENTMCNC: 32.8 G/DL — SIGNIFICANT CHANGE UP (ref 32–36)
MCV RBC AUTO: 96.2 FL — SIGNIFICANT CHANGE UP (ref 80–100)
MONOCYTES # BLD AUTO: 0.46 K/UL — SIGNIFICANT CHANGE UP (ref 0–0.9)
MONOCYTES NFR BLD AUTO: 7.1 % — SIGNIFICANT CHANGE UP (ref 2–14)
NEUTROPHILS # BLD AUTO: 3.05 K/UL — SIGNIFICANT CHANGE UP (ref 1.8–7.4)
NEUTROPHILS NFR BLD AUTO: 47.3 % — SIGNIFICANT CHANGE UP (ref 43–77)
NRBC BLD AUTO-RTO: 0 /100 WBCS — SIGNIFICANT CHANGE UP (ref 0–0)
PLAT MORPH BLD: NORMAL — SIGNIFICANT CHANGE UP
PLATELET # BLD AUTO: 186 K/UL — SIGNIFICANT CHANGE UP (ref 150–400)
PLATELET CLUMP BLD QL SMEAR: ABNORMAL
POTASSIUM SERPL-MCNC: 5.1 MMOL/L — SIGNIFICANT CHANGE UP (ref 3.5–5.3)
POTASSIUM SERPL-SCNC: 5.1 MMOL/L — SIGNIFICANT CHANGE UP (ref 3.5–5.3)
PROT SERPL-MCNC: 7.1 GM/DL — SIGNIFICANT CHANGE UP (ref 6–8.3)
RBC # BLD: 3.99 M/UL — SIGNIFICANT CHANGE UP (ref 3.8–5.2)
RBC # FLD: 14.3 % — SIGNIFICANT CHANGE UP (ref 10.3–14.5)
RBC BLD AUTO: NORMAL — SIGNIFICANT CHANGE UP
SODIUM SERPL-SCNC: 140 MMOL/L — SIGNIFICANT CHANGE UP (ref 135–145)
WBC # BLD: 6.44 K/UL — SIGNIFICANT CHANGE UP (ref 3.8–10.5)
WBC # FLD AUTO: 6.44 K/UL — SIGNIFICANT CHANGE UP (ref 3.8–10.5)

## 2025-08-07 RX ORDER — MAGNESIUM, ALUMINUM HYDROXIDE 200-200 MG
30 TABLET,CHEWABLE ORAL ONCE
Refills: 0 | Status: COMPLETED | OUTPATIENT
Start: 2025-08-07 | End: 2025-08-07

## 2025-08-07 RX ADMIN — Medication 30 MILLILITER(S): at 00:30

## 2025-08-07 NOTE — ED ADULT NURSE REASSESSMENT NOTE - STATUS
----- Message from Ashlee sent at 8/7/2025  1:51 PM CDT -----  - 1:32- pt called a couple weeks ago about test strips on his one touch machine and has not received them   [phone]   awaiting bed, no change

## 2025-08-18 ENCOUNTER — EMERGENCY (EMERGENCY)
Facility: HOSPITAL | Age: 35
LOS: 0 days | Discharge: HOME | End: 2025-08-18
Attending: STUDENT IN AN ORGANIZED HEALTH CARE EDUCATION/TRAINING PROGRAM
Payer: MEDICAID

## 2025-08-18 VITALS
WEIGHT: 269.4 LBS | HEART RATE: 97 BPM | RESPIRATION RATE: 16 BRPM | TEMPERATURE: 98 F | SYSTOLIC BLOOD PRESSURE: 100 MMHG | HEIGHT: 62 IN | DIASTOLIC BLOOD PRESSURE: 75 MMHG | OXYGEN SATURATION: 97 %

## 2025-08-18 VITALS
OXYGEN SATURATION: 100 % | RESPIRATION RATE: 17 BRPM | SYSTOLIC BLOOD PRESSURE: 108 MMHG | HEART RATE: 82 BPM | DIASTOLIC BLOOD PRESSURE: 77 MMHG

## 2025-08-18 DIAGNOSIS — Z91.018 ALLERGY TO OTHER FOODS: ICD-10-CM

## 2025-08-18 DIAGNOSIS — R46.89 OTHER SYMPTOMS AND SIGNS INVOLVING APPEARANCE AND BEHAVIOR: ICD-10-CM

## 2025-08-18 DIAGNOSIS — R45.1 RESTLESSNESS AND AGITATION: ICD-10-CM

## 2025-08-18 DIAGNOSIS — Z91.010 ALLERGY TO PEANUTS: ICD-10-CM

## 2025-08-18 PROCEDURE — 99283 EMERGENCY DEPT VISIT LOW MDM: CPT

## 2025-08-19 ENCOUNTER — EMERGENCY (EMERGENCY)
Facility: HOSPITAL | Age: 35
LOS: 0 days | Discharge: HOME | End: 2025-08-20
Attending: EMERGENCY MEDICINE
Payer: MEDICAID

## 2025-08-19 VITALS
RESPIRATION RATE: 18 BRPM | DIASTOLIC BLOOD PRESSURE: 82 MMHG | OXYGEN SATURATION: 100 % | HEART RATE: 97 BPM | WEIGHT: 268.96 LBS | TEMPERATURE: 98 F | SYSTOLIC BLOOD PRESSURE: 118 MMHG | HEIGHT: 62 IN

## 2025-08-19 DIAGNOSIS — R10.9 UNSPECIFIED ABDOMINAL PAIN: ICD-10-CM
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
## 2025-08-19 DIAGNOSIS — Z91.018 ALLERGY TO OTHER FOODS: ICD-10-CM

## 2025-08-19 DIAGNOSIS — T73.0XXA STARVATION, INITIAL ENCOUNTER: ICD-10-CM

## 2025-08-19 PROCEDURE — 99283 EMERGENCY DEPT VISIT LOW MDM: CPT

## 2025-08-20 VITALS
OXYGEN SATURATION: 100 % | RESPIRATION RATE: 18 BRPM | SYSTOLIC BLOOD PRESSURE: 104 MMHG | HEART RATE: 98 BPM | TEMPERATURE: 98 F | DIASTOLIC BLOOD PRESSURE: 71 MMHG

## 2025-08-31 ENCOUNTER — EMERGENCY (EMERGENCY)
Facility: HOSPITAL | Age: 35
LOS: 0 days | Discharge: HOME | End: 2025-08-31
Payer: MEDICAID

## 2025-08-31 VITALS
OXYGEN SATURATION: 100 % | RESPIRATION RATE: 16 BRPM | DIASTOLIC BLOOD PRESSURE: 89 MMHG | TEMPERATURE: 98 F | SYSTOLIC BLOOD PRESSURE: 126 MMHG | HEART RATE: 95 BPM

## 2025-08-31 VITALS
WEIGHT: 179.9 LBS | OXYGEN SATURATION: 100 % | HEART RATE: 99 BPM | RESPIRATION RATE: 17 BRPM | DIASTOLIC BLOOD PRESSURE: 87 MMHG | TEMPERATURE: 98 F | SYSTOLIC BLOOD PRESSURE: 121 MMHG | HEIGHT: 62 IN

## 2025-08-31 DIAGNOSIS — Z91.010 ALLERGY TO PEANUTS: ICD-10-CM

## 2025-08-31 DIAGNOSIS — L03.311 CELLULITIS OF ABDOMINAL WALL: ICD-10-CM

## 2025-08-31 DIAGNOSIS — Z91.018 ALLERGY TO OTHER FOODS: ICD-10-CM

## 2025-08-31 PROCEDURE — 99284 EMERGENCY DEPT VISIT MOD MDM: CPT

## 2025-08-31 RX ORDER — SULFAMETHOXAZOLE/TRIMETHOPRIM 800-160 MG
1 TABLET ORAL
Qty: 14 | Refills: 0
Start: 2025-08-31 | End: 2025-09-06

## 2025-08-31 RX ORDER — SULFAMETHOXAZOLE/TRIMETHOPRIM 800-160 MG
1 TABLET ORAL ONCE
Refills: 0 | Status: COMPLETED | OUTPATIENT
Start: 2025-08-31 | End: 2025-08-31

## 2025-08-31 RX ADMIN — Medication 1 TABLET(S): at 19:54
